# Patient Record
Sex: FEMALE | Race: WHITE | Employment: UNEMPLOYED
[De-identification: names, ages, dates, MRNs, and addresses within clinical notes are randomized per-mention and may not be internally consistent; named-entity substitution may affect disease eponyms.]

---

## 2017-01-01 ENCOUNTER — HOME CARE VISIT (OUTPATIENT)
Dept: SCHEDULING | Facility: HOME HEALTH | Age: 62
End: 2017-01-01
Payer: MEDICARE

## 2017-01-01 ENCOUNTER — HOME CARE VISIT (OUTPATIENT)
Dept: HOSPICE | Facility: HOSPICE | Age: 62
End: 2017-01-01
Payer: MEDICARE

## 2017-01-01 ENCOUNTER — TELEPHONE (OUTPATIENT)
Dept: PALLATIVE CARE | Age: 62
End: 2017-01-01

## 2017-01-01 ENCOUNTER — HOSPITAL ENCOUNTER (INPATIENT)
Age: 62
LOS: 6 days | Discharge: HOME | DRG: 189 | End: 2017-02-12
Attending: EMERGENCY MEDICINE | Admitting: INTERNAL MEDICINE
Payer: MEDICARE

## 2017-01-01 ENCOUNTER — OFFICE VISIT (OUTPATIENT)
Dept: FAMILY MEDICINE CLINIC | Age: 62
End: 2017-01-01

## 2017-01-01 ENCOUNTER — NURSE NAVIGATOR (OUTPATIENT)
Dept: PALLATIVE CARE | Age: 62
End: 2017-01-01

## 2017-01-01 ENCOUNTER — APPOINTMENT (OUTPATIENT)
Dept: GENERAL RADIOLOGY | Age: 62
DRG: 871 | End: 2017-01-01
Attending: INTERNAL MEDICINE
Payer: MEDICARE

## 2017-01-01 ENCOUNTER — DOCUMENTATION ONLY (OUTPATIENT)
Dept: FAMILY MEDICINE CLINIC | Age: 62
End: 2017-01-01

## 2017-01-01 ENCOUNTER — HOME CARE VISIT (OUTPATIENT)
Dept: HOME HEALTH SERVICES | Facility: HOME HEALTH | Age: 62
End: 2017-01-01
Payer: MEDICARE

## 2017-01-01 ENCOUNTER — APPOINTMENT (OUTPATIENT)
Dept: NUCLEAR MEDICINE | Age: 62
DRG: 871 | End: 2017-01-01
Attending: INTERNAL MEDICINE
Payer: MEDICARE

## 2017-01-01 ENCOUNTER — HOME HEALTH ADMISSION (OUTPATIENT)
Dept: HOME HEALTH SERVICES | Facility: HOME HEALTH | Age: 62
End: 2017-01-01
Payer: MEDICARE

## 2017-01-01 ENCOUNTER — PATIENT OUTREACH (OUTPATIENT)
Dept: FAMILY MEDICINE CLINIC | Age: 62
End: 2017-01-01

## 2017-01-01 ENCOUNTER — HOSPITAL ENCOUNTER (INPATIENT)
Age: 62
LOS: 6 days | Discharge: HOME HEALTH | DRG: 871 | End: 2017-04-22
Attending: EMERGENCY MEDICINE | Admitting: INTERNAL MEDICINE
Payer: MEDICARE

## 2017-01-01 ENCOUNTER — DOCUMENTATION ONLY (OUTPATIENT)
Dept: PALLATIVE CARE | Age: 62
End: 2017-01-01

## 2017-01-01 ENCOUNTER — APPOINTMENT (OUTPATIENT)
Dept: GENERAL RADIOLOGY | Age: 62
DRG: 871 | End: 2017-01-01
Attending: EMERGENCY MEDICINE
Payer: MEDICARE

## 2017-01-01 ENCOUNTER — ANESTHESIA (OUTPATIENT)
Dept: ENDOSCOPY | Age: 62
DRG: 871 | End: 2017-01-01
Payer: MEDICARE

## 2017-01-01 ENCOUNTER — APPOINTMENT (OUTPATIENT)
Dept: CT IMAGING | Age: 62
DRG: 871 | End: 2017-01-01
Attending: FAMILY MEDICINE
Payer: MEDICARE

## 2017-01-01 ENCOUNTER — HOSPITAL ENCOUNTER (INPATIENT)
Age: 62
LOS: 9 days | Discharge: HOME | DRG: 871 | End: 2017-01-24
Attending: EMERGENCY MEDICINE | Admitting: INTERNAL MEDICINE
Payer: MEDICARE

## 2017-01-01 ENCOUNTER — HOSPICE ADMISSION (OUTPATIENT)
Dept: HOSPICE | Facility: HOSPICE | Age: 62
End: 2017-01-01
Payer: MEDICARE

## 2017-01-01 ENCOUNTER — ANESTHESIA EVENT (OUTPATIENT)
Dept: ENDOSCOPY | Age: 62
DRG: 871 | End: 2017-01-01
Payer: MEDICARE

## 2017-01-01 ENCOUNTER — APPOINTMENT (OUTPATIENT)
Dept: CT IMAGING | Age: 62
DRG: 871 | End: 2017-01-01
Attending: EMERGENCY MEDICINE
Payer: MEDICARE

## 2017-01-01 ENCOUNTER — APPOINTMENT (OUTPATIENT)
Dept: CT IMAGING | Age: 62
DRG: 871 | End: 2017-01-01
Attending: INTERNAL MEDICINE
Payer: MEDICARE

## 2017-01-01 ENCOUNTER — HOSPITAL ENCOUNTER (INPATIENT)
Age: 62
LOS: 8 days | Discharge: HOME HEALTH | DRG: 871 | End: 2017-03-03
Attending: EMERGENCY MEDICINE | Admitting: HOSPITALIST
Payer: MEDICARE

## 2017-01-01 ENCOUNTER — HOME VISIT (OUTPATIENT)
Dept: PALLATIVE CARE | Age: 62
End: 2017-01-01

## 2017-01-01 ENCOUNTER — APPOINTMENT (OUTPATIENT)
Dept: GENERAL RADIOLOGY | Age: 62
DRG: 871 | End: 2017-01-01
Attending: FAMILY MEDICINE
Payer: MEDICARE

## 2017-01-01 ENCOUNTER — HOSPITAL ENCOUNTER (INPATIENT)
Age: 62
LOS: 5 days | Discharge: HOME HEALTH | DRG: 871 | End: 2017-03-27
Attending: EMERGENCY MEDICINE | Admitting: FAMILY MEDICINE
Payer: MEDICARE

## 2017-01-01 ENCOUNTER — APPOINTMENT (OUTPATIENT)
Dept: PET IMAGING | Age: 62
DRG: 189 | End: 2017-01-01
Attending: FAMILY MEDICINE
Payer: MEDICARE

## 2017-01-01 ENCOUNTER — HOSPITAL ENCOUNTER (INPATIENT)
Age: 62
LOS: 4 days | Discharge: HOME HEALTH | DRG: 871 | End: 2017-03-10
Attending: EMERGENCY MEDICINE | Admitting: INTERNAL MEDICINE
Payer: MEDICARE

## 2017-01-01 ENCOUNTER — APPOINTMENT (OUTPATIENT)
Dept: ULTRASOUND IMAGING | Age: 62
DRG: 871 | End: 2017-01-01
Attending: INTERNAL MEDICINE
Payer: MEDICARE

## 2017-01-01 ENCOUNTER — HOSPITAL ENCOUNTER (INPATIENT)
Age: 62
LOS: 3 days | End: 2017-11-19
Attending: INTERNAL MEDICINE | Admitting: INTERNAL MEDICINE

## 2017-01-01 ENCOUNTER — APPOINTMENT (OUTPATIENT)
Dept: GENERAL RADIOLOGY | Age: 62
DRG: 189 | End: 2017-01-01
Attending: EMERGENCY MEDICINE
Payer: MEDICARE

## 2017-01-01 VITALS
OXYGEN SATURATION: 96 % | TEMPERATURE: 98.6 F | DIASTOLIC BLOOD PRESSURE: 83 MMHG | HEART RATE: 70 BPM | SYSTOLIC BLOOD PRESSURE: 145 MMHG

## 2017-01-01 VITALS
TEMPERATURE: 98.8 F | RESPIRATION RATE: 17 BRPM | SYSTOLIC BLOOD PRESSURE: 90 MMHG | DIASTOLIC BLOOD PRESSURE: 60 MMHG | OXYGEN SATURATION: 97 % | HEART RATE: 67 BPM

## 2017-01-01 VITALS
SYSTOLIC BLOOD PRESSURE: 150 MMHG | HEART RATE: 97 BPM | HEIGHT: 69 IN | OXYGEN SATURATION: 95 % | WEIGHT: 142.86 LBS | TEMPERATURE: 99 F | RESPIRATION RATE: 30 BRPM | BODY MASS INDEX: 21.16 KG/M2 | DIASTOLIC BLOOD PRESSURE: 88 MMHG

## 2017-01-01 VITALS — HEART RATE: 100 BPM | SYSTOLIC BLOOD PRESSURE: 90 MMHG | RESPIRATION RATE: 24 BRPM | DIASTOLIC BLOOD PRESSURE: 62 MMHG

## 2017-01-01 VITALS
RESPIRATION RATE: 20 BRPM | SYSTOLIC BLOOD PRESSURE: 118 MMHG | HEART RATE: 105 BPM | OXYGEN SATURATION: 92 % | DIASTOLIC BLOOD PRESSURE: 80 MMHG

## 2017-01-01 VITALS
OXYGEN SATURATION: 97 % | HEART RATE: 81 BPM | WEIGHT: 135 LBS | TEMPERATURE: 97.3 F | DIASTOLIC BLOOD PRESSURE: 89 MMHG | RESPIRATION RATE: 18 BRPM | BODY MASS INDEX: 20.46 KG/M2 | HEIGHT: 68 IN | SYSTOLIC BLOOD PRESSURE: 136 MMHG

## 2017-01-01 VITALS
DIASTOLIC BLOOD PRESSURE: 90 MMHG | RESPIRATION RATE: 18 BRPM | HEART RATE: 69 BPM | SYSTOLIC BLOOD PRESSURE: 134 MMHG | OXYGEN SATURATION: 97 % | TEMPERATURE: 97 F

## 2017-01-01 VITALS
BODY MASS INDEX: 19.99 KG/M2 | OXYGEN SATURATION: 94 % | SYSTOLIC BLOOD PRESSURE: 130 MMHG | DIASTOLIC BLOOD PRESSURE: 90 MMHG | RESPIRATION RATE: 26 BRPM | HEIGHT: 69 IN | TEMPERATURE: 97.6 F | HEART RATE: 85 BPM | WEIGHT: 135 LBS

## 2017-01-01 VITALS
RESPIRATION RATE: 22 BRPM | TEMPERATURE: 98 F | HEART RATE: 80 BPM | WEIGHT: 132 LBS | BODY MASS INDEX: 20.07 KG/M2 | SYSTOLIC BLOOD PRESSURE: 125 MMHG | DIASTOLIC BLOOD PRESSURE: 82 MMHG | OXYGEN SATURATION: 95 %

## 2017-01-01 VITALS
DIASTOLIC BLOOD PRESSURE: 66 MMHG | RESPIRATION RATE: 14 BRPM | HEIGHT: 68 IN | WEIGHT: 134.92 LBS | HEART RATE: 74 BPM | BODY MASS INDEX: 20.45 KG/M2 | TEMPERATURE: 97.5 F | SYSTOLIC BLOOD PRESSURE: 132 MMHG | OXYGEN SATURATION: 97 %

## 2017-01-01 VITALS
DIASTOLIC BLOOD PRESSURE: 62 MMHG | RESPIRATION RATE: 16 BRPM | HEART RATE: 60 BPM | TEMPERATURE: 98 F | SYSTOLIC BLOOD PRESSURE: 102 MMHG | OXYGEN SATURATION: 98 %

## 2017-01-01 VITALS
DIASTOLIC BLOOD PRESSURE: 88 MMHG | SYSTOLIC BLOOD PRESSURE: 110 MMHG | RESPIRATION RATE: 14 BRPM | OXYGEN SATURATION: 91 % | HEART RATE: 66 BPM | OXYGEN SATURATION: 97 % | HEART RATE: 99 BPM | TEMPERATURE: 99.3 F | DIASTOLIC BLOOD PRESSURE: 62 MMHG | SYSTOLIC BLOOD PRESSURE: 122 MMHG

## 2017-01-01 VITALS
HEART RATE: 60 BPM | WEIGHT: 125 LBS | RESPIRATION RATE: 24 BRPM | BODY MASS INDEX: 18.46 KG/M2 | SYSTOLIC BLOOD PRESSURE: 100 MMHG | DIASTOLIC BLOOD PRESSURE: 66 MMHG

## 2017-01-01 VITALS
BODY MASS INDEX: 19.31 KG/M2 | TEMPERATURE: 98.7 F | SYSTOLIC BLOOD PRESSURE: 96 MMHG | HEART RATE: 50 BPM | HEIGHT: 69 IN | RESPIRATION RATE: 24 BRPM | DIASTOLIC BLOOD PRESSURE: 48 MMHG | WEIGHT: 130.4 LBS | OXYGEN SATURATION: 96 %

## 2017-01-01 VITALS
OXYGEN SATURATION: 96 % | DIASTOLIC BLOOD PRESSURE: 80 MMHG | TEMPERATURE: 98.4 F | RESPIRATION RATE: 16 BRPM | SYSTOLIC BLOOD PRESSURE: 120 MMHG | HEART RATE: 77 BPM

## 2017-01-01 VITALS — TEMPERATURE: 98.8 F | OXYGEN SATURATION: 93 % | SYSTOLIC BLOOD PRESSURE: 141 MMHG | DIASTOLIC BLOOD PRESSURE: 89 MMHG

## 2017-01-01 VITALS
RESPIRATION RATE: 20 BRPM | SYSTOLIC BLOOD PRESSURE: 122 MMHG | HEART RATE: 99 BPM | OXYGEN SATURATION: 99 % | DIASTOLIC BLOOD PRESSURE: 84 MMHG

## 2017-01-01 VITALS
TEMPERATURE: 97.1 F | OXYGEN SATURATION: 90 % | HEIGHT: 68 IN | HEART RATE: 60 BPM | RESPIRATION RATE: 18 BRPM | WEIGHT: 146.4 LBS | BODY MASS INDEX: 22.19 KG/M2 | DIASTOLIC BLOOD PRESSURE: 78 MMHG | SYSTOLIC BLOOD PRESSURE: 122 MMHG

## 2017-01-01 VITALS
BODY MASS INDEX: 20.16 KG/M2 | WEIGHT: 133 LBS | HEART RATE: 70 BPM | SYSTOLIC BLOOD PRESSURE: 114 MMHG | RESPIRATION RATE: 20 BRPM | OXYGEN SATURATION: 96 % | SYSTOLIC BLOOD PRESSURE: 136 MMHG | TEMPERATURE: 98.5 F | DIASTOLIC BLOOD PRESSURE: 76 MMHG | RESPIRATION RATE: 26 BRPM | OXYGEN SATURATION: 98 % | HEART RATE: 107 BPM | HEIGHT: 68 IN | DIASTOLIC BLOOD PRESSURE: 92 MMHG

## 2017-01-01 VITALS
SYSTOLIC BLOOD PRESSURE: 178 MMHG | TEMPERATURE: 98 F | RESPIRATION RATE: 30 BRPM | WEIGHT: 127 LBS | DIASTOLIC BLOOD PRESSURE: 92 MMHG | HEART RATE: 100 BPM | OXYGEN SATURATION: 93 % | BODY MASS INDEX: 18.75 KG/M2

## 2017-01-01 VITALS — DIASTOLIC BLOOD PRESSURE: 80 MMHG | OXYGEN SATURATION: 98 % | SYSTOLIC BLOOD PRESSURE: 140 MMHG | HEART RATE: 56 BPM

## 2017-01-01 VITALS
BODY MASS INDEX: 19.64 KG/M2 | HEART RATE: 91 BPM | SYSTOLIC BLOOD PRESSURE: 100 MMHG | OXYGEN SATURATION: 97 % | RESPIRATION RATE: 16 BRPM | WEIGHT: 133 LBS | DIASTOLIC BLOOD PRESSURE: 82 MMHG

## 2017-01-01 VITALS
SYSTOLIC BLOOD PRESSURE: 122 MMHG | HEART RATE: 77 BPM | RESPIRATION RATE: 20 BRPM | OXYGEN SATURATION: 94 % | DIASTOLIC BLOOD PRESSURE: 78 MMHG | TEMPERATURE: 97.1 F

## 2017-01-01 VITALS
SYSTOLIC BLOOD PRESSURE: 139 MMHG | HEART RATE: 93 BPM | RESPIRATION RATE: 20 BRPM | TEMPERATURE: 96.2 F | OXYGEN SATURATION: 92 % | DIASTOLIC BLOOD PRESSURE: 80 MMHG

## 2017-01-01 VITALS
DIASTOLIC BLOOD PRESSURE: 59 MMHG | TEMPERATURE: 98.3 F | OXYGEN SATURATION: 92 % | HEART RATE: 63 BPM | RESPIRATION RATE: 16 BRPM | SYSTOLIC BLOOD PRESSURE: 101 MMHG

## 2017-01-01 VITALS
TEMPERATURE: 98.1 F | HEART RATE: 65 BPM | WEIGHT: 132 LBS | RESPIRATION RATE: 20 BRPM | OXYGEN SATURATION: 94 % | SYSTOLIC BLOOD PRESSURE: 100 MMHG | BODY MASS INDEX: 20.07 KG/M2 | DIASTOLIC BLOOD PRESSURE: 64 MMHG

## 2017-01-01 VITALS
BODY MASS INDEX: 22.73 KG/M2 | TEMPERATURE: 97.5 F | WEIGHT: 150 LBS | HEART RATE: 63 BPM | HEIGHT: 68 IN | RESPIRATION RATE: 20 BRPM | OXYGEN SATURATION: 94 % | DIASTOLIC BLOOD PRESSURE: 62 MMHG | SYSTOLIC BLOOD PRESSURE: 112 MMHG

## 2017-01-01 VITALS
WEIGHT: 133 LBS | SYSTOLIC BLOOD PRESSURE: 100 MMHG | HEIGHT: 67 IN | HEART RATE: 68 BPM | RESPIRATION RATE: 26 BRPM | DIASTOLIC BLOOD PRESSURE: 60 MMHG | BODY MASS INDEX: 20.88 KG/M2 | OXYGEN SATURATION: 89 % | TEMPERATURE: 98.5 F

## 2017-01-01 VITALS
SYSTOLIC BLOOD PRESSURE: 122 MMHG | RESPIRATION RATE: 20 BRPM | TEMPERATURE: 97.4 F | HEART RATE: 96 BPM | DIASTOLIC BLOOD PRESSURE: 80 MMHG | OXYGEN SATURATION: 89 %

## 2017-01-01 VITALS
HEART RATE: 94 BPM | SYSTOLIC BLOOD PRESSURE: 116 MMHG | DIASTOLIC BLOOD PRESSURE: 74 MMHG | RESPIRATION RATE: 24 BRPM | OXYGEN SATURATION: 99 %

## 2017-01-01 VITALS
DIASTOLIC BLOOD PRESSURE: 88 MMHG | DIASTOLIC BLOOD PRESSURE: 72 MMHG | SYSTOLIC BLOOD PRESSURE: 130 MMHG | SYSTOLIC BLOOD PRESSURE: 132 MMHG | RESPIRATION RATE: 28 BRPM | TEMPERATURE: 98.2 F | HEART RATE: 73 BPM | TEMPERATURE: 98.1 F | HEART RATE: 91 BPM | OXYGEN SATURATION: 97 % | OXYGEN SATURATION: 94 %

## 2017-01-01 VITALS
SYSTOLIC BLOOD PRESSURE: 128 MMHG | DIASTOLIC BLOOD PRESSURE: 70 MMHG | TEMPERATURE: 96.5 F | RESPIRATION RATE: 20 BRPM | HEART RATE: 54 BPM

## 2017-01-01 VITALS
BODY MASS INDEX: 19.26 KG/M2 | RESPIRATION RATE: 16 BRPM | SYSTOLIC BLOOD PRESSURE: 110 MMHG | HEART RATE: 60 BPM | WEIGHT: 130 LBS | OXYGEN SATURATION: 96 % | DIASTOLIC BLOOD PRESSURE: 72 MMHG | HEIGHT: 69 IN

## 2017-01-01 VITALS
BODY MASS INDEX: 21.46 KG/M2 | HEART RATE: 96 BPM | RESPIRATION RATE: 18 BRPM | TEMPERATURE: 97.3 F | WEIGHT: 144.9 LBS | SYSTOLIC BLOOD PRESSURE: 150 MMHG | DIASTOLIC BLOOD PRESSURE: 90 MMHG | OXYGEN SATURATION: 96 % | HEIGHT: 69 IN

## 2017-01-01 VITALS
SYSTOLIC BLOOD PRESSURE: 157 MMHG | SYSTOLIC BLOOD PRESSURE: 108 MMHG | DIASTOLIC BLOOD PRESSURE: 99 MMHG | HEART RATE: 81 BPM | OXYGEN SATURATION: 97 % | TEMPERATURE: 98.9 F | RESPIRATION RATE: 16 BRPM | HEART RATE: 55 BPM | RESPIRATION RATE: 16 BRPM | DIASTOLIC BLOOD PRESSURE: 60 MMHG | OXYGEN SATURATION: 98 %

## 2017-01-01 VITALS
HEART RATE: 102 BPM | RESPIRATION RATE: 24 BRPM | DIASTOLIC BLOOD PRESSURE: 76 MMHG | OXYGEN SATURATION: 98 % | SYSTOLIC BLOOD PRESSURE: 118 MMHG

## 2017-01-01 VITALS
TEMPERATURE: 97.8 F | OXYGEN SATURATION: 97 % | SYSTOLIC BLOOD PRESSURE: 108 MMHG | HEART RATE: 66 BPM | DIASTOLIC BLOOD PRESSURE: 72 MMHG | RESPIRATION RATE: 17 BRPM

## 2017-01-01 VITALS
DIASTOLIC BLOOD PRESSURE: 70 MMHG | SYSTOLIC BLOOD PRESSURE: 110 MMHG | OXYGEN SATURATION: 96 % | HEART RATE: 72 BPM | RESPIRATION RATE: 24 BRPM

## 2017-01-01 VITALS
HEART RATE: 65 BPM | TEMPERATURE: 98 F | RESPIRATION RATE: 16 BRPM | SYSTOLIC BLOOD PRESSURE: 90 MMHG | DIASTOLIC BLOOD PRESSURE: 60 MMHG | OXYGEN SATURATION: 98 %

## 2017-01-01 VITALS
BODY MASS INDEX: 22.6 KG/M2 | WEIGHT: 152.6 LBS | TEMPERATURE: 98.2 F | OXYGEN SATURATION: 94 % | RESPIRATION RATE: 17 BRPM | SYSTOLIC BLOOD PRESSURE: 116 MMHG | HEIGHT: 69 IN | HEART RATE: 71 BPM | DIASTOLIC BLOOD PRESSURE: 73 MMHG

## 2017-01-01 VITALS — RESPIRATION RATE: 24 BRPM | DIASTOLIC BLOOD PRESSURE: 68 MMHG | SYSTOLIC BLOOD PRESSURE: 114 MMHG | HEART RATE: 90 BPM

## 2017-01-01 VITALS — SYSTOLIC BLOOD PRESSURE: 154 MMHG | DIASTOLIC BLOOD PRESSURE: 98 MMHG | RESPIRATION RATE: 14 BRPM | HEART RATE: 96 BPM

## 2017-01-01 VITALS
DIASTOLIC BLOOD PRESSURE: 72 MMHG | RESPIRATION RATE: 17 BRPM | OXYGEN SATURATION: 97 % | HEART RATE: 65 BPM | SYSTOLIC BLOOD PRESSURE: 108 MMHG | TEMPERATURE: 98.5 F

## 2017-01-01 VITALS
TEMPERATURE: 97.9 F | SYSTOLIC BLOOD PRESSURE: 123 MMHG | DIASTOLIC BLOOD PRESSURE: 79 MMHG | OXYGEN SATURATION: 96 % | HEART RATE: 69 BPM

## 2017-01-01 VITALS
HEART RATE: 92 BPM | OXYGEN SATURATION: 92 % | SYSTOLIC BLOOD PRESSURE: 108 MMHG | DIASTOLIC BLOOD PRESSURE: 88 MMHG | RESPIRATION RATE: 20 BRPM | TEMPERATURE: 99.1 F

## 2017-01-01 VITALS
HEART RATE: 86 BPM | TEMPERATURE: 96.9 F | SYSTOLIC BLOOD PRESSURE: 118 MMHG | OXYGEN SATURATION: 95 % | DIASTOLIC BLOOD PRESSURE: 79 MMHG

## 2017-01-01 VITALS — SYSTOLIC BLOOD PRESSURE: 130 MMHG | DIASTOLIC BLOOD PRESSURE: 62 MMHG | RESPIRATION RATE: 22 BRPM | HEART RATE: 72 BPM

## 2017-01-01 VITALS
TEMPERATURE: 97.1 F | HEART RATE: 111 BPM | RESPIRATION RATE: 28 BRPM | OXYGEN SATURATION: 91 % | SYSTOLIC BLOOD PRESSURE: 122 MMHG | DIASTOLIC BLOOD PRESSURE: 80 MMHG

## 2017-01-01 VITALS
OXYGEN SATURATION: 91 % | SYSTOLIC BLOOD PRESSURE: 60 MMHG | HEART RATE: 66 BPM | DIASTOLIC BLOOD PRESSURE: 40 MMHG | RESPIRATION RATE: 14 BRPM | TEMPERATURE: 99.8 F

## 2017-01-01 VITALS
DIASTOLIC BLOOD PRESSURE: 64 MMHG | SYSTOLIC BLOOD PRESSURE: 102 MMHG | OXYGEN SATURATION: 93 % | TEMPERATURE: 97.3 F | RESPIRATION RATE: 28 BRPM | HEART RATE: 75 BPM

## 2017-01-01 VITALS
TEMPERATURE: 99.1 F | HEART RATE: 109 BPM | SYSTOLIC BLOOD PRESSURE: 116 MMHG | RESPIRATION RATE: 30 BRPM | HEIGHT: 69 IN | BODY MASS INDEX: 21.56 KG/M2 | WEIGHT: 145.6 LBS | OXYGEN SATURATION: 89 % | DIASTOLIC BLOOD PRESSURE: 74 MMHG

## 2017-01-01 VITALS
SYSTOLIC BLOOD PRESSURE: 134 MMHG | RESPIRATION RATE: 17 BRPM | DIASTOLIC BLOOD PRESSURE: 72 MMHG | OXYGEN SATURATION: 97 % | HEART RATE: 79 BPM | TEMPERATURE: 98.2 F

## 2017-01-01 VITALS — DIASTOLIC BLOOD PRESSURE: 72 MMHG | HEART RATE: 71 BPM | OXYGEN SATURATION: 91 % | SYSTOLIC BLOOD PRESSURE: 120 MMHG

## 2017-01-01 VITALS
RESPIRATION RATE: 16 BRPM | TEMPERATURE: 99 F | DIASTOLIC BLOOD PRESSURE: 93 MMHG | WEIGHT: 133 LBS | SYSTOLIC BLOOD PRESSURE: 151 MMHG | HEIGHT: 69 IN | HEART RATE: 87 BPM | OXYGEN SATURATION: 99 % | BODY MASS INDEX: 19.7 KG/M2

## 2017-01-01 VITALS — OXYGEN SATURATION: 89 % | TEMPERATURE: 98.8 F

## 2017-01-01 VITALS
DIASTOLIC BLOOD PRESSURE: 67 MMHG | TEMPERATURE: 98 F | OXYGEN SATURATION: 93 % | SYSTOLIC BLOOD PRESSURE: 104 MMHG | HEART RATE: 63 BPM

## 2017-01-01 VITALS
BODY MASS INDEX: 19.7 KG/M2 | SYSTOLIC BLOOD PRESSURE: 122 MMHG | DIASTOLIC BLOOD PRESSURE: 68 MMHG | OXYGEN SATURATION: 93 % | WEIGHT: 133.4 LBS | RESPIRATION RATE: 16 BRPM | HEART RATE: 61 BPM

## 2017-01-01 VITALS
RESPIRATION RATE: 16 BRPM | WEIGHT: 134.4 LBS | DIASTOLIC BLOOD PRESSURE: 64 MMHG | OXYGEN SATURATION: 94 % | SYSTOLIC BLOOD PRESSURE: 102 MMHG | BODY MASS INDEX: 19.85 KG/M2 | HEART RATE: 53 BPM

## 2017-01-01 VITALS
SYSTOLIC BLOOD PRESSURE: 124 MMHG | DIASTOLIC BLOOD PRESSURE: 76 MMHG | RESPIRATION RATE: 18 BRPM | TEMPERATURE: 98 F | HEART RATE: 67 BPM | OXYGEN SATURATION: 99 %

## 2017-01-01 VITALS — OXYGEN SATURATION: 88 % | DIASTOLIC BLOOD PRESSURE: 40 MMHG | SYSTOLIC BLOOD PRESSURE: 60 MMHG | HEART RATE: 76 BPM

## 2017-01-01 VITALS
RESPIRATION RATE: 16 BRPM | OXYGEN SATURATION: 94 % | DIASTOLIC BLOOD PRESSURE: 82 MMHG | HEART RATE: 74 BPM | SYSTOLIC BLOOD PRESSURE: 128 MMHG | TEMPERATURE: 98.3 F

## 2017-01-01 VITALS
SYSTOLIC BLOOD PRESSURE: 126 MMHG | TEMPERATURE: 97.6 F | OXYGEN SATURATION: 92 % | HEART RATE: 85 BPM | DIASTOLIC BLOOD PRESSURE: 82 MMHG

## 2017-01-01 VITALS
SYSTOLIC BLOOD PRESSURE: 110 MMHG | DIASTOLIC BLOOD PRESSURE: 80 MMHG | TEMPERATURE: 98.1 F | OXYGEN SATURATION: 97 % | HEART RATE: 87 BPM

## 2017-01-01 VITALS
RESPIRATION RATE: 16 BRPM | SYSTOLIC BLOOD PRESSURE: 102 MMHG | DIASTOLIC BLOOD PRESSURE: 60 MMHG | TEMPERATURE: 98.8 F | OXYGEN SATURATION: 95 % | HEART RATE: 75 BPM

## 2017-01-01 VITALS
WEIGHT: 144 LBS | DIASTOLIC BLOOD PRESSURE: 68 MMHG | BODY MASS INDEX: 21.33 KG/M2 | TEMPERATURE: 98 F | HEART RATE: 102 BPM | RESPIRATION RATE: 16 BRPM | OXYGEN SATURATION: 97 % | SYSTOLIC BLOOD PRESSURE: 100 MMHG | HEIGHT: 69 IN

## 2017-01-01 VITALS — HEART RATE: 96 BPM | TEMPERATURE: 98.8 F | OXYGEN SATURATION: 95 %

## 2017-01-01 VITALS
HEART RATE: 99 BPM | OXYGEN SATURATION: 97 % | RESPIRATION RATE: 20 BRPM | SYSTOLIC BLOOD PRESSURE: 138 MMHG | DIASTOLIC BLOOD PRESSURE: 80 MMHG | TEMPERATURE: 98.7 F

## 2017-01-01 VITALS
RESPIRATION RATE: 20 BRPM | TEMPERATURE: 100.1 F | OXYGEN SATURATION: 92 % | SYSTOLIC BLOOD PRESSURE: 110 MMHG | HEART RATE: 77 BPM | DIASTOLIC BLOOD PRESSURE: 66 MMHG

## 2017-01-01 VITALS
HEART RATE: 96 BPM | SYSTOLIC BLOOD PRESSURE: 140 MMHG | RESPIRATION RATE: 22 BRPM | DIASTOLIC BLOOD PRESSURE: 70 MMHG | TEMPERATURE: 98.9 F | DIASTOLIC BLOOD PRESSURE: 90 MMHG | RESPIRATION RATE: 20 BRPM | SYSTOLIC BLOOD PRESSURE: 102 MMHG | WEIGHT: 125 LBS | OXYGEN SATURATION: 95 % | HEART RATE: 101 BPM | BODY MASS INDEX: 18.46 KG/M2

## 2017-01-01 VITALS
OXYGEN SATURATION: 74 % | RESPIRATION RATE: 38 BRPM | SYSTOLIC BLOOD PRESSURE: 118 MMHG | TEMPERATURE: 102.8 F | HEART RATE: 137 BPM | DIASTOLIC BLOOD PRESSURE: 76 MMHG

## 2017-01-01 VITALS
RESPIRATION RATE: 20 BRPM | TEMPERATURE: 98 F | HEART RATE: 68 BPM | DIASTOLIC BLOOD PRESSURE: 94 MMHG | SYSTOLIC BLOOD PRESSURE: 142 MMHG | OXYGEN SATURATION: 94 %

## 2017-01-01 VITALS
RESPIRATION RATE: 20 BRPM | SYSTOLIC BLOOD PRESSURE: 138 MMHG | HEART RATE: 88 BPM | OXYGEN SATURATION: 99 % | DIASTOLIC BLOOD PRESSURE: 80 MMHG

## 2017-01-01 VITALS
DIASTOLIC BLOOD PRESSURE: 70 MMHG | HEIGHT: 68 IN | OXYGEN SATURATION: 98 % | BODY MASS INDEX: 19.85 KG/M2 | RESPIRATION RATE: 20 BRPM | WEIGHT: 131 LBS | HEART RATE: 75 BPM | TEMPERATURE: 99 F | SYSTOLIC BLOOD PRESSURE: 100 MMHG

## 2017-01-01 VITALS
DIASTOLIC BLOOD PRESSURE: 80 MMHG | RESPIRATION RATE: 20 BRPM | HEIGHT: 69 IN | HEART RATE: 68 BPM | WEIGHT: 132 LBS | TEMPERATURE: 97.2 F | SYSTOLIC BLOOD PRESSURE: 122 MMHG | OXYGEN SATURATION: 91 % | BODY MASS INDEX: 19.55 KG/M2

## 2017-01-01 VITALS
SYSTOLIC BLOOD PRESSURE: 108 MMHG | RESPIRATION RATE: 14 BRPM | WEIGHT: 133 LBS | BODY MASS INDEX: 19.64 KG/M2 | HEART RATE: 61 BPM | OXYGEN SATURATION: 94 % | DIASTOLIC BLOOD PRESSURE: 56 MMHG

## 2017-01-01 VITALS
SYSTOLIC BLOOD PRESSURE: 118 MMHG | DIASTOLIC BLOOD PRESSURE: 78 MMHG | TEMPERATURE: 98.6 F | RESPIRATION RATE: 20 BRPM | OXYGEN SATURATION: 95 % | HEART RATE: 74 BPM

## 2017-01-01 VITALS — RESPIRATION RATE: 22 BRPM | SYSTOLIC BLOOD PRESSURE: 102 MMHG | HEART RATE: 62 BPM | DIASTOLIC BLOOD PRESSURE: 68 MMHG

## 2017-01-01 VITALS — HEART RATE: 71 BPM | OXYGEN SATURATION: 91 % | SYSTOLIC BLOOD PRESSURE: 120 MMHG | DIASTOLIC BLOOD PRESSURE: 72 MMHG

## 2017-01-01 VITALS
SYSTOLIC BLOOD PRESSURE: 92 MMHG | OXYGEN SATURATION: 93 % | DIASTOLIC BLOOD PRESSURE: 61 MMHG | TEMPERATURE: 97.8 F | HEART RATE: 63 BPM

## 2017-01-01 VITALS — DIASTOLIC BLOOD PRESSURE: 64 MMHG | OXYGEN SATURATION: 91 % | TEMPERATURE: 100.4 F | SYSTOLIC BLOOD PRESSURE: 108 MMHG

## 2017-01-01 DIAGNOSIS — R13.14 PHARYNGOESOPHAGEAL DYSPHAGIA: ICD-10-CM

## 2017-01-01 DIAGNOSIS — J96.21 ACUTE ON CHRONIC RESPIRATORY FAILURE WITH HYPOXEMIA (HCC): ICD-10-CM

## 2017-01-01 DIAGNOSIS — F31.9 BIPOLAR 1 DISORDER (HCC): ICD-10-CM

## 2017-01-01 DIAGNOSIS — G89.4 CHRONIC PAIN SYNDROME: Chronic | ICD-10-CM

## 2017-01-01 DIAGNOSIS — T17.908S CHRONIC PULMONARY ASPIRATION, SEQUELA: ICD-10-CM

## 2017-01-01 DIAGNOSIS — J96.02 ACUTE RESPIRATORY FAILURE WITH HYPOXIA AND HYPERCAPNIA (HCC): ICD-10-CM

## 2017-01-01 DIAGNOSIS — J69.0 ASPIRATION PNEUMONIA OF BOTH UPPER LOBES, UNSPECIFIED ASPIRATION PNEUMONIA TYPE (HCC): ICD-10-CM

## 2017-01-01 DIAGNOSIS — J69.0 ASPIRATION PNEUMONIA OF BOTH LUNGS, UNSPECIFIED ASPIRATION PNEUMONIA TYPE, UNSPECIFIED PART OF LUNG (HCC): ICD-10-CM

## 2017-01-01 DIAGNOSIS — J96.01 ACUTE RESPIRATORY FAILURE WITH HYPOXIA AND HYPERCAPNIA (HCC): ICD-10-CM

## 2017-01-01 DIAGNOSIS — R53.81 DEBILITY: ICD-10-CM

## 2017-01-01 DIAGNOSIS — R13.10 DYSPHAGIA, UNSPECIFIED TYPE: ICD-10-CM

## 2017-01-01 DIAGNOSIS — G89.4 CHRONIC PAIN SYNDROME: Primary | ICD-10-CM

## 2017-01-01 DIAGNOSIS — G89.3 CHRONIC PAIN DUE TO NEOPLASM: Chronic | ICD-10-CM

## 2017-01-01 DIAGNOSIS — Z85.21 HISTORY OF LARYNGEAL CANCER: ICD-10-CM

## 2017-01-01 DIAGNOSIS — N17.9 ACUTE RENAL INJURY (HCC): ICD-10-CM

## 2017-01-01 DIAGNOSIS — J96.01 ACUTE RESPIRATORY FAILURE WITH HYPOXIA (HCC): Primary | ICD-10-CM

## 2017-01-01 DIAGNOSIS — R65.21 SEPTIC SHOCK (HCC): Primary | ICD-10-CM

## 2017-01-01 DIAGNOSIS — Z71.89 GOALS OF CARE, COUNSELING/DISCUSSION: ICD-10-CM

## 2017-01-01 DIAGNOSIS — J96.21 ACUTE ON CHRONIC RESPIRATORY FAILURE WITH HYPOXIA (HCC): ICD-10-CM

## 2017-01-01 DIAGNOSIS — Z92.3 HISTORY OF RADIATION TO HEAD AND NECK REGION: ICD-10-CM

## 2017-01-01 DIAGNOSIS — R06.02 SHORTNESS OF BREATH: ICD-10-CM

## 2017-01-01 DIAGNOSIS — J18.9 HCAP (HEALTHCARE-ASSOCIATED PNEUMONIA): ICD-10-CM

## 2017-01-01 DIAGNOSIS — Z71.89 COUNSELING REGARDING ADVANCED DIRECTIVES AND GOALS OF CARE: ICD-10-CM

## 2017-01-01 DIAGNOSIS — J96.21 ACUTE ON CHRONIC RESPIRATORY FAILURE WITH HYPOXEMIA (HCC): Primary | ICD-10-CM

## 2017-01-01 DIAGNOSIS — J96.01 ACUTE RESPIRATORY FAILURE WITH HYPOXIA (HCC): ICD-10-CM

## 2017-01-01 DIAGNOSIS — J69.0 ASPIRATION PNEUMONIA OF BOTH UPPER LOBES, UNSPECIFIED ASPIRATION PNEUMONIA TYPE (HCC): Primary | ICD-10-CM

## 2017-01-01 DIAGNOSIS — F31.9 BIPOLAR 1 DISORDER (HCC): Primary | ICD-10-CM

## 2017-01-01 DIAGNOSIS — G89.29 OTHER CHRONIC PAIN: ICD-10-CM

## 2017-01-01 DIAGNOSIS — M79.7 FIBROMYALGIA SYNDROME: ICD-10-CM

## 2017-01-01 DIAGNOSIS — T85.848A PAIN AROUND PEG TUBE SITE, INITIAL ENCOUNTER: ICD-10-CM

## 2017-01-01 DIAGNOSIS — I10 ESSENTIAL HYPERTENSION, BENIGN: ICD-10-CM

## 2017-01-01 DIAGNOSIS — C32.9 LARYNGEAL CARCINOMA (HCC): ICD-10-CM

## 2017-01-01 DIAGNOSIS — J44.1 CHRONIC OBSTRUCTIVE PULMONARY DISEASE WITH ACUTE EXACERBATION (HCC): ICD-10-CM

## 2017-01-01 DIAGNOSIS — J96.21 ACUTE ON CHRONIC RESPIRATORY FAILURE WITH HYPOXIA (HCC): Primary | ICD-10-CM

## 2017-01-01 DIAGNOSIS — J96.91 RESPIRATORY FAILURE WITH HYPOXIA, UNSPECIFIED CHRONICITY (HCC): ICD-10-CM

## 2017-01-01 DIAGNOSIS — I48.91 ATRIAL FIBRILLATION WITH RVR (HCC): ICD-10-CM

## 2017-01-01 DIAGNOSIS — A41.9 SEPSIS, DUE TO UNSPECIFIED ORGANISM: ICD-10-CM

## 2017-01-01 DIAGNOSIS — A41.9 SEPTIC SHOCK (HCC): Primary | ICD-10-CM

## 2017-01-01 DIAGNOSIS — F11.90 CHRONIC, CONTINUOUS USE OF OPIOIDS: ICD-10-CM

## 2017-01-01 DIAGNOSIS — Z93.1 S/P PERCUTANEOUS ENDOSCOPIC GASTROSTOMY (PEG) TUBE PLACEMENT (HCC): ICD-10-CM

## 2017-01-01 DIAGNOSIS — M79.7 FIBROMYALGIA: ICD-10-CM

## 2017-01-01 DIAGNOSIS — J44.1 CHRONIC OBSTRUCTIVE PULMONARY DISEASE WITH ACUTE EXACERBATION (HCC): Primary | ICD-10-CM

## 2017-01-01 DIAGNOSIS — A41.01 SEPSIS DUE TO METHICILLIN SUSCEPTIBLE STAPHYLOCOCCUS AUREUS (HCC): ICD-10-CM

## 2017-01-01 DIAGNOSIS — R41.0 DELIRIUM: ICD-10-CM

## 2017-01-01 DIAGNOSIS — R57.9 SHOCK (HCC): ICD-10-CM

## 2017-01-01 DIAGNOSIS — C32.9 LARYNGEAL CANCER (HCC): Primary | ICD-10-CM

## 2017-01-01 DIAGNOSIS — J18.9 PNEUMONIA OF BOTH LUNGS DUE TO INFECTIOUS ORGANISM, UNSPECIFIED PART OF LUNG: ICD-10-CM

## 2017-01-01 DIAGNOSIS — C79.9 METASTATIC CANCER (HCC): ICD-10-CM

## 2017-01-01 DIAGNOSIS — R53.0 NEOPLASTIC MALIGNANT RELATED FATIGUE: ICD-10-CM

## 2017-01-01 DIAGNOSIS — R05.9 COUGH: ICD-10-CM

## 2017-01-01 DIAGNOSIS — K11.7 INCREASED OROPHARYNGEAL SECRETIONS: ICD-10-CM

## 2017-01-01 DIAGNOSIS — R77.8 ELEVATED TROPONIN: ICD-10-CM

## 2017-01-01 DIAGNOSIS — G89.3 CHRONIC PAIN DUE TO NEOPLASM: Primary | Chronic | ICD-10-CM

## 2017-01-01 DIAGNOSIS — N17.9 ACUTE RENAL FAILURE, UNSPECIFIED ACUTE RENAL FAILURE TYPE (HCC): ICD-10-CM

## 2017-01-01 DIAGNOSIS — Z87.891 HISTORY OF TOBACCO USE: ICD-10-CM

## 2017-01-01 LAB
ALBUMIN SERPL BCP-MCNC: 2.1 G/DL (ref 3.5–5)
ALBUMIN SERPL BCP-MCNC: 2.3 G/DL (ref 3.5–5)
ALBUMIN SERPL BCP-MCNC: 2.4 G/DL (ref 3.5–5)
ALBUMIN SERPL BCP-MCNC: 2.4 G/DL (ref 3.5–5)
ALBUMIN SERPL BCP-MCNC: 2.6 G/DL (ref 3.5–5)
ALBUMIN SERPL BCP-MCNC: 2.7 G/DL (ref 3.5–5)
ALBUMIN SERPL BCP-MCNC: 2.7 G/DL (ref 3.5–5)
ALBUMIN SERPL BCP-MCNC: 2.8 G/DL (ref 3.5–5)
ALBUMIN SERPL BCP-MCNC: 2.9 G/DL (ref 3.5–5)
ALBUMIN SERPL BCP-MCNC: 3 G/DL (ref 3.5–5)
ALBUMIN SERPL BCP-MCNC: 3.2 G/DL (ref 3.5–5)
ALBUMIN SERPL BCP-MCNC: 3.3 G/DL (ref 3.5–5)
ALBUMIN SERPL-MCNC: 4.1 G/DL (ref 3.6–4.8)
ALBUMIN/GLOB SERPL: 0.4 {RATIO} (ref 1.1–2.2)
ALBUMIN/GLOB SERPL: 0.5 {RATIO} (ref 1.1–2.2)
ALBUMIN/GLOB SERPL: 0.6 {RATIO} (ref 1.1–2.2)
ALBUMIN/GLOB SERPL: 0.7 {RATIO} (ref 1.1–2.2)
ALBUMIN/GLOB SERPL: 0.8 {RATIO} (ref 1.1–2.2)
ALBUMIN/GLOB SERPL: 0.8 {RATIO} (ref 1.1–2.2)
ALBUMIN/GLOB SERPL: 1.6 {RATIO} (ref 1.2–2.2)
ALP SERPL-CCNC: 46 U/L (ref 45–117)
ALP SERPL-CCNC: 50 IU/L (ref 39–117)
ALP SERPL-CCNC: 58 U/L (ref 45–117)
ALP SERPL-CCNC: 60 U/L (ref 45–117)
ALP SERPL-CCNC: 60 U/L (ref 45–117)
ALP SERPL-CCNC: 63 U/L (ref 45–117)
ALP SERPL-CCNC: 64 U/L (ref 45–117)
ALP SERPL-CCNC: 64 U/L (ref 45–117)
ALP SERPL-CCNC: 67 U/L (ref 45–117)
ALP SERPL-CCNC: 67 U/L (ref 45–117)
ALP SERPL-CCNC: 74 U/L (ref 45–117)
ALP SERPL-CCNC: 79 U/L (ref 45–117)
ALP SERPL-CCNC: 82 U/L (ref 45–117)
ALP SERPL-CCNC: 83 U/L (ref 45–117)
ALP SERPL-CCNC: 89 U/L (ref 45–117)
ALT SERPL-CCNC: 12 IU/L (ref 0–32)
ALT SERPL-CCNC: 12 U/L (ref 12–78)
ALT SERPL-CCNC: 13 U/L (ref 12–78)
ALT SERPL-CCNC: 16 U/L (ref 12–78)
ALT SERPL-CCNC: 16 U/L (ref 12–78)
ALT SERPL-CCNC: 17 U/L (ref 12–78)
ALT SERPL-CCNC: 18 U/L (ref 12–78)
ALT SERPL-CCNC: 19 U/L (ref 12–78)
ALT SERPL-CCNC: 29 U/L (ref 12–78)
ALT SERPL-CCNC: 7 U/L (ref 12–78)
ALT SERPL-CCNC: 8 U/L (ref 12–78)
ALT SERPL-CCNC: 8 U/L (ref 12–78)
AMPHET UR QL SCN: NEGATIVE
ANION GAP BLD CALC-SCNC: 10 MMOL/L (ref 5–15)
ANION GAP BLD CALC-SCNC: 10 MMOL/L (ref 5–15)
ANION GAP BLD CALC-SCNC: 11 MMOL/L (ref 5–15)
ANION GAP BLD CALC-SCNC: 4 MMOL/L (ref 5–15)
ANION GAP BLD CALC-SCNC: 5 MMOL/L (ref 5–15)
ANION GAP BLD CALC-SCNC: 6 MMOL/L (ref 5–15)
ANION GAP BLD CALC-SCNC: 7 MMOL/L (ref 5–15)
ANION GAP BLD CALC-SCNC: 8 MMOL/L (ref 5–15)
ANION GAP BLD CALC-SCNC: 9 MMOL/L (ref 5–15)
APPEARANCE UR: ABNORMAL
APPEARANCE UR: ABNORMAL
APPEARANCE UR: CLEAR
APTT PPP: 29 SEC (ref 22.1–32.5)
ARTERIAL PATENCY WRIST A: ABNORMAL
ARTERIAL PATENCY WRIST A: YES
AST SERPL W P-5'-P-CCNC: 10 U/L (ref 15–37)
AST SERPL W P-5'-P-CCNC: 10 U/L (ref 15–37)
AST SERPL W P-5'-P-CCNC: 11 U/L (ref 15–37)
AST SERPL W P-5'-P-CCNC: 12 U/L (ref 15–37)
AST SERPL W P-5'-P-CCNC: 13 U/L (ref 15–37)
AST SERPL W P-5'-P-CCNC: 16 U/L (ref 15–37)
AST SERPL W P-5'-P-CCNC: 19 U/L (ref 15–37)
AST SERPL W P-5'-P-CCNC: 19 U/L (ref 15–37)
AST SERPL W P-5'-P-CCNC: 22 U/L (ref 15–37)
AST SERPL W P-5'-P-CCNC: 8 U/L (ref 15–37)
AST SERPL W P-5'-P-CCNC: 8 U/L (ref 15–37)
AST SERPL W P-5'-P-CCNC: 9 U/L (ref 15–37)
AST SERPL-CCNC: 14 IU/L (ref 0–40)
ATRIAL RATE: 102 BPM
ATRIAL RATE: 111 BPM
ATRIAL RATE: 121 BPM
ATRIAL RATE: 83 BPM
ATRIAL RATE: 84 BPM
ATRIAL RATE: 89 BPM
ATRIAL RATE: 99 BPM
BACTERIA SPEC CULT: ABNORMAL
BACTERIA SPEC CULT: NORMAL
BACTERIA URNS QL MICRO: ABNORMAL /HPF
BACTERIA URNS QL MICRO: NEGATIVE /HPF
BARBITURATES UR QL SCN: NEGATIVE
BASE EXCESS BLDA CALC-SCNC: 0.7 MMOL/L
BASE EXCESS BLDA CALC-SCNC: 1.8 MMOL/L
BASE EXCESS BLDA CALC-SCNC: 2.9 MMOL/L
BASE EXCESS BLDA CALC-SCNC: 4.5 MMOL/L
BASE EXCESS BLDA CALC-SCNC: 4.6 MMOL/L
BASOPHILS # BLD AUTO: 0 K/UL
BASOPHILS # BLD AUTO: 0 K/UL (ref 0–0.1)
BASOPHILS # BLD AUTO: 0 X10E3/UL (ref 0–0.2)
BASOPHILS # BLD AUTO: 0.1 K/UL
BASOPHILS # BLD AUTO: 0.1 K/UL (ref 0–0.1)
BASOPHILS # BLD AUTO: 0.2 K/UL
BASOPHILS # BLD: 0 %
BASOPHILS # BLD: 0 % (ref 0–1)
BASOPHILS # BLD: 1 %
BASOPHILS # BLD: 1 %
BASOPHILS NFR BLD AUTO: 0 %
BDY SITE: ABNORMAL
BENZODIAZ UR QL: NEGATIVE
BILIRUB SERPL-MCNC: 0.2 MG/DL (ref 0.2–1)
BILIRUB SERPL-MCNC: 0.3 MG/DL (ref 0.2–1)
BILIRUB SERPL-MCNC: 0.4 MG/DL (ref 0.2–1)
BILIRUB SERPL-MCNC: 0.4 MG/DL (ref 0.2–1)
BILIRUB SERPL-MCNC: 0.5 MG/DL (ref 0.2–1)
BILIRUB SERPL-MCNC: <0.2 MG/DL (ref 0–1.2)
BILIRUB UR QL: NEGATIVE
BNP SERPL-MCNC: 1395 PG/ML (ref 0–125)
BNP SERPL-MCNC: 1930 PG/ML (ref 0–125)
BNP SERPL-MCNC: 334 PG/ML (ref 0–100)
BNP SERPL-MCNC: 928 PG/ML (ref 0–125)
BREATHS.SPONTANEOUS ON VENT: 20
BREATHS.SPONTANEOUS ON VENT: 28
BREATHS.SPONTANEOUS ON VENT: 41
BUN SERPL-MCNC: 12 MG/DL (ref 6–20)
BUN SERPL-MCNC: 12 MG/DL (ref 6–20)
BUN SERPL-MCNC: 13 MG/DL (ref 6–20)
BUN SERPL-MCNC: 14 MG/DL (ref 6–20)
BUN SERPL-MCNC: 15 MG/DL (ref 6–20)
BUN SERPL-MCNC: 16 MG/DL (ref 6–20)
BUN SERPL-MCNC: 17 MG/DL (ref 6–20)
BUN SERPL-MCNC: 17 MG/DL (ref 6–20)
BUN SERPL-MCNC: 21 MG/DL (ref 6–20)
BUN SERPL-MCNC: 22 MG/DL (ref 6–20)
BUN SERPL-MCNC: 23 MG/DL (ref 6–20)
BUN SERPL-MCNC: 23 MG/DL (ref 6–20)
BUN SERPL-MCNC: 24 MG/DL (ref 8–27)
BUN SERPL-MCNC: 25 MG/DL (ref 6–20)
BUN SERPL-MCNC: 25 MG/DL (ref 6–20)
BUN SERPL-MCNC: 27 MG/DL (ref 6–20)
BUN SERPL-MCNC: 29 MG/DL (ref 6–20)
BUN SERPL-MCNC: 31 MG/DL (ref 6–20)
BUN SERPL-MCNC: 33 MG/DL (ref 6–20)
BUN SERPL-MCNC: 9 MG/DL (ref 6–20)
BUN/CREAT SERPL: 12 (ref 12–20)
BUN/CREAT SERPL: 15 (ref 12–20)
BUN/CREAT SERPL: 16 (ref 12–20)
BUN/CREAT SERPL: 16 (ref 12–20)
BUN/CREAT SERPL: 17 (ref 12–20)
BUN/CREAT SERPL: 18 (ref 12–20)
BUN/CREAT SERPL: 19 (ref 12–20)
BUN/CREAT SERPL: 19 (ref 12–20)
BUN/CREAT SERPL: 20 (ref 12–20)
BUN/CREAT SERPL: 21 (ref 12–20)
BUN/CREAT SERPL: 22 (ref 12–20)
BUN/CREAT SERPL: 23 (ref 12–20)
BUN/CREAT SERPL: 23 (ref 12–28)
BUN/CREAT SERPL: 24 (ref 12–20)
BUN/CREAT SERPL: 24 (ref 12–20)
BUN/CREAT SERPL: 26 (ref 12–20)
BUN/CREAT SERPL: 28 (ref 12–20)
C DIFF TOX GENS STL QL NAA+PROBE: NEGATIVE
C JEJUNI+C COLI AG STL QL: NEGATIVE
CALCIUM SERPL-MCNC: 10 MG/DL (ref 8.7–10.3)
CALCIUM SERPL-MCNC: 10.2 MG/DL (ref 8.5–10.1)
CALCIUM SERPL-MCNC: 7.7 MG/DL (ref 8.5–10.1)
CALCIUM SERPL-MCNC: 8.9 MG/DL (ref 8.5–10.1)
CALCIUM SERPL-MCNC: 9 MG/DL (ref 8.5–10.1)
CALCIUM SERPL-MCNC: 9.1 MG/DL (ref 8.5–10.1)
CALCIUM SERPL-MCNC: 9.1 MG/DL (ref 8.5–10.1)
CALCIUM SERPL-MCNC: 9.2 MG/DL (ref 8.5–10.1)
CALCIUM SERPL-MCNC: 9.3 MG/DL (ref 8.5–10.1)
CALCIUM SERPL-MCNC: 9.3 MG/DL (ref 8.5–10.1)
CALCIUM SERPL-MCNC: 9.4 MG/DL (ref 8.5–10.1)
CALCIUM SERPL-MCNC: 9.5 MG/DL (ref 8.5–10.1)
CALCIUM SERPL-MCNC: 9.6 MG/DL (ref 8.5–10.1)
CALCIUM SERPL-MCNC: 9.7 MG/DL (ref 8.5–10.1)
CALCIUM SERPL-MCNC: 9.8 MG/DL (ref 8.5–10.1)
CALCULATED P AXIS, ECG09: -17 DEGREES
CALCULATED P AXIS, ECG09: 60 DEGREES
CALCULATED P AXIS, ECG09: 66 DEGREES
CALCULATED P AXIS, ECG09: 67 DEGREES
CALCULATED P AXIS, ECG09: 69 DEGREES
CALCULATED P AXIS, ECG09: 71 DEGREES
CALCULATED P AXIS, ECG09: 71 DEGREES
CALCULATED R AXIS, ECG10: -7 DEGREES
CALCULATED R AXIS, ECG10: 50 DEGREES
CALCULATED R AXIS, ECG10: 53 DEGREES
CALCULATED R AXIS, ECG10: 54 DEGREES
CALCULATED R AXIS, ECG10: 54 DEGREES
CALCULATED R AXIS, ECG10: 57 DEGREES
CALCULATED R AXIS, ECG10: 62 DEGREES
CALCULATED T AXIS, ECG11: -15 DEGREES
CALCULATED T AXIS, ECG11: 49 DEGREES
CALCULATED T AXIS, ECG11: 53 DEGREES
CALCULATED T AXIS, ECG11: 58 DEGREES
CALCULATED T AXIS, ECG11: 59 DEGREES
CALCULATED T AXIS, ECG11: 70 DEGREES
CALCULATED T AXIS, ECG11: 72 DEGREES
CANNABINOIDS UR QL SCN: NEGATIVE
CHLORIDE SERPL-SCNC: 100 MMOL/L (ref 97–108)
CHLORIDE SERPL-SCNC: 100 MMOL/L (ref 97–108)
CHLORIDE SERPL-SCNC: 101 MMOL/L (ref 97–108)
CHLORIDE SERPL-SCNC: 103 MMOL/L (ref 97–108)
CHLORIDE SERPL-SCNC: 104 MMOL/L (ref 97–108)
CHLORIDE SERPL-SCNC: 105 MMOL/L (ref 97–108)
CHLORIDE SERPL-SCNC: 106 MMOL/L (ref 97–108)
CHLORIDE SERPL-SCNC: 106 MMOL/L (ref 97–108)
CHLORIDE SERPL-SCNC: 107 MMOL/L (ref 97–108)
CHLORIDE SERPL-SCNC: 108 MMOL/L (ref 97–108)
CHLORIDE SERPL-SCNC: 109 MMOL/L (ref 97–108)
CHLORIDE SERPL-SCNC: 110 MMOL/L (ref 97–108)
CHLORIDE SERPL-SCNC: 110 MMOL/L (ref 97–108)
CHLORIDE SERPL-SCNC: 111 MMOL/L (ref 97–108)
CHLORIDE SERPL-SCNC: 111 MMOL/L (ref 97–108)
CHLORIDE SERPL-SCNC: 115 MMOL/L (ref 97–108)
CHLORIDE SERPL-SCNC: 99 MMOL/L (ref 96–106)
CHLORIDE SERPL-SCNC: 99 MMOL/L (ref 97–108)
CHLORIDE SERPL-SCNC: 99 MMOL/L (ref 97–108)
CK MB CFR SERPL CALC: 1.1 % (ref 0–2.5)
CK MB CFR SERPL CALC: 5.9 % (ref 0–2.5)
CK MB CFR SERPL CALC: NORMAL % (ref 0–2.5)
CK MB SERPL-MCNC: 1.3 NG/ML (ref 5–25)
CK MB SERPL-MCNC: 5.3 NG/ML (ref 5–25)
CK MB SERPL-MCNC: <1 NG/ML (ref 5–25)
CK SERPL-CCNC: 15 U/L (ref 26–192)
CK SERPL-CCNC: 22 U/L (ref 26–192)
CK SERPL-CCNC: 26 U/L (ref 26–192)
CK SERPL-CCNC: 29 U/L (ref 26–192)
CK SERPL-CCNC: 476 U/L (ref 26–192)
CO2 SERPL-SCNC: 25 MMOL/L (ref 21–32)
CO2 SERPL-SCNC: 25 MMOL/L (ref 21–32)
CO2 SERPL-SCNC: 26 MMOL/L (ref 21–32)
CO2 SERPL-SCNC: 27 MMOL/L (ref 18–29)
CO2 SERPL-SCNC: 27 MMOL/L (ref 21–32)
CO2 SERPL-SCNC: 28 MMOL/L (ref 21–32)
CO2 SERPL-SCNC: 29 MMOL/L (ref 21–32)
CO2 SERPL-SCNC: 30 MMOL/L (ref 21–32)
CO2 SERPL-SCNC: 31 MMOL/L (ref 21–32)
CO2 SERPL-SCNC: 32 MMOL/L (ref 21–32)
CO2 SERPL-SCNC: 33 MMOL/L (ref 21–32)
CO2 SERPL-SCNC: 34 MMOL/L (ref 21–32)
COCAINE UR QL SCN: NEGATIVE
COLOR UR: ABNORMAL
COLOR UR: NORMAL
CREAT SERPL-MCNC: 0.53 MG/DL (ref 0.55–1.02)
CREAT SERPL-MCNC: 0.62 MG/DL (ref 0.55–1.02)
CREAT SERPL-MCNC: 0.7 MG/DL (ref 0.55–1.02)
CREAT SERPL-MCNC: 0.71 MG/DL (ref 0.55–1.02)
CREAT SERPL-MCNC: 0.72 MG/DL (ref 0.55–1.02)
CREAT SERPL-MCNC: 0.73 MG/DL (ref 0.55–1.02)
CREAT SERPL-MCNC: 0.74 MG/DL (ref 0.55–1.02)
CREAT SERPL-MCNC: 0.74 MG/DL (ref 0.55–1.02)
CREAT SERPL-MCNC: 0.75 MG/DL (ref 0.55–1.02)
CREAT SERPL-MCNC: 0.77 MG/DL (ref 0.55–1.02)
CREAT SERPL-MCNC: 0.79 MG/DL (ref 0.55–1.02)
CREAT SERPL-MCNC: 0.8 MG/DL (ref 0.55–1.02)
CREAT SERPL-MCNC: 0.81 MG/DL (ref 0.55–1.02)
CREAT SERPL-MCNC: 0.82 MG/DL (ref 0.55–1.02)
CREAT SERPL-MCNC: 0.83 MG/DL (ref 0.55–1.02)
CREAT SERPL-MCNC: 0.91 MG/DL (ref 0.55–1.02)
CREAT SERPL-MCNC: 0.93 MG/DL (ref 0.55–1.02)
CREAT SERPL-MCNC: 1 MG/DL (ref 0.55–1.02)
CREAT SERPL-MCNC: 1.02 MG/DL (ref 0.55–1.02)
CREAT SERPL-MCNC: 1.03 MG/DL (ref 0.55–1.02)
CREAT SERPL-MCNC: 1.03 MG/DL (ref 0.55–1.02)
CREAT SERPL-MCNC: 1.04 MG/DL (ref 0.57–1)
CREAT SERPL-MCNC: 1.14 MG/DL (ref 0.55–1.02)
CREAT SERPL-MCNC: 1.16 MG/DL (ref 0.55–1.02)
CREAT SERPL-MCNC: 1.18 MG/DL (ref 0.55–1.02)
CREAT SERPL-MCNC: 1.19 MG/DL (ref 0.55–1.02)
CREAT SERPL-MCNC: 1.19 MG/DL (ref 0.55–1.02)
CREAT SERPL-MCNC: 1.25 MG/DL (ref 0.55–1.02)
CREAT SERPL-MCNC: 1.32 MG/DL (ref 0.55–1.02)
CREAT SERPL-MCNC: 2.01 MG/DL (ref 0.55–1.02)
DATE LAST DOSE: ABNORMAL
DATE LAST DOSE: NORMAL
DIAGNOSIS, 93000: NORMAL
DIFFERENTIAL METHOD BLD: ABNORMAL
DRUG SCRN COMMENT,DRGCM: ABNORMAL
E COLI SXT1+2 STL IA: NO GROWTH
EOSINOPHIL # BLD AUTO: 0.4 X10E3/UL (ref 0–0.4)
EOSINOPHIL # BLD: 0 K/UL
EOSINOPHIL # BLD: 0 K/UL (ref 0–0.4)
EOSINOPHIL # BLD: 0.1 K/UL
EOSINOPHIL # BLD: 0.1 K/UL (ref 0–0.4)
EOSINOPHIL # BLD: 0.1 K/UL (ref 0–0.4)
EOSINOPHIL # BLD: 0.2 K/UL (ref 0–0.4)
EOSINOPHIL # BLD: 0.3 K/UL (ref 0–0.4)
EOSINOPHIL # BLD: 0.4 K/UL (ref 0–0.4)
EOSINOPHIL # BLD: 0.8 K/UL
EOSINOPHIL # BLD: 0.8 K/UL (ref 0–0.4)
EOSINOPHIL # BLD: 0.8 K/UL (ref 0–0.4)
EOSINOPHIL # BLD: 1.2 K/UL (ref 0–0.4)
EOSINOPHIL NFR BLD AUTO: 5 %
EOSINOPHIL NFR BLD: 0 %
EOSINOPHIL NFR BLD: 0 % (ref 0–7)
EOSINOPHIL NFR BLD: 1 %
EOSINOPHIL NFR BLD: 1 % (ref 0–7)
EOSINOPHIL NFR BLD: 11 % (ref 0–7)
EOSINOPHIL NFR BLD: 2 % (ref 0–7)
EOSINOPHIL NFR BLD: 3 % (ref 0–7)
EOSINOPHIL NFR BLD: 3 % (ref 0–7)
EOSINOPHIL NFR BLD: 4 % (ref 0–7)
EOSINOPHIL NFR BLD: 4 % (ref 0–7)
EOSINOPHIL NFR BLD: 5 % (ref 0–7)
EOSINOPHIL NFR BLD: 5 % (ref 0–7)
EOSINOPHIL NFR BLD: 6 %
EOSINOPHIL NFR BLD: 7 % (ref 0–7)
EOSINOPHIL NFR BLD: 8 % (ref 0–7)
EPAP/CPAP/PEEP, PAPEEP: 8
EPITH CASTS URNS QL MICRO: ABNORMAL /LPF
ERYTHROCYTE [DISTWIDTH] IN BLOOD BY AUTOMATED COUNT: 14.9 % (ref 11.5–14.5)
ERYTHROCYTE [DISTWIDTH] IN BLOOD BY AUTOMATED COUNT: 14.9 % (ref 11.5–14.5)
ERYTHROCYTE [DISTWIDTH] IN BLOOD BY AUTOMATED COUNT: 15 % (ref 12.3–15.4)
ERYTHROCYTE [DISTWIDTH] IN BLOOD BY AUTOMATED COUNT: 15.1 % (ref 11.5–14.5)
ERYTHROCYTE [DISTWIDTH] IN BLOOD BY AUTOMATED COUNT: 15.2 % (ref 11.5–14.5)
ERYTHROCYTE [DISTWIDTH] IN BLOOD BY AUTOMATED COUNT: 15.3 % (ref 11.5–14.5)
ERYTHROCYTE [DISTWIDTH] IN BLOOD BY AUTOMATED COUNT: 15.5 % (ref 11.5–14.5)
ERYTHROCYTE [DISTWIDTH] IN BLOOD BY AUTOMATED COUNT: 15.5 % (ref 11.5–14.5)
ERYTHROCYTE [DISTWIDTH] IN BLOOD BY AUTOMATED COUNT: 15.6 % (ref 11.5–14.5)
ERYTHROCYTE [DISTWIDTH] IN BLOOD BY AUTOMATED COUNT: 15.7 % (ref 11.5–14.5)
ERYTHROCYTE [DISTWIDTH] IN BLOOD BY AUTOMATED COUNT: 15.9 % (ref 11.5–14.5)
ERYTHROCYTE [DISTWIDTH] IN BLOOD BY AUTOMATED COUNT: 16.1 % (ref 11.5–14.5)
ERYTHROCYTE [DISTWIDTH] IN BLOOD BY AUTOMATED COUNT: 16.2 % (ref 11.5–14.5)
ERYTHROCYTE [DISTWIDTH] IN BLOOD BY AUTOMATED COUNT: 16.8 % (ref 11.5–14.5)
ERYTHROCYTE [DISTWIDTH] IN BLOOD BY AUTOMATED COUNT: 16.8 % (ref 11.5–14.5)
ERYTHROCYTE [DISTWIDTH] IN BLOOD BY AUTOMATED COUNT: 17 % (ref 11.5–14.5)
ERYTHROCYTE [DISTWIDTH] IN BLOOD BY AUTOMATED COUNT: 17 % (ref 11.5–14.5)
ERYTHROCYTE [DISTWIDTH] IN BLOOD BY AUTOMATED COUNT: 17.2 % (ref 11.5–14.5)
ERYTHROCYTE [DISTWIDTH] IN BLOOD BY AUTOMATED COUNT: 17.3 % (ref 11.5–14.5)
ERYTHROCYTE [DISTWIDTH] IN BLOOD BY AUTOMATED COUNT: 17.4 % (ref 11.5–14.5)
ERYTHROCYTE [DISTWIDTH] IN BLOOD BY AUTOMATED COUNT: 17.5 % (ref 11.5–14.5)
ERYTHROCYTE [DISTWIDTH] IN BLOOD BY AUTOMATED COUNT: 17.5 % (ref 11.5–14.5)
ERYTHROCYTE [DISTWIDTH] IN BLOOD BY AUTOMATED COUNT: 17.6 % (ref 11.5–14.5)
ERYTHROCYTE [DISTWIDTH] IN BLOOD BY AUTOMATED COUNT: 17.6 % (ref 11.5–14.5)
ERYTHROCYTE [DISTWIDTH] IN BLOOD BY AUTOMATED COUNT: 17.7 % (ref 11.5–14.5)
ERYTHROCYTE [DISTWIDTH] IN BLOOD BY AUTOMATED COUNT: 17.7 % (ref 11.5–14.5)
ERYTHROCYTE [DISTWIDTH] IN BLOOD BY AUTOMATED COUNT: 18.2 % (ref 11.5–14.5)
ERYTHROCYTE [DISTWIDTH] IN BLOOD BY AUTOMATED COUNT: 18.3 % (ref 11.5–14.5)
ERYTHROCYTE [DISTWIDTH] IN BLOOD BY AUTOMATED COUNT: 18.5 % (ref 11.5–14.5)
ERYTHROCYTE [DISTWIDTH] IN BLOOD BY AUTOMATED COUNT: 19.4 % (ref 11.5–14.5)
ERYTHROCYTE [DISTWIDTH] IN BLOOD BY AUTOMATED COUNT: 19.5 % (ref 11.5–14.5)
ERYTHROCYTE [DISTWIDTH] IN BLOOD BY AUTOMATED COUNT: 19.5 % (ref 11.5–14.5)
FERRITIN SERPL-MCNC: 250 NG/ML (ref 8–252)
FIO2 ON VENT: 100 %
FLUAV AG NPH QL IA: NEGATIVE
FLUBV AG NOSE QL IA: NEGATIVE
GAS FLOW.O2 O2 DELIVERY SYS: 12 L/MIN
GAS FLOW.O2 O2 DELIVERY SYS: 6 L/MIN
GLOBULIN SER CALC-MCNC: 2.6 G/DL (ref 1.5–4.5)
GLOBULIN SER CALC-MCNC: 3.5 G/DL (ref 2–4)
GLOBULIN SER CALC-MCNC: 3.7 G/DL (ref 2–4)
GLOBULIN SER CALC-MCNC: 3.9 G/DL (ref 2–4)
GLOBULIN SER CALC-MCNC: 4 G/DL (ref 2–4)
GLOBULIN SER CALC-MCNC: 4.1 G/DL (ref 2–4)
GLOBULIN SER CALC-MCNC: 4.1 G/DL (ref 2–4)
GLOBULIN SER CALC-MCNC: 4.2 G/DL (ref 2–4)
GLOBULIN SER CALC-MCNC: 4.2 G/DL (ref 2–4)
GLOBULIN SER CALC-MCNC: 4.3 G/DL (ref 2–4)
GLOBULIN SER CALC-MCNC: 4.5 G/DL (ref 2–4)
GLOBULIN SER CALC-MCNC: 4.5 G/DL (ref 2–4)
GLOBULIN SER CALC-MCNC: 4.6 G/DL (ref 2–4)
GLOBULIN SER CALC-MCNC: 5 G/DL (ref 2–4)
GLOBULIN SER CALC-MCNC: 5.3 G/DL (ref 2–4)
GLUCOSE BLD STRIP.AUTO-MCNC: 100 MG/DL (ref 65–100)
GLUCOSE BLD STRIP.AUTO-MCNC: 142 MG/DL (ref 65–100)
GLUCOSE SERPL-MCNC: 100 MG/DL (ref 65–100)
GLUCOSE SERPL-MCNC: 100 MG/DL (ref 65–100)
GLUCOSE SERPL-MCNC: 101 MG/DL (ref 65–100)
GLUCOSE SERPL-MCNC: 102 MG/DL (ref 65–100)
GLUCOSE SERPL-MCNC: 104 MG/DL (ref 65–100)
GLUCOSE SERPL-MCNC: 104 MG/DL (ref 65–100)
GLUCOSE SERPL-MCNC: 106 MG/DL (ref 65–100)
GLUCOSE SERPL-MCNC: 110 MG/DL (ref 65–100)
GLUCOSE SERPL-MCNC: 110 MG/DL (ref 65–100)
GLUCOSE SERPL-MCNC: 111 MG/DL (ref 65–100)
GLUCOSE SERPL-MCNC: 113 MG/DL (ref 65–100)
GLUCOSE SERPL-MCNC: 114 MG/DL (ref 65–100)
GLUCOSE SERPL-MCNC: 115 MG/DL (ref 65–100)
GLUCOSE SERPL-MCNC: 116 MG/DL (ref 65–100)
GLUCOSE SERPL-MCNC: 116 MG/DL (ref 65–100)
GLUCOSE SERPL-MCNC: 117 MG/DL (ref 65–100)
GLUCOSE SERPL-MCNC: 121 MG/DL (ref 65–100)
GLUCOSE SERPL-MCNC: 121 MG/DL (ref 65–100)
GLUCOSE SERPL-MCNC: 122 MG/DL (ref 65–100)
GLUCOSE SERPL-MCNC: 128 MG/DL (ref 65–100)
GLUCOSE SERPL-MCNC: 137 MG/DL (ref 65–100)
GLUCOSE SERPL-MCNC: 138 MG/DL (ref 65–100)
GLUCOSE SERPL-MCNC: 147 MG/DL (ref 65–100)
GLUCOSE SERPL-MCNC: 158 MG/DL (ref 65–100)
GLUCOSE SERPL-MCNC: 69 MG/DL (ref 65–100)
GLUCOSE SERPL-MCNC: 81 MG/DL (ref 65–100)
GLUCOSE SERPL-MCNC: 85 MG/DL (ref 65–100)
GLUCOSE SERPL-MCNC: 92 MG/DL (ref 65–100)
GLUCOSE SERPL-MCNC: 92 MG/DL (ref 65–99)
GLUCOSE SERPL-MCNC: 96 MG/DL (ref 65–100)
GLUCOSE SERPL-MCNC: 97 MG/DL (ref 65–100)
GLUCOSE SERPL-MCNC: 99 MG/DL (ref 65–100)
GLUCOSE UR STRIP.AUTO-MCNC: NEGATIVE MG/DL
GRAM STN SPEC: ABNORMAL
HCO3 BLDA-SCNC: 27 MMOL/L (ref 22–26)
HCO3 BLDA-SCNC: 29 MMOL/L (ref 22–26)
HCO3 BLDA-SCNC: 31 MMOL/L (ref 22–26)
HCO3 BLDA-SCNC: 31 MMOL/L (ref 22–26)
HCO3 BLDA-SCNC: 32 MMOL/L (ref 22–26)
HCT VFR BLD AUTO: 27.2 % (ref 35–47)
HCT VFR BLD AUTO: 27.4 % (ref 35–47)
HCT VFR BLD AUTO: 28.5 % (ref 35–47)
HCT VFR BLD AUTO: 28.9 % (ref 35–47)
HCT VFR BLD AUTO: 29.2 % (ref 35–47)
HCT VFR BLD AUTO: 29.2 % (ref 35–47)
HCT VFR BLD AUTO: 29.3 % (ref 35–47)
HCT VFR BLD AUTO: 29.3 % (ref 35–47)
HCT VFR BLD AUTO: 29.4 % (ref 35–47)
HCT VFR BLD AUTO: 29.6 % (ref 35–47)
HCT VFR BLD AUTO: 30.1 % (ref 35–47)
HCT VFR BLD AUTO: 30.5 % (ref 35–47)
HCT VFR BLD AUTO: 30.7 % (ref 35–47)
HCT VFR BLD AUTO: 31 % (ref 35–47)
HCT VFR BLD AUTO: 31.3 % (ref 35–47)
HCT VFR BLD AUTO: 31.4 % (ref 35–47)
HCT VFR BLD AUTO: 32 % (ref 35–47)
HCT VFR BLD AUTO: 32.2 % (ref 35–47)
HCT VFR BLD AUTO: 32.4 % (ref 35–47)
HCT VFR BLD AUTO: 32.7 % (ref 35–47)
HCT VFR BLD AUTO: 32.8 % (ref 35–47)
HCT VFR BLD AUTO: 32.9 % (ref 35–47)
HCT VFR BLD AUTO: 33.1 % (ref 35–47)
HCT VFR BLD AUTO: 33.2 % (ref 35–47)
HCT VFR BLD AUTO: 33.2 % (ref 35–47)
HCT VFR BLD AUTO: 33.3 % (ref 35–47)
HCT VFR BLD AUTO: 33.8 % (ref 35–47)
HCT VFR BLD AUTO: 34.4 % (ref 35–47)
HCT VFR BLD AUTO: 34.5 % (ref 35–47)
HCT VFR BLD AUTO: 34.7 % (ref 35–47)
HCT VFR BLD AUTO: 36.2 % (ref 35–47)
HCT VFR BLD AUTO: 36.5 % (ref 35–47)
HCT VFR BLD AUTO: 36.5 % (ref 35–47)
HCT VFR BLD AUTO: 37.5 % (ref 34–46.6)
HCT VFR BLD AUTO: 38.4 % (ref 35–47)
HCT VFR BLD AUTO: 39.6 % (ref 35–47)
HGB BLD-MCNC: 10 G/DL (ref 11.5–16)
HGB BLD-MCNC: 10 G/DL (ref 11.5–16)
HGB BLD-MCNC: 10.1 G/DL (ref 11.5–16)
HGB BLD-MCNC: 10.2 G/DL (ref 11.5–16)
HGB BLD-MCNC: 10.3 G/DL (ref 11.5–16)
HGB BLD-MCNC: 10.4 G/DL (ref 11.5–16)
HGB BLD-MCNC: 10.5 G/DL (ref 11.5–16)
HGB BLD-MCNC: 10.6 G/DL (ref 11.5–16)
HGB BLD-MCNC: 10.9 G/DL (ref 11.5–16)
HGB BLD-MCNC: 11 G/DL (ref 11.5–16)
HGB BLD-MCNC: 11.5 G/DL (ref 11.1–15.9)
HGB BLD-MCNC: 11.8 G/DL (ref 11.5–16)
HGB BLD-MCNC: 11.9 G/DL (ref 11.5–16)
HGB BLD-MCNC: 8.1 G/DL (ref 11.5–16)
HGB BLD-MCNC: 8.2 G/DL (ref 11.5–16)
HGB BLD-MCNC: 8.4 G/DL (ref 11.5–16)
HGB BLD-MCNC: 8.5 G/DL (ref 11.5–16)
HGB BLD-MCNC: 8.6 G/DL (ref 11.5–16)
HGB BLD-MCNC: 8.8 G/DL (ref 11.5–16)
HGB BLD-MCNC: 8.8 G/DL (ref 11.5–16)
HGB BLD-MCNC: 8.9 G/DL (ref 11.5–16)
HGB BLD-MCNC: 9.2 G/DL (ref 11.5–16)
HGB BLD-MCNC: 9.2 G/DL (ref 11.5–16)
HGB BLD-MCNC: 9.3 G/DL (ref 11.5–16)
HGB BLD-MCNC: 9.4 G/DL (ref 11.5–16)
HGB BLD-MCNC: 9.5 G/DL (ref 11.5–16)
HGB BLD-MCNC: 9.6 G/DL (ref 11.5–16)
HGB BLD-MCNC: 9.8 G/DL (ref 11.5–16)
HGB BLD-MCNC: 9.9 G/DL (ref 11.5–16)
HGB UR QL STRIP: ABNORMAL
HGB UR QL STRIP: NEGATIVE
HYALINE CASTS URNS QL MICRO: ABNORMAL /LPF (ref 0–5)
HYALINE CASTS URNS QL MICRO: ABNORMAL /LPF (ref 0–5)
IMM GRANULOCYTES # BLD: 0 X10E3/UL (ref 0–0.1)
IMM GRANULOCYTES NFR BLD: 0 %
INR PPP: 1.1 (ref 0.9–1.1)
INTERPRETATION: NORMAL
IPAP/PIP, IPAPIP: 12
IRON SATN MFR SERPL: 6 % (ref 20–50)
IRON SERPL-MCNC: 10 UG/DL (ref 35–150)
KETONES UR QL STRIP.AUTO: NEGATIVE MG/DL
LACTATE SERPL-SCNC: 0.7 MMOL/L (ref 0.4–2)
LACTATE SERPL-SCNC: 0.8 MMOL/L (ref 0.4–2)
LACTATE SERPL-SCNC: 0.8 MMOL/L (ref 0.4–2)
LACTATE SERPL-SCNC: 0.9 MMOL/L (ref 0.4–2)
LACTATE SERPL-SCNC: 1 MMOL/L (ref 0.4–2)
LACTATE SERPL-SCNC: 1.3 MMOL/L (ref 0.4–2)
LACTATE SERPL-SCNC: 1.6 MMOL/L (ref 0.4–2)
LEUKOCYTE ESTERASE UR QL STRIP.AUTO: ABNORMAL
LEUKOCYTE ESTERASE UR QL STRIP.AUTO: NEGATIVE
LIPASE SERPL-CCNC: 83 U/L (ref 73–393)
LITHIUM SERPL-SCNC: 0.95 MMOL/L (ref 0.6–1.2)
LITHIUM SERPL-SCNC: 1.06 MMOL/L (ref 0.6–1.2)
LITHIUM SERPL-SCNC: 1.1 MMOL/L (ref 0.6–1.2)
LYMPHOCYTES # BLD AUTO: 1.1 X10E3/UL (ref 0.7–3.1)
LYMPHOCYTES # BLD AUTO: 10 % (ref 12–49)
LYMPHOCYTES # BLD AUTO: 11 %
LYMPHOCYTES # BLD AUTO: 12 %
LYMPHOCYTES # BLD AUTO: 12 % (ref 12–49)
LYMPHOCYTES # BLD AUTO: 13 % (ref 12–49)
LYMPHOCYTES # BLD AUTO: 14 % (ref 12–49)
LYMPHOCYTES # BLD AUTO: 14 % (ref 12–49)
LYMPHOCYTES # BLD AUTO: 15 % (ref 12–49)
LYMPHOCYTES # BLD AUTO: 15 % (ref 12–49)
LYMPHOCYTES # BLD AUTO: 18 % (ref 12–49)
LYMPHOCYTES # BLD AUTO: 18 % (ref 12–49)
LYMPHOCYTES # BLD AUTO: 19 % (ref 12–49)
LYMPHOCYTES # BLD AUTO: 3 % (ref 12–49)
LYMPHOCYTES # BLD AUTO: 4 %
LYMPHOCYTES # BLD AUTO: 4 % (ref 12–49)
LYMPHOCYTES # BLD AUTO: 5 %
LYMPHOCYTES # BLD AUTO: 5 % (ref 12–49)
LYMPHOCYTES # BLD AUTO: 5 % (ref 12–49)
LYMPHOCYTES # BLD AUTO: 6 % (ref 12–49)
LYMPHOCYTES # BLD AUTO: 6 % (ref 12–49)
LYMPHOCYTES # BLD AUTO: 7 %
LYMPHOCYTES # BLD AUTO: 7 % (ref 12–49)
LYMPHOCYTES # BLD AUTO: 8 % (ref 12–49)
LYMPHOCYTES # BLD AUTO: 8 % (ref 12–49)
LYMPHOCYTES # BLD AUTO: 9 %
LYMPHOCYTES # BLD AUTO: 9 %
LYMPHOCYTES # BLD AUTO: 9 % (ref 12–49)
LYMPHOCYTES # BLD: 0.5 K/UL (ref 0.8–3.5)
LYMPHOCYTES # BLD: 0.7 K/UL
LYMPHOCYTES # BLD: 0.7 K/UL (ref 0.8–3.5)
LYMPHOCYTES # BLD: 0.8 K/UL
LYMPHOCYTES # BLD: 0.8 K/UL (ref 0.8–3.5)
LYMPHOCYTES # BLD: 0.9 K/UL (ref 0.8–3.5)
LYMPHOCYTES # BLD: 1 K/UL (ref 0.8–3.5)
LYMPHOCYTES # BLD: 1 K/UL (ref 0.8–3.5)
LYMPHOCYTES # BLD: 1.1 K/UL (ref 0.8–3.5)
LYMPHOCYTES # BLD: 1.1 K/UL (ref 0.8–3.5)
LYMPHOCYTES # BLD: 1.2 K/UL (ref 0.8–3.5)
LYMPHOCYTES # BLD: 1.2 K/UL (ref 0.8–3.5)
LYMPHOCYTES # BLD: 1.3 K/UL (ref 0.8–3.5)
LYMPHOCYTES # BLD: 1.4 K/UL (ref 0.8–3.5)
LYMPHOCYTES # BLD: 1.4 K/UL (ref 0.8–3.5)
LYMPHOCYTES # BLD: 1.5 K/UL
LYMPHOCYTES # BLD: 1.5 K/UL (ref 0.8–3.5)
LYMPHOCYTES # BLD: 1.5 K/UL (ref 0.8–3.5)
LYMPHOCYTES # BLD: 1.6 K/UL
LYMPHOCYTES # BLD: 1.6 K/UL (ref 0.8–3.5)
LYMPHOCYTES # BLD: 1.7 K/UL (ref 0.8–3.5)
LYMPHOCYTES # BLD: 1.8 K/UL
LYMPHOCYTES # BLD: 1.8 K/UL
LYMPHOCYTES # BLD: 1.8 K/UL (ref 0.8–3.5)
LYMPHOCYTES # BLD: 1.9 K/UL (ref 0.8–3.5)
LYMPHOCYTES # BLD: 3.5 K/UL
LYMPHOCYTES NFR BLD AUTO: 13 %
MAGNESIUM SERPL-MCNC: 2 MG/DL (ref 1.6–2.4)
MAGNESIUM SERPL-MCNC: 2.2 MG/DL (ref 1.6–2.4)
MAGNESIUM SERPL-MCNC: 2.3 MG/DL (ref 1.6–2.4)
MAGNESIUM SERPL-MCNC: 2.4 MG/DL (ref 1.6–2.4)
MAGNESIUM SERPL-MCNC: 2.4 MG/DL (ref 1.6–2.4)
MCH RBC QN AUTO: 25.8 PG (ref 26–34)
MCH RBC QN AUTO: 26.1 PG (ref 26–34)
MCH RBC QN AUTO: 26.1 PG (ref 26–34)
MCH RBC QN AUTO: 26.3 PG (ref 26.6–33)
MCH RBC QN AUTO: 26.3 PG (ref 26–34)
MCH RBC QN AUTO: 26.4 PG (ref 26–34)
MCH RBC QN AUTO: 26.4 PG (ref 26–34)
MCH RBC QN AUTO: 26.5 PG (ref 26–34)
MCH RBC QN AUTO: 26.5 PG (ref 26–34)
MCH RBC QN AUTO: 26.9 PG (ref 26–34)
MCH RBC QN AUTO: 26.9 PG (ref 26–34)
MCH RBC QN AUTO: 27 PG (ref 26–34)
MCH RBC QN AUTO: 27 PG (ref 26–34)
MCH RBC QN AUTO: 27.1 PG (ref 26–34)
MCH RBC QN AUTO: 27.2 PG (ref 26–34)
MCH RBC QN AUTO: 27.3 PG (ref 26–34)
MCH RBC QN AUTO: 27.4 PG (ref 26–34)
MCH RBC QN AUTO: 27.5 PG (ref 26–34)
MCH RBC QN AUTO: 27.6 PG (ref 26–34)
MCH RBC QN AUTO: 27.6 PG (ref 26–34)
MCH RBC QN AUTO: 27.7 PG (ref 26–34)
MCH RBC QN AUTO: 27.9 PG (ref 26–34)
MCH RBC QN AUTO: 27.9 PG (ref 26–34)
MCH RBC QN AUTO: 28.6 PG (ref 26–34)
MCHC RBC AUTO-ENTMCNC: 28.5 G/DL (ref 30–36.5)
MCHC RBC AUTO-ENTMCNC: 28.7 G/DL (ref 30–36.5)
MCHC RBC AUTO-ENTMCNC: 28.8 G/DL (ref 30–36.5)
MCHC RBC AUTO-ENTMCNC: 28.9 G/DL (ref 30–36.5)
MCHC RBC AUTO-ENTMCNC: 29.4 G/DL (ref 30–36.5)
MCHC RBC AUTO-ENTMCNC: 29.5 G/DL (ref 30–36.5)
MCHC RBC AUTO-ENTMCNC: 29.6 G/DL (ref 30–36.5)
MCHC RBC AUTO-ENTMCNC: 29.6 G/DL (ref 30–36.5)
MCHC RBC AUTO-ENTMCNC: 29.7 G/DL (ref 30–36.5)
MCHC RBC AUTO-ENTMCNC: 29.8 G/DL (ref 30–36.5)
MCHC RBC AUTO-ENTMCNC: 29.8 G/DL (ref 30–36.5)
MCHC RBC AUTO-ENTMCNC: 29.9 G/DL (ref 30–36.5)
MCHC RBC AUTO-ENTMCNC: 30 G/DL (ref 30–36.5)
MCHC RBC AUTO-ENTMCNC: 30 G/DL (ref 30–36.5)
MCHC RBC AUTO-ENTMCNC: 30.1 G/DL (ref 30–36.5)
MCHC RBC AUTO-ENTMCNC: 30.2 G/DL (ref 30–36.5)
MCHC RBC AUTO-ENTMCNC: 30.3 G/DL (ref 30–36.5)
MCHC RBC AUTO-ENTMCNC: 30.4 G/DL (ref 30–36.5)
MCHC RBC AUTO-ENTMCNC: 30.6 G/DL (ref 30–36.5)
MCHC RBC AUTO-ENTMCNC: 30.6 G/DL (ref 30–36.5)
MCHC RBC AUTO-ENTMCNC: 30.7 G/DL (ref 30–36.5)
MCHC RBC AUTO-ENTMCNC: 30.7 G/DL (ref 31.5–35.7)
MCHC RBC AUTO-ENTMCNC: 30.8 G/DL (ref 30–36.5)
MCHC RBC AUTO-ENTMCNC: 31.1 G/DL (ref 30–36.5)
MCHC RBC AUTO-ENTMCNC: 31.3 G/DL (ref 30–36.5)
MCHC RBC AUTO-ENTMCNC: 31.4 G/DL (ref 30–36.5)
MCV RBC AUTO: 86 FL (ref 79–97)
MCV RBC AUTO: 87.8 FL (ref 80–99)
MCV RBC AUTO: 87.8 FL (ref 80–99)
MCV RBC AUTO: 87.9 FL (ref 80–99)
MCV RBC AUTO: 88.2 FL (ref 80–99)
MCV RBC AUTO: 88.3 FL (ref 80–99)
MCV RBC AUTO: 88.4 FL (ref 80–99)
MCV RBC AUTO: 88.4 FL (ref 80–99)
MCV RBC AUTO: 88.5 FL (ref 80–99)
MCV RBC AUTO: 88.8 FL (ref 80–99)
MCV RBC AUTO: 89 FL (ref 80–99)
MCV RBC AUTO: 89 FL (ref 80–99)
MCV RBC AUTO: 89.2 FL (ref 80–99)
MCV RBC AUTO: 89.4 FL (ref 80–99)
MCV RBC AUTO: 89.5 FL (ref 80–99)
MCV RBC AUTO: 89.6 FL (ref 80–99)
MCV RBC AUTO: 89.6 FL (ref 80–99)
MCV RBC AUTO: 89.9 FL (ref 80–99)
MCV RBC AUTO: 90 FL (ref 80–99)
MCV RBC AUTO: 90.8 FL (ref 80–99)
MCV RBC AUTO: 91 FL (ref 80–99)
MCV RBC AUTO: 91 FL (ref 80–99)
MCV RBC AUTO: 91.2 FL (ref 80–99)
MCV RBC AUTO: 91.2 FL (ref 80–99)
MCV RBC AUTO: 91.3 FL (ref 80–99)
MCV RBC AUTO: 91.6 FL (ref 80–99)
MCV RBC AUTO: 91.7 FL (ref 80–99)
MCV RBC AUTO: 92 FL (ref 80–99)
MCV RBC AUTO: 92.3 FL (ref 80–99)
MCV RBC AUTO: 92.4 FL (ref 80–99)
MCV RBC AUTO: 92.5 FL (ref 80–99)
MCV RBC AUTO: 92.6 FL (ref 80–99)
MCV RBC AUTO: 92.7 FL (ref 80–99)
MCV RBC AUTO: 92.8 FL (ref 80–99)
MCV RBC AUTO: 93.3 FL (ref 80–99)
MCV RBC AUTO: 95.1 FL (ref 80–99)
METHADONE UR QL: NEGATIVE
MONOCYTES # BLD AUTO: 0.7 X10E3/UL (ref 0.1–0.9)
MONOCYTES # BLD: 0.2 K/UL
MONOCYTES # BLD: 0.2 K/UL (ref 0–1)
MONOCYTES # BLD: 0.2 K/UL (ref 0–1)
MONOCYTES # BLD: 0.3 K/UL
MONOCYTES # BLD: 0.3 K/UL (ref 0–1)
MONOCYTES # BLD: 0.3 K/UL (ref 0–1)
MONOCYTES # BLD: 0.4 K/UL
MONOCYTES # BLD: 0.4 K/UL (ref 0–1)
MONOCYTES # BLD: 0.5 K/UL (ref 0–1)
MONOCYTES # BLD: 0.5 K/UL (ref 0–1)
MONOCYTES # BLD: 0.6 K/UL
MONOCYTES # BLD: 0.6 K/UL (ref 0–1)
MONOCYTES # BLD: 0.7 K/UL (ref 0–1)
MONOCYTES # BLD: 0.8 K/UL (ref 0–1)
MONOCYTES # BLD: 0.9 K/UL (ref 0–1)
MONOCYTES # BLD: 1 K/UL (ref 0–1)
MONOCYTES # BLD: 1.1 K/UL
MONOCYTES # BLD: 1.1 K/UL (ref 0–1)
MONOCYTES # BLD: 1.1 K/UL (ref 0–1)
MONOCYTES # BLD: 1.2 K/UL
MONOCYTES # BLD: 1.3 K/UL
MONOCYTES # BLD: 1.4 K/UL (ref 0–1)
MONOCYTES NFR BLD AUTO: 1 %
MONOCYTES NFR BLD AUTO: 10 % (ref 5–13)
MONOCYTES NFR BLD AUTO: 10 % (ref 5–13)
MONOCYTES NFR BLD AUTO: 11 % (ref 5–13)
MONOCYTES NFR BLD AUTO: 2 % (ref 5–13)
MONOCYTES NFR BLD AUTO: 3 %
MONOCYTES NFR BLD AUTO: 3 % (ref 5–13)
MONOCYTES NFR BLD AUTO: 4 %
MONOCYTES NFR BLD AUTO: 4 % (ref 5–13)
MONOCYTES NFR BLD AUTO: 6 % (ref 5–13)
MONOCYTES NFR BLD AUTO: 6 % (ref 5–13)
MONOCYTES NFR BLD AUTO: 7 % (ref 5–13)
MONOCYTES NFR BLD AUTO: 8 %
MONOCYTES NFR BLD AUTO: 9 %
MONOCYTES NFR BLD AUTO: 9 % (ref 5–13)
MYELOCYTES NFR BLD MANUAL: 1 %
MYELOCYTES NFR BLD MANUAL: 1 %
MYELOCYTES NFR BLD MANUAL: 2 %
MYELOCYTES NFR BLD MANUAL: 3 %
NEUTROPHILS # BLD AUTO: 6.4 X10E3/UL (ref 1.4–7)
NEUTROPHILS NFR BLD AUTO: 74 %
NEUTS BAND NFR BLD MANUAL: 1 %
NEUTS BAND NFR BLD MANUAL: 10 %
NEUTS BAND NFR BLD MANUAL: 12 %
NEUTS BAND NFR BLD MANUAL: 13 %
NEUTS BAND NFR BLD MANUAL: 2 % (ref 0–6)
NEUTS BAND NFR BLD MANUAL: 3 % (ref 0–6)
NEUTS BAND NFR BLD MANUAL: 5 % (ref 0–6)
NEUTS BAND NFR BLD MANUAL: 6 %
NEUTS BAND NFR BLD MANUAL: 7 %
NEUTS SEG # BLD: 10 K/UL
NEUTS SEG # BLD: 10 K/UL (ref 1.8–8)
NEUTS SEG # BLD: 11.7 K/UL
NEUTS SEG # BLD: 12.2 K/UL (ref 1.8–8)
NEUTS SEG # BLD: 12.6 K/UL (ref 1.8–8)
NEUTS SEG # BLD: 13.1 K/UL (ref 1.8–8)
NEUTS SEG # BLD: 13.7 K/UL (ref 1.8–8)
NEUTS SEG # BLD: 16.2 K/UL (ref 1.8–8)
NEUTS SEG # BLD: 16.9 K/UL (ref 1.8–8)
NEUTS SEG # BLD: 17.3 K/UL (ref 1.8–8)
NEUTS SEG # BLD: 17.9 K/UL
NEUTS SEG # BLD: 18.2 K/UL
NEUTS SEG # BLD: 22.3 K/UL (ref 1.8–8)
NEUTS SEG # BLD: 26.7 K/UL
NEUTS SEG # BLD: 32.3 K/UL (ref 1.8–8)
NEUTS SEG # BLD: 37.6 K/UL
NEUTS SEG # BLD: 4.5 K/UL (ref 1.8–8)
NEUTS SEG # BLD: 5 K/UL (ref 1.8–8)
NEUTS SEG # BLD: 5.5 K/UL (ref 1.8–8)
NEUTS SEG # BLD: 6 K/UL (ref 1.8–8)
NEUTS SEG # BLD: 6.8 K/UL (ref 1.8–8)
NEUTS SEG # BLD: 7 K/UL (ref 1.8–8)
NEUTS SEG # BLD: 7.8 K/UL (ref 1.8–8)
NEUTS SEG # BLD: 8 K/UL (ref 1.8–8)
NEUTS SEG # BLD: 8.2 K/UL (ref 1.8–8)
NEUTS SEG # BLD: 8.2 K/UL (ref 1.8–8)
NEUTS SEG # BLD: 8.8 K/UL
NEUTS SEG # BLD: 9.1 K/UL (ref 1.8–8)
NEUTS SEG # BLD: 9.2 K/UL (ref 1.8–8)
NEUTS SEG # BLD: 9.3 K/UL (ref 1.8–8)
NEUTS SEG # BLD: 9.4 K/UL (ref 1.8–8)
NEUTS SEG # BLD: 9.6 K/UL (ref 1.8–8)
NEUTS SEG # BLD: 9.7 K/UL (ref 1.8–8)
NEUTS SEG NFR BLD AUTO: 65 % (ref 32–75)
NEUTS SEG NFR BLD AUTO: 66 % (ref 32–75)
NEUTS SEG NFR BLD AUTO: 67 % (ref 32–75)
NEUTS SEG NFR BLD AUTO: 68 % (ref 32–75)
NEUTS SEG NFR BLD AUTO: 69 %
NEUTS SEG NFR BLD AUTO: 71 % (ref 32–75)
NEUTS SEG NFR BLD AUTO: 71 % (ref 32–75)
NEUTS SEG NFR BLD AUTO: 73 %
NEUTS SEG NFR BLD AUTO: 73 % (ref 32–75)
NEUTS SEG NFR BLD AUTO: 77 %
NEUTS SEG NFR BLD AUTO: 77 % (ref 32–75)
NEUTS SEG NFR BLD AUTO: 79 % (ref 32–75)
NEUTS SEG NFR BLD AUTO: 80 %
NEUTS SEG NFR BLD AUTO: 80 %
NEUTS SEG NFR BLD AUTO: 81 % (ref 32–75)
NEUTS SEG NFR BLD AUTO: 81 % (ref 32–75)
NEUTS SEG NFR BLD AUTO: 82 % (ref 32–75)
NEUTS SEG NFR BLD AUTO: 83 % (ref 32–75)
NEUTS SEG NFR BLD AUTO: 83 % (ref 32–75)
NEUTS SEG NFR BLD AUTO: 84 % (ref 32–75)
NEUTS SEG NFR BLD AUTO: 86 % (ref 32–75)
NEUTS SEG NFR BLD AUTO: 86 % (ref 32–75)
NEUTS SEG NFR BLD AUTO: 87 %
NEUTS SEG NFR BLD AUTO: 87 % (ref 32–75)
NEUTS SEG NFR BLD AUTO: 88 % (ref 32–75)
NEUTS SEG NFR BLD AUTO: 89 %
NEUTS SEG NFR BLD AUTO: 89 % (ref 32–75)
NEUTS SEG NFR BLD AUTO: 90 % (ref 32–75)
NEUTS SEG NFR BLD AUTO: 91 % (ref 32–75)
NEUTS SEG NFR BLD AUTO: 94 % (ref 32–75)
NITRITE UR QL STRIP.AUTO: NEGATIVE
NRBC # BLD: 0 K/UL (ref 0–0.01)
NRBC BLD-RTO: 0 PER 100 WBC
O+P SPEC MICRO: NORMAL
O+P STL CONC: NORMAL
OPIATES UR QL: POSITIVE
OTHER,OTHU: ABNORMAL
P-R INTERVAL, ECG05: 136 MS
P-R INTERVAL, ECG05: 144 MS
P-R INTERVAL, ECG05: 146 MS
P-R INTERVAL, ECG05: 152 MS
P-R INTERVAL, ECG05: 152 MS
P-R INTERVAL, ECG05: 154 MS
P-R INTERVAL, ECG05: 154 MS
PCO2 BLDA: 51 MMHG (ref 35–45)
PCO2 BLDA: 54 MMHG (ref 35–45)
PCO2 BLDA: 58 MMHG (ref 35–45)
PCO2 BLDA: 60 MMHG (ref 35–45)
PCO2 BLDA: 61 MMHG (ref 35–45)
PCP UR QL: NEGATIVE
PH BLDA: 7.32 [PH] (ref 7.35–7.45)
PH BLDA: 7.32 [PH] (ref 7.35–7.45)
PH BLDA: 7.35 [PH] (ref 7.35–7.45)
PH BLDA: 7.35 [PH] (ref 7.35–7.45)
PH BLDA: 7.38 [PH] (ref 7.35–7.45)
PH UR STRIP: 6 [PH] (ref 5–8)
PH UR STRIP: 6 [PH] (ref 5–8)
PH UR STRIP: 6.5 [PH] (ref 5–8)
PH UR STRIP: 6.5 [PH] (ref 5–8)
PH UR STRIP: 7 [PH] (ref 5–8)
PHOSPHATE SERPL-MCNC: 1.5 MG/DL (ref 2.6–4.7)
PHOSPHATE SERPL-MCNC: 2 MG/DL (ref 2.6–4.7)
PLATELET # BLD AUTO: 159 K/UL (ref 150–400)
PLATELET # BLD AUTO: 167 K/UL (ref 150–400)
PLATELET # BLD AUTO: 177 K/UL (ref 150–400)
PLATELET # BLD AUTO: 180 K/UL (ref 150–400)
PLATELET # BLD AUTO: 183 K/UL (ref 150–400)
PLATELET # BLD AUTO: 188 K/UL (ref 150–400)
PLATELET # BLD AUTO: 198 K/UL (ref 150–400)
PLATELET # BLD AUTO: 204 K/UL (ref 150–400)
PLATELET # BLD AUTO: 208 K/UL (ref 150–400)
PLATELET # BLD AUTO: 213 K/UL (ref 150–400)
PLATELET # BLD AUTO: 222 K/UL (ref 150–400)
PLATELET # BLD AUTO: 222 X10E3/UL (ref 150–379)
PLATELET # BLD AUTO: 229 K/UL (ref 150–400)
PLATELET # BLD AUTO: 232 K/UL (ref 150–400)
PLATELET # BLD AUTO: 233 K/UL (ref 150–400)
PLATELET # BLD AUTO: 235 K/UL (ref 150–400)
PLATELET # BLD AUTO: 238 K/UL (ref 150–400)
PLATELET # BLD AUTO: 243 K/UL (ref 150–400)
PLATELET # BLD AUTO: 257 K/UL (ref 150–400)
PLATELET # BLD AUTO: 258 K/UL (ref 150–400)
PLATELET # BLD AUTO: 265 K/UL (ref 150–400)
PLATELET # BLD AUTO: 267 K/UL (ref 150–400)
PLATELET # BLD AUTO: 269 K/UL (ref 150–400)
PLATELET # BLD AUTO: 275 K/UL (ref 150–400)
PLATELET # BLD AUTO: 288 K/UL (ref 150–400)
PLATELET # BLD AUTO: 300 K/UL (ref 150–400)
PLATELET # BLD AUTO: 305 K/UL (ref 150–400)
PLATELET # BLD AUTO: 306 K/UL (ref 150–400)
PLATELET # BLD AUTO: 316 K/UL (ref 150–400)
PLATELET # BLD AUTO: 319 K/UL (ref 150–400)
PLATELET # BLD AUTO: 322 K/UL (ref 150–400)
PLATELET # BLD AUTO: 351 K/UL (ref 150–400)
PLATELET # BLD AUTO: 377 K/UL (ref 150–400)
PLATELET # BLD AUTO: 403 K/UL (ref 150–400)
PLATELET # BLD AUTO: 418 K/UL (ref 150–400)
PLATELET # BLD AUTO: 532 K/UL (ref 150–400)
PO2 BLDA: 104 MMHG (ref 80–100)
PO2 BLDA: 254 MMHG (ref 80–100)
PO2 BLDA: 50 MMHG (ref 80–100)
PO2 BLDA: 56 MMHG (ref 80–100)
PO2 BLDA: 76 MMHG (ref 80–100)
POTASSIUM SERPL-SCNC: 3.5 MMOL/L (ref 3.5–5.1)
POTASSIUM SERPL-SCNC: 3.6 MMOL/L (ref 3.5–5.1)
POTASSIUM SERPL-SCNC: 3.7 MMOL/L (ref 3.5–5.1)
POTASSIUM SERPL-SCNC: 3.8 MMOL/L (ref 3.5–5.1)
POTASSIUM SERPL-SCNC: 3.8 MMOL/L (ref 3.5–5.1)
POTASSIUM SERPL-SCNC: 3.9 MMOL/L (ref 3.5–5.1)
POTASSIUM SERPL-SCNC: 4 MMOL/L (ref 3.5–5.1)
POTASSIUM SERPL-SCNC: 4.1 MMOL/L (ref 3.5–5.1)
POTASSIUM SERPL-SCNC: 4.2 MMOL/L (ref 3.5–5.1)
POTASSIUM SERPL-SCNC: 4.3 MMOL/L (ref 3.5–5.1)
POTASSIUM SERPL-SCNC: 4.3 MMOL/L (ref 3.5–5.1)
POTASSIUM SERPL-SCNC: 4.4 MMOL/L (ref 3.5–5.1)
POTASSIUM SERPL-SCNC: 4.5 MMOL/L (ref 3.5–5.1)
POTASSIUM SERPL-SCNC: 4.7 MMOL/L (ref 3.5–5.1)
POTASSIUM SERPL-SCNC: 4.7 MMOL/L (ref 3.5–5.1)
POTASSIUM SERPL-SCNC: 4.8 MMOL/L (ref 3.5–5.1)
POTASSIUM SERPL-SCNC: 4.8 MMOL/L (ref 3.5–5.1)
POTASSIUM SERPL-SCNC: 4.8 MMOL/L (ref 3.5–5.2)
POTASSIUM SERPL-SCNC: 4.9 MMOL/L (ref 3.5–5.1)
POTASSIUM SERPL-SCNC: 5 MMOL/L (ref 3.5–5.1)
POTASSIUM SERPL-SCNC: 5.1 MMOL/L (ref 3.5–5.1)
PROT SERPL-MCNC: 5.9 G/DL (ref 6.4–8.2)
PROT SERPL-MCNC: 6 G/DL (ref 6.4–8.2)
PROT SERPL-MCNC: 6.2 G/DL (ref 6.4–8.2)
PROT SERPL-MCNC: 6.4 G/DL (ref 6.4–8.2)
PROT SERPL-MCNC: 6.4 G/DL (ref 6.4–8.2)
PROT SERPL-MCNC: 6.7 G/DL (ref 6–8.5)
PROT SERPL-MCNC: 7 G/DL (ref 6.4–8.2)
PROT SERPL-MCNC: 7.1 G/DL (ref 6.4–8.2)
PROT SERPL-MCNC: 7.1 G/DL (ref 6.4–8.2)
PROT SERPL-MCNC: 7.2 G/DL (ref 6.4–8.2)
PROT SERPL-MCNC: 7.3 G/DL (ref 6.4–8.2)
PROT SERPL-MCNC: 7.4 G/DL (ref 6.4–8.2)
PROT SERPL-MCNC: 7.4 G/DL (ref 6.4–8.2)
PROT SERPL-MCNC: 7.6 G/DL (ref 6.4–8.2)
PROT SERPL-MCNC: 7.9 G/DL (ref 6.4–8.2)
PROT UR STRIP-MCNC: 30 MG/DL
PROT UR STRIP-MCNC: ABNORMAL MG/DL
PROT UR STRIP-MCNC: ABNORMAL MG/DL
PROT UR STRIP-MCNC: NEGATIVE MG/DL
PROT UR STRIP-MCNC: NEGATIVE MG/DL
PROTHROMBIN TIME: 10.7 SEC (ref 9–11.1)
Q-T INTERVAL, ECG07: 326 MS
Q-T INTERVAL, ECG07: 348 MS
Q-T INTERVAL, ECG07: 360 MS
Q-T INTERVAL, ECG07: 370 MS
Q-T INTERVAL, ECG07: 436 MS
Q-T INTERVAL, ECG07: 438 MS
Q-T INTERVAL, ECG07: 460 MS
QRS DURATION, ECG06: 74 MS
QRS DURATION, ECG06: 88 MS
QRS DURATION, ECG06: 90 MS
QRS DURATION, ECG06: 92 MS
QTC CALCULATION (BEZET), ECG08: 462 MS
QTC CALCULATION (BEZET), ECG08: 469 MS
QTC CALCULATION (BEZET), ECG08: 473 MS
QTC CALCULATION (BEZET), ECG08: 474 MS
QTC CALCULATION (BEZET), ECG08: 512 MS
QTC CALCULATION (BEZET), ECG08: 532 MS
QTC CALCULATION (BEZET), ECG08: 543 MS
RBC # BLD AUTO: 2.93 M/UL (ref 3.8–5.2)
RBC # BLD AUTO: 2.96 M/UL (ref 3.8–5.2)
RBC # BLD AUTO: 3.13 M/UL (ref 3.8–5.2)
RBC # BLD AUTO: 3.17 M/UL (ref 3.8–5.2)
RBC # BLD AUTO: 3.17 M/UL (ref 3.8–5.2)
RBC # BLD AUTO: 3.22 M/UL (ref 3.8–5.2)
RBC # BLD AUTO: 3.22 M/UL (ref 3.8–5.2)
RBC # BLD AUTO: 3.27 M/UL (ref 3.8–5.2)
RBC # BLD AUTO: 3.29 M/UL (ref 3.8–5.2)
RBC # BLD AUTO: 3.29 M/UL (ref 3.8–5.2)
RBC # BLD AUTO: 3.33 M/UL (ref 3.8–5.2)
RBC # BLD AUTO: 3.39 M/UL (ref 3.8–5.2)
RBC # BLD AUTO: 3.4 M/UL (ref 3.8–5.2)
RBC # BLD AUTO: 3.41 M/UL (ref 3.8–5.2)
RBC # BLD AUTO: 3.45 M/UL (ref 3.8–5.2)
RBC # BLD AUTO: 3.45 M/UL (ref 3.8–5.2)
RBC # BLD AUTO: 3.52 M/UL (ref 3.8–5.2)
RBC # BLD AUTO: 3.58 M/UL (ref 3.8–5.2)
RBC # BLD AUTO: 3.61 M/UL (ref 3.8–5.2)
RBC # BLD AUTO: 3.63 M/UL (ref 3.8–5.2)
RBC # BLD AUTO: 3.71 M/UL (ref 3.8–5.2)
RBC # BLD AUTO: 3.72 M/UL (ref 3.8–5.2)
RBC # BLD AUTO: 3.74 M/UL (ref 3.8–5.2)
RBC # BLD AUTO: 3.75 M/UL (ref 3.8–5.2)
RBC # BLD AUTO: 3.84 M/UL (ref 3.8–5.2)
RBC # BLD AUTO: 3.85 M/UL (ref 3.8–5.2)
RBC # BLD AUTO: 3.85 M/UL (ref 3.8–5.2)
RBC # BLD AUTO: 3.95 M/UL (ref 3.8–5.2)
RBC # BLD AUTO: 3.98 M/UL (ref 3.8–5.2)
RBC # BLD AUTO: 4.04 M/UL (ref 3.8–5.2)
RBC # BLD AUTO: 4.23 M/UL (ref 3.8–5.2)
RBC # BLD AUTO: 4.27 M/UL (ref 3.8–5.2)
RBC # BLD AUTO: 4.37 X10E6/UL (ref 3.77–5.28)
RBC #/AREA URNS HPF: ABNORMAL /HPF (ref 0–5)
RBC MORPH BLD: ABNORMAL
REPORTED DOSE,DOSE: ABNORMAL UNITS
REPORTED DOSE,DOSE: NORMAL UNITS
REPORTED DOSE/TIME,TMG: 2200
REPORTED DOSE/TIME,TMG: ABNORMAL
REPORTED DOSE/TIME,TMG: NORMAL
RETICS/RBC NFR AUTO: 1.6 % (ref 0.7–2.1)
SAO2 % BLD: 100 % (ref 92–97)
SAO2 % BLD: 82 % (ref 92–97)
SAO2 % BLD: 87 % (ref 92–97)
SAO2 % BLD: 94 % (ref 92–97)
SAO2 % BLD: 97 % (ref 92–97)
SAO2% DEVICE SAO2% SENSOR NAME: ABNORMAL
SERVICE CMNT-IMP: ABNORMAL
SERVICE CMNT-IMP: NORMAL
SODIUM SERPL-SCNC: 135 MMOL/L (ref 136–145)
SODIUM SERPL-SCNC: 136 MMOL/L (ref 136–145)
SODIUM SERPL-SCNC: 136 MMOL/L (ref 136–145)
SODIUM SERPL-SCNC: 137 MMOL/L (ref 136–145)
SODIUM SERPL-SCNC: 138 MMOL/L (ref 136–145)
SODIUM SERPL-SCNC: 139 MMOL/L (ref 136–145)
SODIUM SERPL-SCNC: 139 MMOL/L (ref 136–145)
SODIUM SERPL-SCNC: 140 MMOL/L (ref 134–144)
SODIUM SERPL-SCNC: 140 MMOL/L (ref 136–145)
SODIUM SERPL-SCNC: 141 MMOL/L (ref 136–145)
SODIUM SERPL-SCNC: 142 MMOL/L (ref 136–145)
SODIUM SERPL-SCNC: 142 MMOL/L (ref 136–145)
SODIUM SERPL-SCNC: 143 MMOL/L (ref 136–145)
SODIUM SERPL-SCNC: 144 MMOL/L (ref 136–145)
SODIUM SERPL-SCNC: 145 MMOL/L (ref 136–145)
SODIUM SERPL-SCNC: 146 MMOL/L (ref 136–145)
SODIUM SERPL-SCNC: 149 MMOL/L (ref 136–145)
SP GR UR REFRACTOMETRY: 1.01 (ref 1–1.03)
SP GR UR REFRACTOMETRY: 1.02 (ref 1–1.03)
SPECIMEN SITE: ABNORMAL
SPECIMEN SOURCE: NORMAL
T4 FREE SERPL-MCNC: 0.8 NG/DL (ref 0.8–1.5)
THERAPEUTIC RANGE,PTTT: NORMAL SECS (ref 58–77)
TIBC SERPL-MCNC: 169 UG/DL (ref 250–450)
TROPONIN I SERPL-MCNC: 0.23 NG/ML
TROPONIN I SERPL-MCNC: 0.33 NG/ML
TROPONIN I SERPL-MCNC: 0.36 NG/ML
TROPONIN I SERPL-MCNC: 0.43 NG/ML
TROPONIN I SERPL-MCNC: 0.55 NG/ML
TROPONIN I SERPL-MCNC: <0.04 NG/ML
TSH SERPL DL<=0.05 MIU/L-ACNC: 0.51 UIU/ML (ref 0.36–3.74)
UA: UC IF INDICATED,UAUC: ABNORMAL
UROBILINOGEN UR QL STRIP.AUTO: 0.2 EU/DL (ref 0.2–1)
VANCOMYCIN TROUGH SERPL-MCNC: 11 UG/ML (ref 5–10)
VANCOMYCIN TROUGH SERPL-MCNC: 12.9 UG/ML (ref 5–10)
VANCOMYCIN TROUGH SERPL-MCNC: 14.4 UG/ML (ref 5–10)
VANCOMYCIN TROUGH SERPL-MCNC: 17.6 UG/ML (ref 5–10)
VANCOMYCIN TROUGH SERPL-MCNC: 18.5 UG/ML (ref 5–10)
VENTILATION MODE VENT: ABNORMAL
VENTRICULAR RATE, ECG03: 102 BPM
VENTRICULAR RATE, ECG03: 111 BPM
VENTRICULAR RATE, ECG03: 121 BPM
VENTRICULAR RATE, ECG03: 83 BPM
VENTRICULAR RATE, ECG03: 84 BPM
VENTRICULAR RATE, ECG03: 89 BPM
VENTRICULAR RATE, ECG03: 99 BPM
WBC # BLD AUTO: 10.4 K/UL (ref 3.6–11)
WBC # BLD AUTO: 10.7 K/UL (ref 3.6–11)
WBC # BLD AUTO: 10.8 K/UL (ref 3.6–11)
WBC # BLD AUTO: 10.8 K/UL (ref 3.6–11)
WBC # BLD AUTO: 10.9 K/UL (ref 3.6–11)
WBC # BLD AUTO: 11.1 K/UL (ref 3.6–11)
WBC # BLD AUTO: 11.3 K/UL (ref 3.6–11)
WBC # BLD AUTO: 11.3 K/UL (ref 3.6–11)
WBC # BLD AUTO: 11.6 K/UL (ref 3.6–11)
WBC # BLD AUTO: 11.7 K/UL (ref 3.6–11)
WBC # BLD AUTO: 11.9 K/UL (ref 3.6–11)
WBC # BLD AUTO: 12.5 K/UL (ref 3.6–11)
WBC # BLD AUTO: 12.9 K/UL (ref 3.6–11)
WBC # BLD AUTO: 13 K/UL (ref 3.6–11)
WBC # BLD AUTO: 14.2 K/UL (ref 3.6–11)
WBC # BLD AUTO: 14.2 K/UL (ref 3.6–11)
WBC # BLD AUTO: 14.6 K/UL (ref 3.6–11)
WBC # BLD AUTO: 15.3 K/UL (ref 3.6–11)
WBC # BLD AUTO: 18.4 K/UL (ref 3.6–11)
WBC # BLD AUTO: 19.6 K/UL (ref 3.6–11)
WBC # BLD AUTO: 20.1 K/UL (ref 3.6–11)
WBC # BLD AUTO: 20.4 K/UL (ref 3.6–11)
WBC # BLD AUTO: 20.5 K/UL (ref 3.6–11)
WBC # BLD AUTO: 23.8 K/UL (ref 3.6–11)
WBC # BLD AUTO: 31.5 K/UL (ref 3.6–11)
WBC # BLD AUTO: 34.3 K/UL (ref 3.6–11)
WBC # BLD AUTO: 40.4 K/UL (ref 3.6–11)
WBC # BLD AUTO: 6.8 K/UL (ref 3.6–11)
WBC # BLD AUTO: 6.8 K/UL (ref 3.6–11)
WBC # BLD AUTO: 8.1 K/UL (ref 3.6–11)
WBC # BLD AUTO: 8.3 K/UL (ref 3.6–11)
WBC # BLD AUTO: 8.6 X10E3/UL (ref 3.4–10.8)
WBC # BLD AUTO: 9.1 K/UL (ref 3.6–11)
WBC # BLD AUTO: 9.2 K/UL (ref 3.6–11)
WBC # BLD AUTO: 9.7 K/UL (ref 3.6–11)
WBC # BLD AUTO: 9.8 K/UL (ref 3.6–11)
WBC #/AREA STL HPF: NORMAL /HPF (ref 0–4)
WBC MORPH BLD: ABNORMAL
WBC URNS QL MICRO: ABNORMAL /HPF (ref 0–4)
YEAST URNS QL MICRO: PRESENT

## 2017-01-01 PROCEDURE — 85025 COMPLETE CBC W/AUTO DIFF WBC: CPT | Performed by: FAMILY MEDICINE

## 2017-01-01 PROCEDURE — 74011000250 HC RX REV CODE- 250: Performed by: EMERGENCY MEDICINE

## 2017-01-01 PROCEDURE — 74011250636 HC RX REV CODE- 250/636: Performed by: INTERNAL MEDICINE

## 2017-01-01 PROCEDURE — 74011250637 HC RX REV CODE- 250/637: Performed by: INTERNAL MEDICINE

## 2017-01-01 PROCEDURE — 97161 PT EVAL LOW COMPLEX 20 MIN: CPT

## 2017-01-01 PROCEDURE — 74011000250 HC RX REV CODE- 250: Performed by: NURSE PRACTITIONER

## 2017-01-01 PROCEDURE — 0651 HSPC ROUTINE HOME CARE

## 2017-01-01 PROCEDURE — 0655 HSPC INPATIENT RESPITE

## 2017-01-01 PROCEDURE — 96368 THER/DIAG CONCURRENT INF: CPT

## 2017-01-01 PROCEDURE — 3331090002 HH PPS REVENUE DEBIT

## 2017-01-01 PROCEDURE — 36415 COLL VENOUS BLD VENIPUNCTURE: CPT | Performed by: FAMILY MEDICINE

## 2017-01-01 PROCEDURE — 74011000258 HC RX REV CODE- 258: Performed by: FAMILY MEDICINE

## 2017-01-01 PROCEDURE — 74011250636 HC RX REV CODE- 250/636: Performed by: NURSE PRACTITIONER

## 2017-01-01 PROCEDURE — 85025 COMPLETE CBC W/AUTO DIFF WBC: CPT | Performed by: INTERNAL MEDICINE

## 2017-01-01 PROCEDURE — A4322 IRRIGATION SYRINGE: HCPCS

## 2017-01-01 PROCEDURE — 74011250637 HC RX REV CODE- 250/637: Performed by: FAMILY MEDICINE

## 2017-01-01 PROCEDURE — 3331090001 HH PPS REVENUE CREDIT

## 2017-01-01 PROCEDURE — 36415 COLL VENOUS BLD VENIPUNCTURE: CPT | Performed by: EMERGENCY MEDICINE

## 2017-01-01 PROCEDURE — 80048 BASIC METABOLIC PNL TOTAL CA: CPT | Performed by: HOSPITALIST

## 2017-01-01 PROCEDURE — 74011000250 HC RX REV CODE- 250: Performed by: INTERNAL MEDICINE

## 2017-01-01 PROCEDURE — 74011250636 HC RX REV CODE- 250/636: Performed by: FAMILY MEDICINE

## 2017-01-01 PROCEDURE — 96365 THER/PROPH/DIAG IV INF INIT: CPT

## 2017-01-01 PROCEDURE — HOSPICE MEDICATION HC HH HOSPICE MEDICATION

## 2017-01-01 PROCEDURE — 96361 HYDRATE IV INFUSION ADD-ON: CPT

## 2017-01-01 PROCEDURE — 94640 AIRWAY INHALATION TREATMENT: CPT

## 2017-01-01 PROCEDURE — G0155 HHCP-SVS OF CSW,EA 15 MIN: HCPCS

## 2017-01-01 PROCEDURE — 65660000000 HC RM CCU STEPDOWN

## 2017-01-01 PROCEDURE — 83605 ASSAY OF LACTIC ACID: CPT | Performed by: EMERGENCY MEDICINE

## 2017-01-01 PROCEDURE — 76450000000

## 2017-01-01 PROCEDURE — G0300 HHS/HOSPICE OF LPN EA 15 MIN: HCPCS

## 2017-01-01 PROCEDURE — 82803 BLOOD GASES ANY COMBINATION: CPT | Performed by: EMERGENCY MEDICINE

## 2017-01-01 PROCEDURE — 74011250636 HC RX REV CODE- 250/636: Performed by: EMERGENCY MEDICINE

## 2017-01-01 PROCEDURE — 74011250637 HC RX REV CODE- 250/637: Performed by: NURSE PRACTITIONER

## 2017-01-01 PROCEDURE — 74011250637 HC RX REV CODE- 250/637: Performed by: HOSPITALIST

## 2017-01-01 PROCEDURE — 82962 GLUCOSE BLOOD TEST: CPT

## 2017-01-01 PROCEDURE — 3331090003 HH PPS REVENUE ADJ

## 2017-01-01 PROCEDURE — 74011000250 HC RX REV CODE- 250: Performed by: HOSPITALIST

## 2017-01-01 PROCEDURE — 80053 COMPREHEN METABOLIC PANEL: CPT | Performed by: FAMILY MEDICINE

## 2017-01-01 PROCEDURE — G0299 HHS/HOSPICE OF RN EA 15 MIN: HCPCS

## 2017-01-01 PROCEDURE — 77030019563 HC DEV ATTCH FEED HOLL -A

## 2017-01-01 PROCEDURE — 74011000258 HC RX REV CODE- 258: Performed by: INTERNAL MEDICINE

## 2017-01-01 PROCEDURE — G0151 HHCP-SERV OF PT,EA 15 MIN: HCPCS

## 2017-01-01 PROCEDURE — 74011000258 HC RX REV CODE- 258: Performed by: EMERGENCY MEDICINE

## 2017-01-01 PROCEDURE — 77010033678 HC OXYGEN DAILY

## 2017-01-01 PROCEDURE — 87070 CULTURE OTHR SPECIMN AEROBIC: CPT | Performed by: EMERGENCY MEDICINE

## 2017-01-01 PROCEDURE — 97161 PT EVAL LOW COMPLEX 20 MIN: CPT | Performed by: PHYSICAL THERAPIST

## 2017-01-01 PROCEDURE — 71020 XR CHEST PA LAT: CPT

## 2017-01-01 PROCEDURE — 93005 ELECTROCARDIOGRAM TRACING: CPT

## 2017-01-01 PROCEDURE — 70450 CT HEAD/BRAIN W/O DYE: CPT

## 2017-01-01 PROCEDURE — 77030018798 HC PMP KT ENTRL FED COVD -A

## 2017-01-01 PROCEDURE — 74011250636 HC RX REV CODE- 250/636

## 2017-01-01 PROCEDURE — 77030013140 HC MSK NEB VYRM -A

## 2017-01-01 PROCEDURE — 97165 OT EVAL LOW COMPLEX 30 MIN: CPT

## 2017-01-01 PROCEDURE — 97116 GAIT TRAINING THERAPY: CPT

## 2017-01-01 PROCEDURE — 36415 COLL VENOUS BLD VENIPUNCTURE: CPT | Performed by: HOSPITALIST

## 2017-01-01 PROCEDURE — 36415 COLL VENOUS BLD VENIPUNCTURE: CPT | Performed by: INTERNAL MEDICINE

## 2017-01-01 PROCEDURE — G8978 MOBILITY CURRENT STATUS: HCPCS

## 2017-01-01 PROCEDURE — 74011250636 HC RX REV CODE- 250/636: Performed by: HOSPITALIST

## 2017-01-01 PROCEDURE — 77030005563 HC CATH URETH INT MMGH -A

## 2017-01-01 PROCEDURE — 0656 HSPC GENERAL INPATIENT

## 2017-01-01 PROCEDURE — 81001 URINALYSIS AUTO W/SCOPE: CPT | Performed by: EMERGENCY MEDICINE

## 2017-01-01 PROCEDURE — G8988 SELF CARE GOAL STATUS: HCPCS

## 2017-01-01 PROCEDURE — 80048 BASIC METABOLIC PNL TOTAL CA: CPT | Performed by: INTERNAL MEDICINE

## 2017-01-01 PROCEDURE — 96360 HYDRATION IV INFUSION INIT: CPT

## 2017-01-01 PROCEDURE — 74011636637 HC RX REV CODE- 636/637: Performed by: INTERNAL MEDICINE

## 2017-01-01 PROCEDURE — 71010 XR CHEST PORT: CPT

## 2017-01-01 PROCEDURE — S9470 NUTRITIONAL COUNSELING, DIET: HCPCS

## 2017-01-01 PROCEDURE — 0DH63UZ INSERTION OF FEEDING DEVICE INTO STOMACH, PERCUTANEOUS APPROACH: ICD-10-PCS | Performed by: INTERNAL MEDICINE

## 2017-01-01 PROCEDURE — 36600 WITHDRAWAL OF ARTERIAL BLOOD: CPT | Performed by: EMERGENCY MEDICINE

## 2017-01-01 PROCEDURE — 85025 COMPLETE CBC W/AUTO DIFF WBC: CPT | Performed by: EMERGENCY MEDICINE

## 2017-01-01 PROCEDURE — 65270000029 HC RM PRIVATE

## 2017-01-01 PROCEDURE — 80053 COMPREHEN METABOLIC PANEL: CPT | Performed by: EMERGENCY MEDICINE

## 2017-01-01 PROCEDURE — 80202 ASSAY OF VANCOMYCIN: CPT | Performed by: FAMILY MEDICINE

## 2017-01-01 PROCEDURE — T4541 LARGE DISPOSABLE UNDERPAD: HCPCS

## 2017-01-01 PROCEDURE — 94660 CPAP INITIATION&MGMT: CPT

## 2017-01-01 PROCEDURE — G0153 HHCP-SVS OF S/L PATH,EA 15MN: HCPCS

## 2017-01-01 PROCEDURE — G8979 MOBILITY GOAL STATUS: HCPCS

## 2017-01-01 PROCEDURE — 80048 BASIC METABOLIC PNL TOTAL CA: CPT | Performed by: FAMILY MEDICINE

## 2017-01-01 PROCEDURE — T4526 ADULT SIZE PULL-ON MED: HCPCS

## 2017-01-01 PROCEDURE — G0157 HHC PT ASSISTANT EA 15: HCPCS

## 2017-01-01 PROCEDURE — 83880 ASSAY OF NATRIURETIC PEPTIDE: CPT | Performed by: EMERGENCY MEDICINE

## 2017-01-01 PROCEDURE — 99285 EMERGENCY DEPT VISIT HI MDM: CPT

## 2017-01-01 PROCEDURE — 65610000006 HC RM INTENSIVE CARE

## 2017-01-01 PROCEDURE — 82728 ASSAY OF FERRITIN: CPT | Performed by: FAMILY MEDICINE

## 2017-01-01 PROCEDURE — 83605 ASSAY OF LACTIC ACID: CPT | Performed by: INTERNAL MEDICINE

## 2017-01-01 PROCEDURE — 85025 COMPLETE CBC W/AUTO DIFF WBC: CPT | Performed by: HOSPITALIST

## 2017-01-01 PROCEDURE — G0493 RN CARE EA 15 MIN HH/HOSPICE: HCPCS

## 2017-01-01 PROCEDURE — 400013 HH SOC

## 2017-01-01 PROCEDURE — G8997 SWALLOW GOAL STATUS: HCPCS | Performed by: SPEECH-LANGUAGE PATHOLOGIST

## 2017-01-01 PROCEDURE — 99222 1ST HOSP IP/OBS MODERATE 55: CPT | Performed by: INTERNAL MEDICINE

## 2017-01-01 PROCEDURE — 97530 THERAPEUTIC ACTIVITIES: CPT

## 2017-01-01 PROCEDURE — 82550 ASSAY OF CK (CPK): CPT | Performed by: EMERGENCY MEDICINE

## 2017-01-01 PROCEDURE — 71260 CT THORAX DX C+: CPT

## 2017-01-01 PROCEDURE — 77030005122 HC CATH GASTMY PEG BSC -B: Performed by: INTERNAL MEDICINE

## 2017-01-01 PROCEDURE — 74011000250 HC RX REV CODE- 250

## 2017-01-01 PROCEDURE — 83735 ASSAY OF MAGNESIUM: CPT | Performed by: EMERGENCY MEDICINE

## 2017-01-01 PROCEDURE — 82553 CREATINE MB FRACTION: CPT | Performed by: EMERGENCY MEDICINE

## 2017-01-01 PROCEDURE — 74011000258 HC RX REV CODE- 258: Performed by: HOSPITALIST

## 2017-01-01 PROCEDURE — G8996 SWALLOW CURRENT STATUS: HCPCS | Performed by: SPEECH-LANGUAGE PATHOLOGIST

## 2017-01-01 PROCEDURE — 77030029684 HC NEB SM VOL KT MONA -A

## 2017-01-01 PROCEDURE — G8987 SELF CARE CURRENT STATUS: HCPCS

## 2017-01-01 PROCEDURE — 400014 HH F/U

## 2017-01-01 PROCEDURE — 92526 ORAL FUNCTION THERAPY: CPT | Performed by: SPEECH-LANGUAGE PATHOLOGIST

## 2017-01-01 PROCEDURE — 87040 BLOOD CULTURE FOR BACTERIA: CPT | Performed by: INTERNAL MEDICINE

## 2017-01-01 PROCEDURE — 84484 ASSAY OF TROPONIN QUANT: CPT | Performed by: EMERGENCY MEDICINE

## 2017-01-01 PROCEDURE — 74011000250 HC RX REV CODE- 250: Performed by: FAMILY MEDICINE

## 2017-01-01 PROCEDURE — 83735 ASSAY OF MAGNESIUM: CPT | Performed by: INTERNAL MEDICINE

## 2017-01-01 PROCEDURE — 65270000015 HC RM PRIVATE ONCOLOGY

## 2017-01-01 PROCEDURE — G0156 HHCP-SVS OF AIDE,EA 15 MIN: HCPCS

## 2017-01-01 PROCEDURE — A4927 NON-STERILE GLOVES: HCPCS

## 2017-01-01 PROCEDURE — 85610 PROTHROMBIN TIME: CPT | Performed by: EMERGENCY MEDICINE

## 2017-01-01 PROCEDURE — 87070 CULTURE OTHR SPECIMN AEROBIC: CPT | Performed by: INTERNAL MEDICINE

## 2017-01-01 PROCEDURE — 83540 ASSAY OF IRON: CPT | Performed by: FAMILY MEDICINE

## 2017-01-01 PROCEDURE — 94761 N-INVAS EAR/PLS OXIMETRY MLT: CPT

## 2017-01-01 PROCEDURE — 97535 SELF CARE MNGMENT TRAINING: CPT

## 2017-01-01 PROCEDURE — 96374 THER/PROPH/DIAG INJ IV PUSH: CPT

## 2017-01-01 PROCEDURE — G8988 SELF CARE GOAL STATUS: HCPCS | Performed by: OCCUPATIONAL THERAPIST

## 2017-01-01 PROCEDURE — 3E0G76Z INTRODUCTION OF NUTRITIONAL SUBSTANCE INTO UPPER GI, VIA NATURAL OR ARTIFICIAL OPENING: ICD-10-PCS | Performed by: INTERNAL MEDICINE

## 2017-01-01 PROCEDURE — 84100 ASSAY OF PHOSPHORUS: CPT | Performed by: EMERGENCY MEDICINE

## 2017-01-01 PROCEDURE — 99233 SBSQ HOSP IP/OBS HIGH 50: CPT | Performed by: INTERNAL MEDICINE

## 2017-01-01 PROCEDURE — 71275 CT ANGIOGRAPHY CHEST: CPT

## 2017-01-01 PROCEDURE — 80053 COMPREHEN METABOLIC PANEL: CPT | Performed by: INTERNAL MEDICINE

## 2017-01-01 PROCEDURE — 83880 ASSAY OF NATRIURETIC PEPTIDE: CPT | Performed by: INTERNAL MEDICINE

## 2017-01-01 PROCEDURE — 77030012879 HC MSK CPAP FLL FAC PHIL -B

## 2017-01-01 PROCEDURE — 80178 ASSAY OF LITHIUM: CPT | Performed by: INTERNAL MEDICINE

## 2017-01-01 PROCEDURE — 84100 ASSAY OF PHOSPHORUS: CPT | Performed by: INTERNAL MEDICINE

## 2017-01-01 PROCEDURE — 87040 BLOOD CULTURE FOR BACTERIA: CPT | Performed by: EMERGENCY MEDICINE

## 2017-01-01 PROCEDURE — 85730 THROMBOPLASTIN TIME PARTIAL: CPT | Performed by: EMERGENCY MEDICINE

## 2017-01-01 PROCEDURE — 87186 SC STD MICRODIL/AGAR DIL: CPT | Performed by: EMERGENCY MEDICINE

## 2017-01-01 PROCEDURE — 81003 URINALYSIS AUTO W/O SCOPE: CPT | Performed by: EMERGENCY MEDICINE

## 2017-01-01 PROCEDURE — 70491 CT SOFT TISSUE NECK W/DYE: CPT

## 2017-01-01 PROCEDURE — 80202 ASSAY OF VANCOMYCIN: CPT | Performed by: INTERNAL MEDICINE

## 2017-01-01 PROCEDURE — 36600 WITHDRAWAL OF ARTERIAL BLOOD: CPT

## 2017-01-01 PROCEDURE — 92610 EVALUATE SWALLOWING FUNCTION: CPT

## 2017-01-01 PROCEDURE — 87177 OVA AND PARASITES SMEARS: CPT | Performed by: INTERNAL MEDICINE

## 2017-01-01 PROCEDURE — 85045 AUTOMATED RETICULOCYTE COUNT: CPT | Performed by: FAMILY MEDICINE

## 2017-01-01 PROCEDURE — 87077 CULTURE AEROBIC IDENTIFY: CPT | Performed by: EMERGENCY MEDICINE

## 2017-01-01 PROCEDURE — 82803 BLOOD GASES ANY COMBINATION: CPT | Performed by: FAMILY MEDICINE

## 2017-01-01 PROCEDURE — 84439 ASSAY OF FREE THYROXINE: CPT | Performed by: INTERNAL MEDICINE

## 2017-01-01 PROCEDURE — 80048 BASIC METABOLIC PNL TOTAL CA: CPT | Performed by: EMERGENCY MEDICINE

## 2017-01-01 PROCEDURE — 36600 WITHDRAWAL OF ARTERIAL BLOOD: CPT | Performed by: FAMILY MEDICINE

## 2017-01-01 PROCEDURE — 3336500001 HSPC ELECTION

## 2017-01-01 PROCEDURE — 93306 TTE W/DOPPLER COMPLETE: CPT

## 2017-01-01 PROCEDURE — 80307 DRUG TEST PRSMV CHEM ANLYZR: CPT | Performed by: EMERGENCY MEDICINE

## 2017-01-01 PROCEDURE — 76040000019: Performed by: INTERNAL MEDICINE

## 2017-01-01 PROCEDURE — 89055 LEUKOCYTE ASSESSMENT FECAL: CPT | Performed by: INTERNAL MEDICINE

## 2017-01-01 PROCEDURE — 94762 N-INVAS EAR/PLS OXIMTRY CONT: CPT

## 2017-01-01 PROCEDURE — 83690 ASSAY OF LIPASE: CPT | Performed by: EMERGENCY MEDICINE

## 2017-01-01 PROCEDURE — 74011636320 HC RX REV CODE- 636/320: Performed by: EMERGENCY MEDICINE

## 2017-01-01 PROCEDURE — A9540 TC99M MAA: HCPCS

## 2017-01-01 PROCEDURE — 92526 ORAL FUNCTION THERAPY: CPT

## 2017-01-01 PROCEDURE — 80178 ASSAY OF LITHIUM: CPT | Performed by: EMERGENCY MEDICINE

## 2017-01-01 PROCEDURE — 78815 PET IMAGE W/CT SKULL-THIGH: CPT

## 2017-01-01 PROCEDURE — 74011250637 HC RX REV CODE- 250/637: Performed by: EMERGENCY MEDICINE

## 2017-01-01 PROCEDURE — 74230 X-RAY XM SWLNG FUNCJ C+: CPT

## 2017-01-01 PROCEDURE — 84484 ASSAY OF TROPONIN QUANT: CPT | Performed by: NURSE PRACTITIONER

## 2017-01-01 PROCEDURE — 76060000031 HC ANESTHESIA FIRST 0.5 HR: Performed by: INTERNAL MEDICINE

## 2017-01-01 PROCEDURE — 97165 OT EVAL LOW COMPLEX 30 MIN: CPT | Performed by: OCCUPATIONAL THERAPIST

## 2017-01-01 PROCEDURE — C1751 CATH, INF, PER/CENT/MIDLINE: HCPCS

## 2017-01-01 PROCEDURE — 97535 SELF CARE MNGMENT TRAINING: CPT | Performed by: OCCUPATIONAL THERAPIST

## 2017-01-01 PROCEDURE — G8987 SELF CARE CURRENT STATUS: HCPCS | Performed by: OCCUPATIONAL THERAPIST

## 2017-01-01 PROCEDURE — 75810000137 HC PLCMT CENT VENOUS CATH

## 2017-01-01 PROCEDURE — 93970 EXTREMITY STUDY: CPT

## 2017-01-01 PROCEDURE — 74011250637 HC RX REV CODE- 250/637

## 2017-01-01 PROCEDURE — 97110 THERAPEUTIC EXERCISES: CPT

## 2017-01-01 PROCEDURE — 97116 GAIT TRAINING THERAPY: CPT | Performed by: PHYSICAL THERAPIST

## 2017-01-01 PROCEDURE — 92610 EVALUATE SWALLOWING FUNCTION: CPT | Performed by: SPEECH-LANGUAGE PATHOLOGIST

## 2017-01-01 PROCEDURE — 87804 INFLUENZA ASSAY W/OPTIC: CPT | Performed by: EMERGENCY MEDICINE

## 2017-01-01 PROCEDURE — 51701 INSERT BLADDER CATHETER: CPT

## 2017-01-01 PROCEDURE — 74011636637 HC RX REV CODE- 636/637: Performed by: FAMILY MEDICINE

## 2017-01-01 PROCEDURE — G8980 MOBILITY D/C STATUS: HCPCS

## 2017-01-01 PROCEDURE — 87045 FECES CULTURE AEROBIC BACT: CPT | Performed by: INTERNAL MEDICINE

## 2017-01-01 PROCEDURE — 84443 ASSAY THYROID STIM HORMONE: CPT | Performed by: INTERNAL MEDICINE

## 2017-01-01 PROCEDURE — 02HV33Z INSERTION OF INFUSION DEVICE INTO SUPERIOR VENA CAVA, PERCUTANEOUS APPROACH: ICD-10-PCS | Performed by: EMERGENCY MEDICINE

## 2017-01-01 PROCEDURE — 92611 MOTION FLUOROSCOPY/SWALLOW: CPT

## 2017-01-01 PROCEDURE — G0152 HHCP-SERV OF OT,EA 15 MIN: HCPCS

## 2017-01-01 PROCEDURE — 87493 C DIFF AMPLIFIED PROBE: CPT | Performed by: INTERNAL MEDICINE

## 2017-01-01 PROCEDURE — 77030027138 HC INCENT SPIROMETER -A

## 2017-01-01 PROCEDURE — 74011636320 HC RX REV CODE- 636/320: Performed by: FAMILY MEDICINE

## 2017-01-01 RX ORDER — OXYCODONE HCL 20 MG/ML
10 CONCENTRATE, ORAL ORAL EVERY 4 HOURS
Qty: 30 ML | Refills: 0 | Status: SHIPPED | OUTPATIENT
Start: 2017-01-01 | End: 2017-01-01 | Stop reason: SDUPTHER

## 2017-01-01 RX ORDER — OXYCODONE HCL 20 MG/ML
10 CONCENTRATE, ORAL ORAL EVERY 4 HOURS
Qty: 30 ML | Refills: 0 | Status: SHIPPED | OUTPATIENT
Start: 2017-01-01 | End: 2017-01-01 | Stop reason: DRUGHIGH

## 2017-01-01 RX ORDER — METOPROLOL TARTRATE 25 MG/1
12.5 TABLET, FILM COATED ORAL EVERY 12 HOURS
Status: DISCONTINUED | OUTPATIENT
Start: 2017-01-01 | End: 2017-01-01

## 2017-01-01 RX ORDER — MULTIVITAMIN/IRON/FOLIC ACID 18MG-0.4MG
TABLET ORAL
Status: ON HOLD | COMMUNITY
Start: 2016-11-11 | End: 2017-01-01

## 2017-01-01 RX ORDER — IPRATROPIUM BROMIDE AND ALBUTEROL SULFATE 2.5; .5 MG/3ML; MG/3ML
3 SOLUTION RESPIRATORY (INHALATION)
Status: DISCONTINUED | OUTPATIENT
Start: 2017-01-01 | End: 2017-01-01

## 2017-01-01 RX ORDER — ENOXAPARIN SODIUM 100 MG/ML
1 INJECTION SUBCUTANEOUS EVERY 12 HOURS
Status: DISCONTINUED | OUTPATIENT
Start: 2017-01-01 | End: 2017-01-01

## 2017-01-01 RX ORDER — OXYCODONE HCL 10 MG/1
10 TABLET, FILM COATED, EXTENDED RELEASE ORAL EVERY 12 HOURS
Status: DISCONTINUED | OUTPATIENT
Start: 2017-01-01 | End: 2017-01-01

## 2017-01-01 RX ORDER — MORPHINE SULFATE 4 MG/ML
4 INJECTION, SOLUTION INTRAMUSCULAR; INTRAVENOUS
Status: DISCONTINUED | OUTPATIENT
Start: 2017-01-01 | End: 2017-01-01

## 2017-01-01 RX ORDER — OXYCODONE HCL 20 MG/ML
10 CONCENTRATE, ORAL ORAL
Qty: 30 ML | Refills: 0 | Status: SHIPPED | OUTPATIENT
Start: 2017-01-01 | End: 2017-01-01 | Stop reason: SDUPTHER

## 2017-01-01 RX ORDER — METOPROLOL TARTRATE 25 MG/1
25 TABLET, FILM COATED ORAL 2 TIMES DAILY
Status: DISCONTINUED | OUTPATIENT
Start: 2017-01-01 | End: 2017-01-01 | Stop reason: HOSPADM

## 2017-01-01 RX ORDER — PREDNISONE 10 MG/1
10 TABLET ORAL 2 TIMES DAILY
Qty: 10 TAB | Refills: 0 | Status: SHIPPED | OUTPATIENT
Start: 2017-01-01 | End: 2017-01-01 | Stop reason: ALTCHOICE

## 2017-01-01 RX ORDER — SODIUM CHLORIDE 0.9 % (FLUSH) 0.9 %
5-10 SYRINGE (ML) INJECTION AS NEEDED
Status: DISCONTINUED | OUTPATIENT
Start: 2017-01-01 | End: 2017-01-01 | Stop reason: HOSPADM

## 2017-01-01 RX ORDER — OXYCODONE HCL 20 MG/ML
10 CONCENTRATE, ORAL ORAL
Qty: 60 ML | Refills: 0 | Status: SHIPPED | OUTPATIENT
Start: 2017-01-01 | End: 2017-01-01 | Stop reason: DRUGHIGH

## 2017-01-01 RX ORDER — LIDOCAINE HYDROCHLORIDE 20 MG/ML
15 SOLUTION OROPHARYNGEAL
Status: DISCONTINUED | OUTPATIENT
Start: 2017-01-01 | End: 2017-01-01 | Stop reason: HOSPADM

## 2017-01-01 RX ORDER — HYDROMORPHONE HYDROCHLORIDE 1 MG/ML
1 INJECTION, SOLUTION INTRAMUSCULAR; INTRAVENOUS; SUBCUTANEOUS
Status: DISCONTINUED | OUTPATIENT
Start: 2017-01-01 | End: 2017-01-01

## 2017-01-01 RX ORDER — NALOXONE HYDROCHLORIDE 0.4 MG/ML
0.4 INJECTION, SOLUTION INTRAMUSCULAR; INTRAVENOUS; SUBCUTANEOUS AS NEEDED
Status: DISCONTINUED | OUTPATIENT
Start: 2017-01-01 | End: 2017-01-01 | Stop reason: HOSPADM

## 2017-01-01 RX ORDER — HEPARIN SODIUM 5000 [USP'U]/ML
5000 INJECTION, SOLUTION INTRAVENOUS; SUBCUTANEOUS EVERY 12 HOURS
Status: DISCONTINUED | OUTPATIENT
Start: 2017-01-01 | End: 2017-01-01 | Stop reason: HOSPADM

## 2017-01-01 RX ORDER — SODIUM CHLORIDE 0.9 % (FLUSH) 0.9 %
5-10 SYRINGE (ML) INJECTION EVERY 8 HOURS
Status: COMPLETED | OUTPATIENT
Start: 2017-01-01 | End: 2017-01-01

## 2017-01-01 RX ORDER — METOPROLOL TARTRATE 25 MG/1
25 TABLET, FILM COATED ORAL EVERY 12 HOURS
Status: DISCONTINUED | OUTPATIENT
Start: 2017-01-01 | End: 2017-01-01 | Stop reason: HOSPADM

## 2017-01-01 RX ORDER — ENOXAPARIN SODIUM 100 MG/ML
40 INJECTION SUBCUTANEOUS EVERY 24 HOURS
Status: DISCONTINUED | OUTPATIENT
Start: 2017-01-01 | End: 2017-01-01

## 2017-01-01 RX ORDER — DEXTROMETHORPHAN/PSEUDOEPHED 2.5-7.5/.8
1.2 DROPS ORAL
Status: DISCONTINUED | OUTPATIENT
Start: 2017-01-01 | End: 2017-01-01 | Stop reason: HOSPADM

## 2017-01-01 RX ORDER — PREDNISONE 10 MG/1
10 TABLET ORAL
Status: DISCONTINUED | OUTPATIENT
Start: 2017-01-01 | End: 2017-01-01

## 2017-01-01 RX ORDER — HYDROCODONE BITARTRATE AND ACETAMINOPHEN 10; 325 MG/1; MG/1
1 TABLET ORAL
Status: DISCONTINUED | OUTPATIENT
Start: 2017-01-01 | End: 2017-01-01

## 2017-01-01 RX ORDER — FACIAL-BODY WIPES
10 EACH TOPICAL DAILY PRN
Status: DISCONTINUED | OUTPATIENT
Start: 2017-01-01 | End: 2017-01-01 | Stop reason: HOSPADM

## 2017-01-01 RX ORDER — LITHIUM CARBONATE 450 MG/1
450 TABLET ORAL
Status: DISCONTINUED | OUTPATIENT
Start: 2017-01-01 | End: 2017-01-01 | Stop reason: HOSPADM

## 2017-01-01 RX ORDER — METOPROLOL TARTRATE 25 MG/1
25 TABLET, FILM COATED ORAL EVERY 12 HOURS
Status: DISCONTINUED | OUTPATIENT
Start: 2017-01-01 | End: 2017-01-01

## 2017-01-01 RX ORDER — LEVOFLOXACIN 5 MG/ML
750 INJECTION, SOLUTION INTRAVENOUS
Status: DISCONTINUED | OUTPATIENT
Start: 2017-01-01 | End: 2017-01-01

## 2017-01-01 RX ORDER — MORPHINE SULFATE 20 MG/ML
0.75 SOLUTION ORAL
Qty: 60 ML | Refills: 0 | Status: SHIPPED | OUTPATIENT
Start: 2017-01-01

## 2017-01-01 RX ORDER — MORPHINE SULFATE 2 MG/ML
4 INJECTION, SOLUTION INTRAMUSCULAR; INTRAVENOUS
Status: DISCONTINUED | OUTPATIENT
Start: 2017-01-01 | End: 2017-01-01

## 2017-01-01 RX ORDER — FLUMAZENIL 0.1 MG/ML
0.2 INJECTION INTRAVENOUS
Status: DISCONTINUED | OUTPATIENT
Start: 2017-01-01 | End: 2017-01-01 | Stop reason: HOSPADM

## 2017-01-01 RX ORDER — ALBUTEROL SULFATE 0.83 MG/ML
5 SOLUTION RESPIRATORY (INHALATION)
Status: COMPLETED | OUTPATIENT
Start: 2017-01-01 | End: 2017-01-01

## 2017-01-01 RX ORDER — TRAZODONE HYDROCHLORIDE 100 MG/1
200 TABLET ORAL
Status: DISCONTINUED | OUTPATIENT
Start: 2017-01-01 | End: 2017-01-01 | Stop reason: HOSPADM

## 2017-01-01 RX ORDER — SENNOSIDES 8.8 MG/5ML
5 LIQUID ORAL
Status: DISCONTINUED | OUTPATIENT
Start: 2017-01-01 | End: 2017-01-01 | Stop reason: SDUPTHER

## 2017-01-01 RX ORDER — GABAPENTIN 300 MG/1
300 CAPSULE ORAL 3 TIMES DAILY
Status: DISCONTINUED | OUTPATIENT
Start: 2017-01-01 | End: 2017-01-01

## 2017-01-01 RX ORDER — MUPIROCIN 20 MG/G
OINTMENT TOPICAL EVERY 12 HOURS
Status: DISPENSED | OUTPATIENT
Start: 2017-01-01 | End: 2017-01-01

## 2017-01-01 RX ORDER — LITHIUM CARBONATE 300 MG/1
300 TABLET, FILM COATED, EXTENDED RELEASE ORAL
Status: DISCONTINUED | OUTPATIENT
Start: 2017-01-01 | End: 2017-01-01

## 2017-01-01 RX ORDER — MIDAZOLAM HYDROCHLORIDE 1 MG/ML
.25-5 INJECTION, SOLUTION INTRAMUSCULAR; INTRAVENOUS
Status: DISCONTINUED | OUTPATIENT
Start: 2017-01-01 | End: 2017-01-01 | Stop reason: HOSPADM

## 2017-01-01 RX ORDER — IPRATROPIUM BROMIDE AND ALBUTEROL SULFATE 2.5; .5 MG/3ML; MG/3ML
3 SOLUTION RESPIRATORY (INHALATION)
Status: DISCONTINUED | OUTPATIENT
Start: 2017-01-01 | End: 2017-01-01 | Stop reason: HOSPADM

## 2017-01-01 RX ORDER — LITHIUM CARBONATE 450 MG/1
450 TABLET ORAL DAILY
Status: DISCONTINUED | OUTPATIENT
Start: 2017-01-01 | End: 2017-01-01

## 2017-01-01 RX ORDER — OXYCODONE HCL 20 MG/ML
10 CONCENTRATE, ORAL ORAL
Status: DISCONTINUED | OUTPATIENT
Start: 2017-01-01 | End: 2017-01-01

## 2017-01-01 RX ORDER — CEPHALEXIN 250 MG/1
500 CAPSULE ORAL EVERY 6 HOURS
Status: DISCONTINUED | OUTPATIENT
Start: 2017-01-01 | End: 2017-01-01 | Stop reason: HOSPADM

## 2017-01-01 RX ORDER — AMITRIPTYLINE HYDROCHLORIDE 50 MG/1
25 TABLET, FILM COATED ORAL
Status: DISCONTINUED | OUTPATIENT
Start: 2017-01-01 | End: 2017-01-01 | Stop reason: HOSPADM

## 2017-01-01 RX ORDER — PREGABALIN 100 MG/1
100 CAPSULE ORAL 3 TIMES DAILY
Status: DISCONTINUED | OUTPATIENT
Start: 2017-01-01 | End: 2017-01-01

## 2017-01-01 RX ORDER — AMLODIPINE BESYLATE 5 MG/1
5 TABLET ORAL DAILY
Status: DISCONTINUED | OUTPATIENT
Start: 2017-01-01 | End: 2017-01-01 | Stop reason: HOSPADM

## 2017-01-01 RX ORDER — GLYCOPYRROLATE 0.2 MG/ML
0.2 INJECTION INTRAMUSCULAR; INTRAVENOUS
Status: DISCONTINUED | OUTPATIENT
Start: 2017-01-01 | End: 2017-01-01 | Stop reason: HOSPADM

## 2017-01-01 RX ORDER — IPRATROPIUM BROMIDE AND ALBUTEROL SULFATE 2.5; .5 MG/3ML; MG/3ML
3 SOLUTION RESPIRATORY (INHALATION)
Status: COMPLETED | OUTPATIENT
Start: 2017-01-01 | End: 2017-01-01

## 2017-01-01 RX ORDER — SODIUM CHLORIDE 0.9 % (FLUSH) 0.9 %
10 SYRINGE (ML) INJECTION
Status: COMPLETED | OUTPATIENT
Start: 2017-01-01 | End: 2017-01-01

## 2017-01-01 RX ORDER — AMITRIPTYLINE HYDROCHLORIDE 10 MG/1
10 TABLET, FILM COATED ORAL
Status: DISCONTINUED | OUTPATIENT
Start: 2017-01-01 | End: 2017-01-01 | Stop reason: HOSPADM

## 2017-01-01 RX ORDER — LITHIUM CARBONATE 300 MG/1
300 TABLET, FILM COATED, EXTENDED RELEASE ORAL 2 TIMES DAILY
Status: DISCONTINUED | OUTPATIENT
Start: 2017-01-01 | End: 2017-01-01 | Stop reason: HOSPADM

## 2017-01-01 RX ORDER — AMLODIPINE BESYLATE 10 MG/1
10 TABLET ORAL DAILY
Qty: 30 TAB | Refills: 11 | Status: SHIPPED | OUTPATIENT
Start: 2017-01-01 | End: 2017-01-01

## 2017-01-01 RX ORDER — PREDNISONE 10 MG/1
10 TABLET ORAL
Status: DISCONTINUED | OUTPATIENT
Start: 2017-01-01 | End: 2017-01-01 | Stop reason: HOSPADM

## 2017-01-01 RX ORDER — METRONIDAZOLE 500 MG/100ML
500 INJECTION, SOLUTION INTRAVENOUS EVERY 8 HOURS
Status: DISCONTINUED | OUTPATIENT
Start: 2017-01-01 | End: 2017-01-01

## 2017-01-01 RX ORDER — ACETAMINOPHEN 325 MG/1
650 TABLET ORAL
Status: DISCONTINUED | OUTPATIENT
Start: 2017-01-01 | End: 2017-01-01 | Stop reason: HOSPADM

## 2017-01-01 RX ORDER — LORAZEPAM 2 MG/ML
2 CONCENTRATE ORAL
Status: DISCONTINUED | OUTPATIENT
Start: 2017-01-01 | End: 2017-01-01

## 2017-01-01 RX ORDER — ACETYLCYSTEINE 200 MG/ML
2 SOLUTION ORAL; RESPIRATORY (INHALATION)
Status: DISCONTINUED | OUTPATIENT
Start: 2017-01-01 | End: 2017-01-01

## 2017-01-01 RX ORDER — LITHIUM CARBONATE 300 MG/1
300 CAPSULE ORAL DAILY
Status: DISCONTINUED | OUTPATIENT
Start: 2017-01-01 | End: 2017-01-01 | Stop reason: HOSPADM

## 2017-01-01 RX ORDER — HYDROMORPHONE HYDROCHLORIDE 2 MG/1
2 TABLET ORAL
Status: DISCONTINUED | OUTPATIENT
Start: 2017-01-01 | End: 2017-01-01

## 2017-01-01 RX ORDER — PREDNISONE 10 MG/1
TABLET ORAL
Qty: 10 TAB | Refills: 0 | Status: SHIPPED | OUTPATIENT
Start: 2017-01-01 | End: 2017-01-01 | Stop reason: ALTCHOICE

## 2017-01-01 RX ORDER — HYDROCODONE BITARTRATE AND ACETAMINOPHEN 7.5; 325 MG/15ML; MG/15ML
10 SOLUTION ORAL EVERY 4 HOURS
Status: DISCONTINUED | OUTPATIENT
Start: 2017-01-01 | End: 2017-01-01

## 2017-01-01 RX ORDER — IPRATROPIUM BROMIDE AND ALBUTEROL SULFATE 2.5; .5 MG/3ML; MG/3ML
SOLUTION RESPIRATORY (INHALATION)
Status: COMPLETED
Start: 2017-01-01 | End: 2017-01-01

## 2017-01-01 RX ORDER — VANCOMYCIN 1.75 GRAM/500 ML IN 0.9 % SODIUM CHLORIDE INTRAVENOUS
1750 ONCE
Status: DISCONTINUED | OUTPATIENT
Start: 2017-01-01 | End: 2017-01-01

## 2017-01-01 RX ORDER — LORAZEPAM 2 MG/ML
1 INJECTION INTRAMUSCULAR
Status: DISCONTINUED | OUTPATIENT
Start: 2017-01-01 | End: 2017-01-01

## 2017-01-01 RX ORDER — LEVOFLOXACIN 5 MG/ML
750 INJECTION, SOLUTION INTRAVENOUS EVERY 24 HOURS
Status: DISCONTINUED | OUTPATIENT
Start: 2017-01-01 | End: 2017-01-01

## 2017-01-01 RX ORDER — VANCOMYCIN 2 GRAM/500 ML IN 0.9 % SODIUM CHLORIDE INTRAVENOUS
2000 ONCE
Status: DISCONTINUED | OUTPATIENT
Start: 2017-01-01 | End: 2017-01-01

## 2017-01-01 RX ORDER — GABAPENTIN 400 MG/1
CAPSULE ORAL
Refills: 11 | COMMUNITY
Start: 2016-01-01 | End: 2017-01-01 | Stop reason: ALTCHOICE

## 2017-01-01 RX ORDER — FLUOXETINE HYDROCHLORIDE 20 MG/1
20 CAPSULE ORAL DAILY
Status: DISCONTINUED | OUTPATIENT
Start: 2017-01-01 | End: 2017-01-01 | Stop reason: HOSPADM

## 2017-01-01 RX ORDER — AMOXICILLIN 250 MG
2 CAPSULE ORAL
Status: DISCONTINUED | OUTPATIENT
Start: 2017-01-01 | End: 2017-01-01

## 2017-01-01 RX ORDER — HYDROCODONE BITARTRATE AND ACETAMINOPHEN 5; 325 MG/1; MG/1
2 TABLET ORAL ONCE
Status: DISPENSED | OUTPATIENT
Start: 2017-01-01 | End: 2017-01-01

## 2017-01-01 RX ORDER — PROPOFOL 10 MG/ML
INJECTION, EMULSION INTRAVENOUS AS NEEDED
Status: DISCONTINUED | OUTPATIENT
Start: 2017-01-01 | End: 2017-01-01 | Stop reason: HOSPADM

## 2017-01-01 RX ORDER — LORAZEPAM 0.5 MG/1
0.5 TABLET ORAL
Qty: 60 TAB | Refills: 2 | Status: SHIPPED | OUTPATIENT
Start: 2017-01-01 | End: 2017-01-01

## 2017-01-01 RX ORDER — LORAZEPAM 2 MG/ML
1 CONCENTRATE ORAL
Qty: 30 ML | Refills: 0 | Status: SHIPPED | OUTPATIENT
Start: 2017-01-01

## 2017-01-01 RX ORDER — LITHIUM CARBONATE 450 MG/1
450 TABLET ORAL 2 TIMES DAILY
Status: DISCONTINUED | OUTPATIENT
Start: 2017-01-01 | End: 2017-01-01

## 2017-01-01 RX ORDER — VANCOMYCIN 1.75 GRAM/500 ML IN 0.9 % SODIUM CHLORIDE INTRAVENOUS
1750 ONCE
Status: COMPLETED | OUTPATIENT
Start: 2017-01-01 | End: 2017-01-01

## 2017-01-01 RX ORDER — LITHIUM CARBONATE 300 MG/1
300 TABLET, FILM COATED, EXTENDED RELEASE ORAL DAILY
Status: ON HOLD | COMMUNITY
End: 2017-01-01

## 2017-01-01 RX ORDER — LITHIUM 8 MEQ/5ML
300 SOLUTION ORAL DAILY
Status: DISCONTINUED | OUTPATIENT
Start: 2017-01-01 | End: 2017-01-01

## 2017-01-01 RX ORDER — GABAPENTIN 300 MG/1
600 CAPSULE ORAL 3 TIMES DAILY
Status: DISCONTINUED | OUTPATIENT
Start: 2017-01-01 | End: 2017-01-01 | Stop reason: HOSPADM

## 2017-01-01 RX ORDER — ATROPINE SULFATE 0.1 MG/ML
0.5 INJECTION INTRAVENOUS
Status: DISCONTINUED | OUTPATIENT
Start: 2017-01-01 | End: 2017-01-01 | Stop reason: HOSPADM

## 2017-01-01 RX ORDER — PREDNISONE 20 MG/1
20 TABLET ORAL 2 TIMES DAILY
Qty: 10 TAB | Refills: 0 | Status: SHIPPED | OUTPATIENT
Start: 2017-01-01 | End: 2017-01-01 | Stop reason: ALTCHOICE

## 2017-01-01 RX ORDER — ONDANSETRON 4 MG/1
4 TABLET, ORALLY DISINTEGRATING ORAL
Status: DISCONTINUED | OUTPATIENT
Start: 2017-01-01 | End: 2017-01-01

## 2017-01-01 RX ORDER — SODIUM CHLORIDE 9 MG/ML
50 INJECTION, SOLUTION INTRAVENOUS CONTINUOUS
Status: DISCONTINUED | OUTPATIENT
Start: 2017-01-01 | End: 2017-01-01 | Stop reason: HOSPADM

## 2017-01-01 RX ORDER — AMLODIPINE BESYLATE 5 MG/1
5 TABLET ORAL
Status: COMPLETED | OUTPATIENT
Start: 2017-01-01 | End: 2017-01-01

## 2017-01-01 RX ORDER — LANOLIN ALCOHOL/MO/W.PET/CERES
1000 CREAM (GRAM) TOPICAL DAILY
COMMUNITY

## 2017-01-01 RX ORDER — METOPROLOL TARTRATE 25 MG/1
12.5 TABLET, FILM COATED ORAL 2 TIMES DAILY
Status: DISCONTINUED | OUTPATIENT
Start: 2017-01-01 | End: 2017-01-01 | Stop reason: HOSPADM

## 2017-01-01 RX ORDER — SODIUM CHLORIDE 9 MG/ML
INJECTION, SOLUTION INTRAVENOUS
Status: DISCONTINUED | OUTPATIENT
Start: 2017-01-01 | End: 2017-01-01 | Stop reason: HOSPADM

## 2017-01-01 RX ORDER — ACETAMINOPHEN 650 MG/1
650 SUPPOSITORY RECTAL
Status: DISCONTINUED | OUTPATIENT
Start: 2017-01-01 | End: 2017-01-01 | Stop reason: HOSPADM

## 2017-01-01 RX ORDER — HYDROCODONE BITARTRATE AND IBUPROFEN 10; 200 MG/1; MG/1
TABLET ORAL
COMMUNITY
Start: 2016-01-01 | End: 2017-01-01 | Stop reason: ALTCHOICE

## 2017-01-01 RX ORDER — VANCOMYCIN HYDROCHLORIDE
1250
Status: DISCONTINUED | OUTPATIENT
Start: 2017-01-01 | End: 2017-01-01

## 2017-01-01 RX ORDER — ASPIRIN 325 MG
325 TABLET ORAL ONCE
Status: COMPLETED | OUTPATIENT
Start: 2017-01-01 | End: 2017-01-01

## 2017-01-01 RX ORDER — NALOXONE HYDROCHLORIDE 1 MG/ML
1 INJECTION INTRAMUSCULAR; INTRAVENOUS; SUBCUTANEOUS
Qty: 1 SYRINGE | Refills: 0 | Status: SHIPPED | OUTPATIENT
Start: 2017-01-01

## 2017-01-01 RX ORDER — FLUOXETINE HYDROCHLORIDE 20 MG/5ML
20 LIQUID ORAL DAILY
Status: DISCONTINUED | OUTPATIENT
Start: 2017-01-01 | End: 2017-01-01 | Stop reason: HOSPADM

## 2017-01-01 RX ORDER — NALOXONE HYDROCHLORIDE 0.4 MG/ML
0.4 INJECTION, SOLUTION INTRAMUSCULAR; INTRAVENOUS; SUBCUTANEOUS
Status: DISCONTINUED | OUTPATIENT
Start: 2017-01-01 | End: 2017-01-01 | Stop reason: HOSPADM

## 2017-01-01 RX ORDER — AMOXICILLIN AND CLAVULANATE POTASSIUM 250; 62.5 MG/5ML; MG/5ML
500 POWDER, FOR SUSPENSION ORAL EVERY 8 HOURS
Qty: 100 ML | Refills: 0 | Status: ON HOLD | OUTPATIENT
Start: 2017-01-01 | End: 2017-01-01

## 2017-01-01 RX ORDER — LANOLIN ALCOHOL/MO/W.PET/CERES
1000 CREAM (GRAM) TOPICAL DAILY
Status: DISCONTINUED | OUTPATIENT
Start: 2017-01-01 | End: 2017-01-01

## 2017-01-01 RX ORDER — ACETYLCYSTEINE 200 MG/ML
400 SOLUTION ORAL; RESPIRATORY (INHALATION)
Status: DISCONTINUED | OUTPATIENT
Start: 2017-01-01 | End: 2017-01-01 | Stop reason: HOSPADM

## 2017-01-01 RX ORDER — SODIUM CHLORIDE 9 MG/ML
50 INJECTION, SOLUTION INTRAVENOUS
Status: COMPLETED | OUTPATIENT
Start: 2017-01-01 | End: 2017-01-01

## 2017-01-01 RX ORDER — LITHIUM CARBONATE 300 MG/1
300 CAPSULE ORAL 2 TIMES DAILY
Status: DISCONTINUED | OUTPATIENT
Start: 2017-01-01 | End: 2017-01-01 | Stop reason: HOSPADM

## 2017-01-01 RX ORDER — GABAPENTIN 300 MG/1
600 CAPSULE ORAL 3 TIMES DAILY
Status: DISCONTINUED | OUTPATIENT
Start: 2017-01-01 | End: 2017-01-01

## 2017-01-01 RX ORDER — MORPHINE SULFATE 20 MG/ML
15 SOLUTION ORAL
Status: DISCONTINUED | OUTPATIENT
Start: 2017-01-01 | End: 2017-01-01

## 2017-01-01 RX ORDER — HYDROCODONE BITARTRATE AND ACETAMINOPHEN 10; 325 MG/1; MG/1
1 TABLET ORAL
COMMUNITY
End: 2017-01-01

## 2017-01-01 RX ORDER — LITHIUM CARBONATE 300 MG/1
300 CAPSULE ORAL
COMMUNITY
End: 2017-01-01 | Stop reason: SDUPTHER

## 2017-01-01 RX ORDER — AMLODIPINE BESYLATE 5 MG/1
5 TABLET ORAL DAILY
Status: DISCONTINUED | OUTPATIENT
Start: 2017-01-01 | End: 2017-01-01

## 2017-01-01 RX ORDER — SODIUM CHLORIDE 0.9 % (FLUSH) 0.9 %
5-10 SYRINGE (ML) INJECTION EVERY 8 HOURS
Status: DISCONTINUED | OUTPATIENT
Start: 2017-01-01 | End: 2017-01-01 | Stop reason: HOSPADM

## 2017-01-01 RX ORDER — PREDNISONE 10 MG/1
10 TABLET ORAL
Qty: 30 TAB | Refills: 2 | Status: SHIPPED | OUTPATIENT
Start: 2017-01-01 | End: 2017-01-01 | Stop reason: ALTCHOICE

## 2017-01-01 RX ORDER — SODIUM CHLORIDE 0.9 % (FLUSH) 0.9 %
5-10 SYRINGE (ML) INJECTION AS NEEDED
Status: ACTIVE | OUTPATIENT
Start: 2017-01-01 | End: 2017-01-01

## 2017-01-01 RX ORDER — ALBUTEROL SULFATE 0.83 MG/ML
2.5 SOLUTION RESPIRATORY (INHALATION)
Status: DISCONTINUED | OUTPATIENT
Start: 2017-01-01 | End: 2017-01-01 | Stop reason: HOSPADM

## 2017-01-01 RX ORDER — HYDROCODONE BITARTRATE AND ACETAMINOPHEN 7.5; 325 MG/1; MG/1
1 TABLET ORAL
Status: DISCONTINUED | OUTPATIENT
Start: 2017-01-01 | End: 2017-01-01

## 2017-01-01 RX ORDER — FLUOXETINE HYDROCHLORIDE 20 MG/1
20 CAPSULE ORAL DAILY
Status: DISCONTINUED | OUTPATIENT
Start: 2017-01-01 | End: 2017-01-01

## 2017-01-01 RX ORDER — IPRATROPIUM BROMIDE AND ALBUTEROL SULFATE 2.5; .5 MG/3ML; MG/3ML
3 SOLUTION RESPIRATORY (INHALATION)
Qty: 30 NEBULE | Refills: 12 | Status: SHIPPED | OUTPATIENT
Start: 2017-01-01

## 2017-01-01 RX ORDER — LIDOCAINE HYDROCHLORIDE 20 MG/ML
INJECTION, SOLUTION EPIDURAL; INFILTRATION; INTRACAUDAL; PERINEURAL AS NEEDED
Status: DISCONTINUED | OUTPATIENT
Start: 2017-01-01 | End: 2017-01-01 | Stop reason: HOSPADM

## 2017-01-01 RX ORDER — FENTANYL CITRATE 50 UG/ML
25 INJECTION, SOLUTION INTRAMUSCULAR; INTRAVENOUS
Status: DISCONTINUED | OUTPATIENT
Start: 2017-01-01 | End: 2017-01-01 | Stop reason: HOSPADM

## 2017-01-01 RX ORDER — LORAZEPAM 2 MG/ML
1 CONCENTRATE ORAL
Qty: 30 ML | Refills: 0 | Status: SHIPPED | OUTPATIENT
Start: 2017-01-01 | End: 2017-01-01 | Stop reason: SDUPTHER

## 2017-01-01 RX ORDER — SODIUM CHLORIDE 9 MG/ML
75 INJECTION, SOLUTION INTRAVENOUS CONTINUOUS
Status: DISPENSED | OUTPATIENT
Start: 2017-01-01 | End: 2017-01-01

## 2017-01-01 RX ORDER — SODIUM CHLORIDE 9 MG/ML
100 INJECTION, SOLUTION INTRAVENOUS CONTINUOUS
Status: DISCONTINUED | OUTPATIENT
Start: 2017-01-01 | End: 2017-01-01 | Stop reason: HOSPADM

## 2017-01-01 RX ORDER — MORPHINE SULFATE 20 MG/ML
10 SOLUTION ORAL
Status: DISCONTINUED | OUTPATIENT
Start: 2017-01-01 | End: 2017-01-01 | Stop reason: HOSPADM

## 2017-01-01 RX ORDER — METRONIDAZOLE 500 MG/100ML
500 INJECTION, SOLUTION INTRAVENOUS EVERY 6 HOURS
Status: DISCONTINUED | OUTPATIENT
Start: 2017-01-01 | End: 2017-01-01 | Stop reason: ALTCHOICE

## 2017-01-01 RX ORDER — PREDNISONE 10 MG/1
TABLET ORAL
Qty: 14 TAB | Refills: 0 | Status: SHIPPED | OUTPATIENT
Start: 2017-01-01 | End: 2017-01-01

## 2017-01-01 RX ORDER — ONDANSETRON 2 MG/ML
4 INJECTION INTRAMUSCULAR; INTRAVENOUS
Status: DISCONTINUED | OUTPATIENT
Start: 2017-01-01 | End: 2017-01-01 | Stop reason: HOSPADM

## 2017-01-01 RX ORDER — AMLODIPINE BESYLATE 5 MG/1
10 TABLET ORAL DAILY
Status: DISCONTINUED | OUTPATIENT
Start: 2017-01-01 | End: 2017-01-01 | Stop reason: HOSPADM

## 2017-01-01 RX ORDER — LITHIUM CARBONATE 300 MG
300 TABLET ORAL DAILY
Status: ON HOLD | COMMUNITY
End: 2017-01-01

## 2017-01-01 RX ORDER — EPINEPHRINE 0.1 MG/ML
1 INJECTION INTRACARDIAC; INTRAVENOUS
Status: DISCONTINUED | OUTPATIENT
Start: 2017-01-01 | End: 2017-01-01 | Stop reason: HOSPADM

## 2017-01-01 RX ORDER — LITHIUM CARBONATE 300 MG/1
300 CAPSULE ORAL
Status: DISCONTINUED | OUTPATIENT
Start: 2017-01-01 | End: 2017-01-01

## 2017-01-01 RX ORDER — METOPROLOL TARTRATE 25 MG/1
25 TABLET, FILM COATED ORAL EVERY 12 HOURS
Qty: 60 TAB | Refills: 11 | Status: SHIPPED | OUTPATIENT
Start: 2017-01-01 | End: 2017-01-01 | Stop reason: SDUPTHER

## 2017-01-01 RX ORDER — HYDROMORPHONE HYDROCHLORIDE 4 MG/1
4 TABLET ORAL
Qty: 180 TAB | Refills: 0 | Status: ON HOLD | OUTPATIENT
Start: 2017-01-01 | End: 2017-01-01

## 2017-01-01 RX ORDER — ASPIRIN 81 MG/1
81 TABLET ORAL DAILY
Status: DISCONTINUED | OUTPATIENT
Start: 2017-01-01 | End: 2017-01-01 | Stop reason: HOSPADM

## 2017-01-01 RX ORDER — OXYCODONE HCL 20 MG/ML
10 CONCENTRATE, ORAL ORAL
Qty: 30 ML | Refills: 0 | Status: SHIPPED | OUTPATIENT
Start: 2017-01-01 | End: 2017-01-01 | Stop reason: CLARIF

## 2017-01-01 RX ORDER — NEBULIZER AND COMPRESSOR
1 EACH MISCELLANEOUS
Qty: 1 EACH | Refills: 0 | Status: ON HOLD | OUTPATIENT
Start: 2017-01-01 | End: 2017-01-01

## 2017-01-01 RX ORDER — SENNOSIDES 8.8 MG/5ML
5 LIQUID ORAL
Status: DISCONTINUED | OUTPATIENT
Start: 2017-01-01 | End: 2017-01-01

## 2017-01-01 RX ORDER — LORAZEPAM 2 MG/ML
2 INJECTION INTRAMUSCULAR
Status: DISCONTINUED | OUTPATIENT
Start: 2017-01-01 | End: 2017-01-01 | Stop reason: HOSPADM

## 2017-01-01 RX ORDER — METOPROLOL TARTRATE 25 MG/1
25 TABLET, FILM COATED ORAL EVERY 12 HOURS
Qty: 60 TAB | Refills: 11 | Status: SHIPPED | OUTPATIENT
Start: 2017-01-01

## 2017-01-01 RX ORDER — IPRATROPIUM BROMIDE AND ALBUTEROL SULFATE 2.5; .5 MG/3ML; MG/3ML
3 SOLUTION RESPIRATORY (INHALATION)
Qty: 100 NEBULE | Refills: 3 | Status: ON HOLD | OUTPATIENT
Start: 2017-01-01 | End: 2017-01-01

## 2017-01-01 RX ORDER — HALOPERIDOL 5 MG/ML
0.5 INJECTION INTRAMUSCULAR
Status: DISCONTINUED | OUTPATIENT
Start: 2017-01-01 | End: 2017-01-01 | Stop reason: HOSPADM

## 2017-01-01 RX ORDER — IPRATROPIUM BROMIDE AND ALBUTEROL SULFATE 2.5; .5 MG/3ML; MG/3ML
3 SOLUTION RESPIRATORY (INHALATION) ONCE
Status: COMPLETED | OUTPATIENT
Start: 2017-01-01 | End: 2017-01-01

## 2017-01-01 RX ORDER — MORPHINE SULFATE 4 MG/ML
4 INJECTION, SOLUTION INTRAMUSCULAR; INTRAVENOUS
Status: COMPLETED | OUTPATIENT
Start: 2017-01-01 | End: 2017-01-01

## 2017-01-01 RX ORDER — VANCOMYCIN/0.9 % SOD CHLORIDE 1.5G/250ML
1500 PLASTIC BAG, INJECTION (ML) INTRAVENOUS EVERY 24 HOURS
Status: DISCONTINUED | OUTPATIENT
Start: 2017-01-01 | End: 2017-01-01

## 2017-01-01 RX ORDER — HYDROMORPHONE HYDROCHLORIDE 2 MG/1
4 TABLET ORAL
Status: DISCONTINUED | OUTPATIENT
Start: 2017-01-01 | End: 2017-01-01

## 2017-01-01 RX ORDER — OXYCODONE HCL 20 MG/ML
10 CONCENTRATE, ORAL ORAL
Qty: 60 ML | Refills: 0 | Status: ON HOLD | OUTPATIENT
Start: 2017-01-01 | End: 2017-01-01

## 2017-01-01 RX ORDER — FACIAL-BODY WIPES
10 EACH TOPICAL
Status: DISCONTINUED | OUTPATIENT
Start: 2017-01-01 | End: 2017-01-01 | Stop reason: HOSPADM

## 2017-01-01 RX ORDER — AMOXICILLIN AND CLAVULANATE POTASSIUM 250; 62.5 MG/5ML; MG/5ML
500 POWDER, FOR SUSPENSION ORAL EVERY 8 HOURS
Qty: 150 ML | Refills: 0 | Status: ON HOLD | OUTPATIENT
Start: 2017-01-01 | End: 2017-01-01

## 2017-01-01 RX ORDER — PREGABALIN 100 MG/1
100 CAPSULE ORAL 2 TIMES DAILY
Status: DISCONTINUED | OUTPATIENT
Start: 2017-01-01 | End: 2017-01-01

## 2017-01-01 RX ORDER — MORPHINE SULFATE 20 MG/ML
SOLUTION ORAL
Status: COMPLETED
Start: 2017-01-01 | End: 2017-01-01

## 2017-01-01 RX ORDER — POLYETHYLENE GLYCOL 3350 17 G/17G
17 POWDER, FOR SOLUTION ORAL DAILY
Status: DISCONTINUED | OUTPATIENT
Start: 2017-01-01 | End: 2017-01-01 | Stop reason: HOSPADM

## 2017-01-01 RX ORDER — GABAPENTIN 300 MG/1
600 CAPSULE ORAL 3 TIMES DAILY
Qty: 90 CAP | Refills: 11 | Status: SHIPPED | OUTPATIENT
Start: 2017-01-01 | End: 2017-01-01

## 2017-01-01 RX ORDER — HYDROCODONE BITARTRATE AND ACETAMINOPHEN 10; 325 MG/1; MG/1
1 TABLET ORAL
Status: DISCONTINUED | OUTPATIENT
Start: 2017-01-01 | End: 2017-01-01 | Stop reason: HOSPADM

## 2017-01-01 RX ORDER — LITHIUM CARBONATE 300 MG/1
300 CAPSULE ORAL
Qty: 90 CAP | Refills: 1 | Status: SHIPPED | OUTPATIENT
Start: 2017-01-01

## 2017-01-01 RX ORDER — MORPHINE SULFATE 2 MG/ML
2 INJECTION, SOLUTION INTRAMUSCULAR; INTRAVENOUS
Status: DISCONTINUED | OUTPATIENT
Start: 2017-01-01 | End: 2017-01-01 | Stop reason: HOSPADM

## 2017-01-01 RX ORDER — ENOXAPARIN SODIUM 100 MG/ML
40 INJECTION SUBCUTANEOUS EVERY 24 HOURS
Status: DISCONTINUED | OUTPATIENT
Start: 2017-01-01 | End: 2017-01-01 | Stop reason: HOSPADM

## 2017-01-01 RX ORDER — LEVOFLOXACIN 750 MG/1
TABLET ORAL
Qty: 10 TAB | Refills: 0 | Status: SHIPPED | OUTPATIENT
Start: 2017-01-01 | End: 2017-01-01 | Stop reason: ALTCHOICE

## 2017-01-01 RX ORDER — ESMOLOL HYDROCHLORIDE 10 MG/ML
INJECTION INTRAVENOUS AS NEEDED
Status: DISCONTINUED | OUTPATIENT
Start: 2017-01-01 | End: 2017-01-01 | Stop reason: HOSPADM

## 2017-01-01 RX ORDER — OXYCODONE HYDROCHLORIDE 5 MG/1
5 TABLET ORAL
Status: DISCONTINUED | OUTPATIENT
Start: 2017-01-01 | End: 2017-01-01

## 2017-01-01 RX ORDER — OXYCODONE HCL 20 MG/ML
10 CONCENTRATE, ORAL ORAL
Status: DISCONTINUED | OUTPATIENT
Start: 2017-01-01 | End: 2017-01-01 | Stop reason: HOSPADM

## 2017-01-01 RX ORDER — MORPHINE SULFATE 2 MG/ML
2 INJECTION, SOLUTION INTRAMUSCULAR; INTRAVENOUS
Status: DISCONTINUED | OUTPATIENT
Start: 2017-01-01 | End: 2017-01-01

## 2017-01-01 RX ORDER — AMLODIPINE BESYLATE 10 MG/1
10 TABLET ORAL DAILY
Qty: 90 TAB | Refills: 3 | Status: SHIPPED | OUTPATIENT
Start: 2017-01-01

## 2017-01-01 RX ORDER — ACETAMINOPHEN 500 MG
500 TABLET ORAL
Status: DISCONTINUED | OUTPATIENT
Start: 2017-01-01 | End: 2017-01-01 | Stop reason: HOSPADM

## 2017-01-01 RX ORDER — LORAZEPAM 2 MG/ML
0.5 INJECTION INTRAMUSCULAR
Status: DISCONTINUED | OUTPATIENT
Start: 2017-01-01 | End: 2017-01-01 | Stop reason: HOSPADM

## 2017-01-01 RX ORDER — HYDROMORPHONE HYDROCHLORIDE 2 MG/ML
1 INJECTION, SOLUTION INTRAMUSCULAR; INTRAVENOUS; SUBCUTANEOUS
Status: DISCONTINUED | OUTPATIENT
Start: 2017-01-01 | End: 2017-01-01 | Stop reason: HOSPADM

## 2017-01-01 RX ORDER — SODIUM CHLORIDE 0.9 % (FLUSH) 0.9 %
5-10 SYRINGE (ML) INJECTION AS NEEDED
Status: DISCONTINUED | OUTPATIENT
Start: 2017-01-01 | End: 2017-01-01 | Stop reason: SDUPTHER

## 2017-01-01 RX ORDER — ACETAMINOPHEN 325 MG/1
650 TABLET ORAL
Status: DISCONTINUED | OUTPATIENT
Start: 2017-01-01 | End: 2017-01-01

## 2017-01-01 RX ORDER — FACIAL-BODY WIPES
10 EACH TOPICAL DAILY PRN
Status: DISCONTINUED | OUTPATIENT
Start: 2017-01-01 | End: 2017-01-01 | Stop reason: SDUPTHER

## 2017-01-01 RX ORDER — VANCOMYCIN/0.9 % SOD CHLORIDE 1.5G/250ML
1500 PLASTIC BAG, INJECTION (ML) INTRAVENOUS ONCE
Status: COMPLETED | OUTPATIENT
Start: 2017-01-01 | End: 2017-01-01

## 2017-01-01 RX ORDER — GUAIFENESIN 100 MG/5ML
400 SOLUTION ORAL 4 TIMES DAILY
Status: DISCONTINUED | OUTPATIENT
Start: 2017-01-01 | End: 2017-01-01 | Stop reason: HOSPADM

## 2017-01-01 RX ORDER — HYDROCODONE BITARTRATE AND ACETAMINOPHEN 10; 325 MG/1; MG/1
1 TABLET ORAL
Qty: 180 TAB | Refills: 0 | Status: SHIPPED | OUTPATIENT
Start: 2017-01-01 | End: 2017-01-01 | Stop reason: SDUPTHER

## 2017-01-01 RX ORDER — HYDROMORPHONE HYDROCHLORIDE 2 MG/1
4 TABLET ORAL
Status: DISCONTINUED | OUTPATIENT
Start: 2017-01-01 | End: 2017-01-01 | Stop reason: HOSPADM

## 2017-01-01 RX ORDER — AMOXICILLIN 250 MG
2 CAPSULE ORAL
Status: DISCONTINUED | OUTPATIENT
Start: 2017-01-01 | End: 2017-01-01 | Stop reason: SDUPTHER

## 2017-01-01 RX ORDER — PREDNISONE 20 MG/1
20 TABLET ORAL
Status: DISCONTINUED | OUTPATIENT
Start: 2017-01-01 | End: 2017-01-01 | Stop reason: HOSPADM

## 2017-01-01 RX ORDER — GUAIFENESIN 100 MG/5ML
400 SOLUTION ORAL 3 TIMES DAILY
Status: DISCONTINUED | OUTPATIENT
Start: 2017-01-01 | End: 2017-01-01

## 2017-01-01 RX ORDER — GABAPENTIN 100 MG/1
CAPSULE ORAL
Refills: 3 | COMMUNITY
Start: 2016-11-01 | End: 2017-01-01 | Stop reason: ALTCHOICE

## 2017-01-01 RX ORDER — HYDROCODONE BITARTRATE AND ACETAMINOPHEN 10; 325 MG/1; MG/1
2 TABLET ORAL
Status: DISCONTINUED | OUTPATIENT
Start: 2017-01-01 | End: 2017-01-01

## 2017-01-01 RX ORDER — GUAIFENESIN 100 MG/5ML
600 SOLUTION ORAL
Status: DISCONTINUED | OUTPATIENT
Start: 2017-01-01 | End: 2017-01-01

## 2017-01-01 RX ORDER — OXYCODONE HCL 20 MG/ML
10 CONCENTRATE, ORAL ORAL EVERY 4 HOURS
Qty: 60 ML | Refills: 0 | Status: SHIPPED | OUTPATIENT
Start: 2017-01-01 | End: 2017-01-01 | Stop reason: SDUPTHER

## 2017-01-01 RX ORDER — OXYCODONE HCL 20 MG/ML
10 CONCENTRATE, ORAL ORAL
Qty: 60 ML | Refills: 0 | Status: SHIPPED | OUTPATIENT
Start: 2017-01-01 | End: 2017-01-01 | Stop reason: SDUPTHER

## 2017-01-01 RX ORDER — LORAZEPAM 0.5 MG/1
0.5 TABLET ORAL
Status: DISCONTINUED | OUTPATIENT
Start: 2017-01-01 | End: 2017-01-01 | Stop reason: HOSPADM

## 2017-01-01 RX ORDER — LEVOFLOXACIN 5 MG/ML
750 INJECTION, SOLUTION INTRAVENOUS EVERY 24 HOURS
Status: DISCONTINUED | OUTPATIENT
Start: 2017-01-01 | End: 2017-01-01 | Stop reason: HOSPADM

## 2017-01-01 RX ORDER — FENTANYL 50 UG/1
1 PATCH TRANSDERMAL
Qty: 5 PATCH | Refills: 0
Start: 2017-01-01

## 2017-01-01 RX ORDER — HEPARIN SODIUM 5000 [USP'U]/ML
5000 INJECTION, SOLUTION INTRAVENOUS; SUBCUTANEOUS EVERY 8 HOURS
Status: DISCONTINUED | OUTPATIENT
Start: 2017-01-01 | End: 2017-01-01 | Stop reason: HOSPADM

## 2017-01-01 RX ORDER — BISACODYL 5 MG
5 TABLET, DELAYED RELEASE (ENTERIC COATED) ORAL DAILY
Status: DISCONTINUED | OUTPATIENT
Start: 2017-01-01 | End: 2017-01-01 | Stop reason: CLARIF

## 2017-01-01 RX ORDER — VANCOMYCIN HYDROCHLORIDE
1250 EVERY 12 HOURS
Status: DISCONTINUED | OUTPATIENT
Start: 2017-01-01 | End: 2017-01-01

## 2017-01-01 RX ORDER — OXYCODONE HCL 20 MG/ML
10 CONCENTRATE, ORAL ORAL EVERY 4 HOURS
COMMUNITY
End: 2017-01-01 | Stop reason: SDUPTHER

## 2017-01-01 RX ORDER — LEVOFLOXACIN 500 MG/1
500 TABLET, FILM COATED ORAL DAILY
Qty: 7 TAB | Refills: 0 | Status: SHIPPED | OUTPATIENT
Start: 2017-01-01 | End: 2017-01-01 | Stop reason: ALTCHOICE

## 2017-01-01 RX ORDER — LORAZEPAM 0.5 MG/1
0.5 TABLET ORAL 2 TIMES DAILY
Status: DISCONTINUED | OUTPATIENT
Start: 2017-01-01 | End: 2017-01-01 | Stop reason: HOSPADM

## 2017-01-01 RX ORDER — AMOXICILLIN AND CLAVULANATE POTASSIUM 250; 62.5 MG/5ML; MG/5ML
500 POWDER, FOR SUSPENSION ORAL EVERY 8 HOURS
Status: DISCONTINUED | OUTPATIENT
Start: 2017-01-01 | End: 2017-01-01 | Stop reason: HOSPADM

## 2017-01-01 RX ORDER — AMLODIPINE BESYLATE 10 MG/1
10 TABLET ORAL DAILY
Qty: 30 TAB | Refills: 11 | Status: CANCELLED | OUTPATIENT
Start: 2017-01-01

## 2017-01-01 RX ORDER — HEPARIN SODIUM 10000 [USP'U]/100ML
18-36 INJECTION, SOLUTION INTRAVENOUS
Status: CANCELLED | OUTPATIENT
Start: 2017-01-01

## 2017-01-01 RX ORDER — ACETAMINOPHEN 10 MG/ML
1000 INJECTION, SOLUTION INTRAVENOUS
Status: COMPLETED | OUTPATIENT
Start: 2017-01-01 | End: 2017-01-01

## 2017-01-01 RX ORDER — DULOXETIN HYDROCHLORIDE 20 MG/1
20 CAPSULE, DELAYED RELEASE ORAL DAILY
Status: DISCONTINUED | OUTPATIENT
Start: 2017-01-01 | End: 2017-01-01

## 2017-01-01 RX ORDER — GABAPENTIN 100 MG/1
200 CAPSULE ORAL 3 TIMES DAILY
Status: DISCONTINUED | OUTPATIENT
Start: 2017-01-01 | End: 2017-01-01

## 2017-01-01 RX ORDER — MORPHINE SULFATE 20 MG/ML
0.5 SOLUTION ORAL
COMMUNITY
End: 2017-01-01 | Stop reason: SDUPTHER

## 2017-01-01 RX ORDER — HYDROCODONE BITARTRATE AND ACETAMINOPHEN 10; 325 MG/1; MG/1
1 TABLET ORAL
Qty: 120 TAB | Refills: 0 | Status: SHIPPED | OUTPATIENT
Start: 2017-01-01 | End: 2017-01-01 | Stop reason: SDUPTHER

## 2017-01-01 RX ORDER — ACETAMINOPHEN 500 MG
1000 TABLET ORAL ONCE
Status: COMPLETED | OUTPATIENT
Start: 2017-01-01 | End: 2017-01-01

## 2017-01-01 RX ORDER — HYDROCODONE BITARTRATE AND ACETAMINOPHEN 10; 325 MG/1; MG/1
2 TABLET ORAL
Qty: 240 TAB | Refills: 0 | Status: SHIPPED | OUTPATIENT
Start: 2017-01-01 | End: 2017-01-01

## 2017-01-01 RX ORDER — METOPROLOL TARTRATE 25 MG/1
12.5 TABLET, FILM COATED ORAL EVERY 12 HOURS
Status: DISCONTINUED | OUTPATIENT
Start: 2017-01-01 | End: 2017-01-01 | Stop reason: HOSPADM

## 2017-01-01 RX ORDER — LEVOFLOXACIN 750 MG/1
750 TABLET ORAL DAILY
Qty: 10 TAB | Refills: 0 | Status: SHIPPED | OUTPATIENT
Start: 2017-01-01 | End: 2017-01-01 | Stop reason: ALTCHOICE

## 2017-01-01 RX ORDER — PREGABALIN 100 MG/1
100 CAPSULE ORAL 3 TIMES DAILY
Qty: 90 CAP | Refills: 11 | Status: ON HOLD | OUTPATIENT
Start: 2017-01-01 | End: 2017-01-01

## 2017-01-01 RX ORDER — GLYCOPYRROLATE 0.2 MG/ML
0.2 INJECTION INTRAMUSCULAR; INTRAVENOUS
Status: DISCONTINUED | OUTPATIENT
Start: 2017-01-01 | End: 2017-01-01

## 2017-01-01 RX ORDER — PROPOFOL 10 MG/ML
.5-2 INJECTION, EMULSION INTRAVENOUS ONCE
Status: DISCONTINUED | OUTPATIENT
Start: 2017-01-01 | End: 2017-01-01 | Stop reason: HOSPADM

## 2017-01-01 RX ORDER — OXYCODONE HCL 20 MG/ML
10 CONCENTRATE, ORAL ORAL
Qty: 30 ML | Refills: 0 | Status: CANCELLED | OUTPATIENT
Start: 2017-01-01

## 2017-01-01 RX ORDER — LITHIUM CARBONATE 300 MG
150 TABLET ORAL 3 TIMES DAILY
Status: DISCONTINUED | OUTPATIENT
Start: 2017-01-01 | End: 2017-01-01 | Stop reason: HOSPADM

## 2017-01-01 RX ORDER — MORPHINE SULFATE ORAL SOLUTION 10 MG/5ML
SOLUTION ORAL
Status: ON HOLD | COMMUNITY
End: 2017-01-01

## 2017-01-01 RX ORDER — FENTANYL 25 UG/1
1 PATCH TRANSDERMAL
Status: DISCONTINUED | OUTPATIENT
Start: 2017-01-01 | End: 2017-01-01 | Stop reason: HOSPADM

## 2017-01-01 RX ORDER — LORAZEPAM 2 MG/ML
1 CONCENTRATE ORAL
Status: DISCONTINUED | OUTPATIENT
Start: 2017-01-01 | End: 2017-01-01 | Stop reason: HOSPADM

## 2017-01-01 RX ORDER — AMLODIPINE BESYLATE 5 MG/1
10 TABLET ORAL DAILY
Status: DISCONTINUED | OUTPATIENT
Start: 2017-01-01 | End: 2017-01-01

## 2017-01-01 RX ORDER — ACETYLCYSTEINE 200 MG/ML
600 SOLUTION ORAL; RESPIRATORY (INHALATION) EVERY 12 HOURS
Status: DISCONTINUED | OUTPATIENT
Start: 2017-01-01 | End: 2017-01-01

## 2017-01-01 RX ORDER — SODIUM CHLORIDE 450 MG/100ML
75 INJECTION, SOLUTION INTRAVENOUS CONTINUOUS
Status: DISCONTINUED | OUTPATIENT
Start: 2017-01-01 | End: 2017-01-01

## 2017-01-01 RX ORDER — PREGABALIN 100 MG/1
100 CAPSULE ORAL 3 TIMES DAILY
Status: DISCONTINUED | OUTPATIENT
Start: 2017-01-01 | End: 2017-01-01 | Stop reason: HOSPADM

## 2017-01-01 RX ORDER — LITHIUM CARBONATE 300 MG/1
300 CAPSULE ORAL DAILY
Status: DISCONTINUED | OUTPATIENT
Start: 2017-01-01 | End: 2017-01-01

## 2017-01-01 RX ORDER — AMITRIPTYLINE HYDROCHLORIDE 10 MG/1
10 TABLET, FILM COATED ORAL
Status: DISCONTINUED | OUTPATIENT
Start: 2017-01-01 | End: 2017-01-01

## 2017-01-01 RX ORDER — TRAZODONE HYDROCHLORIDE 50 MG/1
200 TABLET ORAL
Status: DISCONTINUED | OUTPATIENT
Start: 2017-01-01 | End: 2017-01-01

## 2017-01-01 RX ORDER — ATORVASTATIN CALCIUM 20 MG/1
20 TABLET, FILM COATED ORAL
Status: DISCONTINUED | OUTPATIENT
Start: 2017-01-01 | End: 2017-01-01 | Stop reason: HOSPADM

## 2017-01-01 RX ORDER — METRONIDAZOLE 500 MG/100ML
500 INJECTION, SOLUTION INTRAVENOUS
Status: COMPLETED | OUTPATIENT
Start: 2017-01-01 | End: 2017-01-01

## 2017-01-01 RX ORDER — CEFAZOLIN SODIUM IN 0.9 % NACL 2 G/100 ML
2 PLASTIC BAG, INJECTION (ML) INTRAVENOUS EVERY 8 HOURS
Status: DISCONTINUED | OUTPATIENT
Start: 2017-01-01 | End: 2017-01-01

## 2017-01-01 RX ORDER — ACETYLCYSTEINE 100 MG/ML
4 SOLUTION ORAL; RESPIRATORY (INHALATION) EVERY 4 HOURS
Qty: 120 ML | Refills: 11 | Status: SHIPPED | OUTPATIENT
Start: 2017-01-01

## 2017-01-01 RX ORDER — BISACODYL 5 MG
5 TABLET, DELAYED RELEASE (ENTERIC COATED) ORAL DAILY PRN
Status: DISCONTINUED | OUTPATIENT
Start: 2017-01-01 | End: 2017-01-01 | Stop reason: HOSPADM

## 2017-01-01 RX ORDER — HYDROCODONE BITARTRATE AND ACETAMINOPHEN 5; 325 MG/1; MG/1
2 TABLET ORAL
Status: COMPLETED | OUTPATIENT
Start: 2017-01-01 | End: 2017-01-01

## 2017-01-01 RX ORDER — GABAPENTIN 100 MG/1
100 CAPSULE ORAL 3 TIMES DAILY
Status: DISCONTINUED | OUTPATIENT
Start: 2017-01-01 | End: 2017-01-01

## 2017-01-01 RX ORDER — SCOLOPAMINE TRANSDERMAL SYSTEM 1 MG/1
1.5 PATCH, EXTENDED RELEASE TRANSDERMAL
Status: DISCONTINUED | OUTPATIENT
Start: 2017-01-01 | End: 2017-01-01 | Stop reason: HOSPADM

## 2017-01-01 RX ORDER — ENOXAPARIN SODIUM 100 MG/ML
40 INJECTION SUBCUTANEOUS DAILY
Status: DISCONTINUED | OUTPATIENT
Start: 2017-01-01 | End: 2017-01-01 | Stop reason: HOSPADM

## 2017-01-01 RX ORDER — ENOXAPARIN SODIUM 100 MG/ML
30 INJECTION SUBCUTANEOUS DAILY
Status: DISCONTINUED | OUTPATIENT
Start: 2017-01-01 | End: 2017-01-01

## 2017-01-01 RX ORDER — ZOLPIDEM TARTRATE 5 MG/1
5 TABLET ORAL
Status: DISCONTINUED | OUTPATIENT
Start: 2017-01-01 | End: 2017-01-01 | Stop reason: HOSPADM

## 2017-01-01 RX ORDER — VANCOMYCIN/0.9 % SOD CHLORIDE 1.5G/250ML
1500 PLASTIC BAG, INJECTION (ML) INTRAVENOUS ONCE
Status: DISCONTINUED | OUTPATIENT
Start: 2017-01-01 | End: 2017-01-01 | Stop reason: DRUGHIGH

## 2017-01-01 RX ORDER — CEPHALEXIN 500 MG/1
500 CAPSULE ORAL 3 TIMES DAILY
Qty: 15 CAP | Refills: 0 | Status: SHIPPED | OUTPATIENT
Start: 2017-01-01 | End: 2017-01-01 | Stop reason: ALTCHOICE

## 2017-01-01 RX ORDER — SENNOSIDES 8.6 MG/1
1 TABLET ORAL DAILY
Status: DISCONTINUED | OUTPATIENT
Start: 2017-01-01 | End: 2017-01-01 | Stop reason: HOSPADM

## 2017-01-01 RX ORDER — AMLODIPINE BESYLATE 10 MG/1
10 TABLET ORAL DAILY
COMMUNITY
End: 2017-01-01

## 2017-01-01 RX ORDER — ACETYLCYSTEINE 200 MG/ML
2 SOLUTION ORAL; RESPIRATORY (INHALATION) 3 TIMES DAILY
Status: DISCONTINUED | OUTPATIENT
Start: 2017-01-01 | End: 2017-01-01

## 2017-01-01 RX ADMIN — AMLODIPINE BESYLATE 10 MG: 5 TABLET ORAL at 08:00

## 2017-01-01 RX ADMIN — Medication 10 ML: at 10:40

## 2017-01-01 RX ADMIN — GUAIFENESIN 400 MG: 100 SOLUTION ORAL at 22:28

## 2017-01-01 RX ADMIN — HYDROCODONE BITARTRATE AND ACETAMINOPHEN 2 TABLET: 10; 325 TABLET ORAL at 15:50

## 2017-01-01 RX ADMIN — GABAPENTIN 600 MG: 300 CAPSULE ORAL at 22:25

## 2017-01-01 RX ADMIN — IPRATROPIUM BROMIDE AND ALBUTEROL SULFATE 3 ML: .5; 3 SOLUTION RESPIRATORY (INHALATION) at 01:23

## 2017-01-01 RX ADMIN — Medication 4 MG: at 03:59

## 2017-01-01 RX ADMIN — FLUOXETINE 20 MG: 20 CAPSULE ORAL at 08:32

## 2017-01-01 RX ADMIN — IOPAMIDOL 100 ML: 755 INJECTION, SOLUTION INTRAVENOUS at 08:57

## 2017-01-01 RX ADMIN — LEVOFLOXACIN 750 MG: 5 INJECTION, SOLUTION INTRAVENOUS at 12:04

## 2017-01-01 RX ADMIN — Medication 10 ML: at 05:21

## 2017-01-01 RX ADMIN — Medication 10 ML: at 06:19

## 2017-01-01 RX ADMIN — ALBUTEROL SULFATE 2.5 MG: 2.5 SOLUTION RESPIRATORY (INHALATION) at 02:08

## 2017-01-01 RX ADMIN — Medication 10 ML: at 06:02

## 2017-01-01 RX ADMIN — OXYCODONE HYDROCHLORIDE 10 MG: 100 SOLUTION ORAL at 12:57

## 2017-01-01 RX ADMIN — IPRATROPIUM BROMIDE AND ALBUTEROL SULFATE 3 ML: .5; 3 SOLUTION RESPIRATORY (INHALATION) at 20:57

## 2017-01-01 RX ADMIN — UMECLIDINIUM 1 PUFF: 62.5 AEROSOL, POWDER ORAL at 12:37

## 2017-01-01 RX ADMIN — Medication 10 ML: at 21:47

## 2017-01-01 RX ADMIN — LORAZEPAM 1 MG: 2 SOLUTION, CONCENTRATE ORAL at 20:14

## 2017-01-01 RX ADMIN — FLUOXETINE 20 MG: 20 CAPSULE ORAL at 08:59

## 2017-01-01 RX ADMIN — CEFAZOLIN 2 G: 10 INJECTION, POWDER, FOR SOLUTION INTRAVENOUS; PARENTERAL at 03:02

## 2017-01-01 RX ADMIN — DEXTROSE MONOHYDRATE 2 MCG/MIN: 5 INJECTION, SOLUTION INTRAVENOUS at 18:40

## 2017-01-01 RX ADMIN — ACETAMINOPHEN 1000 MG: 500 TABLET ORAL at 12:46

## 2017-01-01 RX ADMIN — METOPROLOL TARTRATE 12.5 MG: 25 TABLET ORAL at 08:16

## 2017-01-01 RX ADMIN — HEPARIN SODIUM 5000 UNITS: 5000 INJECTION, SOLUTION INTRAVENOUS; SUBCUTANEOUS at 09:36

## 2017-01-01 RX ADMIN — Medication 10 ML: at 04:38

## 2017-01-01 RX ADMIN — METOPROLOL TARTRATE 25 MG: 25 TABLET, FILM COATED ORAL at 09:04

## 2017-01-01 RX ADMIN — IPRATROPIUM BROMIDE AND ALBUTEROL SULFATE 3 ML: .5; 3 SOLUTION RESPIRATORY (INHALATION) at 20:54

## 2017-01-01 RX ADMIN — GABAPENTIN 600 MG: 300 CAPSULE ORAL at 08:13

## 2017-01-01 RX ADMIN — HYDROCODONE BITARTRATE AND ACETAMINOPHEN 1 TABLET: 10; 325 TABLET ORAL at 12:10

## 2017-01-01 RX ADMIN — METRONIDAZOLE 500 MG: 500 INJECTION, SOLUTION INTRAVENOUS at 17:32

## 2017-01-01 RX ADMIN — Medication 4 MG: at 15:02

## 2017-01-01 RX ADMIN — ACETYLCYSTEINE 400 MG: 200 SOLUTION ORAL; RESPIRATORY (INHALATION) at 07:22

## 2017-01-01 RX ADMIN — METHYLPREDNISOLONE SODIUM SUCCINATE 20 MG: 40 INJECTION, POWDER, FOR SOLUTION INTRAMUSCULAR; INTRAVENOUS at 09:36

## 2017-01-01 RX ADMIN — LEVOFLOXACIN 750 MG: 5 INJECTION, SOLUTION INTRAVENOUS at 11:24

## 2017-01-01 RX ADMIN — AMLODIPINE BESYLATE 5 MG: 5 TABLET ORAL at 08:04

## 2017-01-01 RX ADMIN — CEFAZOLIN SODIUM 1 G: 1 INJECTION, POWDER, FOR SOLUTION INTRAMUSCULAR; INTRAVENOUS at 14:46

## 2017-01-01 RX ADMIN — ALBUTEROL SULFATE 2.5 MG: 2.5 SOLUTION RESPIRATORY (INHALATION) at 20:17

## 2017-01-01 RX ADMIN — Medication 10 ML: at 06:56

## 2017-01-01 RX ADMIN — Medication 10 ML: at 23:45

## 2017-01-01 RX ADMIN — Medication 10 ML: at 08:58

## 2017-01-01 RX ADMIN — VANCOMYCIN HYDROCHLORIDE 1000 MG: 1 INJECTION, POWDER, LYOPHILIZED, FOR SOLUTION INTRAVENOUS at 17:26

## 2017-01-01 RX ADMIN — METRONIDAZOLE 500 MG: 500 INJECTION, SOLUTION INTRAVENOUS at 11:52

## 2017-01-01 RX ADMIN — GLYCOPYRROLATE 0.2 MG: 0.2 INJECTION INTRAMUSCULAR; INTRAVENOUS at 13:59

## 2017-01-01 RX ADMIN — GABAPENTIN 600 MG: 300 CAPSULE ORAL at 16:53

## 2017-01-01 RX ADMIN — LORAZEPAM 2 MG: 2 INJECTION INTRAMUSCULAR; INTRAVENOUS at 12:15

## 2017-01-01 RX ADMIN — HYDROCODONE BITARTRATE AND ACETAMINOPHEN 1 TABLET: 10; 325 TABLET ORAL at 05:00

## 2017-01-01 RX ADMIN — UMECLIDINIUM 1 PUFF: 62.5 AEROSOL, POWDER ORAL at 10:03

## 2017-01-01 RX ADMIN — METHYLPREDNISOLONE SODIUM SUCCINATE 60 MG: 125 INJECTION, POWDER, FOR SOLUTION INTRAMUSCULAR; INTRAVENOUS at 08:10

## 2017-01-01 RX ADMIN — METHYLPREDNISOLONE SODIUM SUCCINATE 40 MG: 40 INJECTION, POWDER, FOR SOLUTION INTRAMUSCULAR; INTRAVENOUS at 10:09

## 2017-01-01 RX ADMIN — Medication 4 MG: at 06:03

## 2017-01-01 RX ADMIN — CEFAZOLIN SODIUM 1 G: 1 INJECTION, POWDER, FOR SOLUTION INTRAMUSCULAR; INTRAVENOUS at 21:52

## 2017-01-01 RX ADMIN — AMLODIPINE BESYLATE 10 MG: 5 TABLET ORAL at 08:55

## 2017-01-01 RX ADMIN — TRAZODONE HYDROCHLORIDE 200 MG: 100 TABLET ORAL at 21:31

## 2017-01-01 RX ADMIN — IPRATROPIUM BROMIDE AND ALBUTEROL SULFATE 3 ML: .5; 3 SOLUTION RESPIRATORY (INHALATION) at 03:42

## 2017-01-01 RX ADMIN — GABAPENTIN 600 MG: 300 CAPSULE ORAL at 22:04

## 2017-01-01 RX ADMIN — PREGABALIN 100 MG: 100 CAPSULE ORAL at 08:58

## 2017-01-01 RX ADMIN — IPRATROPIUM BROMIDE AND ALBUTEROL SULFATE 3 ML: .5; 3 SOLUTION RESPIRATORY (INHALATION) at 19:40

## 2017-01-01 RX ADMIN — METOPROLOL TARTRATE 25 MG: 25 TABLET, FILM COATED ORAL at 09:37

## 2017-01-01 RX ADMIN — GUAIFENESIN 1200 MG: 600 TABLET, EXTENDED RELEASE ORAL at 09:37

## 2017-01-01 RX ADMIN — LITHIUM CARBONATE 150 MG: 300 TABLET ORAL at 21:49

## 2017-01-01 RX ADMIN — GUAIFENESIN 400 MG: 100 SOLUTION ORAL at 13:25

## 2017-01-01 RX ADMIN — IPRATROPIUM BROMIDE AND ALBUTEROL SULFATE 3 ML: .5; 3 SOLUTION RESPIRATORY (INHALATION) at 08:02

## 2017-01-01 RX ADMIN — Medication 10 ML: at 13:46

## 2017-01-01 RX ADMIN — CEFAZOLIN 2 G: 10 INJECTION, POWDER, FOR SOLUTION INTRAVENOUS; PARENTERAL at 14:21

## 2017-01-01 RX ADMIN — IPRATROPIUM BROMIDE AND ALBUTEROL SULFATE 3 ML: .5; 3 SOLUTION RESPIRATORY (INHALATION) at 07:24

## 2017-01-01 RX ADMIN — GABAPENTIN 600 MG: 300 CAPSULE ORAL at 18:52

## 2017-01-01 RX ADMIN — GUAIFENESIN 400 MG: 100 SOLUTION ORAL at 09:36

## 2017-01-01 RX ADMIN — METRONIDAZOLE 500 MG: 500 INJECTION, SOLUTION INTRAVENOUS at 10:49

## 2017-01-01 RX ADMIN — CEFEPIME HYDROCHLORIDE 2 G: 2 INJECTION, POWDER, FOR SOLUTION INTRAVENOUS at 08:08

## 2017-01-01 RX ADMIN — SODIUM CHLORIDE 125 ML/HR: 900 INJECTION, SOLUTION INTRAVENOUS at 18:34

## 2017-01-01 RX ADMIN — IPRATROPIUM BROMIDE AND ALBUTEROL SULFATE 3 ML: .5; 3 SOLUTION RESPIRATORY (INHALATION) at 22:09

## 2017-01-01 RX ADMIN — MORPHINE SULFATE 4 MG: 4 INJECTION, SOLUTION INTRAMUSCULAR; INTRAVENOUS at 08:22

## 2017-01-01 RX ADMIN — IPRATROPIUM BROMIDE AND ALBUTEROL SULFATE 3 ML: .5; 3 SOLUTION RESPIRATORY (INHALATION) at 02:42

## 2017-01-01 RX ADMIN — LEVOFLOXACIN 750 MG: 5 INJECTION, SOLUTION INTRAVENOUS at 13:45

## 2017-01-01 RX ADMIN — METOPROLOL TARTRATE 25 MG: 25 TABLET, FILM COATED ORAL at 21:38

## 2017-01-01 RX ADMIN — VANCOMYCIN HYDROCHLORIDE 750 MG: 750 INJECTION, POWDER, LYOPHILIZED, FOR SOLUTION INTRAVENOUS at 16:30

## 2017-01-01 RX ADMIN — FLUOXETINE 20 MG: 20 CAPSULE ORAL at 08:03

## 2017-01-01 RX ADMIN — OXYCODONE HYDROCHLORIDE 10 MG: 100 SOLUTION ORAL at 21:05

## 2017-01-01 RX ADMIN — TRAZODONE HYDROCHLORIDE 200 MG: 100 TABLET ORAL at 22:28

## 2017-01-01 RX ADMIN — PIPERACILLIN SODIUM,TAZOBACTAM SODIUM 3.38 G: 3; .375 INJECTION, POWDER, FOR SOLUTION INTRAVENOUS at 21:49

## 2017-01-01 RX ADMIN — METOPROLOL TARTRATE 25 MG: 25 TABLET, FILM COATED ORAL at 21:39

## 2017-01-01 RX ADMIN — METOPROLOL TARTRATE 25 MG: 25 TABLET, FILM COATED ORAL at 08:55

## 2017-01-01 RX ADMIN — ENOXAPARIN SODIUM 40 MG: 40 INJECTION SUBCUTANEOUS at 18:29

## 2017-01-01 RX ADMIN — HEPARIN SODIUM 5000 UNITS: 5000 INJECTION, SOLUTION INTRAVENOUS; SUBCUTANEOUS at 22:03

## 2017-01-01 RX ADMIN — LITHIUM CARBONATE 150 MG: 300 TABLET ORAL at 22:03

## 2017-01-01 RX ADMIN — LORAZEPAM 2 MG: 2 INJECTION INTRAMUSCULAR; INTRAVENOUS at 10:33

## 2017-01-01 RX ADMIN — VANCOMYCIN HYDROCHLORIDE 1000 MG: 1 INJECTION, POWDER, LYOPHILIZED, FOR SOLUTION INTRAVENOUS at 14:14

## 2017-01-01 RX ADMIN — PREDNISONE 10 MG: 10 TABLET ORAL at 09:36

## 2017-01-01 RX ADMIN — SODIUM CHLORIDE 75 ML/HR: 450 INJECTION, SOLUTION INTRAVENOUS at 08:06

## 2017-01-01 RX ADMIN — LITHIUM CARBONATE 300 MG: 300 TABLET, FILM COATED, EXTENDED RELEASE ORAL at 08:00

## 2017-01-01 RX ADMIN — METRONIDAZOLE 500 MG: 500 INJECTION, SOLUTION INTRAVENOUS at 10:23

## 2017-01-01 RX ADMIN — GABAPENTIN 600 MG: 300 CAPSULE ORAL at 10:23

## 2017-01-01 RX ADMIN — PREDNISONE 10 MG: 10 TABLET ORAL at 09:34

## 2017-01-01 RX ADMIN — CEFEPIME HYDROCHLORIDE 2 G: 2 INJECTION, POWDER, FOR SOLUTION INTRAVENOUS at 10:57

## 2017-01-01 RX ADMIN — OXYCODONE HYDROCHLORIDE 10 MG: 100 SOLUTION ORAL at 14:30

## 2017-01-01 RX ADMIN — FLUOXETINE HYDROCHLORIDE 20 MG: 20 SOLUTION ORAL at 10:55

## 2017-01-01 RX ADMIN — SODIUM CHLORIDE 50 ML/HR: 900 INJECTION, SOLUTION INTRAVENOUS at 17:45

## 2017-01-01 RX ADMIN — IPRATROPIUM BROMIDE AND ALBUTEROL SULFATE 3 ML: .5; 3 SOLUTION RESPIRATORY (INHALATION) at 15:44

## 2017-01-01 RX ADMIN — METRONIDAZOLE 500 MG: 500 INJECTION, SOLUTION INTRAVENOUS at 09:03

## 2017-01-01 RX ADMIN — METOPROLOL TARTRATE 12.5 MG: 25 TABLET ORAL at 18:13

## 2017-01-01 RX ADMIN — OXYCODONE HYDROCHLORIDE 10 MG: 100 SOLUTION ORAL at 09:22

## 2017-01-01 RX ADMIN — LITHIUM CARBONATE 300 MG: 300 CAPSULE ORAL at 09:30

## 2017-01-01 RX ADMIN — METOPROLOL TARTRATE 25 MG: 25 TABLET, FILM COATED ORAL at 08:14

## 2017-01-01 RX ADMIN — HYDROCODONE BITARTRATE AND ACETAMINOPHEN 1 TABLET: 7.5; 325 TABLET ORAL at 23:43

## 2017-01-01 RX ADMIN — LORAZEPAM 0.5 MG: 2 INJECTION INTRAMUSCULAR; INTRAVENOUS at 07:57

## 2017-01-01 RX ADMIN — METRONIDAZOLE 500 MG: 500 INJECTION, SOLUTION INTRAVENOUS at 17:54

## 2017-01-01 RX ADMIN — PIPERACILLIN SODIUM,TAZOBACTAM SODIUM 3.38 G: 3; .375 INJECTION, POWDER, FOR SOLUTION INTRAVENOUS at 07:17

## 2017-01-01 RX ADMIN — OXYCODONE HYDROCHLORIDE 10 MG: 100 SOLUTION ORAL at 17:51

## 2017-01-01 RX ADMIN — VANCOMYCIN HYDROCHLORIDE 1750 MG: 10 INJECTION, POWDER, LYOPHILIZED, FOR SOLUTION INTRAVENOUS at 18:07

## 2017-01-01 RX ADMIN — PIPERACILLIN SODIUM,TAZOBACTAM SODIUM 4.5 G: 4; .5 INJECTION, POWDER, FOR SOLUTION INTRAVENOUS at 15:29

## 2017-01-01 RX ADMIN — CEFEPIME HYDROCHLORIDE 2 G: 2 INJECTION, POWDER, FOR SOLUTION INTRAVENOUS at 02:57

## 2017-01-01 RX ADMIN — HYDROCODONE BITARTRATE AND ACETAMINOPHEN 1 TABLET: 10; 325 TABLET ORAL at 05:36

## 2017-01-01 RX ADMIN — HYDROCODONE BITARTRATE AND ACETAMINOPHEN 2 TABLET: 10; 325 TABLET ORAL at 23:45

## 2017-01-01 RX ADMIN — VANCOMYCIN HYDROCHLORIDE 750 MG: 750 INJECTION, POWDER, LYOPHILIZED, FOR SOLUTION INTRAVENOUS at 22:28

## 2017-01-01 RX ADMIN — METOPROLOL TARTRATE 12.5 MG: 25 TABLET ORAL at 08:22

## 2017-01-01 RX ADMIN — CEFAZOLIN SODIUM 1 G: 1 INJECTION, POWDER, FOR SOLUTION INTRAMUSCULAR; INTRAVENOUS at 14:48

## 2017-01-01 RX ADMIN — ASPIRIN 81 MG: 81 TABLET, COATED ORAL at 09:05

## 2017-01-01 RX ADMIN — Medication 10 ML: at 05:39

## 2017-01-01 RX ADMIN — LITHIUM CARBONATE 450 MG: 450 TABLET, EXTENDED RELEASE ORAL at 00:00

## 2017-01-01 RX ADMIN — ALBUTEROL SULFATE 2.5 MG: 2.5 SOLUTION RESPIRATORY (INHALATION) at 02:32

## 2017-01-01 RX ADMIN — METOPROLOL TARTRATE 12.5 MG: 25 TABLET ORAL at 08:39

## 2017-01-01 RX ADMIN — LEVOFLOXACIN 750 MG: 5 INJECTION, SOLUTION INTRAVENOUS at 11:38

## 2017-01-01 RX ADMIN — LEVOFLOXACIN 750 MG: 5 INJECTION, SOLUTION INTRAVENOUS at 17:00

## 2017-01-01 RX ADMIN — IPRATROPIUM BROMIDE AND ALBUTEROL SULFATE 3 ML: .5; 3 SOLUTION RESPIRATORY (INHALATION) at 15:51

## 2017-01-01 RX ADMIN — ATORVASTATIN CALCIUM 20 MG: 20 TABLET, FILM COATED ORAL at 21:38

## 2017-01-01 RX ADMIN — FLUOXETINE 20 MG: 20 CAPSULE ORAL at 08:12

## 2017-01-01 RX ADMIN — OXYCODONE HYDROCHLORIDE 10 MG: 100 SOLUTION ORAL at 18:53

## 2017-01-01 RX ADMIN — Medication 4 MG: at 00:23

## 2017-01-01 RX ADMIN — LORAZEPAM 0.5 MG: 0.5 TABLET ORAL at 17:18

## 2017-01-01 RX ADMIN — SODIUM CHLORIDE 100 ML/HR: 900 INJECTION, SOLUTION INTRAVENOUS at 18:24

## 2017-01-01 RX ADMIN — ENOXAPARIN SODIUM 40 MG: 40 INJECTION SUBCUTANEOUS at 09:36

## 2017-01-01 RX ADMIN — SODIUM CHLORIDE 100 ML/HR: 900 INJECTION, SOLUTION INTRAVENOUS at 00:16

## 2017-01-01 RX ADMIN — HYDROCODONE BITARTRATE AND ACETAMINOPHEN 2 TABLET: 10; 325 TABLET ORAL at 12:21

## 2017-01-01 RX ADMIN — LORAZEPAM 1 MG: 2 INJECTION INTRAMUSCULAR; INTRAVENOUS at 15:44

## 2017-01-01 RX ADMIN — HYDROMORPHONE HYDROCHLORIDE 2 MG: 2 TABLET ORAL at 05:24

## 2017-01-01 RX ADMIN — ACETYLCYSTEINE 800 MG: 200 SOLUTION ORAL; RESPIRATORY (INHALATION) at 08:09

## 2017-01-01 RX ADMIN — Medication 10 ML: at 21:23

## 2017-01-01 RX ADMIN — SODIUM CHLORIDE 100 ML/HR: 900 INJECTION, SOLUTION INTRAVENOUS at 00:56

## 2017-01-01 RX ADMIN — METOPROLOL TARTRATE 25 MG: 25 TABLET ORAL at 09:27

## 2017-01-01 RX ADMIN — LORAZEPAM 0.5 MG: 0.5 TABLET ORAL at 09:14

## 2017-01-01 RX ADMIN — VANCOMYCIN HYDROCHLORIDE 1000 MG: 1 INJECTION, POWDER, LYOPHILIZED, FOR SOLUTION INTRAVENOUS at 20:23

## 2017-01-01 RX ADMIN — LITHIUM CARBONATE 300 MG: 300 TABLET, FILM COATED, EXTENDED RELEASE ORAL at 17:25

## 2017-01-01 RX ADMIN — LORAZEPAM 0.5 MG: 2 INJECTION INTRAMUSCULAR; INTRAVENOUS at 00:23

## 2017-01-01 RX ADMIN — Medication 10 ML: at 05:45

## 2017-01-01 RX ADMIN — OXYCODONE HYDROCHLORIDE 10 MG: 100 SOLUTION ORAL at 21:36

## 2017-01-01 RX ADMIN — HALOPERIDOL LACTATE 0.5 MG: 5 INJECTION, SOLUTION INTRAMUSCULAR at 14:17

## 2017-01-01 RX ADMIN — ENOXAPARIN SODIUM 40 MG: 40 INJECTION SUBCUTANEOUS at 11:21

## 2017-01-01 RX ADMIN — Medication 10 ML: at 08:37

## 2017-01-01 RX ADMIN — LITHIUM CARBONATE 450 MG: 450 TABLET, EXTENDED RELEASE ORAL at 21:24

## 2017-01-01 RX ADMIN — LIDOCAINE HYDROCHLORIDE 40 MG: 20 INJECTION, SOLUTION EPIDURAL; INFILTRATION; INTRACAUDAL; PERINEURAL at 11:35

## 2017-01-01 RX ADMIN — LEVOFLOXACIN 750 MG: 5 INJECTION, SOLUTION INTRAVENOUS at 09:52

## 2017-01-01 RX ADMIN — CEFEPIME HYDROCHLORIDE 2 G: 2 INJECTION, POWDER, FOR SOLUTION INTRAVENOUS at 12:27

## 2017-01-01 RX ADMIN — ENOXAPARIN SODIUM 40 MG: 40 INJECTION SUBCUTANEOUS at 18:37

## 2017-01-01 RX ADMIN — TRAZODONE HYDROCHLORIDE 200 MG: 100 TABLET ORAL at 21:00

## 2017-01-01 RX ADMIN — POLYETHYLENE GLYCOL 3350 17 G: 17 POWDER, FOR SOLUTION ORAL at 08:06

## 2017-01-01 RX ADMIN — METOPROLOL TARTRATE 12.5 MG: 25 TABLET ORAL at 17:51

## 2017-01-01 RX ADMIN — METRONIDAZOLE 500 MG: 500 INJECTION, SOLUTION INTRAVENOUS at 16:34

## 2017-01-01 RX ADMIN — Medication 2 MG: at 18:24

## 2017-01-01 RX ADMIN — OXYCODONE HYDROCHLORIDE 10 MG: 100 SOLUTION ORAL at 13:26

## 2017-01-01 RX ADMIN — ENOXAPARIN SODIUM 30 MG: 30 INJECTION SUBCUTANEOUS at 10:29

## 2017-01-01 RX ADMIN — MORPHINE SULFATE 4 MG: 4 INJECTION, SOLUTION INTRAMUSCULAR; INTRAVENOUS at 18:58

## 2017-01-01 RX ADMIN — Medication 10 ML: at 05:41

## 2017-01-01 RX ADMIN — OXYCODONE HYDROCHLORIDE 10 MG: 100 SOLUTION ORAL at 14:11

## 2017-01-01 RX ADMIN — Medication 10 ML: at 22:52

## 2017-01-01 RX ADMIN — Medication 10 ML: at 05:00

## 2017-01-01 RX ADMIN — Medication 1000 MCG: at 09:00

## 2017-01-01 RX ADMIN — ACETYLCYSTEINE 400 MG: 200 SOLUTION ORAL; RESPIRATORY (INHALATION) at 20:29

## 2017-01-01 RX ADMIN — LORAZEPAM 0.5 MG: 0.5 TABLET ORAL at 21:39

## 2017-01-01 RX ADMIN — CEFAZOLIN 2 G: 10 INJECTION, POWDER, FOR SOLUTION INTRAVENOUS; PARENTERAL at 11:55

## 2017-01-01 RX ADMIN — LORAZEPAM 0.5 MG: 2 INJECTION INTRAMUSCULAR; INTRAVENOUS at 18:38

## 2017-01-01 RX ADMIN — Medication 10 ML: at 13:53

## 2017-01-01 RX ADMIN — OXYCODONE HYDROCHLORIDE 10 MG: 100 SOLUTION ORAL at 09:02

## 2017-01-01 RX ADMIN — HYDROCODONE BITARTRATE AND ACETAMINOPHEN 2 TABLET: 10; 325 TABLET ORAL at 07:04

## 2017-01-01 RX ADMIN — VANCOMYCIN HYDROCHLORIDE 2000 MG: 10 INJECTION, POWDER, LYOPHILIZED, FOR SOLUTION INTRAVENOUS at 09:36

## 2017-01-01 RX ADMIN — GABAPENTIN 600 MG: 300 CAPSULE ORAL at 21:16

## 2017-01-01 RX ADMIN — IPRATROPIUM BROMIDE AND ALBUTEROL SULFATE 3 ML: .5; 3 SOLUTION RESPIRATORY (INHALATION) at 02:05

## 2017-01-01 RX ADMIN — HEPARIN SODIUM 5000 UNITS: 5000 INJECTION, SOLUTION INTRAVENOUS; SUBCUTANEOUS at 21:12

## 2017-01-01 RX ADMIN — Medication 2 MG: at 15:50

## 2017-01-01 RX ADMIN — GABAPENTIN 600 MG: 300 CAPSULE ORAL at 11:13

## 2017-01-01 RX ADMIN — CEFEPIME HYDROCHLORIDE 2 G: 2 INJECTION, POWDER, FOR SOLUTION INTRAVENOUS at 04:42

## 2017-01-01 RX ADMIN — LORAZEPAM 0.5 MG: 0.5 TABLET ORAL at 18:19

## 2017-01-01 RX ADMIN — IPRATROPIUM BROMIDE AND ALBUTEROL SULFATE 3 ML: .5; 3 SOLUTION RESPIRATORY (INHALATION) at 07:27

## 2017-01-01 RX ADMIN — AMITRIPTYLINE HYDROCHLORIDE 10 MG: 10 TABLET, FILM COATED ORAL at 21:00

## 2017-01-01 RX ADMIN — AMITRIPTYLINE HYDROCHLORIDE 10 MG: 10 TABLET, FILM COATED ORAL at 21:39

## 2017-01-01 RX ADMIN — LITHIUM CARBONATE 300 MG: 300 CAPSULE ORAL at 09:16

## 2017-01-01 RX ADMIN — PIPERACILLIN SODIUM,TAZOBACTAM SODIUM 3.38 G: 3; .375 INJECTION, POWDER, FOR SOLUTION INTRAVENOUS at 08:08

## 2017-01-01 RX ADMIN — LITHIUM CARBONATE 450 MG: 450 TABLET, EXTENDED RELEASE ORAL at 08:23

## 2017-01-01 RX ADMIN — Medication 5 ML: at 14:00

## 2017-01-01 RX ADMIN — LORAZEPAM 0.5 MG: 0.5 TABLET ORAL at 09:37

## 2017-01-01 RX ADMIN — VANCOMYCIN HYDROCHLORIDE 750 MG: 750 INJECTION, POWDER, LYOPHILIZED, FOR SOLUTION INTRAVENOUS at 04:54

## 2017-01-01 RX ADMIN — ENOXAPARIN SODIUM 40 MG: 40 INJECTION SUBCUTANEOUS at 19:00

## 2017-01-01 RX ADMIN — FLUOXETINE 20 MG: 20 CAPSULE ORAL at 09:02

## 2017-01-01 RX ADMIN — Medication 8.6 MG: at 09:14

## 2017-01-01 RX ADMIN — GLYCOPYRROLATE 0.2 MG: 0.2 INJECTION INTRAMUSCULAR; INTRAVENOUS at 01:28

## 2017-01-01 RX ADMIN — PIPERACILLIN SODIUM,TAZOBACTAM SODIUM 4.5 G: 4; .5 INJECTION, POWDER, FOR SOLUTION INTRAVENOUS at 17:49

## 2017-01-01 RX ADMIN — LITHIUM CARBONATE 450 MG: 450 TABLET, EXTENDED RELEASE ORAL at 22:07

## 2017-01-01 RX ADMIN — IPRATROPIUM BROMIDE AND ALBUTEROL SULFATE 3 ML: .5; 3 SOLUTION RESPIRATORY (INHALATION) at 09:05

## 2017-01-01 RX ADMIN — HYDROCODONE BITARTRATE AND ACETAMINOPHEN 2 TABLET: 10; 325 TABLET ORAL at 19:27

## 2017-01-01 RX ADMIN — GABAPENTIN 600 MG: 300 CAPSULE ORAL at 21:47

## 2017-01-01 RX ADMIN — LORAZEPAM 0.5 MG: 2 INJECTION INTRAMUSCULAR; INTRAVENOUS at 00:05

## 2017-01-01 RX ADMIN — GABAPENTIN 600 MG: 300 CAPSULE ORAL at 17:00

## 2017-01-01 RX ADMIN — LORAZEPAM 1 MG: 2 INJECTION INTRAMUSCULAR; INTRAVENOUS at 04:13

## 2017-01-01 RX ADMIN — ACETYLCYSTEINE 400 MG: 200 SOLUTION ORAL; RESPIRATORY (INHALATION) at 02:47

## 2017-01-01 RX ADMIN — PREDNISONE 10 MG: 10 TABLET ORAL at 08:00

## 2017-01-01 RX ADMIN — GUAIFENESIN 400 MG: 100 SOLUTION ORAL at 21:15

## 2017-01-01 RX ADMIN — LORAZEPAM 2 MG: 2 INJECTION INTRAMUSCULAR; INTRAVENOUS at 13:59

## 2017-01-01 RX ADMIN — AMLODIPINE BESYLATE 5 MG: 5 TABLET ORAL at 09:39

## 2017-01-01 RX ADMIN — PIPERACILLIN SODIUM,TAZOBACTAM SODIUM 4.5 G: 4; .5 INJECTION, POWDER, FOR SOLUTION INTRAVENOUS at 16:06

## 2017-01-01 RX ADMIN — METRONIDAZOLE 500 MG: 500 INJECTION, SOLUTION INTRAVENOUS at 22:10

## 2017-01-01 RX ADMIN — HYDROCODONE BITARTRATE AND ACETAMINOPHEN 1 TABLET: 10; 325 TABLET ORAL at 00:59

## 2017-01-01 RX ADMIN — METOPROLOL TARTRATE 12.5 MG: 25 TABLET ORAL at 18:37

## 2017-01-01 RX ADMIN — MORPHINE SULFATE 4 MG: 4 INJECTION, SOLUTION INTRAMUSCULAR; INTRAVENOUS at 01:23

## 2017-01-01 RX ADMIN — IPRATROPIUM BROMIDE AND ALBUTEROL SULFATE 3 ML: .5; 3 SOLUTION RESPIRATORY (INHALATION) at 19:22

## 2017-01-01 RX ADMIN — IPRATROPIUM BROMIDE AND ALBUTEROL SULFATE 3 ML: .5; 3 SOLUTION RESPIRATORY (INHALATION) at 03:09

## 2017-01-01 RX ADMIN — LORAZEPAM 0.5 MG: 0.5 TABLET ORAL at 21:38

## 2017-01-01 RX ADMIN — SODIUM CHLORIDE 1500 ML: 900 INJECTION, SOLUTION INTRAVENOUS at 20:11

## 2017-01-01 RX ADMIN — METOPROLOL TARTRATE 12.5 MG: 25 TABLET ORAL at 09:16

## 2017-01-01 RX ADMIN — METHYLPREDNISOLONE SODIUM SUCCINATE 40 MG: 40 INJECTION, POWDER, FOR SOLUTION INTRAMUSCULAR; INTRAVENOUS at 20:22

## 2017-01-01 RX ADMIN — SODIUM CHLORIDE 100 ML/HR: 900 INJECTION, SOLUTION INTRAVENOUS at 03:58

## 2017-01-01 RX ADMIN — CEFEPIME HYDROCHLORIDE 2 G: 2 INJECTION, POWDER, FOR SOLUTION INTRAVENOUS at 08:00

## 2017-01-01 RX ADMIN — IPRATROPIUM BROMIDE AND ALBUTEROL SULFATE 3 ML: .5; 3 SOLUTION RESPIRATORY (INHALATION) at 08:13

## 2017-01-01 RX ADMIN — HYDROCODONE BITARTRATE AND ACETAMINOPHEN 2 TABLET: 10; 325 TABLET ORAL at 23:43

## 2017-01-01 RX ADMIN — Medication 10 ML: at 08:57

## 2017-01-01 RX ADMIN — IPRATROPIUM BROMIDE AND ALBUTEROL SULFATE 3 ML: .5; 3 SOLUTION RESPIRATORY (INHALATION) at 19:26

## 2017-01-01 RX ADMIN — ATORVASTATIN CALCIUM 20 MG: 20 TABLET, FILM COATED ORAL at 21:00

## 2017-01-01 RX ADMIN — Medication 10 ML: at 06:27

## 2017-01-01 RX ADMIN — METRONIDAZOLE 500 MG: 500 INJECTION, SOLUTION INTRAVENOUS at 10:21

## 2017-01-01 RX ADMIN — IPRATROPIUM BROMIDE AND ALBUTEROL SULFATE 3 ML: .5; 3 SOLUTION RESPIRATORY (INHALATION) at 21:21

## 2017-01-01 RX ADMIN — PREGABALIN 100 MG: 100 CAPSULE ORAL at 13:34

## 2017-01-01 RX ADMIN — LITHIUM CARBONATE 300 MG: 300 CAPSULE, GELATIN COATED ORAL at 09:15

## 2017-01-01 RX ADMIN — IPRATROPIUM BROMIDE AND ALBUTEROL SULFATE 3 ML: .5; 3 SOLUTION RESPIRATORY (INHALATION) at 07:45

## 2017-01-01 RX ADMIN — AMITRIPTYLINE HYDROCHLORIDE 10 MG: 10 TABLET, FILM COATED ORAL at 21:31

## 2017-01-01 RX ADMIN — OXYCODONE HYDROCHLORIDE 10 MG: 100 SOLUTION ORAL at 09:37

## 2017-01-01 RX ADMIN — Medication 10 ML: at 09:15

## 2017-01-01 RX ADMIN — ACETYLCYSTEINE 400 MG: 200 SOLUTION ORAL; RESPIRATORY (INHALATION) at 21:21

## 2017-01-01 RX ADMIN — ALBUTEROL SULFATE 2.5 MG: 2.5 SOLUTION RESPIRATORY (INHALATION) at 07:22

## 2017-01-01 RX ADMIN — ALBUTEROL SULFATE 2.5 MG: 2.5 SOLUTION RESPIRATORY (INHALATION) at 09:38

## 2017-01-01 RX ADMIN — LORAZEPAM 0.5 MG: 0.5 TABLET ORAL at 21:22

## 2017-01-01 RX ADMIN — METRONIDAZOLE 500 MG: 500 INJECTION, SOLUTION INTRAVENOUS at 08:48

## 2017-01-01 RX ADMIN — ASPIRIN 81 MG: 81 TABLET, COATED ORAL at 08:31

## 2017-01-01 RX ADMIN — AMITRIPTYLINE HYDROCHLORIDE 25 MG: 50 TABLET, FILM COATED ORAL at 21:48

## 2017-01-01 RX ADMIN — FLUOXETINE 20 MG: 20 CAPSULE ORAL at 10:10

## 2017-01-01 RX ADMIN — LORAZEPAM 0.5 MG: 2 INJECTION INTRAMUSCULAR; INTRAVENOUS at 00:12

## 2017-01-01 RX ADMIN — Medication 10 ML: at 17:40

## 2017-01-01 RX ADMIN — HYDROCODONE BITARTRATE AND ACETAMINOPHEN 1 TABLET: 10; 325 TABLET ORAL at 10:39

## 2017-01-01 RX ADMIN — GUAIFENESIN 1200 MG: 600 TABLET, EXTENDED RELEASE ORAL at 08:11

## 2017-01-01 RX ADMIN — Medication 8.6 MG: at 09:05

## 2017-01-01 RX ADMIN — TRAZODONE HYDROCHLORIDE 200 MG: 100 TABLET ORAL at 22:04

## 2017-01-01 RX ADMIN — IPRATROPIUM BROMIDE AND ALBUTEROL SULFATE 3 ML: .5; 3 SOLUTION RESPIRATORY (INHALATION) at 08:07

## 2017-01-01 RX ADMIN — ACETYLCYSTEINE 400 MG: 200 SOLUTION ORAL; RESPIRATORY (INHALATION) at 20:17

## 2017-01-01 RX ADMIN — LITHIUM CARBONATE 450 MG: 450 TABLET, EXTENDED RELEASE ORAL at 11:13

## 2017-01-01 RX ADMIN — METRONIDAZOLE 500 MG: 500 INJECTION, SOLUTION INTRAVENOUS at 09:51

## 2017-01-01 RX ADMIN — CEFEPIME HYDROCHLORIDE 2 G: 2 INJECTION, POWDER, FOR SOLUTION INTRAVENOUS at 16:12

## 2017-01-01 RX ADMIN — HEPARIN SODIUM 5000 UNITS: 5000 INJECTION, SOLUTION INTRAVENOUS; SUBCUTANEOUS at 08:21

## 2017-01-01 RX ADMIN — METRONIDAZOLE 500 MG: 500 INJECTION, SOLUTION INTRAVENOUS at 21:19

## 2017-01-01 RX ADMIN — METOPROLOL TARTRATE 25 MG: 25 TABLET ORAL at 08:03

## 2017-01-01 RX ADMIN — CEFAZOLIN SODIUM 1 G: 1 INJECTION, POWDER, FOR SOLUTION INTRAMUSCULAR; INTRAVENOUS at 15:29

## 2017-01-01 RX ADMIN — METOPROLOL TARTRATE 25 MG: 25 TABLET, FILM COATED ORAL at 08:32

## 2017-01-01 RX ADMIN — HYDROCODONE BITARTRATE AND ACETAMINOPHEN 1 TABLET: 10; 325 TABLET ORAL at 01:04

## 2017-01-01 RX ADMIN — MORPHINE SULFATE 4 MG: 4 INJECTION, SOLUTION INTRAMUSCULAR; INTRAVENOUS at 06:32

## 2017-01-01 RX ADMIN — OXYCODONE HYDROCHLORIDE 10 MG: 100 SOLUTION ORAL at 18:36

## 2017-01-01 RX ADMIN — LORAZEPAM 2 MG: 2 INJECTION INTRAMUSCULAR; INTRAVENOUS at 16:49

## 2017-01-01 RX ADMIN — IPRATROPIUM BROMIDE AND ALBUTEROL SULFATE 3 ML: .5; 3 SOLUTION RESPIRATORY (INHALATION) at 13:59

## 2017-01-01 RX ADMIN — PIPERACILLIN SODIUM,TAZOBACTAM SODIUM 4.5 G: 4; .5 INJECTION, POWDER, FOR SOLUTION INTRAVENOUS at 21:05

## 2017-01-01 RX ADMIN — CEFAZOLIN SODIUM 1 G: 1 INJECTION, POWDER, FOR SOLUTION INTRAMUSCULAR; INTRAVENOUS at 09:19

## 2017-01-01 RX ADMIN — HEPARIN SODIUM 5000 UNITS: 5000 INJECTION, SOLUTION INTRAVENOUS; SUBCUTANEOUS at 14:22

## 2017-01-01 RX ADMIN — HYDROCODONE BITARTRATE AND ACETAMINOPHEN 2 TABLET: 5; 325 TABLET ORAL at 16:07

## 2017-01-01 RX ADMIN — HYDROCODONE BITARTRATE AND ACETAMINOPHEN 1 TABLET: 10; 325 TABLET ORAL at 22:32

## 2017-01-01 RX ADMIN — IPRATROPIUM BROMIDE AND ALBUTEROL SULFATE 3 ML: .5; 3 SOLUTION RESPIRATORY (INHALATION) at 07:17

## 2017-01-01 RX ADMIN — LORAZEPAM 0.5 MG: 0.5 TABLET ORAL at 08:00

## 2017-01-01 RX ADMIN — DEXTROSE MONOHYDRATE 6 MCG/MIN: 5 INJECTION, SOLUTION INTRAVENOUS at 18:07

## 2017-01-01 RX ADMIN — METHYLPREDNISOLONE SODIUM SUCCINATE 20 MG: 40 INJECTION, POWDER, FOR SOLUTION INTRAMUSCULAR; INTRAVENOUS at 08:45

## 2017-01-01 RX ADMIN — METOPROLOL TARTRATE 25 MG: 25 TABLET, FILM COATED ORAL at 08:07

## 2017-01-01 RX ADMIN — GUAIFENESIN 400 MG: 100 SOLUTION ORAL at 17:30

## 2017-01-01 RX ADMIN — TRAZODONE HYDROCHLORIDE 200 MG: 100 TABLET ORAL at 22:23

## 2017-01-01 RX ADMIN — MORPHINE SULFATE 4 MG: 4 INJECTION, SOLUTION INTRAMUSCULAR; INTRAVENOUS at 22:24

## 2017-01-01 RX ADMIN — HYDROMORPHONE HYDROCHLORIDE 1 MG: 1 INJECTION, SOLUTION INTRAMUSCULAR; INTRAVENOUS; SUBCUTANEOUS at 21:22

## 2017-01-01 RX ADMIN — HEPARIN SODIUM 5000 UNITS: 5000 INJECTION, SOLUTION INTRAVENOUS; SUBCUTANEOUS at 13:49

## 2017-01-01 RX ADMIN — GABAPENTIN 600 MG: 300 CAPSULE ORAL at 08:21

## 2017-01-01 RX ADMIN — Medication 4 MG: at 05:16

## 2017-01-01 RX ADMIN — SODIUM CHLORIDE 75 ML/HR: 900 INJECTION, SOLUTION INTRAVENOUS at 11:11

## 2017-01-01 RX ADMIN — Medication 10 ML: at 14:28

## 2017-01-01 RX ADMIN — HYDROCODONE BITARTRATE AND ACETAMINOPHEN 2 TABLET: 10; 325 TABLET ORAL at 20:34

## 2017-01-01 RX ADMIN — HYDROCODONE BITARTRATE AND ACETAMINOPHEN 1 TABLET: 10; 325 TABLET ORAL at 13:16

## 2017-01-01 RX ADMIN — ACETAMINOPHEN 500 MG: 500 TABLET, FILM COATED ORAL at 17:17

## 2017-01-01 RX ADMIN — LEVOFLOXACIN 750 MG: 5 INJECTION, SOLUTION INTRAVENOUS at 11:48

## 2017-01-01 RX ADMIN — VANCOMYCIN HYDROCHLORIDE 750 MG: 750 INJECTION, POWDER, LYOPHILIZED, FOR SOLUTION INTRAVENOUS at 20:14

## 2017-01-01 RX ADMIN — HEPARIN SODIUM 5000 UNITS: 5000 INJECTION, SOLUTION INTRAVENOUS; SUBCUTANEOUS at 05:59

## 2017-01-01 RX ADMIN — OXYCODONE HYDROCHLORIDE 10 MG: 100 SOLUTION ORAL at 21:41

## 2017-01-01 RX ADMIN — IPRATROPIUM BROMIDE AND ALBUTEROL SULFATE 3 ML: .5; 3 SOLUTION RESPIRATORY (INHALATION) at 03:10

## 2017-01-01 RX ADMIN — METOPROLOL TARTRATE 25 MG: 25 TABLET, FILM COATED ORAL at 22:26

## 2017-01-01 RX ADMIN — LEVOFLOXACIN 750 MG: 5 INJECTION, SOLUTION INTRAVENOUS at 11:12

## 2017-01-01 RX ADMIN — TRAZODONE HYDROCHLORIDE 200 MG: 100 TABLET ORAL at 21:50

## 2017-01-01 RX ADMIN — ACETYLCYSTEINE 400 MG: 200 SOLUTION ORAL; RESPIRATORY (INHALATION) at 07:29

## 2017-01-01 RX ADMIN — GABAPENTIN 600 MG: 300 CAPSULE ORAL at 17:47

## 2017-01-01 RX ADMIN — METHYLPREDNISOLONE SODIUM SUCCINATE 40 MG: 40 INJECTION, POWDER, FOR SOLUTION INTRAMUSCULAR; INTRAVENOUS at 21:49

## 2017-01-01 RX ADMIN — IPRATROPIUM BROMIDE AND ALBUTEROL SULFATE 3 ML: .5; 3 SOLUTION RESPIRATORY (INHALATION) at 15:10

## 2017-01-01 RX ADMIN — HYDROCODONE BITARTRATE AND ACETAMINOPHEN 1 TABLET: 10; 325 TABLET ORAL at 14:25

## 2017-01-01 RX ADMIN — CEFAZOLIN 2 G: 10 INJECTION, POWDER, FOR SOLUTION INTRAVENOUS; PARENTERAL at 04:24

## 2017-01-01 RX ADMIN — ENOXAPARIN SODIUM 30 MG: 30 INJECTION SUBCUTANEOUS at 09:24

## 2017-01-01 RX ADMIN — ASPIRIN 81 MG: 81 TABLET, COATED ORAL at 08:55

## 2017-01-01 RX ADMIN — Medication 10 ML: at 16:41

## 2017-01-01 RX ADMIN — IPRATROPIUM BROMIDE AND ALBUTEROL SULFATE 3 ML: .5; 3 SOLUTION RESPIRATORY (INHALATION) at 21:44

## 2017-01-01 RX ADMIN — PIPERACILLIN SODIUM,TAZOBACTAM SODIUM 3.38 G: 3; .375 INJECTION, POWDER, FOR SOLUTION INTRAVENOUS at 06:30

## 2017-01-01 RX ADMIN — Medication 4 MG: at 21:54

## 2017-01-01 RX ADMIN — AMLODIPINE BESYLATE 10 MG: 5 TABLET ORAL at 09:14

## 2017-01-01 RX ADMIN — ACETYLCYSTEINE 400 MG: 200 SOLUTION ORAL; RESPIRATORY (INHALATION) at 14:11

## 2017-01-01 RX ADMIN — LORAZEPAM 0.5 MG: 2 INJECTION INTRAMUSCULAR; INTRAVENOUS at 15:20

## 2017-01-01 RX ADMIN — METRONIDAZOLE 500 MG: 500 INJECTION, SOLUTION INTRAVENOUS at 02:17

## 2017-01-01 RX ADMIN — PIPERACILLIN SODIUM,TAZOBACTAM SODIUM 3.38 G: 3; .375 INJECTION, POWDER, FOR SOLUTION INTRAVENOUS at 05:39

## 2017-01-01 RX ADMIN — ALBUTEROL SULFATE 2.5 MG: 2.5 SOLUTION RESPIRATORY (INHALATION) at 07:29

## 2017-01-01 RX ADMIN — SODIUM CHLORIDE 125 ML/HR: 900 INJECTION, SOLUTION INTRAVENOUS at 19:29

## 2017-01-01 RX ADMIN — IPRATROPIUM BROMIDE AND ALBUTEROL SULFATE 3 ML: .5; 3 SOLUTION RESPIRATORY (INHALATION) at 12:10

## 2017-01-01 RX ADMIN — LORAZEPAM 0.5 MG: 0.5 TABLET ORAL at 21:31

## 2017-01-01 RX ADMIN — PREGABALIN 100 MG: 100 CAPSULE ORAL at 22:29

## 2017-01-01 RX ADMIN — METOPROLOL TARTRATE 12.5 MG: 25 TABLET ORAL at 22:29

## 2017-01-01 RX ADMIN — Medication 10 ML: at 07:59

## 2017-01-01 RX ADMIN — METOPROLOL TARTRATE 25 MG: 25 TABLET, FILM COATED ORAL at 22:05

## 2017-01-01 RX ADMIN — Medication 4 MG: at 09:26

## 2017-01-01 RX ADMIN — GABAPENTIN 300 MG: 300 CAPSULE ORAL at 15:32

## 2017-01-01 RX ADMIN — IPRATROPIUM BROMIDE AND ALBUTEROL SULFATE 3 ML: .5; 3 SOLUTION RESPIRATORY (INHALATION) at 19:34

## 2017-01-01 RX ADMIN — Medication 1 SPRAY: at 09:36

## 2017-01-01 RX ADMIN — LITHIUM CARBONATE 300 MG: 300 TABLET, FILM COATED, EXTENDED RELEASE ORAL at 09:50

## 2017-01-01 RX ADMIN — ACETYLCYSTEINE: 200 SOLUTION ORAL; RESPIRATORY (INHALATION) at 01:10

## 2017-01-01 RX ADMIN — IPRATROPIUM BROMIDE AND ALBUTEROL SULFATE 3 ML: .5; 3 SOLUTION RESPIRATORY (INHALATION) at 06:46

## 2017-01-01 RX ADMIN — HEPARIN SODIUM 5000 UNITS: 5000 INJECTION, SOLUTION INTRAVENOUS; SUBCUTANEOUS at 09:02

## 2017-01-01 RX ADMIN — IPRATROPIUM BROMIDE AND ALBUTEROL SULFATE 3 ML: .5; 3 SOLUTION RESPIRATORY (INHALATION) at 23:58

## 2017-01-01 RX ADMIN — Medication 10 ML: at 21:51

## 2017-01-01 RX ADMIN — AZITHROMYCIN MONOHYDRATE 500 MG: 500 INJECTION, POWDER, LYOPHILIZED, FOR SOLUTION INTRAVENOUS at 13:52

## 2017-01-01 RX ADMIN — METHYLPREDNISOLONE SODIUM SUCCINATE 40 MG: 40 INJECTION, POWDER, FOR SOLUTION INTRAMUSCULAR; INTRAVENOUS at 20:03

## 2017-01-01 RX ADMIN — PIPERACILLIN SODIUM,TAZOBACTAM SODIUM 4.5 G: 4; .5 INJECTION, POWDER, FOR SOLUTION INTRAVENOUS at 22:21

## 2017-01-01 RX ADMIN — LEVOFLOXACIN 750 MG: 5 INJECTION, SOLUTION INTRAVENOUS at 11:10

## 2017-01-01 RX ADMIN — HYDROCODONE BITARTRATE AND ACETAMINOPHEN 1 TABLET: 10; 325 TABLET ORAL at 06:25

## 2017-01-01 RX ADMIN — GABAPENTIN 600 MG: 300 CAPSULE ORAL at 08:05

## 2017-01-01 RX ADMIN — ATORVASTATIN CALCIUM 20 MG: 20 TABLET, FILM COATED ORAL at 21:31

## 2017-01-01 RX ADMIN — HEPARIN SODIUM 5000 UNITS: 5000 INJECTION, SOLUTION INTRAVENOUS; SUBCUTANEOUS at 08:46

## 2017-01-01 RX ADMIN — METHYLPREDNISOLONE SODIUM SUCCINATE 40 MG: 40 INJECTION, POWDER, FOR SOLUTION INTRAMUSCULAR; INTRAVENOUS at 09:48

## 2017-01-01 RX ADMIN — ALBUTEROL SULFATE 2.5 MG: 2.5 SOLUTION RESPIRATORY (INHALATION) at 02:47

## 2017-01-01 RX ADMIN — ENOXAPARIN SODIUM 70 MG: 40 INJECTION SUBCUTANEOUS at 23:40

## 2017-01-01 RX ADMIN — Medication 10 ML: at 03:09

## 2017-01-01 RX ADMIN — IPRATROPIUM BROMIDE AND ALBUTEROL SULFATE 3 ML: .5; 3 SOLUTION RESPIRATORY (INHALATION) at 14:17

## 2017-01-01 RX ADMIN — PIPERACILLIN SODIUM,TAZOBACTAM SODIUM 3.38 G: 3; .375 INJECTION, POWDER, FOR SOLUTION INTRAVENOUS at 07:59

## 2017-01-01 RX ADMIN — IPRATROPIUM BROMIDE AND ALBUTEROL SULFATE 3 ML: .5; 3 SOLUTION RESPIRATORY (INHALATION) at 20:06

## 2017-01-01 RX ADMIN — GUAIFENESIN 400 MG: 100 SOLUTION ORAL at 08:59

## 2017-01-01 RX ADMIN — Medication 2 MG: at 15:00

## 2017-01-01 RX ADMIN — AMITRIPTYLINE HYDROCHLORIDE 10 MG: 10 TABLET, FILM COATED ORAL at 23:26

## 2017-01-01 RX ADMIN — HEPARIN SODIUM 5000 UNITS: 5000 INJECTION, SOLUTION INTRAVENOUS; SUBCUTANEOUS at 07:04

## 2017-01-01 RX ADMIN — PIPERACILLIN SODIUM,TAZOBACTAM SODIUM 4.5 G: 4; .5 INJECTION, POWDER, FOR SOLUTION INTRAVENOUS at 21:35

## 2017-01-01 RX ADMIN — TRAZODONE HYDROCHLORIDE 200 MG: 100 TABLET ORAL at 21:15

## 2017-01-01 RX ADMIN — LORAZEPAM 2 MG: 2 INJECTION INTRAMUSCULAR; INTRAVENOUS at 21:22

## 2017-01-01 RX ADMIN — ACETYLCYSTEINE 400 MG: 200 SOLUTION ORAL; RESPIRATORY (INHALATION) at 07:17

## 2017-01-01 RX ADMIN — METOPROLOL TARTRATE 12.5 MG: 25 TABLET ORAL at 13:35

## 2017-01-01 RX ADMIN — LORAZEPAM 2 MG: 2 INJECTION INTRAMUSCULAR; INTRAVENOUS at 23:17

## 2017-01-01 RX ADMIN — HEPARIN SODIUM 5000 UNITS: 5000 INJECTION, SOLUTION INTRAVENOUS; SUBCUTANEOUS at 21:21

## 2017-01-01 RX ADMIN — Medication 2 MG: at 12:16

## 2017-01-01 RX ADMIN — SODIUM CHLORIDE 2 G: 900 INJECTION, SOLUTION INTRAVENOUS at 10:57

## 2017-01-01 RX ADMIN — TRAZODONE HYDROCHLORIDE 200 MG: 100 TABLET ORAL at 21:46

## 2017-01-01 RX ADMIN — HYDROCODONE BITARTRATE AND ACETAMINOPHEN 1 TABLET: 10; 325 TABLET ORAL at 03:38

## 2017-01-01 RX ADMIN — FLUOXETINE 20 MG: 20 CAPSULE ORAL at 09:27

## 2017-01-01 RX ADMIN — PIPERACILLIN SODIUM,TAZOBACTAM SODIUM 3.38 G: 3; .375 INJECTION, POWDER, FOR SOLUTION INTRAVENOUS at 13:49

## 2017-01-01 RX ADMIN — PREGABALIN 100 MG: 100 CAPSULE ORAL at 15:46

## 2017-01-01 RX ADMIN — LITHIUM CARBONATE 300 MG: 300 CAPSULE, GELATIN COATED ORAL at 18:59

## 2017-01-01 RX ADMIN — FLUOXETINE 20 MG: 20 CAPSULE ORAL at 09:34

## 2017-01-01 RX ADMIN — Medication 10 ML: at 14:17

## 2017-01-01 RX ADMIN — HYDROCODONE BITARTRATE AND ACETAMINOPHEN 1 TABLET: 7.5; 325 TABLET ORAL at 01:52

## 2017-01-01 RX ADMIN — Medication 10 ML: at 22:30

## 2017-01-01 RX ADMIN — FLUOXETINE 20 MG: 20 CAPSULE ORAL at 09:00

## 2017-01-01 RX ADMIN — LORAZEPAM 1 MG: 2 INJECTION, SOLUTION INTRAMUSCULAR; INTRAVENOUS at 05:55

## 2017-01-01 RX ADMIN — OXYCODONE HYDROCHLORIDE 10 MG: 100 SOLUTION ORAL at 23:55

## 2017-01-01 RX ADMIN — OXYCODONE HYDROCHLORIDE 10 MG: 100 SOLUTION ORAL at 18:16

## 2017-01-01 RX ADMIN — GABAPENTIN 600 MG: 300 CAPSULE ORAL at 08:39

## 2017-01-01 RX ADMIN — Medication 10 ML: at 05:12

## 2017-01-01 RX ADMIN — GABAPENTIN 300 MG: 300 CAPSULE ORAL at 21:00

## 2017-01-01 RX ADMIN — GUAIFENESIN 400 MG: 100 SOLUTION ORAL at 21:32

## 2017-01-01 RX ADMIN — HYDROCODONE BITARTRATE AND ACETAMINOPHEN 1 TABLET: 10; 325 TABLET ORAL at 04:25

## 2017-01-01 RX ADMIN — HYDROCODONE BITARTRATE AND ACETAMINOPHEN 1 TABLET: 10; 325 TABLET ORAL at 05:49

## 2017-01-01 RX ADMIN — METRONIDAZOLE 500 MG: 500 INJECTION, SOLUTION INTRAVENOUS at 11:22

## 2017-01-01 RX ADMIN — Medication 10 ML: at 14:35

## 2017-01-01 RX ADMIN — FLUOXETINE 20 MG: 20 CAPSULE ORAL at 08:05

## 2017-01-01 RX ADMIN — OXYCODONE HYDROCHLORIDE 10 MG: 100 SOLUTION ORAL at 17:48

## 2017-01-01 RX ADMIN — Medication 4 MG: at 21:32

## 2017-01-01 RX ADMIN — GABAPENTIN 600 MG: 300 CAPSULE ORAL at 21:38

## 2017-01-01 RX ADMIN — PIPERACILLIN SODIUM,TAZOBACTAM SODIUM 3.38 G: 3; .375 INJECTION, POWDER, FOR SOLUTION INTRAVENOUS at 01:44

## 2017-01-01 RX ADMIN — SODIUM CHLORIDE 100 ML/HR: 900 INJECTION, SOLUTION INTRAVENOUS at 00:32

## 2017-01-01 RX ADMIN — IPRATROPIUM BROMIDE AND ALBUTEROL SULFATE 3 ML: .5; 3 SOLUTION RESPIRATORY (INHALATION) at 12:05

## 2017-01-01 RX ADMIN — LITHIUM CARBONATE 150 MG: 300 TABLET ORAL at 21:38

## 2017-01-01 RX ADMIN — IPRATROPIUM BROMIDE AND ALBUTEROL SULFATE 3 ML: .5; 3 SOLUTION RESPIRATORY (INHALATION) at 07:48

## 2017-01-01 RX ADMIN — Medication 10 ML: at 08:38

## 2017-01-01 RX ADMIN — PIPERACILLIN SODIUM,TAZOBACTAM SODIUM 3.38 G: 3; .375 INJECTION, POWDER, FOR SOLUTION INTRAVENOUS at 13:50

## 2017-01-01 RX ADMIN — AMITRIPTYLINE HYDROCHLORIDE 10 MG: 10 TABLET, FILM COATED ORAL at 21:38

## 2017-01-01 RX ADMIN — UMECLIDINIUM 1 PUFF: 62.5 AEROSOL, POWDER ORAL at 09:50

## 2017-01-01 RX ADMIN — IPRATROPIUM BROMIDE AND ALBUTEROL SULFATE 3 ML: .5; 3 SOLUTION RESPIRATORY (INHALATION) at 19:59

## 2017-01-01 RX ADMIN — HEPARIN SODIUM 5000 UNITS: 5000 INJECTION, SOLUTION INTRAVENOUS; SUBCUTANEOUS at 06:32

## 2017-01-01 RX ADMIN — FLUOXETINE 20 MG: 20 CAPSULE ORAL at 08:51

## 2017-01-01 RX ADMIN — OXYCODONE HYDROCHLORIDE 10 MG: 100 SOLUTION ORAL at 14:03

## 2017-01-01 RX ADMIN — METRONIDAZOLE 500 MG: 500 INJECTION, SOLUTION INTRAVENOUS at 15:06

## 2017-01-01 RX ADMIN — IPRATROPIUM BROMIDE AND ALBUTEROL SULFATE 3 ML: .5; 3 SOLUTION RESPIRATORY (INHALATION) at 13:31

## 2017-01-01 RX ADMIN — CEFAZOLIN SODIUM 1 G: 1 INJECTION, POWDER, FOR SOLUTION INTRAMUSCULAR; INTRAVENOUS at 03:35

## 2017-01-01 RX ADMIN — METHYLPREDNISOLONE SODIUM SUCCINATE 40 MG: 40 INJECTION, POWDER, FOR SOLUTION INTRAMUSCULAR; INTRAVENOUS at 07:13

## 2017-01-01 RX ADMIN — SODIUM CHLORIDE 100 ML/HR: 900 INJECTION, SOLUTION INTRAVENOUS at 10:28

## 2017-01-01 RX ADMIN — FLUOXETINE HYDROCHLORIDE 20 MG: 20 SOLUTION ORAL at 09:59

## 2017-01-01 RX ADMIN — SODIUM CHLORIDE 100 ML/HR: 900 INJECTION, SOLUTION INTRAVENOUS at 02:56

## 2017-01-01 RX ADMIN — IPRATROPIUM BROMIDE AND ALBUTEROL SULFATE 3 ML: .5; 3 SOLUTION RESPIRATORY (INHALATION) at 23:30

## 2017-01-01 RX ADMIN — MORPHINE SULFATE 10 MG: 20 SOLUTION ORAL at 12:13

## 2017-01-01 RX ADMIN — CEPHALEXIN 500 MG: 250 CAPSULE ORAL at 12:54

## 2017-01-01 RX ADMIN — LITHIUM CARBONATE 150 MG: 300 TABLET ORAL at 08:03

## 2017-01-01 RX ADMIN — CEPHALEXIN 500 MG: 250 CAPSULE ORAL at 05:36

## 2017-01-01 RX ADMIN — OXYCODONE HYDROCHLORIDE 10 MG: 100 SOLUTION ORAL at 18:51

## 2017-01-01 RX ADMIN — GABAPENTIN 200 MG: 100 CAPSULE ORAL at 21:22

## 2017-01-01 RX ADMIN — VANCOMYCIN HYDROCHLORIDE 1750 MG: 10 INJECTION, POWDER, LYOPHILIZED, FOR SOLUTION INTRAVENOUS at 12:00

## 2017-01-01 RX ADMIN — ACETAMINOPHEN 500 MG: 500 TABLET, FILM COATED ORAL at 15:16

## 2017-01-01 RX ADMIN — IPRATROPIUM BROMIDE AND ALBUTEROL SULFATE 3 ML: .5; 3 SOLUTION RESPIRATORY (INHALATION) at 19:13

## 2017-01-01 RX ADMIN — HYDROCODONE BITARTRATE AND ACETAMINOPHEN 10 MG: 2.5; 108 SOLUTION ORAL at 04:02

## 2017-01-01 RX ADMIN — Medication 10 ML: at 04:31

## 2017-01-01 RX ADMIN — METHYLPREDNISOLONE SODIUM SUCCINATE 40 MG: 40 INJECTION, POWDER, FOR SOLUTION INTRAMUSCULAR; INTRAVENOUS at 21:37

## 2017-01-01 RX ADMIN — IPRATROPIUM BROMIDE AND ALBUTEROL SULFATE 3 ML: .5; 3 SOLUTION RESPIRATORY (INHALATION) at 14:14

## 2017-01-01 RX ADMIN — SODIUM CHLORIDE 100 ML/HR: 900 INJECTION, SOLUTION INTRAVENOUS at 08:14

## 2017-01-01 RX ADMIN — HYDROCODONE BITARTRATE AND ACETAMINOPHEN 1 TABLET: 7.5; 325 TABLET ORAL at 19:50

## 2017-01-01 RX ADMIN — VANCOMYCIN HYDROCHLORIDE 750 MG: 750 INJECTION, POWDER, LYOPHILIZED, FOR SOLUTION INTRAVENOUS at 21:37

## 2017-01-01 RX ADMIN — Medication 10 ML: at 03:43

## 2017-01-01 RX ADMIN — ALBUTEROL SULFATE 2.5 MG: 2.5 SOLUTION RESPIRATORY (INHALATION) at 20:47

## 2017-01-01 RX ADMIN — METHYLPREDNISOLONE SODIUM SUCCINATE 40 MG: 40 INJECTION, POWDER, FOR SOLUTION INTRAMUSCULAR; INTRAVENOUS at 12:10

## 2017-01-01 RX ADMIN — ACETYLCYSTEINE 600 MG: 200 SOLUTION ORAL; RESPIRATORY (INHALATION) at 23:48

## 2017-01-01 RX ADMIN — Medication 1 LOZENGE: at 23:38

## 2017-01-01 RX ADMIN — LITHIUM CARBONATE 300 MG: 300 TABLET, FILM COATED, EXTENDED RELEASE ORAL at 17:47

## 2017-01-01 RX ADMIN — AMLODIPINE BESYLATE 10 MG: 5 TABLET ORAL at 08:31

## 2017-01-01 RX ADMIN — LORAZEPAM 0.5 MG: 0.5 TABLET ORAL at 18:47

## 2017-01-01 RX ADMIN — HEPARIN SODIUM 5000 UNITS: 5000 INJECTION, SOLUTION INTRAVENOUS; SUBCUTANEOUS at 08:04

## 2017-01-01 RX ADMIN — LITHIUM CARBONATE 300 MG: 300 TABLET, FILM COATED, EXTENDED RELEASE ORAL at 08:57

## 2017-01-01 RX ADMIN — GABAPENTIN 200 MG: 100 CAPSULE ORAL at 15:35

## 2017-01-01 RX ADMIN — Medication 2 MG: at 13:35

## 2017-01-01 RX ADMIN — HYDROCODONE BITARTRATE AND ACETAMINOPHEN 1 TABLET: 10; 325 TABLET ORAL at 11:56

## 2017-01-01 RX ADMIN — LITHIUM CARBONATE 300 MG: 300 CAPSULE ORAL at 09:00

## 2017-01-01 RX ADMIN — SODIUM CHLORIDE 100 ML/HR: 900 INJECTION, SOLUTION INTRAVENOUS at 23:23

## 2017-01-01 RX ADMIN — METHYLPREDNISOLONE SODIUM SUCCINATE 40 MG: 40 INJECTION, POWDER, FOR SOLUTION INTRAMUSCULAR; INTRAVENOUS at 21:47

## 2017-01-01 RX ADMIN — SODIUM CHLORIDE 1000 ML: 900 INJECTION, SOLUTION INTRAVENOUS at 22:20

## 2017-01-01 RX ADMIN — Medication 10 ML: at 00:55

## 2017-01-01 RX ADMIN — METHYLPREDNISOLONE SODIUM SUCCINATE 40 MG: 125 INJECTION, POWDER, FOR SOLUTION INTRAMUSCULAR; INTRAVENOUS at 16:55

## 2017-01-01 RX ADMIN — METRONIDAZOLE 500 MG: 500 INJECTION, SOLUTION INTRAVENOUS at 09:34

## 2017-01-01 RX ADMIN — FLUOXETINE HYDROCHLORIDE 20 MG: 20 SOLUTION ORAL at 12:31

## 2017-01-01 RX ADMIN — Medication 2 MG: at 12:39

## 2017-01-01 RX ADMIN — IPRATROPIUM BROMIDE AND ALBUTEROL SULFATE 3 ML: .5; 3 SOLUTION RESPIRATORY (INHALATION) at 20:29

## 2017-01-01 RX ADMIN — HALOPERIDOL LACTATE 0.5 MG: 5 INJECTION, SOLUTION INTRAMUSCULAR at 07:54

## 2017-01-01 RX ADMIN — METRONIDAZOLE 500 MG: 500 INJECTION, SOLUTION INTRAVENOUS at 00:59

## 2017-01-01 RX ADMIN — METRONIDAZOLE 500 MG: 500 INJECTION, SOLUTION INTRAVENOUS at 02:30

## 2017-01-01 RX ADMIN — ALBUTEROL SULFATE 2.5 MG: 2.5 SOLUTION RESPIRATORY (INHALATION) at 14:17

## 2017-01-01 RX ADMIN — METOPROLOL TARTRATE 25 MG: 25 TABLET ORAL at 17:58

## 2017-01-01 RX ADMIN — IPRATROPIUM BROMIDE AND ALBUTEROL SULFATE 3 ML: .5; 3 SOLUTION RESPIRATORY (INHALATION) at 22:12

## 2017-01-01 RX ADMIN — MORPHINE SULFATE 4 MG: 4 INJECTION, SOLUTION INTRAMUSCULAR; INTRAVENOUS at 20:50

## 2017-01-01 RX ADMIN — FLUOXETINE HYDROCHLORIDE 20 MG: 20 SOLUTION ORAL at 09:24

## 2017-01-01 RX ADMIN — MUPIROCIN: 20 OINTMENT TOPICAL at 10:23

## 2017-01-01 RX ADMIN — VANCOMYCIN HYDROCHLORIDE 750 MG: 750 INJECTION, POWDER, LYOPHILIZED, FOR SOLUTION INTRAVENOUS at 12:05

## 2017-01-01 RX ADMIN — Medication 10 ML: at 22:29

## 2017-01-01 RX ADMIN — ALBUTEROL SULFATE 2.5 MG: 2.5 SOLUTION RESPIRATORY (INHALATION) at 19:18

## 2017-01-01 RX ADMIN — HYDROMORPHONE HYDROCHLORIDE 2 MG: 2 TABLET ORAL at 10:17

## 2017-01-01 RX ADMIN — HYDROCODONE BITARTRATE AND ACETAMINOPHEN 2 TABLET: 10; 325 TABLET ORAL at 01:57

## 2017-01-01 RX ADMIN — METHYLPREDNISOLONE SODIUM SUCCINATE 40 MG: 40 INJECTION, POWDER, FOR SOLUTION INTRAMUSCULAR; INTRAVENOUS at 06:10

## 2017-01-01 RX ADMIN — Medication 8.6 MG: at 08:05

## 2017-01-01 RX ADMIN — Medication 10 ML: at 14:32

## 2017-01-01 RX ADMIN — GABAPENTIN 400 MG: 100 CAPSULE ORAL at 13:50

## 2017-01-01 RX ADMIN — ENOXAPARIN SODIUM 40 MG: 40 INJECTION SUBCUTANEOUS at 21:32

## 2017-01-01 RX ADMIN — ACETYLCYSTEINE 400 MG: 200 SOLUTION ORAL; RESPIRATORY (INHALATION) at 13:29

## 2017-01-01 RX ADMIN — Medication 4 MG: at 21:36

## 2017-01-01 RX ADMIN — GUAIFENESIN 400 MG: 100 SOLUTION ORAL at 18:12

## 2017-01-01 RX ADMIN — HYDROCODONE BITARTRATE AND ACETAMINOPHEN 1 TABLET: 7.5; 325 TABLET ORAL at 19:53

## 2017-01-01 RX ADMIN — MUPIROCIN: 20 OINTMENT TOPICAL at 09:17

## 2017-01-01 RX ADMIN — AMLODIPINE BESYLATE 10 MG: 5 TABLET ORAL at 09:34

## 2017-01-01 RX ADMIN — HYDROMORPHONE HYDROCHLORIDE 1 MG: 1 INJECTION, SOLUTION INTRAMUSCULAR; INTRAVENOUS; SUBCUTANEOUS at 22:13

## 2017-01-01 RX ADMIN — HEPARIN SODIUM 5000 UNITS: 5000 INJECTION, SOLUTION INTRAVENOUS; SUBCUTANEOUS at 14:01

## 2017-01-01 RX ADMIN — HYDROCODONE BITARTRATE AND ACETAMINOPHEN 1 TABLET: 10; 325 TABLET ORAL at 21:16

## 2017-01-01 RX ADMIN — LITHIUM CARBONATE 300 MG: 300 TABLET, FILM COATED, EXTENDED RELEASE ORAL at 17:18

## 2017-01-01 RX ADMIN — HYDROMORPHONE HYDROCHLORIDE 1 MG: 1 INJECTION, SOLUTION INTRAMUSCULAR; INTRAVENOUS; SUBCUTANEOUS at 05:45

## 2017-01-01 RX ADMIN — HYDROCODONE BITARTRATE AND ACETAMINOPHEN 1 TABLET: 10; 325 TABLET ORAL at 14:00

## 2017-01-01 RX ADMIN — Medication 10 ML: at 21:48

## 2017-01-01 RX ADMIN — HYDROCODONE BITARTRATE AND ACETAMINOPHEN 1 TABLET: 10; 325 TABLET ORAL at 21:38

## 2017-01-01 RX ADMIN — HEPARIN SODIUM 5000 UNITS: 5000 INJECTION, SOLUTION INTRAVENOUS; SUBCUTANEOUS at 06:44

## 2017-01-01 RX ADMIN — ACETYLCYSTEINE: 200 SOLUTION ORAL; RESPIRATORY (INHALATION) at 19:18

## 2017-01-01 RX ADMIN — IPRATROPIUM BROMIDE AND ALBUTEROL SULFATE 3 ML: .5; 3 SOLUTION RESPIRATORY (INHALATION) at 20:02

## 2017-01-01 RX ADMIN — TRAZODONE HYDROCHLORIDE 200 MG: 100 TABLET ORAL at 21:52

## 2017-01-01 RX ADMIN — OXYCODONE HYDROCHLORIDE 10 MG: 100 SOLUTION ORAL at 13:59

## 2017-01-01 RX ADMIN — HYDROCODONE BITARTRATE AND ACETAMINOPHEN 2 TABLET: 10; 325 TABLET ORAL at 04:13

## 2017-01-01 RX ADMIN — Medication 10 ML: at 21:41

## 2017-01-01 RX ADMIN — LORAZEPAM 0.5 MG: 0.5 TABLET ORAL at 17:57

## 2017-01-01 RX ADMIN — LORAZEPAM 0.5 MG: 0.5 TABLET ORAL at 08:55

## 2017-01-01 RX ADMIN — IPRATROPIUM BROMIDE AND ALBUTEROL SULFATE 3 ML: .5; 3 SOLUTION RESPIRATORY (INHALATION) at 23:28

## 2017-01-01 RX ADMIN — ACETYLCYSTEINE 400 MG: 200 SOLUTION ORAL; RESPIRATORY (INHALATION) at 14:28

## 2017-01-01 RX ADMIN — AMITRIPTYLINE HYDROCHLORIDE 10 MG: 10 TABLET, FILM COATED ORAL at 21:25

## 2017-01-01 RX ADMIN — Medication 8.6 MG: at 08:57

## 2017-01-01 RX ADMIN — HYDROCODONE BITARTRATE AND ACETAMINOPHEN 1 TABLET: 10; 325 TABLET ORAL at 19:27

## 2017-01-01 RX ADMIN — Medication 5 ML: at 22:00

## 2017-01-01 RX ADMIN — LEVOFLOXACIN 750 MG: 5 INJECTION, SOLUTION INTRAVENOUS at 11:44

## 2017-01-01 RX ADMIN — GUAIFENESIN 400 MG: 100 SOLUTION ORAL at 17:59

## 2017-01-01 RX ADMIN — METOPROLOL TARTRATE 25 MG: 25 TABLET, FILM COATED ORAL at 21:22

## 2017-01-01 RX ADMIN — IPRATROPIUM BROMIDE AND ALBUTEROL SULFATE 3 ML: .5; 3 SOLUTION RESPIRATORY (INHALATION) at 20:14

## 2017-01-01 RX ADMIN — METRONIDAZOLE 500 MG: 500 INJECTION, SOLUTION INTRAVENOUS at 18:52

## 2017-01-01 RX ADMIN — METHYLPREDNISOLONE SODIUM SUCCINATE 60 MG: 125 INJECTION, POWDER, FOR SOLUTION INTRAMUSCULAR; INTRAVENOUS at 08:03

## 2017-01-01 RX ADMIN — HEPARIN SODIUM 5000 UNITS: 5000 INJECTION, SOLUTION INTRAVENOUS; SUBCUTANEOUS at 16:25

## 2017-01-01 RX ADMIN — METOPROLOL TARTRATE 25 MG: 25 TABLET, FILM COATED ORAL at 21:16

## 2017-01-01 RX ADMIN — SODIUM CHLORIDE 250 ML: 900 INJECTION, SOLUTION INTRAVENOUS at 15:11

## 2017-01-01 RX ADMIN — METHYLPREDNISOLONE SODIUM SUCCINATE 125 MG: 125 INJECTION, POWDER, FOR SOLUTION INTRAMUSCULAR; INTRAVENOUS at 07:00

## 2017-01-01 RX ADMIN — HYDROCODONE BITARTRATE AND ACETAMINOPHEN 1 TABLET: 10; 325 TABLET ORAL at 14:44

## 2017-01-01 RX ADMIN — ACETYLCYSTEINE 400 MG: 200 SOLUTION ORAL; RESPIRATORY (INHALATION) at 07:48

## 2017-01-01 RX ADMIN — MUPIROCIN: 20 OINTMENT TOPICAL at 10:58

## 2017-01-01 RX ADMIN — HYDROCODONE BITARTRATE AND ACETAMINOPHEN 10 MG: 2.5; 108 SOLUTION ORAL at 00:09

## 2017-01-01 RX ADMIN — SODIUM CHLORIDE 2028 ML: 900 INJECTION, SOLUTION INTRAVENOUS at 09:02

## 2017-01-01 RX ADMIN — LITHIUM CARBONATE 150 MG: 300 TABLET ORAL at 10:17

## 2017-01-01 RX ADMIN — PIPERACILLIN SODIUM,TAZOBACTAM SODIUM 4.5 G: 4; .5 INJECTION, POWDER, FOR SOLUTION INTRAVENOUS at 21:45

## 2017-01-01 RX ADMIN — LORAZEPAM 0.5 MG: 2 INJECTION INTRAMUSCULAR; INTRAVENOUS at 13:11

## 2017-01-01 RX ADMIN — HYDROCODONE BITARTRATE AND ACETAMINOPHEN 2 TABLET: 10; 325 TABLET ORAL at 23:32

## 2017-01-01 RX ADMIN — AMLODIPINE BESYLATE 10 MG: 5 TABLET ORAL at 08:44

## 2017-01-01 RX ADMIN — METOPROLOL TARTRATE 12.5 MG: 25 TABLET ORAL at 21:36

## 2017-01-01 RX ADMIN — OXYCODONE HYDROCHLORIDE 10 MG: 100 SOLUTION ORAL at 17:08

## 2017-01-01 RX ADMIN — IPRATROPIUM BROMIDE AND ALBUTEROL SULFATE 3 ML: .5; 3 SOLUTION RESPIRATORY (INHALATION) at 07:50

## 2017-01-01 RX ADMIN — ACETAMINOPHEN 500 MG: 500 TABLET, FILM COATED ORAL at 18:31

## 2017-01-01 RX ADMIN — HYDROMORPHONE HYDROCHLORIDE 1 MG: 1 INJECTION, SOLUTION INTRAMUSCULAR; INTRAVENOUS; SUBCUTANEOUS at 12:16

## 2017-01-01 RX ADMIN — OXYCODONE HYDROCHLORIDE 10 MG: 100 SOLUTION ORAL at 14:26

## 2017-01-01 RX ADMIN — CEFAZOLIN SODIUM 1 G: 1 INJECTION, POWDER, FOR SOLUTION INTRAMUSCULAR; INTRAVENOUS at 20:38

## 2017-01-01 RX ADMIN — Medication 10 ML: at 17:41

## 2017-01-01 RX ADMIN — GUAIFENESIN 1200 MG: 600 TABLET, EXTENDED RELEASE ORAL at 22:04

## 2017-01-01 RX ADMIN — HYDROCODONE BITARTRATE AND ACETAMINOPHEN 1 TABLET: 10; 325 TABLET ORAL at 11:22

## 2017-01-01 RX ADMIN — IPRATROPIUM BROMIDE AND ALBUTEROL SULFATE 3 ML: .5; 3 SOLUTION RESPIRATORY (INHALATION) at 11:21

## 2017-01-01 RX ADMIN — ENOXAPARIN SODIUM 40 MG: 40 INJECTION SUBCUTANEOUS at 18:27

## 2017-01-01 RX ADMIN — IPRATROPIUM BROMIDE AND ALBUTEROL SULFATE 3 ML: .5; 3 SOLUTION RESPIRATORY (INHALATION) at 14:28

## 2017-01-01 RX ADMIN — IPRATROPIUM BROMIDE AND ALBUTEROL SULFATE 3 ML: .5; 3 SOLUTION RESPIRATORY (INHALATION) at 02:28

## 2017-01-01 RX ADMIN — VANCOMYCIN HYDROCHLORIDE 1000 MG: 1 INJECTION, POWDER, LYOPHILIZED, FOR SOLUTION INTRAVENOUS at 02:43

## 2017-01-01 RX ADMIN — TRAZODONE HYDROCHLORIDE 200 MG: 100 TABLET ORAL at 22:25

## 2017-01-01 RX ADMIN — METRONIDAZOLE 500 MG: 500 INJECTION, SOLUTION INTRAVENOUS at 22:41

## 2017-01-01 RX ADMIN — HYDROMORPHONE HYDROCHLORIDE 2 MG: 2 TABLET ORAL at 14:46

## 2017-01-01 RX ADMIN — FLUOXETINE HYDROCHLORIDE 20 MG: 20 SOLUTION ORAL at 09:05

## 2017-01-01 RX ADMIN — Medication 10 ML: at 14:51

## 2017-01-01 RX ADMIN — Medication 10 ML: at 13:40

## 2017-01-01 RX ADMIN — METOPROLOL TARTRATE 25 MG: 25 TABLET, FILM COATED ORAL at 08:44

## 2017-01-01 RX ADMIN — SODIUM CHLORIDE 50 ML/HR: 900 INJECTION, SOLUTION INTRAVENOUS at 09:01

## 2017-01-01 RX ADMIN — TRAZODONE HYDROCHLORIDE 200 MG: 100 TABLET ORAL at 21:19

## 2017-01-01 RX ADMIN — HYDROCODONE BITARTRATE AND ACETAMINOPHEN 1 TABLET: 7.5; 325 TABLET ORAL at 08:07

## 2017-01-01 RX ADMIN — TRAZODONE HYDROCHLORIDE 200 MG: 100 TABLET ORAL at 23:26

## 2017-01-01 RX ADMIN — TRAZODONE HYDROCHLORIDE 200 MG: 100 TABLET ORAL at 21:41

## 2017-01-01 RX ADMIN — METRONIDAZOLE 500 MG: 500 INJECTION, SOLUTION INTRAVENOUS at 22:06

## 2017-01-01 RX ADMIN — METOPROLOL TARTRATE 12.5 MG: 25 TABLET ORAL at 11:13

## 2017-01-01 RX ADMIN — GLYCOPYRROLATE 0.2 MG: 0.2 INJECTION INTRAMUSCULAR; INTRAVENOUS at 07:55

## 2017-01-01 RX ADMIN — GUAIFENESIN 400 MG: 100 SOLUTION ORAL at 13:11

## 2017-01-01 RX ADMIN — VANCOMYCIN HYDROCHLORIDE 1500 MG: 10 INJECTION, POWDER, LYOPHILIZED, FOR SOLUTION INTRAVENOUS at 23:47

## 2017-01-01 RX ADMIN — LORAZEPAM 0.5 MG: 0.5 TABLET ORAL at 08:47

## 2017-01-01 RX ADMIN — IPRATROPIUM BROMIDE AND ALBUTEROL SULFATE 3 ML: .5; 3 SOLUTION RESPIRATORY (INHALATION) at 20:41

## 2017-01-01 RX ADMIN — CEFEPIME 2 G: 2 INJECTION, POWDER, FOR SOLUTION INTRAMUSCULAR; INTRAVENOUS at 22:11

## 2017-01-01 RX ADMIN — GABAPENTIN 600 MG: 300 CAPSULE ORAL at 09:34

## 2017-01-01 RX ADMIN — GABAPENTIN 300 MG: 300 CAPSULE ORAL at 09:14

## 2017-01-01 RX ADMIN — MORPHINE SULFATE 4 MG: 4 INJECTION, SOLUTION INTRAMUSCULAR; INTRAVENOUS at 12:51

## 2017-01-01 RX ADMIN — VANCOMYCIN HYDROCHLORIDE 750 MG: 750 INJECTION, POWDER, LYOPHILIZED, FOR SOLUTION INTRAVENOUS at 12:13

## 2017-01-01 RX ADMIN — Medication 1 LOZENGE: at 07:54

## 2017-01-01 RX ADMIN — MUPIROCIN: 20 OINTMENT TOPICAL at 21:00

## 2017-01-01 RX ADMIN — MUPIROCIN: 20 OINTMENT TOPICAL at 21:25

## 2017-01-01 RX ADMIN — GUAIFENESIN 1200 MG: 600 TABLET, EXTENDED RELEASE ORAL at 08:51

## 2017-01-01 RX ADMIN — METOPROLOL TARTRATE 12.5 MG: 25 TABLET ORAL at 20:15

## 2017-01-01 RX ADMIN — LORAZEPAM 1 MG: 2 INJECTION, SOLUTION INTRAMUSCULAR; INTRAVENOUS at 20:49

## 2017-01-01 RX ADMIN — IPRATROPIUM BROMIDE AND ALBUTEROL SULFATE 3 ML: .5; 3 SOLUTION RESPIRATORY (INHALATION) at 15:08

## 2017-01-01 RX ADMIN — POLYETHYLENE GLYCOL 3350 17 G: 17 POWDER, FOR SOLUTION ORAL at 08:49

## 2017-01-01 RX ADMIN — PREGABALIN 100 MG: 100 CAPSULE ORAL at 14:50

## 2017-01-01 RX ADMIN — TRAZODONE HYDROCHLORIDE 200 MG: 100 TABLET ORAL at 21:54

## 2017-01-01 RX ADMIN — Medication 2 MG: at 01:57

## 2017-01-01 RX ADMIN — OXYCODONE HYDROCHLORIDE 10 MG: 100 SOLUTION ORAL at 07:57

## 2017-01-01 RX ADMIN — AMITRIPTYLINE HYDROCHLORIDE 25 MG: 50 TABLET, FILM COATED ORAL at 22:25

## 2017-01-01 RX ADMIN — IPRATROPIUM BROMIDE AND ALBUTEROL SULFATE 3 ML: .5; 3 SOLUTION RESPIRATORY (INHALATION) at 19:19

## 2017-01-01 RX ADMIN — LEVOFLOXACIN 750 MG: 5 INJECTION, SOLUTION INTRAVENOUS at 10:24

## 2017-01-01 RX ADMIN — LITHIUM CARBONATE 450 MG: 450 TABLET, EXTENDED RELEASE ORAL at 21:48

## 2017-01-01 RX ADMIN — METRONIDAZOLE 500 MG: 500 INJECTION, SOLUTION INTRAVENOUS at 04:00

## 2017-01-01 RX ADMIN — PIPERACILLIN SODIUM,TAZOBACTAM SODIUM 3.38 G: 3; .375 INJECTION, POWDER, FOR SOLUTION INTRAVENOUS at 18:36

## 2017-01-01 RX ADMIN — ENOXAPARIN SODIUM 40 MG: 40 INJECTION SUBCUTANEOUS at 10:48

## 2017-01-01 RX ADMIN — CEPHALEXIN 500 MG: 250 CAPSULE ORAL at 17:47

## 2017-01-01 RX ADMIN — AMLODIPINE BESYLATE 5 MG: 5 TABLET ORAL at 10:11

## 2017-01-01 RX ADMIN — VANCOMYCIN HYDROCHLORIDE 750 MG: 750 INJECTION, POWDER, LYOPHILIZED, FOR SOLUTION INTRAVENOUS at 04:21

## 2017-01-01 RX ADMIN — METHYLPREDNISOLONE SODIUM SUCCINATE 60 MG: 125 INJECTION, POWDER, FOR SOLUTION INTRAMUSCULAR; INTRAVENOUS at 17:00

## 2017-01-01 RX ADMIN — LITHIUM CARBONATE 300 MG: 300 TABLET, FILM COATED, EXTENDED RELEASE ORAL at 18:19

## 2017-01-01 RX ADMIN — MORPHINE SULFATE 4 MG: 4 INJECTION, SOLUTION INTRAMUSCULAR; INTRAVENOUS at 22:08

## 2017-01-01 RX ADMIN — HYDROCODONE BITARTRATE AND ACETAMINOPHEN 1 TABLET: 7.5; 325 TABLET ORAL at 05:48

## 2017-01-01 RX ADMIN — ATORVASTATIN CALCIUM 20 MG: 20 TABLET, FILM COATED ORAL at 21:19

## 2017-01-01 RX ADMIN — METOPROLOL TARTRATE 12.5 MG: 25 TABLET ORAL at 09:48

## 2017-01-01 RX ADMIN — HYDROCODONE BITARTRATE AND ACETAMINOPHEN 1 TABLET: 7.5; 325 TABLET ORAL at 14:13

## 2017-01-01 RX ADMIN — HYDROCODONE BITARTRATE AND ACETAMINOPHEN 1 TABLET: 10; 325 TABLET ORAL at 16:58

## 2017-01-01 RX ADMIN — Medication 4 MG: at 17:06

## 2017-01-01 RX ADMIN — METOPROLOL TARTRATE 12.5 MG: 25 TABLET ORAL at 09:32

## 2017-01-01 RX ADMIN — METOPROLOL TARTRATE 12.5 MG: 25 TABLET ORAL at 14:13

## 2017-01-01 RX ADMIN — AMOXICILLIN AND CLAVULANATE POTASSIUM 500 MG: 250; 62.5 POWDER, FOR SUSPENSION ORAL at 05:59

## 2017-01-01 RX ADMIN — CEFEPIME HYDROCHLORIDE 2 G: 2 INJECTION, POWDER, FOR SOLUTION INTRAVENOUS at 10:28

## 2017-01-01 RX ADMIN — GABAPENTIN 100 MG: 100 CAPSULE ORAL at 21:20

## 2017-01-01 RX ADMIN — HYDROCODONE BITARTRATE AND ACETAMINOPHEN 1 TABLET: 7.5; 325 TABLET ORAL at 17:18

## 2017-01-01 RX ADMIN — GABAPENTIN 200 MG: 100 CAPSULE ORAL at 21:31

## 2017-01-01 RX ADMIN — GUAIFENESIN 1200 MG: 600 TABLET, EXTENDED RELEASE ORAL at 22:25

## 2017-01-01 RX ADMIN — IPRATROPIUM BROMIDE AND ALBUTEROL SULFATE 3 ML: .5; 3 SOLUTION RESPIRATORY (INHALATION) at 16:28

## 2017-01-01 RX ADMIN — OXYCODONE HYDROCHLORIDE 10 MG: 100 SOLUTION ORAL at 06:02

## 2017-01-01 RX ADMIN — LEVOFLOXACIN 750 MG: 5 INJECTION, SOLUTION INTRAVENOUS at 09:06

## 2017-01-01 RX ADMIN — METOPROLOL TARTRATE 25 MG: 25 TABLET, FILM COATED ORAL at 08:51

## 2017-01-01 RX ADMIN — CEFAZOLIN 2 G: 10 INJECTION, POWDER, FOR SOLUTION INTRAVENOUS; PARENTERAL at 19:28

## 2017-01-01 RX ADMIN — METOPROLOL TARTRATE 12.5 MG: 25 TABLET ORAL at 09:15

## 2017-01-01 RX ADMIN — METOPROLOL TARTRATE 25 MG: 25 TABLET, FILM COATED ORAL at 08:57

## 2017-01-01 RX ADMIN — LITHIUM CARBONATE 300 MG: 300 CAPSULE, GELATIN COATED ORAL at 08:57

## 2017-01-01 RX ADMIN — ACETYLCYSTEINE 400 MG: 200 SOLUTION ORAL; RESPIRATORY (INHALATION) at 13:46

## 2017-01-01 RX ADMIN — Medication 5 ML: at 18:53

## 2017-01-01 RX ADMIN — LITHIUM CARBONATE 450 MG: 450 TABLET, EXTENDED RELEASE ORAL at 09:55

## 2017-01-01 RX ADMIN — Medication 10 ML: at 04:46

## 2017-01-01 RX ADMIN — OXYCODONE HYDROCHLORIDE 10 MG: 100 SOLUTION ORAL at 01:25

## 2017-01-01 RX ADMIN — IPRATROPIUM BROMIDE AND ALBUTEROL SULFATE 3 ML: .5; 3 SOLUTION RESPIRATORY (INHALATION) at 19:31

## 2017-01-01 RX ADMIN — Medication 1 SPRAY: at 09:54

## 2017-01-01 RX ADMIN — AMLODIPINE BESYLATE 5 MG: 5 TABLET ORAL at 19:13

## 2017-01-01 RX ADMIN — METRONIDAZOLE 500 MG: 500 INJECTION, SOLUTION INTRAVENOUS at 17:02

## 2017-01-01 RX ADMIN — HYDROMORPHONE HYDROCHLORIDE 4 MG: 2 TABLET ORAL at 22:29

## 2017-01-01 RX ADMIN — GUAIFENESIN 400 MG: 100 SOLUTION ORAL at 21:20

## 2017-01-01 RX ADMIN — CEFAZOLIN SODIUM 1 G: 1 INJECTION, POWDER, FOR SOLUTION INTRAMUSCULAR; INTRAVENOUS at 03:19

## 2017-01-01 RX ADMIN — OXYCODONE HYDROCHLORIDE 10 MG: 100 SOLUTION ORAL at 21:44

## 2017-01-01 RX ADMIN — MORPHINE SULFATE 4 MG: 4 INJECTION, SOLUTION INTRAMUSCULAR; INTRAVENOUS at 08:59

## 2017-01-01 RX ADMIN — METOPROLOL TARTRATE 25 MG: 25 TABLET, FILM COATED ORAL at 08:00

## 2017-01-01 RX ADMIN — IPRATROPIUM BROMIDE AND ALBUTEROL SULFATE 3 ML: .5; 2.5 SOLUTION RESPIRATORY (INHALATION) at 17:22

## 2017-01-01 RX ADMIN — METRONIDAZOLE 500 MG: 500 INJECTION, SOLUTION INTRAVENOUS at 03:35

## 2017-01-01 RX ADMIN — ACETAMINOPHEN 650 MG: 650 SUPPOSITORY RECTAL at 21:12

## 2017-01-01 RX ADMIN — OXYCODONE HYDROCHLORIDE 10 MG: 100 SOLUTION ORAL at 02:51

## 2017-01-01 RX ADMIN — ENOXAPARIN SODIUM 30 MG: 30 INJECTION SUBCUTANEOUS at 09:16

## 2017-01-01 RX ADMIN — PIPERACILLIN SODIUM,TAZOBACTAM SODIUM 4.5 G: 4; .5 INJECTION, POWDER, FOR SOLUTION INTRAVENOUS at 16:29

## 2017-01-01 RX ADMIN — CEFEPIME HYDROCHLORIDE 2 G: 2 INJECTION, POWDER, FOR SOLUTION INTRAVENOUS at 05:16

## 2017-01-01 RX ADMIN — OXYCODONE HYDROCHLORIDE 10 MG: 100 SOLUTION ORAL at 05:13

## 2017-01-01 RX ADMIN — CEFAZOLIN SODIUM 1 G: 1 INJECTION, POWDER, FOR SOLUTION INTRAMUSCULAR; INTRAVENOUS at 03:23

## 2017-01-01 RX ADMIN — METHYLPREDNISOLONE SODIUM SUCCINATE 20 MG: 40 INJECTION, POWDER, FOR SOLUTION INTRAMUSCULAR; INTRAVENOUS at 09:33

## 2017-01-01 RX ADMIN — Medication 10 ML: at 06:29

## 2017-01-01 RX ADMIN — ALBUTEROL SULFATE 2.5 MG: 2.5 SOLUTION RESPIRATORY (INHALATION) at 07:47

## 2017-01-01 RX ADMIN — GABAPENTIN 600 MG: 300 CAPSULE ORAL at 08:22

## 2017-01-01 RX ADMIN — GABAPENTIN 300 MG: 300 CAPSULE ORAL at 08:01

## 2017-01-01 RX ADMIN — LORAZEPAM 0.5 MG: 0.5 TABLET ORAL at 09:04

## 2017-01-01 RX ADMIN — Medication 4 MG: at 09:41

## 2017-01-01 RX ADMIN — Medication 2 MG: at 04:30

## 2017-01-01 RX ADMIN — OXYCODONE HYDROCHLORIDE 10 MG: 100 SOLUTION ORAL at 05:31

## 2017-01-01 RX ADMIN — OXYCODONE HYDROCHLORIDE 10 MG: 100 SOLUTION ORAL at 10:28

## 2017-01-01 RX ADMIN — MUPIROCIN: 20 OINTMENT TOPICAL at 08:39

## 2017-01-01 RX ADMIN — IPRATROPIUM BROMIDE AND ALBUTEROL SULFATE 3 ML: .5; 3 SOLUTION RESPIRATORY (INHALATION) at 11:23

## 2017-01-01 RX ADMIN — OXYCODONE HYDROCHLORIDE 10 MG: 100 SOLUTION ORAL at 11:11

## 2017-01-01 RX ADMIN — CEFEPIME HYDROCHLORIDE 2 G: 2 INJECTION, POWDER, FOR SOLUTION INTRAVENOUS at 20:13

## 2017-01-01 RX ADMIN — METOPROLOL TARTRATE 12.5 MG: 25 TABLET ORAL at 09:35

## 2017-01-01 RX ADMIN — METRONIDAZOLE 500 MG: 500 INJECTION, SOLUTION INTRAVENOUS at 20:37

## 2017-01-01 RX ADMIN — METOPROLOL TARTRATE 25 MG: 25 TABLET, FILM COATED ORAL at 22:04

## 2017-01-01 RX ADMIN — LITHIUM CARBONATE 300 MG: 300 TABLET, FILM COATED, EXTENDED RELEASE ORAL at 17:57

## 2017-01-01 RX ADMIN — HYDROCODONE BITARTRATE AND ACETAMINOPHEN 1 TABLET: 10; 325 TABLET ORAL at 22:09

## 2017-01-01 RX ADMIN — FLUOXETINE 20 MG: 20 CAPSULE ORAL at 09:37

## 2017-01-01 RX ADMIN — CEFAZOLIN 2 G: 10 INJECTION, POWDER, FOR SOLUTION INTRAVENOUS; PARENTERAL at 12:32

## 2017-01-01 RX ADMIN — PREGABALIN 100 MG: 100 CAPSULE ORAL at 21:57

## 2017-01-01 RX ADMIN — METOPROLOL TARTRATE 25 MG: 25 TABLET, FILM COATED ORAL at 21:19

## 2017-01-01 RX ADMIN — Medication 10 ML: at 15:01

## 2017-01-01 RX ADMIN — HYDROMORPHONE HYDROCHLORIDE 1 MG: 1 INJECTION, SOLUTION INTRAMUSCULAR; INTRAVENOUS; SUBCUTANEOUS at 14:17

## 2017-01-01 RX ADMIN — ENOXAPARIN SODIUM 40 MG: 40 INJECTION SUBCUTANEOUS at 19:34

## 2017-01-01 RX ADMIN — CEFEPIME HYDROCHLORIDE 2 G: 2 INJECTION, POWDER, FOR SOLUTION INTRAVENOUS at 20:01

## 2017-01-01 RX ADMIN — IPRATROPIUM BROMIDE AND ALBUTEROL SULFATE 3 ML: .5; 3 SOLUTION RESPIRATORY (INHALATION) at 12:11

## 2017-01-01 RX ADMIN — OXYCODONE HYDROCHLORIDE 10 MG: 100 SOLUTION ORAL at 06:53

## 2017-01-01 RX ADMIN — HYDROCODONE BITARTRATE AND ACETAMINOPHEN 2 TABLET: 10; 325 TABLET ORAL at 17:32

## 2017-01-01 RX ADMIN — ALBUTEROL SULFATE 2.5 MG: 2.5 SOLUTION RESPIRATORY (INHALATION) at 20:29

## 2017-01-01 RX ADMIN — TRAZODONE HYDROCHLORIDE 200 MG: 100 TABLET ORAL at 21:35

## 2017-01-01 RX ADMIN — HYDROCODONE BITARTRATE AND ACETAMINOPHEN 2 TABLET: 10; 325 TABLET ORAL at 03:12

## 2017-01-01 RX ADMIN — ALBUTEROL SULFATE 2.5 MG: 2.5 SOLUTION RESPIRATORY (INHALATION) at 01:10

## 2017-01-01 RX ADMIN — METHYLPREDNISOLONE SODIUM SUCCINATE 40 MG: 40 INJECTION, POWDER, FOR SOLUTION INTRAMUSCULAR; INTRAVENOUS at 00:02

## 2017-01-01 RX ADMIN — METRONIDAZOLE 500 MG: 500 INJECTION, SOLUTION INTRAVENOUS at 09:19

## 2017-01-01 RX ADMIN — SODIUM CHLORIDE 1000 ML: 900 INJECTION, SOLUTION INTRAVENOUS at 10:17

## 2017-01-01 RX ADMIN — OXYCODONE HYDROCHLORIDE 10 MG: 100 SOLUTION ORAL at 05:02

## 2017-01-01 RX ADMIN — ALBUTEROL SULFATE 5 MG: 2.5 SOLUTION RESPIRATORY (INHALATION) at 00:39

## 2017-01-01 RX ADMIN — METOPROLOL TARTRATE 12.5 MG: 25 TABLET ORAL at 20:35

## 2017-01-01 RX ADMIN — GLYCOPYRROLATE 0.2 MG: 0.2 INJECTION INTRAMUSCULAR; INTRAVENOUS at 19:58

## 2017-01-01 RX ADMIN — Medication 10 ML: at 22:04

## 2017-01-01 RX ADMIN — LORAZEPAM 1 MG: 2 INJECTION INTRAMUSCULAR; INTRAVENOUS at 01:25

## 2017-01-01 RX ADMIN — OXYCODONE HYDROCHLORIDE 10 MG: 10 TABLET, FILM COATED, EXTENDED RELEASE ORAL at 13:50

## 2017-01-01 RX ADMIN — ALBUTEROL SULFATE 2.5 MG: 2.5 SOLUTION RESPIRATORY (INHALATION) at 21:52

## 2017-01-01 RX ADMIN — Medication 10 ML: at 05:16

## 2017-01-01 RX ADMIN — CEFEPIME HYDROCHLORIDE 2 G: 2 INJECTION, POWDER, FOR SOLUTION INTRAVENOUS at 21:42

## 2017-01-01 RX ADMIN — ACETYLCYSTEINE 400 MG: 200 SOLUTION ORAL; RESPIRATORY (INHALATION) at 15:09

## 2017-01-01 RX ADMIN — HYDROMORPHONE HYDROCHLORIDE 1 MG: 1 INJECTION, SOLUTION INTRAMUSCULAR; INTRAVENOUS; SUBCUTANEOUS at 01:23

## 2017-01-01 RX ADMIN — Medication 10 ML: at 21:39

## 2017-01-01 RX ADMIN — SODIUM CHLORIDE 50 ML/HR: 900 INJECTION, SOLUTION INTRAVENOUS at 13:27

## 2017-01-01 RX ADMIN — SODIUM CHLORIDE 500 ML: 900 INJECTION, SOLUTION INTRAVENOUS at 23:32

## 2017-01-01 RX ADMIN — Medication 4 MG: at 18:55

## 2017-01-01 RX ADMIN — HYDROCODONE BITARTRATE AND ACETAMINOPHEN 1 TABLET: 10; 325 TABLET ORAL at 18:48

## 2017-01-01 RX ADMIN — ASPIRIN 81 MG: 81 TABLET, COATED ORAL at 08:01

## 2017-01-01 RX ADMIN — ATORVASTATIN CALCIUM 20 MG: 20 TABLET, FILM COATED ORAL at 21:22

## 2017-01-01 RX ADMIN — HYDROCODONE BITARTRATE AND ACETAMINOPHEN 1 TABLET: 10; 325 TABLET ORAL at 16:37

## 2017-01-01 RX ADMIN — AMLODIPINE BESYLATE 10 MG: 5 TABLET ORAL at 08:06

## 2017-01-01 RX ADMIN — GABAPENTIN 600 MG: 300 CAPSULE ORAL at 17:40

## 2017-01-01 RX ADMIN — IPRATROPIUM BROMIDE AND ALBUTEROL SULFATE 3 ML: .5; 3 SOLUTION RESPIRATORY (INHALATION) at 00:59

## 2017-01-01 RX ADMIN — SODIUM CHLORIDE 75 ML/HR: 450 INJECTION, SOLUTION INTRAVENOUS at 04:30

## 2017-01-01 RX ADMIN — PIPERACILLIN SODIUM,TAZOBACTAM SODIUM 3.38 G: 3; .375 INJECTION, POWDER, FOR SOLUTION INTRAVENOUS at 14:18

## 2017-01-01 RX ADMIN — METRONIDAZOLE 500 MG: 500 INJECTION, SOLUTION INTRAVENOUS at 04:46

## 2017-01-01 RX ADMIN — HEPARIN SODIUM 5000 UNITS: 5000 INJECTION, SOLUTION INTRAVENOUS; SUBCUTANEOUS at 05:43

## 2017-01-01 RX ADMIN — HYDROMORPHONE HYDROCHLORIDE 2 MG: 2 TABLET ORAL at 19:16

## 2017-01-01 RX ADMIN — METHYLPREDNISOLONE SODIUM SUCCINATE 40 MG: 40 INJECTION, POWDER, FOR SOLUTION INTRAMUSCULAR; INTRAVENOUS at 09:42

## 2017-01-01 RX ADMIN — LITHIUM CARBONATE 300 MG: 300 CAPSULE ORAL at 09:35

## 2017-01-01 RX ADMIN — LITHIUM CARBONATE 450 MG: 450 TABLET, EXTENDED RELEASE ORAL at 00:50

## 2017-01-01 RX ADMIN — Medication 10 ML: at 03:24

## 2017-01-01 RX ADMIN — Medication 5 ML: at 16:12

## 2017-01-01 RX ADMIN — LITHIUM CARBONATE 150 MG: 300 TABLET ORAL at 16:16

## 2017-01-01 RX ADMIN — HYDROMORPHONE HYDROCHLORIDE 1 MG: 1 INJECTION, SOLUTION INTRAMUSCULAR; INTRAVENOUS; SUBCUTANEOUS at 14:09

## 2017-01-01 RX ADMIN — HYDROCODONE BITARTRATE AND ACETAMINOPHEN 2 TABLET: 10; 325 TABLET ORAL at 18:52

## 2017-01-01 RX ADMIN — PIPERACILLIN SODIUM,TAZOBACTAM SODIUM 3.38 G: 3; .375 INJECTION, POWDER, FOR SOLUTION INTRAVENOUS at 08:01

## 2017-01-01 RX ADMIN — HYDROCODONE BITARTRATE AND ACETAMINOPHEN 1 TABLET: 10; 325 TABLET ORAL at 13:22

## 2017-01-01 RX ADMIN — METOPROLOL TARTRATE 25 MG: 25 TABLET ORAL at 10:10

## 2017-01-01 RX ADMIN — METOPROLOL TARTRATE 12.5 MG: 25 TABLET ORAL at 21:33

## 2017-01-01 RX ADMIN — METOPROLOL TARTRATE 12.5 MG: 25 TABLET ORAL at 10:28

## 2017-01-01 RX ADMIN — CEFAZOLIN 2 G: 10 INJECTION, POWDER, FOR SOLUTION INTRAVENOUS; PARENTERAL at 03:38

## 2017-01-01 RX ADMIN — PIPERACILLIN SODIUM,TAZOBACTAM SODIUM 3.38 G: 3; .375 INJECTION, POWDER, FOR SOLUTION INTRAVENOUS at 08:16

## 2017-01-01 RX ADMIN — CEFAZOLIN SODIUM 1 G: 1 INJECTION, POWDER, FOR SOLUTION INTRAMUSCULAR; INTRAVENOUS at 15:44

## 2017-01-01 RX ADMIN — GABAPENTIN 600 MG: 300 CAPSULE ORAL at 08:51

## 2017-01-01 RX ADMIN — HYDROMORPHONE HYDROCHLORIDE 4 MG: 2 TABLET ORAL at 14:20

## 2017-01-01 RX ADMIN — HYDROCODONE BITARTRATE AND ACETAMINOPHEN 1 TABLET: 10; 325 TABLET ORAL at 14:01

## 2017-01-01 RX ADMIN — HYDROCODONE BITARTRATE AND ACETAMINOPHEN 2 TABLET: 10; 325 TABLET ORAL at 11:13

## 2017-01-01 RX ADMIN — Medication 10 ML: at 14:20

## 2017-01-01 RX ADMIN — HYDROCODONE BITARTRATE AND ACETAMINOPHEN 2 TABLET: 10; 325 TABLET ORAL at 08:23

## 2017-01-01 RX ADMIN — HYDROCODONE BITARTRATE AND ACETAMINOPHEN 1 TABLET: 10; 325 TABLET ORAL at 22:53

## 2017-01-01 RX ADMIN — HYDROCODONE BITARTRATE AND ACETAMINOPHEN 1 TABLET: 10; 325 TABLET ORAL at 05:39

## 2017-01-01 RX ADMIN — IPRATROPIUM BROMIDE AND ALBUTEROL SULFATE 3 ML: .5; 3 SOLUTION RESPIRATORY (INHALATION) at 11:39

## 2017-01-01 RX ADMIN — UMECLIDINIUM 1 PUFF: 62.5 AEROSOL, POWDER ORAL at 09:00

## 2017-01-01 RX ADMIN — Medication 10 ML: at 03:35

## 2017-01-01 RX ADMIN — Medication 10 ML: at 10:29

## 2017-01-01 RX ADMIN — HEPARIN SODIUM 5000 UNITS: 5000 INJECTION, SOLUTION INTRAVENOUS; SUBCUTANEOUS at 21:16

## 2017-01-01 RX ADMIN — CEFAZOLIN 2 G: 10 INJECTION, POWDER, FOR SOLUTION INTRAVENOUS; PARENTERAL at 12:51

## 2017-01-01 RX ADMIN — METOPROLOL TARTRATE 25 MG: 25 TABLET, FILM COATED ORAL at 21:48

## 2017-01-01 RX ADMIN — AMITRIPTYLINE HYDROCHLORIDE 10 MG: 10 TABLET, FILM COATED ORAL at 21:22

## 2017-01-01 RX ADMIN — ACETYLCYSTEINE 400 MG: 200 SOLUTION ORAL; RESPIRATORY (INHALATION) at 09:38

## 2017-01-01 RX ADMIN — LITHIUM CARBONATE 450 MG: 450 TABLET, EXTENDED RELEASE ORAL at 22:04

## 2017-01-01 RX ADMIN — OXYCODONE HYDROCHLORIDE 10 MG: 100 SOLUTION ORAL at 22:33

## 2017-01-01 RX ADMIN — MORPHINE SULFATE 4 MG: 4 INJECTION, SOLUTION INTRAMUSCULAR; INTRAVENOUS at 20:23

## 2017-01-01 RX ADMIN — TRAZODONE HYDROCHLORIDE 200 MG: 100 TABLET ORAL at 21:38

## 2017-01-01 RX ADMIN — VANCOMYCIN HYDROCHLORIDE 1000 MG: 1 INJECTION, POWDER, LYOPHILIZED, FOR SOLUTION INTRAVENOUS at 02:31

## 2017-01-01 RX ADMIN — LORAZEPAM 0.5 MG: 2 INJECTION INTRAMUSCULAR; INTRAVENOUS at 22:24

## 2017-01-01 RX ADMIN — ATORVASTATIN CALCIUM 20 MG: 20 TABLET, FILM COATED ORAL at 22:22

## 2017-01-01 RX ADMIN — METOPROLOL TARTRATE 25 MG: 25 TABLET, FILM COATED ORAL at 08:06

## 2017-01-01 RX ADMIN — SODIUM CHLORIDE 50 ML/HR: 900 INJECTION, SOLUTION INTRAVENOUS at 08:55

## 2017-01-01 RX ADMIN — METHYLPREDNISOLONE SODIUM SUCCINATE 40 MG: 125 INJECTION, POWDER, FOR SOLUTION INTRAMUSCULAR; INTRAVENOUS at 01:00

## 2017-01-01 RX ADMIN — HYDROCODONE BITARTRATE AND ACETAMINOPHEN 1 TABLET: 7.5; 325 TABLET ORAL at 04:26

## 2017-01-01 RX ADMIN — PREDNISONE 10 MG: 10 TABLET ORAL at 10:28

## 2017-01-01 RX ADMIN — METRONIDAZOLE 500 MG: 500 INJECTION, SOLUTION INTRAVENOUS at 00:00

## 2017-01-01 RX ADMIN — SODIUM CHLORIDE 100 ML/HR: 900 INJECTION, SOLUTION INTRAVENOUS at 00:00

## 2017-01-01 RX ADMIN — HYDROCODONE BITARTRATE AND ACETAMINOPHEN 1 TABLET: 10; 325 TABLET ORAL at 07:03

## 2017-01-01 RX ADMIN — MORPHINE SULFATE 4 MG: 4 INJECTION, SOLUTION INTRAMUSCULAR; INTRAVENOUS at 17:02

## 2017-01-01 RX ADMIN — MUPIROCIN: 20 OINTMENT TOPICAL at 21:30

## 2017-01-01 RX ADMIN — IPRATROPIUM BROMIDE AND ALBUTEROL SULFATE 3 ML: .5; 3 SOLUTION RESPIRATORY (INHALATION) at 08:35

## 2017-01-01 RX ADMIN — AMLODIPINE BESYLATE 10 MG: 5 TABLET ORAL at 09:04

## 2017-01-01 RX ADMIN — SODIUM CHLORIDE: 9 INJECTION, SOLUTION INTRAVENOUS at 11:05

## 2017-01-01 RX ADMIN — SODIUM CHLORIDE 100 ML/HR: 900 INJECTION, SOLUTION INTRAVENOUS at 12:26

## 2017-01-01 RX ADMIN — CEFEPIME HYDROCHLORIDE 2 G: 2 INJECTION, POWDER, FOR SOLUTION INTRAVENOUS at 12:56

## 2017-01-01 RX ADMIN — AMOXICILLIN AND CLAVULANATE POTASSIUM 500 MG: 250; 62.5 POWDER, FOR SUSPENSION ORAL at 13:26

## 2017-01-01 RX ADMIN — VANCOMYCIN HYDROCHLORIDE 1000 MG: 1 INJECTION, POWDER, LYOPHILIZED, FOR SOLUTION INTRAVENOUS at 21:28

## 2017-01-01 RX ADMIN — ACETAMINOPHEN 500 MG: 500 TABLET, FILM COATED ORAL at 20:18

## 2017-01-01 RX ADMIN — HYDROCODONE BITARTRATE AND ACETAMINOPHEN 1 TABLET: 10; 325 TABLET ORAL at 16:52

## 2017-01-01 RX ADMIN — Medication 10 ML: at 22:26

## 2017-01-01 RX ADMIN — METRONIDAZOLE 500 MG: 500 INJECTION, SOLUTION INTRAVENOUS at 00:05

## 2017-01-01 RX ADMIN — HYDROCODONE BITARTRATE AND ACETAMINOPHEN 1 TABLET: 7.5; 325 TABLET ORAL at 20:15

## 2017-01-01 RX ADMIN — PIPERACILLIN SODIUM,TAZOBACTAM SODIUM 3.38 G: 3; .375 INJECTION, POWDER, FOR SOLUTION INTRAVENOUS at 14:31

## 2017-01-01 RX ADMIN — Medication 10 ML: at 13:36

## 2017-01-01 RX ADMIN — HYDROCODONE BITARTRATE AND ACETAMINOPHEN 1 TABLET: 10; 325 TABLET ORAL at 08:05

## 2017-01-01 RX ADMIN — METHYLPREDNISOLONE SODIUM SUCCINATE 60 MG: 125 INJECTION, POWDER, FOR SOLUTION INTRAMUSCULAR; INTRAVENOUS at 23:48

## 2017-01-01 RX ADMIN — Medication 8.6 MG: at 08:01

## 2017-01-01 RX ADMIN — Medication 8.6 MG: at 08:00

## 2017-01-01 RX ADMIN — HYDROCODONE BITARTRATE AND ACETAMINOPHEN 1 TABLET: 10; 325 TABLET ORAL at 01:57

## 2017-01-01 RX ADMIN — FLUOXETINE 20 MG: 20 CAPSULE ORAL at 08:46

## 2017-01-01 RX ADMIN — GABAPENTIN 300 MG: 300 CAPSULE ORAL at 08:00

## 2017-01-01 RX ADMIN — Medication 10 ML: at 13:05

## 2017-01-01 RX ADMIN — HYDROCODONE BITARTRATE AND ACETAMINOPHEN 2 TABLET: 10; 325 TABLET ORAL at 05:32

## 2017-01-01 RX ADMIN — LORAZEPAM 2 MG: 2 INJECTION INTRAMUSCULAR; INTRAVENOUS at 19:58

## 2017-01-01 RX ADMIN — PIPERACILLIN SODIUM,TAZOBACTAM SODIUM 3.38 G: 3; .375 INJECTION, POWDER, FOR SOLUTION INTRAVENOUS at 18:47

## 2017-01-01 RX ADMIN — METRONIDAZOLE 500 MG: 500 INJECTION, SOLUTION INTRAVENOUS at 08:57

## 2017-01-01 RX ADMIN — FLUOXETINE 20 MG: 20 CAPSULE ORAL at 09:39

## 2017-01-01 RX ADMIN — AMITRIPTYLINE HYDROCHLORIDE 10 MG: 10 TABLET, FILM COATED ORAL at 22:09

## 2017-01-01 RX ADMIN — LORAZEPAM 0.5 MG: 0.5 TABLET ORAL at 17:40

## 2017-01-01 RX ADMIN — UMECLIDINIUM 1 PUFF: 62.5 AEROSOL, POWDER ORAL at 09:18

## 2017-01-01 RX ADMIN — METHYLPREDNISOLONE SODIUM SUCCINATE 40 MG: 40 INJECTION, POWDER, FOR SOLUTION INTRAMUSCULAR; INTRAVENOUS at 17:30

## 2017-01-01 RX ADMIN — LEVOFLOXACIN 750 MG: 5 INJECTION, SOLUTION INTRAVENOUS at 09:37

## 2017-01-01 RX ADMIN — ENOXAPARIN SODIUM 40 MG: 40 INJECTION SUBCUTANEOUS at 20:14

## 2017-01-01 RX ADMIN — FLUOXETINE 20 MG: 20 CAPSULE ORAL at 08:21

## 2017-01-01 RX ADMIN — HYDROCODONE BITARTRATE AND ACETAMINOPHEN 1 TABLET: 10; 325 TABLET ORAL at 08:14

## 2017-01-01 RX ADMIN — LITHIUM CARBONATE 300 MG: 300 TABLET, EXTENDED RELEASE ORAL at 23:26

## 2017-01-01 RX ADMIN — METOPROLOL TARTRATE 25 MG: 25 TABLET, FILM COATED ORAL at 20:46

## 2017-01-01 RX ADMIN — LITHIUM CARBONATE 300 MG: 300 TABLET, FILM COATED, EXTENDED RELEASE ORAL at 09:13

## 2017-01-01 RX ADMIN — ACETYLCYSTEINE 400 MG: 200 SOLUTION ORAL; RESPIRATORY (INHALATION) at 07:45

## 2017-01-01 RX ADMIN — HEPARIN SODIUM 5000 UNITS: 5000 INJECTION, SOLUTION INTRAVENOUS; SUBCUTANEOUS at 14:00

## 2017-01-01 RX ADMIN — GABAPENTIN 600 MG: 300 CAPSULE ORAL at 09:04

## 2017-01-01 RX ADMIN — FLUOXETINE 20 MG: 20 CAPSULE ORAL at 09:36

## 2017-01-01 RX ADMIN — OXYCODONE HYDROCHLORIDE 10 MG: 100 SOLUTION ORAL at 05:39

## 2017-01-01 RX ADMIN — LORAZEPAM 2 MG: 2 INJECTION INTRAMUSCULAR; INTRAVENOUS at 02:43

## 2017-01-01 RX ADMIN — METRONIDAZOLE 500 MG: 500 INJECTION, SOLUTION INTRAVENOUS at 16:20

## 2017-01-01 RX ADMIN — HEPARIN SODIUM 5000 UNITS: 5000 INJECTION, SOLUTION INTRAVENOUS; SUBCUTANEOUS at 23:23

## 2017-01-01 RX ADMIN — Medication 10 ML: at 05:24

## 2017-01-01 RX ADMIN — IPRATROPIUM BROMIDE AND ALBUTEROL SULFATE 3 ML: .5; 3 SOLUTION RESPIRATORY (INHALATION) at 19:37

## 2017-01-01 RX ADMIN — VANCOMYCIN HYDROCHLORIDE 1250 MG: 10 INJECTION, POWDER, LYOPHILIZED, FOR SOLUTION INTRAVENOUS at 21:45

## 2017-01-01 RX ADMIN — Medication 10 ML: at 13:27

## 2017-01-01 RX ADMIN — MORPHINE SULFATE 4 MG: 4 INJECTION, SOLUTION INTRAMUSCULAR; INTRAVENOUS at 04:14

## 2017-01-01 RX ADMIN — PROPOFOL 70 MG: 10 INJECTION, EMULSION INTRAVENOUS at 11:44

## 2017-01-01 RX ADMIN — HYDROCODONE BITARTRATE AND ACETAMINOPHEN 2 TABLET: 10; 325 TABLET ORAL at 12:22

## 2017-01-01 RX ADMIN — LITHIUM CARBONATE 300 MG: 300 TABLET, FILM COATED, EXTENDED RELEASE ORAL at 09:04

## 2017-01-01 RX ADMIN — HALOPERIDOL LACTATE 0.5 MG: 5 INJECTION, SOLUTION INTRAMUSCULAR at 04:11

## 2017-01-01 RX ADMIN — METOPROLOL TARTRATE 12.5 MG: 25 TABLET ORAL at 17:48

## 2017-01-01 RX ADMIN — ACETYLCYSTEINE 400 MG: 200 SOLUTION ORAL; RESPIRATORY (INHALATION) at 02:08

## 2017-01-01 RX ADMIN — LEVOFLOXACIN 750 MG: 5 INJECTION, SOLUTION INTRAVENOUS at 17:40

## 2017-01-01 RX ADMIN — GABAPENTIN 600 MG: 300 CAPSULE ORAL at 17:32

## 2017-01-01 RX ADMIN — HYDROCODONE BITARTRATE AND ACETAMINOPHEN 10 MG: 2.5; 108 SOLUTION ORAL at 16:20

## 2017-01-01 RX ADMIN — METOPROLOL TARTRATE 25 MG: 25 TABLET, FILM COATED ORAL at 08:01

## 2017-01-01 RX ADMIN — Medication 10 ML: at 06:44

## 2017-01-01 RX ADMIN — VANCOMYCIN HYDROCHLORIDE 1000 MG: 1 INJECTION, POWDER, LYOPHILIZED, FOR SOLUTION INTRAVENOUS at 12:24

## 2017-01-01 RX ADMIN — METHYLPREDNISOLONE SODIUM SUCCINATE 60 MG: 125 INJECTION, POWDER, FOR SOLUTION INTRAMUSCULAR; INTRAVENOUS at 16:53

## 2017-01-01 RX ADMIN — METOPROLOL TARTRATE 12.5 MG: 25 TABLET ORAL at 18:02

## 2017-01-01 RX ADMIN — TRAZODONE HYDROCHLORIDE 200 MG: 100 TABLET ORAL at 21:17

## 2017-01-01 RX ADMIN — HYDROCODONE BITARTRATE AND ACETAMINOPHEN 1 TABLET: 10; 325 TABLET ORAL at 09:45

## 2017-01-01 RX ADMIN — TRAZODONE HYDROCHLORIDE 200 MG: 100 TABLET ORAL at 22:03

## 2017-01-01 RX ADMIN — METOPROLOL TARTRATE 12.5 MG: 25 TABLET ORAL at 21:52

## 2017-01-01 RX ADMIN — VANCOMYCIN HYDROCHLORIDE 750 MG: 750 INJECTION, POWDER, LYOPHILIZED, FOR SOLUTION INTRAVENOUS at 04:00

## 2017-01-01 RX ADMIN — FLUOXETINE 20 MG: 20 CAPSULE ORAL at 08:00

## 2017-01-01 RX ADMIN — OXYCODONE HYDROCHLORIDE 10 MG: 100 SOLUTION ORAL at 13:53

## 2017-01-01 RX ADMIN — ACETYLCYSTEINE 800 MG: 200 SOLUTION ORAL; RESPIRATORY (INHALATION) at 13:00

## 2017-01-01 RX ADMIN — OXYCODONE HYDROCHLORIDE 10 MG: 100 SOLUTION ORAL at 07:43

## 2017-01-01 RX ADMIN — PIPERACILLIN SODIUM,TAZOBACTAM SODIUM 3.38 G: 3; .375 INJECTION, POWDER, FOR SOLUTION INTRAVENOUS at 21:15

## 2017-01-01 RX ADMIN — HYDROCODONE BITARTRATE AND ACETAMINOPHEN 2 TABLET: 10; 325 TABLET ORAL at 21:50

## 2017-01-01 RX ADMIN — HYDROCODONE BITARTRATE AND ACETAMINOPHEN 1 TABLET: 10; 325 TABLET ORAL at 09:34

## 2017-01-01 RX ADMIN — ASPIRIN 81 MG: 81 TABLET, COATED ORAL at 09:14

## 2017-01-01 RX ADMIN — HEPARIN SODIUM 5000 UNITS: 5000 INJECTION, SOLUTION INTRAVENOUS; SUBCUTANEOUS at 21:26

## 2017-01-01 RX ADMIN — LITHIUM CARBONATE 150 MG: 300 TABLET ORAL at 16:25

## 2017-01-01 RX ADMIN — METRONIDAZOLE 500 MG: 500 INJECTION, SOLUTION INTRAVENOUS at 10:46

## 2017-01-01 RX ADMIN — ALBUTEROL SULFATE 2.5 MG: 2.5 SOLUTION RESPIRATORY (INHALATION) at 13:46

## 2017-01-01 RX ADMIN — LORAZEPAM 0.5 MG: 0.5 TABLET ORAL at 08:56

## 2017-01-01 RX ADMIN — PIPERACILLIN SODIUM,TAZOBACTAM SODIUM 3.38 G: 3; .375 INJECTION, POWDER, FOR SOLUTION INTRAVENOUS at 13:58

## 2017-01-01 RX ADMIN — Medication 10 ML: at 14:03

## 2017-01-01 RX ADMIN — LITHIUM CARBONATE 300 MG: 300 TABLET, FILM COATED, EXTENDED RELEASE ORAL at 18:47

## 2017-01-01 RX ADMIN — ENOXAPARIN SODIUM 40 MG: 40 INJECTION SUBCUTANEOUS at 18:01

## 2017-01-01 RX ADMIN — LITHIUM CARBONATE 300 MG: 300 CAPSULE, GELATIN COATED ORAL at 22:00

## 2017-01-01 RX ADMIN — HYDROCODONE BITARTRATE AND ACETAMINOPHEN 2 TABLET: 10; 325 TABLET ORAL at 13:28

## 2017-01-01 RX ADMIN — VANCOMYCIN HYDROCHLORIDE 1000 MG: 1 INJECTION, POWDER, LYOPHILIZED, FOR SOLUTION INTRAVENOUS at 21:31

## 2017-01-01 RX ADMIN — PIPERACILLIN SODIUM,TAZOBACTAM SODIUM 4.5 G: 4; .5 INJECTION, POWDER, FOR SOLUTION INTRAVENOUS at 06:21

## 2017-01-01 RX ADMIN — LORAZEPAM 1 MG: 2 SOLUTION, CONCENTRATE ORAL at 20:15

## 2017-01-01 RX ADMIN — AMITRIPTYLINE HYDROCHLORIDE 25 MG: 50 TABLET, FILM COATED ORAL at 22:04

## 2017-01-01 RX ADMIN — ALBUTEROL SULFATE 2.5 MG: 2.5 SOLUTION RESPIRATORY (INHALATION) at 08:09

## 2017-01-01 RX ADMIN — Medication 2 MG: at 16:41

## 2017-01-01 RX ADMIN — PIPERACILLIN SODIUM,TAZOBACTAM SODIUM 3.38 G: 3; .375 INJECTION, POWDER, FOR SOLUTION INTRAVENOUS at 14:02

## 2017-01-01 RX ADMIN — GABAPENTIN 600 MG: 300 CAPSULE ORAL at 09:56

## 2017-01-01 RX ADMIN — PIPERACILLIN SODIUM,TAZOBACTAM SODIUM 3.38 G: 3; .375 INJECTION, POWDER, FOR SOLUTION INTRAVENOUS at 06:25

## 2017-01-01 RX ADMIN — Medication 10 ML: at 14:00

## 2017-01-01 RX ADMIN — IPRATROPIUM BROMIDE AND ALBUTEROL SULFATE 3 ML: .5; 3 SOLUTION RESPIRATORY (INHALATION) at 14:12

## 2017-01-01 RX ADMIN — HYDROCODONE BITARTRATE AND ACETAMINOPHEN 2 TABLET: 10; 325 TABLET ORAL at 02:30

## 2017-01-01 RX ADMIN — IPRATROPIUM BROMIDE AND ALBUTEROL SULFATE 3 ML: .5; 3 SOLUTION RESPIRATORY (INHALATION) at 16:39

## 2017-01-01 RX ADMIN — ACETYLCYSTEINE 400 MG: 200 SOLUTION ORAL; RESPIRATORY (INHALATION) at 09:06

## 2017-01-01 RX ADMIN — HALOPERIDOL LACTATE 0.5 MG: 5 INJECTION, SOLUTION INTRAMUSCULAR at 22:22

## 2017-01-01 RX ADMIN — SODIUM CHLORIDE 2 G: 900 INJECTION, SOLUTION INTRAVENOUS at 20:11

## 2017-01-01 RX ADMIN — ACETAMINOPHEN 500 MG: 500 TABLET, FILM COATED ORAL at 20:47

## 2017-01-01 RX ADMIN — LITHIUM CARBONATE 150 MG: 300 TABLET ORAL at 15:40

## 2017-01-01 RX ADMIN — GUAIFENESIN 400 MG: 100 SOLUTION ORAL at 09:03

## 2017-01-01 RX ADMIN — SODIUM CHLORIDE 500 ML: 900 INJECTION, SOLUTION INTRAVENOUS at 18:11

## 2017-01-01 RX ADMIN — HYDROCODONE BITARTRATE AND ACETAMINOPHEN 10 MG: 2.5; 108 SOLUTION ORAL at 08:59

## 2017-01-01 RX ADMIN — IPRATROPIUM BROMIDE AND ALBUTEROL SULFATE 3 ML: .5; 3 SOLUTION RESPIRATORY (INHALATION) at 23:04

## 2017-01-01 RX ADMIN — FLUOXETINE 20 MG: 20 CAPSULE ORAL at 09:55

## 2017-01-01 RX ADMIN — Medication 10 ML: at 07:17

## 2017-01-01 RX ADMIN — HYDROCODONE BITARTRATE AND ACETAMINOPHEN 1 TABLET: 10; 325 TABLET ORAL at 05:26

## 2017-01-01 RX ADMIN — IPRATROPIUM BROMIDE AND ALBUTEROL SULFATE 3 ML: .5; 3 SOLUTION RESPIRATORY (INHALATION) at 20:03

## 2017-01-01 RX ADMIN — OXYCODONE HYDROCHLORIDE 10 MG: 100 SOLUTION ORAL at 14:57

## 2017-01-01 RX ADMIN — TRAZODONE HYDROCHLORIDE 200 MG: 100 TABLET ORAL at 21:12

## 2017-01-01 RX ADMIN — IPRATROPIUM BROMIDE AND ALBUTEROL SULFATE 3 ML: .5; 3 SOLUTION RESPIRATORY (INHALATION) at 15:58

## 2017-01-01 RX ADMIN — SODIUM CHLORIDE 100 ML/HR: 900 INJECTION, SOLUTION INTRAVENOUS at 12:57

## 2017-01-01 RX ADMIN — LORAZEPAM 1 MG: 2 INJECTION INTRAMUSCULAR; INTRAVENOUS at 06:35

## 2017-01-01 RX ADMIN — OXYCODONE HYDROCHLORIDE 10 MG: 100 SOLUTION ORAL at 02:49

## 2017-01-01 RX ADMIN — PREDNISONE 10 MG: 10 TABLET ORAL at 09:35

## 2017-01-01 RX ADMIN — LORAZEPAM 0.5 MG: 0.5 TABLET ORAL at 17:26

## 2017-01-01 RX ADMIN — ENOXAPARIN SODIUM 40 MG: 40 INJECTION SUBCUTANEOUS at 22:28

## 2017-01-01 RX ADMIN — CEFEPIME HYDROCHLORIDE 2 G: 2 INJECTION, POWDER, FOR SOLUTION INTRAVENOUS at 02:01

## 2017-01-01 RX ADMIN — GUAIFENESIN 1200 MG: 600 TABLET, EXTENDED RELEASE ORAL at 21:16

## 2017-01-01 RX ADMIN — HEPARIN SODIUM 5000 UNITS: 5000 INJECTION, SOLUTION INTRAVENOUS; SUBCUTANEOUS at 22:24

## 2017-01-01 RX ADMIN — SODIUM CHLORIDE 100 ML/HR: 900 INJECTION, SOLUTION INTRAVENOUS at 10:24

## 2017-01-01 RX ADMIN — PIPERACILLIN SODIUM,TAZOBACTAM SODIUM 4.5 G: 4; .5 INJECTION, POWDER, FOR SOLUTION INTRAVENOUS at 06:01

## 2017-01-01 RX ADMIN — HEPARIN SODIUM 5000 UNITS: 5000 INJECTION, SOLUTION INTRAVENOUS; SUBCUTANEOUS at 20:03

## 2017-01-01 RX ADMIN — CEFAZOLIN 2 G: 10 INJECTION, POWDER, FOR SOLUTION INTRAVENOUS; PARENTERAL at 21:39

## 2017-01-01 RX ADMIN — METHYLPREDNISOLONE SODIUM SUCCINATE 60 MG: 125 INJECTION, POWDER, FOR SOLUTION INTRAMUSCULAR; INTRAVENOUS at 07:59

## 2017-01-01 RX ADMIN — LORAZEPAM 1 MG: 2 INJECTION, SOLUTION INTRAMUSCULAR; INTRAVENOUS at 17:02

## 2017-01-01 RX ADMIN — Medication 5 ML: at 14:29

## 2017-01-01 RX ADMIN — SODIUM CHLORIDE 125 ML/HR: 900 INJECTION, SOLUTION INTRAVENOUS at 03:57

## 2017-01-01 RX ADMIN — Medication 2 MG: at 00:04

## 2017-01-01 RX ADMIN — GUAIFENESIN 400 MG: 100 SOLUTION ORAL at 18:10

## 2017-01-01 RX ADMIN — ACETYLCYSTEINE 400 MG: 200 SOLUTION ORAL; RESPIRATORY (INHALATION) at 00:57

## 2017-01-01 RX ADMIN — TRAZODONE HYDROCHLORIDE 200 MG: 100 TABLET ORAL at 21:36

## 2017-01-01 RX ADMIN — PIPERACILLIN SODIUM,TAZOBACTAM SODIUM 3.38 G: 3; .375 INJECTION, POWDER, FOR SOLUTION INTRAVENOUS at 21:12

## 2017-01-01 RX ADMIN — FLUOXETINE 20 MG: 20 CAPSULE ORAL at 08:22

## 2017-01-01 RX ADMIN — SODIUM CHLORIDE 500 ML: 900 INJECTION, SOLUTION INTRAVENOUS at 00:39

## 2017-01-01 RX ADMIN — LEVOFLOXACIN 750 MG: 5 INJECTION, SOLUTION INTRAVENOUS at 15:38

## 2017-01-01 RX ADMIN — UMECLIDINIUM 1 PUFF: 62.5 AEROSOL, POWDER ORAL at 09:24

## 2017-01-01 RX ADMIN — Medication 10 ML: at 21:19

## 2017-01-01 RX ADMIN — PIPERACILLIN SODIUM,TAZOBACTAM SODIUM 3.38 G: 3; .375 INJECTION, POWDER, FOR SOLUTION INTRAVENOUS at 21:40

## 2017-01-01 RX ADMIN — HYDROCODONE BITARTRATE AND ACETAMINOPHEN 1 TABLET: 10; 325 TABLET ORAL at 17:25

## 2017-01-01 RX ADMIN — OXYCODONE HYDROCHLORIDE 10 MG: 100 SOLUTION ORAL at 05:44

## 2017-01-01 RX ADMIN — OXYCODONE HYDROCHLORIDE 10 MG: 100 SOLUTION ORAL at 22:23

## 2017-01-01 RX ADMIN — GLYCOPYRROLATE 0.2 MG: 0.2 INJECTION INTRAMUSCULAR; INTRAVENOUS at 18:58

## 2017-01-01 RX ADMIN — GLYCOPYRROLATE 0.2 MG: 0.2 INJECTION INTRAMUSCULAR; INTRAVENOUS at 08:59

## 2017-01-01 RX ADMIN — OXYCODONE HYDROCHLORIDE 10 MG: 100 SOLUTION ORAL at 09:28

## 2017-01-01 RX ADMIN — SODIUM CHLORIDE 125 ML/HR: 900 INJECTION, SOLUTION INTRAVENOUS at 06:11

## 2017-01-01 RX ADMIN — AMLODIPINE BESYLATE 10 MG: 5 TABLET ORAL at 08:12

## 2017-01-01 RX ADMIN — GABAPENTIN 600 MG: 300 CAPSULE ORAL at 21:33

## 2017-01-01 RX ADMIN — ACETYLCYSTEINE 400 MG: 200 SOLUTION ORAL; RESPIRATORY (INHALATION) at 20:47

## 2017-01-01 RX ADMIN — HYDROCODONE BITARTRATE AND ACETAMINOPHEN 1 TABLET: 10; 325 TABLET ORAL at 13:42

## 2017-01-01 RX ADMIN — AMLODIPINE BESYLATE 5 MG: 5 TABLET ORAL at 18:46

## 2017-01-01 RX ADMIN — HYDROMORPHONE HYDROCHLORIDE 4 MG: 2 TABLET ORAL at 07:40

## 2017-01-01 RX ADMIN — LORAZEPAM 1 MG: 2 SOLUTION, CONCENTRATE ORAL at 09:58

## 2017-01-01 RX ADMIN — OXYCODONE HYDROCHLORIDE 10 MG: 100 SOLUTION ORAL at 21:50

## 2017-01-01 RX ADMIN — OXYCODONE HYDROCHLORIDE 10 MG: 100 SOLUTION ORAL at 09:36

## 2017-01-01 RX ADMIN — GABAPENTIN 600 MG: 300 CAPSULE ORAL at 16:37

## 2017-01-01 RX ADMIN — ALBUTEROL SULFATE 2.5 MG: 2.5 SOLUTION RESPIRATORY (INHALATION) at 14:53

## 2017-01-01 RX ADMIN — CEPHALEXIN 500 MG: 250 CAPSULE ORAL at 12:58

## 2017-01-01 RX ADMIN — METOPROLOL TARTRATE 25 MG: 25 TABLET, FILM COATED ORAL at 08:21

## 2017-01-01 RX ADMIN — LITHIUM CARBONATE 300 MG: 300 CAPSULE, GELATIN COATED ORAL at 08:17

## 2017-01-01 RX ADMIN — VANCOMYCIN HYDROCHLORIDE 1500 MG: 10 INJECTION, POWDER, LYOPHILIZED, FOR SOLUTION INTRAVENOUS at 12:31

## 2017-01-01 RX ADMIN — LITHIUM CARBONATE 450 MG: 450 TABLET, EXTENDED RELEASE ORAL at 08:48

## 2017-01-01 RX ADMIN — HYDROMORPHONE HYDROCHLORIDE 1 MG: 1 INJECTION, SOLUTION INTRAMUSCULAR; INTRAVENOUS; SUBCUTANEOUS at 23:18

## 2017-01-01 RX ADMIN — HYDROCODONE BITARTRATE AND ACETAMINOPHEN 1 TABLET: 10; 325 TABLET ORAL at 21:12

## 2017-01-01 RX ADMIN — ENOXAPARIN SODIUM 40 MG: 40 INJECTION SUBCUTANEOUS at 22:29

## 2017-01-01 RX ADMIN — Medication 10 ML: at 04:14

## 2017-01-01 RX ADMIN — HYDROCODONE BITARTRATE AND ACETAMINOPHEN 1 TABLET: 10; 325 TABLET ORAL at 21:39

## 2017-01-01 RX ADMIN — LORAZEPAM 1 MG: 2 SOLUTION, CONCENTRATE ORAL at 12:15

## 2017-01-01 RX ADMIN — ENOXAPARIN SODIUM 30 MG: 30 INJECTION SUBCUTANEOUS at 09:52

## 2017-01-01 RX ADMIN — IPRATROPIUM BROMIDE AND ALBUTEROL SULFATE 3 ML: .5; 3 SOLUTION RESPIRATORY (INHALATION) at 16:08

## 2017-01-01 RX ADMIN — MORPHINE SULFATE 10 MG: 20 SOLUTION ORAL at 14:38

## 2017-01-01 RX ADMIN — GABAPENTIN 200 MG: 100 CAPSULE ORAL at 17:06

## 2017-01-01 RX ADMIN — METOPROLOL TARTRATE 25 MG: 25 TABLET ORAL at 09:39

## 2017-01-01 RX ADMIN — FLUOXETINE 20 MG: 20 CAPSULE ORAL at 09:04

## 2017-01-01 RX ADMIN — LORAZEPAM 0.25 MG: 2 CONCENTRATE ORAL at 13:00

## 2017-01-01 RX ADMIN — ENOXAPARIN SODIUM 40 MG: 40 INJECTION SUBCUTANEOUS at 18:47

## 2017-01-01 RX ADMIN — VANCOMYCIN HYDROCHLORIDE 1000 MG: 1 INJECTION, POWDER, LYOPHILIZED, FOR SOLUTION INTRAVENOUS at 21:18

## 2017-01-01 RX ADMIN — GABAPENTIN 600 MG: 300 CAPSULE ORAL at 17:54

## 2017-01-01 RX ADMIN — HYDROCODONE BITARTRATE AND ACETAMINOPHEN 1 TABLET: 10; 325 TABLET ORAL at 18:03

## 2017-01-01 RX ADMIN — HYDROMORPHONE HYDROCHLORIDE 2 MG: 2 TABLET ORAL at 01:03

## 2017-01-01 RX ADMIN — IPRATROPIUM BROMIDE AND ALBUTEROL SULFATE 3 ML: .5; 3 SOLUTION RESPIRATORY (INHALATION) at 07:53

## 2017-01-01 RX ADMIN — IPRATROPIUM BROMIDE AND ALBUTEROL SULFATE 3 ML: .5; 3 SOLUTION RESPIRATORY (INHALATION) at 02:29

## 2017-01-01 RX ADMIN — PIPERACILLIN SODIUM,TAZOBACTAM SODIUM 3.38 G: 3; .375 INJECTION, POWDER, FOR SOLUTION INTRAVENOUS at 08:42

## 2017-01-01 RX ADMIN — SODIUM CHLORIDE 50 ML/HR: 900 INJECTION, SOLUTION INTRAVENOUS at 08:58

## 2017-01-01 RX ADMIN — HYDROCODONE BITARTRATE AND ACETAMINOPHEN 2 TABLET: 10; 325 TABLET ORAL at 18:58

## 2017-01-01 RX ADMIN — CEFAZOLIN 2 G: 10 INJECTION, POWDER, FOR SOLUTION INTRAVENOUS; PARENTERAL at 20:23

## 2017-01-01 RX ADMIN — LEVOFLOXACIN 750 MG: 5 INJECTION, SOLUTION INTRAVENOUS at 09:43

## 2017-01-01 RX ADMIN — PIPERACILLIN SODIUM,TAZOBACTAM SODIUM 3.38 G: 3; .375 INJECTION, POWDER, FOR SOLUTION INTRAVENOUS at 00:52

## 2017-01-01 RX ADMIN — IPRATROPIUM BROMIDE AND ALBUTEROL SULFATE 3 ML: .5; 3 SOLUTION RESPIRATORY (INHALATION) at 11:46

## 2017-01-01 RX ADMIN — Medication 10 ML: at 00:29

## 2017-01-01 RX ADMIN — METOPROLOL TARTRATE 12.5 MG: 25 TABLET ORAL at 08:58

## 2017-01-01 RX ADMIN — IPRATROPIUM BROMIDE AND ALBUTEROL SULFATE 3 ML: .5; 3 SOLUTION RESPIRATORY (INHALATION) at 07:46

## 2017-01-01 RX ADMIN — TRAZODONE HYDROCHLORIDE 200 MG: 100 TABLET ORAL at 21:47

## 2017-01-01 RX ADMIN — OXYCODONE HYDROCHLORIDE 10 MG: 100 SOLUTION ORAL at 06:13

## 2017-01-01 RX ADMIN — MORPHINE SULFATE 1 ML: 20 SOLUTION ORAL at 12:19

## 2017-01-01 RX ADMIN — Medication 4 MG: at 12:26

## 2017-01-01 RX ADMIN — MORPHINE SULFATE 4 MG: 4 INJECTION, SOLUTION INTRAMUSCULAR; INTRAVENOUS at 15:36

## 2017-01-01 RX ADMIN — VANCOMYCIN HYDROCHLORIDE 1250 MG: 10 INJECTION, POWDER, LYOPHILIZED, FOR SOLUTION INTRAVENOUS at 13:02

## 2017-01-01 RX ADMIN — HYDROCODONE BITARTRATE AND ACETAMINOPHEN 1 TABLET: 10; 325 TABLET ORAL at 17:04

## 2017-01-01 RX ADMIN — Medication 5 ML: at 15:30

## 2017-01-01 RX ADMIN — FLUOXETINE HYDROCHLORIDE 20 MG: 20 SOLUTION ORAL at 09:26

## 2017-01-01 RX ADMIN — IPRATROPIUM BROMIDE AND ALBUTEROL SULFATE 3 ML: .5; 3 SOLUTION RESPIRATORY (INHALATION) at 03:33

## 2017-01-01 RX ADMIN — ACETYLCYSTEINE 800 MG: 200 SOLUTION ORAL; RESPIRATORY (INHALATION) at 01:10

## 2017-01-01 RX ADMIN — Medication 10 ML: at 21:40

## 2017-01-01 RX ADMIN — FLUOXETINE 20 MG: 20 CAPSULE ORAL at 08:39

## 2017-01-01 RX ADMIN — ALBUTEROL SULFATE 2.5 MG: 2.5 SOLUTION RESPIRATORY (INHALATION) at 14:26

## 2017-01-01 RX ADMIN — PIPERACILLIN SODIUM,TAZOBACTAM SODIUM 4.5 G: 4; .5 INJECTION, POWDER, FOR SOLUTION INTRAVENOUS at 14:20

## 2017-01-01 RX ADMIN — HYDROCODONE BITARTRATE AND ACETAMINOPHEN 1 TABLET: 10; 325 TABLET ORAL at 22:25

## 2017-01-01 RX ADMIN — CEFEPIME HYDROCHLORIDE 2 G: 2 INJECTION, POWDER, FOR SOLUTION INTRAVENOUS at 22:03

## 2017-01-01 RX ADMIN — CEFAZOLIN 2 G: 10 INJECTION, POWDER, FOR SOLUTION INTRAVENOUS; PARENTERAL at 04:45

## 2017-01-01 RX ADMIN — HYDROCODONE BITARTRATE AND ACETAMINOPHEN 1 TABLET: 10; 325 TABLET ORAL at 10:08

## 2017-01-01 RX ADMIN — LITHIUM CARBONATE 300 MG: 300 CAPSULE ORAL at 13:41

## 2017-01-01 RX ADMIN — CEFEPIME HYDROCHLORIDE 2 G: 2 INJECTION, POWDER, FOR SOLUTION INTRAVENOUS at 17:30

## 2017-01-01 RX ADMIN — HEPARIN SODIUM 5000 UNITS: 5000 INJECTION, SOLUTION INTRAVENOUS; SUBCUTANEOUS at 21:47

## 2017-01-01 RX ADMIN — Medication 10 ML: at 04:47

## 2017-01-01 RX ADMIN — LORAZEPAM 0.5 MG: 0.5 TABLET ORAL at 21:00

## 2017-01-01 RX ADMIN — UMECLIDINIUM 1 PUFF: 62.5 AEROSOL, POWDER ORAL at 11:12

## 2017-01-01 RX ADMIN — HYDROCODONE BITARTRATE AND ACETAMINOPHEN 2 TABLET: 10; 325 TABLET ORAL at 11:00

## 2017-01-01 RX ADMIN — HYDROCODONE BITARTRATE AND ACETAMINOPHEN 2 TABLET: 10; 325 TABLET ORAL at 16:10

## 2017-01-01 RX ADMIN — TRAZODONE HYDROCHLORIDE 200 MG: 50 TABLET ORAL at 22:57

## 2017-01-01 RX ADMIN — IPRATROPIUM BROMIDE AND ALBUTEROL SULFATE 3 ML: .5; 3 SOLUTION RESPIRATORY (INHALATION) at 11:37

## 2017-01-01 RX ADMIN — Medication 10 ML: at 21:05

## 2017-01-01 RX ADMIN — ATORVASTATIN CALCIUM 20 MG: 20 TABLET, FILM COATED ORAL at 21:39

## 2017-01-01 RX ADMIN — HYDROCODONE BITARTRATE AND ACETAMINOPHEN 1 TABLET: 10; 325 TABLET ORAL at 08:43

## 2017-01-01 RX ADMIN — HYDROMORPHONE HYDROCHLORIDE 1 MG: 1 INJECTION, SOLUTION INTRAMUSCULAR; INTRAVENOUS; SUBCUTANEOUS at 19:58

## 2017-01-01 RX ADMIN — IPRATROPIUM BROMIDE AND ALBUTEROL SULFATE 3 ML: .5; 3 SOLUTION RESPIRATORY (INHALATION) at 23:36

## 2017-01-01 RX ADMIN — HYDROCODONE BITARTRATE AND ACETAMINOPHEN 1 TABLET: 7.5; 325 TABLET ORAL at 14:20

## 2017-01-01 RX ADMIN — PIPERACILLIN SODIUM,TAZOBACTAM SODIUM 3.38 G: 3; .375 INJECTION, POWDER, FOR SOLUTION INTRAVENOUS at 09:33

## 2017-01-01 RX ADMIN — ENOXAPARIN SODIUM 30 MG: 30 INJECTION SUBCUTANEOUS at 09:36

## 2017-01-01 RX ADMIN — PIPERACILLIN SODIUM,TAZOBACTAM SODIUM 3.38 G: 3; .375 INJECTION, POWDER, FOR SOLUTION INTRAVENOUS at 01:00

## 2017-01-01 RX ADMIN — Medication 10 ML: at 21:00

## 2017-01-01 RX ADMIN — Medication 10 ML: at 07:03

## 2017-01-01 RX ADMIN — GABAPENTIN 600 MG: 300 CAPSULE ORAL at 22:29

## 2017-01-01 RX ADMIN — HYDROMORPHONE HYDROCHLORIDE 4 MG: 2 TABLET ORAL at 03:36

## 2017-01-01 RX ADMIN — VANCOMYCIN HYDROCHLORIDE 1250 MG: 10 INJECTION, POWDER, LYOPHILIZED, FOR SOLUTION INTRAVENOUS at 05:22

## 2017-01-01 RX ADMIN — Medication 2 MG: at 03:20

## 2017-01-01 RX ADMIN — LITHIUM CARBONATE 300 MG: 300 TABLET, EXTENDED RELEASE ORAL at 21:24

## 2017-01-01 RX ADMIN — ESMOLOL HYDROCHLORIDE 10 MG: 10 INJECTION INTRAVENOUS at 11:43

## 2017-01-01 RX ADMIN — PIPERACILLIN SODIUM,TAZOBACTAM SODIUM 3.38 G: 3; .375 INJECTION, POWDER, FOR SOLUTION INTRAVENOUS at 03:09

## 2017-01-01 RX ADMIN — HEPARIN SODIUM 5000 UNITS: 5000 INJECTION, SOLUTION INTRAVENOUS; SUBCUTANEOUS at 08:59

## 2017-01-01 RX ADMIN — LITHIUM CARBONATE 150 MG: 300 TABLET ORAL at 09:39

## 2017-01-01 RX ADMIN — CEFAZOLIN SODIUM 1 G: 1 INJECTION, POWDER, FOR SOLUTION INTRAMUSCULAR; INTRAVENOUS at 08:58

## 2017-01-01 RX ADMIN — GABAPENTIN 600 MG: 300 CAPSULE ORAL at 21:12

## 2017-01-01 RX ADMIN — LORAZEPAM 1 MG: 2 SOLUTION, CONCENTRATE ORAL at 18:02

## 2017-01-01 RX ADMIN — METOPROLOL TARTRATE 25 MG: 25 TABLET, FILM COATED ORAL at 20:47

## 2017-01-01 RX ADMIN — SODIUM CHLORIDE 1000 ML: 900 INJECTION, SOLUTION INTRAVENOUS at 11:45

## 2017-01-01 RX ADMIN — ACETYLCYSTEINE 400 MG: 200 SOLUTION ORAL; RESPIRATORY (INHALATION) at 13:59

## 2017-01-01 RX ADMIN — HYDROCODONE BITARTRATE AND ACETAMINOPHEN 1 TABLET: 7.5; 325 TABLET ORAL at 11:55

## 2017-01-01 RX ADMIN — IPRATROPIUM BROMIDE AND ALBUTEROL SULFATE 3 ML: .5; 3 SOLUTION RESPIRATORY (INHALATION) at 22:10

## 2017-01-01 RX ADMIN — LORAZEPAM 1 MG: 2 INJECTION, SOLUTION INTRAMUSCULAR; INTRAVENOUS at 08:22

## 2017-01-01 RX ADMIN — IPRATROPIUM BROMIDE AND ALBUTEROL SULFATE 3 ML: .5; 2.5 SOLUTION RESPIRATORY (INHALATION) at 14:38

## 2017-01-01 RX ADMIN — METRONIDAZOLE 500 MG: 500 INJECTION, SOLUTION INTRAVENOUS at 17:08

## 2017-01-01 RX ADMIN — ALBUTEROL SULFATE 2.5 MG: 2.5 SOLUTION RESPIRATORY (INHALATION) at 14:11

## 2017-01-01 RX ADMIN — GUAIFENESIN 1200 MG: 600 TABLET, EXTENDED RELEASE ORAL at 08:05

## 2017-01-01 RX ADMIN — ASPIRIN 81 MG: 81 TABLET, COATED ORAL at 08:02

## 2017-01-01 RX ADMIN — VANCOMYCIN HYDROCHLORIDE 1250 MG: 10 INJECTION, POWDER, LYOPHILIZED, FOR SOLUTION INTRAVENOUS at 04:31

## 2017-01-01 RX ADMIN — HYDROCODONE BITARTRATE AND ACETAMINOPHEN 2 TABLET: 10; 325 TABLET ORAL at 09:15

## 2017-01-01 RX ADMIN — PIPERACILLIN SODIUM,TAZOBACTAM SODIUM 3.38 G: 3; .375 INJECTION, POWDER, FOR SOLUTION INTRAVENOUS at 06:59

## 2017-01-01 RX ADMIN — GABAPENTIN 300 MG: 300 CAPSULE ORAL at 15:16

## 2017-01-01 RX ADMIN — PIPERACILLIN SODIUM,TAZOBACTAM SODIUM 3.38 G: 3; .375 INJECTION, POWDER, FOR SOLUTION INTRAVENOUS at 18:31

## 2017-01-01 RX ADMIN — HYDROCODONE BITARTRATE AND ACETAMINOPHEN 1 TABLET: 10; 325 TABLET ORAL at 00:30

## 2017-01-01 RX ADMIN — HYDROCODONE BITARTRATE AND ACETAMINOPHEN 1 TABLET: 10; 325 TABLET ORAL at 11:28

## 2017-01-01 RX ADMIN — PIPERACILLIN SODIUM,TAZOBACTAM SODIUM 3.38 G: 3; .375 INJECTION, POWDER, FOR SOLUTION INTRAVENOUS at 01:07

## 2017-01-01 RX ADMIN — IPRATROPIUM BROMIDE AND ALBUTEROL SULFATE 3 ML: .5; 3 SOLUTION RESPIRATORY (INHALATION) at 07:28

## 2017-01-01 RX ADMIN — SODIUM CHLORIDE 100 ML/HR: 900 INJECTION, SOLUTION INTRAVENOUS at 23:17

## 2017-01-01 RX ADMIN — HYDROMORPHONE HYDROCHLORIDE 1 MG: 1 INJECTION, SOLUTION INTRAMUSCULAR; INTRAVENOUS; SUBCUTANEOUS at 16:54

## 2017-01-01 RX ADMIN — ENOXAPARIN SODIUM 40 MG: 40 INJECTION SUBCUTANEOUS at 18:19

## 2017-01-01 RX ADMIN — HYDROCODONE BITARTRATE AND ACETAMINOPHEN 1 TABLET: 7.5; 325 TABLET ORAL at 08:00

## 2017-01-01 RX ADMIN — LITHIUM CARBONATE 450 MG: 450 TABLET, EXTENDED RELEASE ORAL at 21:12

## 2017-01-01 RX ADMIN — OXYCODONE HYDROCHLORIDE 10 MG: 100 SOLUTION ORAL at 09:05

## 2017-01-01 RX ADMIN — TRAZODONE HYDROCHLORIDE 200 MG: 100 TABLET ORAL at 22:29

## 2017-01-01 RX ADMIN — HYDROCODONE BITARTRATE AND ACETAMINOPHEN 2 TABLET: 10; 325 TABLET ORAL at 13:02

## 2017-01-01 RX ADMIN — LORAZEPAM 0.5 MG: 0.5 TABLET ORAL at 17:47

## 2017-01-01 RX ADMIN — Medication 2 MG: at 23:59

## 2017-01-01 RX ADMIN — MUPIROCIN: 20 OINTMENT TOPICAL at 20:49

## 2017-01-01 RX ADMIN — ACETAMINOPHEN 1000 MG: 10 INJECTION, SOLUTION INTRAVENOUS at 09:03

## 2017-01-01 RX ADMIN — HYDROCODONE BITARTRATE AND ACETAMINOPHEN 1 TABLET: 10; 325 TABLET ORAL at 01:43

## 2017-01-01 RX ADMIN — HYDROCODONE BITARTRATE AND ACETAMINOPHEN 2 TABLET: 10; 325 TABLET ORAL at 03:46

## 2017-01-01 RX ADMIN — PROPOFOL 30 MG: 10 INJECTION, EMULSION INTRAVENOUS at 11:50

## 2017-01-01 RX ADMIN — OXYCODONE HYDROCHLORIDE 10 MG: 100 SOLUTION ORAL at 14:02

## 2017-01-01 RX ADMIN — GUAIFENESIN 1200 MG: 600 TABLET, EXTENDED RELEASE ORAL at 08:47

## 2017-01-01 RX ADMIN — GABAPENTIN 600 MG: 300 CAPSULE ORAL at 17:17

## 2017-01-01 RX ADMIN — LITHIUM CARBONATE 300 MG: 300 CAPSULE ORAL at 09:48

## 2017-01-01 RX ADMIN — HYDROCODONE BITARTRATE AND ACETAMINOPHEN 2 TABLET: 10; 325 TABLET ORAL at 06:47

## 2017-01-01 RX ADMIN — CEFTRIAXONE 1 G: 1 INJECTION, POWDER, FOR SOLUTION INTRAMUSCULAR; INTRAVENOUS at 12:45

## 2017-01-01 RX ADMIN — HYDROCODONE BITARTRATE AND ACETAMINOPHEN 1 TABLET: 10; 325 TABLET ORAL at 09:11

## 2017-01-01 RX ADMIN — GUAIFENESIN 400 MG: 100 SOLUTION ORAL at 10:28

## 2017-01-01 RX ADMIN — FLUOXETINE 20 MG: 20 CAPSULE ORAL at 11:24

## 2017-01-01 RX ADMIN — PREDNISONE 10 MG: 10 TABLET ORAL at 09:15

## 2017-01-01 RX ADMIN — CEFEPIME HYDROCHLORIDE 2 G: 2 INJECTION, POWDER, FOR SOLUTION INTRAVENOUS at 21:28

## 2017-01-01 RX ADMIN — Medication 10 ML: at 21:33

## 2017-01-01 RX ADMIN — IPRATROPIUM BROMIDE AND ALBUTEROL SULFATE 3 ML: .5; 3 SOLUTION RESPIRATORY (INHALATION) at 18:16

## 2017-01-01 RX ADMIN — TRAZODONE HYDROCHLORIDE 200 MG: 100 TABLET ORAL at 21:22

## 2017-01-01 RX ADMIN — ACETYLCYSTEINE 400 MG: 200 SOLUTION ORAL; RESPIRATORY (INHALATION) at 21:53

## 2017-01-01 RX ADMIN — HYDROCODONE BITARTRATE AND ACETAMINOPHEN 1 TABLET: 10; 325 TABLET ORAL at 07:53

## 2017-01-01 RX ADMIN — GABAPENTIN 100 MG: 100 CAPSULE ORAL at 17:02

## 2017-01-01 RX ADMIN — TRAZODONE HYDROCHLORIDE 200 MG: 100 TABLET ORAL at 21:21

## 2017-01-01 RX ADMIN — GUAIFENESIN 400 MG: 100 SOLUTION ORAL at 09:02

## 2017-01-01 RX ADMIN — GUAIFENESIN 1200 MG: 600 TABLET, EXTENDED RELEASE ORAL at 09:34

## 2017-01-01 RX ADMIN — GUAIFENESIN 400 MG: 100 SOLUTION ORAL at 18:52

## 2017-01-01 RX ADMIN — GUAIFENESIN 400 MG: 100 SOLUTION ORAL at 12:31

## 2017-01-01 RX ADMIN — SODIUM CHLORIDE 75 ML/HR: 450 INJECTION, SOLUTION INTRAVENOUS at 17:53

## 2017-01-01 RX ADMIN — IPRATROPIUM BROMIDE AND ALBUTEROL SULFATE 3 ML: .5; 3 SOLUTION RESPIRATORY (INHALATION) at 13:29

## 2017-01-01 RX ADMIN — SODIUM CHLORIDE 100 ML/HR: 900 INJECTION, SOLUTION INTRAVENOUS at 13:58

## 2017-01-01 RX ADMIN — LEVOFLOXACIN 750 MG: 5 INJECTION, SOLUTION INTRAVENOUS at 07:56

## 2017-01-01 RX ADMIN — TRAZODONE HYDROCHLORIDE 200 MG: 100 TABLET ORAL at 21:33

## 2017-01-01 RX ADMIN — IPRATROPIUM BROMIDE AND ALBUTEROL SULFATE 3 ML: .5; 3 SOLUTION RESPIRATORY (INHALATION) at 22:24

## 2017-01-01 RX ADMIN — METHYLPREDNISOLONE SODIUM SUCCINATE 20 MG: 40 INJECTION, POWDER, FOR SOLUTION INTRAMUSCULAR; INTRAVENOUS at 22:24

## 2017-01-01 RX ADMIN — LEVOFLOXACIN 750 MG: 5 INJECTION, SOLUTION INTRAVENOUS at 12:33

## 2017-01-01 RX ADMIN — VANCOMYCIN HYDROCHLORIDE 1000 MG: 1 INJECTION, POWDER, LYOPHILIZED, FOR SOLUTION INTRAVENOUS at 04:46

## 2017-01-01 RX ADMIN — MORPHINE SULFATE 4 MG: 4 INJECTION, SOLUTION INTRAMUSCULAR; INTRAVENOUS at 05:54

## 2017-01-01 RX ADMIN — Medication 2 MG: at 01:28

## 2017-01-01 RX ADMIN — TRAZODONE HYDROCHLORIDE 200 MG: 100 TABLET ORAL at 21:48

## 2017-01-01 RX ADMIN — IPRATROPIUM BROMIDE AND ALBUTEROL SULFATE 3 ML: .5; 3 SOLUTION RESPIRATORY (INHALATION) at 15:23

## 2017-01-01 RX ADMIN — HEPARIN SODIUM 5000 UNITS: 5000 INJECTION, SOLUTION INTRAVENOUS; SUBCUTANEOUS at 09:03

## 2017-01-01 RX ADMIN — ACETYLCYSTEINE 400 MG: 200 SOLUTION ORAL; RESPIRATORY (INHALATION) at 02:32

## 2017-01-01 RX ADMIN — METHYLPREDNISOLONE SODIUM SUCCINATE 40 MG: 40 INJECTION, POWDER, FOR SOLUTION INTRAMUSCULAR; INTRAVENOUS at 21:25

## 2017-01-01 RX ADMIN — OXYCODONE HYDROCHLORIDE 10 MG: 100 SOLUTION ORAL at 09:16

## 2017-01-01 RX ADMIN — GABAPENTIN 200 MG: 100 CAPSULE ORAL at 08:31

## 2017-01-01 RX ADMIN — LORAZEPAM 1 MG: 2 INJECTION INTRAMUSCULAR; INTRAVENOUS at 18:57

## 2017-01-01 RX ADMIN — HEPARIN SODIUM 5000 UNITS: 5000 INJECTION, SOLUTION INTRAVENOUS; SUBCUTANEOUS at 21:36

## 2017-01-01 RX ADMIN — POLYETHYLENE GLYCOL 3350 17 G: 17 POWDER, FOR SOLUTION ORAL at 09:36

## 2017-01-01 RX ADMIN — ENOXAPARIN SODIUM 40 MG: 40 INJECTION SUBCUTANEOUS at 21:52

## 2017-01-01 RX ADMIN — ASPIRIN 81 MG: 81 TABLET, COATED ORAL at 14:13

## 2017-01-01 RX ADMIN — LORAZEPAM 1 MG: 2 SOLUTION, CONCENTRATE ORAL at 09:33

## 2017-01-01 RX ADMIN — LORAZEPAM 1 MG: 2 INJECTION INTRAMUSCULAR; INTRAVENOUS at 20:20

## 2017-01-01 RX ADMIN — LORAZEPAM 2 MG: 2 INJECTION INTRAMUSCULAR; INTRAVENOUS at 01:23

## 2017-01-01 RX ADMIN — PREGABALIN 100 MG: 100 CAPSULE ORAL at 21:50

## 2017-01-01 RX ADMIN — GABAPENTIN 100 MG: 100 CAPSULE ORAL at 08:07

## 2017-01-01 RX ADMIN — SODIUM CHLORIDE 100 ML/HR: 900 INJECTION, SOLUTION INTRAVENOUS at 09:45

## 2017-01-01 RX ADMIN — IPRATROPIUM BROMIDE AND ALBUTEROL SULFATE 3 ML: .5; 3 SOLUTION RESPIRATORY (INHALATION) at 23:35

## 2017-01-01 RX ADMIN — VANCOMYCIN HYDROCHLORIDE 750 MG: 750 INJECTION, POWDER, LYOPHILIZED, FOR SOLUTION INTRAVENOUS at 11:11

## 2017-01-01 RX ADMIN — GABAPENTIN 200 MG: 100 CAPSULE ORAL at 08:55

## 2017-01-01 RX ADMIN — METOPROLOL TARTRATE 25 MG: 25 TABLET ORAL at 18:58

## 2017-01-01 RX ADMIN — GABAPENTIN 300 MG: 300 CAPSULE ORAL at 21:38

## 2017-01-01 RX ADMIN — Medication 10 ML: at 04:48

## 2017-01-01 RX ADMIN — ALBUTEROL SULFATE 2.5 MG: 2.5 SOLUTION RESPIRATORY (INHALATION) at 13:00

## 2017-01-01 RX ADMIN — OXYCODONE HYDROCHLORIDE 5 MG: 5 TABLET ORAL at 02:57

## 2017-01-01 RX ADMIN — METOPROLOL TARTRATE 12.5 MG: 25 TABLET ORAL at 21:51

## 2017-01-01 RX ADMIN — AMLODIPINE BESYLATE 10 MG: 5 TABLET ORAL at 08:21

## 2017-01-01 RX ADMIN — METOPROLOL TARTRATE 25 MG: 25 TABLET, FILM COATED ORAL at 09:14

## 2017-01-01 RX ADMIN — PIPERACILLIN SODIUM,TAZOBACTAM SODIUM 4.5 G: 4; .5 INJECTION, POWDER, FOR SOLUTION INTRAVENOUS at 05:14

## 2017-01-01 RX ADMIN — CEFEPIME HYDROCHLORIDE 2 G: 2 INJECTION, POWDER, FOR SOLUTION INTRAVENOUS at 19:35

## 2017-01-01 RX ADMIN — CEFEPIME HYDROCHLORIDE 2 G: 2 INJECTION, POWDER, FOR SOLUTION INTRAVENOUS at 17:59

## 2017-01-01 RX ADMIN — AMOXICILLIN AND CLAVULANATE POTASSIUM 500 MG: 250; 62.5 POWDER, FOR SUSPENSION ORAL at 09:04

## 2017-01-01 RX ADMIN — IPRATROPIUM BROMIDE AND ALBUTEROL SULFATE 3 ML: .5; 3 SOLUTION RESPIRATORY (INHALATION) at 07:51

## 2017-01-01 RX ADMIN — OXYCODONE HYDROCHLORIDE 10 MG: 100 SOLUTION ORAL at 23:17

## 2017-01-01 RX ADMIN — HEPARIN SODIUM 5000 UNITS: 5000 INJECTION, SOLUTION INTRAVENOUS; SUBCUTANEOUS at 21:49

## 2017-01-01 RX ADMIN — CEFAZOLIN 2 G: 10 INJECTION, POWDER, FOR SOLUTION INTRAVENOUS; PARENTERAL at 20:20

## 2017-01-01 RX ADMIN — VANCOMYCIN HYDROCHLORIDE 1750 MG: 10 INJECTION, POWDER, LYOPHILIZED, FOR SOLUTION INTRAVENOUS at 04:32

## 2017-01-01 RX ADMIN — LITHIUM CARBONATE 450 MG: 450 TABLET, EXTENDED RELEASE ORAL at 21:27

## 2017-01-01 RX ADMIN — METHYLPREDNISOLONE SODIUM SUCCINATE 20 MG: 40 INJECTION, POWDER, FOR SOLUTION INTRAMUSCULAR; INTRAVENOUS at 22:04

## 2017-01-01 RX ADMIN — CEFAZOLIN SODIUM 1 G: 1 INJECTION, POWDER, FOR SOLUTION INTRAMUSCULAR; INTRAVENOUS at 08:21

## 2017-01-01 RX ADMIN — IPRATROPIUM BROMIDE AND ALBUTEROL SULFATE 3 ML: .5; 3 SOLUTION RESPIRATORY (INHALATION) at 08:10

## 2017-01-01 RX ADMIN — SODIUM CHLORIDE 500 ML: 900 INJECTION, SOLUTION INTRAVENOUS at 19:10

## 2017-01-01 RX ADMIN — PIPERACILLIN SODIUM,TAZOBACTAM SODIUM 4.5 G: 4; .5 INJECTION, POWDER, FOR SOLUTION INTRAVENOUS at 05:38

## 2017-01-01 RX ADMIN — Medication 4 MG: at 10:12

## 2017-01-01 RX ADMIN — IPRATROPIUM BROMIDE AND ALBUTEROL SULFATE 3 ML: .5; 3 SOLUTION RESPIRATORY (INHALATION) at 09:06

## 2017-01-01 RX ADMIN — PIPERACILLIN SODIUM,TAZOBACTAM SODIUM 3.38 G: 3; .375 INJECTION, POWDER, FOR SOLUTION INTRAVENOUS at 05:59

## 2017-01-01 RX ADMIN — HEPARIN SODIUM 5000 UNITS: 5000 INJECTION, SOLUTION INTRAVENOUS; SUBCUTANEOUS at 14:59

## 2017-01-01 RX ADMIN — PIPERACILLIN SODIUM,TAZOBACTAM SODIUM 3.38 G: 3; .375 INJECTION, POWDER, FOR SOLUTION INTRAVENOUS at 05:42

## 2017-01-01 RX ADMIN — GLYCOPYRROLATE 0.2 MG: 0.2 INJECTION INTRAMUSCULAR; INTRAVENOUS at 06:33

## 2017-01-01 RX ADMIN — GABAPENTIN 600 MG: 300 CAPSULE ORAL at 08:57

## 2017-01-01 RX ADMIN — IPRATROPIUM BROMIDE AND ALBUTEROL SULFATE 3 ML: .5; 3 SOLUTION RESPIRATORY (INHALATION) at 03:28

## 2017-01-01 RX ADMIN — LITHIUM CARBONATE 150 MG: 300 TABLET ORAL at 09:27

## 2017-01-01 RX ADMIN — GUAIFENESIN 1200 MG: 600 TABLET, EXTENDED RELEASE ORAL at 22:03

## 2017-01-01 RX ADMIN — IPRATROPIUM BROMIDE AND ALBUTEROL SULFATE 3 ML: .5; 3 SOLUTION RESPIRATORY (INHALATION) at 13:53

## 2017-01-01 RX ADMIN — OXYCODONE HYDROCHLORIDE 10 MG: 100 SOLUTION ORAL at 09:49

## 2017-01-01 RX ADMIN — AMITRIPTYLINE HYDROCHLORIDE 10 MG: 10 TABLET, FILM COATED ORAL at 22:27

## 2017-01-01 RX ADMIN — Medication 4 MG: at 17:59

## 2017-01-01 RX ADMIN — IPRATROPIUM BROMIDE AND ALBUTEROL SULFATE 3 ML: .5; 3 SOLUTION RESPIRATORY (INHALATION) at 01:22

## 2017-01-01 RX ADMIN — Medication 8.6 MG: at 08:07

## 2017-01-01 RX ADMIN — ENOXAPARIN SODIUM 40 MG: 40 INJECTION SUBCUTANEOUS at 21:51

## 2017-01-01 RX ADMIN — PIPERACILLIN SODIUM,TAZOBACTAM SODIUM 4.5 G: 4; .5 INJECTION, POWDER, FOR SOLUTION INTRAVENOUS at 23:44

## 2017-01-01 RX ADMIN — LORAZEPAM 0.5 MG: 0.5 TABLET ORAL at 17:00

## 2017-01-01 RX ADMIN — Medication 4 MG: at 00:12

## 2017-01-01 RX ADMIN — ENOXAPARIN SODIUM 40 MG: 40 INJECTION SUBCUTANEOUS at 09:05

## 2017-01-01 RX ADMIN — Medication 8.6 MG: at 08:31

## 2017-01-01 RX ADMIN — TRAZODONE HYDROCHLORIDE 200 MG: 100 TABLET ORAL at 22:22

## 2017-01-01 RX ADMIN — IPRATROPIUM BROMIDE AND ALBUTEROL SULFATE 3 ML: .5; 3 SOLUTION RESPIRATORY (INHALATION) at 02:00

## 2017-01-01 RX ADMIN — PREDNISONE 20 MG: 20 TABLET ORAL at 08:04

## 2017-01-01 RX ADMIN — PREDNISONE 10 MG: 10 TABLET ORAL at 09:51

## 2017-01-01 RX ADMIN — Medication 2 MG: at 01:48

## 2017-01-01 RX ADMIN — HYDROCODONE BITARTRATE AND ACETAMINOPHEN 2 TABLET: 10; 325 TABLET ORAL at 08:39

## 2017-01-01 RX ADMIN — MORPHINE SULFATE 4 MG: 4 INJECTION, SOLUTION INTRAMUSCULAR; INTRAVENOUS at 10:58

## 2017-01-01 RX ADMIN — LORAZEPAM 2 MG: 2 INJECTION INTRAMUSCULAR; INTRAVENOUS at 07:54

## 2017-01-01 RX ADMIN — LITHIUM CARBONATE 300 MG: 300 CAPSULE, GELATIN COATED ORAL at 18:23

## 2017-01-01 RX ADMIN — LORAZEPAM 0.5 MG: 0.5 TABLET ORAL at 08:21

## 2017-01-01 RX ADMIN — IPRATROPIUM BROMIDE AND ALBUTEROL SULFATE 3 ML: .5; 3 SOLUTION RESPIRATORY (INHALATION) at 11:13

## 2017-01-01 RX ADMIN — PIPERACILLIN SODIUM,TAZOBACTAM SODIUM 3.38 G: 3; .375 INJECTION, POWDER, FOR SOLUTION INTRAVENOUS at 06:00

## 2017-01-01 RX ADMIN — SODIUM CHLORIDE 100 ML/HR: 900 INJECTION, SOLUTION INTRAVENOUS at 04:39

## 2017-01-01 RX ADMIN — Medication 10 ML: at 13:25

## 2017-01-01 RX ADMIN — OXYCODONE HYDROCHLORIDE 10 MG: 100 SOLUTION ORAL at 19:57

## 2017-01-01 RX ADMIN — DEXTROSE MONOHYDRATE 2 MCG/MIN: 5 INJECTION, SOLUTION INTRAVENOUS at 05:37

## 2017-01-01 RX ADMIN — LORAZEPAM 1 MG: 2 SOLUTION, CONCENTRATE ORAL at 10:01

## 2017-01-01 RX ADMIN — IPRATROPIUM BROMIDE AND ALBUTEROL SULFATE 3 ML: .5; 2.5 SOLUTION RESPIRATORY (INHALATION) at 05:54

## 2017-01-01 RX ADMIN — HYDROCODONE BITARTRATE AND ACETAMINOPHEN 2 TABLET: 10; 325 TABLET ORAL at 04:14

## 2017-01-01 RX ADMIN — IPRATROPIUM BROMIDE AND ALBUTEROL SULFATE 3 ML: .5; 3 SOLUTION RESPIRATORY (INHALATION) at 01:36

## 2017-01-01 RX ADMIN — CEPHALEXIN 500 MG: 250 CAPSULE ORAL at 00:29

## 2017-01-01 RX ADMIN — ASPIRIN 81 MG: 81 TABLET, COATED ORAL at 08:57

## 2017-01-01 RX ADMIN — TRAZODONE HYDROCHLORIDE 200 MG: 100 TABLET ORAL at 21:05

## 2017-01-01 RX ADMIN — PIPERACILLIN SODIUM,TAZOBACTAM SODIUM 3.38 G: 3; .375 INJECTION, POWDER, FOR SOLUTION INTRAVENOUS at 21:21

## 2017-01-01 RX ADMIN — HYDROMORPHONE HYDROCHLORIDE 1 MG: 1 INJECTION, SOLUTION INTRAMUSCULAR; INTRAVENOUS; SUBCUTANEOUS at 13:59

## 2017-01-01 RX ADMIN — HYDROCODONE BITARTRATE AND ACETAMINOPHEN 2 TABLET: 10; 325 TABLET ORAL at 08:17

## 2017-01-01 RX ADMIN — Medication 10 ML: at 21:45

## 2017-01-01 RX ADMIN — Medication 10 ML: at 14:22

## 2017-01-01 RX ADMIN — PIPERACILLIN SODIUM,TAZOBACTAM SODIUM 3.38 G: 3; .375 INJECTION, POWDER, FOR SOLUTION INTRAVENOUS at 13:26

## 2017-01-01 RX ADMIN — MORPHINE SULFATE 4 MG: 4 INJECTION, SOLUTION INTRAMUSCULAR; INTRAVENOUS at 14:58

## 2017-01-01 RX ADMIN — SODIUM CHLORIDE 100 ML/HR: 900 INJECTION, SOLUTION INTRAVENOUS at 14:39

## 2017-01-01 RX ADMIN — LEVOFLOXACIN 750 MG: 5 INJECTION, SOLUTION INTRAVENOUS at 17:18

## 2017-01-01 RX ADMIN — OXYCODONE HYDROCHLORIDE 10 MG: 100 SOLUTION ORAL at 18:10

## 2017-01-01 RX ADMIN — IPRATROPIUM BROMIDE AND ALBUTEROL SULFATE 3 ML: .5; 3 SOLUTION RESPIRATORY (INHALATION) at 07:18

## 2017-01-01 RX ADMIN — AMLODIPINE BESYLATE 5 MG: 5 TABLET ORAL at 09:27

## 2017-01-01 RX ADMIN — ACETYLCYSTEINE 400 MG: 200 SOLUTION ORAL; RESPIRATORY (INHALATION) at 13:53

## 2017-01-01 RX ADMIN — HYDROCODONE BITARTRATE AND ACETAMINOPHEN 2 TABLET: 10; 325 TABLET ORAL at 21:33

## 2017-01-01 RX ADMIN — FLUOXETINE HYDROCHLORIDE 20 MG: 20 SOLUTION ORAL at 11:11

## 2017-01-01 RX ADMIN — PREGABALIN 100 MG: 100 CAPSULE ORAL at 08:17

## 2017-01-01 RX ADMIN — SODIUM CHLORIDE 75 ML/HR: 450 INJECTION, SOLUTION INTRAVENOUS at 04:00

## 2017-01-01 RX ADMIN — OXYCODONE HYDROCHLORIDE 10 MG: 100 SOLUTION ORAL at 18:34

## 2017-01-01 RX ADMIN — Medication 10 ML: at 21:36

## 2017-01-01 RX ADMIN — TRAZODONE HYDROCHLORIDE 200 MG: 100 TABLET ORAL at 21:43

## 2017-01-01 RX ADMIN — METHYLPREDNISOLONE SODIUM SUCCINATE 60 MG: 125 INJECTION, POWDER, FOR SOLUTION INTRAMUSCULAR; INTRAVENOUS at 01:43

## 2017-01-01 RX ADMIN — ALBUTEROL SULFATE 2.5 MG: 2.5 SOLUTION RESPIRATORY (INHALATION) at 01:09

## 2017-01-01 RX ADMIN — HYDROMORPHONE HYDROCHLORIDE 4 MG: 2 TABLET ORAL at 18:23

## 2017-01-01 RX ADMIN — Medication 10 ML: at 14:19

## 2017-01-01 RX ADMIN — OXYCODONE HYDROCHLORIDE 10 MG: 100 SOLUTION ORAL at 05:14

## 2017-01-01 RX ADMIN — LEVOFLOXACIN 750 MG: 5 INJECTION, SOLUTION INTRAVENOUS at 10:02

## 2017-01-01 RX ADMIN — ASPIRIN 325 MG ORAL TABLET 325 MG: 325 PILL ORAL at 12:46

## 2017-01-01 RX ADMIN — METOPROLOL TARTRATE 25 MG: 25 TABLET, FILM COATED ORAL at 09:35

## 2017-01-01 RX ADMIN — Medication 8.6 MG: at 08:55

## 2017-01-01 RX ADMIN — FLUOXETINE 20 MG: 20 CAPSULE ORAL at 08:07

## 2017-01-01 RX ADMIN — PIPERACILLIN SODIUM,TAZOBACTAM SODIUM 3.38 G: 3; .375 INJECTION, POWDER, FOR SOLUTION INTRAVENOUS at 13:44

## 2017-01-01 RX ADMIN — HYDROMORPHONE HYDROCHLORIDE 1 MG: 1 INJECTION, SOLUTION INTRAMUSCULAR; INTRAVENOUS; SUBCUTANEOUS at 02:43

## 2017-01-01 RX ADMIN — HYDROCODONE BITARTRATE AND ACETAMINOPHEN 1 TABLET: 7.5; 325 TABLET ORAL at 02:00

## 2017-01-01 RX ADMIN — Medication 10 ML: at 14:02

## 2017-01-01 RX ADMIN — LORAZEPAM 1 MG: 2 INJECTION INTRAMUSCULAR; INTRAVENOUS at 22:05

## 2017-01-01 RX ADMIN — Medication 10 ML: at 05:13

## 2017-01-01 RX ADMIN — Medication 4 MG: at 12:49

## 2017-01-01 RX ADMIN — Medication 2 MG: at 10:51

## 2017-01-01 RX ADMIN — OXYCODONE HYDROCHLORIDE 10 MG: 100 SOLUTION ORAL at 13:40

## 2017-01-01 RX ADMIN — GUAIFENESIN 1200 MG: 600 TABLET, EXTENDED RELEASE ORAL at 21:47

## 2017-01-01 RX ADMIN — SODIUM CHLORIDE 75 ML/HR: 450 INJECTION, SOLUTION INTRAVENOUS at 10:18

## 2017-01-01 RX ADMIN — LITHIUM CARBONATE 300 MG: 300 TABLET, EXTENDED RELEASE ORAL at 22:47

## 2017-01-01 RX ADMIN — METOPROLOL TARTRATE 12.5 MG: 25 TABLET ORAL at 09:04

## 2017-01-01 RX ADMIN — IPRATROPIUM BROMIDE AND ALBUTEROL SULFATE 3 ML: .5; 3 SOLUTION RESPIRATORY (INHALATION) at 15:30

## 2017-01-01 RX ADMIN — HYDROCODONE BITARTRATE AND ACETAMINOPHEN 1 TABLET: 10; 325 TABLET ORAL at 15:32

## 2017-01-01 RX ADMIN — FLUOXETINE 20 MG: 20 CAPSULE ORAL at 09:15

## 2017-01-01 RX ADMIN — OXYCODONE HYDROCHLORIDE 10 MG: 100 SOLUTION ORAL at 21:45

## 2017-01-01 RX ADMIN — LORAZEPAM 1 MG: 2 INJECTION INTRAMUSCULAR; INTRAVENOUS at 22:25

## 2017-01-01 RX ADMIN — ACETAMINOPHEN 500 MG: 500 TABLET, FILM COATED ORAL at 05:34

## 2017-01-01 RX ADMIN — AMLODIPINE BESYLATE 10 MG: 5 TABLET ORAL at 08:51

## 2017-01-01 RX ADMIN — HEPARIN SODIUM 5000 UNITS: 5000 INJECTION, SOLUTION INTRAVENOUS; SUBCUTANEOUS at 08:51

## 2017-01-01 RX ADMIN — LEVOFLOXACIN 750 MG: 5 INJECTION, SOLUTION INTRAVENOUS at 11:43

## 2017-01-01 RX ADMIN — SODIUM CHLORIDE 75 ML/HR: 450 INJECTION, SOLUTION INTRAVENOUS at 19:50

## 2017-01-01 RX ADMIN — AMOXICILLIN AND CLAVULANATE POTASSIUM 500 MG: 250; 62.5 POWDER, FOR SUSPENSION ORAL at 21:24

## 2017-01-01 RX ADMIN — GABAPENTIN 600 MG: 300 CAPSULE ORAL at 16:26

## 2017-01-01 RX ADMIN — FLUOXETINE 20 MG: 20 CAPSULE ORAL at 08:56

## 2017-01-01 RX ADMIN — VANCOMYCIN HYDROCHLORIDE 1000 MG: 1 INJECTION, POWDER, LYOPHILIZED, FOR SOLUTION INTRAVENOUS at 11:15

## 2017-01-01 RX ADMIN — ACETYLCYSTEINE 400 MG: 200 SOLUTION ORAL; RESPIRATORY (INHALATION) at 14:26

## 2017-01-01 RX ADMIN — PIPERACILLIN SODIUM,TAZOBACTAM SODIUM 3.38 G: 3; .375 INJECTION, POWDER, FOR SOLUTION INTRAVENOUS at 20:04

## 2017-01-01 RX ADMIN — ACETYLCYSTEINE 400 MG: 200 SOLUTION ORAL; RESPIRATORY (INHALATION) at 22:09

## 2017-01-01 RX ADMIN — HEPARIN SODIUM 5000 UNITS: 5000 INJECTION, SOLUTION INTRAVENOUS; SUBCUTANEOUS at 08:10

## 2017-01-01 RX ADMIN — ACETAMINOPHEN 500 MG: 500 TABLET, FILM COATED ORAL at 08:01

## 2017-01-01 RX ADMIN — SODIUM CHLORIDE 125 ML/HR: 900 INJECTION, SOLUTION INTRAVENOUS at 04:54

## 2017-01-01 RX ADMIN — GUAIFENESIN 1200 MG: 600 TABLET, EXTENDED RELEASE ORAL at 21:12

## 2017-01-01 RX ADMIN — PIPERACILLIN SODIUM,TAZOBACTAM SODIUM 3.38 G: 3; .375 INJECTION, POWDER, FOR SOLUTION INTRAVENOUS at 12:42

## 2017-01-01 RX ADMIN — GABAPENTIN 600 MG: 300 CAPSULE ORAL at 09:37

## 2017-01-01 RX ADMIN — TRAZODONE HYDROCHLORIDE 200 MG: 100 TABLET ORAL at 23:24

## 2017-01-01 RX ADMIN — CEFAZOLIN 2 G: 10 INJECTION, POWDER, FOR SOLUTION INTRAVENOUS; PARENTERAL at 20:48

## 2017-01-01 RX ADMIN — IPRATROPIUM BROMIDE AND ALBUTEROL SULFATE 3 ML: .5; 3 SOLUTION RESPIRATORY (INHALATION) at 12:00

## 2017-01-01 RX ADMIN — GUAIFENESIN 400 MG: 100 SOLUTION ORAL at 22:23

## 2017-01-01 RX ADMIN — GABAPENTIN 600 MG: 300 CAPSULE ORAL at 21:35

## 2017-01-01 RX ADMIN — GUAIFENESIN 400 MG: 100 SOLUTION ORAL at 09:48

## 2017-01-01 RX ADMIN — AMLODIPINE BESYLATE 10 MG: 5 TABLET ORAL at 08:57

## 2017-01-01 RX ADMIN — IPRATROPIUM BROMIDE AND ALBUTEROL SULFATE 3 ML: .5; 3 SOLUTION RESPIRATORY (INHALATION) at 03:34

## 2017-01-01 RX ADMIN — Medication 10 ML: at 05:01

## 2017-01-01 RX ADMIN — MUPIROCIN: 20 OINTMENT TOPICAL at 08:08

## 2017-01-01 RX ADMIN — HYDROCODONE BITARTRATE AND ACETAMINOPHEN 1 TABLET: 10; 325 TABLET ORAL at 18:36

## 2017-01-01 RX ADMIN — IPRATROPIUM BROMIDE AND ALBUTEROL SULFATE 3 ML: .5; 3 SOLUTION RESPIRATORY (INHALATION) at 07:40

## 2017-01-01 RX ADMIN — Medication 2 MG: at 00:57

## 2017-01-01 RX ADMIN — HYDROCODONE BITARTRATE AND ACETAMINOPHEN 2 TABLET: 10; 325 TABLET ORAL at 16:26

## 2017-01-01 RX ADMIN — GABAPENTIN 600 MG: 300 CAPSULE ORAL at 21:48

## 2017-01-01 RX ADMIN — METOPROLOL TARTRATE 25 MG: 25 TABLET ORAL at 18:32

## 2017-01-01 RX ADMIN — LITHIUM CARBONATE 450 MG: 450 TABLET, EXTENDED RELEASE ORAL at 21:32

## 2017-01-01 RX ADMIN — GUAIFENESIN AND DEXTROMETHORPHAN HYDROBROMIDE 1 TABLET: 600; 30 TABLET, EXTENDED RELEASE ORAL at 08:51

## 2017-01-01 RX ADMIN — GABAPENTIN 600 MG: 300 CAPSULE ORAL at 16:52

## 2017-01-01 RX ADMIN — HYDROCODONE BITARTRATE AND ACETAMINOPHEN 2 TABLET: 10; 325 TABLET ORAL at 17:43

## 2017-01-01 RX ADMIN — OXYCODONE HYDROCHLORIDE 10 MG: 100 SOLUTION ORAL at 09:35

## 2017-01-01 RX ADMIN — METOPROLOL TARTRATE 25 MG: 25 TABLET, FILM COATED ORAL at 21:12

## 2017-01-01 RX ADMIN — FLUOXETINE 20 MG: 20 CAPSULE ORAL at 08:55

## 2017-01-01 RX ADMIN — SODIUM CHLORIDE 75 ML/HR: 450 INJECTION, SOLUTION INTRAVENOUS at 01:14

## 2017-01-01 RX ADMIN — SODIUM CHLORIDE 100 ML/HR: 900 INJECTION, SOLUTION INTRAVENOUS at 11:41

## 2017-01-01 RX ADMIN — CEFAZOLIN SODIUM 1 G: 1 INJECTION, POWDER, FOR SOLUTION INTRAMUSCULAR; INTRAVENOUS at 22:23

## 2017-01-01 RX ADMIN — MORPHINE SULFATE 4 MG: 4 INJECTION, SOLUTION INTRAMUSCULAR; INTRAVENOUS at 07:54

## 2017-01-01 RX ADMIN — METRONIDAZOLE 500 MG: 500 INJECTION, SOLUTION INTRAVENOUS at 18:58

## 2017-01-01 RX ADMIN — LEVOFLOXACIN 750 MG: 5 INJECTION, SOLUTION INTRAVENOUS at 12:20

## 2017-01-01 RX ADMIN — LITHIUM CARBONATE 300 MG: 300 CAPSULE, GELATIN COATED ORAL at 17:43

## 2017-01-01 RX ADMIN — POLYETHYLENE GLYCOL 3350 17 G: 17 POWDER, FOR SOLUTION ORAL at 08:16

## 2017-01-01 RX ADMIN — HEPARIN SODIUM 5000 UNITS: 5000 INJECTION, SOLUTION INTRAVENOUS; SUBCUTANEOUS at 21:15

## 2017-01-01 RX ADMIN — Medication 10 ML: at 05:27

## 2017-01-01 RX ADMIN — Medication 10 ML: at 21:13

## 2017-01-01 RX ADMIN — PIPERACILLIN SODIUM,TAZOBACTAM SODIUM 3.38 G: 3; .375 INJECTION, POWDER, FOR SOLUTION INTRAVENOUS at 23:22

## 2017-01-01 RX ADMIN — IPRATROPIUM BROMIDE AND ALBUTEROL SULFATE 3 ML: .5; 3 SOLUTION RESPIRATORY (INHALATION) at 07:39

## 2017-01-01 RX ADMIN — PREDNISONE 10 MG: 10 TABLET ORAL at 09:04

## 2017-01-01 RX ADMIN — HYDROCODONE BITARTRATE AND ACETAMINOPHEN 1 TABLET: 10; 325 TABLET ORAL at 18:31

## 2017-01-01 RX ADMIN — HYDROCODONE BITARTRATE AND ACETAMINOPHEN 1 TABLET: 10; 325 TABLET ORAL at 18:37

## 2017-01-01 RX ADMIN — LORAZEPAM 0.5 MG: 0.5 TABLET ORAL at 08:31

## 2017-01-01 RX ADMIN — CEFAZOLIN 2 G: 10 INJECTION, POWDER, FOR SOLUTION INTRAVENOUS; PARENTERAL at 11:22

## 2017-01-01 RX ADMIN — LORAZEPAM 0.5 MG: 2 INJECTION INTRAMUSCULAR; INTRAVENOUS at 12:26

## 2017-01-01 RX ADMIN — HYDROCODONE BITARTRATE AND ACETAMINOPHEN 10 MG: 2.5; 108 SOLUTION ORAL at 20:12

## 2017-01-01 RX ADMIN — ASPIRIN 81 MG: 81 TABLET, COATED ORAL at 08:07

## 2017-01-01 RX ADMIN — IPRATROPIUM BROMIDE AND ALBUTEROL SULFATE 3 ML: .5; 3 SOLUTION RESPIRATORY (INHALATION) at 19:24

## 2017-01-01 RX ADMIN — SODIUM CHLORIDE 75 ML/HR: 450 INJECTION, SOLUTION INTRAVENOUS at 09:04

## 2017-01-01 RX ADMIN — PIPERACILLIN SODIUM,TAZOBACTAM SODIUM 3.38 G: 3; .375 INJECTION, POWDER, FOR SOLUTION INTRAVENOUS at 14:59

## 2017-01-01 RX ADMIN — IOPAMIDOL 100 ML: 612 INJECTION, SOLUTION INTRAVENOUS at 09:00

## 2017-01-01 RX ADMIN — Medication 10 ML: at 13:50

## 2017-01-01 RX ADMIN — GUAIFENESIN 400 MG: 100 SOLUTION ORAL at 12:56

## 2017-01-01 RX ADMIN — HYDROCODONE BITARTRATE AND ACETAMINOPHEN 1 TABLET: 10; 325 TABLET ORAL at 09:53

## 2017-01-01 RX ADMIN — ACETYLCYSTEINE 800 MG: 200 SOLUTION ORAL; RESPIRATORY (INHALATION) at 19:37

## 2017-01-01 RX ADMIN — CEFAZOLIN 2 G: 10 INJECTION, POWDER, FOR SOLUTION INTRAVENOUS; PARENTERAL at 05:26

## 2017-01-01 RX ADMIN — PREGABALIN 100 MG: 100 CAPSULE ORAL at 09:15

## 2017-01-01 RX ADMIN — HYDROCODONE BITARTRATE AND ACETAMINOPHEN 2 TABLET: 10; 325 TABLET ORAL at 14:55

## 2017-01-01 RX ADMIN — PIPERACILLIN SODIUM,TAZOBACTAM SODIUM 4.5 G: 4; .5 INJECTION, POWDER, FOR SOLUTION INTRAVENOUS at 06:13

## 2017-01-01 RX ADMIN — METRONIDAZOLE 500 MG: 500 INJECTION, SOLUTION INTRAVENOUS at 02:51

## 2017-01-01 RX ADMIN — METOPROLOL TARTRATE 12.5 MG: 25 TABLET ORAL at 18:52

## 2017-01-01 RX ADMIN — GUAIFENESIN 1200 MG: 600 TABLET, EXTENDED RELEASE ORAL at 08:21

## 2017-01-15 PROBLEM — R57.9 SHOCK (HCC): Status: ACTIVE | Noted: 2017-01-01

## 2017-01-15 PROBLEM — M79.7 FIBROMYALGIA: Status: ACTIVE | Noted: 2017-01-01

## 2017-01-15 PROBLEM — N17.9 ACUTE RENAL INJURY (HCC): Status: ACTIVE | Noted: 2017-01-01

## 2017-01-15 PROBLEM — A41.9 SEPSIS (HCC): Status: ACTIVE | Noted: 2017-01-01

## 2017-01-15 PROBLEM — G89.29 CHRONIC PAIN: Chronic | Status: ACTIVE | Noted: 2017-01-01

## 2017-01-15 PROBLEM — J96.01 ACUTE RESPIRATORY FAILURE WITH HYPOXIA (HCC): Status: ACTIVE | Noted: 2017-01-01

## 2017-01-15 PROBLEM — Z87.891 HISTORY OF TOBACCO USE: Status: ACTIVE | Noted: 2017-01-01

## 2017-01-15 NOTE — IP AVS SNAPSHOT
Current Discharge Medication List  
  
Take these medications at their scheduled times Dose & Instructions Dispensing Information Comments Morning Noon Evening Bedtime  
 amitriptyline 10 mg tablet Commonly known as:  ELAVIL Your next dose is: Today, Tomorrow Other:  ____________ Dose:  10 mg Take 1 Tab by mouth nightly. Quantity:  30 Tab Refills:  5  
     
   
   
   
  
 amLODIPine 10 mg tablet Commonly known as:  Ofelia Burgess Your next dose is: Today, Tomorrow Other:  ____________ Dose:  10 mg Take 1 Tab by mouth daily. Quantity:  30 Tab Refills:  11  
     
   
   
   
  
 cephALEXin 500 mg capsule Commonly known as:  Deatrice Bennett Your next dose is: Today, Tomorrow Other:  ____________ Dose:  500 mg Take 1 Cap by mouth three (3) times daily. Quantity:  15 Cap Refills:  0 FLUoxetine 20 mg tablet Commonly known as:  PROzac Your next dose is: Today, Tomorrow Other:  ____________  
   
   
 daily. Refills:  0  
     
   
   
   
  
 gabapentin 300 mg capsule Commonly known as:  NEURONTIN Your next dose is: Today, Tomorrow Other:  ____________ Dose:  600 mg Take 2 Caps by mouth three (3) times daily. Quantity:  90 Cap Refills:  11  
     
   
   
   
  
 guaiFENesin-dextromethorphan 600-30 mg per tablet Commonly known as:  Doyle & Doyle DM Your next dose is: Today, Tomorrow Other:  ____________ Dose:  1 Tab Take 1 Tab by mouth two (2) times a day. Quantity:  20 Tab Refills:  3  
     
   
   
   
  
 metoprolol tartrate 25 mg tablet Commonly known as:  LOPRESSOR Your next dose is: Today, Tomorrow Other:  ____________ Dose:  25 mg Take 1 Tab by mouth every twelve (12) hours. Quantity:  60 Tab Refills:  11 Senna 8.6 mg tablet Generic drug:  senna Your next dose is: Today, Tomorrow Other:  ____________ Dose:  1 Tab Take 1 Tab by mouth daily. Refills:  0  
     
   
   
   
  
 traZODone 100 mg tablet Commonly known as:  Jeannette Tavarez Your next dose is: Today, Tomorrow Other:  ____________ Dose:  200 mg Take 200 mg by mouth nightly. Refills:  2 Take these medications as needed Dose & Instructions Dispensing Information Comments Morning Noon Evening Bedtime  
 albuterol-ipratropium 2.5 mg-0.5 mg/3 ml Nebu Commonly known as:  Felvinay Hejose raulann Your next dose is: Today, Tomorrow Other:  ____________ Dose:  3 mL  
3 mL by Nebulization route every six (6) hours as needed. Quantity:  100 Nebule Refills:  3  
     
   
   
   
  
 bisacodyl 5 mg Tab Your next dose is: Today, Tomorrow Other:  ____________ Dose:  1 Tab Take 1 Tab by mouth daily as needed. Refills:  0 HYDROcodone-acetaminophen  mg tablet Commonly known as:  Tony Ascencio Your next dose is: Today, Tomorrow Other:  ____________ Dose:  1 Tab Take 1 Tab by mouth every four (4) hours as needed. Max Daily Amount: 6 Tabs. Quantity:  120 Tab Refills:  0 Nebulizer & Compressor machine Your next dose is: Today, Tomorrow Other:  ____________ Dose:  1 Each  
1 Each by Does Not Apply route four (4) times daily as needed. Quantity:  1 Each Refills:  0 Take these medications as directed Dose & Instructions Dispensing Information Comments Morning Noon Evening Bedtime  
 lithium carbonate  mg CR tablet Commonly known as:  ESKALITH CR Your next dose is: Today, Tomorrow Other:  ____________ TAKE 1 TABLET BY ORAL ROUTE PER AT BEDTIME Refills:  0 SORE THROAT (BENZOCAINE-MENTH) 15-2.6 mg Lozg lozenge Generic drug:  benzocaine-menthol Your next dose is: Today, Tomorrow Other:  ____________ Refills:  0 Where to Get Your Medications Information about where to get these medications is not yet available ! Ask your nurse or doctor about these medications  
  albuterol-ipratropium 2.5 mg-0.5 mg/3 ml Nebu  
 amLODIPine 10 mg tablet  
 cephALEXin 500 mg capsule  
 gabapentin 300 mg capsule  
 guaiFENesin-dextromethorphan 600-30 mg per tablet HYDROcodone-acetaminophen  mg tablet  
 metoprolol tartrate 25 mg tablet Nebulizer & Compressor machine

## 2017-01-15 NOTE — IP AVS SNAPSHOT
[address] [facility] 
[phone] Patient: Whit Turcios MRN: [id number] You are allergic to the following No active allergies Recent Documentation Height Weight BMI OB Status Smoking Status 1.727 m 68 kg 22.15 kg/m2 Hysterectomy Former Smoker Emergency Contacts Name Discharge Info Relation Home Work Mobile [facility] HOSPITAL DISCHARGE CAREGIVER [3] Child [2] [phone] [address] CAREGIVER [3] Spouse [3]   [phone] About your hospitalization You were admitted on:  January 15, 2017 You last received care in the:  [facility] 3 RENAL MEDICINE You were discharged on:  January 24, 2017 Why you were hospitalized Your primary diagnosis was:  Acute Respiratory Failure With Hypoxia (Hcc) Your diagnoses also included:  Essential Hypertension, Benign, Bipolar 1 Disorder (Hcc), History Of Laryngeal Cancer, History Of Radiation To Head And Neck Region, Fibromyalgia, Chronic Pain, History Of Tobacco Use, Sepsis (Hcc), Acute Renal Injury (Hcc), Shock (Hcc), Dysphagia, Aspiration Pneumonia Of Both Upper Lobes (Hcc), Shortness Of Breath, Delirium, Goals Of Care, Counseling/Discussion, Debility Providers Seen During Your Hospitalizations Provider Role Specialty Primary office phone Robles Edmonds DO Attending Provider Emergency Medicine [phone] Bubba Batista MD Attending Provider [facility] [phone] Your Primary Care Physician (PCP) Primary Care Physician Office Phone Office Fax Edi López [phone] Follow-up Information Follow up With Details Comments Contact Info Bubba Batista MD   [address] [place] [address] [phone] 
[phone] Current Discharge Medication List  
  
 START taking these medications Dose & Instructions Dispensing Information Comments Morning Noon Evening Bedtime  
 albuterol-ipratropium 2.5 mg-0.5 mg/3 ml Nebu Commonly known as:  Rory Burton Your next dose is: Today, Tomorrow Other:  _________ Dose:  3 mL  
3 mL by Nebulization route every six (6) hours as needed. Quantity:  100 Nebule Refills:  3  
     
   
   
   
  
 amLODIPine 10 mg tablet Commonly known as:  Endeavor Lobito Your next dose is: Today, Tomorrow Other:  _________ Dose:  10 mg Take 1 Tab by mouth daily. Quantity:  30 Tab Refills:  11  
     
   
   
   
  
 cephALEXin 500 mg capsule Commonly known as:  Tamazight Rota Your next dose is: Today, Tomorrow Other:  _________ Dose:  500 mg Take 1 Cap by mouth three (3) times daily. Quantity:  15 Cap Refills:  0  
     
   
   
   
  
 gabapentin 300 mg capsule Commonly known as:  NEURONTIN Your next dose is: Today, Tomorrow Other:  _________ Dose:  600 mg Take 2 Caps by mouth three (3) times daily. Quantity:  90 Cap Refills:  11  
     
   
   
   
  
 guaiFENesin-dextromethorphan 600-30 mg per tablet Commonly known as:  Doyle & Doyle DM Your next dose is: Today, Tomorrow Other:  _________ Dose:  1 Tab Take 1 Tab by mouth two (2) times a day. Quantity:  20 Tab Refills:  3 HYDROcodone-acetaminophen  mg tablet Commonly known as:  Solomon Vivas Your next dose is: Today, Tomorrow Other:  _________ Dose:  1 Tab Take 1 Tab by mouth every four (4) hours as needed. Max Daily Amount: 6 Tabs. Quantity:  120 Tab Refills:  0  
     
   
   
   
  
 metoprolol tartrate 25 mg tablet Commonly known as:  LOPRESSOR Your next dose is: Today, Tomorrow Other:  _________ Dose:  25 mg Take 1 Tab by mouth every twelve (12) hours. Quantity:  60 Tab Refills:  11 Nebulizer & Compressor machine Your next dose is: Today, Tomorrow Other:  _________ Dose:  1 Each  
1 Each by Does Not Apply route four (4) times daily as needed. Quantity:  1 Each Refills:  0 CONTINUE these medications which have NOT CHANGED Dose & Instructions Dispensing Information Comments Morning Noon Evening Bedtime  
 amitriptyline 10 mg tablet Commonly known as:  ELAVIL Your next dose is: Today, Tomorrow Other:  _________ Dose:  10 mg Take 1 Tab by mouth nightly. Quantity:  30 Tab Refills:  5  
     
   
   
   
  
 bisacodyl 5 mg Tab Your next dose is: Today, Tomorrow Other:  _________ Dose:  1 Tab Take 1 Tab by mouth daily as needed. Refills:  0 FLUoxetine 20 mg tablet Commonly known as:  PROzac Your next dose is: Today, Tomorrow Other:  _________  
   
   
 daily. Refills:  0  
     
   
   
   
  
 lithium carbonate  mg CR tablet Commonly known as:  ESKALITH CR Your next dose is: Today, Tomorrow Other:  _________ TAKE 1 TABLET BY ORAL ROUTE PER AT BEDTIME Refills:  0 Senna 8.6 mg tablet Generic drug:  senna Your next dose is: Today, Tomorrow Other:  _________ Dose:  1 Tab Take 1 Tab by mouth daily. Refills:  0 SORE THROAT (BENZOCAINE-MENTH) 15-2.6 mg Lozg lozenge Generic drug:  benzocaine-menthol Your next dose is: Today, Tomorrow Other:  _________ Refills:  0  
     
   
   
   
  
 traZODone 100 mg tablet Commonly known as:  Hessjohny Ziegler Your next dose is: Today, Tomorrow Other:  _________ Dose:  200 mg Take 200 mg by mouth nightly. Refills:  2 STOP taking these medications HYDROcodone-Ibuprofen  mg tablet Commonly known as:  VICOPROFEN  
   
  
 lidocaine 2 % solution Commonly known as:  XYLOCAINE Where to Get Your Medications Information on where to get these meds will be given to you by the nurse or doctor. ! Ask your nurse or doctor about these medications  
  albuterol-ipratropium 2.5 mg-0.5 mg/3 ml Nebu  
 amLODIPine 10 mg tablet  
 cephALEXin 500 mg capsule  
 gabapentin 300 mg capsule  
 guaiFENesin-dextromethorphan 600-30 mg per tablet HYDROcodone-acetaminophen  mg tablet  
 metoprolol tartrate 25 mg tablet Nebulizer & Compressor machine Discharge Instructions PATIENT DISCHARGE INSTRUCTIONS PATIENT DISCHARGE INSTRUCTIONS Lexy Cage / [id number] : 1955 Admitted 1/15/2017 Discharged: 2017 · It is important that you take the medication exactly as they are prescribed. · Keep your medication in the bottles provided by the pharmacist and keep a list of the medication names, dosages, and times to be taken in your wallet. · Do not take other medications without consulting your doctor. What to do at [facility] Recommended Diet: Nectar thick liquids Recommended Activity: Activity as tolerated. use O2 at night and as needed If you experience any of the following symptoms  Worsening shortness of breathe,fever ,chills,worsening cough 
, please follow up with Da Moore MD 
 
 
 
 
Signed By: Da Moore MD   
 2017 Discharge Orders None General Information Please provide this summary of care documentation to your next provider. Introducing [facility]! Simran Erwin introduces [employer] patient portal. Now you can access parts of your medical record, email your doctor's office, and request medication refills online. 1. In your internet browser, go to [email or web address] 2. Click on the First Time User? Click Here link in the Sign In box. You will see the New Member Sign Up page. 3. Enter your [email or web address] Access Code exactly as it appears below. You will not need to use this code after youve completed the sign-up process. If you do not sign up before the expiration date, you must request a new code. · [email or web address] Access Code: [facility] Expires: 3/7/2017  2:52 PM 
 
4. Enter the last four digits of your Social Security Number (xxxx) and Date of Birth (mm/dd/yyyy) as indicated and click Submit. You will be taken to the next sign-up page. 5. Create a [email or web address] ID. This will be your [email or web address] login ID and cannot be changed, so think of one that is secure and easy to remember. 6. Create a [email or web address] password. You can change your password at any time. 7. Enter your Password Reset Question and Answer. This can be used at a later time if you forget your password. 8. Enter your e-mail address. You will receive e-mail notification when new information is available in [address] 9. Click Sign Up. You can now view and download portions of your medical record. 10. Click the Download Summary menu link to download a portable copy of your medical information. If you have questions, please visit the Frequently Asked Questions section of the [email or web address] website. Remember, [email or web address] is NOT to be used for urgent needs. For medical emergencies, dial 911. Now available from your iPhone and Android! Patient Signature:  ____________________________________________________________ Date:  ____________________________________________________________  
  
Kera [employer] Provider Signature:  ____________________________________________________________ Date:  ____________________________________________________________

## 2017-01-15 NOTE — IP AVS SNAPSHOT
[address] [facility] 
[phone] Patient: Adarsh Tijerina MRN: [id number] URK:[id number] You are allergic to the following No active allergies Recent Documentation Height Weight BMI OB Status Smoking Status 1.727 m 68 kg 22.15 kg/m2 Hysterectomy Former Smoker Emergency Contacts Name Discharge Info Relation Home Work Mobile [facility] HOSPITAL DISCHARGE CAREGIVER [3] Child [2] [phone] [address] CAREGIVER [3] Spouse [3]   [phone] About your hospitalization You were admitted on:  January 15, 2017 You last received care in the:  [facility] 3 RENAL MEDICINE You were discharged on:  January 24, 2017 Unit phone number:  [phone] Why you were hospitalized Your primary diagnosis was:  Acute Respiratory Failure With Hypoxia (Hcc) Your diagnoses also included:  Essential Hypertension, Benign, Bipolar 1 Disorder (Hcc), History Of Laryngeal Cancer, History Of Radiation To Head And Neck Region, Fibromyalgia, Chronic Pain, History Of Tobacco Use, Sepsis (Hcc), Acute Renal Injury (Hcc), Shock (Hcc), Dysphagia, Aspiration Pneumonia Of Both Upper Lobes (Hcc), Shortness Of Breath, Delirium, Goals Of Care, Counseling/Discussion, Debility Providers Seen During Your Hospitalizations Provider Role Specialty Primary office phone Pato Hernandez DO Attending Provider Emergency Medicine [phone] Lizabeth Brasher MD Attending Provider [facility] [phone] Your Primary Care Physician (PCP) Primary Care Physician Office Phone Office Fax Carmela Gutierrez [phone] Follow-up Information Follow up With Details Comments Contact Info Lizabeth Brasher MD   [address] [place], [place] [address] [address] [phone] Current Discharge Medication List  
  
START taking these medications Dose & Instructions Dispensing Information Comments Morning Noon Evening Bedtime  
 albuterol-ipratropium 2.5 mg-0.5 mg/3 ml Nebu Commonly known as:  Mannie Lott Your next dose is: Today, Tomorrow Other:  _________ Dose:  3 mL  
3 mL by Nebulization route every six (6) hours as needed. Quantity:  100 Nebule Refills:  3  
     
   
   
   
  
 amLODIPine 10 mg tablet Commonly known as:  Sandy Spruce Your next dose is: Today, Tomorrow Other:  _________ Dose:  10 mg Take 1 Tab by mouth daily. Quantity:  30 Tab Refills:  11  
     
   
   
   
  
 cephALEXin 500 mg capsule Commonly known as:  Inocencia Craze Your next dose is: Today, Tomorrow Other:  _________ Dose:  500 mg Take 1 Cap by mouth three (3) times daily. Quantity:  15 Cap Refills:  0  
     
   
   
   
  
 gabapentin 300 mg capsule Commonly known as:  NEURONTIN Your next dose is: Today, Tomorrow Other:  _________ Dose:  600 mg Take 2 Caps by mouth three (3) times daily. Quantity:  90 Cap Refills:  11  
     
   
   
   
  
 guaiFENesin-dextromethorphan 600-30 mg per tablet Commonly known as:  [address] DM Your next dose is: Today, Tomorrow Other:  _________ Dose:  1 Tab Take 1 Tab by mouth two (2) times a day. Quantity:  20 Tab Refills:  3 HYDROcodone-acetaminophen  mg tablet Commonly known as:  Brown Minor Your next dose is: Today, Tomorrow Other:  _________ Dose:  1 Tab Take 1 Tab by mouth every four (4) hours as needed. Max Daily Amount: 6 Tabs. Quantity:  120 Tab Refills:  0  
     
   
   
   
  
 metoprolol tartrate 25 mg tablet Commonly known as:  LOPRESSOR Your next dose is: Today, Tomorrow Other:  _________  Dose:  25 mg  
 Take 1 Tab by mouth every twelve (12) hours. Quantity:  60 Tab Refills:  11 Nebulizer & Compressor machine Your next dose is: Today, Tomorrow Other:  _________ Dose:  1 Each  
1 Each by Does Not Apply route four (4) times daily as needed. Quantity:  1 Each Refills:  0 CONTINUE these medications which have NOT CHANGED Dose & Instructions Dispensing Information Comments Morning Noon Evening Bedtime  
 amitriptyline 10 mg tablet Commonly known as:  ELAVIL Your next dose is: Today, Tomorrow Other:  _________ Dose:  10 mg Take 1 Tab by mouth nightly. Quantity:  30 Tab Refills:  5  
     
   
   
   
  
 bisacodyl 5 mg Tab Your next dose is: Today, Tomorrow Other:  _________ Dose:  1 Tab Take 1 Tab by mouth daily as needed. Refills:  0 FLUoxetine 20 mg tablet Commonly known as:  PROzac Your next dose is: Today, Tomorrow Other:  _________  
   
   
 daily. Refills:  0  
     
   
   
   
  
 lithium carbonate  mg CR tablet Commonly known as:  ESKALITH CR Your next dose is: Today, Tomorrow Other:  _________ TAKE 1 TABLET BY ORAL ROUTE PER AT BEDTIME Refills:  0 Senna 8.6 mg tablet Generic drug:  senna Your next dose is: Today, Tomorrow Other:  _________ Dose:  1 Tab Take 1 Tab by mouth daily. Refills:  0 SORE THROAT (BENZOCAINE-MENTH) 15-2.6 mg Lozg lozenge Generic drug:  benzocaine-menthol Your next dose is: Today, Tomorrow Other:  _________ Refills:  0  
     
   
   
   
  
 traZODone 100 mg tablet Commonly known as:  Saba Cast Your next dose is: Today, Tomorrow Other:  _________ Dose:  200 mg Take 200 mg by mouth nightly. Refills:  2 STOP taking these medications HYDROcodone-Ibuprofen  mg tablet Commonly known as:  VICOPROFEN  
   
  
 lidocaine 2 % solution Commonly known as:  XYLOCAINE Where to Get Your Medications Information on where to get these meds will be given to you by the nurse or doctor. ! Ask your nurse or doctor about these medications  
  albuterol-ipratropium 2.5 mg-0.5 mg/3 ml Nebu  
 amLODIPine 10 mg tablet  
 cephALEXin 500 mg capsule  
 gabapentin 300 mg capsule  
 guaiFENesin-dextromethorphan 600-30 mg per tablet HYDROcodone-acetaminophen  mg tablet  
 metoprolol tartrate 25 mg tablet Nebulizer & Compressor machine Discharge Instructions PATIENT DISCHARGE INSTRUCTIONS PATIENT DISCHARGE INSTRUCTIONS Dale Mariscal / [id number] : 1955 Admitted 1/15/2017 Discharged: 2017 · It is important that you take the medication exactly as they are prescribed. · Keep your medication in the bottles provided by the pharmacist and keep a list of the medication names, dosages, and times to be taken in your wallet. · Do not take other medications without consulting your doctor. What to do at [facility] Recommended Diet: Nectar thick liquids Recommended Activity: Activity as tolerated. use O2 at night and as needed If you experience any of the following symptoms  Worsening shortness of breathe,fever ,chills,worsening cough 
, please follow up with Wilson Galicia MD 
 
 
 
 
Signed By: Wilson Galicia MD   
 2017 Discharge Orders None [facility] Announcement We are excited to announce that we are making your provider's discharge notes available to you in [facility]. You will see these notes when they are completed and signed by the physician that discharged you from your recent hospital stay.   If you have any questions or concerns about any information you see in [employer], please call the Health Information Department where you were seen or reach out to your Primary Care Provider for more information about your plan of care. Introducing [facility] SERVICES! Nghia Palomo introduces [employer] patient portal. Now you can access parts of your medical record, email your doctor's office, and request medication refills online. 1. In your internet browser, go to [email or web address] 2. Click on the First Time User? Click Here link in the Sign In box. You will see the New Member Sign Up page. 3. Enter your [employer] Access Code exactly as it appears below. You will not need to use this code after youve completed the sign-up process. If you do not sign up before the expiration date, you must request a new code. · [employer] Access Code: [facility] Expires: 3/7/2017  2:52 PM 
 
4. Enter the last four digits of your Social Security Number (xxxx) and Date of Birth (mm/dd/yyyy) as indicated and click Submit. You will be taken to the next sign-up page. 5. Create a [employer] ID. This will be your [employer] login ID and cannot be changed, so think of one that is secure and easy to remember. 6. Create a [employer] password. You can change your password at any time. 7. Enter your Password Reset Question and Answer. This can be used at a later time if you forget your password. 8. Enter your e-mail address. You will receive e-mail notification when new information is available in [address] 9. Click Sign Up. You can now view and download portions of your medical record. 10. Click the Download Summary menu link to download a portable copy of your medical information. If you have questions, please visit the Frequently Asked Questions section of the [employer] website. Remember, [employer] is NOT to be used for urgent needs. For medical emergencies, dial 911. Now available from your iPhone and Android! General Information Please provide this summary of care documentation to your next provider. Patient Signature:  ____________________________________________________________ Date:  ____________________________________________________________  
  
Anuj Mais Provider Signature:  ____________________________________________________________ Date:  ____________________________________________________________

## 2017-01-15 NOTE — PROGRESS NOTES
Case discussed with RN and Dr. Winston Klein. The patient's SBP is not responding to IVF and with rising troponin the overall picture is more complicated. Thus we will change her admission disposition to ICU and start her on Levophed. Dr. Winston Klein has agreed to place a central line.      Sabina De Leon MD MPH FACP   4:40 PM  1/15/2017

## 2017-01-15 NOTE — PROGRESS NOTES
Pharmacy Automatic Renal Dosing Protocol - Antimicrobials    Indication for Antimicrobials: HCAP  Current Regimen of Each Antimicrobial (Start Day & Day of Therapy):  Vancomycin 2.5 g X1, then 1250 mg q 24 hours (1/15; day #1    Previous abx:  Azithromycin in the ED X1   CTX X1 in the ED    Goal Level (if needed): 15-20  Level(s) Ordered (if needed): n/a  Measured / Extrapolated Levels (if drawn): na   / na    Significant Cultures:   1/15: Blood cx: pending    CAPD, Hemodialysis or Renal Replacement Therapy: ARF   Paralysis, amputations, malnutrition: ?? Recent Labs      01/15/17   1113   CREA  2.01*   BUN  25*   WBC  10.4     Temp (24hrs), Av.5 °F (38.1 °C), Min:100.5 °F (38.1 °C), Max:100.5 °F (38.1 °C)    Creatinine Clearance (Creatinine Clearance (ml/min)): ~30     Impression/Plan:   Patient received a dose of azithromycin and CTX. To start with vancomycin 2.5 g X1, then 1250 mg q 24 hours and this will yield an estimated trough of 17 mcg/ml. Will follow cx, renal function and WBC       Pharmacy will follow daily and adjust as appropriate.     Thank you,  Lachelle Cohen, PHARMD

## 2017-01-15 NOTE — ED PROVIDER NOTES
HPI Comments:   Jesse Aguilar is a 57 y.o. female with a hx of HTN, laryngeal cancer, fibromyalgia, and bipolar disorder presenting to the ED via EMS with her  for garbled speech which started just PTA. Per , pt was c/o cough and not feeling well last night, went to sleep, and he did not wake her up when he left the house this morning. Her daughter then came to wake her up, she fell out of the bed, and was experiencing garbled speech. Her last known well was last night.  reports she takes Hydrocodone for her chronic pain but denies seeing her take more pills than usual. She is not currently on O2 at home.  denies any other symptoms or complaints. PCP: Marna Fabry, MD    There are no other complaints, changes or physical findings at this time. Written by SHAYNE Littleibkathya, as dictated by Cassell Fleischer,       The history is provided by the spouse. The history is limited by the condition of the patient. No  was used. Past Medical History:   Diagnosis Date    Bipolar 1 disorder ([address] 11/29/2011    Cancer ([address]      skin cancer buttocks    Cancer (HCC)      throat    Chronic pain      FIBROMYALGIA, LEGS    Encounter for long-term (current) use of other medications 11/29/2011    Essential hypertension, benign 11/29/2011    Laryngeal cancer ([facility] 11/2/2015    Laryngeal mass     Psychiatric disorder      bipolar       Past Surgical History:   Procedure Laterality Date    Place percut gastrostomy tube  10/28/2014      has been removed 2015    Hx heent       BX OF NECK MASS    Hx gi       PLACEMENT OF G TUBE    Hx gi       ESOPH.  DILATATION    Hx breast augmentation Bilateral      SALINE IMPLANTS    Hx gyn       tubal pregnancy    Hx hysterectomy           Family History:   Problem Relation Age of Onset    Cancer Father      bone/skin/lung    Anesth Problems Neg Hx        Social History     Social History    Marital status:      Spouse name: N/A    Number of children: N/A    Years of education: N/A     Occupational History    Not on file. Social History Main Topics    Smoking status: Former Smoker     Packs/day: 1.00     Years: 40.00     Quit date: 8/27/2014    Smokeless tobacco: Never Used      Comment: PASSIVE SMOKE EXPOSURE,  SMOKES    Alcohol use No    Drug use: No    Sexual activity: Not on file     Other Topics Concern    Not on file     Social History Narrative         ALLERGIES: Review of patient's allergies indicates no known allergies. Review of Systems   Unable to perform ROS: Mental status change       Vitals:    01/15/17 1600 01/15/17 1615 01/15/17 1630 01/15/17 1645   BP: (!) 88/59 (!) 82/57 (!) 84/58 (!) 88/58   Pulse: 81 81 78 78   Resp: 17 21 14 18   Temp:       SpO2: 99% 99% 99% 98%   Weight:                Physical Exam   Constitutional: She appears well-developed and well-nourished. She appears distressed. HENT:   Head: Normocephalic and atraumatic. Right Ear: External ear normal.   Left Ear: External ear normal.   Mouth/Throat: Oropharynx is clear and moist.   Eyes: Conjunctivae and EOM are normal. Pupils are equal, round, and reactive to light. Neck: Normal range of motion. Neck supple. No JVD present. No tracheal deviation present. Cardiovascular: Regular rhythm, normal heart sounds and intact distal pulses. No murmur heard. Tachycardic     Pulmonary/Chest: No stridor. She is in respiratory distress. She has no wheezes. She has no rales. Diminished, diffuse rhonchi     Abdominal: Soft. Bowel sounds are normal. She exhibits no distension. There is no tenderness. There is no rebound and no guarding. Musculoskeletal: Normal range of motion. She exhibits no edema or tenderness. Neurological: No cranial nerve deficit. Pt altered non-sensical speech, moving all extremities, no apparent facial droop      Skin: Skin is warm and dry.    Psychiatric: She has a normal mood and affect. Her behavior is normal.   Nursing note and vitals reviewed. MDM  Number of Diagnoses or Management Options  Acute renal failure, unspecified acute renal failure type [facility]):   Acute respiratory failure with hypoxia ([address]):   Pneumonia of both lungs due to infectious organism, unspecified part of lung:   Sepsis, due to unspecified organism [facility]):   Diagnosis management comments: DDx: CVA, ICH, adverse effect of medication, sepsis, PNA       Amount and/or Complexity of Data Reviewed  Clinical lab tests: ordered and reviewed  Tests in the radiology section of CPT®: ordered and reviewed  Tests in the medicine section of CPT®: ordered and reviewed  Obtain history from someone other than the patient: yes ()  Discuss the patient with other providers: yes (Hospitalist)  Independent visualization of images, tracings, or specimens: yes    Patient Progress  Patient progress: stable    Procedures     ED EKG interpretation:  Rhythm: sinus tachycardia; and regular . Rate (approx.): 111; Axis: normal; P wave: normal; QRS interval: normal ; ST/T wave: non-specific changes; . This EKG was interpreted by Jeniffer Muse DO,ED Provider. Discussed with spouse, pt has hx of chronic pain, he is not aware of her taking more than her prescribed dose, she has had a cough recently but not uncommon for her. Pt went to bed normal he thought she had slept in, and when he went to check on her found her altered. PROGRESS NOTE:   11:50 AM  Pt is more awake and alert, took her off O2 for 1 minute and sats dropped to 80s, pt placed back on nasal cannula. Pt startes on IV Ab for CAP. Written by Jeral Libman, ED Scribe, as dictated by Jeniffer Muse DO.     CONSULT NOTE:   12:55 PM  Jeniffer Muse DO spoke with Dr. Lukas Casiano,   Specialty: Hospitalist  Discussed pt's hx, disposition, and available diagnostic and imaging results. Reviewed care plans. Consultant will evaluate pt for admission.   Written by Viki Robles Amisha Winters, ED Scribe, as dictated by Willian Harper DO. After IV Ab, pt BP dropped, we were asked to place CVL in pt so pressors could be started. Procedure Note - Central Line Placement:   5:40 PM  Performed by: Willian Harper DO    Immediately prior to the procedure, the patient was reevaluated and found suitable for the planned procedure and any planned medications. Immediately prior to the procedure a time out was called to verify the correct patient, procedure, equipment, staff, and marking as appropriate. Area was cleansed with Chlorprep and anesthetized with 3 mLs of 1% lidocaine. Prepped and draped in sterile fashion. Landmarks identified. 20 gauge needle with triple lumen catheter was inserted into pt's Right, Internal Jugular Vein with ultrasound guidance. Line sutured in place; sterile dressing applied. Position: Trendelenburg  Number of attempts: 1  Estimated blood loss: 1 mL  The procedure took 16-30 minutes, and pt tolerated well. Written by Abdias Pathak, ED Scribe, as dictated by Willian Harper DO.      CRITICAL CARE NOTE :  5:41 PM    IMPENDING DETERIORATION -Respiratory, Cardiovascular and CNS    ASSOCIATED RISK FACTORS - Hypoxia    MANAGEMENT- Bedside Assessment and Supervision of Care    INTERPRETATION -  Xrays, ECG, Blood Pressure and Cardiac Output Measures     INTERVENTIONS - hemodynamic mngmt and oxygen, IV Ab    CASE REVIEW - Hospitalist, Nursing and Family    TREATMENT RESPONSE -Improved    PERFORMED BY - Self    NOTES   :  I have spent 40 minutes of critical care time involved in lab review, consultations with specialist, family decision- making, bedside attention and documentation. During this entire length of time I was immediately available to the patient .   Willian Harper DO      LABORATORY TESTS:  Recent Results (from the past 12 hour(s))   EKG, 12 LEAD, INITIAL    Collection Time: 01/15/17 11:06 AM   Result Value Ref Range    Ventricular Rate 111 BPM Atrial Rate 111 BPM    P-R Interval 146 ms    QRS Duration 90 ms    Q-T Interval 348 ms    QTC Calculation (Bezet) 473 ms    Calculated P Axis 71 degrees    Calculated R Axis 57 degrees    Calculated T Axis 53 degrees    Diagnosis       Sinus tachycardia  Nonspecific ST abnormality  When compared with ECG of 23-NOV-2015 09:37,  premature atrial complexes are no longer present  Non-specific change in ST segment in Anterior leads  ST now depressed in Lateral leads  Nonspecific T wave abnormality now evident in Inferior leads     LACTIC ACID, PLASMA    Collection Time: 01/15/17 11:13 AM   Result Value Ref Range    Lactic acid 1.3 0.4 - 2.0 MMOL/L   TROPONIN I    Collection Time: 01/15/17 11:13 AM   Result Value Ref Range    Troponin-I, Qt. 0.23 (H) <7.17 ng/mL   METABOLIC PANEL, COMPREHENSIVE    Collection Time: 01/15/17 11:13 AM   Result Value Ref Range    Sodium 136 136 - 145 mmol/L    Potassium 4.7 3.5 - 5.1 mmol/L    Chloride 100 97 - 108 mmol/L    CO2 26 21 - 32 mmol/L    Anion gap 10 5 - 15 mmol/L    Glucose 113 (H) 65 - 100 mg/dL    BUN 25 (H) 6 - 20 MG/DL    Creatinine 2.01 (H) 0.55 - 1.02 MG/DL    BUN/Creatinine ratio 12 12 - 20      GFR est AA 30 (L) >60 ml/min/1.73m2    GFR est non-AA 25 (L) >60 ml/min/1.73m2    Calcium 9.8 8.5 - 10.1 MG/DL    Bilirubin, total 0.5 0.2 - 1.0 MG/DL    ALT 16 12 - 78 U/L    AST 22 15 - 37 U/L    Alk.  phosphatase 67 45 - 117 U/L    Protein, total 7.6 6.4 - 8.2 g/dL    Albumin 3.3 (L) 3.5 - 5.0 g/dL    Globulin 4.3 (H) 2.0 - 4.0 g/dL    A-G Ratio 0.8 (L) 1.1 - 2.2     CBC WITH AUTOMATED DIFF    Collection Time: 01/15/17 11:13 AM   Result Value Ref Range    WBC 10.4 3.6 - 11.0 K/uL    RBC 4.27 3.80 - 5.20 M/uL    HGB 11.9 11.5 - 16.0 g/dL    HCT 39.6 35.0 - 47.0 %    MCV 92.7 80.0 - 99.0 FL    MCH 27.9 26.0 - 34.0 PG    MCHC 30.1 30.0 - 36.5 g/dL    RDW 14.9 (H) 11.5 - 14.5 %    PLATELET 592 096 - 798 K/uL    NEUTROPHILS 90 (H) 32 - 75 %    LYMPHOCYTES 5 (L) 12 - 49 %    MONOCYTES 4 (L) 5 - 13 %    EOSINOPHILS 1 0 - 7 %    BASOPHILS 0 0 - 1 %    ABS. NEUTROPHILS 9.4 (H) 1.8 - 8.0 K/UL    ABS. LYMPHOCYTES 0.5 (L) 0.8 - 3.5 K/UL    ABS. MONOCYTES 0.4 0.0 - 1.0 K/UL    ABS. EOSINOPHILS 0.1 0.0 - 0.4 K/UL    ABS. BASOPHILS 0.0 0.0 - 0.1 K/UL    RBC COMMENTS NORMOCYTIC, NORMOCHROMIC     CK W/ REFLX CKMB    Collection Time: 01/15/17 11:13 AM   Result Value Ref Range     (H) 26 - 192 U/L   LITHIUM    Collection Time: 01/15/17 11:13 AM   Result Value Ref Range    Lithium 1.10 0.60 - 1.20 MMOL/L    Reported dose date: NOT PROVIDED      Reported dose time: NOT PROVIDED      Reported dose: NOT PROVIDED UNITS   URINALYSIS W/ REFLEX CULTURE    Collection Time: 01/15/17 11:13 AM   Result Value Ref Range    Color YELLOW/STRAW      Appearance CLOUDY (A) CLEAR      Specific gravity 1.014 1.003 - 1.030      pH (UA) 6.0 5.0 - 8.0      Protein 30 (A) NEG mg/dL    Glucose NEGATIVE  NEG mg/dL    Ketone NEGATIVE  NEG mg/dL    Bilirubin NEGATIVE  NEG      Blood MODERATE (A) NEG      Urobilinogen 0.2 0.2 - 1.0 EU/dL    Nitrites NEGATIVE  NEG      Leukocyte Esterase NEGATIVE  NEG      WBC 0-4 0 - 4 /hpf    RBC 0-5 0 - 5 /hpf    Epithelial cells FEW FEW /lpf    Bacteria NEGATIVE  NEG /hpf    UA:UC IF INDICATED CULTURE NOT INDICATED BY UA RESULT CNI     DRUG SCREEN, URINE    Collection Time: 01/15/17 11:13 AM   Result Value Ref Range    AMPHETAMINE NEGATIVE  NEG      BARBITURATES NEGATIVE  NEG      BENZODIAZEPINE NEGATIVE  NEG      COCAINE NEGATIVE  NEG      METHADONE NEGATIVE  NEG      OPIATES POSITIVE (A) NEG      PCP(PHENCYCLIDINE) NEGATIVE  NEG      THC (TH-CANNABINOL) NEGATIVE  NEG      Drug screen comment (NOTE)    CK-MB,QUANT.     Collection Time: 01/15/17 11:13 AM   Result Value Ref Range    CK - MB 5.3 (H) <3.6 NG/ML    CK-MB Index 1.1 0 - 2.5     TROPONIN I    Collection Time: 01/15/17  3:32 PM   Result Value Ref Range    Troponin-I, Qt. 0.36 (H) <0.05 ng/mL   MAGNESIUM    Collection Time: 01/15/17  4:25 PM   Result Value Ref Range    Magnesium 2.2 1.6 - 2.4 mg/dL   TSH 3RD GENERATION    Collection Time: 01/15/17  4:25 PM   Result Value Ref Range    TSH 0.51 0.36 - 3.74 uIU/mL       IMAGING RESULTS:  CT HEAD WO CONT   Final Result   EXAM: CT HEAD WO CONT     INDICATION: 57year-old with history of hypertension, laryngeal cancer,  fibromyalgia, bipolar disorder presenting to emergency department via EMS for  probable speech beginning just prior to admission.     COMPARISON: None.     TECHNIQUE: Unenhanced CT of the head was performed using 5 mm images. Brain and  bone windows were generated. CT dose reduction was achieved through use of a  standardized protocol tailored for this examination and automatic exposure  control for dose modulation.      FINDINGS:  The ventricles and sulci are normal in size, shape and configuration and  midline. Mild to moderate bilateral periventricular and centrum semiovale  diminished attenuation is shown consistent with chronic small vessel ischemic  disease of the white matter. . There is no intracranial hemorrhage, extra-axial  collection, mass, mass effect or midline shift. The basilar cisterns are open. No acute infarct is identified. The bone windows demonstrate no abnormalities. The visualized portions of the paranasal sinuses and mastoid air cells are  clear.     IMPRESSION  IMPRESSION: Chronic small vessel ischemic disease of white matter. No acute  findings. Signed by      Signed Date/Time    Phone Pager     BURT Bergeron 1/15/2017 12:21 [phone]       XR CHEST PORT   Final Result   EXAM: XR CHEST PORT     INDICATION: ams     COMPARISON: 9/7/2016     FINDINGS: A portable AP radiograph of the chest was obtained at 1125 hours. The  patient is on a cardiac monitor. There is bilateral perihilar airspace  disease. . The cardiac and mediastinal contours and pulmonary vascularity are  normal. Bony structures appear intact. .      IMPRESSION  IMPRESSION: There is bilateral perihilar airspace disease could represent edema  and/or pneumonia. Signed by      Signed Date/Time    Phone Pager     Ingrid Goyal 1/15/2017 11:35 [phone]           MEDICATIONS GIVEN:  Medications   oxyCODONE ER (OxyCONTIN) tablet 10 mg (10 mg Oral Given 1/15/17 1350)   NOREPINEPHrine (LEVOPHED) 16 mg/250 ml D5W infusion (not administered)   vancomycin (VANCOCIN) 1750 mg in  ml infusion (not administered)   vancomycin (VANCOCIN) 1,000 mg in 0.9% sodium chloride (MBP/ADV) 250 mL (not administered)   sodium chloride 0.9 % bolus infusion 1,000 mL (0 mL IntraVENous IV Completed 1/15/17 1406)   cefTRIAXone (ROCEPHIN) 1 g in 0.9% sodium chloride (MBP/ADV) 50 mL (0 g IntraVENous IV Completed 1/15/17 1401)   azithromycin (ZITHROMAX) 500 mg in 0.9% sodium chloride (MBP/ADV) 250 mL (0 mg IntraVENous IV Completed 1/15/17 1513)   aspirin (ASPIRIN) tablet 325 mg (325 mg Oral Given 1/15/17 1246)   acetaminophen (TYLENOL) tablet 1,000 mg (1,000 mg Oral Given 1/15/17 1246)   gabapentin (NEURONTIN) capsule 400 mg (400 mg Oral Given 1/15/17 1350)   sodium chloride 0.9 % bolus infusion 250 mL (0 mL IntraVENous IV Completed 1/15/17 1611)       IMPRESSION:  1. Acute respiratory failure with hypoxia ([address]    2. Pneumonia of both lungs due to infectious organism, unspecified part of lung    3. Acute renal failure, unspecified acute renal failure type ([address]    4. Sepsis, due to unspecified organism [facility])        PLAN: Admit to Hospitalist    Admit Note:  12:55 PM  Patient is being admitted to the hospital by Dr. Ronnie Mckenna. The results of their tests and reasons for their admission have been discussed with the patient and/or available family. They convey their agreement and understanding for the need to be admitted and for their admission diagnosis. Written by Bob Mitchell ED Scribe, as dictated by Vinita Quiles DO. Attestation:   This note is prepared by Bob Mitchell, acting as Scribe for Jazmín Calhoun Oriana Chester DO: The scribe's documentation has been prepared under my direction and personally reviewed by me in its entirety. I confirm that the note above accurately reflects all work, treatment, procedures, and medical decision making performed by me.

## 2017-01-15 NOTE — Clinical Note
Status[de-identified] Inpatient [101] Type of Bed: Telemetry Remote [29] Inpatient Hospitalization Certified Necessary for the Following Reasons: 3. Patient receiving treatment that can only be provided in an inpatient setting (further clarification in H&P documentation) Admitting Diagnosis: Acute respiratory failure with hypoxia ([facility] [[id number]] Admitting Physician: Heidi Montgomery [[id number]] Attending Physician: Cachorro Yoo [[id number]] Estimated Length of Stay: 3-4 Midnights Discharge Plan[de-identified] [facility]

## 2017-01-15 NOTE — PROGRESS NOTES
Arrived for STAT V/Q Lung Scan. Called for pt to come. Delay bringing pt to [address] . Spoke with nurse and they will call me back in when pt able to come to lab.   Will call radiologist and inform

## 2017-01-15 NOTE — PROGRESS NOTES
[phone]: bedside and verbal report received from xavier lundy  1835: patient transported to [facility] and transferred to bed, connected to monitor. Levo gtt decreased to 2mcg. Patient alert and oriented, 4l nc with o2 sats 97%.    1915: bedside and verbal report given to xavier Stafford

## 2017-01-15 NOTE — PROGRESS NOTES
Pharmacy Medication Reconciliation     The patient was interviewed regarding current PTA medication list, use and drug allergies;  three family visitors present in room and obtained permission from patient to discuss drug regimen with visitor(s) present. Allergy Update:  no update    Recommendations/Findings: The following amendments were made to the patient's active medication list on file at [facility]:   1)  Additions:       Bisacodyl 5mg PO Daily prn    2)  Deletions:       none    3)  Changes:       None    Note:  Patient confirms taking Hydrocodone-Ibuprofen 10mg/200mg two tabs QID (09/12/16/20) every day    The patient was questioned regarding use of any other inhalers, topical products, over the counter medications, herbal medications, vitamin products or ophthalmic/nasal/otic medication use. Prior to Admission Medications   Prescriptions Last Dose Informant Patient Reported? Taking? FLUoxetine (PROZAC) 20 mg tablet 1/14/2017 at Unknown time  Yes Yes   Sig: daily. HYDROcodone-Ibuprofen (VICOPROFEN)  mg tablet 1/14/2017 at Unknown time  No Yes   Sig: Take 2 Tabs by mouth every six (6) hours as needed for Pain. Max Daily Amount: 8 Tabs. SORE THROAT, BENZOCAINE-MENTH, 15-2.6 mg lozg lozenge Unknown at Unknown time  Yes No   amitriptyline (ELAVIL) 10 mg tablet 1/14/2017 at Unknown time  No Yes   Sig: Take 1 Tab by mouth nightly. bisacodyl 5 mg tab Unknown at Unknown time  Yes No   Sig: Take 1 Tab by mouth daily as needed. lidocaine (XYLOCAINE) 2 % solution 1/14/2017 at Unknown time  No Yes   Sig: Take 15 mL by mouth as needed for Pain.   lithium carbonate CR (ESKALITH CR) 450 mg CR tablet 1/14/2017 at Unknown time  Yes Yes   Sig: TAKE 1 TABLET BY ORAL ROUTE PER AT BEDTIME   senna (SENNA) 8.6 mg tablet Unknown at Unknown time  Yes No   Sig: Take 1 Tab by mouth daily. traZODone (DESYREL) 100 mg tablet 1/14/2017 at Unknown time Self Yes Yes   Sig: Take 200 mg by mouth nightly. Facility-Administered Medications: None       Thank you,  Kamilah Bravo, [facility]

## 2017-01-15 NOTE — H&P
History and Physical          Pt Name  Keri Wharton   Date of Birth 1955   Medical Record Number  [id number]      Age  57 y.o. PCP Zelda Nix MD   Admit date:  1/15/2017    Room Number  070 [id number]  @ [facility]   Date of Service  1/15/2017     Admission Diagnoses:  Acute respiratory failure with hypoxia [facility])     Certification: We are admitting Justin Wall 57 y.o. female with a principle diagnosis of Acute respiratory failure with hypoxia ([address]  This patient also suffers from other comorbidities listed below. I have a high level of concern for rapid decline in homeostasis  leading to life threatening complications      Assessment and plan:   Sepsis (altered mental status, hypotension, = meets qSOFA Criteria)    Acute respiratory failure with hypoxia (HCC) due to   [employer] associated Bilateral  PNA    Altered mental status and dysphagia (transient)    Rising troponin level - possible T2MI    fall at home    Essential hypertension, benign   Bipolar 1 disorder (HCC)   History of laryngeal cancer   History of radiation to head and neck region   Fibromyalgia   Chronic pain   History of tobacco use      PLAN :   · Admit to remote tele on behalf of PCP Dr. Zelda Nix MD   · Full code   · IV Broad spectrum abx with Cefepime IV + IV Vanc (per pharmacy protocol )   · Oxygen supplement per NC   · Gentle IVF   · We will change order for  CTA PE Protocol instead order V/Q scan   · We will consult cardiologist to see her   · Order Echo complete to be done in the morning. · Continue home meds as they were   · We will not give her Vicoprofen; we will order Oxycodone/tylenol (due to risk of GI bleed etc)   · As a background check we will go ahead order TSH. Free T4 (due to history of XRT to neck)   · PT OT to see her for fall at home. · Her CT head was negative for acute finding.  We will not engage neurology and our working hypothesis is most likely due to her hypoxia she was altered in her mentation. But we cannot rule out naroctics and other centrally acting medications' contribution. .   · Her Lithium level is therapeutic,   · I spoke with Dr. Ray Ahuja informed him about the pt's admission     PORT score for Pneumonia Severity index is Class IV - AMS, Low SBP, hypoxia, neoplastic disease. CODE STATUS  Full     Functional Status  Pt is  and lives with her  in their private residence. She performs most of her ADLs on her own. Their daughter lives bout one block away who helps as well  The patient quite smoking when she learned about her throat cancer    Surrogate decision maker: Pt's     Prophylaxis  Lovenox   Discharge Plan:  PT, OT, RN,     There are currently no Active Isolations Payor: [employer] MEDICARE / Plan: [address] [place] [address] HMO / Product Type: Managed Care Medicare /    Social issues  Date Comment    01/15/17 Met with pt's  in the room and we talked extensively about clinical issues plan of care and answered all questions       Prognosis  Fair      Subjective Data         History of Present Illness : The patient was not feeling well last night. This morning her  didn't awaken her and allowed her to rest in. Later their daughter went to wake her up and noted that the pt was altered and was having signs of dysarthria, without any sign of focal neurological deficit. EMS were called in and pt was noted to have hypoxia, oxygen supplied and her mentation improved immediately. CT head was negative.           Past Medical History   Diagnosis Date    Bipolar 1 disorder ([address] 11/29/2011    Cancer ([facility]      skin cancer buttocks    Cancer (HCC)      throat    Chronic pain      FIBROMYALGIA, LEGS    Encounter for long-term (current) use of other medications 11/29/2011    Essential hypertension, benign 11/29/2011    Laryngeal cancer ([address] 11/2/2015    Laryngeal mass     Psychiatric disorder      bipolar Past Surgical History   Procedure Laterality Date    Place percut gastrostomy tube  10/28/2014      has been removed 2015    Hx heent       BX OF NECK MASS    Hx gi       PLACEMENT OF G TUBE    Hx gi       ESOPH. DILATATION    Hx breast augmentation Bilateral      SALINE IMPLANTS    Hx gyn       tubal pregnancy    Hx hysterectomy           Social History   Substance Use Topics    Smoking status: Former Smoker     Packs/day: 1.00     Years: 40.00     Quit date: 8/27/2014    Smokeless tobacco: Never Used      Comment: PASSIVE SMOKE EXPOSURE,  SMOKES    Alcohol use No         Family History   Problem Relation Age of Onset    Cancer Father      bone/skin/lung    Anesth Problems Neg Hx        Review of Systems - History obtained from spouse and the patient  General ROS: positive for  - chills, fatigue, fever and malaise  Psychological ROS: positive for - depression and BiPolar disorder managed by Dr. Maria Del Carmen Menard   negative for - hallucinations, hostility or irritability  Ophthalmic ROS: negative for - blurry vision, decreased vision or double vision  ENT ROS: negative for - epistaxis, headaches or hearing change  Respiratory ROS: positive for - cough, shortness of breath and tachypnea  negative for - hemoptysis  Cardiovascular ROS: no chest pain or dyspnea on exertion  Gastrointestinal ROS: no abdominal pain, change in bowel habits, or black or bloody stools  Genito-Urinary ROS: no dysuria, trouble voiding, or hematuria  Musculoskeletal ROS: positive for - Weakness and fall at home   Neurological ROS: positive for - Dysarthria   Dermatological ROS: negative       Objective data     Comment:  Pleasant lady lying in bed in no apparent distress.  But her speech is impaired due to shortness of breath   Her  is at her side      Patient Vitals for the past 24 hrs:   Temp   01/15/17 1100 (!) 100.5 °F (38.1 °C)    Patient Vitals for the past 24 hrs:   Pulse   01/15/17 1400 90   01/15/17 1220 97   01/15/17 1145 (!) 105   01/15/17 1130 (!) 108   01/15/17 1102 (!) 113    Patient Vitals for the past 24 hrs:   Resp   01/15/17 1400 17   01/15/17 1220 24   01/15/17 1145 19   01/15/17 1130 21   01/15/17 1102 11    Patient Vitals for the past 24 hrs:   BP   01/15/17 1400 95/48   01/15/17 1145 127/83   01/15/17 1130 (!) 136/39   01/15/17 1100 (!) 146/111        SpO2 Readings from Last 6 Encounters:   01/15/17 98%   12/07/16 91%   11/22/16 93%   10/07/16 95%   09/07/16 92%   09/04/16 95%         O2 Device: Room air   Body mass index is 22.15 kg/(m^2). - Wt Readings from Last 10 Encounters:   01/15/17 68 kg (150 lb)   12/07/16 67.9 kg (149 lb 12.8 oz)   11/22/16 67.8 kg (149 lb 6.4 oz)   10/07/16 65.9 kg (145 lb 3.2 oz)   09/07/16 64.5 kg (142 lb 3.2 oz)   09/04/16 64.2 kg (141 lb 8.6 oz)   08/24/16 64.9 kg (143 lb)   08/15/16 65.9 kg (145 lb 3.2 oz)   07/05/16 66.9 kg (147 lb 6.4 oz)   06/16/16 65.3 kg (144 lb)          Physical Exam:             General:  Alert, cooperative,   Mildly MAL  noursished,   well developed,   appears stated age    Ears/Eyes:  Hearing intact  Sclera anicteric. Pupils equal   Mouth/Throat:  Mucous membranes normal pink and moist  Oral pharynx clear    Neck:     Lungs:  Trachea midline  Chest excursion symmetrical   Auscultation B/L Symmetrical with   Vesicular breath sounds     No Crepitations noted   Percussion note resonant on mid Clavicular line; no sign of pneumothorax        CVS:  Regular rate and rhythm   no  murmur,   No click, rub or gallop  S1 normal   S2 normal   Pedal pulses  b/l symmetrical   Abdomen:    Soft, non-tender  Bowel sounds normal  No distension   Percussion note tympanitic   Extremities:    No cyanosis, jaundice  No edema noted   No sign of DVT/cord like lesion on palpation  No sign of acute trauma   Age appropriate OA changes noted.     Skin:    Skin color, texture, turgor normal. no acute rash or lesions    Lymph nodes:     Musculoskeletal Muscle bulk B/L symmetrical   Neuro Cranial nerves are intact,   motor movement b/l symmetrical,   Sensory evaluation b/l symmetrical    Psych:  Alert and oriented, normal mood & affect given the clinical scenario          Medications reviewed     Current Facility-Administered Medications   Medication Dose Route Frequency    oxyCODONE ER (OxyCONTIN) tablet 10 mg  10 mg Oral Q12H     Current Outpatient Prescriptions   Medication Sig    HYDROcodone-Ibuprofen (VICOPROFEN)  mg tablet Take 2 Tabs by mouth every six (6) hours as needed for Pain. Max Daily Amount: 8 Tabs.  lithium carbonate CR (ESKALITH CR) 450 mg CR tablet TAKE 1 TABLET BY ORAL ROUTE PER AT BEDTIME    lidocaine (XYLOCAINE) 2 % solution Take 15 mL by mouth as needed for Pain.  FLUoxetine (PROZAC) 20 mg tablet daily.  amitriptyline (ELAVIL) 10 mg tablet Take 1 Tab by mouth nightly.  traZODone (DESYREL) 100 mg tablet Take 200 mg by mouth nightly.  bisacodyl 5 mg tab Take 1 Tab by mouth daily as needed.  senna (SENNA) 8.6 mg tablet Take 1 Tab by mouth daily.  SORE THROAT, BENZOCAINE-MENTH, 15-2.6 mg lozg lozenge        Relevant other informations: Other medical conditions listed in this following active hospital problem list section; all of these and other pertinent data were taken into consideration when treatment plan is developed and customized to this patient's unique overall circumstances and needs. We have reviewed available old medical records within the constraints of this admission process.    Present on Admission:   Acute respiratory failure with hypoxia (HCC)   Essential hypertension, benign   Bipolar 1 disorder ([facility]   History of laryngeal cancer   History of radiation to head and neck region   Fibromyalgia   Chronic pain   History of tobacco use       Data Review:   Recent Days:  All Micro Results     Procedure Component Value Units Date/Time    CULTURE, BLOOD, PAIRED [[id number]] Collected:  01/15/17 1113    Order Status:  Completed Specimen:  Blood Updated:  01/15/17 1200           Recent Labs      01/15/17   1113   WBC  10.4   HGB  11.9   HCT  39.6   PLT  177     Recent Labs      01/15/17   1113   NA  136   K  4.7   CL  100   CO2  26   GLU  113*   BUN  25*   CREA  2.01*   CA  9.8   ALB  3.3*   TBILI  0.5   SGOT  22   ALT  16      Lab Results   Component Value Date/Time    TSH 2.790 10/04/2013 10:57 AM         Care Plan discussed with: Patient/Family and Nurse   Other medical conditions are listed in the active hospital problem list section; these and other pertinent data were taken into consideration when the treatment plan was developed and customized to this patient's unique overall circumstances and needs. High complexity decision making was performed for this patient who is at high risk for decompensation with multiple organ involvement. Today total floor/unit time was 65  minutes while caring for this patient and greater than 50% of that time was spent with patient (and/or family) coordinating patients clinical issues.      Alyssa Lima MD MPH  FACP                               1/15/2017

## 2017-01-16 NOTE — PROGRESS NOTES
General Daily Progress Note    Admit Date: 1/15/2017  Hospital day 2    Subjective:     Patient has  Dyspnea,neck pain. .   Medication side effects: none    Current Facility-Administered Medications   Medication Dose Route Frequency    sodium chloride 0.9 % bolus infusion 1,000 mL  1,000 mL IntraVENous ONCE    amitriptyline (ELAVIL) tablet 10 mg  10 mg Oral QHS    FLUoxetine (PROzac) capsule 20 mg  20 mg Oral DAILY    lidocaine (XYLOCAINE) 2 % viscous solution 15 mL  15 mL Mouth/Throat Q4H PRN    senna (SENOKOT) tablet 8.6 mg  1 Tab Oral DAILY    benzocaine-menthol (CHLORASEPTIC MAX) lozenge 1 Lozenge  1 Lozenge Oral Q2H PRN    traZODone (DESYREL) tablet 200 mg  200 mg Oral QHS    sodium chloride (NS) flush 5-10 mL  5-10 mL IntraVENous Q8H    sodium chloride (NS) flush 5-10 mL  5-10 mL IntraVENous PRN    acetaminophen (TYLENOL) tablet 500 mg  500 mg Oral Q4H PRN    naloxone (NARCAN) injection 0.4 mg  0.4 mg IntraVENous PRN    bisacodyl (DULCOLAX) tablet 5 mg  5 mg Oral DAILY PRN    enoxaparin (LOVENOX) injection 40 mg  40 mg SubCUTAneous Q24H    sodium chloride (NS) flush 5-10 mL  5-10 mL IntraVENous PRN    HYDROcodone-acetaminophen (NORCO) 7.5-325 mg per tablet 1 Tab  1 Tab Oral Q6H PRN    NOREPINEPHrine (LEVOPHED) 16 mg/250 ml D5W infusion  2-16 mcg/min IntraVENous TITRATE    vancomycin (VANCOCIN) 1,000 mg in 0.9% sodium chloride (MBP/ADV) 250 mL  1,000 mg IntraVENous Q24H    cefepime (MAXIPIME) 2 g in 0.9% sodium chloride (MBP/ADV) 100 mL  2 g IntraVENous Q12H    lithium carbonate SR (LITHOBID) tablet 300 mg  300 mg Oral QHS        Review of Systems  Constitutional: positive for fatigue and malaise  Respiratory: positive for cough or stridor  Cardiovascular: negative  Gastrointestinal: negative  Behavioral/Psych: positive for anxiety and bipolar    Objective:     Patient Vitals for the past 8 hrs:   BP Temp Pulse Resp SpO2   01/16/17 0800 132/85 98.4 °F (36.9 °C) 93 18 94 %   01/16/17 0700 102/62 - 90 20 94 %   01/16/17 0630 (!) 81/52 - 82 18 95 %   01/16/17 0600 112/71 - 94 25 93 %   01/16/17 0529 (!) 82/48 - 82 17 90 %   01/16/17 0414 110/64 99.3 °F (37.4 °C) 99 20 92 %   01/16/17 0300 118/61 - 79 16 94 %   01/16/17 0200 - - 84 15 95 %   01/16/17 0100 - - 70 16 97 %     01/16 0701 - 01/16 1900  In: 201.9 [I.V.:201.9]  Out: -   01/14 1901 - 01/16 0700  In: 368.1 [P.O.:340; I.V.:28.1]  Out: -     Physical Exam:   Visit Vitals    /85 (BP 1 Location: Left arm, BP Patient Position: At rest)    Pulse 93    Temp 98.4 °F (36.9 °C)    Resp 18    Wt 150 lb (68 kg)    LMP  (LMP Unknown)    SpO2 94%    BMI 22.15 kg/m2     General appearance: alert, fatigued, cooperative, no distress, appears stated age  Neck: supple, symmetrical, trachea midline, no adenopathy, thyroid: not enlarged, symmetric, no tenderness/mass/nodules, no carotid bruit, no JVD and tender rt lateral neck  Lungs: rhonchi R base, L base  Heart: regular rate and rhythm, S1, S2 normal, no murmur, click, rub or gallop  Abdomen: soft, non-tender.  Bowel sounds normal. No masses,  no organomegaly      ECG: normal sinus rhythm     Data Review   Recent Results (from the past 24 hour(s))   EKG, 12 LEAD, INITIAL    Collection Time: 01/15/17 11:06 AM   Result Value Ref Range    Ventricular Rate 111 BPM    Atrial Rate 111 BPM    P-R Interval 146 ms    QRS Duration 90 ms    Q-T Interval 348 ms    QTC Calculation (Bezet) 473 ms    Calculated P Axis 71 degrees    Calculated R Axis 57 degrees    Calculated T Axis 53 degrees    Diagnosis       Sinus tachycardia  Nonspecific ST abnormality  Confirmed by Elliot Quinonez ([id number]) on 1/16/2017 8:30:15 AM     LACTIC ACID, PLASMA    Collection Time: 01/15/17 11:13 AM   Result Value Ref Range    Lactic acid 1.3 0.4 - 2.0 MMOL/L   TROPONIN I    Collection Time: 01/15/17 11:13 AM   Result Value Ref Range    Troponin-I, Qt. 0.23 (H) <6.65 ng/mL   METABOLIC PANEL, COMPREHENSIVE    Collection Time: 01/15/17 11:13 AM   Result Value Ref Range    Sodium 136 136 - 145 mmol/L    Potassium 4.7 3.5 - 5.1 mmol/L    Chloride 100 97 - 108 mmol/L    CO2 26 21 - 32 mmol/L    Anion gap 10 5 - 15 mmol/L    Glucose 113 (H) 65 - 100 mg/dL    BUN 25 (H) 6 - 20 MG/DL    Creatinine 2.01 (H) 0.55 - 1.02 MG/DL    BUN/Creatinine ratio 12 12 - 20      GFR est AA 30 (L) >60 ml/min/1.73m2    GFR est non-AA 25 (L) >60 ml/min/1.73m2    Calcium 9.8 8.5 - 10.1 MG/DL    Bilirubin, total 0.5 0.2 - 1.0 MG/DL    ALT 16 12 - 78 U/L    AST 22 15 - 37 U/L    Alk. phosphatase 67 45 - 117 U/L    Protein, total 7.6 6.4 - 8.2 g/dL    Albumin 3.3 (L) 3.5 - 5.0 g/dL    Globulin 4.3 (H) 2.0 - 4.0 g/dL    A-G Ratio 0.8 (L) 1.1 - 2.2     CBC WITH AUTOMATED DIFF    Collection Time: 01/15/17 11:13 AM   Result Value Ref Range    WBC 10.4 3.6 - 11.0 K/uL    RBC 4.27 3.80 - 5.20 M/uL    HGB 11.9 11.5 - 16.0 g/dL    HCT 39.6 35.0 - 47.0 %    MCV 92.7 80.0 - 99.0 FL    MCH 27.9 26.0 - 34.0 PG    MCHC 30.1 30.0 - 36.5 g/dL    RDW 14.9 (H) 11.5 - 14.5 %    PLATELET 332 783 - 178 K/uL    NEUTROPHILS 90 (H) 32 - 75 %    LYMPHOCYTES 5 (L) 12 - 49 %    MONOCYTES 4 (L) 5 - 13 %    EOSINOPHILS 1 0 - 7 %    BASOPHILS 0 0 - 1 %    ABS. NEUTROPHILS 9.4 (H) 1.8 - 8.0 K/UL    ABS. LYMPHOCYTES 0.5 (L) 0.8 - 3.5 K/UL    ABS. MONOCYTES 0.4 0.0 - 1.0 K/UL    ABS. EOSINOPHILS 0.1 0.0 - 0.4 K/UL    ABS.  BASOPHILS 0.0 0.0 - 0.1 K/UL    RBC COMMENTS NORMOCYTIC, NORMOCHROMIC     CK W/ REFLX CKMB    Collection Time: 01/15/17 11:13 AM   Result Value Ref Range     (H) 26 - 192 U/L   LITHIUM    Collection Time: 01/15/17 11:13 AM   Result Value Ref Range    Lithium 1.10 0.60 - 1.20 MMOL/L    Reported dose date: NOT PROVIDED      Reported dose time: NOT PROVIDED      Reported dose: NOT PROVIDED UNITS   URINALYSIS W/ REFLEX CULTURE    Collection Time: 01/15/17 11:13 AM   Result Value Ref Range    Color YELLOW/STRAW      Appearance CLOUDY (A) CLEAR      Specific gravity 1.014 1.003 - 1.030 pH (UA) 6.0 5.0 - 8.0      Protein 30 (A) NEG mg/dL    Glucose NEGATIVE  NEG mg/dL    Ketone NEGATIVE  NEG mg/dL    Bilirubin NEGATIVE  NEG      Blood MODERATE (A) NEG      Urobilinogen 0.2 0.2 - 1.0 EU/dL    Nitrites NEGATIVE  NEG      Leukocyte Esterase NEGATIVE  NEG      WBC 0-4 0 - 4 /hpf    RBC 0-5 0 - 5 /hpf    Epithelial cells FEW FEW /lpf    Bacteria NEGATIVE  NEG /hpf    UA:UC IF INDICATED CULTURE NOT INDICATED BY UA RESULT CNI     DRUG SCREEN, URINE    Collection Time: 01/15/17 11:13 AM   Result Value Ref Range    AMPHETAMINE NEGATIVE  NEG      BARBITURATES NEGATIVE  NEG      BENZODIAZEPINE NEGATIVE  NEG      COCAINE NEGATIVE  NEG      METHADONE NEGATIVE  NEG      OPIATES POSITIVE (A) NEG      PCP(PHENCYCLIDINE) NEGATIVE  NEG      THC (TH-CANNABINOL) NEGATIVE  NEG      Drug screen comment (NOTE)    CULTURE, BLOOD, PAIRED    Collection Time: 01/15/17 11:13 AM   Result Value Ref Range    Special Requests: NO SPECIAL REQUESTS      Culture result: NO GROWTH AFTER 20 HOURS     CK-MB,QUANT.     Collection Time: 01/15/17 11:13 AM   Result Value Ref Range    CK - MB 5.3 (H) <3.6 NG/ML    CK-MB Index 1.1 0 - 2.5     TROPONIN I    Collection Time: 01/15/17  3:32 PM   Result Value Ref Range    Troponin-I, Qt. 0.36 (H) <0.05 ng/mL   MAGNESIUM    Collection Time: 01/15/17  4:25 PM   Result Value Ref Range    Magnesium 2.2 1.6 - 2.4 mg/dL   TSH 3RD GENERATION    Collection Time: 01/15/17  4:25 PM   Result Value Ref Range    TSH 0.51 0.36 - 3.74 uIU/mL   T4, FREE    Collection Time: 01/15/17  4:25 PM   Result Value Ref Range    T4, Free 0.8 0.8 - 1.5 NG/DL   METABOLIC PANEL, BASIC    Collection Time: 01/16/17  5:28 AM   Result Value Ref Range    Sodium 143 136 - 145 mmol/L    Potassium 4.2 3.5 - 5.1 mmol/L    Chloride 110 (H) 97 - 108 mmol/L    CO2 28 21 - 32 mmol/L    Anion gap 5 5 - 15 mmol/L    Glucose 117 (H) 65 - 100 mg/dL    BUN 21 (H) 6 - 20 MG/DL    Creatinine 1.25 (H) 0.55 - 1.02 MG/DL    BUN/Creatinine ratio 17 12 - 20      GFR est AA 53 (L) >60 ml/min/1.73m2    GFR est non-AA 43 (L) >60 ml/min/1.73m2    Calcium 9.1 8.5 - 10.1 MG/DL   CBC WITH AUTOMATED DIFF    Collection Time: 01/16/17  5:28 AM   Result Value Ref Range    WBC 11.6 (H) 3.6 - 11.0 K/uL    RBC 3.45 (L) 3.80 - 5.20 M/uL    HGB 9.5 (L) 11.5 - 16.0 g/dL    HCT 31.4 (L) 35.0 - 47.0 %    MCV 91.0 80.0 - 99.0 FL    MCH 27.5 26.0 - 34.0 PG    MCHC 30.3 30.0 - 36.5 g/dL    RDW 15.3 (H) 11.5 - 14.5 %    PLATELET 877 511 - 703 K/uL    NEUTROPHILS 83 (H) 32 - 75 %    LYMPHOCYTES 7 (L) 12 - 49 %    MONOCYTES 9 5 - 13 %    EOSINOPHILS 1 0 - 7 %    BASOPHILS 0 0 - 1 %    ABS. NEUTROPHILS 9.6 (H) 1.8 - 8.0 K/UL    ABS. LYMPHOCYTES 0.9 0.8 - 3.5 K/UL    ABS. MONOCYTES 1.0 0.0 - 1.0 K/UL    ABS. EOSINOPHILS 0.2 0.0 - 0.4 K/UL    ABS. BASOPHILS 0.0 0.0 - 0.1 K/UL   LITHIUM    Collection Time: 01/16/17  5:28 AM   Result Value Ref Range    Lithium 1.06 0.60 - 1.20 MMOL/L    Reported dose date: NOT PROVIDED      Reported dose time: NOT PROVIDED      Reported dose: NOT PROVIDED UNITS           Assessment:     Principal Problem:    Sepsis ([facility] (1/15/2017)    Active Problems:    Shock ([facility] [address] (1/15/2017)      Acute respiratory failure with hypoxia ([facility] (1/15/2017)      History of laryngeal cancer (11/2/2015)      History of radiation to head and neck region (3/30/2016)      Chronic pain (1/15/2017)      Essential hypertension, benign (11/29/2011)      Bipolar 1 disorder ([address] (11/29/2011)      Fibromyalgia (1/15/2017)      History of tobacco use (1/15/2017)      Acute renal injury ([facility] [address] (1/15/2017)        Plan:     Pressure remains low. V/Q scan intermediate probability,on lovenox. Has had abnormal CT since October worrisome for infectious or metastatic processes. Has chronic rt neck pain from ca treatment. Remains chronically anxious ,little insight

## 2017-01-16 NOTE — PROGRESS NOTES
Problem: Mobility Impaired (Adult and Pediatric)  Goal: *Acute Goals and Plan of Care (Insert Text)  Physical Therapy Goals  Initiated 1/16/2017  1. Patient will move from supine to sit and sit to supine , scoot up and down and roll side to side in bed with independence within 7 day(s). 2. Patient will transfer from bed to chair and chair to bed with independence using the least restrictive device within 7 day(s). 3. Patient will perform sit to stand with independence within 7 day(s). 4. Patient will ambulate with independence for 250 feet with the least restrictive device within 7 day(s). 5. Patient will ascend/descend 5 stairs with 1 handrail(s) with independence within 7 day(s). PHYSICAL THERAPY EVALUATION  Patient: Dale Mariscal (87 y.o. female)  Date: 1/16/2017  Primary Diagnosis: Acute respiratory failure with hypoxia (HCC)  Shock ([facility]        Precautions:          ASSESSMENT :  Based on the objective data described below, the patient presents with impaired functional mobility secondary to generalized weakness, decreased activity tolerance, and impaired safety awareness. Prior to admission, pt reports independence w/ ambulation, ADLs, and denies history of falls. Pt lives with , still drives, and denies home O2 use. Today, pt received supine in bed and agreeable to participation with therapy. Pt assumed seated position EOB at supervision level with intact sitting balance once EOB. Pt required near constant VC to remain on task as well as for safety cues throughout mobility. Pt sit<>stand w/ stand-by assist and ambulated 160ft w/ stand-by assist. Mild gait instability noted with pt exhibited short, shuffled steps and decreased gait speed (non-functional gait speed). Verbal cues required for pt to increase gait speed to normal pace. Pt initially performed ambulation trial on 3 LPM O2 via NC with O2 sats 85% after ~70ft of ambulation.  Instructed pt in pursed lip breathing however O2 sats remained decreased therefore increased supplemental O2 to 4 LPM for remainder of gait training. O2 sats 92% at completion of ambulation trial on 4 LPM O2. At this time, pt is likely nearing her baseline level in terms of strength, balance, and overall mobility however would benefit from additional skilled therapy to address the above mentioned deficits and assist in weaning supplemental O2. Discharge recommendations include [facility] PT for home safety evaluation vs none, pending further progress. Patient will benefit from skilled intervention to address the above impairments. Patients rehabilitation potential is considered to be Good  Factors which may influence rehabilitation potential include:   [ ]         None noted  [X]         Mental ability/status  [ ]         Medical condition  [ ]         Home/family situation and support systems  [ ]         Safety awareness  [ ]         Pain tolerance/management  [ ]         Other:        PLAN :  Recommendations and Planned Interventions:  [X]           Bed Mobility Training             [ ]    Neuromuscular Re-Education  [X]           Transfer Training                   [ ]    Orthotic/Prosthetic Training  [X]           Gait Training                         [ ]    Modalities  [X]           Therapeutic Exercises           [ ]    Edema Management/Control  [X]           Therapeutic Activities            [X]    Patient and Family Training/Education  [X]           Other (comment): stair climbing     Frequency/Duration: Patient will be followed by physical therapy  3 times a week to address goals. Discharge Recommendations:  PT vs none  Further Equipment Recommendations for Discharge: None       SUBJECTIVE:   Patient stated I have railings next to my stairs at home but I don't use them.       OBJECTIVE DATA SUMMARY:   HISTORY:    Past Medical History   Diagnosis Date    Bipolar 1 disorder ([facility] 11/29/2011    Cancer ([facility]         skin cancer buttocks    Cancer ([facility] throat    Chronic pain         FIBROMYALGIA, LEGS    Encounter for long-term (current) use of other medications 11/29/2011    Essential hypertension, benign 11/29/2011    Laryngeal cancer ([facility] [address] 11/2/2015    Laryngeal mass      Psychiatric disorder         bipolar     Past Surgical History   Procedure Laterality Date    Place percut gastrostomy tube   10/28/2014        has been removed 2015    Hx heent           BX OF NECK MASS    Hx gi           PLACEMENT OF G TUBE    Hx gi           ESOPH. DILATATION    Hx breast augmentation Bilateral         SALINE IMPLANTS    Hx gyn           tubal pregnancy    Hx hysterectomy         Prior Level of Function/Home Situation: Prior to admission, pt reports independence w/ ambulation, ADLs, and denies history of falls. Pt denies home O2 use. Lives with . Still driving.    Personal factors and/or comorbidities impacting plan of care:      Home Situation  Home Environment: Private residence  # Steps to Enter: 5  Rails to Enter: Yes  Hand Rails : Right  One/Two Story Residence: Two story, live on 1st floor  Lift Chair Available: No  Living Alone: No  Support Systems: Spouse/Significant Other/Partner  Patient Expects to be Discharged to[de-identified] Private residence  Current DME Used/Available at Home: None     EXAMINATION/PRESENTATION/DECISION MAKING:   Critical Behavior:  Neurologic State: Alert           Skin:  Intact   Strength:    Strength: Generally decreased, functional                    Tone & Sensation:   Tone: Normal              Sensation: Intact               Range Of Motion:  AROM: Within functional limits                       Coordination:  Coordination: Within functional limits     Functional Mobility:  Bed Mobility:  Rolling: Supervision  Supine to Sit: Supervision     Scooting: Supervision  Transfers:  Sit to Stand: Stand-by asssistance  Stand to Sit: Stand-by asssistance        Bed to Chair: Stand-by asssistance              Balance:   Sitting: Intact  Standing: Impaired  Standing - Static: Good  Standing - Dynamic : Fair  Ambulation/Gait Training:  Distance (ft): 160 Feet (ft)  Assistive Device: Gait belt  Ambulation - Level of Assistance: Stand-by asssistance        Gait Abnormalities: Decreased step clearance;Shuffling gait        Base of Support: Widened     Speed/[place]: Fluctuations; Pace decreased (<100 feet/min); Accelerated  Step Length: Left shortened;Right shortened                             Stand-by assist over 160ft of ambulation. Pt initially exhibited decreased gait speed with short, shuffled gaits. Mildly unsteady overall. Functional Measure:  Barthel Index:      Bathin  Bladder: 5  Bowels: 5  Groomin  Dressin  Feeding: 10  Mobility: 10  Stairs: 0  Toilet Use: 5  Transfer (Bed to Chair and Back): 10  Total: 60         Barthel and G-code impairment scale:  Percentage of impairment CH  0% CI  1-19% CJ  20-39% CK  40-59% CL  60-79% CM  80-99% CN  100%   Barthel Score 0-100 100 99-80 79-60 59-40 20-39 1-19    0   Barthel Score 0-20 20 17-19 13-16 9-12 5-8 1-4 0      The Barthel ADL Index: Guidelines  1. The index should be used as a record of what a patient does, not as a record of what a patient could do. 2. The main aim is to establish degree of independence from any help, physical or verbal, however minor and for whatever reason. 3. The need for supervision renders the patient not independent. 4. A patient's performance should be established using the best available evidence. Asking the patient, friends/relatives and nurses are the usual sources, but direct observation and common sense are also important. However direct testing is not needed. 5. Usually the patient's performance over the preceding 24-48 hours is important, but occasionally longer periods will be relevant. 6. Middle categories imply that the patient supplies over 50 per cent of the effort. 7. Use of aids to be independent is allowed. Duncan Owen., Barthel, D.W. (0019). Functional evaluation: the Barthel Index. [address] [facility] [address]. Marcellus Rojas gabino MARLENA Matthews, Donan Barclay., Jeimy De La Torre., [facility], [address] (1999). Measuring the change indisability after inpatient rehabilitation; comparison of the responsiveness of the Barthel Index and Functional [place] Measure. Journal of Neurology, Neurosurgery, and Psychiatry, 66(4), 829-319. CHICA Hopper, HOLLEY Toscano, & Amisha Thomson M.A. (2004.) Assessment of post-stroke quality of life in cost-effectiveness studies: The usefulness of the Barthel Index and the EuroQoL-5D. Quality of Life Research, 13, 219-79         G codes: In compliance with CMSs Claims Based Outcome Reporting, the following G-code set was chosen for this patient based on their primary functional limitation being treated: The outcome measure chosen to determine the severity of the functional limitation was the Barthel Index with a score of 60/100 which was correlated with the impairment scale.       · Mobility - Walking and Moving Around:               - CURRENT STATUS:    CJ - 20%-39% impaired, limited or restricted               - GOAL STATUS:           CI - 1%-19% impaired, limited or restricted               - D/C STATUS:                       ---------------To be determined---------------      Physical Therapy Evaluation Charge Determination   History Examination Presentation Decision-Making   MEDIUM  Complexity : 1-2 comorbidities / personal factors will impact the outcome/ POC  MEDIUM Complexity : 3 Standardized tests and measures addressing body structure, function, activity limitation and / or participation in recreation  MEDIUM Complexity : Evolving with changing characteristics  MEDIUM Complexity : FOTO score of 26-74      Based on the above components, the patient evaluation is determined to be of the following complexity level: MEDIUM     Pain:  Pain Scale 1: Numeric (0 - 10)  Pain Intensity 1: 0              Activity Tolerance:   O2 sats 85% on 3 LPM O2 via NC during ambulation trial with HR elevated to 117 BPM  Please refer to the flowsheet for vital signs taken during this treatment. After treatment:   [X]         Patient left in no apparent distress sitting up in chair  [ ]         Patient left in no apparent distress in bed  [X]         Call bell left within reach  [X]         Nursing notified  [ ]         Caregiver present  [ ]         Bed alarm activated      COMMUNICATION/EDUCATION:   The patients plan of care was discussed with: Occupational Therapist and Registered Nurse.  [X]         Fall prevention education was provided and the patient/caregiver indicated understanding. [X]         Patient/family have participated as able in goal setting and plan of care. [X]         Patient/family agree to work toward stated goals and plan of care. [ ]         Patient understands intent and goals of therapy, but is neutral about his/her participation. [ ]         Patient is unable to participate in goal setting and plan of care.      Thank you for this referral.  Osorio Weinberg, PT, DPT   Time Calculation: 21 mins

## 2017-01-16 NOTE — PROGRESS NOTES
Problem: Self Care Deficits Care Plan (Adult)  Goal: *Acute Goals and Plan of Care (Insert Text)  Occupational Therapy Goals  Initiated 1/16/2017  1. Patient will perform standing ADL at sink x 5 minutes with supervision/set-up within 7 day(s). 2. Patient will perform upper body dressing and lower body dressing with supervision/set-up within 7 day(s). 3. Patient will gather materials for ADL task with supervision/set-up within 7 day(s). 4. Patient will perform toilet transfers with supervision/set-up within 7 day(s). 5. Patient will perform all aspects of toileting with supervision/set-up within 7 day(s). OCCUPATIONAL THERAPY EVALUATION  Patient: Andre Bence (23 y.o. female)  Date: 1/16/2017  Primary Diagnosis: Acute respiratory failure with hypoxia (HCC)  Shock ([facility] [address]        Precautions: fall         ASSESSMENT :  Based on the objective data described below, the patient presents with impaired cognition (likely baseline 2* Bipolar disorder), need for supplemental O2 with activity and decreased balance. Suspect pt is likely close to ADL baseline and will need 1-2 more OT sessions to address energy conservation during ADLs. Pt was CGA for standing ADLs at sink and dynamic balance tasks without assistive device. Pt desaturated to 85% on 3 L with activity, educated on importance of PLB and O2 titrated to 4 L for remainder of evaluation. All VSS during session with pt seated in chair end of session via 3 L NC. Recommend discharge home with HHPT vs none. Patient will benefit from skilled intervention to address the above impairments.   Patients rehabilitation potential is considered to be Good  Factors which may influence rehabilitation potential include:   [ ]             None noted  [X]             Mental ability/status- hx Bipolar   [ ]             Medical condition  [ ]             Home/family situation and support systems  [ ]             Safety awareness  [ ]             Pain tolerance/management  [ ] Other: PLAN :  Recommendations and Planned Interventions:  [X]               Self Care Training                  [ ]        Therapeutic Activities  [X]               Functional Mobility Training    [ ]        Cognitive Retraining  [X]               Therapeutic Exercises           [X]        Endurance Activities  [X]               Balance Training                   [ ]        Neuromuscular Re-Education  [ ]               Visual/Perceptual Training     [X]   Home Safety Training  [X]               Patient Education                 [X]        Family Training/Education  [ ]               Other (comment):     Frequency/Duration: Patient will be followed by occupational therapy 3 times a week to address goals. Discharge Recommendations: HHPT vs none  Further Equipment Recommendations for Discharge: none       SUBJECTIVE:   Patient stated My 's name is [place].       OBJECTIVE DATA SUMMARY:   HISTORY:   Past Medical History   Diagnosis Date    Bipolar 1 disorder ([facility] [address] 11/29/2011    Cancer ([facility] [address]         skin cancer buttocks    Cancer (HCC)         throat    Chronic pain         FIBROMYALGIA, LEGS    Encounter for long-term (current) use of other medications 11/29/2011    Essential hypertension, benign 11/29/2011    Laryngeal cancer ([facility] [address] 11/2/2015    Laryngeal mass      Psychiatric disorder         bipolar     Past Surgical History   Procedure Laterality Date    Place percut gastrostomy tube   10/28/2014        has been removed 2015    Hx heent           BX OF NECK MASS    Hx gi           PLACEMENT OF G TUBE    Hx gi           ESOPH. DILATATION    Hx breast augmentation Bilateral         SALINE IMPLANTS    Hx gyn           tubal pregnancy    Hx hysterectomy            Prior Level of Function/Home Situation: independent in ADLs, does not wear home O2.  Lives with her   Expanded or extensive additional review of patient history: hx bipolar d/o     [address] [facility] Environment: Private residence  # Steps to Enter: 5  Rails to Enter: Yes  [employer] : Right  One/Two Story Residence: Two story, live on 1st floor  Lift Chair Available: No  Living Alone: No  Support Systems: Spouse/Significant Other/Partner  Patient Expects to be Discharged to[de-identified] Private residence  Current DME Used/Available at Home: None  [X]  Right hand dominant             [ ]  Left hand dominant     EXAMINATION OF PERFORMANCE DEFICITS:  Cognitive/Behavioral Status:  Neurologic State: Alert;Agitated                 Skin: intact  Edema: none noted  Vision/Perceptual:    Tracking: Able to track stimulus in all quadrants w/o difficulty                              Range of Motion:     AROM: Within functional limits                       Strength:     Strength: Generally decreased, functional              Coordination:  Coordination: Within functional limits  Fine Motor Skills-Upper: Left Intact; Right Intact    Gross Motor Skills-Upper: Left Intact; Right Intact  Tone & Sensation:  Tone: Normal  Sensation: Intact                       Balance:  Sitting: Intact  Standing: Impaired  Standing - Static: Good  Standing - Dynamic : Fair     Functional Mobility and Transfers for ADLs:  Bed Mobility:  Rolling: Supervision  Supine to Sit: Supervision  Scooting: Supervision     Transfers:  Sit to Stand: Stand-by asssistance  Stand to Sit: Stand-by asssistance  Bed to Chair: Stand-by asssistance     ADL Assessment:  Feeding: Independent (pt with multiple items in bed, utensils)     Oral Facial Hygiene/Grooming: Contact guard assistance     Bathing: Minimum assistance     Upper Body Dressing: Minimum assistance (L IV)     Lower Body Dressing: Contact guard assistance     Toileting: Contact guard assistance                 ADL Intervention and task modifications:     Pt educated on role of OT and required verbal cues for safety and proper hand placement during ADLs/functional transfers.       Educated pt on PLB during activity and ADLs, demonstrated to pt who completed technique. Functional Measure:  Barthel Index:      Bathin  Bladder: 5  Bowels: 5  Groomin  Dressin  Feeding: 10  Mobility: 10  Stairs: 0  Toilet Use: 5  Transfer (Bed to Chair and Back): 10  Total: 60         Barthel and G-code impairment scale:  Percentage of impairment CH  0% CI  1-19% CJ  20-39% CK  40-59% CL  60-79% CM  80-99% CN  100%   Barthel Score 0-100 100 99-80 79-60 59-40 20-39 1-19    0   Barthel Score 0-20 20 17-19 13-16 9-12 5-8 1-4 0      The Barthel ADL Index: Guidelines  1. The index should be used as a record of what a patient does, not as a record of what a patient could do. 2. The main aim is to establish degree of independence from any help, physical or verbal, however minor and for whatever reason. 3. The need for supervision renders the patient not independent. 4. A patient's performance should be established using the best available evidence. Asking the patient, friends/relatives and nurses are the usual sources, but direct observation and common sense are also important. However direct testing is not needed. 5. Usually the patient's performance over the preceding 24-48 hours is important, but occasionally longer periods will be relevant. 6. Middle categories imply that the patient supplies over 50 per cent of the effort. 7. Use of aids to be independent is allowed. Ruben Suárez., Barthel, D.W. (9056). Functional evaluation: the Barthel Index. [address] [facility] (14)2. MARLENA Michaud, [place] Joshua., Tasneem Ceja, Nancy, [address] [facility] (). Measuring the change indisability after inpatient rehabilitation; comparison of the responsiveness of the Barthel Index and Functional [place] Measure. Journal of Neurology, Neurosurgery, and Psychiatry, 66(4), 639-785. [place] NJENNIFER.CHARISSA, HOLLEY Toscano, & Veronica Madden M.A. (2004.) Assessment of post-stroke quality of life in cost-effectiveness studies:  The usefulness of the Barthel Index and the EuroQoL-5D. Quality of Life Research, 15, 687-35   '     G codes: In compliance with CMSs Claims Based Outcome Reporting, the following G-code set was chosen for this patient based on their primary functional limitation being treated: The outcome measure chosen to determine the severity of the functional limitation was the [place] index with a score of 60/100 which was correlated with the impairment scale. · Self Care:               - CURRENT STATUS:    CJ - 20%-39% impaired, limited or restricted               - GOAL STATUS:           CH - 0% impaired, limited or restricted               - D/C STATUS:                       ---------------To be determined---------------      Occupational Therapy Evaluation Charge Determination   History Examination Decision-Making   LOW Complexity : Brief history review  MEDIUM Complexity : 3-5 performance deficits relating to physical, cognitive , or psychosocial skils that result in activity limitations and / or participation restrictions LOW Complexity : No comorbidities that affect functional and no verbal or physical assistance needed to complete eval tasks       Based on the above components, the patient evaluation is determined to be of the following complexity level: LOW   Pain:  Pain Scale 1: Numeric (0 - 10)  Pain Intensity 1: 0              Activity Tolerance:   Need for 4 L with activity   Please refer to the flowsheet for vital signs taken during this treatment. After treatment:   [X] Patient left in no apparent distress sitting up in chair  [ ] Patient left in no apparent distress in bed  [X] Call bell left within reach  [X] Nursing notified  [ ] Caregiver present  [ ] Bed alarm activated      COMMUNICATION/EDUCATION:   The patients plan of care was discussed with: Physical Therapist and Registered Nurse. [ ] Home safety education was provided and the patient/caregiver indicated understanding.   [X] Patient/family have participated as able in goal setting and plan of care. [X] Patient/family agree to work toward stated goals and plan of care. [ ] Patient understands intent and goals of therapy, but is neutral about his/her participation. [ ] Patient is unable to participate in goal setting and plan of care. This patients plan of care is appropriate for delegation to [facility].      Thank you for this referral.  Mary Herrmann, OT  Time Calculation: 20 mins

## 2017-01-16 NOTE — INTERDISCIPLINARY ROUNDS
Interdisciplinary team rounds were held with the following team members:Case Management, Diabetes Treatment Specialist, Nursing, Nutrition, Occupational Therapy, Physical Therapy,Pharmacy, Physician and Respiratory Therapy. Plan of care discussed. See clinical pathway and/or care plan for interventions and desired outcomes.     Goals: add [place], PT/OT, lower extrem dopplers

## 2017-01-16 NOTE — PROGRESS NOTES
1915- bedside report received from Radha Lebron [facility] and assumed care of pt.  1930- assessment completed  1945- pt c/o pain 8 on scale of 1-10 and asked for pain meds. norco po given per prn.    1950- pt incontinent of urine. stress incontinence from coughing. Pt washed and Bed linens changed. 2100- daughter [address] [facility] Pt has cell phone at bedside and a bag with clothing and eye glasses. 2230- pt to Nuclear med on monitor, Nasal O2 with transport tech and RN. 2245-pt SBP in 140's . Levophed gtt decreased to 2.5 mcg/kg/min. 2320- back in room, tolerated VQ well. VSS, ttr Levophed for SBP >100    0030- pt resting with eyes closed. 0330- Levophed off BP stable. 0425- pt c/o neck and body pain. 8 on 1-10 scale. Norco given  0530- BP has decreased and SBP <100.  Levophed gtt on at 2 mcg/kg/min.  0700- bedside report given to Fela Monae

## 2017-01-16 NOTE — PROGRESS NOTES
9449 Received verbal report from off going shift, assumed care of patient. Labs and orders reviewed. Close observation maintained. [id number]  Interdisciplinary rounds done, orders received.

## 2017-01-16 NOTE — PROGRESS NOTES
[facility] Consult Service Progress NOTE  Pulmonary, Critical Care, and Sleep Medicine    Name: Samia Quinonez MRN: [id number]   : 1955 Hospital: [facility]   Date: 2017  Admission Date: 1/15/2017     Chart and notes reviewed. Data reviewed. Pt seen on rounds earlier today. I have evaluated and examined the patient. Pt is acutely ill. Reviewed the evaluation and will assist in implementing the plan as outlined by Dr. Alysia Case and team    The patient was not feeling well night prior to admission. This morning her  didn't awaken her and allowed her to rest in. Later her daughter went to wake her up and noted that the pt was altered and was having signs of dysarthria, without any sign of focal neurological deficit. EMS were called in and pt was noted to have hypoxia, oxygen supplied and her mentation improved immediately. CT head was negative. Pt admitted. VQ scan intermediate probability but CXR markedly abnormal. Pt in ICU on IV levophed that was weaned off this AM.    Pt now awake but has pressured speech and dysarthria. No SOB. Some cough. No sputum      Hospital Day: 2    --Blood cultures came back positive to for Gram Postive Cocci. IMPRESSION:   · Acute respiratory failure with hypoxiaAMS- found by family- etiology? Over medicated?   · Abnormal VQ scan; matched defects- indeterminant  · Severe bilateral penumonia- likely aspiration given her underlying history  · Sepsis ([address]   · Shock ([address] (1/15/2017)  · History of laryngeal cancer (2015)  · History of radiation to head and neck region (3/30/2016)  · H/o dysphagia   · Chronic pain (1/15/2017)  · Hypertension, benign   · Bipolar 1 disorder Lithium level is therapeutic,   · Fibromyalgia (1/15/2017)  · History of tobacco use (1/15/2017)  · Acute renal injury ([address] (1/15/2017)      RECOMMENDATIONS/PLAN:   · Full code   · IV Broad spectrum abx with Cefepime IV + IV Vanc (per pharmacy protocol ) · Add IV flagyl for anaerobic coverage  · Oxygen supplement per NC   · cardiologist to see her   · Echo completed  · Speech therapy  · Aspiration precautions  · Another fluid bolus- looks dehydrated  · Continue home meds as they were; no Vicoprofen; on Oxycodone/tylenol (due to risk of GI bleed etc) As a background check we will go ahead order TSH. Free T4 (due to history of XRT to neck)  PT OT to see her for fall at home. · Will be available to assist in medical management while in the CCU pending disposition     Code: Full Code        Hemodynamics:   PAP:   CO:     Wedge:   CI:     CVP:    SVR:       PVR:         Ventilator Settings:      Mode Rate TV Press PEEP FiO2 PIP Min. Vent                              Vital Signs: Intake/Output: Intake/Output:   Visit Vitals    /73 (BP 1 Location: Left arm, BP Patient Position: At rest)    Pulse 95    Temp 98.4 °F (36.9 °C)    Resp 19    Wt 68 kg (150 lb)    LMP  (LMP Unknown)    SpO2 (!) 82%    BMI 22.15 kg/m2      O2 Device: Nasal cannula  O2 Flow Rate (L/min): 4 l/min  Temp (24hrs), Av.3 °F (36.8 °C), Min:97.9 °F (36.6 °C), Max:99.3 °F (37.4 °C)     Intake/Output Summary (Last 24 hours) at 17 1210  Last data filed at 17 1204   Gross per 24 hour   Intake           810.08 ml   Output             1150 ml   Net          -339.92 ml    Last shift:       07 - 1900  In: 788 [P.O.:240; I.V.:202]  Out: 1150 [Urine:1150]  Last 3 shifts: 1901 -  07  In: 368.1 [P.O.:340; I.V.:28.1]  Out: -          Telemetry:    AFIB    Physical Exam:    General: petite WF in no respiratory distress and acyanotic, alert and appears dehydrated   HEENT: NCAT; no thrush   Neck: No abnormally enlarged lymph nodes.    Chest: normal   Lungs: decreased air exchange bilaterally   Heart: Regular rate and rhythm or S1S2 present   Abdomen: soft, non-tender, without masses or organomegaly   :    Extremity: negative, clubbing;    Neuro: alert   Psych: disorganized   Skin: Skin unremarkableSkin color, texture, turgor normal. No rashes or lesions;   Pulses: Bilateral, Radial, 1+ Normal upper and lower extremity pulses and Diffusely decreased peripheral pulses   Capillary refill: normal well perfused;      DATA:    MAR reviewed and pertinent medications noted or modified as needed    MEDS:   Current Facility-Administered Medications   Medication    metroNIDAZOLE (FLAGYL) IVPB premix 500 mg    0.45% sodium chloride infusion    mupirocin (BACTROBAN) 2 % ointment    vancomycin (VANCOCIN) 1,000 mg in 0.9% sodium chloride (MBP/ADV) 250 mL    amitriptyline (ELAVIL) tablet 10 mg    FLUoxetine (PROzac) capsule 20 mg    lidocaine (XYLOCAINE) 2 % viscous solution 15 mL    senna (SENOKOT) tablet 8.6 mg    benzocaine-menthol (CHLORASEPTIC MAX) lozenge 1 Lozenge    traZODone (DESYREL) tablet 200 mg    sodium chloride (NS) flush 5-10 mL    sodium chloride (NS) flush 5-10 mL    acetaminophen (TYLENOL) tablet 500 mg    naloxone (NARCAN) injection 0.4 mg    bisacodyl (DULCOLAX) tablet 5 mg    enoxaparin (LOVENOX) injection 40 mg    sodium chloride (NS) flush 5-10 mL    HYDROcodone-acetaminophen (NORCO) 7.5-325 mg per tablet 1 Tab    cefepime (MAXIPIME) 2 g in 0.9% sodium chloride (MBP/ADV) 100 mL    lithium carbonate SR (LITHOBID) tablet 300 mg          Labs:  Recent Labs      01/16/17   0528  01/15/17   1113   WBC  11.6*  10.4   HGB  9.5*  11.9   PLT  159  177     Recent Labs      01/16/17   0528  01/15/17   1625  01/15/17   1113   NA  143   --   136   K  4.2   --   4.7   CL  110*   --   100   CO2  28   --   26   GLU  117*   --   113*   BUN  21*   --   25*   CREA  1.25*   --   2.01*   CA  9.1   --   9.8   MG   --   2.2   --    LAC   --    --   1.3   ALB   --    --   3.3*   SGOT   --    --   22   ALT   --    --   16     No results for input(s): PH, PCO2, PO2, HCO3, FIO2 in the last 72 hours.   No results for input(s): PH, PCO2, PO2, HCO3, FIO2 in the last 72 hours. Recent Labs      01/15/17   1532  01/15/17   1113   CKNDX   --   1.1   TROIQ  0.36*  0.23*       Imaging:  []                           I have personally reviewed the patients radiographs and reports:        Results from Hospital Encounter encounter on 01/15/17   XR CHEST PORT   Narrative EXAM:  XR CHEST PORT    INDICATION:  central line placement verf    COMPARISON:  January 15, 2017    FINDINGS: A portable AP radiograph of the chest was obtained at 1743 hours. A  right IJ line is placed in the interval terminating in the superior vena cava. There is no pneumothorax or pleural effusion. Severe bilateral pulmonary  consolidations are again noted in the right and left perihilar regions  predominating in the left mid and lower perihilar region of the right superior  perihilar region. The findings could be on the basis of bilateral pneumonia or  pulmonary edema. Hilar contours are fully obscured. The visualized portions of  the cardiac and mediastinal contours are stable. The bones are moderately  osteopenic. Impression IMPRESSION: Right IJ line position in superior vena cava. Other findings  unchanged. Results from [place] encounter on 01/15/17   CT HEAD WO CONT   Narrative EXAM:  CT HEAD WO CONT    INDICATION:   57-year-old with history of hypertension, laryngeal cancer,  fibromyalgia, bipolar disorder presenting to emergency department via EMS for  probable speech beginning just prior to admission. COMPARISON: None. TECHNIQUE: Unenhanced CT of the head was performed using 5 mm images. Brain and  bone windows were generated. CT dose reduction was achieved through use of a  standardized protocol tailored for this examination and automatic exposure  control for dose modulation. FINDINGS:  The ventricles and sulci are normal in size, shape and configuration and  midline.  Mild to moderate bilateral periventricular and centrum semiovale  diminished attenuation is shown consistent with chronic small vessel ischemic  disease of the white matter. . There is no intracranial hemorrhage, extra-axial  collection, mass, mass effect or midline shift. The basilar cisterns are open. No acute infarct is identified. The bone windows demonstrate no abnormalities. The visualized portions of the paranasal sinuses and mastoid air cells are  clear. Impression IMPRESSION: Chronic small vessel ischemic disease of white matter. No acute  findings.             Christiano Hebert MD

## 2017-01-16 NOTE — CONSULTS
[address] [place] [address], [address] [facility]     Date of  Admission: 1/15/2017 10:58 AM     Admission type:Emergency    Consult for: elevated troponin  Consult by: hospitalist     Subjective:     Dale Mariscal is a 57 y.o. female  With PMH bipolar, laryngeal cancer, HTN who was admitted for Acute respiratory failure with hypoxia ([address]  Shock ([address] Per H&P, Ms. Todd Rg was in her usual state of health until yesterday when she had altered mental status and dysarthria and found to be hypoxic on EMS arrival.      Ms. Todd Rg is not followed by a cardiologist.  She has no recent ECHOs, caths, or stress tests in the system. Today Ms. Wall is alert and engaged, but has tangential thinking and it is difficult to do a full ROS. She denies chest pain and SOB.       Cardiac risk factors: smoking/ tobacco exposure, hypertension, stress, post-menopausal.      Patient Active Problem List    Diagnosis Date Noted    Acute respiratory failure with hypoxia ([address] 01/15/2017    Fibromyalgia 01/15/2017     Class: Chronic    Chronic pain 01/15/2017     Class: Chronic    History of tobacco use 01/15/2017     Class: Present on Admission    Sepsis ([address] 01/15/2017     Class: Acute    Acute renal injury ([address] 01/15/2017     Class: Present on Admission    Shock ([address] 01/15/2017    History of radiation to head and neck region 03/30/2016    History of laryngeal cancer 11/02/2015     Class: Present on Admission    Dysphagia 12/30/2014    Essential hypertension, benign 11/29/2011    Bipolar 1 disorder ([address] 11/29/2011    Encounter for long-term (current) use of other medications 11/29/2011      Wilson Galicia MD  Past Medical History   Diagnosis Date    Bipolar 1 disorder ([address] 11/29/2011    Cancer ([address]      skin cancer buttocks    Cancer (HCC)      throat    Chronic pain      FIBROMYALGIA, LEGS    Encounter for long-term (current) use of other medications 11/29/2011    Essential hypertension, benign 11/29/2011    [facility] 11/2/2015    Laryngeal mass     Psychiatric disorder      bipolar      Social History     Social History    Marital status:      Spouse name: N/A    Number of children: N/A    Years of education: N/A     Social History Main Topics    Smoking status: Former Smoker     Packs/day: 1.00     Years: 40.00     Quit date: 8/27/2014    Smokeless tobacco: Never Used      Comment: PASSIVE SMOKE EXPOSURE,  SMOKES    Alcohol use No    Drug use: No    Sexual activity: Not on file     Other Topics Concern    Not on file     Social History Narrative     No Known Allergies   Family History   Problem Relation Age of Onset    Cancer Father      bone/skin/lung    Anesth Problems Neg Hx       Current Facility-Administered Medications   Medication Dose Route Frequency    metroNIDAZOLE (FLAGYL) IVPB premix 500 mg  500 mg IntraVENous Q6H    0.45% sodium chloride infusion  75 mL/hr IntraVENous CONTINUOUS    mupirocin (BACTROBAN) 2 % ointment   Both Nostrils Q12H    amitriptyline (ELAVIL) tablet 10 mg  10 mg Oral QHS    FLUoxetine (PROzac) capsule 20 mg  20 mg Oral DAILY    lidocaine (XYLOCAINE) 2 % viscous solution 15 mL  15 mL Mouth/Throat Q4H PRN    senna (SENOKOT) tablet 8.6 mg  1 Tab Oral DAILY    benzocaine-menthol (CHLORASEPTIC MAX) lozenge 1 Lozenge  1 Lozenge Oral Q2H PRN    traZODone (DESYREL) tablet 200 mg  200 mg Oral QHS    sodium chloride (NS) flush 5-10 mL  5-10 mL IntraVENous Q8H    sodium chloride (NS) flush 5-10 mL  5-10 mL IntraVENous PRN    acetaminophen (TYLENOL) tablet 500 mg  500 mg Oral Q4H PRN    naloxone (NARCAN) injection 0.4 mg  0.4 mg IntraVENous PRN    bisacodyl (DULCOLAX) tablet 5 mg  5 mg Oral DAILY PRN    enoxaparin (LOVENOX) injection 40 mg  40 mg SubCUTAneous Q24H    sodium chloride (NS) flush 5-10 mL  5-10 mL IntraVENous PRN    HYDROcodone-acetaminophen (NORCO) 7.5-325 mg per tablet 1 Tab  1 Tab Oral Q6H PRN    vancomycin (VANCOCIN) 1,000 mg in 0.9% sodium chloride (MBP/ADV) 250 mL  1,000 mg IntraVENous Q24H    cefepime (MAXIPIME) 2 g in 0.9% sodium chloride (MBP/ADV) 100 mL  2 g IntraVENous Q12H    lithium carbonate SR (LITHOBID) tablet 300 mg  300 mg Oral QHS        Review of Symptoms:   Constitutional: negative  Eyes: negative   Ears, nose, mouth, throat, and face: negative  Respiratory: negative   Cardiovascular: negative   Gastrointestinal: negative  Genitourinary:negative   Musculoskeletal: pain at joints and all IV sites. Neurological: negative   Endocrine: negative          Objective:      Visit Vitals    /73 (BP 1 Location: Left arm, BP Patient Position: At rest)    Pulse 95    Temp 98.4 °F (36.9 °C)    Resp 19    Wt 68 kg (150 lb)    SpO2 (!) 82%    BMI 22.15 kg/m2       Physical:   General:  female sitting in chair, unable to speak in full sentences due to breathing. Heart: tachy RR, no m/S3/  Lungs: very diminished throughout  Abdomen: distended, Soft, +BS, NT  Extremities: LE luis +DP/PT, trace edema   Neurologic: impulsive, tangential thinking, dysarthria.   ZAVALA with equal strength    Data Review:   Recent Labs      01/16/17   0528  01/15/17   1113   WBC  11.6*  10.4   HGB  9.5*  11.9   HCT  31.4*  39.6   PLT  159  177     Recent Labs      01/16/17   0528  01/15/17   1625  01/15/17   1113   NA  143   --   136   K  4.2   --   4.7   CL  110*   --   100   CO2  28   --   26   GLU  117*   --   113*   BUN  21*   --   25*   CREA  1.25*   --   2.01*   CA  9.1   --   9.8   MG   --   2.2   --    ALB   --    --   3.3*   TBILI   --    --   0.5   SGOT   --    --   22   ALT   --    --   16       Recent Labs      01/15/17   1532  01/15/17   1113   TROIQ  0.36*  0.23*   CKMB   --   5.3*         Intake/Output Summary (Last 24 hours) at 01/16/17 1133  Last data filed at 01/16/17 1000   Gross per 24 hour   Intake           810.08 ml   Output 300 ml   Net           510.08 ml        Cardiographics    Telemetry: SR-ST with frequent PACs, occasional PVCs, and an episode of 10 beat run VT overnight. ECG: (1/15) ST  Echocardiogram: ordered  CXRAY:(1/15) \"There is bilateral perihilar airspace disease could represent edema and/or pneumonia. \"   V/Q scan: (1/15) intermediate probability of PE       Assessment:       Principal Problem:    Sepsis ([address] (1/15/2017)    Active Problems:    Essential hypertension, benign (11/29/2011)      Bipolar 1 disorder ([address] (11/29/2011)      History of laryngeal cancer (11/2/2015)      History of radiation to head and neck region (3/30/2016)      Acute respiratory failure with hypoxia ([address] (1/15/2017)      Fibromyalgia (1/15/2017)      Chronic pain (1/15/2017)      History of tobacco use (1/15/2017)      Acute renal injury ([address] (1/15/2017)      Shock ([address] (1/15/2017)         Plan:       Elevated troponin: 0.23 to 0.36. In setting of shock, hypoxic respiratory failure, and V/Q showing intermediate risk of PE. No EKG changes - short run of VT overnight. Titrated off levophed this morning. MIGUEL ÁNGEL resolving. Last CT of chest concerning for cancer. · ECHO ordered  · Add-on troponin to morning blood draw  · Add ASA and statin, low dose BB  · Troponin increase most likely secondary to non-cardiac causes. ECHO results will impact further orders. Thank you for consulting [facility].       Dayna Domínguez NP  DNP, RN, AGACNP-BC

## 2017-01-16 NOTE — PROGRESS NOTES
General Daily Progress Note    Admit Date: 1/15/2017  Hospital day 2    Subjective:     Patient has no complaint of cough,dyspnea,chronic rt sided neck pain. .   Medication side effects: none    Current Facility-Administered Medications   Medication Dose Route Frequency    amitriptyline (ELAVIL) tablet 10 mg  10 mg Oral QHS    FLUoxetine (PROzac) capsule 20 mg  20 mg Oral DAILY    lidocaine (XYLOCAINE) 2 % viscous solution 15 mL  15 mL Mouth/Throat Q4H PRN    senna (SENOKOT) tablet 8.6 mg  1 Tab Oral DAILY    benzocaine-menthol (CHLORASEPTIC MAX) lozenge 1 Lozenge  1 Lozenge Oral Q2H PRN    traZODone (DESYREL) tablet 200 mg  200 mg Oral QHS    sodium chloride (NS) flush 5-10 mL  5-10 mL IntraVENous Q8H    sodium chloride (NS) flush 5-10 mL  5-10 mL IntraVENous PRN    acetaminophen (TYLENOL) tablet 500 mg  500 mg Oral Q4H PRN    naloxone (NARCAN) injection 0.4 mg  0.4 mg IntraVENous PRN    bisacodyl (DULCOLAX) tablet 5 mg  5 mg Oral DAILY PRN    enoxaparin (LOVENOX) injection 40 mg  40 mg SubCUTAneous Q24H    sodium chloride (NS) flush 5-10 mL  5-10 mL IntraVENous PRN    HYDROcodone-acetaminophen (NORCO) 7.5-325 mg per tablet 1 Tab  1 Tab Oral Q6H PRN    NOREPINEPHrine (LEVOPHED) 16 mg/250 ml D5W infusion  2-16 mcg/min IntraVENous TITRATE    vancomycin (VANCOCIN) 1,000 mg in 0.9% sodium chloride (MBP/ADV) 250 mL  1,000 mg IntraVENous Q24H    cefepime (MAXIPIME) 2 g in 0.9% sodium chloride (MBP/ADV) 100 mL  2 g IntraVENous Q12H    lithium carbonate SR (LITHOBID) tablet 300 mg  300 mg Oral QHS        Review of Systems  Constitutional: positive for fatigue and malaise  Ears, nose, mouth, throat, and face: positive for chronic neck pain  Respiratory: positive for cough or stridor  Cardiovascular: negative  Behavioral/Psych: positive for anxiety and bipolar    Objective:     Patient Vitals for the past 8 hrs:   BP Temp Pulse Resp SpO2   01/16/17 0529 (!) 82/48 - 82 17 90 %   01/16/17 0414 110/64 99.3 °F (37.4 °C) 99 20 92 %   01/16/17 0300 118/61 - 79 16 94 %   01/16/17 0200 - - 84 15 95 %   01/16/17 0100 - - 70 16 97 %   01/16/17 0000 - 97.9 °F (36.6 °C) 85 14 92 %   01/15/17 2330 - 97.9 °F (36.6 °C) 96 19 91 %   01/15/17 2319 126/62 - (!) 101 (!) 32 (!) 89 %   01/15/17 2215 123/66 - 82 - -     01/15 1901 - 01/16 0700  In: 365.5 [P.O.:340; I.V.:25.5]  Out: -        Physical Exam:   Visit Vitals    BP (!) 82/48    Pulse 82    Temp 99.3 °F (37.4 °C)    Resp 17    Wt 150 lb (68 kg)    LMP  (LMP Unknown)    SpO2 90%    BMI 22.15 kg/m2     General appearance: alert, fatigued, cooperative, mild distress, appears stated age  Neck: supple, symmetrical, trachea midline, no adenopathy, thyroid: not enlarged, symmetric, no tenderness/mass/nodules and rt lateral neck tenderness  Lungs: rhonchi R base, L base  Heart: regular rate and rhythm  Abdomen: soft, non-tender.  Bowel sounds normal. No masses,  no organomegaly  Neurologic: Mental status: Alert, oriented, mildly agitated      ECG: normal sinus rhythm     Data Review   Recent Results (from the past 24 hour(s))   EKG, 12 LEAD, INITIAL    Collection Time: 01/15/17 11:06 AM   Result Value Ref Range    Ventricular Rate 111 BPM    Atrial Rate 111 BPM    P-R Interval 146 ms    QRS Duration 90 ms    Q-T Interval 348 ms    QTC Calculation (Bezet) 473 ms    Calculated P Axis 71 degrees    Calculated R Axis 57 degrees    Calculated T Axis 53 degrees    Diagnosis       Sinus tachycardia  Nonspecific ST abnormality  When compared with ECG of 23-NOV-2015 09:37,  premature atrial complexes are no longer present  Non-specific change in ST segment in Anterior leads  ST now depressed in Lateral leads  Nonspecific T wave abnormality now evident in Inferior leads     LACTIC ACID, PLASMA    Collection Time: 01/15/17 11:13 AM   Result Value Ref Range    Lactic acid 1.3 0.4 - 2.0 MMOL/L   TROPONIN I    Collection Time: 01/15/17 11:13 AM   Result Value Ref Range    Troponin-I, Qt. 0.23 (H) <5.08 ng/mL   METABOLIC PANEL, COMPREHENSIVE    Collection Time: 01/15/17 11:13 AM   Result Value Ref Range    Sodium 136 136 - 145 mmol/L    Potassium 4.7 3.5 - 5.1 mmol/L    Chloride 100 97 - 108 mmol/L    CO2 26 21 - 32 mmol/L    Anion gap 10 5 - 15 mmol/L    Glucose 113 (H) 65 - 100 mg/dL    BUN 25 (H) 6 - 20 MG/DL    Creatinine 2.01 (H) 0.55 - 1.02 MG/DL    BUN/Creatinine ratio 12 12 - 20      GFR est AA 30 (L) >60 ml/min/1.73m2    GFR est non-AA 25 (L) >60 ml/min/1.73m2    Calcium 9.8 8.5 - 10.1 MG/DL    Bilirubin, total 0.5 0.2 - 1.0 MG/DL    ALT 16 12 - 78 U/L    AST 22 15 - 37 U/L    Alk. phosphatase 67 45 - 117 U/L    Protein, total 7.6 6.4 - 8.2 g/dL    Albumin 3.3 (L) 3.5 - 5.0 g/dL    Globulin 4.3 (H) 2.0 - 4.0 g/dL    A-G Ratio 0.8 (L) 1.1 - 2.2     CBC WITH AUTOMATED DIFF    Collection Time: 01/15/17 11:13 AM   Result Value Ref Range    WBC 10.4 3.6 - 11.0 K/uL    RBC 4.27 3.80 - 5.20 M/uL    HGB 11.9 11.5 - 16.0 g/dL    HCT 39.6 35.0 - 47.0 %    MCV 92.7 80.0 - 99.0 FL    MCH 27.9 26.0 - 34.0 PG    MCHC 30.1 30.0 - 36.5 g/dL    RDW 14.9 (H) 11.5 - 14.5 %    PLATELET 143 526 - 274 K/uL    NEUTROPHILS 90 (H) 32 - 75 %    LYMPHOCYTES 5 (L) 12 - 49 %    MONOCYTES 4 (L) 5 - 13 %    EOSINOPHILS 1 0 - 7 %    BASOPHILS 0 0 - 1 %    ABS. NEUTROPHILS 9.4 (H) 1.8 - 8.0 K/UL    ABS. LYMPHOCYTES 0.5 (L) 0.8 - 3.5 K/UL    ABS. MONOCYTES 0.4 0.0 - 1.0 K/UL    ABS. EOSINOPHILS 0.1 0.0 - 0.4 K/UL    ABS.  BASOPHILS 0.0 0.0 - 0.1 K/UL    RBC COMMENTS NORMOCYTIC, NORMOCHROMIC     CK W/ REFLX CKMB    Collection Time: 01/15/17 11:13 AM   Result Value Ref Range     (H) 26 - 192 U/L   LITHIUM    Collection Time: 01/15/17 11:13 AM   Result Value Ref Range    Lithium 1.10 0.60 - 1.20 MMOL/L    Reported dose date: NOT PROVIDED      Reported dose time: NOT PROVIDED      Reported dose: NOT PROVIDED UNITS   URINALYSIS W/ REFLEX CULTURE    Collection Time: 01/15/17 11:13 AM   Result Value Ref Range    Color YELLOW/STRAW Appearance CLOUDY (A) CLEAR      Specific gravity 1.014 1.003 - 1.030      pH (UA) 6.0 5.0 - 8.0      Protein 30 (A) NEG mg/dL    Glucose NEGATIVE  NEG mg/dL    Ketone NEGATIVE  NEG mg/dL    Bilirubin NEGATIVE  NEG      Blood MODERATE (A) NEG      Urobilinogen 0.2 0.2 - 1.0 EU/dL    Nitrites NEGATIVE  NEG      Leukocyte Esterase NEGATIVE  NEG      WBC 0-4 0 - 4 /hpf    RBC 0-5 0 - 5 /hpf    Epithelial cells FEW FEW /lpf    Bacteria NEGATIVE  NEG /hpf    UA:UC IF INDICATED CULTURE NOT INDICATED BY UA RESULT CNI     DRUG SCREEN, URINE    Collection Time: 01/15/17 11:13 AM   Result Value Ref Range    AMPHETAMINE NEGATIVE  NEG      BARBITURATES NEGATIVE  NEG      BENZODIAZEPINE NEGATIVE  NEG      COCAINE NEGATIVE  NEG      METHADONE NEGATIVE  NEG      OPIATES POSITIVE (A) NEG      PCP(PHENCYCLIDINE) NEGATIVE  NEG      THC (TH-CANNABINOL) NEGATIVE  NEG      Drug screen comment (NOTE)    CK-MB,QUANT.     Collection Time: 01/15/17 11:13 AM   Result Value Ref Range    CK - MB 5.3 (H) <3.6 NG/ML    CK-MB Index 1.1 0 - 2.5     TROPONIN I    Collection Time: 01/15/17  3:32 PM   Result Value Ref Range    Troponin-I, Qt. 0.36 (H) <0.05 ng/mL   MAGNESIUM    Collection Time: 01/15/17  4:25 PM   Result Value Ref Range    Magnesium 2.2 1.6 - 2.4 mg/dL   TSH 3RD GENERATION    Collection Time: 01/15/17  4:25 PM   Result Value Ref Range    TSH 0.51 0.36 - 3.74 uIU/mL   T4, FREE    Collection Time: 01/15/17  4:25 PM   Result Value Ref Range    T4, Free 0.8 0.8 - 1.5 NG/DL   CBC WITH AUTOMATED DIFF    Collection Time: 01/16/17  5:28 AM   Result Value Ref Range    WBC 11.6 (H) 3.6 - 11.0 K/uL    RBC 3.45 (L) 3.80 - 5.20 M/uL    HGB 9.5 (L) 11.5 - 16.0 g/dL    HCT 31.4 (L) 35.0 - 47.0 %    MCV 91.0 80.0 - 99.0 FL    MCH 27.5 26.0 - 34.0 PG    MCHC 30.3 30.0 - 36.5 g/dL    RDW 15.3 (H) 11.5 - 14.5 %    PLATELET 994 075 - 094 K/uL    NEUTROPHILS 83 (H) 32 - 75 %    LYMPHOCYTES 7 (L) 12 - 49 %    MONOCYTES 9 5 - 13 %    EOSINOPHILS 1 0 - 7 %    BASOPHILS 0 0 - 1 %    ABS. NEUTROPHILS 9.6 (H) 1.8 - 8.0 K/UL    ABS. LYMPHOCYTES 0.9 0.8 - 3.5 K/UL    ABS. MONOCYTES 1.0 0.0 - 1.0 K/UL    ABS. EOSINOPHILS 0.2 0.0 - 0.4 K/UL    ABS. BASOPHILS 0.0 0.0 - 0.1 K/UL           Assessment:     Principal Problem:    Sepsis ([facility] [address] (1/15/2017)    Active Problems:    Shock ([facility] [address] (1/15/2017)      Acute respiratory failure with hypoxia ([facility] [address] (1/15/2017)      History of laryngeal cancer (11/2/2015)      History of radiation to head and neck region (3/30/2016)      Chronic pain (1/15/2017)      Essential hypertension, benign (11/29/2011)      Bipolar 1 disorder ([facility] [address] (11/29/2011)      Fibromyalgia (1/15/2017)      History of tobacco use (1/15/2017)      Acute renal injury ([facility] [address] (1/15/2017)        Plan:     Feeling  Some better this am,remains hypotensive,sats ok. Coarse ronchi both bases. V/Q scan intermediate probability for PE. Pt well known to me. Has had laryngeal cancer with surgery and RT with resultant chronic rt neck pain and mild dysphagia. Dhiraj Monreal Has had abnormal CXR and Chest CT since October,awaiting 3 mo f/u CT. Studies worrisome for infectious or metastatic etiologies. Pt followed by Psych for Bipolar Disorder and is chronically anxious /agitated with poor insight. Continue current meds and treatments.

## 2017-01-16 NOTE — PROGRESS NOTES
Problem: Dysphagia (Adult)  Goal: *Acute Goals and Plan of Care (Insert Text)  Initiated 1/16/2017  1. Patient will participate in reevaluation of swallow function within 7 days. SPEECH LANGUAGE PATHOLOGY BEDSIDE SWALLOW EVALUATION  Patient: Krishna Frankel (11 y.o. female)  Date: 1/16/2017  Primary Diagnosis: Acute respiratory failure with hypoxia (HCC)  Shock ([address]        Precautions: fall         ASSESSMENT :  Based on the objective data described below, the patient presents with suspected severe pharyngeal dysphagia. Limited examination due to patient agitation, report of significant pain. RN aware and attempting to assist with pain management as able. Patient constantly rocking in bed, repositioning, throwing off covers, and talking. O2 sats and RR fluctuating significantly throughout assessment likely due to agitation/anxiety. Attempts to slow breathing and encourage deep breaths through her nose were met with resistance. With thin liquids, patient with delayed pharyngeal swallow and multiple swallows per bolus, followed by throat clear. High risk for aspiration. Poor insight as patient stats she swallows \"just fine\". Note patient's history of laryngeal cancer/radiation. She had an esophagram in 2015 showing narrowing of her cervical esophagus and aspiration or barium. She has had an esophageal dilatation in the past. Per family, baseline diet is mostly liquids. She is unable to eat solids at all. Patient will benefit from skilled intervention to address the above impairments.   Patients rehabilitation potential is considered to be Guarded  Factors which may influence rehabilitation potential include:   [ ]            None noted  [X]            Mental ability/status  [X]            Medical condition  [ ]            Home/family situation and support systems  [ ]            Safety awareness  [ ]            Pain tolerance/management  [ ]            Other:        PLAN :  Recommendations and Planned Interventions:  Hold trays for now. SLP will reassess tomorrow, pending patient's ability to participate  She may benefit from [facility] as tolerated. Frequency/Duration: Patient will be followed by speech-language pathology 4 times a week to address goals. Discharge Recommendations: To Be Determined       SUBJECTIVE:   Patient stated I don't want to slow my breathing down. OBJECTIVE:       Past Medical History   Diagnosis Date    Bipolar 1 disorder ([facility] [address] 11/29/2011    Cancer ([facility] [address]         skin cancer buttocks    Cancer (HCC)         throat    Chronic pain         FIBROMYALGIA, LEGS    Encounter for long-term (current) use of other medications 11/29/2011    Essential hypertension, benign 11/29/2011    Laryngeal cancer ([facility] [address] 11/2/2015    Laryngeal mass      Psychiatric disorder         bipolar     Past Surgical History   Procedure Laterality Date    Place percut gastrostomy tube   10/28/2014        has been removed 2015    Hx heent           BX OF NECK MASS    Hx gi           PLACEMENT OF G TUBE    Hx gi           ESOPH.  DILATATION    Hx breast augmentation Bilateral         SALINE IMPLANTS    Hx gyn           tubal pregnancy    Hx hysterectomy         Prior Level of Function/Home Situation:    Home Situation  Home Environment: Private residence  # Steps to Enter: 5  Rails to Enter: Yes  Hand Rails : Right  One/Two Story Residence: Two story, live on 1st floor  Lift Chair Available: No  Living Alone: No  Support Systems: Spouse/Significant Other/Partner  Patient Expects to be Discharged to[de-identified] Private residence  Current DME Used/Available at Home: None  Diet prior to admission: essentially full liquid per family-ice cream, carnation instant breakfast, etc.   Current Diet:  Mechanical soft/thins   Cognitive and Communication Status:  Neurologic State: Alert, Agitated                 Oral Assessment:  Oral Assessment  Labial: No impairment  Dentition: Edentulous  Oral Hygiene: dry  Lingual: No impairment  Mandible: No impairment  P.O. Trials:  Patient Position: semi upright in bed-constant movement  Vocal quality prior to P.O.:  (mildly hoarse)  Consistency Presented: Thin liquid  How Presented: Cup/sip; Self-fed/presented     Bolus Acceptance: No impairment  Bolus Formation/Control: No impairment     Propulsion: No impairment  Oral Residue: None  Initiation of Swallow: Delayed (# of seconds)  Laryngeal Elevation: Decreased  Aspiration Signs/Symptoms: Clear throat (100% of trials)  Pharyngeal Phase Characteristics: Multiple swallows              Oral Phase Severity: No impairment  Pharyngeal Phase Severity : Severe     NOMS:   The NOMS functional outcome measure was used to quantify this patient's level of swallowing impairment. Based on the NOMS, the patient was determined to be at level 2 for swallow function      G Codes: In compliance with CMSs Claims Based Outcome Reporting, the following G-code set was chosen for this patient based the use of the NOMS functional outcome to quantify this patient's level of swallowing impairment. Using the NOMS, the patient was determined to be at level 2 for swallow function which correlates with the CM= 80-99% level of severity. Based on the objective assessment provided within this note, the current, goal, and discharge g-codes are as follows:     Swallow  Swallowing:   Swallow Current Status CM= 80-99%   Swallow Goal Status CK= 40-59%         NOMS Swallowing Levels:  Level 1 (CN): NPO  Level 2 (CM): NPO but takes consistency in therapy  Level 3 (CL): Takes less than 50% of nutrition p.o. and continues with nonoral feedings; and/or safe with mod cues; and/or max diet restriction  Level 4 (CK):  Safe swallow but needs mod cues; and/or mod diet restriction; and/or still requires some nonoral feeding/supplements  Level 5 (CJ): Safe swallow with min diet restriction; and/or needs min cues  Level 6 (CI): Independent with p.o.; rare cues; usually self cues; may need to avoid some foods or needs extra time  Level 7 [facility]): Independent for all p.o.  PETTY. (2003). National Outcomes Measurement System (NOMS): Adult Speech-Language Pathology User's Guide. Pain:  Pain Scale 1: Numeric (0 - 10)  Pain Intensity 1: 0     After treatment:   [ ]            Patient left in no apparent distress sitting up in chair  [ ]            Patient left in no apparent distress in bed  [X]            Call bell left within reach  [X]            Nursing notified  [X]            Caregiver present  [ ]            Bed alarm activated      COMMUNICATION/EDUCATION:   The patients plan of care including recommendations, planned interventions, and recommended diet changes were discussed with: Registered Nurse and Physician. Patient was educated regarding purpose of SLP visit. She was unable to participate in conversation regarding recommendations given agitation. [ ]            Posted safety precautions in patient's room. [ ]            Patient/family have participated as able in goal setting and plan of care. [ ]            Patient/family agree to work toward stated goals and plan of care. [ ]            Patient understands intent and goals of therapy, but is neutral about his/her participation. [X]            Patient is unable to participate in goal setting and plan of care.      Thank you for this referral.  Nando Richmond SLP  Time Calculation: 15 mins

## 2017-01-16 NOTE — PROGRESS NOTES
Pressure Ulcer Prevention In basket Alert Received for Angel < 14 (moderate risk).      Suggested Care Plan/Interventions for Nursing  1. Complete Angel Pressure Ulcer Risk Scale and use sub scores to identify appropriate interventions. 2. Perform Assessment: skin, changes in LOC, visual cues for pain, monitor skin under medical devices  3. Respond to Reduced Sensory Perception: changes in LOC, check visual cues for pain, float heels, suspension boots, pressure redistribution bed/mattress/chair cushion, turning and reposition approximately every 2 hours (pillows & wedges), pad between skin to skin, turn & reposition  4. Manage Moisture: absorbent under pads, internal / external urinary device, internal /  external fecal device, minimize layers, contain wound drainage, access need for specialty bed, limit adult briefs, maintain skin hydration (lotion/cream), moisture barrier, offer toileting every hour  5. Promote Activity: increase time out of bed, chair cushion, PT/OT evaluation, trapeze to reposition, pressure redistribution bed/mattress/chair  6. Address Reduced Mobility: float heels / suspension boot, HOB 30 degrees or less, pressure redistribution bed/mattress/cushion, PT / OT evaluation, turn and reposition approximately every 2 hours (pillows & wedges)  7. Promote Nutrition: document food / fluid / supplement intake, encourage/assist with meals as needed  8. Reduce Friction and Shear: transferring/repositioning devices (lift/draw sheet), lift team/ patient mobility team, feet elevated on foot rest, minimize layers, foam dressing / transparent film / skin sealants, protective barrier creams and emollients, transfer aides (board, Katie lift, ceiling lift, stand assist), HOB 30 degrees or less, trapeze to reposition.   Wound Care Team

## 2017-01-17 NOTE — PROGRESS NOTES
Chart reviewed. Attempted to see pt this PM for PT intervention however pt declining OOB mobility/participation, continually stating \"not today! Not today! \" Pt with reports of increased pain, requesting administration of pain medication as well as requesting that she be allowed to rest. Educated pt regarding importance of OOB mobility however pt continued to refused. Will hold this date however continue to follow. Kristen Goins.  Sarina Arevalo, DPT

## 2017-01-17 NOTE — PROGRESS NOTES
Problem: Dysphagia (Adult)  Goal: *Acute Goals and Plan of Care (Insert Text)  Initiated 1/16/2017  1. Patient will participate in reevaluation of swallow function within 7 days. SPEECH LANGUAGE PATHOLOGY DYSPHAGIA TREATMENT  Patient: Jesse Aguilar (24 y.o. female)  Date: 1/17/2017  Diagnosis: Acute respiratory failure with hypoxia ([address]  Shock ([address] Sepsis ([address]       Precautions: fall        ASSESSMENT:  Patient seen seated in chair, Self-feeding ice cream. She was much calmer today, better able to provide history of her dysphagia, and more understanding of goals of care. No overt s/s of aspiration noted when self-feeding ice cream. (She mostly only eats ice cream, carnation instant breakfast, and protein shakes at home.) Patient reports that when she attempts solid foods, she immediately vomits or coughs it back up. Greater difficulty with thins than slightly thicker liquids. Given her history and current report of difficulties, MBS would be beneficial to further guide care. Discussed this with her, and she agreed. Order obtained from MD. Scheduled with radiology for 9:00 AM tomorrow. Progression toward goals:  [ ]         Improving appropriately and progressing toward goals  [X]         Improving slowly and progressing toward goals  [ ]         Not making progress toward goals and plan of care will be adjusted       PLAN:  Recommendations and Planned Interventions:  Diet as tolerated. Hold if s/s of aspiration  MBS  Patient continues to benefit from skilled intervention to address the above impairments. Continue treatment per established plan of care. Discharge Recommendations: To Be Determined       SUBJECTIVE:   Patient stated The doctor came and gave me the booger blaster or something like that. .      OBJECTIVE:   Cognitive and Communication Status:  Neurologic State: Alert, Confused                 Dysphagia Treatment:  Oral Assessment:  Oral Assessment  Labial: No impairment  Dentition: Edentulous  Oral Hygiene: moist  Lingual: No impairment  Mandible: No impairment  P.O. Trials:  Patient Position: seated in chair  Vocal quality prior to P.O.: No impairment  Consistency Presented:  (ice cream)  How Presented: Self-fed/presented;Spoon     Bolus Acceptance: No impairment  Bolus Formation/Control: No impairment     Propulsion: No impairment  Oral Residue: None        Aspiration Signs/Symptoms: None                                                                                                                                                  Pain:  Pain Scale 1: Numeric (0 - 10)  Pain Intensity 1: 5  Pain Location 1: Chest  After treatment:   [ ]              Patient left in no apparent distress sitting up in chair  [X]              Patient left in no apparent distress in bed  [X]              Call bell left within reach  [ ]              Nursing notified  [ ]              Caregiver present  [ ]              Bed alarm activated      COMMUNICATION/EDUCATION:   Patient was educated regarding  Concern for aspiration, plans for MBS, goals of MBS. Patient verbalized understanding. The patients plan of care including recommendations, planned interventions, and recommended diet changes were discussed with: Registered Nurse. [ ]              Posted safety precautions in patient's room.      JAZ Fu  Time Calculation: 9 mins

## 2017-01-17 NOTE — PROGRESS NOTES
[address] [place] [address], [address] [facility]  [phone]      Cardiology Progress Note      1/17/2017 5:57 PM    Admit Date: 1/15/2017    Admit Diagnosis:   Acute respiratory failure with hypoxia ([facility] Shock ([facility]    Subjective:     Andre Bence denies CP    Visit Vitals    /83 (BP 1 Location: Left arm, BP Patient Position: At rest)    Pulse 90    Temp 98.6 °F (37 °C)    Resp 22    Wt 150 lb (68 kg)    LMP  (LMP Unknown)    SpO2 97%    BMI 22.15 kg/m2       Current Facility-Administered Medications   Medication Dose Route Frequency    metoprolol tartrate (LOPRESSOR) tablet 25 mg  25 mg Oral Q12H    gabapentin (NEURONTIN) capsule 100 mg  100 mg Oral TID    Vancomycin Trough before 2100 dose   Other ONCE    [START ON 1/18/2017] amLODIPine (NORVASC) tablet 5 mg  5 mg Oral DAILY    metroNIDAZOLE (FLAGYL) IVPB premix 500 mg  500 mg IntraVENous Q6H    0.45% sodium chloride infusion  75 mL/hr IntraVENous CONTINUOUS    mupirocin (BACTROBAN) 2 % ointment   Both Nostrils Q12H    vancomycin (VANCOCIN) 1,000 mg in 0.9% sodium chloride (MBP/ADV) 250 mL  1,000 mg IntraVENous Q16H    atorvastatin (LIPITOR) tablet 20 mg  20 mg Oral QHS    aspirin delayed-release tablet 81 mg  81 mg Oral DAILY    albuterol-ipratropium (DUO-NEB) 2.5 MG-0.5 MG/3 ML  3 mL Nebulization QID RT    amitriptyline (ELAVIL) tablet 10 mg  10 mg Oral QHS    FLUoxetine (PROzac) capsule 20 mg  20 mg Oral DAILY    lidocaine (XYLOCAINE) 2 % viscous solution 15 mL  15 mL Mouth/Throat Q4H PRN    senna (SENOKOT) tablet 8.6 mg  1 Tab Oral DAILY    benzocaine-menthol (CHLORASEPTIC MAX) lozenge 1 Lozenge  1 Lozenge Oral Q2H PRN    traZODone (DESYREL) tablet 200 mg  200 mg Oral QHS    sodium chloride (NS) flush 5-10 mL  5-10 mL IntraVENous Q8H    sodium chloride (NS) flush 5-10 mL  5-10 mL IntraVENous PRN    acetaminophen (TYLENOL) tablet 500 mg  500 mg Oral Q4H PRN    naloxone (NARCAN) injection 0.4 mg  0.4 mg IntraVENous PRN    bisacodyl (DULCOLAX) tablet 5 mg  5 mg Oral DAILY PRN    enoxaparin (LOVENOX) injection 40 mg  40 mg SubCUTAneous Q24H    sodium chloride (NS) flush 5-10 mL  5-10 mL IntraVENous PRN    HYDROcodone-acetaminophen (NORCO) 7.5-325 mg per tablet 1 Tab  1 Tab Oral Q6H PRN    cefepime (MAXIPIME) 2 g in 0.9% sodium chloride (MBP/ADV) 100 mL  2 g IntraVENous Q12H    lithium carbonate SR (LITHOBID) tablet 300 mg  300 mg Oral QHS       Objective:      Physical Exam:  General Appearance:    Chest:   Clear  Cardiovascular:  Regular rate and rhythm, no murmur.   Abdomen:   Soft, non-tender, bowel sounds are active.   Extremities:   Skin:  Warm and dry.     Data Review:   Recent Labs      01/17/17   0442  01/16/17   0528  01/15/17   1113   WBC  14.2*  11.6*  10.4   HGB  10.1*  9.5*  11.9   HCT  33.3*  31.4*  39.6   PLT  188  159  177     Recent Labs      01/17/17   0442  01/16/17   0528  01/15/17   1625  01/15/17   1113   NA  141  143   --   136   K  3.8  4.2   --   4.7   CL  109*  110*   --   100   CO2  28  28   --   26   GLU  106*  117*   --   113*   BUN  14  21*   --   25*   CREA  0.93  1.25*   --   2.01*   CA  9.2  9.1   --   9.8   MG   --    --   2.2   --    ALB   --    --    --   3.3*   TBILI   --    --    --   0.5   SGOT   --    --    --   22   ALT   --    --    --   16       Recent Labs      01/15/17   1532  01/15/17   1113   TROIQ  0.36*  0.23*   CKMB   --   5.3*         Intake/Output Summary (Last 24 hours) at 01/17/17 1757  Last data filed at 01/17/17 1702   Gross per 24 hour   Intake             2925 ml   Output             3700 ml   Net             -775 ml        Telemetry:   EKG:  Cxray:    Assessment:     Principal Problem:    Sepsis ([address] (1/15/2017)    Active Problems:    Essential hypertension, benign (11/29/2011)      B[facility] disorder ([facility] (11/29/2011)      History of laryngeal cancer (11/2/2015)      History of radiation to head and neck region (3/30/2016)      Acute respiratory failure with hypoxia ([facility] (1/15/2017)      Fibromyalgia (1/15/2017)      Chronic pain (1/15/2017)      History of tobacco use (1/15/2017)      Acute renal injury ([facility] (1/15/2017)      Shock ([facility] (1/15/2017)        Plan:     Echo normal.  No cardiac Sx. Will sign off.

## 2017-01-17 NOTE — PROGRESS NOTES
General Daily Progress Note    Admit Date: 1/15/2017  Hospital day 3    Subjective:     Patient has improving sob,cough. .   Medication side effects: none    Current Facility-Administered Medications   Medication Dose Route Frequency    metoprolol tartrate (LOPRESSOR) tablet 25 mg  25 mg Oral Q12H    gabapentin (NEURONTIN) capsule 100 mg  100 mg Oral TID    metroNIDAZOLE (FLAGYL) IVPB premix 500 mg  500 mg IntraVENous Q6H    0.45% sodium chloride infusion  75 mL/hr IntraVENous CONTINUOUS    mupirocin (BACTROBAN) 2 % ointment   Both Nostrils Q12H    vancomycin (VANCOCIN) 1,000 mg in 0.9% sodium chloride (MBP/ADV) 250 mL  1,000 mg IntraVENous Q16H    atorvastatin (LIPITOR) tablet 20 mg  20 mg Oral QHS    aspirin delayed-release tablet 81 mg  81 mg Oral DAILY    albuterol-ipratropium (DUO-NEB) 2.5 MG-0.5 MG/3 ML  3 mL Nebulization QID RT    amitriptyline (ELAVIL) tablet 10 mg  10 mg Oral QHS    FLUoxetine (PROzac) capsule 20 mg  20 mg Oral DAILY    lidocaine (XYLOCAINE) 2 % viscous solution 15 mL  15 mL Mouth/Throat Q4H PRN    senna (SENOKOT) tablet 8.6 mg  1 Tab Oral DAILY    benzocaine-menthol (CHLORASEPTIC MAX) lozenge 1 Lozenge  1 Lozenge Oral Q2H PRN    traZODone (DESYREL) tablet 200 mg  200 mg Oral QHS    sodium chloride (NS) flush 5-10 mL  5-10 mL IntraVENous Q8H    sodium chloride (NS) flush 5-10 mL  5-10 mL IntraVENous PRN    acetaminophen (TYLENOL) tablet 500 mg  500 mg Oral Q4H PRN    naloxone (NARCAN) injection 0.4 mg  0.4 mg IntraVENous PRN    bisacodyl (DULCOLAX) tablet 5 mg  5 mg Oral DAILY PRN    enoxaparin (LOVENOX) injection 40 mg  40 mg SubCUTAneous Q24H    sodium chloride (NS) flush 5-10 mL  5-10 mL IntraVENous PRN    HYDROcodone-acetaminophen (NORCO) 7.5-325 mg per tablet 1 Tab  1 Tab Oral Q6H PRN    cefepime (MAXIPIME) 2 g in 0.9% sodium chloride (MBP/ADV) 100 mL  2 g IntraVENous Q12H    lithium carbonate SR (LITHOBID) tablet 300 mg  300 mg Oral QHS        Review of Systems  Constitutional: positive for fatigue and malaise  Respiratory: positive for cough or stridor  Cardiovascular: negative  Gastrointestinal: positive for dysphagia  Musculoskeletal:positive for myalgias and arthralgias    Objective:     Patient Vitals for the past 8 hrs:   BP Temp Pulse Resp SpO2   01/17/17 0600 (!) 188/94 - 98 26 100 %   01/17/17 0500 153/84 - 87 21 97 %   01/17/17 0400 (!) 161/106 98.2 °F (36.8 °C) 89 24 97 %   01/17/17 0300 164/72 - 90 20 97 %   01/17/17 0200 (!) 170/96 - 92 27 95 %   01/17/17 0100 155/86 - 88 23 97 %   01/17/17 0000 153/87 98.4 °F (36.9 °C) 80 23 96 %   01/16/17 2358 - - - - 97 %   01/16/17 2300 165/87 - 83 22 96 %     01/16 1901 - 01/17 0700  In: 1325 [I.V.:1325]  Out: 1500 [Urine:1500]  01/15 0701 - 01/16 1900  In: 1912.6 [P.O.:580; I.V.:1332.6]  Out: 1950 [Urine:1950]    Physical Exam:   Visit Vitals    BP (!) 188/94    Pulse 98    Temp 98.2 °F (36.8 °C)    Resp 26    Wt 150 lb (68 kg)    LMP  (LMP Unknown)    SpO2 100%    BMI 22.15 kg/m2     General appearance: alert, fatigued, cooperative, no distress, appears stated age  Lungs: clear to auscultation bilaterally  Heart: regular rate and rhythm  Abdomen: soft, non-tender.  Bowel sounds normal. No masses,  no organomegaly  Extremities: extremities normal, atraumatic, no cyanosis or edema      ECG: normal EKG, normal sinus rhythm, unchanged from previous tracings, sinus tachycardia     Data Review   Recent Results (from the past 24 hour(s))   METABOLIC PANEL, BASIC    Collection Time: 01/17/17  4:42 AM   Result Value Ref Range    Sodium 141 136 - 145 mmol/L    Potassium 3.8 3.5 - 5.1 mmol/L    Chloride 109 (H) 97 - 108 mmol/L    CO2 28 21 - 32 mmol/L    Anion gap 4 (L) 5 - 15 mmol/L    Glucose 106 (H) 65 - 100 mg/dL    BUN 14 6 - 20 MG/DL    Creatinine 0.93 0.55 - 1.02 MG/DL    BUN/Creatinine ratio 15 12 - 20      GFR est AA >60 >60 ml/min/1.73m2    GFR est non-AA >60 >60 ml/min/1.73m2    Calcium 9.2 8.5 - 10.1 MG/DL   CBC WITH AUTOMATED DIFF    Collection Time: 01/17/17  4:42 AM   Result Value Ref Range    WBC 14.2 (H) 3.6 - 11.0 K/uL    RBC 3.74 (L) 3.80 - 5.20 M/uL    HGB 10.1 (L) 11.5 - 16.0 g/dL    HCT 33.3 (L) 35.0 - 47.0 %    MCV 89.0 80.0 - 99.0 FL    MCH 27.0 26.0 - 34.0 PG    MCHC 30.3 30.0 - 36.5 g/dL    RDW 14.9 (H) 11.5 - 14.5 %    PLATELET 550 164 - 698 K/uL    NEUTROPHILS 86 (H) 32 - 75 %    LYMPHOCYTES 6 (L) 12 - 49 %    MONOCYTES 7 5 - 13 %    EOSINOPHILS 1 0 - 7 %    BASOPHILS 0 0 - 1 %    ABS. NEUTROPHILS 12.2 (H) 1.8 - 8.0 K/UL    ABS. LYMPHOCYTES 0.9 0.8 - 3.5 K/UL    ABS. MONOCYTES 1.0 0.0 - 1.0 K/UL    ABS. EOSINOPHILS 0.2 0.0 - 0.4 K/UL    ABS. BASOPHILS 0.0 0.0 - 0.1 K/UL           Assessment:     Principal Problem:    Sepsis ([address] (1/15/2017)    Active Problems:    Shock ([address] (1/15/2017)      Acute respiratory failure with hypoxia ([address] (1/15/2017)      History of laryngeal cancer (11/2/2015)      History of radiation to head and neck region (3/30/2016)      Chronic pain (1/15/2017)      Essential hypertension, benign (11/29/2011)      Bipolar 1 disorder ([address] (11/29/2011)      Fibromyalgia (1/15/2017)      History of tobacco use (1/15/2017)      Acute renal injury ([address] (1/15/2017)        Plan:     Feeling better,now hypertensive,mildly agitated. C/O of a variety of aches and pains . Little insight. For swallowing studies. OK to move to floor. Needs repeat CT Thorax

## 2017-01-17 NOTE — PROGRESS NOTES
0730  Received verbal report from off going shift assumed care of patient, labs and orders reviewed  Close observation maintained. 6939  Interdisciplinary rounds done, orders received. 1200  Patient assessment completed No signs of acute distress noted VSS  Close observation maintained. Patient has asked for help to the bedside commode. Close observation maintained. 1400  Patient resting comfortably, no signs of acute distress noted VSS    1711  Dr. Ashleigh Webb called and notified of elevated BP, orders received.

## 2017-01-17 NOTE — PROGRESS NOTES
[facility] Consult Service Progress NOTE  Pulmonary, Critical Care, and Sleep Medicine    Name: Samia Quinonez MRN: [id number]   : 1955 Hospital: [facility]   Date: 2017  Admission Date: 1/15/2017     Chart and notes reviewed. Data reviewed. Pt seen on rounds earlier today. I have evaluated and examined the patient. Pt is acutely ill. Reviewed the evaluation and will assist in implementing the plan as outlined by Dr. Alysia Case and team    The patient was not feeling well night prior to admission. This morning her  didn't awaken her and allowed her to rest in. Later her daughter went to wake her up and noted that the pt was altered and was having signs of dysarthria, without any sign of focal neurological deficit. EMS were called in and pt was noted to have hypoxia, oxygen supplied and her mentation improved immediately. CT head was negative. Pt admitted. VQ scan intermediate probability but CXR markedly abnormal. Pt in ICU on IV levophed that was weaned off this AM.    Pt now awake but has pressured speech and dysarthria. No SOB. Some cough. No sputum      Hospital Day: 3    --Blood cultures came back positive to for Gram Postive Cocci. IMPRESSION:   · Acute respiratory failure with hypoxiaAMS- found by family- etiology? Over medicated? · Abnormal VQ scan; matched defects- indeterminant  · Gram positive Bacteremia  · Severe bilateral penumonia- likely aspiration given her underlying history  · Sepsis ([address]   · Shock ([address] (1/15/2017)  · History of laryngeal cancer (2015)  · History of radiation to head and neck region (3/30/2016)  · H/o dysphagia, discussed with   · Chronic pain (1/15/2017)-this has been an challenge. · Hypertension, benign   · Bipolar 1 disorder Lithium level is therapeutic.    · Fibromyalgia (1/15/2017)  · History of tobacco use (1/15/2017)  · Acute renal injury ([address] (1/15/2017)      RECOMMENDATIONS/PLAN:   · Full code   · IV Broad spectrum abx with Cefepime IV + IV Vanc (per pharmacy protocol ) -will await final Cx. · Add IV flagyl for anaerobic coverage  · Oxygen supplement per NC   · Echo completed  · Speech therapy, following. · Aspiration precautions, will most likely need MBS now since stable. · Another fluid bolus- looks dehydrated  · Continue home meds as they were; no Vicoprofen; on Oxycodone/tylenol (due to risk of GI bleed etc) As a background check we will go ahead order TSH. Free T4 (due to history of XRT to neck)  PT OT to see her for fall at home. · Will be available to assist in medical management while in the CCU pending disposition     Code: Full Code        Hemodynamics:   PAP:   CO:     Wedge:   CI:     CVP:    SVR:       PVR:         Ventilator Settings:      Mode Rate TV Press PEEP FiO2 PIP Min. Vent                              Vital Signs: Intake/Output: Intake/Output:   Visit Vitals    BP (!) 172/93 (BP 1 Location: Left arm, BP Patient Position: At rest)    Pulse 92    Temp 98.2 °F (36.8 °C)    Resp 26    Wt 68 kg (150 lb)    LMP  (LMP Unknown)    SpO2 98%    BMI 22.15 kg/m2      O2 Device: Nasal cannula  O2 Flow Rate (L/min): 4 l/min  Temp (24hrs), Av.6 °F (37 °C), Min:98.2 °F (36.8 °C), Max:98.9 °F (37.2 °C)     Intake/Output Summary (Last 24 hours) at 17 0758  Last data filed at 17 0700   Gross per 24 hour   Intake          2944.46 ml   Output             3450 ml   Net          -505.54 ml    Last shift:         Last 3 shifts: 01/15 1901 -  0700  In: 3312.6 [P.O.:580; I.V.:2732.6]  Out: 3450 [Urine:3450]         Telemetry:    AFIB    Physical Exam: on 4L NC oxygen. General: petite WF in no respiratory distress and acyanotic, alert and appears dehydrated   HEENT: NCAT; no thrush   Neck: No abnormally enlarged lymph nodes. Chest: normal   Lungs: decreased air exchange bilaterally, clear, has pleuritic pain with deep inspiration.     Heart: Regular rate and rhythm or S1S2 present   Abdomen: soft, non-tender, without masses or organomegaly   :    Extremity: negative, clubbing;    Neuro: alert   Psych: disorganized   Skin: Skin unremarkableSkin color, texture, turgor normal. No rashes or lesions;   Pulses: Bilateral, Radial, 1+ Normal upper and lower extremity pulses and Diffusely decreased peripheral pulses   Capillary refill: normal well perfused;      DATA:    MAR reviewed and pertinent medications noted or modified as needed    MEDS:   Current Facility-Administered Medications   Medication    metoprolol tartrate (LOPRESSOR) tablet 25 mg    gabapentin (NEURONTIN) capsule 100 mg    metroNIDAZOLE (FLAGYL) IVPB premix 500 mg    0.45% sodium chloride infusion    mupirocin (BACTROBAN) 2 % ointment    vancomycin (VANCOCIN) 1,000 mg in 0.9% sodium chloride (MBP/ADV) 250 mL    atorvastatin (LIPITOR) tablet 20 mg    aspirin delayed-release tablet 81 mg    albuterol-ipratropium (DUO-NEB) 2.5 MG-0.5 MG/3 ML    amitriptyline (ELAVIL) tablet 10 mg    FLUoxetine (PROzac) capsule 20 mg    lidocaine (XYLOCAINE) 2 % viscous solution 15 mL    senna (SENOKOT) tablet 8.6 mg    benzocaine-menthol (CHLORASEPTIC MAX) lozenge 1 Lozenge    traZODone (DESYREL) tablet 200 mg    sodium chloride (NS) flush 5-10 mL    sodium chloride (NS) flush 5-10 mL    acetaminophen (TYLENOL) tablet 500 mg    naloxone (NARCAN) injection 0.4 mg    bisacodyl (DULCOLAX) tablet 5 mg    enoxaparin (LOVENOX) injection 40 mg    sodium chloride (NS) flush 5-10 mL    HYDROcodone-acetaminophen (NORCO) 7.5-325 mg per tablet 1 Tab    cefepime (MAXIPIME) 2 g in 0.9% sodium chloride (MBP/ADV) 100 mL    lithium carbonate SR (LITHOBID) tablet 300 mg          Labs:  Recent Labs      01/17/17   0442  01/16/17   0528  01/15/17   1113   WBC  14.2*  11.6*  10.4   HGB  10.1*  9.5*  11.9   PLT  188  159  177     Recent Labs      01/17/17   0442  01/16/17   0528  01/15/17   1625  01/15/17   1113   NA  141  143   --   136 K  3.8  4.2   --   4.7   CL  109*  110*   --   100   CO2  28  28   --   26   GLU  106*  117*   --   113*   BUN  14  21*   --   25*   CREA  0.93  1.25*   --   2.01*   CA  9.2  9.1   --   9.8   MG   --    --   2.2   --    LAC   --    --    --   1.3   ALB   --    --    --   3.3*   SGOT   --    --    --   22   ALT   --    --    --   16     No results for input(s): PH, PCO2, PO2, HCO3, FIO2 in the last 72 hours. No results for input(s): PH, PCO2, PO2, HCO3, FIO2 in the last 72 hours. Recent Labs      01/15/17   1532  01/15/17   1113   CKNDX   --   1.1   TROIQ  0.36*  0.23*       Imaging:  []                           I have personally reviewed the patients radiographs and reports:        Results from Hospital Encounter encounter on 01/15/17   XR CHEST PORT   Narrative EXAM:  XR CHEST PORT    INDICATION:  central line placement verf    COMPARISON:  January 15, 2017    FINDINGS: A portable AP radiograph of the chest was obtained at 1743 hours. A  right IJ line is placed in the interval terminating in the superior vena cava. There is no pneumothorax or pleural effusion. Severe bilateral pulmonary  consolidations are again noted in the right and left perihilar regions  predominating in the left mid and lower perihilar region of the right superior  perihilar region. The findings could be on the basis of bilateral pneumonia or  pulmonary edema. Hilar contours are fully obscured. The visualized portions of  the cardiac and mediastinal contours are stable. The bones are moderately  osteopenic. Impression IMPRESSION: Right IJ line position in superior vena cava. Other findings  unchanged.               Results from [place] encounter on 01/15/17   CT HEAD WO CONT   Narrative EXAM:  CT HEAD WO CONT    INDICATION:   21-year-old with history of hypertension, laryngeal cancer,  fibromyalgia, bipolar disorder presenting to emergency department via EMS for  probable speech beginning just prior to admission. COMPARISON: None. TECHNIQUE: Unenhanced CT of the head was performed using 5 mm images. Brain and  bone windows were generated. CT dose reduction was achieved through use of a  standardized protocol tailored for this examination and automatic exposure  control for dose modulation. FINDINGS:  The ventricles and sulci are normal in size, shape and configuration and  midline. Mild to moderate bilateral periventricular and centrum semiovale  diminished attenuation is shown consistent with chronic small vessel ischemic  disease of the white matter. . There is no intracranial hemorrhage, extra-axial  collection, mass, mass effect or midline shift. The basilar cisterns are open. No acute infarct is identified. The bone windows demonstrate no abnormalities. The visualized portions of the paranasal sinuses and mastoid air cells are  clear. Impression IMPRESSION: Chronic small vessel ischemic disease of white matter. No acute  findings.             Anna Ireland MD

## 2017-01-18 NOTE — PROGRESS NOTES
Problem: Dysphagia (Adult)  Goal: *Acute Goals and Plan of Care (Insert Text)  Initiated 1/16/2017  1. Patient will participate in reevaluation of swallow function within 7 days. Pt had MBS 1/18. 2. Pt will tolerate dys 2 diet with thins with no overt s/s of aspiration. 3. Pt will complete pharyngeal strengthening exercises to improve laryngeal elevation/closure and pharyngeal constriction with mod cues. SPEECH PATHOLOGY MODIFIED BARIUM SWALLOW STUDY  Patient: Shaan Rodriguez (2 y.o. female)  Date: 1/18/2017  Primary Diagnosis: Acute respiratory failure with hypoxia (HCC)  Shock ([facility] [address]        Precautions:          ASSESSMENT :  Based on the objective data described below, the patient presents with mild to mod oral and mod pharyngeal dysphagia. Orally she is slow to masticate solids and discoordinated oral mastication. Some extraneous movements orally. Pharyngeal swallow is characterized by mild to mod swallow delay. With thins and nectar thick liquids there is a swallow delay to the aryepiglottic folds and into the laryngeal vestibule. There is reduced laryngeal closure/elevation and  laryngeal penetration occurring before and during the swallow that does not clear with the swallow. They penetrated to mid vestibule and eventually to the TVCs but in minimal trace amt. She does not cough spontaneously due to reduced sensation. When cued to cough she is able to clear most if not all of the trace penetrated liquid. Chin tuck with thins is not helpful. With larger gulp of nectar she has spot trace aspiration that clears back up into the vestibule after the swallow. Purees with mild swallow delay and trace laryngeal penetration during the swallow due to reduced laryngeal closure. The purees penetrated mid vestibule. Solids are manipulated orally in a discoordinated way. Mild residue on underside of epiglottis and in the valleculae. The epiglottis appears short and thick with no inversion.  There is also trace posterior pharyngeal wall residue due to reduced pharyngeal wall constriction. Patient will benefit from skilled intervention to address the above impairments. Patients rehabilitation potential is considered to be Fair with OP speech /swallow therapy  Factors which may influence rehabilitation potential include:   [ ]              None noted  [X]              Mental ability/status  [X]              Medical condition  [X]              Home/family situation and support systems  [ ]              Safety awareness  [ ]              Pain tolerance/management  [ ]              Other:        PLAN :  Recommendations and Planned Interventions:  Purees//minced with thins   Small bites and sips   Cough after a few bolus to eliminate any penetrated material.   May need alternative feeding to assist with nutritional intake  Frequency/Duration: Patient will be followed by speech-language pathology 4 times a week to address goals. Discharge Recommendations: Outpatient       SUBJECTIVE:   Patient stated she wanted to know the results. OBJECTIVE:       Past Medical History   Diagnosis Date    Bipolar 1 disorder ([facility] [address] 11/29/2011    Cancer ([facility] [address]         skin cancer buttocks    Cancer (HCC)         throat    Chronic pain         FIBROMYALGIA, LEGS    Encounter for long-term (current) use of other medications 11/29/2011    Essential hypertension, benign 11/29/2011    Laryngeal cancer ([facility] [address] 11/2/2015    Laryngeal mass      Psychiatric disorder         bipolar     Past Surgical History   Procedure Laterality Date    Place percut gastrostomy tube   10/28/2014        has been removed 2015    Hx heent           BX OF NECK MASS    Hx gi           PLACEMENT OF G TUBE    Hx gi           ESOPH.  DILATATION    Hx breast augmentation Bilateral         SALINE IMPLANTS    Hx gyn           tubal pregnancy    Hx hysterectomy         Prior Level of Function/Home Situation:   Home Situation  Home Environment: Private residence  # Steps to Enter: 5  Rails to Enter: Yes  Hand Rails : Right  One/Two Story Residence: Two story, live on 1st floor  Lift Chair Available: No  Living Alone: No  Support Systems: Spouse/Significant Other/Partner  Patient Expects to be Discharged to[de-identified] Private residence  Current DME Used/Available at Home: None  Diet prior to admission:   Current Diet:  Purees/thins  Radiologist:  (Dr. Priscilla Montes De Oca)  Film Views: Lateral;Fluoro  Patient Position:  (upright in transmotion chair)      Trial 1:   Consistency Presented: Thin liquid; Nectar thick liquid;Puree; Solid   How Presented: Self-fed/presented;Cup/sip       Bolus Acceptance: No impairment   Bolus Formation/Control: Impaired: Delayed;Mastication;Posterior   Propulsion: Delayed (# of seconds)   Oral Residue: None   Initiation of Swallow: Triggered at vallecula   Timing: Pooling 1-5 sec   Penetration: Trace;During swallow; To laryngeal vestibule; To cords   Aspiration/Timing: Silent ;Trace;During;From initial swallow   Pharyngeal Clearance: Vallecular residue;Pyriform residue ; Less than 10%   Attempted Modifications: Chin tuck   Effective Modifications: None   Cues for Modifications: Maximal             Decreased Tongue Base Retraction?: Yes  Laryngeal Elevation: Inadequate epiglottic inversion; Incomplete laryngeal closure; Reduced excursion with laryngeal vestibule gap (reduced elevation)  Aspiration/Penetration Score: 6 (Aspiration-Contrast passes below the cords/glottis, and is cleared)   Pharyngeal Symmetry: Not assessed  Pharyngeal-Esophageal Segment:  (h/o esoph dlitation)  Pharyngeal Dysfunction: Decreased tongue base retraction;Decreased elevation/closure;Decreased pharyngeal wall constriction     Oral Phase Severity: Mild  Pharyngeal Phase Severity: Moderate  NOMS:   The NOMS functional outcome measure was used to quantify this patient's level of swallowing impairment.   Based on the NOMS, the patient was determined to be at level 4 for swallow function      G Codes:  In compliance with CMSs Claims Based Outcome Reporting, the following G-code set was chosen for this patient based the use of the NOMS functional outcome to quantify this patient's level of swallowing impairment. Using the NOMS, the patient was determined to be at level 4 for swallow function which correlates with the CK= 40-59% level of severity. Based on the objective assessment provided within this note, the current, goal, and discharge g-codes are as follows:     Swallow  Swallowing:   Swallow Current Status CK= 40-59%   Swallow Goal Status CK= 40-59%         NOMS Swallowing Levels:  Level 1 (CN): NPO  Level 2 (CM): NPO but takes consistency in therapy  Level 3 (CL): Takes less than 50% of nutrition p.o. and continues with nonoral feedings; and/or safe with mod cues; and/or max diet restriction  Level 4 (CK): Safe swallow but needs mod cues; and/or mod diet restriction; and/or still requires some nonoral feeding/supplements  Level 5 (CJ): Safe swallow with min diet restriction; and/or needs min cues  Level 6 (CI): Independent with p.o.; rare cues; usually self cues; may need to avoid some foods or needs extra time  Level 7 ([address] [place] [address]): Independent for all p.o.  PETTY. (2003). National Outcomes Measurement System (NOMS): Adult Speech-Language Pathology User's Guide. COMMUNICATION/EDUCATION:   Patient was educated regarding Her deficit(s) of dysphagia as this relates to Her diagnosis of neck CA s/p xrt and chemo. She demonstrated Fair understanding     The patients plan of care including findings from MBS, recommendations, planned interventions, and recommended diet changes were discussed with: Registered Nurse.  [X]  Posted safety precautions in patient's room. [X]  Patient/family have participated as able in goal setting and plan of care. [ ]  Patient/family agree to work toward stated goals and plan of care.   [ ]  Patient understands intent and goals of therapy, but is neutral about his/her participation. [ ]  Patient is unable to participate in goal setting and plan of care.      Thank you for this referral.  JAZ Brown  Time Calculation: 25 mins

## 2017-01-18 NOTE — PROGRESS NOTES
Pharmacy Automatic Renal Dosing Protocol - Antimicrobials    Indication for Antimicrobials: HCAP  Current Regimen of Each Antimicrobial (Start Day & Day of Therapy):  Cefazolin 2 g IV q 8 hours (; day #1    Previous abx:  Azithromycin in the ED X1   CTX X1 in the ED  Vancomycin 2.5 g X1, then 1000 mg q 12 hours (1/15; day #4  Cefepime 2g IV Q12H (1/15; day #4  Metronidazole 500 mg IV Q8H (; day #3  Goal Level (if needed): 15-20  Level(s) Ordered (if needed): will order  Measured / Extrapolated Levels (if drawn): na   / na    Significant Cultures:   1/15: Blood Paired cx: probable S. Aureus (MSSA)  bottles  - Prelim  : Blood cx: NG - Pending      CAPD, Hemodialysis or Renal Replacement Therapy: None   Paralysis, amputations, malnutrition: None  Recent Labs      17   0403  17   0442  17   0528   CREA  0.79  0.93  1.25*   BUN  14  14  21*   WBC  12.9*  14.2*  11.6*     Temp (24hrs), Av.4 °F (36.9 °C), Min:97.4 °F (36.3 °C), Max:98.9 °F (37.2 °C)    Creatinine Clearance (Creatinine Clearance (ml/min)): ~65-67 ml/min    Impression/Plan:   Discussed with Dr. Lindsey Johnson and he agreed to change the abx to Cefazolin 2g q 8 hours to cover for MSSA for now. Will follow cx. Will d/c the vancomycin, cefepime and metronidazole       Pharmacy will follow daily and adjust as appropriate.   Thanks  Fawn Vincent, PHARMD    Thank you,

## 2017-01-18 NOTE — PROGRESS NOTES
Verbal report received from YONAS Cabello RN(name) on Xiao Marte  being received from CCU(unit) for routine progression of care      Report consisted of patients Situation, Background, Assessment and   Recommendations(SBAR). Information from the following report(s) SBAR, Kardex, Recent Results and Med Rec Status was reviewed with the receiving nurse. Opportunity for questions and clarification was provided.

## 2017-01-18 NOTE — PROGRESS NOTES
[facility] Consult Service Progress NOTE  Pulmonary, Critical Care, and Sleep Medicine    Name: Maciel Fermin MRN: [id number]   : 1955 Hospital: [address] [facility]   Date: 2017  Admission Date: 1/15/2017     1-18-17: Pt seems to be doing better today. She feels her pain is better controlled. Has a mainly dry cough. Feels breathing is much more comfortable. Chart and notes reviewed. Data reviewed. Pt seen on rounds earlier today. I have evaluated and examined the patient. Pt is acutely ill. Reviewed the evaluation and will assist in implementing the plan as outlined by Dr. Buddy Nicholson and team        Hospital Day: 4    --Blood cultures came back positive to for Gram Postive Cocci. IMPRESSION:   · Acute respiratory failure with hypoxia-on 4L oxygen with pox of 96% this am.   · Gram positive Bacteremia has MSSA, second set of cx are negative. · Severe bilateral penumonia- likely aspiration given her underlying history  · Dysphagia: went to endo this am  · Sepsis ([facility] [address]   · History of laryngeal cancer (2015)  · History of radiation to head and neck region (3/30/2016)  · H/o dysphagia, discussed with   · Chronic pain (1/15/2017)-this has been an challenge. · Hypertension, benign   · Bipolar 1 disorder Lithium level is therapeutic. · Fibromyalgia (1/15/2017)  · History of tobacco use (1/15/2017)  · Acute renal injury ([address] (1/15/2017)      RECOMMENDATIONS/PLAN:   · Full code   · Can de-escalate Abx. Will see what type of po intake she will have. · Diet per speech and GI.   · Oxygen supplement per NC, Wean as tolerated, suspect may need home oxygen. · Speech therapy, following.    · Aspiration precautions  · Continue home meds as they were; no Vicoprofen; on Oxycodone/tylenol (due to risk of GI bleed etc)   · Will be available to assist in medical management while in the CCU pending disposition     Code: Full Code          Vital Signs: Intake/Output: Intake/Output:   Visit Vitals    BP (!) 148/115    Pulse 74    Temp 98.2 °F (36.8 °C)    Resp 19    Wt 68 kg (150 lb)    LMP  (LMP Unknown)    SpO2 96%    BMI 22.15 kg/m2      O2 Device: Nasal cannula  O2 Flow Rate (L/min): 4 l/min  Temp (24hrs), Av.6 °F (37 °C), Min:98.2 °F (36.8 °C), Max:98.8 °F (37.1 °C)     Intake/Output Summary (Last 24 hours) at 17 1111  Last data filed at 17 0600   Gross per 24 hour   Intake             2645 ml   Output             4400 ml   Net            -1755 ml    Last shift:         Last 3 shifts:  1901 -  0700  In: 3429 [P.O.:720; I.V.:3825]  Out: 6500 [Urine:6500]         Telemetry:    AFIB    Physical Exam: on 4L NC oxygen. General: petite WF in no respiratory distress and acyanotic, alert   HEENT: NCAT; no thrush   Neck: No abnormally enlarged lymph nodes. Chest: normal   Lungs: decreased air exchange bilaterally, clear good air movement today. Heart: Regular rate and rhythm or S1S2 present   Abdomen: soft, non-tender, without masses or organomegaly   :    Extremity: negative, clubbing;    Neuro: alert   Psych: seems pretty appropriate today. Still with intermittent confusion.     Skin: Skin unremarkableSkin color, texture, turgor normal. No rashes or lesions;   Pulses: Bilateral, Radial, 1+ Normal upper and lower extremity pulses and Diffusely decreased peripheral pulses   Capillary refill: normal well perfused;      DATA:    MAR reviewed and pertinent medications noted or modified as needed    MEDS:   Current Facility-Administered Medications   Medication    amLODIPine (NORVASC) tablet 10 mg    gabapentin (NEURONTIN) capsule 200 mg    lithium carbonate SR (LITHOBID) tablet 300 mg    LORazepam (ATIVAN) tablet 0.5 mg    metoprolol tartrate (LOPRESSOR) tablet 25 mg    vancomycin (VANCOCIN) 1,000 mg in 0.9% sodium chloride (MBP/ADV) 250 mL    metroNIDAZOLE (FLAGYL) IVPB premix 500 mg    0.45% sodium chloride infusion    mupirocin (BACTROBAN) 2 % ointment    atorvastatin (LIPITOR) tablet 20 mg    aspirin delayed-release tablet 81 mg    albuterol-ipratropium (DUO-NEB) 2.5 MG-0.5 MG/3 ML    amitriptyline (ELAVIL) tablet 10 mg    FLUoxetine (PROzac) capsule 20 mg    lidocaine (XYLOCAINE) 2 % viscous solution 15 mL    senna (SENOKOT) tablet 8.6 mg    benzocaine-menthol (CHLORASEPTIC MAX) lozenge 1 Lozenge    traZODone (DESYREL) tablet 200 mg    sodium chloride (NS) flush 5-10 mL    sodium chloride (NS) flush 5-10 mL    acetaminophen (TYLENOL) tablet 500 mg    naloxone (NARCAN) injection 0.4 mg    bisacodyl (DULCOLAX) tablet 5 mg    enoxaparin (LOVENOX) injection 40 mg    sodium chloride (NS) flush 5-10 mL    HYDROcodone-acetaminophen (NORCO) 7.5-325 mg per tablet 1 Tab    cefepime (MAXIPIME) 2 g in 0.9% sodium chloride (MBP/ADV) 100 mL          Labs:  Recent Labs      01/18/17   0403  01/17/17   0442  01/16/17   0528   WBC  12.9*  14.2*  11.6*   HGB  10.1*  10.1*  9.5*   PLT  222  188  159     Recent Labs      01/18/17   0403  01/17/17   0442  01/16/17   0528  01/15/17   1625  01/15/17   1113   NA  144  141  143   --   136   K  3.5  3.8  4.2   --   4.7   CL  107  109*  110*   --   100   CO2  31  28  28   --   26   GLU  102*  106*  117*   --   113*   BUN  14  14  21*   --   25*   CREA  0.79  0.93  1.25*   --   2.01*   CA  8.9  9.2  9.1   --   9.8   MG   --    --    --   2.2   --    LAC   --    --    --    --   1.3   ALB   --    --    --    --   3.3*   SGOT   --    --    --    --   22   ALT   --    --    --    --   16     No results for input(s): PH, PCO2, PO2, HCO3, FIO2 in the last 72 hours. No results for input(s): PH, PCO2, PO2, HCO3, FIO2 in the last 72 hours.   Recent Labs      01/15/17   1532  01/15/17   1113   CKNDX   --   1.1   TROIQ  0.36*  0.23*       Imaging:  []                           I have personally reviewed the patients radiographs and reports:        Results from Hospital Encounter encounter on 01/15/17   XR SWALLOW [facility] VIDEO   Narrative Clinical indication: Difficulty swallowing. Fluoroscopy provided for a modified barium swallow performed with the speech  pathologist, no images were taken. Fluoroscopy time was 3.3 minutes. Direct  lateral fluoroscopy for multiple different consistencies. For water consistency  there was some flash penetration. Minimal delay in the oral phase. For nectar  consistency there was some flash penetration and one episode of minimal  aspiration with significant cough reflex. for puree and solid consistencies  there were no significant additional abnormalities. Impression impression: Mild abnormality of the swallowing mechanism as above. Results from [place] encounter on 01/15/17   CT HEAD WO CONT   Narrative EXAM:  CT HEAD WO CONT    INDICATION:   59-year-old with history of hypertension, laryngeal cancer,  fibromyalgia, bipolar disorder presenting to emergency department via EMS for  probable speech beginning just prior to admission. COMPARISON: None. TECHNIQUE: Unenhanced CT of the head was performed using 5 mm images. Brain and  bone windows were generated. CT dose reduction was achieved through use of a  standardized protocol tailored for this examination and automatic exposure  control for dose modulation. FINDINGS:  The ventricles and sulci are normal in size, shape and configuration and  midline. Mild to moderate bilateral periventricular and centrum semiovale  diminished attenuation is shown consistent with chronic small vessel ischemic  disease of the white matter. . There is no intracranial hemorrhage, extra-axial  collection, mass, mass effect or midline shift. The basilar cisterns are open. No acute infarct is identified. The bone windows demonstrate no abnormalities. The visualized portions of the paranasal sinuses and mastoid air cells are  clear. Impression IMPRESSION: Chronic small vessel ischemic disease of white matter. No acute  findings.             Benoit Hernandez MD

## 2017-01-18 NOTE — PROGRESS NOTES
Occupational Therapy  Chart reviewed. Attempted to see pt for OT. Pt is currently off the floor. Will continue to follow.  Sukh Colunga OT

## 2017-01-18 NOTE — PROGRESS NOTES
General Daily Progress Note    Admit Date: 1/15/2017  Hospital day 4    Subjective:     Patient has  Anxiety,agitation,widespread pain c/o. [place]    Medication side effects: none    Current Facility-Administered Medications   Medication Dose Route Frequency    amLODIPine (NORVASC) tablet 10 mg  10 mg Oral DAILY    gabapentin (NEURONTIN) capsule 200 mg  200 mg Oral TID    lithium carbonate SR (LITHOBID) tablet 300 mg  300 mg Oral BID    LORazepam (ATIVAN) tablet 0.5 mg  0.5 mg Oral BID and QHS    metoprolol tartrate (LOPRESSOR) tablet 25 mg  25 mg Oral Q12H    vancomycin (VANCOCIN) 1,000 mg in 0.9% sodium chloride (MBP/ADV) 250 mL  1,000 mg IntraVENous Q12H    metroNIDAZOLE (FLAGYL) IVPB premix 500 mg  500 mg IntraVENous Q6H    0.45% sodium chloride infusion  75 mL/hr IntraVENous CONTINUOUS    mupirocin (BACTROBAN) 2 % ointment   Both Nostrils Q12H    atorvastatin (LIPITOR) tablet 20 mg  20 mg Oral QHS    aspirin delayed-release tablet 81 mg  81 mg Oral DAILY    albuterol-ipratropium (DUO-NEB) 2.5 MG-0.5 MG/3 ML  3 mL Nebulization QID RT    amitriptyline (ELAVIL) tablet 10 mg  10 mg Oral QHS    FLUoxetine (PROzac) capsule 20 mg  20 mg Oral DAILY    lidocaine (XYLOCAINE) 2 % viscous solution 15 mL  15 mL Mouth/Throat Q4H PRN    senna (SENOKOT) tablet 8.6 mg  1 Tab Oral DAILY    benzocaine-menthol (CHLORASEPTIC MAX) lozenge 1 Lozenge  1 Lozenge Oral Q2H PRN    traZODone (DESYREL) tablet 200 mg  200 mg Oral QHS    sodium chloride (NS) flush 5-10 mL  5-10 mL IntraVENous Q8H    sodium chloride (NS) flush 5-10 mL  5-10 mL IntraVENous PRN    acetaminophen (TYLENOL) tablet 500 mg  500 mg Oral Q4H PRN    naloxone (NARCAN) injection 0.4 mg  0.4 mg IntraVENous PRN    bisacodyl (DULCOLAX) tablet 5 mg  5 mg Oral DAILY PRN    enoxaparin (LOVENOX) injection 40 mg  40 mg SubCUTAneous Q24H    sodium chloride (NS) flush 5-10 mL  5-10 mL IntraVENous PRN    HYDROcodone-acetaminophen (NORCO) 7.5-325 mg per tablet 1 Tab  1 Tab Oral Q6H PRN    cefepime (MAXIPIME) 2 g in 0.9% sodium chloride (MBP/ADV) 100 mL  2 g IntraVENous Q12H        Review of Systems  Constitutional: negative  Respiratory: positive for cough  Cardiovascular: negative  Musculoskeletal:positive for myalgias and arthralgias  Behavioral/Psych: positive for anxiety    Objective:     Patient Vitals for the past 8 hrs:   BP Temp Pulse Resp SpO2   01/18/17 0400 111/68 98.2 °F (36.8 °C) 74 19 96 %   01/17/17 2300 139/83 98.8 °F (37.1 °C) 76 22 96 %     01/17 1901 - 01/18 0700  In: 2020 [P.O.:720; I.V.:1300]  Out: 3300 [Urine:3300]  01/16 0701 - 01/17 1900  In: 4069.5 [P.O.:240; I.V.:3829.5]  Out: 5150 [Urine:5150]    Physical Exam:   Visit Vitals    /68 (BP 1 Location: Left arm, BP Patient Position: At rest)    Pulse 74    Temp 98.2 °F (36.8 °C)    Resp 19    Wt 150 lb (68 kg)    LMP  (LMP Unknown)    SpO2 96%    BMI 22.15 kg/m2     General appearance: alert, cooperative, no distress, appears stated age, agitated  Lungs: clear to auscultation bilaterally, diminished breath sounds diffusely  Heart: regular rate and rhythm  Abdomen: soft, non-tender.  Bowel sounds normal. No masses,  no organomegaly  Extremities: extremities normal, atraumatic, no cyanosis or edema  Neurologic: Grossly normal      ECG: sinus tachycardia     Data Review   Recent Results (from the past 24 hour(s))   VANCOMYCIN, TROUGH    Collection Time: 01/17/17  9:11 PM   Result Value Ref Range    Vancomycin,trough 11.0 (H) 5.0 - 10.0 ug/mL    Reported dose date: NOT PROVIDED      Reported dose time: NOT PROVIDED      Reported dose: NOT PROVIDED UNITS   METABOLIC PANEL, BASIC    Collection Time: 01/18/17  4:03 AM   Result Value Ref Range    Sodium 144 136 - 145 mmol/L    Potassium 3.5 3.5 - 5.1 mmol/L    Chloride 107 97 - 108 mmol/L    CO2 31 21 - 32 mmol/L    Anion gap 6 5 - 15 mmol/L    Glucose 102 (H) 65 - 100 mg/dL    BUN 14 6 - 20 MG/DL    Creatinine 0.79 0.55 - 1.02 MG/DL BUN/Creatinine ratio 18 12 - 20      GFR est AA >60 >60 ml/min/1.73m2    GFR est non-AA >60 >60 ml/min/1.73m2    Calcium 8.9 8.5 - 10.1 MG/DL           Assessment:     Principal Problem:    Sepsis ([facility] [address] (1/15/2017)    Active Problems:    Shock ([address] (1/15/2017)      Acute respiratory failure with hypoxia ([facility] [address] (1/15/2017)      History of laryngeal cancer (11/2/2015)      History of radiation to head and neck region (3/30/2016)      Chronic pain (1/15/2017)      Essential hypertension, benign (11/29/2011)      Bipolar 1 disorder ([facility] [address] (11/29/2011)      Fibromyalgia (1/15/2017)      History of tobacco use (1/15/2017)      Acute renal injury ([facility] [address] (1/15/2017)        Plan:     Feeling some better,less cough and dyspnea. Seems more agitated. C/O of variety of musculosketal pains,requesting hydrocodone. Discussed with daughter hCuy Jacinto feels patient is habituated to Hydrocone ,initiated to control intractable Head and Neck cancer pain,but now takes for variety of pains. Agitation may be withdrawal from reduced dose of narcotic or inadequately controlled Bip[place]ar hypomania. She sees Dr Lorri Duncan ,as outpatient. Will consult. Usually maintained on Eskalith 450mg every day,since hospitalization ahs been on litium 300mg every day,will increase to bid,add ativan for agitation,increase gabapentin.

## 2017-01-18 NOTE — PROGRESS NOTES
Pharmacist Note - Vancomycin Dosing  Therapy day 3  Indication: HCAP  Current regimen: Vancomycin 1000 mg IV every 16 hours    A Trough Level resulted at 11 mcg/mL. Goal trough: 15 - 20 mcg/mL     Plan: Change to 1000 mg IV every 12 hours for an estimated trough of 17.9 mcg/mL . Pharmacy will continue to monitor this patient daily for changes in clinical status and renal function.       Thank you,  Rachid Salmeron, PHARMD

## 2017-01-18 NOTE — PROGRESS NOTES
Failed attempt to meet with patient. CM acknowledged consult for discharge planning. Cm will continue to follow.     [address] [facility]  Ext [phone]

## 2017-01-18 NOTE — PROGRESS NOTES
Assumed care of pt late this morning. Pt is able to answer orientation questions appropriately but seems a little off. Looking though the chart the previous nursing assessments documented that she was confused, this maybe intermittent. Pt daughter present at bedside when shift started, stated this was close to her baseline. PO medication missed this morning, never sent by pharmacy per report. Pt stated she did not rest well last night now sleeping, family has left bedside.

## 2017-01-18 NOTE — PROGRESS NOTES
Cm spoke with daughter, Griselda Manzano to confirm patient information. Name and  confirmed as pt identifiers. Demographics confirmed. Patient lives with her  in a single story home, 4-5 steps at entry. Patient was independent with ADL's at home to include driving. DME - none  Support network -  works during the day, daughter lives 3 houses down and is available for support as needed. Daughter informed CM that a [employer] CM visits with patient weekly. [employer] RN makes 1 or 2 visits a year. Pt has had previous experience with [facility] but does not remember the name of agency. Preferred pharmacy is [facility]. Family discharge goal is for patient to return home. Care Management Interventions  PCP Verified by CM:  Yes  Transition of Care Consult (CM Consult): Discharge Planning  Physical Therapy Consult: Yes  Occupational Therapy Consult: Yes  Current Support Network: Lives with Spouse  Confirm Follow Up Transport: Family  Discharge Location  Discharge Placement: Lluvia Perkins RN CM  Ext [phone]

## 2017-01-19 NOTE — PROGRESS NOTES
Problem: Mobility Impaired (Adult and Pediatric)  Goal: *Acute Goals and Plan of Care (Insert Text)  Physical Therapy Goals  Initiated 1/16/2017  1. Patient will move from supine to sit and sit to supine , scoot up and down and roll side to side in bed with independence within 7 day(s). 2. Patient will transfer from bed to chair and chair to bed with independence using the least restrictive device within 7 day(s). 3. Patient will perform sit to stand with independence within 7 day(s). 4. Patient will ambulate with independence for 250 feet with the least restrictive device within 7 day(s). 5. Patient will ascend/descend 5 stairs with 1 handrail(s) with independence within 7 day(s). PHYSICAL THERAPY TREATMENT  Patient: Guillaume Denson (40 y.o. female)  Date: 1/19/2017  Diagnosis: Acute respiratory failure with hypoxia ([address]  Shock ([address] Sepsis ([address]       Precautions:  Fall      ASSESSMENT:  Pt intially declined amb with PT but stated she would cooperate after receiving her pain med at 11:48. PT returned at 11:55 & pt agreeable to amb immediately after receiving pain med. Reports pain is all over. Pt very tangential in her conversation, rambling about all her cocaine/heroin/meth usage \" yrs ago \". Pt with poor attention to task, impaired judgement. Pt amb without device/no balance loss/SBA & up/down 5 steps with one rail/CGA. With activity: On RA O2 sats were 89% to 91 %, on 2 LPM O2, sats stable > 94%. Pt reports both her  & dtr across the street work so she's alone during the day. -.  Progression toward goals:  [ ]    Improving appropriately and progressing toward goals  [X]    Improving slowly and progressing toward goals  [ ]    Not making progress toward goals and plan of care will be adjusted       PLAN:  Patient continues to benefit from skilled intervention to address the above impairments. Continue treatment per established plan of care.   Discharge Recommendations:  Home Health  Further Equipment Recommendations for Discharge:  Continue to assess re O2 needs       SUBJECTIVE:   Patient stated I'll be ready a minute after my pain med.       OBJECTIVE DATA SUMMARY:   Critical Behavior:  Neurologic State: Alert  Orientation Level: Oriented X4  Cognition: Follows commands     Functional Mobility Training:  Bed Mobility:  Rolling: Stand-by asssistance  Supine to Sit: Stand-by asssistance  Sit to Supine: Supervision  Scooting: Stand-by asssistance        Transfers:  Sit to Stand: Stand-by asssistance  Stand to Sit: Stand-by asssistance        Bed to Chair: Stand-by asssistance                    Balance:  Sitting: Intact  Standing: Intact  Ambulation/Gait Training:  Distance (ft): 160 Feet (ft)  Assistive Device: Gait belt  Ambulation - Level of Assistance: Stand-by asssistance        Gait Abnormalities: Path deviations        Base of Support: Narrowed              No balance loss  Stairs:      Up/down 5 steps/one rail/CGA                    Therapeutic Exercises:   Attempted standing BLE exer with HHA, but pt not attending to task  Pain:  Pain Scale 1: Numeric (0 - 10)  Pain Intensity 1: 7  Pain Location 1: Chest;Shoulder  Pain Orientation 1: Anterior;Distal  Pain Description 1: Aching;Constant  Pain Intervention(s) 1: Medication (see MAR)  Activity Tolerance:   Fair  Please refer to the flowsheet for vital signs taken during this treatment.   After treatment:   [X]    Patient left in no apparent distress sitting up in chair  [ ]    Patient left in no apparent distress in bed  [X]    Call bell left within reach  [X]    Nursing notified  [ ]    Caregiver present  [ ]    Bed alarm activated      COMMUNICATION/COLLABORATION:   The patients plan of care was discussed with: Occupational Therapist, Speech Therapist and Registered Nurse     Anthony Acosta, PT   Time Calculation: 25 mins

## 2017-01-19 NOTE — PROGRESS NOTES
General Daily Progress Note    Admit Date: 1/15/2017  Hospital day 5    Subjective:     Patient has improving dyspnea,agitation. .   Medication side effects: none    Current Facility-Administered Medications   Medication Dose Route Frequency    amLODIPine (NORVASC) tablet 10 mg  10 mg Oral DAILY    gabapentin (NEURONTIN) capsule 200 mg  200 mg Oral TID    lithium carbonate SR (LITHOBID) tablet 300 mg  300 mg Oral BID    LORazepam (ATIVAN) tablet 0.5 mg  0.5 mg Oral BID and QHS    ceFAZolin (ANCEF) 2g in 100 ml 0.9% NS IVPB  2 g IntraVENous Q8H    metoprolol tartrate (LOPRESSOR) tablet 25 mg  25 mg Oral Q12H    0.45% sodium chloride infusion  75 mL/hr IntraVENous CONTINUOUS    mupirocin (BACTROBAN) 2 % ointment   Both Nostrils Q12H    atorvastatin (LIPITOR) tablet 20 mg  20 mg Oral QHS    aspirin delayed-release tablet 81 mg  81 mg Oral DAILY    albuterol-ipratropium (DUO-NEB) 2.5 MG-0.5 MG/3 ML  3 mL Nebulization QID RT    amitriptyline (ELAVIL) tablet 10 mg  10 mg Oral QHS    FLUoxetine (PROzac) capsule 20 mg  20 mg Oral DAILY    lidocaine (XYLOCAINE) 2 % viscous solution 15 mL  15 mL Mouth/Throat Q4H PRN    senna (SENOKOT) tablet 8.6 mg  1 Tab Oral DAILY    benzocaine-menthol (CHLORASEPTIC MAX) lozenge 1 Lozenge  1 Lozenge Oral Q2H PRN    traZODone (DESYREL) tablet 200 mg  200 mg Oral QHS    sodium chloride (NS) flush 5-10 mL  5-10 mL IntraVENous Q8H    sodium chloride (NS) flush 5-10 mL  5-10 mL IntraVENous PRN    acetaminophen (TYLENOL) tablet 500 mg  500 mg Oral Q4H PRN    naloxone (NARCAN) injection 0.4 mg  0.4 mg IntraVENous PRN    bisacodyl (DULCOLAX) tablet 5 mg  5 mg Oral DAILY PRN    enoxaparin (LOVENOX) injection 40 mg  40 mg SubCUTAneous Q24H    sodium chloride (NS) flush 5-10 mL  5-10 mL IntraVENous PRN    HYDROcodone-acetaminophen (NORCO) 7.5-325 mg per tablet 1 Tab  1 Tab Oral Q6H PRN        Review of Systems  Constitutional: positive for fatigue  Respiratory: positive for stridor  Cardiovascular: negative  Gastrointestinal: negative  Genitourinary:negative  Musculoskeletal:positive for myalgias and arthralgias    Objective:     Patient Vitals for the past 8 hrs:   BP Temp Pulse Resp SpO2   01/19/17 0320 144/89 98.8 °F (37.1 °C) 74 18 97 %   01/18/17 2339 137/89 99.4 °F (37.4 °C) 80 18 98 %     01/18 1901 - 01/19 0700  In: 2016.3 [P.O.:1190; I.V.:826.3]  Out: 975 [Urine:975]  01/17 0701 - 01/18 1900  In: 4118.8 [P.O.:720; I.V.:3398.8]  Out: 5000 [Urine:5000]    Physical Exam:   Visit Vitals    /89 (BP 1 Location: Left arm, BP Patient Position: At rest)    Pulse 74    Temp 98.8 °F (37.1 °C)    Resp 18    Wt 150 lb (68 kg)    LMP  (LMP Unknown)    SpO2 97%    BMI 22.15 kg/m2     General appearance: alert, fatigued, cooperative, no distress, appears stated age  Lungs: clear to auscultation bilaterally  Heart: regular rate and rhythm  Abdomen: soft, non-tender. Bowel sounds normal. No masses,  no organomegaly  Extremities: extremities normal, atraumatic, no cyanosis or edema  Neurologic: Grossly normal,mildly agitated      ECG: normal sinus rhythm     Data Review   Recent Results (from the past 24 hour(s))   CBC WITH AUTOMATED DIFF    Collection Time: 01/19/17  4:50 AM   Result Value Ref Range    WBC 11.9 (H) 3.6 - 11.0 K/uL    RBC 3.95 3.80 - 5.20 M/uL    HGB 10.9 (L) 11.5 - 16.0 g/dL    HCT 34.7 (L) 35.0 - 47.0 %    MCV 87.8 80.0 - 99.0 FL    MCH 27.6 26.0 - 34.0 PG    MCHC 31.4 30.0 - 36.5 g/dL    RDW 15.2 (H) 11.5 - 14.5 %    PLATELET 706 219 - 817 K/uL    NEUTROPHILS 67 32 - 75 %    BANDS 2 0 - 6 %    LYMPHOCYTES 14 12 - 49 %    MONOCYTES 9 5 - 13 %    EOSINOPHILS 7 0 - 7 %    BASOPHILS 0 0 - 1 %    MYELOCYTES 1 (H) 0 %    ABS. NEUTROPHILS 8.2 (H) 1.8 - 8.0 K/UL    ABS. LYMPHOCYTES 1.7 0.8 - 3.5 K/UL    ABS. MONOCYTES 1.1 (H) 0.0 - 1.0 K/UL    ABS. EOSINOPHILS 0.8 (H) 0.0 - 0.4 K/UL    ABS.  BASOPHILS 0.0 0.0 - 0.1 K/UL    DF MANUAL      RBC COMMENTS ANISOCYTOSIS  1+ RBC COMMENTS OVALOCYTES  1+        WBC COMMENTS TOXIC GRANULATION             Assessment:     Principal Problem:    Sepsis ([address] (1/15/2017)    Active Problems:    Shock ([address] (1/15/2017)      Acute respiratory failure with hypoxia ([address] (1/15/2017)      History of laryngeal cancer (11/2/2015)      History of radiation to head and neck region (3/30/2016)      Chronic pain (1/15/2017)      Essential hypertension, benign (11/29/2011)      Bipolar 1 disorder ([address] (11/29/2011)      Dysphagia (12/30/2014)      Fibromyalgia (1/15/2017)      History of tobacco use (1/15/2017)      Acute renal injury ([address] (1/15/2017)        Plan:     Feeling some better. Mild leukocytosis persists. Weaning pain meds. Hopefully will get psych consult. [facility]. Will check CTs

## 2017-01-19 NOTE — PROGRESS NOTES
[facility] Consult Service Progress NOTE  Pulmonary, Critical Care, and Sleep Medicine    Name: Krishna Frankel MRN: [id number]   : 1955 Hospital: [facility]   Date: 2017  Admission Date: 1/15/2017     1-19-17: Complain of chest pain. Requesting more pain meds. Has dry cough. 17: Pt seems to be doing better today. She feels her pain is better controlled. Has a mainly dry cough. Feels breathing is much more comfortable. Chart and notes reviewed. Data reviewed. Pt seen on rounds earlier today. I have evaluated and examined the patient. Pt is acutely ill. Reviewed the evaluation and will assist in implementing the plan as outlined by Dr. Dago Toure and team        Hospital Day: 5      IMPRESSION:   · Acute respiratory failure with hypoxia-on 4L oxygen with pox of 96% this am.   · Gram positive Bacteremia has MSSA, second set of cx are negative. · Severe bilateral penumonia- likely aspiration given her underlying history-Will need to follow up with CT in next 2-3 months. Question of malignancy may be helpful to get PET scan once treated and recover from pneumonia. · Dysphagia: went to endo this am  · Sepsis ([address]   · History of laryngeal cancer (2015)  · History of radiation to head and neck region (3/30/2016)  · H/o dysphagia, discussed with   · Chronic pain (1/15/2017)-this has been an challenge. · Hypertension, benign   · Bipolar disorder Lithium level is therapeutic. · Fibromyalgia (1/15/2017)  · History of tobacco use (1/15/2017)  · Acute renal injury ([address] (1/15/2017)      RECOMMENDATIONS/PLAN:   · Full code   · Was placed on ancef. · Diet per speech and GI.   · Oxygen supplement per NC, Wean as tolerated, suspect may need home oxygen. · Speech therapy, following. · Aspiration precautions  · Wean oxygen as tolerated  · Will need follow up imaging of chest in 3 months upon discharge.    · Continue home meds as they were; no Vicoprofen; on Oxycodone/tylenol (due to risk of GI bleed etc)      Code: Full Code          Vital Signs: Intake/Output: Intake/Output:   Visit Vitals    BP (!) 150/94    Pulse 89    Temp 97.5 °F (36.4 °C)    Resp 18    Wt 68 kg (150 lb)    LMP  (LMP Unknown)    SpO2 98%    BMI 22.15 kg/m2      O2 Device: Nasal cannula  O2 Flow Rate (L/min): 4 l/min  Temp (24hrs), Av.4 °F (36.9 °C), Min:97.5 °F (36.4 °C), Max:99.4 °F (37.4 °C)     Intake/Output Summary (Last 24 hours) at 17 1339  Last data filed at 17 0901   Gross per 24 hour   Intake             3290 ml   Output              975 ml   Net             2315 ml    Last shift:       0701 -  190  In: 300 [P.O.:300]  Out: -   Last 3 shifts:  1901 -  0700  In: 5010 [P.O.:1910; I.V.:3100]  Out: 4275 [Urine:4275]         Telemetry:    AFIB    Physical Exam: on 4L NC oxygen. General: petite WF in no respiratory distress and acyanotic, alert   HEENT: NCAT; no thrush   Neck: No abnormally enlarged lymph nodes. Chest: normal   Lungs: decreased air exchange bilaterally, clear good air movement today. Heart: Regular rate and rhythm or S1S2 present   Abdomen: soft, non-tender, without masses or organomegaly   :    Extremity: negative, clubbing;    Neuro: alert   Psych: seems pretty appropriate today. Still with intermittent confusion.     Skin: Skin unremarkableSkin color, texture, turgor normal. No rashes or lesions;   Pulses: Bilateral, Radial, 1+ Normal upper and lower extremity pulses and Diffusely decreased peripheral pulses   Capillary refill: normal well perfused;      DATA:    MAR reviewed and pertinent medications noted or modified as needed    MEDS:   Current Facility-Administered Medications   Medication    amLODIPine (NORVASC) tablet 10 mg    gabapentin (NEURONTIN) capsule 200 mg    lithium carbonate SR (LITHOBID) tablet 300 mg    LORazepam (ATIVAN) tablet 0.5 mg    ceFAZolin (ANCEF) 2g in 100 ml 0.9% NS IVPB    metoprolol tartrate (LOPRESSOR) tablet 25 mg    0.45% sodium chloride infusion    mupirocin (BACTROBAN) 2 % ointment    atorvastatin (LIPITOR) tablet 20 mg    aspirin delayed-release tablet 81 mg    albuterol-ipratropium (DUO-NEB) 2.5 MG-0.5 MG/3 ML    amitriptyline (ELAVIL) tablet 10 mg    FLUoxetine (PROzac) capsule 20 mg    lidocaine (XYLOCAINE) 2 % viscous solution 15 mL    senna (SENOKOT) tablet 8.6 mg    benzocaine-menthol (CHLORASEPTIC MAX) lozenge 1 Lozenge    traZODone (DESYREL) tablet 200 mg    sodium chloride (NS) flush 5-10 mL    sodium chloride (NS) flush 5-10 mL    acetaminophen (TYLENOL) tablet 500 mg    naloxone (NARCAN) injection 0.4 mg    bisacodyl (DULCOLAX) tablet 5 mg    enoxaparin (LOVENOX) injection 40 mg    sodium chloride (NS) flush 5-10 mL    HYDROcodone-acetaminophen (NORCO) 7.5-325 mg per tablet 1 Tab          Labs:  Recent Labs      01/19/17   0450  01/18/17   0403  01/17/17   0442   WBC  11.9*  12.9*  14.2*   HGB  10.9*  10.1*  10.1*   PLT  232  222  188     Recent Labs      01/18/17   0403  01/17/17   0442   NA  144  141   K  3.5  3.8   CL  107  109*   CO2  31  28   GLU  102*  106*   BUN  14  14   CREA  0.79  0.93   CA  8.9  9.2     No results for input(s): PH, PCO2, PO2, HCO3, FIO2 in the last 72 hours. No results for input(s): PH, PCO2, PO2, HCO3, FIO2 in the last 72 hours. No results for input(s): CPK, CKNDX, TROIQ in the last 72 hours. No lab exists for component: CPKMB    Imaging:  []                           I have personally reviewed the patients radiographs and reports:        Results from Hospital Encounter encounter on 01/15/17   XR SWALLOW FUNC VIDEO   Narrative Clinical indication: Difficulty swallowing. Fluoroscopy provided for a modified barium swallow performed with the speech  pathologist, no images were taken. Fluoroscopy time was 3.3 minutes. Direct  lateral fluoroscopy for multiple different consistencies.  For water consistency  there was some flash penetration. Minimal delay in the oral phase. For nectar  consistency there was some flash penetration and one episode of minimal  aspiration with significant cough reflex. for puree and solid consistencies  there were no significant additional abnormalities. Impression impression: Mild abnormality of the swallowing mechanism as above. Results from [place] encounter on 01/15/17   CT NECK SOFT TISSUE W CONT   Narrative Clinical indication: Neck mass, pneumonia, history of laryngeal malignancy. Axial CT scan of the neck and chest obtained after intravenous injection 100 cc  of Isovue-300. Coronal and sagittal reconstructions. Comparison chest CT October 13, 2016 , Neck CT January 11, 2016. CT dose reduction was achieved through the use of a standardized protocol  tailored for this examination and automatic exposure control for dose modulation  . Loni Oates Prior right parotid surgery unchanged findings. Posttreatment changes in the  larynx also unchanged. There is no new masses. There is no adenopathy. Submandibular gland and left parotid glands appear unremarkable. There is no  compromise of the airway or fluid collection. Thyroid enlargement with masses  are also stable. Major vessels do appear patent. Lateral breast implants. Infusion catheter tip is in the right atrium. There is  an enlarged superior mediastinal node pretracheal measuring 22 x 15  it  was 12  x 16 , infracarinal nodes and precarinal nodes have also slightly enlarged in  size since the prior examination. Disease once again seen. A small nonsolid lesion in the right apex is increase  in size measuring at this time 11 mm, it was 4 multiple new nonsolid nodules are  seen bilaterally in addition there is a consolidation in the right suprahilar  region.  There are some solid nodule also seen which are new or increased in  there is a small sized right pleural effusion the heart is enlarged no  pericardial effusion seen. Review of bone windows do not suggest metastases. Impression impression: Significant progression of lung disease suspicious for metastases. Right upper lobe consolidation is acute. Increased size of mediastinal nodes.                 Bhavna Patel MD

## 2017-01-19 NOTE — CONSULTS
[address]   [address] [place] [address]       Name:  Romario Fortune   MR#:  [id number]   :  1955   Account #:  [de-identified]    Date of Consultation:  2017   Date of Adm:  01/15/2017       REASON FOR CONSULTATION: Possible metastatic disease to her   chest, history of throat cancer. REFERRING PHYSICIAN: Dr. Rohit Castillo. HISTORY OF PRESENT ILLNESS: The patient is a 76-year-old   female who is a patient of one of my partners, Dr. Royal Saenz. 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 She received concurrent chemotherapy and radiation therapy. She then had what sounds to be Warthin tumor that was removed. the   patient has been following with Dr. Royal Saenz. She has not had any   signs of recurrence. She is in the hospital now with a pneumonia. Pulmonary is following her. She had a CT scan done of her neck and   chest today. There was some progression in her lung disease   suspicious for metastasis, right upper lobe consolidation and increased   size of mediastinal lymph nodes. Pulmonary, Dr. Margarette Kaplan has seen the   scans and thinks that this all could just be the pneumonia, however, a   PET scan has been ordered. The patient reports she is doing okay. Is   weak. She is still on oxygen and we are asked to see her for further   evaluation. PAST MEDICAL HISTORY: Significant for the squamous cell   carcinoma of the throat back in , a Warthin tumor, fibromyalgia,   bipolar disorder. SOCIAL HISTORY: She is a former smoker. She does not drink. CURRENT MEDICATIONS   1. DuoNebs. 2. Elavil. 3. Norvasc. 4. Aspirin. 5. Atorvastatin. 6. Ancef. 7. Lovenox prophylactic dose. 8. Prozac. 9. Neurontin. 10. Lithobid. 11. Ativan. 12. Lopressor. 13. Bactroban. 14. Vancomycin. ALLERGIES: NO KNOWN DRUG ALLERGIES. FAMILY HISTORY: Noncontributory.     REVIEW OF SYSTEMS: A 12 point systems done and negative except   for as above. PHYSICAL EXAMINATION   VITAL SIGNS: Temperature 97.7, pulse 85, blood pressure 130/77,   respirations 18, saturating 97% on 4 liters nasal cannula. GENERAL: Lying in bed in no acute distress. HEENT: Normocephalic, atraumatic. No cervical, supraclavicular,   axillary lymphadenopathy appreciated. CARDIOVASCULAR: Regular rhythm. LUNGS: Coarse breath sounds bilaterally. ABDOMEN: Normoactive bowel sounds, soft, nontender,   nondistended. No hepatosplenomegaly. EXTREMITIES: No clubbing, cyanosis or edema. NEUROLOGIC: Nonfocal.    LABORATORY VALUES: White blood cell count 11.9, hemoglobin   10.9, platelets 184. CHEMISTRY: Sodium 144, potassium 3.5, glucose   102, BUN 14, creatinine 8.9. LFTs done on 01/15, total bili 0.5, ALT 16,   AST 22, alkaline phosphatase 67. IMPRESSION AND PLAN: The patient is a 24-year-old female who is   a patient of one of my partners, Dr. Jon Barclay, with a history of throat   cancer in the past. She received chemo and radiation therapy. She has   had no signs of recurrence. She is now in the hospital with   a pneumonia. CT scan shows a few questionable areas concerning for   possible metastasis. Pulmonary has seen the patient. They   recommend doing another CT scan or perhaps a PET scan after she   has gotten over her pneumonia. A  PET scan has been ordered by   Dr. Buddy Nicholson. We will continue to follow along with you. We will see what   the further scans show. Pulmonary is on the case. If something needs   to be biopsied, perhaps we will see what the PET scan shows. We will   continue to follow along with you and make further recommendations   as needed. She will need to followup with Dr. Jon Barclay as an   outpatient. This could just all be due to her pneumonia. We will see   what further workup shows.         MD Cyndi Hines / On[place]   D:  01/19/2017   15:58   T:  01/19/2017   16:39   Job #:  [id number]

## 2017-01-19 NOTE — PROGRESS NOTES
PCU SHIFT NURSING NOTE      Bedside and Verbal shift change report given to Morro Khan (oncoming nurse) by Rosibel Mcghee RN (offgoing nurse). Report included the following information SBAR, Kardex, Recent Results and Med Rec Status. Shift Summary:       Admission Date 1/15/2017   Admission Diagnosis Acute respiratory failure with hypoxia (HCC)  Shock ([address]   Consults IP CONSULT TO PRIMARY CARE PROVIDER  IP CONSULT TO PSYCHIATRY  IP CONSULT TO CARDIOLOGY  IP CONSULT TO PULMONOLOGY        Consults   []PT   []OT   []Speech   []Case Management      [] Palliative      Cardiac Monitoring Order   []Yes   []No     IV drips   []Yes    Drip:                            Dose:  Drip:                            Dose:  Drip:                            Dose:   []No     GI Prophylaxis   []Yes   []No         DVT Prophylaxis   SCDs:             Claudio stockings:         [] Medication   []Contraindicated   []None      Activity Level Activity Level: Bed Rest     Activity Assistance: Partial (one person)   Purposeful Rounding every 1-2 hour? []Yes   Das Score  Total Score: 5   Bed Alarm (If score 3 or >)   []Yes   [] Refused (See signed refusal form in chart)   Angel Score  Angel Score: 17   Angel Score (if score 14 or less)   []PMT consult   []Wound Care consult      []Specialty bed   [] Nutrition consult          Needs prior to discharge:   Home O2 required:    []Yes   []No    If yes, how much O2 required?     Other:    Last Bowel Movement: Last Bowel Movement Date: 01/17/17      Influenza Vaccine Received Flu Vaccine for Current Season (usually Sept-March): Yes        Pneumonia Vaccine           Diet Active Orders   Diet    DIET CARDIAC Pureed      LDAs           Triple Lumen triple lumen central line 01/15/17 Right Neck (Active)   Central Line Being Utilized Yes 1/18/2017  3:14 PM   Criteria for Appropriate Use Hemodynamically unstable, requiring monitoring lines, vasopressors, or volume resuscitation 1/18/2017  3:14 PM   Site Assessment Clean, dry, & intact 1/18/2017  3:14 PM   Infiltration Assessment 0 1/18/2017  3:14 PM   Affected Extremity/Extremities Color distal to insertion site pink (or appropriate for race) 1/18/2017  3:14 PM   Date of Last Dressing Change 01/17/17 1/18/2017  4:00 AM   Dressing Status Clean, dry, & intact 1/18/2017  3:14 PM   Dressing Type Transparent;Tape;Disk with Chlorhexadine Gluconate (CHG) 1/18/2017  3:14 PM   Proximal Hub Color/Line Status Patent 1/18/2017  3:14 PM   Positive Blood Return (Medial Site) Yes 1/18/2017  4:00 AM   Medial Hub Color/Line Status Patent 1/18/2017  3:14 PM   Positive Blood Return (Lateral Site) Yes 1/18/2017  4:00 AM   Distal Hub Color/Line Status Patent 1/18/2017  3:14 PM   Positive Blood Return (Site #3) Yes 1/18/2017  4:00 AM   Alcohol Cap Used Yes 1/18/2017  4:00 AM        Peripheral IV 05/13/15 Left Hand (Active)       Peripheral IV 05/13/15 Right Forearm (Active)          Damir-Russell Drain 05/13/15 Right; Anterior Neck (Active)       PEG/Gastrostomy Tube 10/28/14 Mid Abdomen (Active)       PEG/Gastrostomy Tube Abdomen (Active)       J-Tube (Active)                Urinary Catheter      Intake & Output   Date 01/17/17 1900 - 01/18/17 0659 01/18/17 0700 - 01/19/17 0659   Shift 0281-9076 24 Hour Total 8667-0264 1125-4005 24 Hour Total   I  N  T  A  K  E   P. O. 720 720         P. O. 720 720       I.V.  (mL/kg/hr) 1300 2500         Volume (0.45% sodium chloride infusion) 900 1800         Volume (cefepime (MAXIPIME) 2 g in 0.9% sodium chloride (MBP/ADV) 100 mL) 50 150         Volume (metroNIDAZOLE (FLAGYL) IVPB premix 500 mg) 100 300         Volume (vancomycin (VANCOCIN) 1,000 mg in 0.9% sodium chloride (MBP/ADV) 250 mL) 250 250       Shift Total  (mL/kg) 2020  (29.7) 3220  (47.3)      O  U  T  P  U  T   Urine  (mL/kg/hr) 3300 5000         Urine Voided 3300 5000       Stool           Stool Occurrence(s)  3 x       Shift Total  (mL/kg) 3300  (48.5) 5000  (73.5)      NET -1285 -8721 Weight (kg) 68 68 68 68 68         Readmission Risk Assessment Tool Score Low Risk            12       Total Score        3 Relationship with PCP    4 More than 1 Admission in calendar year    5 Patient Insurance is Medicare, Medicaid or Self Pay        Criteria that do not apply:    Patient Living Status    Patient Length of Stay > 5    Charlson Comorbidity Score       Expected Length of Stay 4d 21h   Actual Length of Stay 3

## 2017-01-19 NOTE — PROGRESS NOTES
Called Dr Colin Naqvi with request for more pain medication.   Pt's Ct scan showed possible metastasis

## 2017-01-19 NOTE — ROUTINE PROCESS
Bedside, Verbal and Written shift change report given to Lena Samaniego RN (oncoming nurse) by Lucius Chester RN (offgoing nurse). Report included the following information SBAR, Kardex, MAR and Recent Results.

## 2017-01-19 NOTE — PROGRESS NOTES
Problem: Self Care Deficits Care Plan (Adult)  Goal: *Acute Goals and Plan of Care (Insert Text)  Occupational Therapy Goals  Initiated 1/16/2017  1. Patient will perform standing ADL at sink x 5 minutes with supervision/set-up within 7 day(s). (MET)  2. Patient will perform upper body dressing and lower body dressing with supervision/set-up within 7 day(s). (MET)  3. Patient will gather materials for ADL task with supervision/set-up within 7 day(s). (MET)  4. Patient will perform toilet transfers with supervision/set-up within 7 day(s). (MET)  5. Patient will perform all aspects of toileting with supervision/set-up within 7 day(s). (MET)   OCCUPATIONAL THERAPY TREATMENT/DISCHARGE  Patient: Dale Mariscal (89 y.o. female)  Date: 1/19/2017  Diagnosis: Acute respiratory failure with hypoxia ([address]  Shock ([address] Sepsis ([address]       Precautions:        ASSESSMENT:  Patient has met all OT goals at this time, completing functional mobility with SBA and no assistive device. Pt completed all bathing and dressing ADLs with supervision/setup of supplies, able to retrieve clothing item from floor and walk to dirty linen bag on opposite side of room. O2 ranged from 90-93% on 4L during activity, 96% at rest. Pt is close to her ADL baseline, recommend discharge home with HHOT/PT and occasional 24 hr supervision or safety. Noted CM note that  works during the day but daughter lives close by to assist as needed. Pt needs O2 for activity at this time and may require portable O2 at discharge. Progression toward goals:  [X]            Improving appropriately and progressing toward goals  [ ]            Improving slowly and progressing toward goals  [ ]            Not making progress toward goals and plan of care will be adjusted       PLAN:  Patient will be discharged from occupational therapy at this time.   Rationale for discharge:  [X]   Goals Achieved  [ ]   Olivier Andre  [ ]   Patient not participating in therapy  [ ] Other:  Discharge Recommendations:  HHOT/PT, initial 24 hour for safety  Further Equipment Recommendations for Discharge:  None for OT       SUBJECTIVE:   Patient stated I have some pain in my right side of my neck but I am not due for pain meds for another hour.       OBJECTIVE DATA SUMMARY:   Cognitive/Behavioral Status:  Neurologic State: Alert  Orientation Level: Oriented X4  Cognition: Follows commands              Functional Mobility and Transfers for ADLs:  Bed Mobility:  Supine to Sit: Supervision  Sit to Supine: Supervision     Transfers:  Sit to Stand: Stand-by asssistance  Bed to Chair: Stand-by asssistance  Toilet Transfer : Supervision     Balance:   intact sitting  Impaired without support     ADL Intervention:         pt completed functional mobility to bathroom with SBA and gait belt. Pt completed standing ADLs in bathroom x 15 minutes, stood x 5 minutes for hygiene with increased PENALOZA. Pt required  verbal cue to sit on commode for rest break to perform remaining bathing task. Upper Body Bathing  Bathing Assistance: Supervision/set-up  Position Performed: Seated in chair (on commode)     Lower Body Bathing  Perineal  : Supervision/set-up  Position Performed: Seated on toilet  Lower Body : Supervision/set-up  Position Performed: Seated in chair (on commode)     Upper Body [address]: Supervision/ set-up     Lower Body Dressing Assistance  Underpants: Supervision/set-up (seated on commode)     Toileting  Bladder Hygiene: Supervision/set-up  Clothing Management: Supervision/set-up              Pain:  Pain Scale 1: Numeric (0 - 10)  Pain Intensity 1: 5  Pain Location 1: Chest;Shoulder  Pain Orientation 1: Anterior;Distal  Pain Description 1: Aching;Constant  Pain Intervention(s) 1: Medication (see MAR)  Activity Tolerance:   90-93% on 4 L  Please refer to the flowsheet for vital signs taken during this treatment.   After treatment:   [ ] Patient left in no apparent distress sitting up in chair  [X] Patient left in no apparent distress in bed  [X] Call bell left within reach  [X] Nursing notified  [ ] Caregiver present  [X] Bed alarm activated      COMMUNICATION/COLLABORATION:   The patients plan of care was discussed with: Physical Therapist, Registered Nurse and Rehabilitation Attendant     Merlene Vazquez OT  Time Calculation: 27 mins

## 2017-01-19 NOTE — ROUTINE PROCESS
Primary Nurse Kellee Talley RN and Edwar Elmore RN performed a dual skin assessment on this patient No impairment noted  Angel score is 17

## 2017-01-19 NOTE — PROGRESS NOTES
Problem: Dysphagia (Adult)  Goal: *Acute Goals and Plan of Care (Insert Text)  Initiated 1/16/2017  1. Patient will participate in reevaluation of swallow function within 7 days. Pt had MBS 1/18. 2. Pt will tolerate dys 2 diet with thins with no overt s/s of aspiration. 3. Pt will complete pharyngeal strengthening exercises to improve laryngeal elevation/closure and pharyngeal constriction with mod cues. SPEECH LANGUAGE PATHOLOGY DYSPHAGIA TREATMENT  Patient: Mu Fischer (19 y.o. female)  Date: 1/19/2017  Diagnosis: Acute respiratory failure with hypoxia ([address]  Shock ([address] Sepsis ([address]       Precautions: fall        ASSESSMENT:  Patient seen sitting upright edge of bed. Reviewed MBS results with her. She demonstrated fair understanding. She did point to her swallow precautions sign in her room and was able to verbalize all safe swallow strategies without cues. Observed her taking several sips of milk, free of overt s/s of aspiration. With conversation, O2 sats via monitor dropped to 72. Notified RN who attended to patient. Progression toward goals:  [ ]         Improving appropriately and progressing toward goals  [X]         Improving slowly and progressing toward goals  [ ]         Not making progress toward goals and plan of care will be adjusted       PLAN:  Recommendations and Planned Interventions:  Continue puree/thins-patient mostly drinks carnation instant breakfast  Upright for all PO  Clear throat every 2-3 swallows  Will follow for treatment of dysphagia  Patient continues to benefit from skilled intervention to address the above impairments. Continue treatment per established plan of care. Discharge Recommendations: To Be Determined       SUBJECTIVE:   Patient stated My daddy was a milk man.       OBJECTIVE:   Cognitive and Communication Status:  Neurologic State: Alert  Orientation Level: Oriented X4  Cognition: Follows commands           Dysphagia Treatment:  :           reviewed MBS results and recommendations. Thin liquid trials. No s/s of aspiration. Pain:  Pain Scale 1: Numeric (0 - 10)  Pain Intensity 1: 5  Pain Location 1: Chest;Shoulder  After treatment:   [ ]              Patient left in no apparent distress sitting up in chair  [X]              Patient left in no apparent distress in bed seated edge of bed  [X]              Call bell left within reach  [X]              Nursing notified  [ ]              Caregiver present  [ ]              Bed alarm activated      COMMUNICATION/EDUCATION:   Patient was educated as noted above     The patients plan of care including recommendations, planned interventions, and recommended diet changes were discussed with: Registered Nurse. [ ]              Posted safety precautions in patient's room.      Nam Patel SLP  Time Calculation: 12 mins

## 2017-01-20 PROBLEM — R53.81 DEBILITY: Status: ACTIVE | Noted: 2017-01-01

## 2017-01-20 PROBLEM — R06.02 SHORTNESS OF BREATH: Status: ACTIVE | Noted: 2017-01-01

## 2017-01-20 PROBLEM — Z71.89 GOALS OF CARE, COUNSELING/DISCUSSION: Status: ACTIVE | Noted: 2017-01-01

## 2017-01-20 PROBLEM — J69.0 ASPIRATION PNEUMONIA OF BOTH UPPER LOBES (HCC): Status: ACTIVE | Noted: 2017-01-01

## 2017-01-20 PROBLEM — R41.0 DELIRIUM: Status: ACTIVE | Noted: 2017-01-01

## 2017-01-20 NOTE — PROGRESS NOTES
General Daily Progress Note    Admit Date: 1/15/2017  Hospital day 6    Subjective:     Patient has cough ,dyspnea,diffuse pain. .   Medication side effects: none    Current Facility-Administered Medications   Medication Dose Route Frequency    HYDROcodone-acetaminophen (NORCO)  mg tablet 1 Tab  1 Tab Oral Q4H PRN    gabapentin (NEURONTIN) capsule 300 mg  300 mg Oral TID    amLODIPine (NORVASC) tablet 10 mg  10 mg Oral DAILY    lithium carbonate SR (LITHOBID) tablet 300 mg  300 mg Oral BID    LORazepam (ATIVAN) tablet 0.5 mg  0.5 mg Oral BID and QHS    ceFAZolin (ANCEF) 2g in 100 ml 0.9% NS IVPB  2 g IntraVENous Q8H    metoprolol tartrate (LOPRESSOR) tablet 25 mg  25 mg Oral Q12H    0.45% sodium chloride infusion  75 mL/hr IntraVENous CONTINUOUS    mupirocin (BACTROBAN) 2 % ointment   Both Nostrils Q12H    atorvastatin (LIPITOR) tablet 20 mg  20 mg Oral QHS    aspirin delayed-release tablet 81 mg  81 mg Oral DAILY    albuterol-ipratropium (DUO-NEB) 2.5 MG-0.5 MG/3 ML  3 mL Nebulization QID RT    amitriptyline (ELAVIL) tablet 10 mg  10 mg Oral QHS    FLUoxetine (PROzac) capsule 20 mg  20 mg Oral DAILY    lidocaine (XYLOCAINE) 2 % viscous solution 15 mL  15 mL Mouth/Throat Q4H PRN    senna (SENOKOT) tablet 8.6 mg  1 Tab Oral DAILY    benzocaine-menthol (CHLORASEPTIC MAX) lozenge 1 Lozenge  1 Lozenge Oral Q2H PRN    traZODone (DESYREL) tablet 200 mg  200 mg Oral QHS    sodium chloride (NS) flush 5-10 mL  5-10 mL IntraVENous Q8H    sodium chloride (NS) flush 5-10 mL  5-10 mL IntraVENous PRN    acetaminophen (TYLENOL) tablet 500 mg  500 mg Oral Q4H PRN    naloxone (NARCAN) injection 0.4 mg  0.4 mg IntraVENous PRN    bisacodyl (DULCOLAX) tablet 5 mg  5 mg Oral DAILY PRN    enoxaparin (LOVENOX) injection 40 mg  40 mg SubCUTAneous Q24H    sodium chloride (NS) flush 5-10 mL  5-10 mL IntraVENous PRN        Review of Systems  Constitutional: positive for fatigue and malaise  Respiratory: positive for cough or stridor  Cardiovascular: negative  Gastrointestinal: positive for dysphagia  Musculoskeletal:positive for myalgias and arthralgias    Objective:     Patient Vitals for the past 8 hrs:   BP Temp Pulse Resp SpO2   01/20/17 0242 134/69 98.4 °F (36.9 °C) 95 22 90 %        01/18 0701 - 01/19 1900  In: 5786 [P.O.:1708; I.V.:1800]  Out: 975 [Urine:975]    Physical Exam:   Visit Vitals    /69 (BP 1 Location: Left arm, BP Patient Position: At rest;Head of bed elevated (Comment degrees))    Pulse 95    Temp 98.4 °F (36.9 °C)    Resp 22    Wt 150 lb (68 kg)    LMP  (LMP Unknown)    SpO2 90%    BMI 22.15 kg/m2     General appearance: alert, fatigued, cooperative, mild distress, appears stated age  Lungs: rhonchi R base, L base  Heart: regular rate and rhythm  Abdomen: soft, non-tender. Bowel sounds normal. No masses,  no organomegaly      ECG: sinus tachycardia     Data Review   Recent Results (from the past 24 hour(s))   CBC WITH AUTOMATED DIFF    Collection Time: 01/20/17  4:24 AM   Result Value Ref Range    WBC 12.5 (H) 3.6 - 11.0 K/uL    RBC 3.72 (L) 3.80 - 5.20 M/uL    HGB 10.1 (L) 11.5 - 16.0 g/dL    HCT 32.8 (L) 35.0 - 47.0 %    MCV 88.2 80.0 - 99.0 FL    MCH 27.2 26.0 - 34.0 PG    MCHC 30.8 30.0 - 36.5 g/dL    RDW 15.5 (H) 11.5 - 14.5 %    PLATELET 870 750 - 450 K/uL    NEUTROPHILS PENDING %    LYMPHOCYTES PENDING %    MONOCYTES PENDING %    EOSINOPHILS PENDING %    BASOPHILS PENDING %    ABS. NEUTROPHILS PENDING K/UL    ABS. LYMPHOCYTES PENDING K/UL    ABS. MONOCYTES PENDING K/UL    ABS. EOSINOPHILS PENDING K/UL    ABS.  BASOPHILS PENDING K/UL    DF PENDING            Assessment:     Principal Problem:    Sepsis ([facility] (1/15/2017)    Active Problems:    Shock ([facility] (1/15/2017)      Acute respiratory failure with hypoxia ([facility] (1/15/2017)      History of laryngeal cancer (11/2/2015)      History of radiation to head and neck region (3/30/2016)      Chronic pain (1/15/2017)      Essential hypertension, benign (11/29/2011)      Bipolar 1 disorder ([facility] (11/29/2011)      Dysphagia (12/30/2014)      Fibromyalgia (1/15/2017)      History of tobacco use (1/15/2017)      Acute renal injury ([facility] (1/15/2017)        Plan:     Remains dyspneic ,coughing. Tolerating diet ok. CT scan ,onncology consult noted. Leukocytosis,hypoxia persist .Currently on Ancef and Vanc for pneumonitis and 1 positive BC for MSSA. Will discuss with Pulmonary what antibiotic therapy  We should use. Remains agitated and insistent on increased pain meds. Will ask Psych to see,and Palliative Care.

## 2017-01-20 NOTE — CONSULTS
Psychiatry Consult Note    Subjective:   Patient:  Shaan Rodriguez Age:  57 y.o. :  1955  Date of Evaluation:  2017    Reason for Referral:  Shaan Rodriguez was admitted for Bipolar Disorder    History of Presenting Problem: Saw the patient this afternoon and she stated she was admitted here for sepsis and pneumonia. Stated she has been diagnosed with Bipolar II DO and she sees Dr Angelic Nolasco and she is doing good with her bipolar on the current medication of lithium and prozac. She denied SI/HI/AH/VH/paranpoia. Stated she is sleeps fine with the current sleep medication and she is not having ups and down in her mood . Stated she got little down when she was told that she was being investigated for cancer. Denied having depressive and manic symptoms or any psychotic symptoms. Stated she doesn't want her medication to be changed.       Social History:   Social History     Social History    Marital status:      Spouse name: N/A    Number of children: N/A    Years of education: N/A     Social History Main Topics    Smoking status: Former Smoker     Packs/day: 1.00     Years: 40.00     Quit date: 2014    Smokeless tobacco: Never Used      Comment: PASSIVE SMOKE EXPOSURE,  SMOKES    Alcohol use No    Drug use: No    Sexual activity: Not on file     Other Topics Concern    Not on file     Social History Narrative       Legal: denied    Family History:     Family History   Problem Relation Age of Onset    Cancer Father      bone/skin/lung    Anesth Problems Neg Hx          Medical History:   Past Medical History   Diagnosis Date    Bipolar 1 disorder ([address] 2011    Cancer ([address]      skin cancer buttocks    Cancer ([address]      throat    Chronic pain      FIBROMYALGIA, LEGS    Encounter for long-term (current) use of other medications 2011    Essential hypertension, benign 2011    Laryngeal cancer ([address] 2015    Laryngeal mass     Psychiatric disorder bipolar       Psychiatric History: has been diagnosed with Bipolar II DO and sees Dr Ade Taylor and had been admited to psychiatric hospital in the past many years ago      Substance Use History:   History   Alcohol Use No     History   Drug Use No           Objective:     Vitals/Physical Assessment:        Patient Vitals for the past 8 hrs:   SpO2   01/20/17 1413 97 %           MENTAL STATUS EXAM:   FINDINGS WITHIN NORMAL LIMITS (WNL) UNLESS OTHERWISE STATED BELOW:    Sensorium  oriented to time, place and person   Relations cooperative   Appearance:  age appropriate and older than stated age   Motor Behavior:  hypoactive and within normal limits   Speech:  normal volume   Thought Process: logical and tangential   Thought Content free of hallucinations   Suicidal ideations none   Homicidal ideations none   Mood:  depressed   Affect:  anxious   Memory recent  adequate   Memory remote:  adequate   Concentration:     Abstraction:     Insight:  fair   Reliability fair   Judgment:  fair          Pertinant Data:  Patient Vitals for the past 8 hrs:   SpO2   01/20/17 1413 97 %       Recent Results (from the past 24 hour(s))   CBC WITH AUTOMATED DIFF    Collection Time: 01/20/17  4:24 AM   Result Value Ref Range    WBC 12.5 (H) 3.6 - 11.0 K/uL    RBC 3.72 (L) 3.80 - 5.20 M/uL    HGB 10.1 (L) 11.5 - 16.0 g/dL    HCT 32.8 (L) 35.0 - 47.0 %    MCV 88.2 80.0 - 99.0 FL    MCH 27.2 26.0 - 34.0 PG    MCHC 30.8 30.0 - 36.5 g/dL    RDW 15.5 (H) 11.5 - 14.5 %    PLATELET 619 010 - 128 K/uL    NEUTROPHILS 69 %    BANDS 1 %    LYMPHOCYTES 12 %    MONOCYTES 9 %    EOSINOPHILS 6 %    BASOPHILS 0 %    MYELOCYTES 3 %    ABS. NEUTROPHILS 8.8 K/UL    ABS. LYMPHOCYTES 1.5 K/UL    ABS. MONOCYTES 1.1 K/UL    ABS. EOSINOPHILS 0.8 K/UL    ABS. BASOPHILS 0.0 K/UL    RBC COMMENTS NORMOCYTIC, NORMOCHROMIC      WBC COMMENTS TOXIC GRANULATION      DF MANUAL       No results found.           Impression:      Principal Problem:    Sepsis ([address] (1/15/2017)    Active Problems:    Essential hypertension, benign (11/29/2011)      Bipolar 1 disorder ([facility] (11/29/2011)      Dysphagia (12/30/2014)      History of laryngeal cancer (11/2/2015)      History of radiation to head and neck region (3/30/2016)      Acute respiratory failure with hypoxia (HCC) (1/15/2017)      Fibromyalgia (1/15/2017)      Chronic pain (1/15/2017)      History of tobacco use (1/15/2017)      Acute renal injury ([facility] (1/15/2017)      Shock ([address] (1/15/2017)      Aspiration pneumonia of both upper lobes ([facility] (1/20/2017)      Shortness of breath (1/20/2017)      Delirium (1/20/2017)      Goals of care, counseling/discussion (1/20/2017)      Debility (1/20/2017)              Plan:       Medications:  Current Facility-Administered Medications   Medication Dose Route Frequency    HYDROcodone-acetaminophen (NORCO)  mg tablet 1 Tab  1 Tab Oral Q4H PRN    gabapentin (NEURONTIN) capsule 300 mg  300 mg Oral TID    amLODIPine (NORVASC) tablet 10 mg  10 mg Oral DAILY    lithium carbonate SR (LITHOBID) tablet 300 mg  300 mg Oral BID    LORazepam (ATIVAN) tablet 0.5 mg  0.5 mg Oral BID and QHS    ceFAZolin (ANCEF) 2g in 100 ml 0.9% NS IVPB  2 g IntraVENous Q8H    metoprolol tartrate (LOPRESSOR) tablet 25 mg  25 mg Oral Q12H    0.45% sodium chloride infusion  75 mL/hr IntraVENous CONTINUOUS    mupirocin (BACTROBAN) 2 % ointment   Both Nostrils Q12H    atorvastatin (LIPITOR) tablet 20 mg  20 mg Oral QHS    aspirin delayed-release tablet 81 mg  81 mg Oral DAILY    albuterol-ipratropium (DUO-NEB) 2.5 MG-0.5 MG/3 ML  3 mL Nebulization QID RT    amitriptyline (ELAVIL) tablet 10 mg  10 mg Oral QHS    FLUoxetine (PROzac) capsule 20 mg  20 mg Oral DAILY    lidocaine (XYLOCAINE) 2 % viscous solution 15 mL  15 mL Mouth/Throat Q4H PRN    senna (SENOKOT) tablet 8.6 mg  1 Tab Oral DAILY    benzocaine-menthol (CHLORASEPTIC MAX) lozenge 1 Lozenge  1 Lozenge Oral Q2H PRN    traZODone (DESYREL) tablet 200 mg  200 mg Oral QHS    sodium chloride (NS) flush 5-10 mL  5-10 mL IntraVENous Q8H    sodium chloride (NS) flush 5-10 mL  5-10 mL IntraVENous PRN    acetaminophen (TYLENOL) tablet 500 mg  500 mg Oral Q4H PRN    naloxone (NARCAN) injection 0.4 mg  0.4 mg IntraVENous PRN    bisacodyl (DULCOLAX) tablet 5 mg  5 mg Oral DAILY PRN    enoxaparin (LOVENOX) injection 40 mg  40 mg SubCUTAneous Q24H        Scheduled Medications:   Current Facility-Administered Medications   Medication Dose Route Frequency    gabapentin (NEURONTIN) capsule 300 mg  300 mg Oral TID    amLODIPine (NORVASC) tablet 10 mg  10 mg Oral DAILY    lithium carbonate SR (LITHOBID) tablet 300 mg  300 mg Oral BID    LORazepam (ATIVAN) tablet 0.5 mg  0.5 mg Oral BID and QHS    ceFAZolin (ANCEF) 2g in 100 ml 0.9% NS IVPB  2 g IntraVENous Q8H    metoprolol tartrate (LOPRESSOR) tablet 25 mg  25 mg Oral Q12H    0.45% sodium chloride infusion  75 mL/hr IntraVENous CONTINUOUS    mupirocin (BACTROBAN) 2 % ointment   Both Nostrils Q12H    atorvastatin (LIPITOR) tablet 20 mg  20 mg Oral QHS    aspirin delayed-release tablet 81 mg  81 mg Oral DAILY    albuterol-ipratropium (DUO-NEB) 2.5 MG-0.5 MG/3 ML  3 mL Nebulization QID RT    amitriptyline (ELAVIL) tablet 10 mg  10 mg Oral QHS    FLUoxetine (PROzac) capsule 20 mg  20 mg Oral DAILY    senna (SENOKOT) tablet 8.6 mg  1 Tab Oral DAILY    traZODone (DESYREL) tablet 200 mg  200 mg Oral QHS    sodium chloride (NS) flush 5-10 mL  5-10 mL IntraVENous Q8H    enoxaparin (LOVENOX) injection 40 mg  40 mg SubCUTAneous Q24H            Interventions:  Continue the current lithium and prozac 20 mg PO daily and lithium 300 mg PO BID.   Check Lithium level        Recommendations for Treatment/Conditions:  Continue meds and follow up with out[patient doctor upon discharge    Referral To:           Klaus Brennan MD  1/20/2017 4:56 PM

## 2017-01-20 NOTE — PROGRESS NOTES
Bedside and Verbal shift change report given to Clark Marie RN (oncoming nurse) by Ella Blanco RN (offgoing nurse). Report included the following information SBAR, Kardex, Procedure Summary, Intake/Output, MAR, Recent Results, Med Rec Status and Cardiac Rhythm NSR/ST.

## 2017-01-20 NOTE — PROGRESS NOTES
TRANSFER - OUT REPORT:    Verbal report given to [facility] RN(name) on [employer]  being transferred to Renal (unit) for routine progression of care       Report consisted of patients Situation, Background, Assessment and   Recommendations(SBAR). Information from the following report(s) SBAR, Kardex, [facility] and Recent Results was reviewed with the receiving nurse. Lines:   Triple Lumen triple lumen central line 01/15/17 Right Neck (Active)   Central Line Being Utilized Yes 1/20/2017  2:55 AM   Criteria for Appropriate Use Limited/no vessel suitable for conventional peripheral access 1/20/2017  2:55 AM   Site Assessment Clean, dry, & intact 1/20/2017  2:55 AM   Infiltration Assessment 0 1/20/2017  2:55 AM   Affected Extremity/Extremities Color distal to insertion site pink (or appropriate for race) 1/20/2017  2:55 AM   Date of Last Dressing Change 01/17/17 1/20/2017  2:55 AM   Dressing Status Clean, dry, & intact 1/20/2017  2:55 AM   Dressing Type Transparent 1/20/2017  2:55 AM   Action Taken Blood drawn 1/20/2017  2:55 AM   Proximal Hub Color/Line Status White 1/20/2017  2:55 AM   Positive Blood Return (Medial Site) Yes 1/19/2017  3:51 PM   Medial Hub Color/Line Status Blue 1/20/2017  2:55 AM   Positive Blood Return (Lateral Site) Yes 1/19/2017  3:51 PM   Distal Hub Color/Line Status Brown 1/20/2017  2:55 AM   Positive Blood Return (Site #3) Yes 1/19/2017  3:51 PM   Alcohol Cap Used Yes 1/20/2017  2:55 AM       Peripheral IV 05/13/15 Left Hand (Active)       Peripheral IV 05/13/15 Right Forearm (Active)        Opportunity for questions and clarification was provided.       Patient transported with:   O2 @ 2 liters

## 2017-01-20 NOTE — PROGRESS NOTES
[facility] Consult Service Progress NOTE  Pulmonary, Critical Care, and Sleep Medicine    Name: Julia Moore MRN: [id number]   : 1955 Hospital: [address]   Date: 2017  Admission Date: 1/15/2017     1-20-17: Pt was seen and evaluated this am. Pt is much more calm and   17: Complain of chest pain. Requesting more pain meds. Has dry cough. 17: Pt seems to be doing better today. She feels her pain is better controlled. Has a mainly dry cough. Feels breathing is much more comfortable. Chart and notes reviewed. Data reviewed. Pt seen on rounds earlier today. I have evaluated and examined the patient. Pt is acutely ill. Reviewed the evaluation and will assist in implementing the plan as outlined by Dr. Miguelangel Becker and team        Hospital Day: 6      IMPRESSION:   · Acute respiratory failure with hypoxia-Pox was 92% on RA this pm.   · Gram positive Bacteremia has MSSA   · Severe bilateral pneumonia- likely aspiration given her underlying history-Will need to follow up with CT in next 2-3 months. Question of malignancy may be helpful to get PET scan once treated and recovered from pneumonia. Will get a repeat CT of chest in 3 months as an oupt. · Dysphagia: went to endo this am  · Sepsis ([address]   · History of laryngeal cancer (2015)  · History of radiation to head and neck region (3/30/2016)  · H/o dysphagia, discussed with   · Chronic pain (1/15/2017)-this has been an challenge. · Hypertension, benign   · Bipolar disorder Lithium level is therapeutic. · Fibromyalgia (1/15/2017)  · History of tobacco use (1/15/2017)  · Acute renal injury ([address] (1/15/2017)      RECOMMENDATIONS/PLAN:   · Full code   · Was placed on ancef. · Diet per speech and GI.   · Oxygen supplement per NC, Wean as tolerated, suspect may need home oxygen. Wean as tolerated  · Speech therapy, following.    · Aspiration precautions  · Will need follow up imaging of chestCT in 3 months upon discharge. Will have pt come see me in office after CT of chest.    · Continue home meds as they were; no Vicoprofen; on Oxycodone/tylenol (due to risk of GI bleed etc)   · Will see as needed over the weekend. Please call if any issues. Code: Full Code          Vital Signs: Intake/Output: Intake/Output:   Visit Vitals    /82 (BP 1 Location: Left arm, BP Patient Position: At rest)    Pulse 97    Temp 98.1 °F (36.7 °C)    Resp 22    Wt 68 kg (150 lb)    LMP  (LMP Unknown)    SpO2 97%    BMI 22.15 kg/m2      O2 Device: Nasal cannula  O2 Flow Rate (L/min): 2 l/min  Temp (24hrs), Av.1 °F (36.7 °C), Min:97.6 °F (36.4 °C), Max:98.4 °F (36.9 °C)     Intake/Output Summary (Last 24 hours) at 17 1516  Last data filed at 17 1847   Gross per 24 hour   Intake              118 ml   Output                0 ml   Net              118 ml    Last shift:         Last 3 shifts:  1901 -  0700  In: 2534.3 [P.O.:1708; I.V.:826.3]  Out: 975 [Urine:975]         Telemetry:    AFIB    Physical Exam: Pox was 93% on RA at rest.   General: petite WF in no respiratory distress and acyanotic, alert   HEENT: NCAT; no thrush   Neck: No abnormally enlarged lymph nodes. Chest: normal   Lungs: decreased air exchange bilaterally, clear good air movement today. Heart: Regular rate and rhythm or S1S2 present   Abdomen: soft, non-tender, without masses or organomegaly   :    Extremity: negative, clubbing;    Neuro: alert   Psych: seems pretty appropriate today. No depression no anxiety.    Skin: Skin unremarkableSkin color, texture, turgor normal. No rashes or lesions;   Pulses: Bilateral, Radial, 1+ Normal upper and lower extremity pulses and Diffusely decreased peripheral pulses   Capillary refill: normal well perfused;      DATA:    MAR reviewed and pertinent medications noted or modified as needed    MEDS:   Current Facility-Administered Medications   Medication    HYDROcodone-acetaminophen (NORCO)  mg tablet 1 Tab    gabapentin (NEURONTIN) capsule 300 mg    amLODIPine (NORVASC) tablet 10 mg    lithium carbonate SR (LITHOBID) tablet 300 mg    LORazepam (ATIVAN) tablet 0.5 mg    ceFAZolin (ANCEF) 2g in 100 ml 0.9% NS IVPB    metoprolol tartrate (LOPRESSOR) tablet 25 mg    0.45% sodium chloride infusion    mupirocin (BACTROBAN) 2 % ointment    atorvastatin (LIPITOR) tablet 20 mg    aspirin delayed-release tablet 81 mg    albuterol-ipratropium (DUO-NEB) 2.5 MG-0.5 MG/3 ML    amitriptyline (ELAVIL) tablet 10 mg    FLUoxetine (PROzac) capsule 20 mg    lidocaine (XYLOCAINE) 2 % viscous solution 15 mL    senna (SENOKOT) tablet 8.6 mg    benzocaine-menthol (CHLORASEPTIC MAX) lozenge 1 Lozenge    traZODone (DESYREL) tablet 200 mg    sodium chloride (NS) flush 5-10 mL    sodium chloride (NS) flush 5-10 mL    acetaminophen (TYLENOL) tablet 500 mg    naloxone (NARCAN) injection 0.4 mg    bisacodyl (DULCOLAX) tablet 5 mg    enoxaparin (LOVENOX) injection 40 mg          Labs:  Recent Labs      01/20/17   0424  01/19/17   0450  01/18/17   0403   WBC  12.5*  11.9*  12.9*   HGB  10.1*  10.9*  10.1*   PLT  235  232  222     Recent Labs      01/18/17   0403   NA  144   K  3.5   CL  107   CO2  31   GLU  102*   BUN  14   CREA  0.79   CA  8.9     No results for input(s): PH, PCO2, PO2, HCO3, FIO2 in the last 72 hours. No results for input(s): PH, PCO2, PO2, HCO3, FIO2 in the last 72 hours. No results for input(s): CPK, CKNDX, TROIQ in the last 72 hours. No lab exists for component: CPKMB    Imaging:  []                           I have personally reviewed the patients radiographs and reports:        Results from Hospital Encounter encounter on 01/15/17   XR SWALLOW FUNC VIDEO   Narrative Clinical indication: Difficulty swallowing. Fluoroscopy provided for a modified barium swallow performed with the speech  pathologist, no images were taken.  Fluoroscopy time was 3.3 minutes. Direct  lateral fluoroscopy for multiple different consistencies. For water consistency  there was some flash penetration. Minimal delay in the oral phase. For nectar  consistency there was some flash penetration and one episode of minimal  aspiration with significant cough reflex. for puree and solid consistencies  there were no significant additional abnormalities. Impression impression: Mild abnormality of the swallowing mechanism as above. Results from [place] encounter on 01/15/17   CT NECK SOFT TISSUE W CONT   Narrative Clinical indication: Neck mass, pneumonia, history of laryngeal malignancy. Axial CT scan of the neck and chest obtained after intravenous injection 100 cc  of Isovue-300. Coronal and sagittal reconstructions. Comparison chest CT October 13, 2016 , Neck CT January 11, 2016. CT dose reduction was achieved through the use of a standardized protocol  tailored for this examination and automatic exposure control for dose modulation  . Marivel Alvarez Prior right parotid surgery unchanged findings. Posttreatment changes in the  larynx also unchanged. There is no new masses. There is no adenopathy. Submandibular gland and left parotid glands appear unremarkable. There is no  compromise of the airway or fluid collection. Thyroid enlargement with masses  are also stable. Major vessels do appear patent. Lateral breast implants. Infusion catheter tip is in the right atrium. There is  an enlarged superior mediastinal node pretracheal measuring 22 x 15  it  was 12  x 16 , infracarinal nodes and precarinal nodes have also slightly enlarged in  size since the prior examination. Disease once again seen. A small nonsolid lesion in the right apex is increase  in size measuring at this time 11 mm, it was 4 multiple new nonsolid nodules are  seen bilaterally in addition there is a consolidation in the right suprahilar  region.  There are some solid nodule also seen which are new or increased in  there is a small sized right pleural effusion the heart is enlarged no  pericardial effusion seen. Review of bone windows do not suggest metastases. Impression impression: Significant progression of lung disease suspicious for metastases. Right upper lobe consolidation is acute. Increased size of mediastinal nodes.                 Katya Cisneros MD

## 2017-01-20 NOTE — CONSULTS
Palliative Medicine Consult  Jose Alberto: [phone] ([phone])    Patient Name: Mu Fischer  YOB: 1955    Date of Initial Consult: 1/20/17  Reason for Consult: care decisions  Requesting Provider: Dr. Chichi Cadet   Primary Care Physician: Jeanette Benavides MD   SUMMARY:   Mu Fischer is a 57y.o. year old with a past history of fibromyalgia, bipolar disorder, laryngeal cancer diagnosed in 2014 s/p chemo with Dr. Gildardo Tellez, who was admitted on 1/15/2017 from home with a diagnosis of shortness of breath and altered mental status in the setting of multilobar pneumonia. Current medical issues leading to Palliative Medicine involvement include: help with care deicions. Tisha Gonzalezthorne PALLIATIVE DIAGNOSES:   1. Delirium  2. Shortness of breath  3. Debility  4. Care decisions       PLAN:   1. Patient in deep sleep. Wakes up but goes right back to sleep. 2. Called daughter and  in the numbers listed and left messages to call back. 3. Goals seem clear at this point- follow up CT in 3 months to differentiate lung lesions from pneumonia. Baseline functional status unknown at this time. 4. Will recommend completion of AMD if daughter calls back or patient able to communicate with me. 5. Psychiatry has been consulted as well. 6. Initial consult note routed to primary continuity provider  7.  Communicated plan of care with: Palliative IDT       GOALS OF CARE / TREATMENT PREFERENCES:   [====Goals of Care====]  GOALS OF CARE:  Patient / health care proxy stated goals: FULL      TREATMENT PREFERENCES:   Code Status: Full Code    Advance Care Planning:  Advance Care Planning 1/20/2017   Patient's Healthcare Decision Maker is: Legal Next of Kin   Primary Decision Maker Name Turner Morris   Primary Decision Maker Phone Number [phone]   Primary Decision Maker Relationship to Patient Adult child   Confirm Advance Directive -       Other:    The palliative care team has discussed with patient / health care proxy about goals of care / treatment preferences for patient.  [====Goals of Care====]         HISTORY:         HPI/SUBJECTIVE:    The patient is:   [] Verbal and participatory  [x] Non-participatory due to:   Mental status. Baseline unknown at this time     Clinical Pain Assessment (nonverbal scale for severity on nonverbal patients):   [++++ Clinical Pain Assessment++++]  [++++Pain Severity++++]: Pain: 0  [++++Pain Character++++]:   [++++Pain Duration++++]:   [++++Pain Effect++++]:   [++++Pain Factors++++]:   [++++Pain Frequency++++]:   [++++Pain Location++++]:   [++++ Clinical Pain Assessment++++]     FUNCTIONAL ASSESSMENT:     Palliative Performance Scale (PPS):  PPS: 60       PSYCHOSOCIAL/SPIRITUAL SCREENING:     Advance Care Planning:  Advance Care Planning 1/20/2017   Patient's Healthcare Decision Maker is: Legal Next archana Velez 68   Primary Decision Maker Name [address] [place] [address]   Primary Decision Maker Phone Number [phone]   Primary Decision Maker Relationship to Patient Adult child   Confirm Advance Directive -        Any spiritual / Worship concerns:  [] Yes /  [x] No    Caregiver Burnout:  [] Yes /  [] No /  [x] No Caregiver Present      Anticipatory grief assessment:   [] Normal  / [] Maladaptive       ESAS Anxiety:      ESAS Depression:        not assessed due to patient factors   REVIEW OF SYSTEMS:     Positive and pertinent negative findings in ROS are noted above in HPI. The following systems were [] reviewed / [] unable to be reviewed as noted in HPI  Other findings are noted below. Systems: constitutional, ears/nose/mouth/throat, respiratory, gastrointestinal, genitourinary, musculoskeletal, integumentary, neurologic, psychiatric, endocrine. Positive findings noted below.   Modified ESAS Completed by: provider           Pain: 0           Dyspnea: 0     Constipation: No     Stool Occurrence(s): 1        PHYSICAL EXAM:     From RN flowsheet:  Wt Readings from Last 3 Encounters:   01/15/17 150 lb (68 kg) 12/07/16 149 lb 12.8 oz (67.9 kg)   11/22/16 149 lb 6.4 oz (67.8 kg)     Blood pressure 135/82, pulse 97, temperature 98.1 °F (36.7 °C), resp. rate 22, weight 150 lb (68 kg), SpO2 97 %. Pain Scale 1: Numeric (0 - 10)  Pain Intensity 1: 3  Pain Onset 1: chronic  Pain Location 1: Chest, Shoulder  Pain Orientation 1: Anterior, Left  Pain Description 1: Aching, Constant  Pain Intervention(s) 1: Medication (see MAR)  Last bowel movement, if known:     Constitutional: sleepy, drowsy when aroused.   Eyes: pupils equal, anicteric  ENMT: no nasal discharge, moist mucous membranes  Cardiovascular: regular rhythm, distal pulses intact  Respiratory: breathing not labored, symmetric  Gastrointestinal: soft non-tender, +bowel sounds  Musculoskeletal: no deformity, no tenderness to palpation  Skin: warm, dry  Neurologic:na     HISTORY:     Principal Problem:    Sepsis ([address] (1/15/2017)    Active Problems:    Essential hypertension, benign (11/29/2011)      Bipolar 1 disorder ([address] (11/29/2011)      Dysphagia (12/30/2014)      History of laryngeal cancer (11/2/2015)      History of radiation to head and neck region (3/30/2016)      Acute respiratory failure with hypoxia ([address] (1/15/2017)      Fibromyalgia (1/15/2017)      Chronic pain (1/15/2017)      History of tobacco use (1/15/2017)      Acute renal injury ([address] (1/15/2017)      Shock ([address] (1/15/2017)      Aspiration pneumonia of both upper lobes ([address] (1/20/2017)      Past Medical History   Diagnosis Date    Bipolar 1 disorder ([address] 11/29/2011    Cancer ([address]      skin cancer buttocks    Cancer ([address]      throat    Chronic pain      FIBROMYALGIA, LEGS    Encounter for long-term (current) use of other medications 11/29/2011    Essential hypertension, benign 11/29/2011    Laryngeal cancer ([address] 11/2/2015    Laryngeal mass     Psychiatric disorder      bipolar      Past Surgical History   Procedure Laterality Date    Place percut gastrostomy tube  10/28/2014 has been removed 2015    Hx heent       BX OF NECK MASS    Hx gi       PLACEMENT OF G TUBE    Hx gi       ESOPH. DILATATION    Hx breast augmentation Bilateral      SALINE IMPLANTS    Hx gyn       tubal pregnancy    Hx hysterectomy        Family History   Problem Relation Age of Onset    Cancer Father      bone/skin/lung    Anesth Problems Neg Hx       History reviewed, no pertinent family history.   Social History   Substance Use Topics    Smoking status: Former Smoker     Packs/day: 1.00     Years: 40.00     Quit date: 8/27/2014    Smokeless tobacco: Never Used      Comment: PASSIVE SMOKE EXPOSURE,  SMOKES    Alcohol use No     No Known Allergies   Current Facility-Administered Medications   Medication Dose Route Frequency    HYDROcodone-acetaminophen (NORCO)  mg tablet 1 Tab  1 Tab Oral Q4H PRN    gabapentin (NEURONTIN) capsule 300 mg  300 mg Oral TID    amLODIPine (NORVASC) tablet 10 mg  10 mg Oral DAILY    lithium carbonate SR (LITHOBID) tablet 300 mg  300 mg Oral BID    LORazepam (ATIVAN) tablet 0.5 mg  0.5 mg Oral BID and QHS    ceFAZolin (ANCEF) 2g in 100 ml 0.9% NS IVPB  2 g IntraVENous Q8H    metoprolol tartrate (LOPRESSOR) tablet 25 mg  25 mg Oral Q12H    0.45% sodium chloride infusion  75 mL/hr IntraVENous CONTINUOUS    mupirocin (BACTROBAN) 2 % ointment   Both Nostrils Q12H    atorvastatin (LIPITOR) tablet 20 mg  20 mg Oral QHS    aspirin delayed-release tablet 81 mg  81 mg Oral DAILY    albuterol-ipratropium (DUO-NEB) 2.5 MG-0.5 MG/3 ML  3 mL Nebulization QID RT    amitriptyline (ELAVIL) tablet 10 mg  10 mg Oral QHS    FLUoxetine (PROzac) capsule 20 mg  20 mg Oral DAILY    lidocaine (XYLOCAINE) 2 % viscous solution 15 mL  15 mL Mouth/Throat Q4H PRN    senna (SENOKOT) tablet 8.6 mg  1 Tab Oral DAILY    benzocaine-menthol (CHLORASEPTIC MAX) lozenge 1 Lozenge  1 Lozenge Oral Q2H PRN    traZODone (DESYREL) tablet 200 mg  200 mg Oral QHS    sodium chloride (NS) flush 5-10 mL  5-10 mL IntraVENous Q8H    sodium chloride (NS) flush 5-10 mL  5-10 mL IntraVENous PRN    acetaminophen (TYLENOL) tablet 500 mg  500 mg Oral Q4H PRN    naloxone (NARCAN) injection 0.4 mg  0.4 mg IntraVENous PRN    bisacodyl (DULCOLAX) tablet 5 mg  5 mg Oral DAILY PRN    enoxaparin (LOVENOX) injection 40 mg  40 mg SubCUTAneous Q24H          LAB AND IMAGING FINDINGS:     Lab Results   Component Value Date/Time    WBC 12.5 01/20/2017 04:24 AM    HGB 10.1 01/20/2017 04:24 AM    PLATELET 546 43/12/0688 04:24 AM     Lab Results   Component Value Date/Time    Sodium 144 01/18/2017 04:03 AM    Potassium 3.5 01/18/2017 04:03 AM    Chloride 107 01/18/2017 04:03 AM    CO2 31 01/18/2017 04:03 AM    BUN 14 01/18/2017 04:03 AM    Creatinine 0.79 01/18/2017 04:03 AM    Calcium 8.9 01/18/2017 04:03 AM    Magnesium 2.2 01/15/2017 04:25 PM    Phosphorus 3.3 12/30/2014 12:07 PM      Lab Results   Component Value Date/Time    AST 22 01/15/2017 11:13 AM    Alk. phosphatase 67 01/15/2017 11:13 AM    Protein, total 7.6 01/15/2017 11:13 AM    Albumin 3.3 01/15/2017 11:13 AM    Globulin 4.3 01/15/2017 11:13 AM     No results found for: INR, PTMR, PTP, PT1, PT2, APTT   No results found for: IRON, FE, TIBC, IBCT, PSAT, FERR   No results found for: PH, PCO2, PO2  No components found for: [place]   Lab Results   Component Value Date/Time    CK 61 12/30/2014 12:07 PM    CK - MB 5.3 01/15/2017 11:13 AM                Total time: 30 mins   Counseling / coordination time: 20 mins   > 50% counseling / coordination?: yes    Prolonged service was provided for  []30 min   []75 min in face to face time in the presence of the patient. Time Start:   Time End:   Note: this can only be billed with 31409 (initial) or 55208 (follow up). If multiple start / stop times, list each separately.

## 2017-01-20 NOTE — PROGRESS NOTES
5: Called PCU to receive report.    0702: TRANSFER - IN REPORT:    Verbal report received from [facility]) on [employer]  being received from PCU (unit) for routine progression of care      Report consisted of patients Situation, Background, Assessment and   Recommendations(SBAR). Information from the following report(s) SBAR was reviewed with the receiving nurse. Opportunity for questions and clarification was provided. Assessment completed upon patients arrival to unit and care assumed. Patient has not arrived to the floor.

## 2017-01-20 NOTE — PROGRESS NOTES
Hematology Oncology Progress Note    Follow up for: h/o Head and Neck ca, questionable areas on chest CT    Chart notes reviewed since last visit. Case discussed with following:     Patient complains of the following: weak, some PENALOZA    Additional concerns noted by the staff:     Patient Vitals for the past 24 hrs:   BP Temp Pulse Resp SpO2   01/20/17 0830 135/82 98.1 °F (36.7 °C) 97 22 97 %   01/20/17 0746 - - - - 94 %   01/20/17 0653 (!) 142/94 98.3 °F (36.8 °C) 91 20 91 %   01/20/17 0242 134/69 98.4 °F (36.9 °C) 95 22 90 %   01/19/17 2052 - - - - 93 %   01/19/17 2047 (!) 161/91 97.6 °F (36.4 °C) (!) 110 22 94 %   01/19/17 1551 - - - - 97 %   01/19/17 1445 130/77 97.7 °F (36.5 °C) 85 18 98 %   01/19/17 1419 132/80 - 88 - -   01/19/17 1159 (!) 150/94 97.5 °F (36.4 °C) 89 18 98 %   01/19/17 1113 - - - - 97 %       Review of Systems:  12 point ROS done and negative except as above    Physical Examination:  Gen NAD  CV reg  Lungs coarse  abd benign    Labs:  Recent Results (from the past 24 hour(s))   CBC WITH AUTOMATED DIFF    Collection Time: 01/20/17  4:24 AM   Result Value Ref Range    WBC 12.5 (H) 3.6 - 11.0 K/uL    RBC 3.72 (L) 3.80 - 5.20 M/uL    HGB 10.1 (L) 11.5 - 16.0 g/dL    HCT 32.8 (L) 35.0 - 47.0 %    MCV 88.2 80.0 - 99.0 FL    MCH 27.2 26.0 - 34.0 PG    MCHC 30.8 30.0 - 36.5 g/dL    RDW 15.5 (H) 11.5 - 14.5 %    PLATELET 040 805 - 296 K/uL    NEUTROPHILS 69 %    BANDS 1 %    LYMPHOCYTES 12 %    MONOCYTES 9 %    EOSINOPHILS 6 %    BASOPHILS 0 %    MYELOCYTES 3 %    ABS. NEUTROPHILS 8.8 K/UL    ABS. LYMPHOCYTES 1.5 K/UL    ABS. MONOCYTES 1.1 K/UL    ABS. EOSINOPHILS 0.8 K/UL    ABS. BASOPHILS 0.0 K/UL    RBC COMMENTS NORMOCYTIC, NORMOCHROMIC      WBC COMMENTS TOXIC GRANULATION      DF MANUAL       Assessment and Plan:   H/o head and Neck ca  -has been no sign of recurrence   -f/u with Dr. Mayte Brunner    Pneumonia/?  Areas on CT  -agree with Dr. Aaron Hernandez that this could be due to pneumonia  -he recommends f/u CT in a few months to access interval change    Will sign off. Please call with any questions.

## 2017-01-20 NOTE — PROGRESS NOTES
Discussed with Dr. Gabriele Montez. Will cancel PET scan. Will need an outpt CT of chest in 3 months for interval changes. Had a multilobar Pneumonia which would confuse results on PET scan imaging.

## 2017-01-20 NOTE — PROGRESS NOTES
Nutrition Services      Nutrition Screen:  Wt Readings from Last 10 Encounters:   01/15/17 68 kg (150 lb)   12/07/16 67.9 kg (149 lb 12.8 oz)   11/22/16 67.8 kg (149 lb 6.4 oz)   10/07/16 65.9 kg (145 lb 3.2 oz)   09/07/16 64.5 kg (142 lb 3.2 oz)   09/04/16 64.2 kg (141 lb 8.6 oz)   08/24/16 64.9 kg (143 lb)   08/15/16 65.9 kg (145 lb 3.2 oz)   07/05/16 66.9 kg (147 lb 6.4 oz)   06/16/16 65.3 kg (144 lb)     Body mass index is 22.15 kg/(m^2). Supplements:                        _____ ordered ______  declined. __ __  Pt is nutritionally stable at this time, will rescreen in 7 days. __ __    Pt is at nutritional risk and will be rescreened in  days. _x __  Pt is at moderate or high nutritional risk, will refer to RD for assessment.        Lynne Geiger  Dietetic Technician, Registered

## 2017-01-20 NOTE — PROGRESS NOTES
Problem: Mobility Impaired (Adult and Pediatric)  Goal: *Acute Goals and Plan of Care (Insert Text)  Physical Therapy Goals  Initiated 1/16/2017  1. Patient will move from supine to sit and sit to supine , scoot up and down and roll side to side in bed with independence within 7 day(s). 2. Patient will transfer from bed to chair and chair to bed with independence using the least restrictive device within 7 day(s). 3. Patient will perform sit to stand with independence within 7 day(s). 4. Patient will ambulate with independence for 250 feet with the least restrictive device within 7 day(s). 5. Patient will ascend/descend 5 stairs with 1 handrail(s) with independence within 7 day(s). PHYSICAL THERAPY TREATMENT  Patient: Maciel Fermin (4 y.o. female)  Date: 1/20/2017  Diagnosis: Acute respiratory failure with hypoxia (HCC)  Shock ([address] Sepsis ([facility]       Precautions:        ASSESSMENT:  Received patient supine in bed after finishing breathing treatment. Patient is supervision with bed mobility and SBA with transfers and ambulation. Ambulates without AD 200ft without any loss of balance and asks that PT \"doesn't hold onto the belt\" while she ambulates because it \"adds weight for me to pull\". Patient was on room air during ambulation with O2 decreasing to 90%. Patient was impulsive at times with mobility but generally safe. Recommend at d/c HHPT for continued rehab needs. Will continue to follow for progression of mobility. Progression toward goals:  [X]    Improving appropriately and progressing toward goals  [ ]    Improving slowly and progressing toward goals  [ ]    Not making progress toward goals and plan of care will be adjusted       PLAN:  Patient continues to benefit from skilled intervention to address the above impairments. Continue treatment per established plan of care.   Discharge Recommendations:  Home Health  Further Equipment Recommendations for Discharge:  TBD       SUBJECTIVE: Patient stated Ly Gaviria you please not hold onto the belt while I'm walking.       OBJECTIVE DATA SUMMARY:   Critical Behavior:  Neurologic State: Alert, Restless  Orientation Level: Oriented X4  Cognition: Follows commands     Functional Mobility Training:  Bed Mobility:  Rolling: Supervision  Supine to Sit: Supervision  Sit to Supine: Supervision  Scooting: Supervision        Transfers:  Sit to Stand: Stand-by asssistance  Stand to Sit: Stand-by asssistance              Balance:  Sitting: Intact  Standing: Intact  Standing - Static: Good  Standing - Dynamic : Fair  Ambulation/Gait Training:  Distance (ft): 200 Feet (ft)  Assistive Device: Gait belt  Ambulation - Level of Assistance: Stand-by asssistance;Assist x1        Gait Abnormalities: Decreased step clearance        Base of Support: Narrowed     Speed/[place]: Pace decreased (<100 feet/min)  Step Length: Left shortened;Right shortened           Pain:  Pain Scale 1: Numeric (0 - 10)  Pain Intensity 1: 3  Pain Location 1: Chest;Shoulder  Pain Orientation 1: Anterior; Left  Pain Description 1: Aching;Constant  Pain Intervention(s) 1: Medication (see MAR)  Activity Tolerance:   VSS- SpO2 on room air was 95% sitting and during ambulation 90%  Please refer to the flowsheet for vital signs taken during this treatment. After treatment:   [ ]    Patient left in no apparent distress sitting up in chair  [X]    Patient left in no apparent distress in bed  [X]    Call bell left within reach  [X]    Nursing notified  [ ]    Caregiver present  [X]    Bed alarm activated      COMMUNICATION/COLLABORATION:   The patients plan of care was discussed with: Physical Therapist and Registered Nurse     Regarding student involvement in patient care:  A student participated in this treatment session. Per CMS Medicare statements and APTA guidelines I certify that the following was true:  1. I was present and directly observed the entire session.   2. I made all skilled judgments and clinical decisions regarding care. 3. I am the practitioner responsible for assessment, treatment, and documentation. Dameon Hastings, [facility]   Time Calculation: 12 mins

## 2017-01-21 NOTE — PROGRESS NOTES
S: Ms. Dontrell Luu is a patient of Dr. Wilfredo Billings, and was seen by me today during rounds. At this time, she is ambulating in room. The patient is highly fixated on the number of narcotic medications she receives. \"I used to take 16 pills a day, then they put me down to 2 and you can imagine how terrible that was. I'm only on half my usual dose and I need more. \"  Pain rated at 8/10, hakeem along R jaw and face, and right neck, right chest.  ROS otherwise unremarkable except as noted elsewhere. O: Blood pressure 119/69, pulse 91, temperature 97.8 °F (36.6 °C), resp. rate 20, weight 150 lb (68 kg), SpO2 91 %. Gen: Patient is in no acute distress. Lungs: CTAB. Heart: RRR. Abd: S, NT, ND, BS present. Extremities: Warm. Recent Results (from the past 12 hour(s))   CBC WITH AUTOMATED DIFF    Collection Time: 01/21/17  1:00 AM   Result Value Ref Range    WBC 10.7 3.6 - 11.0 K/uL    RBC 3.75 (L) 3.80 - 5.20 M/uL    HGB 10.1 (L) 11.5 - 16.0 g/dL    HCT 33.2 (L) 35.0 - 47.0 %    MCV 88.5 80.0 - 99.0 FL    MCH 26.9 26.0 - 34.0 PG    MCHC 30.4 30.0 - 36.5 g/dL    RDW 15.6 (H) 11.5 - 14.5 %    PLATELET 348 658 - 986 K/uL    NEUTROPHILS 65 32 - 75 %    LYMPHOCYTES 18 12 - 49 %    MONOCYTES 4 (L) 5 - 13 %    EOSINOPHILS 11 (H) 0 - 7 %    BASOPHILS 0 0 - 1 %    MYELOCYTES 2 (H) 0 %    ABS. NEUTROPHILS 7.0 1.8 - 8.0 K/UL    ABS. LYMPHOCYTES 1.9 0.8 - 3.5 K/UL    ABS. MONOCYTES 0.4 0.0 - 1.0 K/UL    ABS. EOSINOPHILS 1.2 (H) 0.0 - 0.4 K/UL    ABS. BASOPHILS 0.0 0.0 - 0.1 K/UL    DF MANUAL      RBC COMMENTS ANISOCYTOSIS  1+        WBC COMMENTS TOXIC GRANULATION          A / P:  1. Acute respiratory failure with hypoxia: Better; Now satting >90% on RA. Pulmonology following. 2. Gram positive Bacteremia has MSSA   3. Severe bilateral pneumonia, probably secondary to aspiration. Suspicious CT; Per Pulmonary: Will need to follow up with CT in next 2-3 months.  Question of malignancy may be helpful to get PET scan once treated and recovered from pneumonia. Will get a repeat CT of chest in 3 months as an oupt. 4. Dysphagia:   5. Sepsis ([address]): Improved. 6. History of laryngeal cancer (11/2/2015): s/p resection by Dr. Potts ; History of radiation to head and neck region (3/30/2016)  7. Chronic pain (1/15/2017) due to prior surgeries and fibromyalgia: She is very insistent / obsessed on obtaining more narcotics. I'll review regimen. 8. Hypertension, benign   9. Bipolar disorder Lithium level is therapeutic. 10. Fibromyalgia (1/15/2017)  11. History of tobacco use (1/15/2017)  12. Acute renal injury ([address] (1/15/2017): Improved.

## 2017-01-21 NOTE — PROGRESS NOTES
Bedside and Verbal shift change report given to Parker Woods RN (oncoming nurse) by  Nava Knight RN (offgoing nurse). Report included the following information SBAR, Kardex, Procedure Summary, Intake/Output, MAR, Accordion and Recent Results. Zone Phone for oncoming shift:   [phone]    Shift Summary:     LDAs           Triple Lumen triple lumen central line 01/15/17 Right Neck (Active)   Central Line Being Utilized Yes 1/20/2017  3:00 PM   Criteria for Appropriate Use Limited/no vessel suitable for conventional peripheral access 1/20/2017  3:00 PM   Site Assessment Clean, dry, & intact 1/20/2017  3:00 PM   Infiltration Assessment 0 1/20/2017  3:00 PM   Affected Extremity/Extremities Color distal to insertion site pink (or appropriate for race) 1/20/2017  3:00 PM   Date of Last Dressing Change 01/17/17 1/20/2017  3:00 PM   Dressing Status Clean, dry, & intact 1/20/2017  3:00 PM   Dressing Type Transparent;Disk with Chlorhexadine Gluconate (CHG) 1/20/2017  3:00 PM   Action Taken Open ports on tubing capped 1/20/2017  3:00 PM   Proximal Hub Color/Line Status White;Flushed;Cap end changed 1/20/2017  3:00 PM   Positive Blood Return (Medial Site) Yes 1/20/2017  3:00 PM   Medial Hub Color/Line Status Blue;Flushed;Cap end changed; Infusing 1/20/2017  3:00 PM   Positive Blood Return (Lateral Site) Yes 1/20/2017  3:00 PM   Distal Hub Color/Line Status Brown;Flushed;Cap end changed 1/20/2017  3:00 PM   Positive Blood Return (Site #3) Yes 1/20/2017  3:00 PM   Alcohol Cap Used Yes 1/20/2017  3:00 PM        Peripheral IV 05/13/15 Left Hand (Active)       Peripheral IV 05/13/15 Right Forearm (Active)          Damir-Russell Drain 05/13/15 Right; Anterior Neck (Active)       PEG/Gastrostomy Tube 10/28/14 Mid Abdomen (Active)       PEG/Gastrostomy Tube Abdomen (Active)       J-Tube (Active)                  Intake & Output   Date 01/19/17 1900 - 01/20/17 0659 01/20/17 0700 - 01/21/17 0659   Shift 8285-2958 24 Hour Total 5499-5065 4885-5564 24 Hour Total   I  N  T  A  K  E   P. O.  518         P. O.  518       Shift Total  (mL/kg)  518  (7.6)      O  U  T  P  U  T   Urine  (mL/kg/hr)           Urine Occurrence(s) 2 x 2 x       Shift Total  (mL/kg)        NET  518      Weight (kg) 68 68 68 68 68      Last Bowel Movement Last Bowel Movement Date: 01/20/17   Glucose Checks [x] N/A  [] AC/HS  [] Q6  Concerns:   Nutrition Active Orders   Diet    DIET CARDIAC Pureed       Consults []PT  []OT  []Speech  [x]Case Management   Cardiac Monitoring [x]N/A []Yes Expires:

## 2017-01-21 NOTE — PROGRESS NOTES
Bedside shift change report given to [facility][1] (oncoming nurse) by Chanda Maradiaga (offgoing nurse). Report included the following information SBAR, Kardex, Intake/Output, MAR and Recent Results. Zone Phone for oncoming shift:   [phone]    Shift Summary: Pt rested quietly through night. Still issues with pain in shoulder and lung area. Some relief from medication. LDAs           Triple Lumen triple lumen central line 01/15/17 Right Neck (Active)   Central Line Being Utilized Yes 1/21/2017  3:02 AM   Criteria for Appropriate Use Limited/no vessel suitable for conventional peripheral access 1/21/2017  3:02 AM   Site Assessment Clean, dry, & intact 1/21/2017  3:02 AM   Infiltration Assessment 0 1/21/2017  3:02 AM   Affected Extremity/Extremities Color distal to insertion site pink (or appropriate for race) 1/21/2017  3:02 AM   Date of Last Dressing Change 01/17/17 1/21/2017  3:02 AM   Dressing Status Clean, dry, & intact 1/21/2017  3:02 AM   Dressing Type Disk with Chlorhexadine Gluconate (CHG); Transparent 1/21/2017  3:02 AM   Action Taken Blood drawn 1/21/2017  3:02 AM   Proximal Hub Color/Line Status White;Flushed 1/21/2017  3:02 AM   Positive Blood Return (Medial Site) Yes 1/21/2017  3:02 AM   Medial Hub Color/Line Status Blue;Flushed 1/21/2017  3:02 AM   Positive Blood Return (Lateral Site) Yes 1/21/2017  3:02 AM   Distal Hub Color/Line Status Brown;Flushed 1/21/2017  3:02 AM   Positive Blood Return (Site #3) Yes 1/21/2017  3:02 AM   Alcohol Cap Used Yes 1/21/2017  3:02 AM        Peripheral IV 05/13/15 Left Hand (Active)       Peripheral IV 05/13/15 Right Forearm (Active)          Damir-Russell Drain 05/13/15 Right; Anterior Neck (Active)       PEG/Gastrostomy Tube 10/28/14 Mid Abdomen (Active)       PEG/Gastrostomy Tube Abdomen (Active)       J-Tube (Active)                  Intake & Output   Date 01/20/17 0700 - 01/21/17 0659 01/21/17 0700 - 01/22/17 0659   Shift 1335-8456 3896-8779 24 Hour Total 9308-4606 3859-9548 24 Hour Total   I  N  T  A  K  E   P.O.  480 480         P. O.  480 480       I.V.  (mL/kg/hr)  900 900         I.V.  900 900       Shift Total  (mL/kg)  1380  (20.3) 1380  (20.3)      O  U  T  P  U  T   Urine  (mL/kg/hr)            Urine Occurrence(s)  3 x 3 x       Shift Total  (mL/kg)         NET  1380 1380      Weight (kg) 68 68 68 68 68 68      Last Bowel Movement Last Bowel Movement Date: 01/20/17   Glucose Checks [x] N/A  [] AC/HS  [] Q6  Concerns:   Nutrition Active Orders   Diet    DIET CARDIAC Pureed       Consults [x]PT  [x]OT  []Speech  [x]Case Management   Cardiac Monitoring [x]N/A []Yes Expires:

## 2017-01-22 NOTE — PROGRESS NOTES
S: Ms. Dontrell Luu is a patient of Dr. Wilfredo Billings, and was seen by me today during rounds. At this time, she is lying in bed, asleep initially but rouses easily. She says she has some SOB when the oxygen is off. The patient remains very concerned about her pain and getting more narcotics. Pain is hakeem intense in R jaw and face, and right neck, right chest.  ROS otherwise unremarkable except as noted elsewhere. O: Blood pressure 129/62, pulse 91, temperature 97.9 °F (36.6 °C), resp. rate 20, weight 150 lb (68 kg), SpO2 92 %. Gen: Patient is in no acute distress. Lungs: CTAB. Heart: RRR. Abd: S, NT, ND, BS present. Extremities: Warm. No results found for this or any previous visit (from the past 12 hour(s)). A / P:  1. Acute respiratory failure with hypoxia: Better; Now satting >90% on RA. Pulmonology following. 2. Gram positive Bacteremia has MSSA   3. Severe bilateral pneumonia, probably secondary to aspiration. Suspicious CT; Per Pulmonary: Will need to follow up with CT in next 2-3 months. Question of malignancy may be helpful to get PET scan once treated and recovered from pneumonia. Will get a repeat CT of chest in 3 months as an oupt. 4. Dysphagia:   5. Sepsis ([address]): Improved. 6. History of laryngeal cancer (11/2/2015): s/p resection by Dr. Diego Pratt; History of radiation to head and neck region (3/30/2016)  7. Chronic pain (1/15/2017) due to prior surgeries and fibromyalgia: She is very insistent / obsessed on obtaining more narcotics. I'll review regimen. 8. Hypertension, benign   9. Bipolar disorder, on lithium   10. Fibromyalgia (1/15/2017)  11. History of tobacco use (1/15/2017)  12. Acute renal injury ([address] (1/15/2017): Improved.

## 2017-01-22 NOTE — PROGRESS NOTES
Bedside and Verbal shift change report given to [place] (oncoming nurse) by Sachi Dumont RN (offgoing nurse). Report included the following information SBAR, Kardex, Intake/Output, MAR and Med Rec Status. Zone Phone for oncoming shift:   [phone]    Shift Summary: Pain med given  prn    LDAs           Triple Lumen triple lumen central line 01/15/17 Right Neck (Active)   Central Line Being Utilized Yes 1/21/2017  3:23 PM   Criteria for Appropriate Use Limited/no vessel suitable for conventional peripheral access 1/21/2017  3:23 PM   Site Assessment Clean, dry, & intact 1/21/2017  3:23 PM   Infiltration Assessment 0 1/21/2017  3:23 PM   Affected Extremity/Extremities Color distal to insertion site pink (or appropriate for race) 1/21/2017  3:23 PM   Date of Last Dressing Change 01/15/17 1/21/2017  3:23 PM   Dressing Status Clean, dry, & intact 1/21/2017  3:23 PM   Dressing Type Disk with Chlorhexadine Gluconate (CHG); Transparent 1/21/2017  3:23 PM   Action Taken Blood drawn 1/21/2017  3:23 PM   Proximal Hub Color/Line Status White;Capped 1/21/2017  3:23 PM   Positive Blood Return (Medial Site) Yes 1/21/2017  3:23 PM   Medial Hub Color/Line Status Blue;Flushed 1/21/2017  3:23 PM   Positive Blood Return (Lateral Site) Yes 1/21/2017  3:23 PM   Distal Hub Color/Line Status Brown;Flushed 1/21/2017  3:23 PM   Positive Blood Return (Site #3) Yes 1/21/2017  3:23 PM   Alcohol Cap Used Yes 1/21/2017  3:23 PM        Peripheral IV 05/13/15 Left Hand (Active)       Peripheral IV 05/13/15 Right Forearm (Active)          Damir-Russell Drain 05/13/15 Right; Anterior Neck (Active)       PEG/Gastrostomy Tube 10/28/14 Mid Abdomen (Active)       PEG/Gastrostomy Tube Abdomen (Active)       J-Tube (Active)                  Intake & Output   Date 01/21/17 0700 - 01/22/17 0659 01/22/17 0700 - 01/23/17 0659   Shift 5202-0594 6294-6630 24 Hour Total 7486-3927 7109-2817 24 Hour Total   I  N  T  A  K  E   Shift Total  (mL/kg) O  U  T  P  U  T   Urine  (mL/kg/hr) 350  (0.4)  350         Urine Voided 350  350         Urine Occurrence(s) 1 x  1 x       Shift Total  (mL/kg) 350  (5.1)  350  (5.1)      NET -350  -350      Weight (kg) 68 68 68 68 68 68      Last Bowel Movement Last Bowel Movement Date: 01/20/17   Glucose Checks [x] N/A  [] AC/HS  [] Q6  Concerns:   Nutrition Active Orders   Diet    DIET CARDIAC Pureed       Consults []PT  []OT  []Speech  []Case Management   Cardiac Monitoring [x]N/A []Yes Expires:

## 2017-01-22 NOTE — PROGRESS NOTES
Bedside and Verbal shift change report given to Sister RN (oncoming nurse) by Shantal Schwartz RN (offgoing nurse). Report included the following information SBAR, Kardex, Intake/Output, MAR and Recent Results. Zone Phone for oncoming shift:   [phone]    Shift Summary: none    LDAs           Triple Lumen triple lumen central line 01/15/17 Right Neck (Active)   Central Line Being Utilized Yes 1/22/2017  2:19 PM   Criteria for Appropriate Use Limited/no vessel suitable for conventional peripheral access 1/22/2017  2:19 PM   Site Assessment Clean, dry, & intact 1/22/2017  2:19 PM   Infiltration Assessment 0 1/22/2017  2:19 PM   Affected Extremity/Extremities Color distal to insertion site pink (or appropriate for race) 1/22/2017  2:19 PM   Date of Last Dressing Change 01/15/17 1/22/2017  2:19 PM   Dressing Status Clean, dry, & intact 1/22/2017  2:19 PM   Dressing Type Disk with Chlorhexadine Gluconate (CHG); Transparent 1/22/2017  2:19 PM   Action Taken Blood drawn 1/22/2017  2:19 PM   Proximal Hub Color/Line Status White;Capped 1/22/2017  2:19 PM   Positive Blood Return (Medial Site) Yes 1/22/2017  2:19 PM   Medial Hub Color/Line Status Blue;Capped 1/22/2017  2:19 PM   Positive Blood Return (Lateral Site) Yes 1/22/2017  2:19 PM   Distal Hub Color/Line Status Brown;Capped 1/22/2017  2:19 PM   Positive Blood Return (Site #3) Yes 1/22/2017  2:19 PM   External Catheter Length (cm) 0 centimeters 1/22/2017  2:19 PM   Alcohol Cap Used Yes 1/22/2017  2:19 PM        Peripheral IV 05/13/15 Left Hand (Active)       Peripheral IV 05/13/15 Right Forearm (Active)          Damir-Russell Drain 05/13/15 Right; Anterior Neck (Active)       PEG/Gastrostomy Tube 10/28/14 Mid Abdomen (Active)       PEG/Gastrostomy Tube Abdomen (Active)       J-Tube (Active)                  Intake & Output   Date 01/21/17 0700 - 01/22/17 0659 01/22/17 0700 - 01/23/17 0659   Shift 2449-9934 6251-5684 24 Hour Total 6679-6586 4439-0926 24 Hour Total   I  KARI  T  CHARISSA  K  E I.V.  (mL/kg/hr)  5422.5  (6.6) 5422.5  (3.3)         Volume (0.45% sodium chloride infusion)  5422.5 5422.5       Shift Total  (mL/kg)  5422.5  (79.7) 5422.5  (79.7)      O  U  T  P  U  T   Urine  (mL/kg/hr) 350  (0.4)  350  (0.2)         Urine Voided 350  350         Urine Occurrence(s) 1 x 4 x 5 x       Shift Total  (mL/kg) 350  (5.1)  350  (5.1)      NET -350 5422.5 5072.5      Weight (kg) 68 68 68 68 68 68      Last Bowel Movement Last Bowel Movement Date: 01/20/17   Glucose Checks [x] N/A  [] AC/HS  [] Q6  Concerns:   Nutrition Active Orders   Diet    DIET CARDIAC Pureed       Consults []PT  []OT  []Speech  []Case Management   Cardiac Monitoring [x]N/A []Yes Expires:

## 2017-01-22 NOTE — PROGRESS NOTES
Bedside and Verbal shift change report given to Sister RN (oncoming nurse) by Ninoska Collado RN (offgoing nurse). Report included the following information SBAR, Kardex, Intake/Output, MAR and Recent Results. Zone Phone for oncoming shift:   [phone]    Shift Summary: none    LDAs           Triple Lumen triple lumen central line 01/15/17 Right Neck (Active)   Central Line Being Utilized Yes 1/21/2017  3:23 PM   Criteria for Appropriate Use Limited/no vessel suitable for conventional peripheral access 1/21/2017  3:23 PM   Site Assessment Clean, dry, & intact 1/21/2017  3:23 PM   Infiltration Assessment 0 1/21/2017  3:23 PM   Affected Extremity/Extremities Color distal to insertion site pink (or appropriate for race) 1/21/2017  3:23 PM   Date of Last Dressing Change 01/15/17 1/21/2017  3:23 PM   Dressing Status Clean, dry, & intact 1/21/2017  3:23 PM   Dressing Type Disk with Chlorhexadine Gluconate (CHG); Transparent 1/21/2017  3:23 PM   Action Taken Blood drawn 1/21/2017  3:23 PM   Proximal Hub Color/Line Status White;Capped 1/21/2017  3:23 PM   Positive Blood Return (Medial Site) Yes 1/21/2017  3:23 PM   Medial Hub Color/Line Status Blue;Flushed 1/21/2017  3:23 PM   Positive Blood Return (Lateral Site) Yes 1/21/2017  3:23 PM   Distal Hub Color/Line Status Brown;Flushed 1/21/2017  3:23 PM   Positive Blood Return (Site #3) Yes 1/21/2017  3:23 PM   Alcohol Cap Used Yes 1/21/2017  3:23 PM        Peripheral IV 05/13/15 Left Hand (Active)       Peripheral IV 05/13/15 Right Forearm (Active)          Damir-Russell Drain 05/13/15 Right; Anterior Neck (Active)       PEG/Gastrostomy Tube 10/28/14 Mid Abdomen (Active)       PEG/Gastrostomy Tube Abdomen (Active)       J-Tube (Active)                  Intake & Output   Date 01/20/17 1900 - 01/21/17 0659 01/21/17 0700 - 01/22/17 0659   Shift 6537-0629 24 Hour Total 8979-6352 3804-7799 24 Hour Total   I  N  T  A  K  E   P.O. 480 480         P. O. 480 480       I.V.  (mL/kg/hr) 094 172 I.V. 900 900       Shift Total  (mL/kg) 1380  (20.3) 1380  (20.3)      O  U  T  P  U  T   Urine  (mL/kg/hr)   350  (0.4)  350      Urine Voided   350  350      Urine Occurrence(s) 3 x 3 x 1 x  1 x    Shift Total  (mL/kg)   350  (5.1)  350  (5.1)   NET 1380 1380 -350  -350   Weight (kg) 68 68 68 68 68      Last Bowel Movement Last Bowel Movement Date: 01/20/17   Glucose Checks [x] N/A  [] AC/HS  [] Q6  Concerns:   Nutrition Active Orders   Diet    DIET CARDIAC Pureed       Consults [x]PT  [x]OT  []Speech  [x]Case Management   Cardiac Monitoring [x]N/A []Yes Expires:

## 2017-01-23 NOTE — PROGRESS NOTES
Spiritual Care Assessment/Progress Notes    George Shaw [id number]  [id number]    1955  57 y.o.  female    Patient Telephone Number: [phone] (home)   Sabianist Affiliation: Karla Mckeon   Language: English   Extended Emergency Contact Information  Primary Emergency Contact: Vern Phone: [phone]  Mobile Phone: [phone]  Relation: Child  Secondary Emergency Contact: Rick Wall  Address: [place] [address] [address] [place] [address] Deric  Mobile Phone: [phone]  Relation: Spouse   Patient Active Problem List    Diagnosis Date Noted    Aspiration pneumonia of both upper lobes ([address] 01/20/2017    Shortness of breath 01/20/2017    Delirium 01/20/2017    Goals of care, counseling/discussion 01/20/2017    Debility 01/20/2017    Acute respiratory failure with hypoxia ([address] 01/15/2017    Fibromyalgia 01/15/2017     Class: Chronic    Chronic pain 01/15/2017     Class: Chronic    History of tobacco use 01/15/2017     Class: Present on Admission    Sepsis ([address] 01/15/2017     Class: Acute    Acute renal injury ([address] 01/15/2017     Class: Present on Admission    Shock ([address] 01/15/2017    History of radiation to head and neck region 03/30/2016    History of laryngeal cancer 11/02/2015     Class: Present on Admission    Dysphagia 12/30/2014    Essential hypertension, benign 11/29/2011    Bipolar 1 disorder ([address] 11/29/2011    Encounter for long-term (current) use of other medications 11/29/2011        Date: 1/23/2017       Level of Sabianist/Spiritual Activity:  [x]         Involved in yoly tradition/spiritual practice    []         Not involved in yoly tradition/spiritual practice  []         Spiritually oriented    []         Claims no spiritual orientation    []         seeking spiritual identity  []         Feels alienated from Cheondoism practice/tradition  []         Feels angry about Cheondoism practice/tradition  [x]         Spirituality/Gnosticist tradition is a resource for coping at this time. []         Not able to assess due to medical condition    Services Provided Today:  []         crisis intervention    []         reading Scriptures  [x]         spiritual assessment    []         prayer  [x]         empathic listening/emotional support  []         rites and rituals (cite in comments)  []         life review     []         Gnosticist support  [x]         theological development   []         advocacy  []         ethical dialog     []         blessing  []         bereavement support    []         support to family  []         anticipatory grief support   []         help with AMD  []         spiritual guidance    []         meditation      Spiritual Care Needs  [x]         Emotional Support  [x]         Spiritual/Temple Care  []         Loss/Adjustment  []         Advocacy/Referral                /Ethics  []         No needs expressed at               this time  []         Other: (note in               comments)  [address]  []         Follow up visits with               pt/family  []         Provide materials  []         Schedule sacraments  []         Contact Community               Clergy  [x]         Follow up as needed  []         Other: (note in               comments)     Comments: Visited with Ms. Annalise Tlelez, who goes by Nicholas Brower, after pt's case discussed with Palliative MD.  Nicholas Inocente shared about her yoly and some spiritual questions that she is struggling with. Pt shared end of life concerns. Provided listening presence and support, exploring Barby's beliefs and affirming her yoly. Offered some theological reframing around forgiveness issues. Pt has support from community clergy (her daughter's ). Pt expressed appreciation for visit and support. Words of blessing offered as visit came to an end. Pt expects to be discharged tomorrow.     Delphine Arreguin, Palliative

## 2017-01-23 NOTE — PROGRESS NOTES
General Daily Progress Note    Admit Date: 1/15/2017  Hospital day 9    Subjective:     Patient has cough and dyspnea. .   Medication side effects: none    Current Facility-Administered Medications   Medication Dose Route Frequency    gabapentin (NEURONTIN) capsule 600 mg  600 mg Oral TID    albuterol-ipratropium (DUO-NEB) 2.5 MG-0.5 MG/3 ML  3 mL Nebulization Q6H PRN    HYDROcodone-acetaminophen (NORCO)  mg tablet 1 Tab  1 Tab Oral Q4H PRN    amLODIPine (NORVASC) tablet 10 mg  10 mg Oral DAILY    lithium carbonate SR (LITHOBID) tablet 300 mg  300 mg Oral BID    LORazepam (ATIVAN) tablet 0.5 mg  0.5 mg Oral BID and QHS    ceFAZolin (ANCEF) 2g in 100 ml 0.9% NS IVPB  2 g IntraVENous Q8H    metoprolol tartrate (LOPRESSOR) tablet 25 mg  25 mg Oral Q12H    atorvastatin (LIPITOR) tablet 20 mg  20 mg Oral QHS    aspirin delayed-release tablet 81 mg  81 mg Oral DAILY    amitriptyline (ELAVIL) tablet 10 mg  10 mg Oral QHS    FLUoxetine (PROzac) capsule 20 mg  20 mg Oral DAILY    lidocaine (XYLOCAINE) 2 % viscous solution 15 mL  15 mL Mouth/Throat Q4H PRN    senna (SENOKOT) tablet 8.6 mg  1 Tab Oral DAILY    benzocaine-menthol (CHLORASEPTIC MAX) lozenge 1 Lozenge  1 Lozenge Oral Q2H PRN    traZODone (DESYREL) tablet 200 mg  200 mg Oral QHS    sodium chloride (NS) flush 5-10 mL  5-10 mL IntraVENous Q8H    sodium chloride (NS) flush 5-10 mL  5-10 mL IntraVENous PRN    acetaminophen (TYLENOL) tablet 500 mg  500 mg Oral Q4H PRN    naloxone (NARCAN) injection 0.4 mg  0.4 mg IntraVENous PRN    bisacodyl (DULCOLAX) tablet 5 mg  5 mg Oral DAILY PRN    enoxaparin (LOVENOX) injection 40 mg  40 mg SubCUTAneous Q24H        Review of Systems  Constitutional: positive for fatigue and malaise  Respiratory: positive for cough or stridor  Cardiovascular: negative  Gastrointestinal: positive for dysphagia  Behavioral/Psych: positive for anxiety    Objective:     Patient Vitals for the past 8 hrs:   BP Temp Pulse Resp SpO2   01/23/17 0152 - - - - 93 %   01/22/17 2336 105/57 98.7 °F (37.1 °C) 65 18 92 %        01/21 0701 - 01/22 1900  In: 5422.5 [I.V.:5422.5]  Out: 350 [Urine:350]    Physical Exam:   Visit Vitals    /57    Pulse 65    Temp 98.7 °F (37.1 °C)    Resp 18    Wt 150 lb (68 kg)    LMP  (LMP Unknown)    SpO2 93%    BMI 22.15 kg/m2     General appearance: alert, fatigued, cooperative, no distress, appears stated age  Lungs: rhonchi R base, L base  Heart: regular rate and rhythm  Abdomen: soft, non-tender. Bowel sounds normal. No masses,  no organomegaly  Extremities: extremities normal, atraumatic, no cyanosis or edema  Neurologic: Grossly normal           Data Review   Recent Results (from the past 24 hour(s))   METABOLIC PANEL, COMPREHENSIVE    Collection Time: 01/23/17  4:42 AM   Result Value Ref Range    Sodium 140 136 - 145 mmol/L    Potassium 4.7 3.5 - 5.1 mmol/L    Chloride 104 97 - 108 mmol/L    CO2 30 21 - 32 mmol/L    Anion gap 6 5 - 15 mmol/L    Glucose 85 65 - 100 mg/dL    BUN 23 (H) 6 - 20 MG/DL    Creatinine 1.03 (H) 0.55 - 1.02 MG/DL    BUN/Creatinine ratio 22 (H) 12 - 20      GFR est AA >60 >60 ml/min/1.73m2    GFR est non-AA 54 (L) >60 ml/min/1.73m2    Calcium 9.2 8.5 - 10.1 MG/DL    Bilirubin, total 0.2 0.2 - 1.0 MG/DL    ALT 7 (L) 12 - 78 U/L    AST 13 (L) 15 - 37 U/L    Alk.  phosphatase 63 45 - 117 U/L    Protein, total 6.4 6.4 - 8.2 g/dL    Albumin 2.4 (L) 3.5 - 5.0 g/dL    Globulin 4.0 2.0 - 4.0 g/dL    A-G Ratio 0.6 (L) 1.1 - 2.2     CBC WITH AUTOMATED DIFF    Collection Time: 01/23/17  4:42 AM   Result Value Ref Range    WBC 9.7 3.6 - 11.0 K/uL    RBC 3.61 (L) 3.80 - 5.20 M/uL    HGB 10.0 (L) 11.5 - 16.0 g/dL    HCT 32.2 (L) 35.0 - 47.0 %    MCV 89.2 80.0 - 99.0 FL    MCH 27.7 26.0 - 34.0 PG    MCHC 31.1 30.0 - 36.5 g/dL    RDW 15.5 (H) 11.5 - 14.5 %    PLATELET 279 138 - 862 K/uL    NEUTROPHILS 71 32 - 75 %    LYMPHOCYTES 15 12 - 49 %    MONOCYTES 6 5 - 13 %    EOSINOPHILS 8 (H) 0 - 7 %    BASOPHILS 0 0 - 1 %    ABS. NEUTROPHILS 6.8 1.8 - 8.0 K/UL    ABS. LYMPHOCYTES 1.5 0.8 - 3.5 K/UL    ABS. MONOCYTES 0.6 0.0 - 1.0 K/UL    ABS. EOSINOPHILS 0.8 (H) 0.0 - 0.4 K/UL    ABS. BASOPHILS 0.0 0.0 - 0.1 K/UL           Assessment:     Principal Problem:    Sepsis ([address] (1/15/2017)    Active Problems:    Shock ([address] (1/15/2017)      Acute respiratory failure with hypoxia ([address] (1/15/2017)      History of laryngeal cancer (11/2/2015)      History of radiation to head and neck region (3/30/2016)      Chronic pain (1/15/2017)      Essential hypertension, benign (11/29/2011)      Bipolar 1 disorder ([address] (11/29/2011)      Dysphagia (12/30/2014)      Fibromyalgia (1/15/2017)      History of tobacco use (1/15/2017)      Acute renal injury ([address] (1/15/2017)      Aspiration pneumonia of both upper lobes ([address] (1/20/2017)      Shortness of breath (1/20/2017)      Delirium (1/20/2017)      Goals of care, counseling/discussion (1/20/2017)      Debility (1/20/2017)        Plan:     Feeling some better. Swallowing better. Dyspneic without nasal O2. On day 8 of antibiotics for pneumonia/staph bacteremia. Very insistent on pain meds. [facility]  Can assist .Some pain related to prior cancer treatment,but has variety of pain c/o. On gabapentin,amitriptyline,hydrocodone. Will likely need Home O2,Nebulizer and meds. Needs to complete 2 weeks antibiotics,hopefully can be changed to po and go home in am.Await Pulmonary recommendations

## 2017-01-23 NOTE — PROGRESS NOTES
Problem: Dysphagia (Adult)  Goal: *Acute Goals and Plan of Care (Insert Text)  Initiated 1/16/2017  1. Patient will participate in reevaluation of swallow function within 7 days. Pt had MBS 1/18. 2. Pt will tolerate dys 2 diet with thins with no overt s/s of aspiration. 3. Pt will complete pharyngeal strengthening exercises to improve laryngeal elevation/closure and pharyngeal constriction with mod cues. SPEECH LANGUAGE PATHOLOGY DYSPHAGIA TREATMENT  Patient: Gini Lawler (9 y.o. female)  Date: 1/23/2017  Diagnosis: Acute respiratory failure with hypoxia ([facility]  Shock ([facility] Sepsis ([facility] [facility]       Precautions:         ASSESSMENT:  Pt practiced an effortful swallow 20 times. She completed the Shaker to improve laryngeal elevation. She was able to complete with mod cues. She does not recall her swallowing strategies or why she needs them. Recommend home health speech/swallowing exercises. She is challenging to treat due to her mental illness and behavior. Reiterated reason for her strategies. Progression toward goals:  [ ]         Improving appropriately and progressing toward goals  [ ]         Improving slowly and progressing toward goals  [ ]         Not making progress toward goals and plan of care will be adjusted       PLAN:  Recommendations and Planned Interventions:  Swallowing exercises  Patient continues to benefit from skilled intervention to address the above impairments. Continue treatment per established plan of care. Discharge Recommendations:  Home Health       SUBJECTIVE:   Patient stated she wet her pants when she coughed.        OBJECTIVE:   Cognitive and Communication Status:  Neurologic State: Alert, Appropriate for age  Orientation Level: Oriented X4  Cognition: Follows commands           Dysphagia Treatment:  Oral Assessment:                    Exercises:  Laryngeal Exercises:         effortful swallow  Shaker exercise Pain:  Pain Scale 1: Numeric (0 - 10)  Pain Intensity 1: 0  Pain Location 1: Neck; Shoulder  After treatment:   [ ]              Patient left in no apparent distress sitting up in chair  [ ]              Patient left in no apparent distress in bed  [ ]              Call bell left within reach  [ ]              Nursing notified  [ ]              Caregiver present  [ ]              Bed alarm activated      COMMUNICATION/EDUCATION:   Patient was educated regarding her exercises and rationale for swallowing strategies. The patients plan of care including recommendations, planned interventions, and recommended diet changes were discussed with: Registered Nurse. [ ]              Posted safety precautions in patient's room.      JAZ Corona  Time Calculation: 20 mins

## 2017-01-23 NOTE — PROGRESS NOTES
Bedside and Verbal shift change report given to [place] (oncoming nurse) by Tan Martínez RN (offgoing nurse). Report included the following information SBAR, Kardex, Intake/Output, MAR, Recent Results and Med Rec Status.

## 2017-01-23 NOTE — PROGRESS NOTES
Palliative Medicine Psychosocial Assessment  Jose Alberto: [phone]  [facility]: [phone])        Medical/Physical Functioning:  Principal Problem:    Sepsis ([address] (1/15/2017)    Active Problems:    Essential hypertension, benign (11/29/2011)      Bipolar 1 disorder ([address] (11/29/2011)      Dysphagia (12/30/2014)      History of laryngeal cancer (11/2/2015)      History of radiation to head and neck region (3/30/2016)      Acute respiratory failure with hypoxia ([address] (1/15/2017)      Fibromyalgia (1/15/2017)      Chronic pain (1/15/2017)      History of tobacco use (1/15/2017)      Acute renal injury ([address] (1/15/2017)      Shock ([address] (1/15/2017)      Aspiration pneumonia of both upper lobes ([address] (1/20/2017)      Shortness of breath (1/20/2017)      Delirium (1/20/2017)      Goals of care, counseling/discussion (1/20/2017)      Debility (1/20/2017)        Advance Care Planning:  Advance Care Planning 1/20/2017   Patient's Healthcare Decision Maker is: Legal Next of Agustin 68   Primary Decision Maker Name Jose Skinner,    Primary Decision Maker Phone Number [phone]   Primary Decision Maker Relationship to Patient Adult child   Secondary Decision Maker Name Joe Smalls, daughter and another daughter Priscilla Scott Phone Number [phone] or [phone]   Secondary Decision Maker Relationship to Patient Adult child   Confirm Advance Directive None   Patient Would Like to Complete Advance Directive No      Relationship Status:  []Single  [x]  []Significant Other/Life Partner  []  []  []      Living Circumstances:  []Lives Alone  [x]Family/Significant Other in Household  []Roommates  []Children in the Home  []Paid Caregivers  []Assisted Living Facility/Group [address]  []Homeless  []Incarcerated  []Environmental/Care Concerns  []Other:lives with her ; he works full-time. Their dtr, Alejandra Leaks, lives 3 house down from them. Support System:  Pt has strong formal support system: Insurance CM visits weekly for last year and also weekly rn visit recently. Pt has outpatient psychiatrist helping with her manage mental health dx. Pt draws strength from her yoly in [place], her daughters (Lisa Berg and Bogdan Luis Enrique), in-laws, etc.  [x]Strong  []Fair  []Limited    Tenriism/Spiritual/Existential:  [x]Strong Sense of Spirituality  []Involved in [facility]  []Request  Visit  []Expressing Clotilde Border  [x]No Concerns Identified            Narrative: Pt is alert and oriented to person, place, and has some understanding of her situation. She spoke about end of life wishes and after death with matter of fact nature. She said she sometimes feels depressed because of pain (neck/face) and it helps to speak with her family members. She shared about joyful visits with loved ones that occurred during this hospitalization and her desire to help a relative's mood. Pt states she does not have an Advance directive but wants to complete one with her insurance CM. Palliative team provided her with AMD and Rights booklet to review. Pt verbally stated she would want to appoint her dtr, Lisa Berg, as health care agent because she has been involved in her medical care for over 10 years now. She was uncertain about successor but mentioned other dtr, Bogdan Dudley, over . At present, Pt's NOK is her . Palliative MD is going to call Pt's dtr (Lisa Otis) after being given permission by Pt to explain about AMD and encourage them to complete with assistance from insurance  this Wednesday. Instructed Pt to provide a copy to PCP office. Pt does not want to be prolonged on machine support if she has a terminal condition. Pt has been thinking about her living will wishes for awhile and reports talking with Carli on multiple occasions.      Goals/Plan:   Palliative Medicine met with Pt regarding AMD. Pt plans to review document and discuss with her daughter and complete in near future. Pt anticipates returning home tomorrow. Thank you for including Palliative Medicine in Ms. Wall's care.       Mary Jo Dickerson, [address] ([id number])  Work cell: [phone]

## 2017-01-23 NOTE — PROGRESS NOTES
Initial Nutrition Assessment:    INTERVENTIONS/RECOMMENDATIONS:   · Meals/Snacks: General/healthful diet: Continue cardiac, pureed diet. · Supplements: Commercial supplement: [place] instant breakfast TID, mighty shakes TID    ASSESSMENT:   Patient medically noted for acute respiratory failure, shock, and aspiration pneumonia. PMH for HTN, dysphagia, bipolar disorder, and laryngeal cancer. Patient reports improved appetite and swallowing function. Tolerating pureed foods at this time. She reports loving the mighty shakes and carn[place] instant breakfast. Hopeful for discharge tomorrow. Continue current nutrition plan of care. Diet Order: Cardiac, Puree  % Eaten:  No data found. Pertinent Medications: [x]Reviewed []Other: norvasc, atorvastatin, prozac, ativan, lopressor, senna, trazodone   Pertinent Labs: [x]Reviewed []Other: BUN 23, Cr 1.03  Food Allergies: [x]None []Other   Last BM: 1/20   [x]Active     []Hyperactive  []Hypoactive       [] Absent BS  Skin:    [x] Intact   [] Incision  [] Breakdown  [] Other:    Anthropometrics:   Height: 5' 8\" (172.7 cm) Weight: 68 kg (150 lb)   IBW (%IBW): 63.5 kg (140 lb) ( ) UBW (%UBW):   (  %)   Last Weight Metrics:  Weight Loss Metrics 1/15/2017 12/7/2016 11/22/2016 10/7/2016 9/7/2016 9/4/2016 8/24/2016   Today's Wt 150 lb 149 lb 12.8 oz 149 lb 6.4 oz 145 lb 3.2 oz 142 lb 3.2 oz 141 lb 8.6 oz 143 lb   BMI 22.15 kg/m2 22.12 kg/m2 22.06 kg/m2 21.44 kg/m2 21 kg/m2 20.9 kg/m2 21.74 kg/m2       BMI: Body mass index is 22.15 kg/(m^2). This BMI is indicative of:   []Underweight    [x]Normal    []Overweight    [] Obesity   [] Extreme Obesity (BMI>40)     Estimated Nutrition Needs (Based on):   1675 Kcals/day (BMR (1289) x 1. 3AF) , 54 g (-68 g (0.8-1.0 g/kg bw)) Protein  Carbohydrate:  At Least 130 g/day  Fluids: 1700 mL/day (1ml/kcal)    Pt expected to meet estimated nutrient needs: [x]Yes []No    NUTRITION DIAGNOSES:   Problem:  Swallowing difficulty      Etiology: related to hx laryngeal cancer     Signs/Symptoms: as evidenced by aspiration pna, pureed diet       NUTRITION INTERVENTIONS:  Meals/Snacks: General/healthful diet   Supplements: Commercial supplement              GOAL:   PO intake >50% of meals and 75% of ONS next 4-6 days     LEARNING NEEDS (Diet, Food/Nutrient-Drug Interaction):    [x] None Identified   [] Identified and Education Provided/Documented   [] Identified and Pt declined/was not appropriate     Cultureal, Evangelical, OR Ethnic Dietary Needs:    [x] None Identified   [] Identified and Addressed     [x] Interdisciplinary Care Plan Reviewed/Documented    [x] Discharge Planning:   Pureed diet. Preferred ONS 2-3x/day     MONITORING /EVALUATION:   Food/Nutrient Intake Outcomes:  Total energy intake  Physical Signs/Symptoms Outcomes: Weight/weight change, Electrolyte and renal profile, GI profile    NUTRITION RISK:    [] High              [] Moderate           [x]  Low  []  Minimal/Uncompromised    PT SEEN FOR:    []  MD Consult: []Calorie Count      []Diabetic Diet Education        []Diet Education     []Electrolyte Management     []General Nutrition Management and Supplements     []Management of Tube Feeding     []TPN Recommendations    []  RN Referral:  []MST score >=2     []Enteral/Parenteral Nutrition PTA     []Pregnant: Gestational DM or Multigestation     []Pressure Ulcer/Wound Care needs        []  Low BMI  [x]  DTR Referral       [facility]  Pager [phone]                Weekend Pager [phone]

## 2017-01-23 NOTE — PROGRESS NOTES
[facility] Consult Service Progress NOTE  Pulmonary, Critical Care, and Sleep Medicine    Name: Shaan Rodriguez MRN: [id number]   : 1955 Hospital: [facility]   Date: 2017  Admission Date: 1/15/2017     1-23-17: feeling better. Less chest pain and less dyspnea. 17: Pt was seen and evaluated this am. Pt is much more calm and   17: Complain of chest pain. Requesting more pain meds. Has dry cough. 17: Pt seems to be doing better today. She feels her pain is better controlled. Has a mainly dry cough. Feels breathing is much more comfortable. Chart and notes reviewed. Data reviewed. Pt seen on rounds earlier today. I have evaluated and examined the patient. Pt is acutely ill. Reviewed the evaluation and will assist in implementing the plan as outlined by Dr. Wendie Perez and team        Hospital Day: 9      IMPRESSION:   · Acute respiratory failure with hypoxia   · Gram positive bacteremia - MSSA   · Severe bilateral pneumonia- likely aspiration given her underlying history-Will need to follow up with CT in next 2-3 months. Question of malignancy may be helpful to get PET scan once treated and recovered from pneumonia. · Dysphagia   · Sepsis ([address]   · History of laryngeal cancer (2015)  · History of radiation to head and neck region (3/30/2016)  · H/o dysphagia, discussed with   · Chronic pain (1/15/2017)-this has been an challenge. · Hypertension, benign   · Bipolar disorder Lithium level is therapeutic. · Fibromyalgia (1/15/2017)  · History of tobacco use (1/15/2017)  · Acute renal injury ([address] (1/15/2017)      RECOMMENDATIONS/PLAN:   · O2 PRN  · Bronchodilators  · Continue antibiotics for 14 days total (ie finish on )  · Speech therapy, following. · Aspiration precautions  · Will need follow up imaging of chest CT in 3 months upon discharge.  Patient to follow up with Dr. Mickey Centeno in office after CT of chest.    · Continue home meds as they were   · Agree she should be ready for discharge home in AM.  Will check back PRN. Code: Full Code          Vital Signs: Intake/Output: Intake/Output:   Visit Vitals    /71    Pulse 77    Temp 98.6 °F (37 °C)    Resp 18    Wt 68 kg (150 lb)    LMP  (LMP Unknown)    SpO2 94%    BMI 22.15 kg/m2      O2 Device: Nasal cannula  O2 Flow Rate (L/min): 2 l/min  Temp (24hrs), Av.6 °F (37 °C), Min:98.6 °F (37 °C), Max:98.7 °F (37.1 °C)   No intake or output data in the 24 hours ending 17 0915 Last shift:         Last 3 shifts: 1901 -  0700  In: 5422.5 [I.V.:5422.5]  Out: -             Physical Exam:     General: NAD   HEENT: NCAT    Neck: No abnormally enlarged lymph nodes. Chest: normal   Lungs: no wheeze or rales   Heart: Regular rate and rhythm    Abdomen: soft, non-tender    Extremity: negative, clubbing   Neuro: alert   Skin: warm/dry    Data:   Labs:  Recent Labs      17   0442  17   0100   WBC  9.7  10.7   HGB  10.0*  10.1*   HCT  32.2*  33.2*   PLT  322  243     Recent Labs      17   0442   NA  140   K  4.7   CL  104   CO2  30   GLU  85   BUN  23*   CREA  1.03*   CA  9.2   ALB  2.4*   TBILI  0.2   SGOT  13*   ALT  7*     No results for input(s): PH, PCO2, PO2, HCO3, FIO2 in the last 72 hours.     Imaging:  I have personally reviewed the patients radiographs:  None today        Macario Fernandez MD

## 2017-01-23 NOTE — PROGRESS NOTES
Problem: Mobility Impaired (Adult and Pediatric)  Goal: *Acute Goals and Plan of Care (Insert Text)  Physical Therapy Goals  Goals reviewed and remain appropriate 1/23/17  Initiated 1/16/2017  1. Patient will move from supine to sit and sit to supine , scoot up and down and roll side to side in bed with independence within 7 day(s). 2. Patient will transfer from bed to chair and chair to bed with independence using the least restrictive device within 7 day(s). 3. Patient will perform sit to stand with independence within 7 day(s). 4. Patient will ambulate with independence for 250 feet with the least restrictive device within 7 day(s). 5. Patient will ascend/descend 5 stairs with 1 handrail(s) with independence within 7 day(s). PHYSICAL THERAPY TREATMENT: WEEKLY REASSESSMENT  Patient: Dontrell Luu (58 y.o. female)  Date: 1/23/2017  Diagnosis: Acute respiratory failure with hypoxia (HCC)  Shock ([facility] Sepsis ([facility] [address] [address]       Precautions:        ASSESSMENT:  Patient received sitting on EOB and agreeable to PT intervention. Patient cleared by nursing for mobility. Patient continues to demonstrate limited functional mobility secondary to generalized weakness, impaired balance, and decreased activity tolerance. Patient required SBA-supervision for transfers and CGA-SBA for gait this date. Patient able to ambulate 200' x 2 demonstrating mild path deviations, decreased step clearance, and slow gait speed throughout. 2 L/min O2 donned during gait (see below). Patient without LOB during mobility, however pt easily distracted and impulsive requiring cueing to attend to task. Patient was left sitting on EOB following therapy session with all needs met, nursing informed, and bed alarm on. Recommend patient return home with family support and HHPT pending progress in acute PT. Patient will continue to benefit from skilled acute PT to improve activity tolerance and gait.       SaO2 on RA pre-activity: 88%  SaO2 on RA during activity: 85%  SaO2 on 2 L/min O2 during mobility: 91%  SaO2 on 2 L/min O2 post-mobility at rest: 93%     Patient's progression toward goals since last assessment: Slow progress       PLAN:  Goals have been updated based on progression since last assessment. Patient continues to benefit from skilled intervention to address the above impairments. Continue to follow the patient 3 times a week to address goals. Planned Interventions:  [X]              Bed Mobility Training             [ ]       Neuromuscular Re-Education  [X]              Transfer Training                   [ ]       Orthotic/Prosthetic Training  [X]              Gait Training                         [ ]       Modalities  [X]              Therapeutic Exercises           [ ]       Edema Management/Control  [X]              Therapeutic Activities            [X]       Patient and Family Training/Education  [ ]              Other (comment):  Discharge Recommendations: Home Health  Further Equipment Recommendations for Discharge: portable oxygen       SUBJECTIVE:   Patient stated Do I need that?       OBJECTIVE DATA SUMMARY:   Critical Behavior:  Neurologic State: Alert, Appropriate for age  Orientation Level: Oriented X4  Cognition: Follows commands     Functional Mobility Training:  Bed Mobility:           Scooting: Modified independent        Transfers:  Sit to Stand: Supervision  Stand to Sit: Supervision        Bed to Chair: Stand-by asssistance                    Balance:  Sitting: Intact  Standing: Impaired; Without support  Standing - Static: Good  Standing - Dynamic : Fair  Ambulation/Gait Training:  Distance (ft): 200 Feet (ft) (x 2)  Assistive Device: Gait belt  Ambulation - Level of Assistance: Contact guard assistance;Stand-by asssistance        Gait Abnormalities: Decreased step clearance; Path deviations        Base of Support: Narrowed     Speed/[place]: Pace decreased (<100 feet/min)  Step Length: Left shortened;Right shortened Pain:   Patient with complaints of pain, however did not rate pain nor did pain impact mobility.       Activity Tolerance:   Fair - SaO2 variable; pt with pain  After treatment:   [ ]  Patient left in no apparent distress sitting up in chair  [X]  Patient left in no apparent distress in bed  [X]  Call bell left within reach  [X]  Nursing notified  [ ]  Caregiver present  [X]  Bed alarm activated      COMMUNICATION/COLLABORATION:   The patients plan of care was discussed with: Physical Therapist and Registered Nurse     Levar Burgos PT, DPT   Time Calculation: 30 mins

## 2017-01-23 NOTE — PROGRESS NOTES
Per CRM note 17:  Cm spoke with daughter, Karishma Pickett to confirm patient information. Name and  confirmed as pt identifiers. Demographics confirmed. Patient lives with her  in a single story home, 4-5 steps at entry. Patient was independent with ADL's at home to include driving. DME - none  Support network -  works during the day, daughter lives 3 houses down and is available for support as needed. Daughter informed CM that a [employer] CM visits with patient weekly. [employer] RN makes 1 or 2 visits a year. Pt has had previous experience with [facility] but does not remember the name of agency.   [facility]. Will follow to arrange oxygen//nebulizer. Staff please document oxygen criteria. Thanks    1600  SaO2 on RA pre-activity: 88%  SaO2 on RA during activity: 85%  SaO2 on 2 L/min O2 during mobility: 91%  SaO2 on 2 L/min O2 post-mobility at rest: 93%    1630  Orders ecin'd to [facility] for oxygen//nebulizer delivery.   Order on paper chart for MD signature

## 2017-01-23 NOTE — PROGRESS NOTES
Palliative Medicine Consult  Jose Alberto: [phone]    Patient Name: Sofy Gallegos  YOB: 1955    Date of Initial Consult: 1/20/17  Reason for Consult: care decisions  Requesting Provider: Dr. Stephanie Genao   Primary Care Physician: Sveta Saavedra MD   SUMMARY:   Sofy Gallegos is a 57y.o. year old with a past history of fibromyalgia, bipolar disorder, laryngeal cancer diagnosed in 2014 s/p chemo with Dr. Yaw English, who was admitted on 1/15/2017 from home with a diagnosis of shortness of breath and altered mental status in the setting of multilobar pneumonia. Also w/ MSSA bacteremia. Question of pulm malignancy on CT chest - pulmonary recommends repeat CT in next couple months. Psych saw her here- on lithium. Current medical issues leading to Palliative Medicine involvement include: help with care deicions. Gorge Rudolph PALLIATIVE DIAGNOSES:   1. Delirium- improved  2. Shortness of breath  3. Debility  4. Care decisions       PLAN:   1. AMD: Along w/ Breonna Knott LCSW, meet w/ pt who is alert and oriented. No longer seems delirious, but uncertain what baseline mental status is. Very tangential. Discussed w/ bedside RN Maggy Beaulieu as well. While I did not conduct detailed psych exam, throughout discussion I was concerned that pt may not have full ability to do AMD today- this may clear up as PNA cont to be treated. 2. Defer to  who sees pt on weekly basis and PCP who know pt best. I can also see again tmrw once I have a better understanding of her baseline mental state. She does seem consistent that she would want her  and dtr Carli to make decisions, and that she would not want resuscitative efforts at end of life if terminal.   3. Do think that AMD important to do. I called her dtr Mariposa Hoffman at [phone] to discuss, left message. 4. Pain: Chronic, in neck, hard to describe. Very fixated on her hydrocodone.  Interestingly, while I do not doubt her pain- when she is involved in conversation and during her exam- her nonverbal pain signs are 0/10. May have psychosocial overlay. 5. I am not going to make any changes to her pain regimen, as not going to follow in clinic- however reiterated opioid safety. 6. Agree w/ use of adjuvant meds, can consider Cymbalta instead of Prozac, but pt would need to discuss w/ her psychiatrist.   7. Support: Will ask  Tati Pennington to see, may also help pain. Feels well supported. 8. Communicated plan of care with: Palliative IDT; [facility] RN       GOALS OF CARE / TREATMENT PREFERENCES:   [====Goals of Care====]  GOALS OF CARE:  Patient / health care proxy stated goals: FULL      TREATMENT PREFERENCES:   Code Status: Full Code    Advance Care Planning:  Advance Care Planning 1/20/2017   Patient's Healthcare Decision Maker is: Legal Next of Agustin 68   Primary Decision Maker Name Devora Levi,    Primary Decision Maker Phone Number [phone]   Primary Decision Maker Relationship to Patient Adult child   Secondary Decision Maker Name Shantell Marcum, daughter and another daughter Elder Wisdom Phone Number [phone] or [phone]   Secondary Decision Maker Relationship to Patient Adult child   Confirm Advance Directive None   Patient Would Like to Complete Advance Directive No       Other:    The palliative care team has discussed with patient / health care proxy about goals of care / treatment preferences for patient.  [====Goals of Care====]         HISTORY:         HPI/SUBJECTIVE:    The patient is:   [x] Verbal and participatory  [] Non-participatory due to:     Pt in NAD, can get up and is involved in conversation. Tangential. Feels that her family supports her well.      Clinical Pain Assessment (nonverbal scale for severity on nonverbal patients):   [++++ Clinical Pain Assessment++++]  [++++Pain Severity++++]: Pain: 4  [++++Pain Character++++]: burning, aching   [++++Pain Duration++++]: past few years  [++++Pain Effect++++]: incr depression- denies SI/HI  [++++Pain Factors++++]:   [++++Pain Frequency++++]:constant  [++++Pain Location++++]: neck and everywhere  [++++ Clinical Pain Assessment++++]     FUNCTIONAL ASSESSMENT:     Palliative Performance Scale (PPS):  PPS: 70       PSYCHOSOCIAL/SPIRITUAL SCREENING:     Advance Care Planning:  Advance Care Planning 1/20/2017   Patient's Healthcare Decision Maker is: Legal Next of Agustin 68   Primary Decision Maker Name Andrew Houston,    Primary Decision Maker Phone Number [phone]   Primary Decision Maker Relationship to Patient Adult child   Secondary Decision Maker Name Caden Carmona, daughter and another daughter Giuseppe Boss Phone Number [phone] or [phone]   Secondary Decision Maker Relationship to Patient Adult child   Confirm Advance Directive None   Patient Would Like to Complete Advance Directive No        Any spiritual / Sabianist concerns:  [] Yes /  [x] No    Caregiver Burnout:  [] Yes /  [] No /  [x] No Caregiver Present      Anticipatory grief assessment:   [] Normal  / [] Maladaptive       ESAS Anxiety: Anxiety: 4    ESAS Depression: Depression: 0      not assessed due to patient factors   REVIEW OF SYSTEMS:     Positive and pertinent negative findings in ROS are noted above in HPI. The following systems were [x] reviewed / [] unable to be reviewed as noted in HPI  Other findings are noted below. Systems: constitutional, ears/nose/mouth/throat, respiratory, gastrointestinal, genitourinary, musculoskeletal, integumentary, neurologic, psychiatric, endocrine. Positive findings noted below.   Modified ESAS Completed by: provider   Fatigue: 3 Drowsiness: 0   Depression: 0 Pain: 4   Anxiety: 4 Nausea: 0   Anorexia: 0 Dyspnea: 0     Constipation: No     Stool Occurrence(s): 1        PHYSICAL EXAM:     From RN flowsheet:  Wt Readings from Last 3 Encounters:   01/15/17 150 lb (68 kg)   12/07/16 149 lb 12.8 oz (67.9 kg)   11/22/16 149 lb 6.4 oz (67.8 kg)     Blood pressure 103/70, pulse 68, temperature 98.1 °F (36.7 °C), resp. rate 20, height 5' 8\" (1.727 m), weight 150 lb (68 kg), SpO2 94 %.     Pain Scale 1: Numeric (0 - 10)  Pain Intensity 1: 0  Pain Onset 1: chronic  Pain Location 1: Head, Neck  Pain Orientation 1: Anterior  Pain Description 1: Aching  Pain Intervention(s) 1: Medication (see MAR)  Last bowel movement, if known:     Constitutional: awake, alert to situation and orientation question but not certain about medical condition entirely   Eyes: pupils equal, anicteric  ENMT: no nasal discharge, moist mucous membranes  Cardiovascular: regular rhythm, distal pulses intact  Respiratory: breathing not labored, decr at bases  Gastrointestinal: soft non-tender, +bowel sounds  Musculoskeletal: no deformity, no tenderness to palpation  Skin: warm, dry  Neurologic:moving all extremities      HISTORY:     Principal Problem:    Sepsis ([address] (1/15/2017)    Active Problems:    Essential hypertension, benign (11/29/2011)      Bipolar 1 disorder ([address] (11/29/2011)      Dysphagia (12/30/2014)      History of laryngeal cancer (11/2/2015)      History of radiation to head and neck region (3/30/2016)      Acute respiratory failure with hypoxia (HCC) (1/15/2017)      Fibromyalgia (1/15/2017)      Chronic pain (1/15/2017)      History of tobacco use (1/15/2017)      Acute renal injury ([address] (1/15/2017)      Shock ([address] (1/15/2017)      Aspiration pneumonia of both upper lobes ([address] (1/20/2017)      Shortness of breath (1/20/2017)      Delirium (1/20/2017)      Goals of care, counseling/discussion (1/20/2017)      Debility (1/20/2017)      Past Medical History   Diagnosis Date    Bipolar 1 disorder ([address] 11/29/2011    Cancer ([address]      skin cancer buttocks    Cancer ([address]      throat    Chronic pain      FIBROMYALGIA, LEGS    Encounter for long-term (current) use of other medications 11/29/2011    Essential hypertension, benign 11/29/2011    Laryngeal cancer ([address] 11/2/2015    Laryngeal mass     Psychiatric disorder      bipolar      Past Surgical History   Procedure Laterality Date    Place percut gastrostomy tube  10/28/2014      has been removed 2015    Hx heent       BX OF NECK MASS    Hx gi       PLACEMENT OF G TUBE    Hx gi       ESOPH. DILATATION    Hx breast augmentation Bilateral      SALINE IMPLANTS    Hx gyn       tubal pregnancy    Hx hysterectomy        Family History   Problem Relation Age of Onset    Cancer Father      bone/skin/lung    Anesth Problems Neg Hx       History reviewed, no pertinent family history.   Social History   Substance Use Topics    Smoking status: Former Smoker     Packs/day: 1.00     Years: 40.00     Quit date: 8/27/2014    Smokeless tobacco: Never Used      Comment: PASSIVE SMOKE EXPOSURE,  SMOKES    Alcohol use No     No Known Allergies   Current Facility-Administered Medications   Medication Dose Route Frequency    cephALEXin (KEFLEX) capsule 500 mg  500 mg Oral Q6H    gabapentin (NEURONTIN) capsule 600 mg  600 mg Oral TID    albuterol-ipratropium (DUO-NEB) 2.5 MG-0.5 MG/3 ML  3 mL Nebulization Q6H PRN    HYDROcodone-acetaminophen (NORCO)  mg tablet 1 Tab  1 Tab Oral Q4H PRN    amLODIPine (NORVASC) tablet 10 mg  10 mg Oral DAILY    lithium carbonate SR (LITHOBID) tablet 300 mg  300 mg Oral BID    LORazepam (ATIVAN) tablet 0.5 mg  0.5 mg Oral BID and QHS    metoprolol tartrate (LOPRESSOR) tablet 25 mg  25 mg Oral Q12H    atorvastatin (LIPITOR) tablet 20 mg  20 mg Oral QHS    aspirin delayed-release tablet 81 mg  81 mg Oral DAILY    amitriptyline (ELAVIL) tablet 10 mg  10 mg Oral QHS    FLUoxetine (PROzac) capsule 20 mg  20 mg Oral DAILY    lidocaine (XYLOCAINE) 2 % viscous solution 15 mL  15 mL Mouth/Throat Q4H PRN    senna (SENOKOT) tablet 8.6 mg  1 Tab Oral DAILY    benzocaine-menthol (CHLORASEPTIC MAX) lozenge 1 Lozenge  1 Lozenge Oral Q2H PRN    traZODone (DESYREL) tablet 200 mg  200 mg Oral QHS    sodium chloride (NS) flush 5-10 mL  5-10 mL IntraVENous Q8H    sodium chloride (NS) flush 5-10 mL  5-10 mL IntraVENous PRN    acetaminophen (TYLENOL) tablet 500 mg  500 mg Oral Q4H PRN    naloxone (NARCAN) injection 0.4 mg  0.4 mg IntraVENous PRN    bisacodyl (DULCOLAX) tablet 5 mg  5 mg Oral DAILY PRN    enoxaparin (LOVENOX) injection 40 mg  40 mg SubCUTAneous Q24H          LAB AND IMAGING FINDINGS:     Lab Results   Component Value Date/Time    WBC 9.7 01/23/2017 04:42 AM    HGB 10.0 01/23/2017 04:42 AM    PLATELET 540 11/21/5140 04:42 AM     Lab Results   Component Value Date/Time    Sodium 140 01/23/2017 04:42 AM    Potassium 4.7 01/23/2017 04:42 AM    Chloride 104 01/23/2017 04:42 AM    CO2 30 01/23/2017 04:42 AM    BUN 23 01/23/2017 04:42 AM    Creatinine 1.03 01/23/2017 04:42 AM    Calcium 9.2 01/23/2017 04:42 AM    Magnesium 2.2 01/15/2017 04:25 PM    Phosphorus 3.3 12/30/2014 12:07 PM      Lab Results   Component Value Date/Time    AST 13 01/23/2017 04:42 AM    Alk. phosphatase 63 01/23/2017 04:42 AM    Protein, total 6.4 01/23/2017 04:42 AM    Albumin 2.4 01/23/2017 04:42 AM    Globulin 4.0 01/23/2017 04:42 AM     No results found for: INR, PTMR, PTP, PT1, PT2, APTT   No results found for: IRON, FE, TIBC, IBCT, PSAT, FERR   No results found for: PH, PCO2, PO2  No components found for: [place]   Lab Results   Component Value Date/Time    CK 61 12/30/2014 12:07 PM    CK - MB 5.3 01/15/2017 11:13 AM                Total time:45 mins   Counseling / coordination time: 40 mins   > 50% counseling / coordination?: yes    Prolonged service was provided for  []30 min   []75 min in face to face time in the presence of the patient. Time Start:   Time End:   Note: this can only be billed with 64657 (initial) or 40110 (follow up). If multiple start / stop times, list each separately.

## 2017-01-24 NOTE — DISCHARGE INSTRUCTIONS
PATIENT DISCHARGE INSTRUCTIONS      PATIENT DISCHARGE INSTRUCTIONS    Isabel Carisa / [id number] : 1955    Admitted 1/15/2017 Discharged: 2017       · It is important that you take the medication exactly as they are prescribed. · Keep your medication in the bottles provided by the pharmacist and keep a list of the medication names, dosages, and times to be taken in your wallet. · Do not take other medications without consulting your doctor. What to do at Home    Recommended Diet: Nectar thick liquids    Recommended Activity: Activity as tolerated. use O2 at night and as needed    If you experience any of the following symptoms  Worsening shortness of breathe,fever ,chills,worsening cough  , please follow up with More Silverio MD          Signed By: More Silverio MD     2017

## 2017-01-24 NOTE — PROGRESS NOTES
Bedside shift change report given to Letty Han (oncoming nurse) by Pilar Garcia (offgoing nurse). Report included the following information SBAR, Kardex, Intake/Output and MAR.

## 2017-01-24 NOTE — DISCHARGE SUMMARY
Physician Discharge Summary       Patient: Andre Bence MRN: [id number]  SSN: [id number]    YOB: 1955  Age: 57 y.o. Sex: female    PCP: Isra Lima MD    Admit date: 1/15/2017  Admitting Provider: Macey Price MD    Discharge date: 1/24/2017  Discharging Provider: Isra Lima MD    * Admission Diagnoses: Acute respiratory failure with hypoxia [facility]); Shock [facility])    * Discharge Diagnoses:    Hospital Problems as of 1/24/2017  Date Reviewed: 1/24/2017          Codes Class Noted - Resolved POA    * (Principal)Acute respiratory failure with hypoxia ([facility] ICD-10-CM: J96.01  ICD-9-CM: 518.81  1/15/2017 - Present Yes        Aspiration pneumonia of both upper lobes ([facility] ICD-10-CM: J69.0  ICD-9-CM: 507.0  1/20/2017 - Present Unknown        Sepsis ([facility] ICD-10-CM: A41.9  ICD-9-CM: 038.9, 995.91 Acute 1/15/2017 - Present Yes        Shock ([facility] ICD-10-CM: R57.9  ICD-9-CM: 785.50  1/15/2017 - Present Unknown        Dysphagia ICD-10-CM: R13.10  ICD-9-CM: 787.20  12/30/2014 - Present Yes        History of radiation to head and neck region ICD-10-CM: Z92.3  ICD-9-CM: V15.3  3/30/2016 - Present Yes        Shortness of breath ICD-10-CM: R06.02  ICD-9-CM: 786.05  1/20/2017 - Present Unknown        Delirium ICD-10-CM: R41.0  ICD-9-CM: 780.09  1/20/2017 - Present Unknown        Goals of care, counseling/discussion ICD-10-CM: Z71.89  ICD-9-CM: V65.49  1/20/2017 - Present Unknown        Debility ICD-10-CM: R53.81  ICD-9-CM: 799.3  1/20/2017 - Present Unknown        Fibromyalgia ICD-10-CM: M79.7  ICD-9-CM: 729.1 Chronic 1/15/2017 - Present Yes        Chronic pain (Chronic) ICD-10-CM: G89.29  ICD-9-CM: 338.29 Chronic 1/15/2017 - Present Yes        History of tobacco use ICD-10-CM: Z87.891  ICD-9-CM: V15.82 Present on Admission 1/15/2017 - Present Yes        Acute renal injury [facility]) ICD-10-CM: N17.9  ICD-9-CM: 584.9 Present on Admission 1/15/2017 - Present Yes        History of laryngeal cancer ICD-10-CM: Z85.21  ICD-9-CM: V10.21 Present on Admission 11/2/2015 - Present Yes        Essential hypertension, benign ICD-10-CM: I10  ICD-9-CM: 401.1  11/29/2011 - Present Yes        Bipolar 1 disorder ([facility] ICD-10-CM: F31.9  ICD-9-CM: 296.7  11/29/2011 - Present Yes              * Hospital Course: this elderly female,followed for complications of laryngeal ca ,bipolar disorder,chronic pain disorder,was admitted through the ER on 1/15/17 with lethargy,acute respiratory failure ,septic shock and arf. She was admitted to the ICU on pressors and Bipap  And steroids,IVF,empiric antibiotics. Seen by Pulmonary and Cardiology,please see their notes. CXR revealed bilateral infiltrates,echo demonstrated normal EF. It was felt she had bilateral aspiration pneumonia and resultant septic shock. She gradually improved and BIPAP and pressors were discontinued. Blood culture grew MSSA in 1 bottle. CT Scan of chest was markedly abnormal with infiltrates and adenopathy. Oncology was consulted because of consern for metastatic disease,and the consensus was findings likely were the result of chronic aspiration/infection. Please see Dr Brian Tate notes. She is to f/u with Dr Dilan Pritchard her oncologist and have repeat CT Scans in 3 mo. She had episodic hypomania and was obsessed with her hydrocodone dosing for her chronic pains,neck and elsewhere. Palliative Care  And Psychiatry were consulted and recommended continuing current treatment and f/u with her psychiatrist,Dr Wilks post discharge,She continued to improve throughout her stay and was discharged Home in good condition on 1/24/17        Consults: Cardiology, Pulmonary/Intensive care, Hematology/Oncology, Psychiatry and Palliative Care    Significant Diagnostic Studies: microbiology: blood culture: positive for MSSA and radiology: CT scan:Thorax multilobar infiltrates,hilar adenopathy    Discharge Exam:  Visit Vitals    /66 (BP 1 Location: Left arm, BP Patient Position: At rest)    Pulse 84  Temp 98.3 °F (36.8 °C)    Resp 18    Ht 5' 8\" (1.727 m)    Wt 150 lb (68 kg)    LMP  (LMP Unknown)    SpO2 95%    BMI 22.15 kg/m2     General appearance: alert, fatigued, cooperative, no distress, appears stated age  Lungs: clear to auscultation bilaterally  Heart: regular rate and rhythm  Abdomen: soft, non-tender. Bowel sounds normal. No masses,  no organomegaly  Extremities: extremities normal, atraumatic, no cyanosis or edema    * Discharge Condition: good  * Disposition: Home    Discharge Medications:  Current Discharge Medication List      START taking these medications    Details   albuterol-ipratropium (DUO-NEB) 2.5 mg-0.5 mg/3 ml nebu 3 mL by Nebulization route every six (6) hours as needed. Qty: 100 Nebule, Refills: 3      amLODIPine (NORVASC) 10 mg tablet Take 1 Tab by mouth daily. Qty: 30 Tab, Refills: 11      cephALEXin (KEFLEX) 500 mg capsule Take 1 Cap by mouth three (3) times daily. Qty: 15 Cap, Refills: 0      gabapentin (NEURONTIN) 300 mg capsule Take 2 Caps by mouth three (3) times daily. Qty: 90 Cap, Refills: 11      HYDROcodone-acetaminophen (NORCO)  mg tablet Take 1 Tab by mouth every four (4) hours as needed. Max Daily Amount: 6 Tabs. Qty: 120 Tab, Refills: 0      metoprolol tartrate (LOPRESSOR) 25 mg tablet Take 1 Tab by mouth every twelve (12) hours. Qty: 60 Tab, Refills: 11      guaiFENesin-dextromethorphan (MUCINEX DM) 600-30 mg per tablet Take 1 Tab by mouth two (2) times a day. Qty: 20 Tab, Refills: 3      Nebulizer & Compressor machine 1 Each by Does Not Apply route four (4) times daily as needed. Qty: 1 Each, Refills: 0         CONTINUE these medications which have NOT CHANGED    Details   lithium carbonate CR (ESKALITH CR) 450 mg CR tablet TAKE 1 TABLET BY ORAL ROUTE PER AT BEDTIME  Refills: 0    Associated Diagnoses: Bipolar 1 disorder (HCC)      FLUoxetine (PROZAC) 20 mg tablet daily. amitriptyline (ELAVIL) 10 mg tablet Take 1 Tab by mouth nightly.   Qty: 30 Tab, Refills: 5    Associated Diagnoses: Laryngeal cancer ([address] Chronic pain due to neoplasm      traZODone (DESYREL) 100 mg tablet Take 200 mg by mouth nightly. Refills: 2      bisacodyl 5 mg tab Take 1 Tab by mouth daily as needed. senna (SENNA) 8.6 mg tablet Take 1 Tab by mouth daily. SORE THROAT, BENZOCAINE-MENTH, 15-2.6 mg lozg lozenge          STOP taking these medications       HYDROcodone-Ibuprofen (VICOPROFEN)  mg tablet Comments:   Reason for Stopping:         lidocaine (XYLOCAINE) 2 % solution Comments:   Reason for Stopping:               * Follow-up Care/Patient Instructions:   Activity: Activity as tolerated  Diet: Dysphagia diet-pureed  Wound Care: None needed    Follow-up Information     Follow up With Details Comments Contact Info    Isra Lima MD   [address] [facility]  [phone]            Signed:  Isra Lima MD  1/24/2017  6:17 AM

## 2017-01-24 NOTE — PROGRESS NOTES
Bedside and Verbal shift change report given to Pato (oncoming nurse) by Elisabet Escobar RN (offgoing nurse). Report included the following information SBAR, Kardex, Intake/Output, MAR, Recent Results and Med Rec Status. Zone Phone for oncoming shift:   [phone]    Shift Summary: Pt rested well last night. Pain med  Given PRN. Scheduled for D/C today    LDAs           Triple Lumen triple lumen central line 01/15/17 Right Neck (Active)   Central Line Being Utilized No 1/23/2017  7:44 PM   Criteria for Appropriate Use Limited/no vessel suitable for conventional peripheral access 1/23/2017  7:44 PM   Site Assessment Clean, dry, & intact 1/23/2017  7:44 PM   Infiltration Assessment 0 1/23/2017  7:44 PM   Affected Extremity/Extremities Color distal to insertion site pink (or appropriate for race) 1/23/2017  7:44 PM   Date of Last Dressing Change 01/15/17 1/23/2017  7:44 PM   Dressing Status Clean, dry, & intact 1/23/2017  7:44 PM   Dressing Type Disk with Chlorhexadine Gluconate (CHG) 1/23/2017  7:44 PM   Action Taken Blood drawn 1/23/2017  3:08 PM   Proximal Hub Color/Line Status White;Capped 1/23/2017  7:44 PM   Positive Blood Return (Medial Site) Yes 1/23/2017  7:44 PM   Medial Hub Color/Line Status Blue;Capped 1/23/2017  7:44 PM   Positive Blood Return (Lateral Site) Yes 1/23/2017  7:44 PM   Distal Hub Color/Line Status Brown;Capped 1/23/2017  7:44 PM   Positive Blood Return (Site #3) Yes 1/23/2017  7:44 PM   External Catheter Length (cm) 0 centimeters 1/23/2017  3:08 PM   Alcohol Cap Used Yes 1/23/2017  7:44 PM        Peripheral IV 05/13/15 Left Hand (Active)       Peripheral IV 05/13/15 Right Forearm (Active)          Damir-Russell Drain 05/13/15 Right; Anterior Neck (Active)       PEG/Gastrostomy Tube 10/28/14 Mid Abdomen (Active)       PEG/Gastrostomy Tube Abdomen (Active)       J-Tube (Active)                  Intake & Output     Last Bowel Movement Last Bowel Movement Date: 01/22/17   Glucose Checks [] N/A  [] AC/HS  [] Q6  Concerns:   Nutrition Active Orders   Diet    DIET CARDIAC Pureed       Consults []PT  []OT  []Speech  []Case Management   Cardiac Monitoring []N/A []Yes Expires:

## 2017-01-24 NOTE — PROGRESS NOTES
Bedside and Verbal shift change report given to Sister RN (oncoming nurse) by Jono Olmos RN (offgoing nurse). Report included the following information SBAR, Kardex, Intake/Output, MAR and Recent Results. Zone Phone for oncoming shift:   [phone]    Shift Summary: none    LDAs           Triple Lumen triple lumen central line 01/15/17 Right Neck (Active)   Central Line Being Utilized No 1/23/2017  7:44 PM   Criteria for Appropriate Use Limited/no vessel suitable for conventional peripheral access 1/23/2017  7:44 PM   Site Assessment Clean, dry, & intact 1/23/2017  7:44 PM   Infiltration Assessment 0 1/23/2017  7:44 PM   Affected Extremity/Extremities Color distal to insertion site pink (or appropriate for race) 1/23/2017  7:44 PM   Date of Last Dressing Change 01/15/17 1/23/2017  7:44 PM   Dressing Status Clean, dry, & intact 1/23/2017  7:44 PM   Dressing Type Disk with Chlorhexadine Gluconate (CHG) 1/23/2017  7:44 PM   Action Taken Blood drawn 1/23/2017  3:08 PM   Proximal Hub Color/Line Status White;Capped 1/23/2017  7:44 PM   Positive Blood Return (Medial Site) Yes 1/23/2017  7:44 PM   Medial Hub Color/Line Status Blue;Capped 1/23/2017  7:44 PM   Positive Blood Return (Lateral Site) Yes 1/23/2017  7:44 PM   Distal Hub Color/Line Status Brown;Capped 1/23/2017  7:44 PM   Positive Blood Return (Site #3) Yes 1/23/2017  7:44 PM   External Catheter Length (cm) 0 centimeters 1/23/2017  3:08 PM   Alcohol Cap Used Yes 1/23/2017  7:44 PM        Peripheral IV 05/13/15 Left Hand (Active)       Peripheral IV 05/13/15 Right Forearm (Active)          Damir-Russell Drain 05/13/15 Right; Anterior Neck (Active)       PEG/Gastrostomy Tube 10/28/14 Mid Abdomen (Active)       PEG/Gastrostomy Tube Abdomen (Active)       J-Tube (Active)                  Intake & Output     Last Bowel Movement Last Bowel Movement Date: 01/22/17   Glucose Checks [x] N/A  [] AC/HS  [] Q6  Concerns:   Nutrition Active Orders   Diet    DIET CARDIAC Pureed Consults [x]PT  [x]OT  [x]Speech  []Case Management   Cardiac Monitoring [x]N/A []Yes Expires:

## 2017-01-24 NOTE — PROGRESS NOTES
Patient insurance will not cover nebulizer, MD Ogden's office called and message left, patient offered to order nebulizer out of pocket, declines at this time, she and daughter will follow up with [address] [facility] worker to try to obtain  Patient discharged home in good condiition. Discharge summary reveiwed with patient daughter and herself, given opportunity for questions and concerns, none voiced, TLC removed, hand held pressure applied education on bleeding and occlusive dressiing given, hemostasis achieved.

## 2017-01-24 NOTE — PROGRESS NOTES
Awaiting word from [address] [place] [address] regarding time of oxygen delivery. I've asked for 11a delivery. Portable oxygen in room. [facility] rep. Tito Houser states on ecin that according to the pt's account this plan will only cover nevulizer if rx is to administer acetylcysteine. Pt is aware and will discuss with PCP//[facility] if needed. She is aware she may purchase a nebulizer should she desire.

## 2017-01-25 NOTE — PROGRESS NOTES
Patient listed on discharge [facility]) report on 1/24. Patient  to [id number] [place] 1/15-1/24 for acute Resp failure with hypoxia. Attempted to contacted patient to perform post hospital discharge assessment.   LMTCB

## 2017-01-31 NOTE — MR AVS SNAPSHOT
Visit Information Date & Time Provider Department Dept. Phone Encounter #  
 1/31/2017 11:15 AM Umberto Arana [phone] [id number] Follow-up Instructions Return in about 4 weeks (around 2/28/2017). Upcoming Health Maintenance Date Due  
 MEDICARE YEARLY EXAM 5/25/1972 BREAST CANCER SCRN MAMMOGRAM 5/25/2004 FOBT Q 1 YEAR AGE 50-75 5/25/2004 Pneumococcal 19-64 Highest Risk (3 of 3 - PCV13) 12/7/2017 PAP AKA CERVICAL CYTOLOGY 2/28/2019 DTaP/Tdap/Td series (2 - Td) 12/7/2026 Allergies as of 1/31/2017  Review Complete On: 1/31/2017 By: Max Henson LPN No Known Allergies Current Immunizations  Reviewed on 12/7/2016 Name Date Influenza Vaccine 11/1/2014 Influenza Vaccine (Quad) PF 12/7/2016 Pneumococcal Polysaccharide (PPSV-23) 12/7/2016 Tdap 12/7/2016 Not reviewed this visit You Were Diagnosed With   
  
 Codes Comments Aspiration pneumonia of both upper lobes, unspecified aspiration pneumonia type [facility]    -  Primary ICD-10-CM: J69.0 ICD-9-CM: 507.0 History of laryngeal cancer     ICD-10-CM: Z85.21 
ICD-9-CM: V10.21 Pharyngoesophageal dysphagia     ICD-10-CM: R13.14 ICD-9-CM: 787.24 History of radiation to head and neck region     ICD-10-CM: Z92.3 ICD-9-CM: V15.3 Essential hypertension, benign     ICD-10-CM: I10 
ICD-9-CM: 238. 1 Chronic pain syndrome     ICD-10-CM: G89.4 ICD-9-CM: 338. 4 Vitals BP Pulse Temp Resp Height(growth percentile) Weight(growth percentile) 122/78 (BP 1 Location: Left arm, BP Patient Position: Sitting) 60 97.1 °F (36.2 °C) (Oral) 18 5' 8\" (1.727 m) 146 lb 6.4 oz (66.4 kg) LMP SpO2 BMI OB Status Smoking Status (LMP Unknown) 90% 22.26 kg/m2 Hysterectomy Former Smoker Vitals History BMI and BSA Data Body Mass Index Body Surface Area  
 22.26 kg/m 2 1.78 m 2 Preferred Pharmacy Pharmacy Name Phone [facility] [address] [place] [address] [phone] Your Updated Medication List  
  
   
This list is accurate as of: 1/31/17 11:57 AM.  Always use your most recent med list.  
  
  
  
  
 acetylcysteine 100 mg/mL (10 %) nebulizer solution Commonly known as:  MUCOMYST Take 4 mL by inhalation every four (4) hours. albuterol-ipratropium 2.5 mg-0.5 mg/3 ml Nebu Commonly known as:  DUO-NEB  
3 mL by Nebulization route every six (6) hours as needed. amitriptyline 10 mg tablet Commonly known as:  ELAVIL Take 1 Tab by mouth nightly. amLODIPine 10 mg tablet Commonly known as:  Shabbir Presser Take 1 Tab by mouth daily. bisacodyl 5 mg Tab Take 1 Tab by mouth daily as needed. FLUoxetine 20 mg tablet Commonly known as:  PROzac Take 20 mg by mouth daily. gabapentin 300 mg capsule Commonly known as:  NEURONTIN Take 2 Caps by mouth three (3) times daily. guaiFENesin-dextromethorphan -30 mg per tablet Commonly known as:  [place] [address] DM Take 1 Tab by mouth two (2) times a day. HYDROcodone-acetaminophen  mg tablet Commonly known as:  Lynnetta Davis Take 1 Tab by mouth every four (4) hours as needed. Max Daily Amount: 6 Tabs. lithium carbonate  mg CR tablet Commonly known as:  ESKALITH CR  
TAKE 1 TABLET BY ORAL ROUTE PER AT BEDTIME  
  
 metoprolol tartrate 25 mg tablet Commonly known as:  LOPRESSOR Take 1 Tab by mouth every twelve (12) hours. Nebulizer & Compressor machine 1 Each by Does Not Apply route four (4) times daily as needed. Senna 8.6 mg tablet Generic drug:  senna Take 1 Tab by mouth daily. * SORE THROAT (BENZOCAINE-MENTH) 15-2.6 mg Lozg lozenge Generic drug:  benzocaine-menthol * SORE THROAT (BENZOCAINE-MENTH) 15-2.6 mg Lozg lozenge Generic drug:  benzocaine-menthol  
  
 traZODone 100 mg tablet Commonly known as:  Jazmyn Delvalle Take 200 mg by mouth nightly. VITAMIN B-12 1,000 mcg sublingual tablet Generic drug:  cyanocobalamin * Notice: This list has 2 medication(s) that are the same as other medications prescribed for you. Read the directions carefully, and ask your doctor or other care provider to review them with you. We Performed the Following AMB POC COMPLETE CBC, AUTOMATED [35262 CPT(R)] METABOLIC PANEL, COMPREHENSIVE [60380 CPT(R)] Follow-up Instructions Return in about 4 weeks (around 2/28/2017). Introducing [facility]! Tabitha Matias introduces goCatch patient portal. Now you can access parts of your medical record, email your doctor's office, and request medication refills online. 1. In your internet browser, go to [email or web address] 2. Click on the First Time User? Click Here link in the Sign In box. You will see the New Member Sign Up page. 3. Enter your goCatch Access Code exactly as it appears below. You will not need to use this code after youve completed the sign-up process. If you do not sign up before the expiration date, you must request a new code. · goCatch Access Code: [facility] Expires: 3/7/2017  2:52 PM 
 
4. Enter the last four digits of your Social Security Number (xxxx) and Date of Birth (mm/dd/yyyy) as indicated and click Submit. You will be taken to the next sign-up page. 5. Create a goCatch ID. This will be your goCatch login ID and cannot be changed, so think of one that is secure and easy to remember. 6. Create a goCatch password. You can change your password at any time. 7. Enter your Password Reset Question and Answer. This can be used at a later time if you forget your password. 8. Enter your e-mail address. You will receive e-mail notification when new information is available in [address] 9. Click Sign Up. You can now view and download portions of your medical record.  
10. Click the Download Summary menu link to download a portable copy of your medical information. If you have questions, please visit the Frequently Asked Questions section of the [employer] website. Remember, [employer] is NOT to be used for urgent needs. For medical emergencies, dial 911. Now available from your iPhone and Android! Please provide this summary of care documentation to your next provider. Your primary care clinician is listed as Les Ray. If you have any questions after today's visit, please call [phone].

## 2017-01-31 NOTE — PROGRESS NOTES
Chief Complaint   Patient presents with   [facility] Follow Up     Oxygen low, Pt on oxygen 2 liters at night, doctor notifed.

## 2017-02-01 NOTE — PROGRESS NOTES
HISTORY OF PRESENT ILLNESS  Monie Jackson is a 57 y.o. female Transitional Care visit following hospitalization for svere aspiration pneumona/sepsis. Doing well,little cough,improving dyspnea and fatigue. Chronic neck pain persists    Hospital Follow Up   The history is provided by the patient. This is a new problem. The problem has been rapidly improving. Associated symptoms include shortness of breath. Pertinent negatives include no chest pain, no abdominal pain and no headaches. Breathing Problem   This is a recurrent problem. The problem occurs frequently. The problem has been gradually improving. Associated symptoms include neck pain and cough. Pertinent negatives include no fever, no headaches, no sputum production, no wheezing, no chest pain, no abdominal pain and no leg swelling. Review of Systems   Constitutional: Positive for malaise/fatigue. Negative for chills and fever. Respiratory: Positive for cough and shortness of breath. Negative for sputum production and wheezing. Cardiovascular: Negative for chest pain and leg swelling. Gastrointestinal: Negative for abdominal pain. Musculoskeletal: Positive for neck pain. Neurological: Negative for headaches. Psychiatric/Behavioral: Negative for depression. Physical Exam   Constitutional: She appears well-developed and well-nourished. HENT:   Head: Normocephalic and atraumatic. Right Ear: External ear normal.   Left Ear: External ear normal.   Cardiovascular: Normal rate and regular rhythm. Pulmonary/Chest: Effort normal.   Generalized diminished breathe sounds ,scattered ronchi     Abdominal: Soft. Bowel sounds are normal.   Neurological: She is alert. Skin: Skin is warm and dry. ASSESSMENT and PLAN  [facility] [place] was seen today for hospital follow up.     Diagnoses and all orders for this visit:    Aspiration pneumonia of both upper lobes, unspecified aspiration pneumonia type (HCC),much improved,to use 02 at hs and prn  - AMB POC COMPLETE CBC, AUTOMATED  -     METABOLIC PANEL, COMPREHENSIVE    History of laryngeal cancer    Pharyngoesophageal dysphagia    History of radiation to head and neck region    Essential hypertension, benign    Chronic pain syndrome      Follow-up Disposition:  Return in about 4 weeks (around 2/28/2017).

## 2017-02-05 NOTE — IP AVS SNAPSHOT
[address] [facility] 
[phone] Patient: Tosha Dominguez MRN: [id number] ZTM:[birth year] You are allergic to the following No active allergies Recent Documentation Height Weight Breastfeeding? BMI OB Status Smoking Status 1.753 m 69.2 kg No 22.54 kg/m2 Hysterectomy Former Smoker Emergency Contacts  (Rel.) Home Phone Work Phone Mobile Phone Cliff Tabares [phone] -- [phone] Cuauhtemoc Woodruff (Spouse) -- -- [phone] About your hospitalization You were admitted on:  February 6, 2017 You last received care in the:  [facility] 1 MEDICAL ONCOLOGY You were discharged on:  February 12, 2017 Why you were hospitalized Your primary diagnosis was:  Acute Respiratory Failure (Hcc) Your diagnoses also included:  Bipolar 1 Disorder (Hcc), Chronic Pain, Dysphagia, Essential Hypertension, Benign, History Of Laryngeal Cancer, History Of Radiation To Head And Neck Region, Chronic Obstructive Pulmonary Disease With Acute Exacerbation (Hcc), Aspiration Pneumonia Of Both Upper Lobes (Hcc) Providers Seen During Your Hospitalizations Provider Role Specialty Primary office phone Elmira Carrillo MD Attending Provider Emergency Medicine [phone] Webster Severs, MD Attending Provider [facility] [phone] Your Primary Care Physician (PCP) Primary Care Physician Office Phone Office Fax Jose Angel Velasquez [phone] Follow-up Information Follow up With Details Comments Contact Info Webster Severs, MD   [address] [place] [address] [id number] [phone] Current Discharge Medication List  
  
START taking these medications Dose & Instructions Dispensing Information Comments Morning Noon Evening Bedtime albuterol-ipratropium 2.5 mg-0.5 mg/3 ml Nebu Commonly known as:  Padmini Pilar Your next dose is: Today, Tomorrow Other:  _________ Dose:  3 mL  
3 mL by Nebulization route every four (4) hours as needed. Quantity:  30 Nebule Refills:  12  
     
   
   
   
  
 levoFLOXacin 500 mg tablet Commonly known as:  Avni Stevan Your next dose is: Today, Tomorrow Other:  _________ Dose:  500 mg Take 1 Tab by mouth daily for 10 days. Quantity:  7 Tab Refills:  0  
     
   
   
   
  
 predniSONE 20 mg tablet Commonly known as:  Merle Jhonathan Your next dose is: Today, Tomorrow Other:  _________ Dose:  20 mg Take 1 Tab by mouth two (2) times a day. Quantity:  10 Tab Refills:  0 CONTINUE these medications which have NOT CHANGED Dose & Instructions Dispensing Information Comments Morning Noon Evening Bedtime  
 acetylcysteine 100 mg/mL (10 %) nebulizer solution Commonly known as:  MUCOMYST Your next dose is: Today, Tomorrow Other:  _________ Dose:  4 mL Take 4 mL by inhalation every four (4) hours. Quantity:  120 mL Refills:  11  
     
   
   
   
  
 amitriptyline 10 mg tablet Commonly known as:  ELAVIL Your next dose is: Today, Tomorrow Other:  _________ Dose:  10 mg Take 1 Tab by mouth nightly. Quantity:  30 Tab Refills:  5  
     
   
   
   
  
 amLODIPine 10 mg tablet Commonly known as:  Danya Islas Your next dose is: Today, Tomorrow Other:  _________ Dose:  10 mg Take 1 Tab by mouth daily. Quantity:  30 Tab Refills:  11  
     
   
   
   
  
 diphenhydrAMINE 12.5 mg/5 mL elix 40 mL, lidocaine 2 % soln 40 mL, aluminum & magnesium hydroxide-simethicone 400-400-40 mg/5 mL susp 40 mL Your next dose is: Today, Tomorrow Other:  _________ Dose:  5 mL Take 5 mL by mouth daily. Refills:  0 FLUoxetine 20 mg tablet Commonly known as:  PROzac Your next dose is: Today, Tomorrow Other:  _________ Dose:  20 mg Take 20 mg by mouth daily. Refills:  0  
     
   
   
   
  
 gabapentin 300 mg capsule Commonly known as:  NEURONTIN Your next dose is: Today, Tomorrow Other:  _________ Dose:  600 mg Take 2 Caps by mouth three (3) times daily. Quantity:  90 Cap Refills:  11  
     
   
   
   
  
 guaiFENesin-dextromethorphan -30 mg per tablet Commonly known as:  Doyle & Doyle DM Your next dose is: Today, Tomorrow Other:  _________ Dose:  1 Tab Take 1 Tab by mouth two (2) times a day. Quantity:  20 Tab Refills:  3 HYDROcodone-acetaminophen  mg tablet Commonly known as:  Malik Lay Your next dose is: Today, Tomorrow Other:  _________ Dose:  1 Tab Take 1 Tab by mouth every four (4) hours as needed. Max Daily Amount: 6 Tabs. Quantity:  120 Tab Refills:  0  
     
   
   
   
  
 lithium carbonate  mg CR tablet Commonly known as:  ESKALITH CR Your next dose is: Today, Tomorrow Other:  _________ TAKE 1 TABLET BY ORAL ROUTE PER AT BEDTIME Refills:  0  
     
   
   
   
  
 metoprolol tartrate 25 mg tablet Commonly known as:  LOPRESSOR Your next dose is: Today, Tomorrow Other:  _________ Dose:  25 mg Take 1 Tab by mouth every twelve (12) hours. Quantity:  60 Tab Refills:  11 Senna 8.6 mg tablet Generic drug:  senna Your next dose is: Today, Tomorrow Other:  _________ Dose:  1 Tab Take 1 Tab by mouth daily. Refills:  0  
     
   
   
   
  
 traZODone 100 mg tablet Commonly known as:  Naren Milan Your next dose is: Today, Tomorrow Other:  _________  Dose:  200 mg  
 Take 200 mg by mouth nightly. Refills:  2 VITAMIN B-12 1,000 mcg sublingual tablet Generic drug:  cyanocobalamin Your next dose is: Today, Tomorrow Other:  _________ Dose:  1000 mcg  
1,000 mcg by SubLINGual route daily. Refills:  0 Where to Get Your Medications Information on where to get these meds will be given to you by the nurse or doctor. ! Ask your nurse or doctor about these medications  
  albuterol-ipratropium 2.5 mg-0.5 mg/3 ml Nebu  
 levoFLOXacin 500 mg tablet  
 predniSONE 20 mg tablet Discharge Instructions None Discharge Orders None [employer] Announcement We are excited to announce that we are making your provider's discharge notes available to you in Apixio. You will see these notes when they are completed and signed by the physician that discharged you from your recent hospital stay. If you have any questions or concerns about any information you see in [employer], please call the Health Information Department where you were seen or reach out to your Primary Care Provider for more information about your plan of care. Introducing [facility]! Octavia Carrera introduces [employer] patient portal. Now you can access parts of your medical record, email your doctor's office, and request medication refills online. 1. In your internet browser, go to [email or web address] 2. Click on the First Time User? Click Here link in the Sign In box. You will see the New Member Sign Up page. 3. Enter your [employer] Access Code exactly as it appears below. You will not need to use this code after youve completed the sign-up process. If you do not sign up before the expiration date, you must request a new code. · [employer] Access Code: [facility] Expires: 3/7/2017  2:52 PM 
 
4.  Enter the last four digits of your Social Security Number (xxxx) and Date of Birth (mm/dd/yyyy) as indicated and click Submit. You will be taken to the next sign-up page. 5. Create a YourTeamOnline ID. This will be your [employer] login ID and cannot be changed, so think of one that is secure and easy to remember. 6. Create a [employer] password. You can change your password at any time. 7. Enter your Password Reset Question and Answer. This can be used at a later time if you forget your password. 8. Enter your e-mail address. You will receive e-mail notification when new information is available in [address] 9. Click Sign Up. You can now view and download portions of your medical record. 10. Click the Download Summary menu link to download a portable copy of your medical information. If you have questions, please visit the Frequently Asked Questions section of the [employer] website. Remember, [employer] is NOT to be used for urgent needs. For medical emergencies, dial 911. Now available from your iPhone and Android! General Information Please provide this summary of care documentation to your next provider. Patient Signature:  ____________________________________________________________ Date:  ____________________________________________________________  
  
Julissa Maurice Provider Signature:  ____________________________________________________________ Date:  ____________________________________________________________

## 2017-02-06 PROBLEM — J96.00 ACUTE RESPIRATORY FAILURE (HCC): Status: ACTIVE | Noted: 2017-01-01

## 2017-02-06 NOTE — PROGRESS NOTES
Patient arrived on the unit from ED at approximately 0305. Assessment completed as much as patient would cooperate. Before her home medications and some parts of the assessment could be completed the patient stated that she was tired and wanted to sleep. With that she turned over and went to sleep.

## 2017-02-06 NOTE — PROGRESS NOTES
Pharmacy Automatic Renal Dosing Protocol - Antimicrobials    Indication for Antimicrobials: Pneumonia CAP  Current Regimen of Each Antimicrobial (Start Day & Day of Therapy):  17  Zosyn 2.25 gm iv q6h    Goal Vancomycin Level (if needed):   Level(s) Ordered (if needed):  Measured / Extrapolated Vancomycin Levels (if drawn):     Significant Cultures:     Recent Labs      17   0422  17   2134   CREA  1.02  1.19*   BUN  21*  27*   WBC  11.3*  11.7*     Temp (24hrs), Av.6 °F (37 °C), Min:98.3 °F (36.8 °C), Max:98.9 °F (37.2 °C)    Creatinine Clearance (Creatinine Clearance (ml/min)): 59.8    Impression/Plan: will change Zosyn to 3.375 gm iv q6h       Pharmacy will follow daily and adjust medications as appropriate for renal function and/or serum levels.     Thank you,  Leif Mendez, [facility]     Renal Dosing Tables on Pharmweb

## 2017-02-06 NOTE — PROGRESS NOTES
Primary Nurse Edgar Ruiz and Efrain Child, RN performed a dual skin assessment on this patient with skin being intact. No open areas noted. Angel score is 19.

## 2017-02-06 NOTE — PROGRESS NOTES
CM Initial Assessment        This is a readmit--patient discharged on 1/24/17 after being treated with PNA. Current admission assessment--  Patient came to ed with complaint of worsening sob. She also reports production of cough with yellow sputum and left sided chest pain that is exacerbated with deep inspiration. She lives in one story home with her . She states she is able to feed and bath herself. She uses home oxygen at home at nite and prn at two liters per minute. She denies using dme. She states she is attempting to purchase a cane. Her daughter lives three doors down from her. Her daughter takes her to follow up appointments. She has used home health in the past however she is not sure of the name of the agency. She has not used rehab in the past per her daughter. She states she gets her prescriptions filled at [facility]. Patient states a casemanager from [facility] [employer] [facility] comes to see her once a week. Care Management Interventions  PCP Verified by CM:  Yes  Current Support Network: Lives with Spouse  Confirm Follow Up Transport: Family  Plan discussed with Pt/Family/Caregiver: Yes  Discharge Location  Discharge Placement: Home                Kathrine PHAMBSNCRM EXT [id number]

## 2017-02-06 NOTE — CONSULTS
[facility]  Pulmonary, Critical Care, and Sleep Medicine    Initial Patient Consult    Name: Sofy Gallegos MRN: [id number]   : 1955 Hospital: [facility]   Date: 2017        IMPRESSION:   · Acute on chronic respiratory failure  · Abnormal chest ct  · Head and neck Ca      RECOMMENDATIONS:   · O2  · Empiric abx, doubt this is pneumonia  · Check BNP  · Chest CT c/w end stage metastatic disease  · Palliative care consult  · Poor overall prognosis     Subjective: This patient has been seen and evaluated at the request of Dr. Cora Paige for acute respiratory failure. Patient is a 57 y.o. female presented with SOB  No fever, no chills  Today still hypoxic, in some distress      Past Medical History   Diagnosis Date    Bipolar 1 disorder ([address] 2011    Cancer [address] [address]      skin cancer buttocks    Cancer ([facility] [address]      throat    Chronic pain      FIBROMYALGIA, LEGS    Encounter for long-term (current) use of other medications 2011    Essential hypertension, benign 2011    Laryngeal cancer ([facility] [address] 2015    Laryngeal mass     Psychiatric disorder      bipolar      Past Surgical History   Procedure Laterality Date    Place percut gastrostomy tube  10/28/2014      has been removed     Hx heent       BX OF NECK MASS    Hx gi       PLACEMENT OF G TUBE    Hx gi       ESOPH. DILATATION    Hx breast augmentation Bilateral      SALINE IMPLANTS    Hx gyn       tubal pregnancy    Hx hysterectomy        Prior to Admission medications    Medication Sig Start Date End Date Taking? Authorizing Provider   VITAMIN B-12 1,000 mcg sublingual tablet  16  Yes Historical Provider   gabapentin (NEURONTIN) 300 mg capsule Take 2 Caps by mouth three (3) times daily. 17  Yes Sveta Saavedra MD   HYDROcodone-acetaminophen [facility])  mg tablet Take 1 Tab by mouth every four (4) hours as needed. Max Daily Amount: 6 Tabs.  17  Yes Kyara Cola Savannah Hernandez MD   metoprolol tartrate (LOPRESSOR) 25 mg tablet Take 1 Tab by mouth every twelve (12) hours. 1/24/17  Yes Mj Kay MD   guaiFENesin-dextromethorphan [facility] DM) 600-30 mg per tablet Take 1 Tab by mouth two (2) times a day. 1/24/17  Yes Mj Kay MD   Nebulizer & Compressor machine 1 Each by Does Not Apply route four (4) times daily as needed. 1/24/17  Yes Mj Kay MD   senna (SENNA) 8.6 mg tablet Take 1 Tab by mouth daily. Yes Historical Provider   lithium carbonate CR (ESKALITH CR) 450 mg CR tablet TAKE 1 TABLET BY ORAL ROUTE PER AT BEDTIME 11/9/16  Yes Historical Provider   FLUoxetine (PROZAC) 20 mg tablet Take 20 mg by mouth daily. 2/5/16  Yes Historical Provider   traZODone (DESYREL) 100 mg tablet Take 200 mg by mouth nightly. 11/2/15  Yes Historical Provider   SORE THROAT, BENZOCAINE-MENTH, 15-2.6 mg lozg lozenge  11/11/16   Historical Provider   acetylcysteine (MUCOMYST) 100 mg/mL (10 %) nebulizer solution Take 4 mL by inhalation every four (4) hours. 1/26/17   Mj Kay MD   albuterol-ipratropium (DUO-NEB) 2.5 mg-0.5 mg/3 ml nebu 3 mL by Nebulization route every six (6) hours as needed. 1/24/17   Mj Kay MD   amLODIPine (NORVASC) 10 mg tablet Take 1 Tab by mouth daily. 1/24/17   Mj Kay MD   bisacodyl 5 mg tab Take 1 Tab by mouth daily as needed. Historical Provider   amitriptyline (ELAVIL) 10 mg tablet Take 1 Tab by mouth nightly.  2/29/16   Mj Kay MD   SORE THROAT, BENZOCAINE-MENTH, 15-2.6 mg lozg lozenge  11/18/15   Historical Provider     No Known Allergies   Social History   Substance Use Topics    Smoking status: Former Smoker     Packs/day: 1.00     Years: 40.00     Quit date: 8/27/2014    Smokeless tobacco: Never Used      Comment: PASSIVE SMOKE EXPOSURE,  SMOKES    Alcohol use No      Family History   Problem Relation Age of Onset    Cancer Father      bone/skin/lung  Anesth Problems Neg Hx         Current Facility-Administered Medications   Medication Dose Route Frequency    amitriptyline (ELAVIL) tablet 10 mg  10 mg Oral QHS    amLODIPine (NORVASC) tablet 10 mg  10 mg Oral DAILY    FLUoxetine (PROzac) capsule 20 mg  20 mg Oral DAILY    gabapentin (NEURONTIN) capsule 600 mg  600 mg Oral TID    lithium carbonate CR (ESKALITH CR) tablet 450 mg  450 mg Oral QHS    traZODone (DESYREL) tablet 200 mg  200 mg Oral QHS    polyethylene glycol (MIRALAX) packet 17 g  17 g Oral DAILY    heparin (porcine) injection 5,000 Units  5,000 Units SubCUTAneous Q12H    0.9% sodium chloride infusion  50 mL/hr IntraVENous CONTINUOUS    sodium chloride (NS) flush 5-10 mL  5-10 mL IntraVENous Q8H    guaiFENesin SR (MUCINEX) tablet 1,200 mg  1,200 mg Oral BID    metoprolol tartrate (LOPRESSOR) tablet 25 mg  25 mg Oral Q12H    piperacillin-tazobactam (ZOSYN) 3.375 g in 0.9% sodium chloride (MBP/ADV) 100 mL  3.375 g IntraVENous Q6H    methylPREDNISolone (PF) (SOLU-MEDROL) injection 60 mg  60 mg IntraVENous Q8H    levoFLOXacin (LEVAQUIN) 750 mg in D5W IVPB  750 mg IntraVENous Q24H    albuterol-ipratropium (DUO-NEB) 2.5 MG-0.5 MG/3 ML  3 mL Nebulization QID RT       Review of Systems:  A comprehensive review of systems was negative except for: Respiratory: positive for dyspnea on exertion    Objective:   Vital Signs:    Visit Vitals    /75 (BP 1 Location: Right arm, BP Patient Position: At rest;Post activity)    Pulse 90    Temp 99.5 °F (37.5 °C)    Resp 22    Ht 5' 9\" (1.753 m)    Wt 69.2 kg (152 lb 9.6 oz)    LMP  (LMP Unknown)    SpO2 95%    Breastfeeding No    BMI 22.54 kg/m2       O2 Device: Nasal cannula   O2 Flow Rate (L/min): 6 l/min   Temp (24hrs), Av.9 °F (37.2 °C), Min:98.3 °F (36.8 °C), Max:99.5 °F (37.5 °C)       Intake/Output:   Last shift:      701 - 1900  In: 100 [I.V.:100]  Out: -   Last 3 shifts: 1901 - 700  In: 260.4 [I.V.:260.4]  Out: 400 [Urine:400]    Intake/Output Summary (Last 24 hours) at 02/06/17 1130  Last data filed at 02/06/17 0759   Gross per 24 hour   Intake           360.42 ml   Output              400 ml   Net           -39.58 ml      Physical Exam:   General:  Alert, cooperative, no distress, appears stated age. Head:  Normocephalic, without obvious abnormality, atraumatic. Eyes:  Conjunctivae/corneas clear. PERRL, EOMs intact. Nose: Nares normal. Septum midline. Mucosa normal. No drainage or sinus tenderness. Throat: Lips, mucosa, and tongue normal. Teeth and gums normal.   Neck: Supple, symmetrical, trachea midline, no adenopathy, thyroid: no enlargment/tenderness/nodules, no carotid bruit and no JVD. Back:   Symmetric, no curvature. ROM normal.   Lungs:   Clear to auscultation bilaterally. Chest wall:  No tenderness or deformity. Heart:  Regular rate and rhythm, S1, S2 normal, no murmur, click, rub or gallop. Abdomen:   Soft, non-tender. Bowel sounds normal. No masses,  No organomegaly. Extremities: Extremities normal, atraumatic, no cyanosis or edema. Pulses: 2+ and symmetric all extremities.    Skin: Skin color, texture, turgor normal. No rashes or lesions   Lymph nodes: Cervical, supraclavicular, and axillary nodes normal.   Neurologic: Grossly nonfocal     Data review:     Recent Results (from the past 24 hour(s))   EKG, 12 LEAD, INITIAL    Collection Time: 02/05/17  8:59 PM   Result Value Ref Range    Ventricular Rate 99 BPM    Atrial Rate 99 BPM    P-R Interval 154 ms    QRS Duration 88 ms    Q-T Interval 370 ms    QTC Calculation (Bezet) 474 ms    Calculated P Axis 60 degrees    Calculated R Axis 62 degrees    Calculated T Axis 59 degrees    Diagnosis       Normal sinus rhythm  Normal ECG  When compared with ECG of 15-JOSE RAFAEL-2017 11:06,  No significant change was found     BLOOD GAS, ARTERIAL    Collection Time: 02/05/17  9:15 PM   Result Value Ref Range    pH 7.32 (L) 7.35 - 7.45 PCO2 58 (H) 35.0 - 45.0 mmHg    PO2 104 (H) 80 - 100 mmHg    O2 SAT 97 92 - 97 %    BICARBONATE 29 (H) 22 - 26 mmol/L    BASE EXCESS 1.8 mmol/L    O2 METHOD NR      O2 FLOW RATE 12.00 L/min    MODE OTHER      Sample source ARTERIAL      SITE RIGHT RADIAL      INESSA'S TEST YES     CBC WITH AUTOMATED DIFF    Collection Time: 02/05/17  9:34 PM   Result Value Ref Range    WBC 11.7 (H) 3.6 - 11.0 K/uL    RBC 3.72 (L) 3.80 - 5.20 M/uL    HGB 10.3 (L) 11.5 - 16.0 g/dL    HCT 34.4 (L) 35.0 - 47.0 %    MCV 92.5 80.0 - 99.0 FL    MCH 27.7 26.0 - 34.0 PG    MCHC 29.9 (L) 30.0 - 36.5 g/dL    RDW 15.6 (H) 11.5 - 14.5 %    PLATELET 888 966 - 466 K/uL    NEUTROPHILS 83 (H) 32 - 75 %    LYMPHOCYTES 7 (L) 12 - 49 %    MONOCYTES 7 5 - 13 %    EOSINOPHILS 3 0 - 7 %    BASOPHILS 0 0 - 1 %    ABS. NEUTROPHILS 9.6 (H) 1.8 - 8.0 K/UL    ABS. LYMPHOCYTES 0.9 0.8 - 3.5 K/UL    ABS. MONOCYTES 0.9 0.0 - 1.0 K/UL    ABS. EOSINOPHILS 0.3 0.0 - 0.4 K/UL    ABS. BASOPHILS 0.0 0.0 - 0.1 K/UL   CK W/ CKMB & INDEX    Collection Time: 02/05/17  9:34 PM   Result Value Ref Range    CK 22 (L) 26 - 192 U/L    CK - MB 1.3 <3.6 NG/ML    CK-MB Index 5.9 (H) 0 - 2.5     METABOLIC PANEL, COMPREHENSIVE    Collection Time: 02/05/17  9:34 PM   Result Value Ref Range    Sodium 137 136 - 145 mmol/L    Potassium 4.4 3.5 - 5.1 mmol/L    Chloride 101 97 - 108 mmol/L    CO2 32 21 - 32 mmol/L    Anion gap 4 (L) 5 - 15 mmol/L    Glucose 138 (H) 65 - 100 mg/dL    BUN 27 (H) 6 - 20 MG/DL    Creatinine 1.19 (H) 0.55 - 1.02 MG/DL    BUN/Creatinine ratio 23 (H) 12 - 20      GFR est AA 56 (L) >60 ml/min/1.73m2    GFR est non-AA 46 (L) >60 ml/min/1.73m2    Calcium 9.1 8.5 - 10.1 MG/DL    Bilirubin, total 0.2 0.2 - 1.0 MG/DL    ALT (SGPT) 13 12 - 78 U/L    AST (SGOT) 11 (L) 15 - 37 U/L    Alk.  phosphatase 64 45 - 117 U/L    Protein, total 7.4 6.4 - 8.2 g/dL    Albumin 3.2 (L) 3.5 - 5.0 g/dL    Globulin 4.2 (H) 2.0 - 4.0 g/dL    A-G Ratio 0.8 (L) 1.1 - 2.2     TROPONIN I Collection Time: 02/05/17  9:34 PM   Result Value Ref Range    Troponin-I, Qt. <0.04 <0.05 ng/mL   MAGNESIUM    Collection Time: 02/05/17  9:34 PM   Result Value Ref Range    Magnesium 2.3 1.6 - 2.4 mg/dL   LACTIC ACID, PLASMA    Collection Time: 02/05/17  9:34 PM   Result Value Ref Range    Lactic acid 1.0 0.4 - 2.0 MMOL/L   CBC WITH AUTOMATED DIFF    Collection Time: 02/06/17  4:22 AM   Result Value Ref Range    WBC 11.3 (H) 3.6 - 11.0 K/uL    RBC 3.84 3.80 - 5.20 M/uL    HGB 10.4 (L) 11.5 - 16.0 g/dL    HCT 36.5 35.0 - 47.0 %    MCV 95.1 80.0 - 99.0 FL    MCH 27.1 26.0 - 34.0 PG    MCHC 28.5 (L) 30.0 - 36.5 g/dL    RDW 16.2 (H) 11.5 - 14.5 %    PLATELET 018 003 - 137 K/uL    NEUTROPHILS 82 (H) 32 - 75 %    LYMPHOCYTES 10 (L) 12 - 49 %    MONOCYTES 7 5 - 13 %    EOSINOPHILS 1 0 - 7 %    BASOPHILS 0 0 - 1 %    ABS. NEUTROPHILS 9.3 (H) 1.8 - 8.0 K/UL    ABS. LYMPHOCYTES 1.1 0.8 - 3.5 K/UL    ABS. MONOCYTES 0.8 0.0 - 1.0 K/UL    ABS. EOSINOPHILS 0.1 0.0 - 0.4 K/UL    ABS.  BASOPHILS 0.0 0.0 - 0.1 K/UL    RBC COMMENTS OVALOCYTES  PRESENT        DF SMEAR SCANNED     METABOLIC PANEL, BASIC    Collection Time: 02/06/17  4:22 AM   Result Value Ref Range    Sodium 140 136 - 145 mmol/L    Potassium 4.3 3.5 - 5.1 mmol/L    Chloride 107 97 - 108 mmol/L    CO2 27 21 - 32 mmol/L    Anion gap 6 5 - 15 mmol/L    Glucose 116 (H) 65 - 100 mg/dL    BUN 21 (H) 6 - 20 MG/DL    Creatinine 1.02 0.55 - 1.02 MG/DL    BUN/Creatinine ratio 21 (H) 12 - 20      GFR est AA >60 >60 ml/min/1.73m2    GFR est non-AA 55 (L) >60 ml/min/1.73m2    Calcium 8.9 8.5 - 10.1 MG/DL   MAGNESIUM    Collection Time: 02/06/17  4:22 AM   Result Value Ref Range    Magnesium 2.4 1.6 - 2.4 mg/dL       Imaging:  I have personally reviewed the patients radiographs and have reviewed the reports:  CXR: bilateral infiltrates, worse        Ree MD Queenie

## 2017-02-06 NOTE — PROGRESS NOTES
Pharmacy Medication Reconciliation      The patient was interviewed regarding current PTA medication list, use and drug allergies; Daughter present in room and obtained permission from patient to discuss drug regimen with visitor(s) present. The patient was questioned regarding use of any other inhalers, topical products, over the counter medications, herbal medications, vitamin products or ophthalmic/nasal/otic medication use.      Allergy Update: no update      Recommendations/Findings: The following amendments were made to the patient's active medication list on file at [facility]:     1) Additions: miracle mouth wash      2) Deletions:   1. Duo-neb 2.5 mg- 0.5mg- not taking  2. Bisacodyl 5 mg tab- not taking  3. Medical equipment- nebulizer  4. Sore throat lozenge- not taking      3) Changes: added sig for vit B-12      **Patient provided lithium carbonate  mg from home to use as inpatient. Patient also has miracle mouthwash that did not have listed ingredients**      -Clarified PTA med list with rx query and patient medication brought in. PTA medication list was corrected to the following:      Prior to Admission Medications   Prescriptions Last Dose Informant Patient Reported? Taking? FLUoxetine (PROZAC) 20 mg tablet 2017   Yes Yes   Sig: Take 20 mg by mouth daily. HYDROcodone-acetaminophen (NORCO)  mg tablet 2017   No Yes   Sig: Take 1 Tab by mouth every four (4) hours as needed. Max Daily Amount: 6 Tabs. VITAMIN B-12 1,000 mcg sublingual tablet 2017   Yes Yes   Si,000 mcg by SubLINGual route daily. acetylcysteine (MUCOMYST) 100 mg/mL (10 %) nebulizer solution 2017   No Yes   Sig: Take 4 mL by inhalation every four (4) hours. amLODIPine (NORVASC) 10 mg tablet 17   No Yes   Sig: Take 1 Tab by mouth daily. amitriptyline (ELAVIL) 10 mg tablet 2017   No Yes   Sig: Take 1 Tab by mouth nightly.    diphenhydrAMINE 12.5 mg/5 mL elix 40 mL, lidocaine 2 % soln 40 mL, aluminum & magnesium hydroxide-simethicone 400-400-40 mg/5 mL susp 40 mL 2/5/2017   Yes Yes   Sig: Take 5 mL by mouth daily. gabapentin (NEURONTIN) 300 mg capsule 2/5/2017   No Yes   Sig: Take 2 Caps by mouth three (3) times daily. guaiFENesin-dextromethorphan [facility] DM) 600-30 mg per tablet 2/5/2017   No Yes   Sig: Take 1 Tab by mouth two (2) times a day. lithium carbonate CR (ESKALITH CR) 450 mg CR tablet 2/5/2017   Yes Yes   Sig: TAKE 1 TABLET BY ORAL ROUTE PER AT BEDTIME   metoprolol tartrate (LOPRESSOR) 25 mg tablet 2/5/2017   No Yes   Sig: Take 1 Tab by mouth every twelve (12) hours. senna (SENNA) 8.6 mg tablet 2/5/2017   Yes Yes   Sig: Take 1 Tab by mouth daily.    traZODone (DESYREL) 100 mg tablet 2/5/2017 Self Yes Yes   Sig: Take 200 mg by mouth nightly.       Facility-Administered Medications: None             Thank you,  Mason Summers, PharmD Candidate 2017  [facility]

## 2017-02-06 NOTE — PROGRESS NOTES
General Daily Progress Note    Admit Date: 2/5/2017  Hospital day 2    Subjective:     Patient has [address] Loni Oates    Medication side effects: none    Current Facility-Administered Medications   Medication Dose Route Frequency    acetylcysteine (MUCOMYST) 200 mg/mL (20 %) solution 400 mg  2 mL Inhalation QID RT    amitriptyline (ELAVIL) tablet 10 mg  10 mg Oral QHS    amLODIPine (NORVASC) tablet 10 mg  10 mg Oral DAILY    FLUoxetine (PROzac) capsule 20 mg  20 mg Oral DAILY    gabapentin (NEURONTIN) capsule 600 mg  600 mg Oral TID    guaiFENesin-dextromethorphan SR (HUMIBID DM) 600-30 mg tablet 1 Tab  1 Tab Oral BID    lithium carbonate CR (ESKALITH CR) tablet 450 mg  450 mg Oral QHS    traZODone (DESYREL) tablet 200 mg  200 mg Oral QHS    acetaminophen (TYLENOL) tablet 650 mg  650 mg Oral Q4H PRN    polyethylene glycol (MIRALAX) packet 17 g  17 g Oral DAILY    heparin (porcine) injection 5,000 Units  5,000 Units SubCUTAneous Q12H    albuterol-ipratropium (DUO-NEB) 2.5 MG-0.5 MG/3 ML  3 mL Nebulization Q2H PRN    0.9% sodium chloride infusion  125 mL/hr IntraVENous CONTINUOUS    sodium chloride (NS) flush 5-10 mL  5-10 mL IntraVENous Q8H    sodium chloride (NS) flush 5-10 mL  5-10 mL IntraVENous PRN    guaiFENesin SR (MUCINEX) tablet 1,200 mg  1,200 mg Oral BID    metoprolol tartrate (LOPRESSOR) tablet 25 mg  25 mg Oral Q12H        Review of Systems  Constitutional: positive for fatigue and malaise  Respiratory: positive for cough, pleurisy/chest pain or stridor  Cardiovascular: negative  Gastrointestinal: positive for dysphagia  Behavioral/Psych: positive for bipolar    Objective:     Patient Vitals for the past 8 hrs:   BP Temp Pulse Resp SpO2 Height Weight   02/06/17 0311 92/60 98.9 °F (37.2 °C) 70 19 95 % 5' 9\" (1.753 m) 152 lb 9.6 oz (69.2 kg)   02/06/17 0215 100/53 - 88 21 - - -   02/06/17 0214 - - - - 90 % - -   02/06/17 0200 99/61 - 89 14 92 % - -   02/06/17 0145 126/74 - 95 26 90 % - -   02/06/17 0130 109/63 - 90 17 96 % - -   02/06/17 0115 107/62 - 88 20 93 % - -   02/06/17 0100 105/64 - 84 15 95 % - -   02/06/17 0045 119/83 - 82 22 94 % - -   02/06/17 0039 (!) 89/55 - 72 - - - -   02/06/17 0030 (!) 89/55 - 70 14 97 % - -   02/06/17 0015 90/60 - 74 14 97 % - -   02/06/17 0001 90/59 - 75 14 96 % - -   02/05/17 2345 104/73 - 77 15 96 % - -   02/05/17 2330 95/61 - 76 13 95 % - -   02/05/17 2311 123/82 - 83 24 93 % - -     02/05 1901 - 02/06 0700  In: 218.8 [I.V.:218.8]  Out: 400 [Urine:400]       Physical Exam:   Visit Vitals    BP 92/60 (BP 1 Location: Right arm, BP Patient Position: Standing; At rest)    Pulse 70    Temp 98.9 °F (37.2 °C)    Resp 19    Ht 5' 9\" (1.753 m)    Wt 152 lb 9.6 oz (69.2 kg)    LMP  (LMP Unknown)    SpO2 95%    BMI 22.54 kg/m2     General appearance: alert, fatigued, cooperative, no distress, appears stated age  Throat: Lips, mucosa, and tongue normal. Teeth and gums normal  Lungs: diminished breath sounds DIFFUSELY  Heart: regular rate and rhythm  Abdomen: soft, non-tender.  Bowel sounds normal. No masses,  no organomegaly  Neurologic: Grossly normal      ECG: normal sinus rhythm     Data Review   Recent Results (from the past 24 hour(s))   EKG, 12 LEAD, INITIAL    Collection Time: 02/05/17  8:59 PM   Result Value Ref Range    Ventricular Rate 99 BPM    Atrial Rate 99 BPM    P-R Interval 154 ms    QRS Duration 88 ms    Q-T Interval 370 ms    QTC Calculation (Bezet) 474 ms    Calculated P Axis 60 degrees    Calculated R Axis 62 degrees    Calculated T Axis 59 degrees    Diagnosis       Normal sinus rhythm  Normal ECG  When compared with ECG of 15-JOSE RAFAEL-2017 11:06,  No significant change was found     BLOOD GAS, ARTERIAL    Collection Time: 02/05/17  9:15 PM   Result Value Ref Range    pH 7.32 (L) 7.35 - 7.45      PCO2 58 (H) 35.0 - 45.0 mmHg    PO2 104 (H) 80 - 100 mmHg    O2 SAT 97 92 - 97 %    BICARBONATE 29 (H) 22 - 26 mmol/L    BASE EXCESS 1.8 mmol/L O2 METHOD NR      O2 FLOW RATE 12.00 L/min    MODE OTHER      Sample source ARTERIAL      SITE RIGHT RADIAL      INESSA'S TEST YES     CBC WITH AUTOMATED DIFF    Collection Time: 02/05/17  9:34 PM   Result Value Ref Range    WBC 11.7 (H) 3.6 - 11.0 K/uL    RBC 3.72 (L) 3.80 - 5.20 M/uL    HGB 10.3 (L) 11.5 - 16.0 g/dL    HCT 34.4 (L) 35.0 - 47.0 %    MCV 92.5 80.0 - 99.0 FL    MCH 27.7 26.0 - 34.0 PG    MCHC 29.9 (L) 30.0 - 36.5 g/dL    RDW 15.6 (H) 11.5 - 14.5 %    PLATELET 010 786 - 420 K/uL    NEUTROPHILS 83 (H) 32 - 75 %    LYMPHOCYTES 7 (L) 12 - 49 %    MONOCYTES 7 5 - 13 %    EOSINOPHILS 3 0 - 7 %    BASOPHILS 0 0 - 1 %    ABS. NEUTROPHILS 9.6 (H) 1.8 - 8.0 K/UL    ABS. LYMPHOCYTES 0.9 0.8 - 3.5 K/UL    ABS. MONOCYTES 0.9 0.0 - 1.0 K/UL    ABS. EOSINOPHILS 0.3 0.0 - 0.4 K/UL    ABS. BASOPHILS 0.0 0.0 - 0.1 K/UL   CK W/ CKMB & INDEX    Collection Time: 02/05/17  9:34 PM   Result Value Ref Range    CK 22 (L) 26 - 192 U/L    CK - MB 1.3 <3.6 NG/ML    CK-MB Index 5.9 (H) 0 - 2.5     METABOLIC PANEL, COMPREHENSIVE    Collection Time: 02/05/17  9:34 PM   Result Value Ref Range    Sodium 137 136 - 145 mmol/L    Potassium 4.4 3.5 - 5.1 mmol/L    Chloride 101 97 - 108 mmol/L    CO2 32 21 - 32 mmol/L    Anion gap 4 (L) 5 - 15 mmol/L    Glucose 138 (H) 65 - 100 mg/dL    BUN 27 (H) 6 - 20 MG/DL    Creatinine 1.19 (H) 0.55 - 1.02 MG/DL    BUN/Creatinine ratio 23 (H) 12 - 20      GFR est AA 56 (L) >60 ml/min/1.73m2    GFR est non-AA 46 (L) >60 ml/min/1.73m2    Calcium 9.1 8.5 - 10.1 MG/DL    Bilirubin, total 0.2 0.2 - 1.0 MG/DL    ALT (SGPT) 13 12 - 78 U/L    AST (SGOT) 11 (L) 15 - 37 U/L    Alk.  phosphatase 64 45 - 117 U/L    Protein, total 7.4 6.4 - 8.2 g/dL    Albumin 3.2 (L) 3.5 - 5.0 g/dL    Globulin 4.2 (H) 2.0 - 4.0 g/dL    A-G Ratio 0.8 (L) 1.1 - 2.2     TROPONIN I    Collection Time: 02/05/17  9:34 PM   Result Value Ref Range    Troponin-I, Qt. <0.04 <0.05 ng/mL   MAGNESIUM    Collection Time: 02/05/17  9:34 PM Result Value Ref Range    Magnesium 2.3 1.6 - 2.4 mg/dL   LACTIC ACID, PLASMA    Collection Time: 02/05/17  9:34 PM   Result Value Ref Range    Lactic acid 1.0 0.4 - 2.0 MMOL/L   CBC WITH AUTOMATED DIFF    Collection Time: 02/06/17  4:22 AM   Result Value Ref Range    WBC 11.3 (H) 3.6 - 11.0 K/uL    RBC 3.84 3.80 - 5.20 M/uL    HGB 10.4 (L) 11.5 - 16.0 g/dL    HCT 36.5 35.0 - 47.0 %    MCV 95.1 80.0 - 99.0 FL    MCH 27.1 26.0 - 34.0 PG    MCHC 28.5 (L) 30.0 - 36.5 g/dL    RDW 16.2 (H) 11.5 - 14.5 %    PLATELET 668 178 - 902 K/uL    NEUTROPHILS 82 (H) 32 - 75 %    LYMPHOCYTES 10 (L) 12 - 49 %    MONOCYTES 7 5 - 13 %    EOSINOPHILS 1 0 - 7 %    BASOPHILS 0 0 - 1 %    ABS. NEUTROPHILS 9.3 (H) 1.8 - 8.0 K/UL    ABS. LYMPHOCYTES 1.1 0.8 - 3.5 K/UL    ABS. MONOCYTES 0.8 0.0 - 1.0 K/UL    ABS. EOSINOPHILS 0.1 0.0 - 0.4 K/UL    ABS.  BASOPHILS 0.0 0.0 - 0.1 K/UL    RBC COMMENTS OVALOCYTES  PRESENT        DF SMEAR SCANNED     METABOLIC PANEL, BASIC    Collection Time: 02/06/17  4:22 AM   Result Value Ref Range    Sodium 140 136 - 145 mmol/L    Potassium 4.3 3.5 - 5.1 mmol/L    Chloride 107 97 - 108 mmol/L    CO2 27 21 - 32 mmol/L    Anion gap 6 5 - 15 mmol/L    Glucose 116 (H) 65 - 100 mg/dL    BUN 21 (H) 6 - 20 MG/DL    Creatinine 1.02 0.55 - 1.02 MG/DL    BUN/Creatinine ratio 21 (H) 12 - 20      GFR est AA >60 >60 ml/min/1.73m2    GFR est non-AA 55 (L) >60 ml/min/1.73m2    Calcium 8.9 8.5 - 10.1 MG/DL   MAGNESIUM    Collection Time: 02/06/17  4:22 AM   Result Value Ref Range    Magnesium 2.4 1.6 - 2.4 mg/dL           Assessment:     Principal Problem:    Acute respiratory failure (HCC) (2/6/2017)    Active Problems:    Dysphagia (12/30/2014)      History of laryngeal cancer (11/2/2015)      History of radiation to head and neck region (3/30/2016)      Essential hypertension, benign (11/29/2011)      Bipolar 1 disorder ([facility] [address] (11/29/2011)      Chronic pain (1/15/2017)        Plan:     Remains hypoxic,requiring NRB.Afebrile,slightly hypomanic and obsessed with pain controll as usual.Continue current meds and treatments. CTA?

## 2017-02-06 NOTE — ED PROVIDER NOTES
HPI Comments: Mike Kerns is a 57 y.o. female, who presents ambulatory to the ED c/o progressively worsening SOB x yesterday. Pt reports additional productive cough with yellow sputum and L sided CP that is exacerbated with deep inspiration. Pt states she was discharged from the hospital, after being treated for PNA, on 1/24/2017. She notes adherence with her rx'd medications and states she was instructed to use home oxygen as needed. Pt states she attempted to use her nasal canula this evening with no relief of sx's. Pt denies any recent follow up with her PCP since her discharge from the hospital. Pt denies any recent fever, chills, nausea, vomiting, diarrhea, or abd pain. On evaluation in the ED, pt's initial SpO2 62% on RA, improved to the low 90s with NRB. PCP: Ofelia Oglesby MD    Allergies: NKDA  PMHx: Significant for bipolar disorder, chronic pain, Laryngeal CA  PSHx: Significant for gastrostomy tube placement / removal, BL breast implants, hysterectomy, ectopic pregnancy  Social Hx: -tobacco (former 1705), -EtOH, -Illicit Drugs    There are no other complaints, changes, or physical findings at this time. The history is provided by the spouse and the patient. Past Medical History:   Diagnosis Date    Bipolar 1 disorder ([address] 11/29/2011    Cancer ([address]      skin cancer buttocks    Cancer (HCC)      throat    Chronic pain      FIBROMYALGIA, LEGS    Encounter for long-term (current) use of other medications 11/29/2011    Essential hypertension, benign 11/29/2011    [facility] cancer [facility] 11/2/2015    Laryngeal mass     Psychiatric disorder      bipolar       Past Surgical History:   Procedure Laterality Date    Place percut gastrostomy tube  10/28/2014      has been removed 2015    Hx heent       BX OF NECK MASS    Hx gi       PLACEMENT OF G TUBE    Hx gi       ESOPH.  DILATATION    Hx breast augmentation Bilateral      SALINE IMPLANTS    Hx gyn       tubal pregnancy  Hx hysterectomy           Family History:   Problem Relation Age of Onset    Cancer Father      bone/skin/lung    Anesth Problems Neg Hx        Social History     Social History    Marital status:      Spouse name: N/A    Number of children: N/A    Years of education: N/A     Occupational History    Not on file. Social History Main Topics    Smoking status: Former Smoker     Packs/day: 1.00     Years: 40.00     Quit date: 8/27/2014    Smokeless tobacco: Never Used      Comment: PASSIVE SMOKE EXPOSURE,  SMOKES    Alcohol use No    Drug use: No    Sexual activity: Not on file     Other Topics Concern    Not on file     Social History Narrative         ALLERGIES: Review of patient's allergies indicates no known allergies. Review of Systems   Constitutional: Negative for chills, fatigue and fever. HENT: Negative for congestion, rhinorrhea and sore throat. Eyes: Negative for pain, discharge and visual disturbance. Respiratory: Positive for cough and shortness of breath. Negative for chest tightness and wheezing. Cardiovascular: Positive for chest pain (L sided). Negative for palpitations and leg swelling. Gastrointestinal: Negative for abdominal pain, constipation, diarrhea, nausea and vomiting. Genitourinary: Negative for dysuria, frequency and hematuria. Musculoskeletal: Negative for arthralgias, back pain and myalgias. Skin: Negative for rash. Neurological: Negative for dizziness, weakness, light-headedness and headaches. Psychiatric/Behavioral: Negative. Patient Vitals for the past 12 hrs:   Temp Pulse Resp BP SpO2   02/05/17 2108 - - - - 95 %   02/05/17 2107 98.3 °F (36.8 °C) (!) 101 18 137/80 (!) 62 %              Physical Exam   Constitutional: She is oriented to person, place, and time. She appears well-developed and well-nourished. No distress. HENT:   Head: Normocephalic and atraumatic. Eyes: EOM are normal. Right eye exhibits no discharge. Left eye exhibits no discharge. No scleral icterus. Neck: Normal range of motion. Neck supple. No tracheal deviation present. Cardiovascular: Regular rhythm, normal heart sounds and intact distal pulses. Tachycardia present. Exam reveals no gallop and no friction rub. No murmur heard. Pulmonary/Chest: She is in respiratory distress (mild respiratory distress). She has no wheezes. Diffuse crackles. Abdominal: Soft. She exhibits no distension. There is no tenderness. Musculoskeletal: Normal range of motion. She exhibits no edema. Lymphadenopathy:     She has no cervical adenopathy. Neurological: She is alert and oriented to person, place, and time. No focal neuro deficits   Skin: Skin is warm and dry. No rash noted. Psychiatric: She has a normal mood and affect. Nursing note and vitals reviewed. MDM  Number of Diagnoses or Management Options  Diagnosis management comments:     Differential includes metastatic disease, mucous plugging, pulmonary edema, pleural effusion, pneumonia, PE, ACS    Patient is a 58 yo female with a history of H/N cancer - she was recently admitted for severe bilateral pneumonia and had CT chest that showed probable diffuse metastatic disease. She is afebrile today without leukocytosis but significantly hypoxic which is likely due to extensive probable metastatic disease. Doubt pneumonia at this time so will defer antibiotics. Will treat with IVF, duo-nebs and admit for further management.          Amount and/or Complexity of Data Reviewed  Clinical lab tests: ordered and reviewed  Tests in the radiology section of CPT®: ordered and reviewed  Tests in the medicine section of CPT®: ordered and reviewed  Obtain history from someone other than the patient: yes (Spouse)  Review and summarize past medical records: yes  Discuss the patient with other providers: yes (Hospitalist)  Independent visualization of images, tracings, or specimens: yes        Procedures    EKG interpretation: (Preliminary)2059  Rhythm: normal sinus rhythm; and regular . Rate (approx.): 99bpm; Axis: normal; KS interval: normal; QRS interval: normal ; ST/T wave: normal; in  Lead. Written by SHAYNE Adame, as dictated by Sandy Livingston MD    PROGRESS NOTE:  11:08 PM  Pt reevaluated. Pt and spouse updated on all available lab and imaging findings. Given CXR findings will consult with hospitalist for admission. Written by SHAYNE Adame, as dictated by Sandy Livingston MD    CONSULT NOTE:   11:19 PM  Sandy Livingston MD spoke with Farhan Smith MD,   Specialty: Hospitalist  Discussed pt's hx, disposition, and available diagnostic and imaging results. Reviewed care plans. Consultant will evaluate pt for admission. Written by SHAYNE Admae, as dictated by Sandy Livingston MD.    CRITICAL CARE NOTE :    11:19 PM    IMPENDING DETERIORATION - Respiratory, Cardiovascular    ASSOCIATED RISK FACTORS - Hypoxia, hypotension    MANAGEMENT- Bedside assessment, supervision of care    INTERPRETATION -  EKG, CXR, labs, vital signs    INTERVENTIONS - Oxygen, duo-nebs, IVF    CASE REVIEW - Hospitalist, nursing, family    TREATMENT RESPONSE - Improved    PERFORMED BY - Self    NOTES:  I have spent 60 minutes of critical care time involved in lab review, consultations with specialist, family decision- making, bedside attention and documentation. During this entire length of time I was immediately available to the patient .   Sandy Livingston MD    LABORATORY TESTS:  Recent Results (from the past 12 hour(s))   BLOOD GAS, ARTERIAL    Collection Time: 02/05/17  9:15 PM   Result Value Ref Range    pH 7.32 (L) 7.35 - 7.45      PCO2 58 (H) 35.0 - 45.0 mmHg    PO2 104 (H) 80 - 100 mmHg    O2 SAT 97 92 - 97 %    BICARBONATE 29 (H) 22 - 26 mmol/L    BASE EXCESS 1.8 mmol/L    O2 METHOD NRM      O2 FLOW RATE 12.00 L/min    MODE OTHER Sample source ARTERIAL      SITE RIGHT RADIAL      INESSA'S TEST YES     CBC WITH AUTOMATED DIFF    Collection Time: 02/05/17  9:34 PM   Result Value Ref Range    WBC 11.7 (H) 3.6 - 11.0 K/uL    RBC 3.72 (L) 3.80 - 5.20 M/uL    HGB 10.3 (L) 11.5 - 16.0 g/dL    HCT 34.4 (L) 35.0 - 47.0 %    MCV 92.5 80.0 - 99.0 FL    MCH 27.7 26.0 - 34.0 PG    MCHC 29.9 (L) 30.0 - 36.5 g/dL    RDW 15.6 (H) 11.5 - 14.5 %    PLATELET 171 411 - 423 K/uL    NEUTROPHILS 83 (H) 32 - 75 %    LYMPHOCYTES 7 (L) 12 - 49 %    MONOCYTES 7 5 - 13 %    EOSINOPHILS 3 0 - 7 %    BASOPHILS 0 0 - 1 %    ABS. NEUTROPHILS 9.6 (H) 1.8 - 8.0 K/UL    ABS. LYMPHOCYTES 0.9 0.8 - 3.5 K/UL    ABS. MONOCYTES 0.9 0.0 - 1.0 K/UL    ABS. EOSINOPHILS 0.3 0.0 - 0.4 K/UL    ABS. BASOPHILS 0.0 0.0 - 0.1 K/UL   CK W/ CKMB & INDEX    Collection Time: 02/05/17  9:34 PM   Result Value Ref Range    CK 22 (L) 26 - 192 U/L    CK - MB 1.3 <3.6 NG/ML    CK-MB Index 5.9 (H) 0 - 2.5     METABOLIC PANEL, COMPREHENSIVE    Collection Time: 02/05/17  9:34 PM   Result Value Ref Range    Sodium 137 136 - 145 mmol/L    Potassium 4.4 3.5 - 5.1 mmol/L    Chloride 101 97 - 108 mmol/L    CO2 32 21 - 32 mmol/L    Anion gap 4 (L) 5 - 15 mmol/L    Glucose 138 (H) 65 - 100 mg/dL    BUN 27 (H) 6 - 20 MG/DL    Creatinine 1.19 (H) 0.55 - 1.02 MG/DL    BUN/Creatinine ratio 23 (H) 12 - 20      GFR est AA 56 (L) >60 ml/min/1.73m2    GFR est non-AA 46 (L) >60 ml/min/1.73m2    Calcium 9.1 8.5 - 10.1 MG/DL    Bilirubin, total 0.2 0.2 - 1.0 MG/DL    ALT (SGPT) 13 12 - 78 U/L    AST (SGOT) 11 (L) 15 - 37 U/L    Alk.  phosphatase 64 45 - 117 U/L    Protein, total 7.4 6.4 - 8.2 g/dL    Albumin 3.2 (L) 3.5 - 5.0 g/dL    Globulin 4.2 (H) 2.0 - 4.0 g/dL    A-G Ratio 0.8 (L) 1.1 - 2.2     TROPONIN I    Collection Time: 02/05/17  9:34 PM   Result Value Ref Range    Troponin-I, Qt. <0.04 <0.05 ng/mL   MAGNESIUM    Collection Time: 02/05/17  9:34 PM   Result Value Ref Range    Magnesium 2.3 1.6 - 2.4 mg/dL   LACTIC ACID, PLASMA    Collection Time: 02/05/17  9:34 PM   Result Value Ref Range    Lactic acid 1.0 0.4 - 2.0 MMOL/L       IMAGING RESULTS:    CXR Results  (Last 48 hours)               02/05/17 2150  XR CHEST PORT Final result    Impression:  IMPRESSION: Progressive bilateral airspace and trace suspicious for pneumonia. Narrative:  EXAM:  XR CHEST PORT       INDICATION:  Chest Pain       COMPARISON:  CT thorax 1/19/2017       FINDINGS: A portable AP radiograph of the chest was obtained at 21:33 hours. The   patient is on a cardiac monitor. There are bilateral airspace infiltrates,   progressed from priors. There is no pneumothorax or pleural effusion. The   cardiac and mediastinal contours and pulmonary vascularity are normal.  The   chest wall structures and visualized upper abdomen appear stable with no acute   interval change. [facility] MEDICATIONS GIVEN:  Medications   sodium chloride 0.9 % bolus infusion 500 mL (not administered)   albuterol-ipratropium (DUO-NEB) 2.5 MG-0.5 MG/3 ML (3 mL Nebulization Given 2/5/17 2209)   albuterol-ipratropium (DUO-NEB) 2.5 MG-0.5 MG/3 ML (3 mL Nebulization Given 2/5/17 2144)       IMPRESSION:  Respiratory failure with hypoxia and hypercapnia  Abnormal chest CT  History of head and neck cancer    PLAN:  1. Admit to hospitalist    11:20 PM  Patient is being admitted to the hospital by Dr. Dameon Boswell. The results of their tests and reasons for their admission have been discussed with them and/or available family. They convey agreement and understanding for the need to be admitted and for their admission diagnosis. Written by Gonzalez Olvera, ED Scribe, as dictated by Jamarcus Hernandez MD.        This note is prepared by Roberto Dorsey, acting as Scribe for MD Jamarcus Larson MD : The scribe's documentation has been prepared under my direction and personally reviewed by me in its entirety.  I confirm that the note above accurately reflects all work, treatment, procedures, and medical decision making performed by me. This note will not be viewable in [address]

## 2017-02-06 NOTE — CONSULTS
Palliative Medicine Consult  Jose Alberto: [phone] ([phone])    Patient Name: Joey Rae  YOB: 1955    Date of Initial Consult: 2/6/17  Reason for Consult: end stage disease   Requesting Provider: Adin Harris   Primary Care Physician: Dilcia Hernandez MD      SUMMARY:   Joey Rae is a 57y.o. year old with a past history of fibromyalgia, bipolar disorder, laryngeal cancer diagnosed in 2014 s/p chemo with Dr. Tyler Floor was admitted on 2/5/2017 from home w/ respiratory failure, on IV abx. Pt was recently admitted in Jan 2017 with multilobar PNA, MSSA bacteremia and concern for metastatic pulmonary disease seen on CT chest. Possible PET scan possible in future? Current medical issues leading to Palliative Medicine involvement include: end stage disease. I saw pt w/ Bryan Zhou LCSW last stay, however uncertain of her baseline mental status- pt very tangential. Did not complete AMD because of this, could not reach family to learn pt's baseline. Recommended  who visits her at home follow up w/ this. PALLIATIVE DIAGNOSES:   1. Chronic neck pain   2. Shortness of breath -improved   3. Fatigue        PLAN:   1. Meet w/ pt along w/ IGNACIO CoffeyW- she remembers us from previous recent stay when we talked about advanced directives. 2. Pt unable to yet complete AMD, as her  at home was doing other things at last visit. 3. Given pt's medical hx and presentation, feel that it would be best if completed AMD/code status discussion w/ the support of her dtr Jermaine Molina who was visiting this AM.   4. Pt w/ possible metastatic disease- previous notes indicate repeat CT versus PET. Per attending, in future PET and Onc consult will be done. As work up goes on, my team would be happy to help w/ overall care decisions discussion. 5. Offer to call dtr Jermaine Molina to set up a meeting at least for AMD, however pt prefers to call her first - and we can f/u with pt tmrw.    6. Plan: Follow up tmrw for AMD discussion, seeing if dtr can be present. 7. Pain: Has been a chronic issue since tx for larygenal cancer and while I don't doubt her pain- as discussed in last note, is not consistent (not tender to my exam, no nonverbal pain signs when doing general interview but then reports pain when asked specifically)- thus do not recommend changing regimen. Certainly if pt w/ malignancy, this may need to be readdressed. 8. Initial consult note routed to primary continuity provider  9. Communicated plan of care with: Palliative IDT; Lala PH[facility]; Dr Jasbir Victor / TREATMENT PREFERENCES:   [====Goals of Care====]  GOALS OF CARE:  Patient / health care proxy stated goals: all measures      TREATMENT PREFERENCES:   Code Status: Full Code    Advance Care Planning:  Advance Care Planning 2/6/2017   Patient's Healthcare Decision Maker is: Legal Next of Agustin 68   Primary Decision Maker Name Lisa Silverio,    Primary Decision Maker Phone Number [phone]   Primary Decision Maker Relationship to Patient Adult child   Secondary Decision Maker Name Wilfredo Millan, daughter and another daughter Amor Quiroz Phone Number [phone] or [phone]   Secondary Decision Maker Relationship to Patient Adult child   Confirm Advance Directive None       Other:    The palliative care team has discussed with patient / health care proxy about goals of care / treatment preferences for patient.  [====Goals of Care====]         HISTORY:     History obtained from: Pt, chart, staff    CHIEF COMPLAINT: I remember you     HPI/SUBJECTIVE:    The patient is:   [x] Verbal and participatory  [] Non-participatory due to:     Pt lying in bed, NAD, seems alert/oriented but per staff not consistent with what she says all the time.      Clinical Pain Assessment (nonverbal scale for severity on nonverbal patients):   [++++ Clinical Pain Assessment++++]  [++++Pain Severity++++]: Pain: 3  [++++Pain Character++++]: sharp, aching  [++++Pain Duration++++]: years  [++++Pain Effect++++]: decr function   [++++Pain Factors++++]:   [++++Pain Frequency++++]: all the time  [++++Pain Location++++]: neck   [++++ Clinical Pain Assessment++++]     FUNCTIONAL ASSESSMENT:     Palliative Performance Scale (PPS):  PPS: 60       PSYCHOSOCIAL/SPIRITUAL SCREENING:     Advance Care Planning:  Advance Care Planning 2/6/2017   Patient's Healthcare Decision Maker is: Legal Next of Agustin 68   Primary Decision Maker Name Jen Galarza,    Primary Decision Maker Phone Number [phone]   Primary Decision Maker Relationship to Patient Adult child   Secondary Decision Maker Name Brittney Vines, daughter and another daughter Jose Leal Phone Number [phone] or [phone]   Secondary Decision Maker Relationship to Patient Adult child   Confirm Advance Directive None        Any spiritual / Restoration concerns:  [] Yes /  [x] No    Caregiver Burnout:  [] Yes /  [] No /  [x] No Caregiver Present      Anticipatory grief assessment:   [x] Normal  / [] Maladaptive       ESAS Anxiety: Anxiety: 0    ESAS Depression: Depression: 0        REVIEW OF SYSTEMS:     Positive and pertinent negative findings in ROS are noted above in HPI. The following systems were [x] reviewed / [] unable to be reviewed as noted in HPI  Other findings are noted below. Systems: constitutional, ears/nose/mouth/throat, respiratory, gastrointestinal, genitourinary, musculoskeletal, integumentary, neurologic, psychiatric, endocrine. Positive findings noted below.   Modified ESAS Completed by: provider   Fatigue: 3 Drowsiness: 0   Depression: 0 Pain: 3   Anxiety: 0 Nausea: 0   Anorexia: 0 Dyspnea: 2     Constipation: No              PHYSICAL EXAM:     From RN flowsheet:  Wt Readings from Last 3 Encounters:   02/06/17 152 lb 9.6 oz (69.2 kg)   01/31/17 146 lb 6.4 oz (66.4 kg)   01/15/17 150 lb (68 kg)     Blood pressure 104/57, pulse 68, temperature 99.1 °F (37.3 °C), resp. rate 20, height 5' 9\" (1.753 m), weight 152 lb 9.6 oz (69.2 kg), SpO2 98 %, not currently breastfeeding. Pain Scale 1: Numeric (0 - 10)  Pain Intensity 1: 3     Pain Location 1: Back  Pain Orientation 1: Mid  Pain Description 1: Aching, Constant  Pain Intervention(s) 1: Medication (see MAR)    Constitutional: awake, alert  Eyes: pupils equal, anicteric  ENMT: no nasal discharge, moist mucous membranes  Respiratory: breathing not labored  Musculoskeletal: no deformity, no tenderness to palpation  Skin: warm, dry  Neurologic: following commands, moving all extremities  Psychiatric: full affect, no hallucinations       HISTORY:     Principal Problem:    Acute respiratory failure (HCC) (2/6/2017)    Active Problems:    Essential hypertension, benign (11/29/2011)      Bipolar 1 disorder ([facility] (11/29/2011)      Dysphagia (12/30/2014)      History of laryngeal cancer (11/2/2015)      History of radiation to head and neck region (3/30/2016)      Chronic pain (1/15/2017)      Past Medical History   Diagnosis Date    Bipolar 1 disorder ([address] 11/29/2011    Cancer ([facility]      skin cancer buttocks    Cancer (HCC)      throat    Chronic pain      FIBROMYALGIA, LEGS    Encounter for long-term (current) use of other medications 11/29/2011    Essential hypertension, benign 11/29/2011    Laryngeal cancer ([facility] [address] 11/2/2015    Laryngeal mass     Psychiatric disorder      bipolar      Past Surgical History   Procedure Laterality Date    Place percut gastrostomy tube  10/28/2014      has been removed 2015    Hx heent       BX OF NECK MASS    Hx gi       PLACEMENT OF G TUBE    Hx gi       ESOPH. DILATATION    Hx breast augmentation Bilateral      SALINE IMPLANTS    Hx gyn       tubal pregnancy    Hx hysterectomy        Family History   Problem Relation Age of Onset    Cancer Father      bone/skin/lung    Anesth Problems Neg Hx       History reviewed, no pertinent family history.   Social History Substance Use Topics    Smoking status: Former Smoker     Packs/day: 1.00     Years: 40.00     Quit date: 8/27/2014    Smokeless tobacco: Never Used      Comment: PASSIVE SMOKE EXPOSURE,  SMOKES    Alcohol use No     No Known Allergies   Current Facility-Administered Medications   Medication Dose Route Frequency    amitriptyline (ELAVIL) tablet 10 mg  10 mg Oral QHS    amLODIPine (NORVASC) tablet 10 mg  10 mg Oral DAILY    FLUoxetine (PROzac) capsule 20 mg  20 mg Oral DAILY    gabapentin (NEURONTIN) capsule 600 mg  600 mg Oral TID    lithium carbonate CR (ESKALITH CR) tablet 450 mg  450 mg Oral QHS    traZODone (DESYREL) tablet 200 mg  200 mg Oral QHS    acetaminophen (TYLENOL) tablet 650 mg  650 mg Oral Q4H PRN    polyethylene glycol (MIRALAX) packet 17 g  17 g Oral DAILY    heparin (porcine) injection 5,000 Units  5,000 Units SubCUTAneous Q12H    albuterol-ipratropium (DUO-NEB) 2.5 MG-0.5 MG/3 ML  3 mL Nebulization Q2H PRN    0.9% sodium chloride infusion  50 mL/hr IntraVENous CONTINUOUS    sodium chloride (NS) flush 5-10 mL  5-10 mL IntraVENous Q8H    sodium chloride (NS) flush 5-10 mL  5-10 mL IntraVENous PRN    guaiFENesin SR (MUCINEX) tablet 1,200 mg  1,200 mg Oral BID    metoprolol tartrate (LOPRESSOR) tablet 25 mg  25 mg Oral Q12H    piperacillin-tazobactam (ZOSYN) 3.375 g in 0.9% sodium chloride (MBP/ADV) 100 mL  3.375 g IntraVENous Q6H    methylPREDNISolone (PF) (SOLU-MEDROL) injection 60 mg  60 mg IntraVENous Q8H    HYDROcodone-acetaminophen (NORCO)  mg tablet 1 Tab  1 Tab Oral Q6H PRN    levoFLOXacin (LEVAQUIN) 750 mg in D5W IVPB  750 mg IntraVENous Q24H    albuterol-ipratropium (DUO-NEB) 2.5 MG-0.5 MG/3 ML  3 mL Nebulization QID RT          LAB AND IMAGING FINDINGS:     Lab Results   Component Value Date/Time    WBC 11.3 02/06/2017 04:22 AM    HGB 10.4 02/06/2017 04:22 AM    PLATELET 514 87/50/1723 04:22 AM     Lab Results   Component Value Date/Time    Sodium 140 02/06/2017 04:22 AM    Potassium 4.3 02/06/2017 04:22 AM    Chloride 107 02/06/2017 04:22 AM    CO2 27 02/06/2017 04:22 AM    BUN 21 02/06/2017 04:22 AM    Creatinine 1.02 02/06/2017 04:22 AM    Calcium 8.9 02/06/2017 04:22 AM    Magnesium 2.4 02/06/2017 04:22 AM    Phosphorus 3.3 12/30/2014 12:07 PM      Lab Results   Component Value Date/Time    AST (SGOT) 11 02/05/2017 09:34 PM    Alk. phosphatase 64 02/05/2017 09:34 PM    Protein, total 7.4 02/05/2017 09:34 PM    Albumin 3.2 02/05/2017 09:34 PM    Globulin 4.2 02/05/2017 09:34 PM     No results found for: INR, PTMR, PTP, PT1, PT2, APTT   No results found for: IRON, FE, TIBC, IBCT, PSAT, FERR   Lab Results   Component Value Date/Time    pH 7.32 02/05/2017 09:15 PM    PCO2 58 02/05/2017 09:15 PM    PO2 104 02/05/2017 09:15 PM     No components found for: [place]   Lab Results   Component Value Date/Time    CK 22 02/05/2017 09:34 PM    CK - MB 1.3 02/05/2017 09:34 PM                Total time: 50 min  Counseling / coordination time: 30 min   > 50% counseling / coordination?: yes    Prolonged service was provided for  []30 min   []75 min in face to face time in the presence of the patient. Time Start:   Time End:   Note: this can only be billed with 75919 (initial) or 23156 (follow up). If multiple start / stop times, list each separately.

## 2017-02-06 NOTE — PROGRESS NOTES
Palliative medicine   Dr. Sarah Johnson MD and this LCSW visited Pt for end stage disease. No family present but pt's dtr is expected later this evening. Pt recalls meeting with Palliative MD a few weeks ago. She explained her insurance care manager (comes to home on Wed.) and she spoke about the AMD but had not completed because other priorities. She is receptive to discussing AMD/care decisions during this hospitalization and including her dtr. Pt prefers to discuss with her dtr this evening vs palliative reaching out to dtr. Provided Pt with our contact information. Plan:  Palliative Medicine team will visit tomorrow to follow up on ESD/GOC. Thank you for including Palliative Medicine in [facility].       Latisha Núñez, [address] ([id number])  Work cell: [phone]

## 2017-02-06 NOTE — H&P
Hospitalist Admission Note    NAME: Adarsh Tijerina   :  1955   MRN:  [id number]     Date/Time:  2017 1:15 AM    Patient PCP: Lizabeth Brasher MD  ________________________________________________________________________    Given the patient's current clinical presentation, I have a high level of concern for decompensation if discharged from the ED. Complex decision making was performed which includes reviewing the patient's available past medical records, laboratory results, and Xray films. I have also directly communicated my plan and discussed this case with the involved ED physician. My assessment of this patient's clinical condition and my plan of care is as follows:    Assessment / Plan:  Acute hypoxic respiratory failure, possibly on chronic process, strongly suspect is due to mucus plugging  Possible metastatic process of the lung as of yet undiagnosed  Hypotension  Hypercarbia causing respiratory acidosis  Left pleuritis  -IVF NS  -aggressive pulmonary toilet, flutter valve, humidify air  -mucolytics to continue  -o2 to keep sats >92%  -may need pressors if not fluid sensitive  -no role for antibiotics at this time  -Flutter valve  -pulmonary to see patient - may need bronch with biopsy if able  -PET scan, at this time, will not likely be of help given recent pneumonia pattern  -could possibly benefit from CTA chest when more stable to r/o PE. Treat with lovenox at this time  -2 large bore IV- elevated risk for continued clinical deterioration    Dehdydration  Mild MIGUEL ÁNGEL  -creat has bumped to 1.2 and given her weight/habitus could be considered significant  -gentle IVF   -monitor daily creat    History laryngeal cancer  \"new\" chest findings with lymphadenopathy/mass  -findings on recent CT chest concerning for metastatic process - could be recurrence of skin CA, laryngeal cancer vs new cancer.   -Pulmonary is working up for \"new\" changes - PET in near future.  May need bronch to further delineate. Bipolar  -continue on home meds as was on OP PO, but not sure she is controlled - in ED she is challenging to interview - Dr Ehsan Turner to follow    Code Status: full  DVT Prophylaxis: lovenox  GI Prophylaxis: not indicated        Subjective:   CHIEF COMPLAINT: \"I am sob again\"    HISTORY OF PRESENT ILLNESS:     Jose Raul Melara is a 57 y.o.  female with known history as listed below presents to ED with complaint noted above. Available records were reviewed at the time of H&P. Patient recently dc from [facility] with PNA pattern and possible new lung mass with LAD seen on CT chest followed by pulmonary whom was requesting a repeat CT chest in 3 mos and at that time would decide further action plans. Patient, however, with worsened SOB. +cough now with yellow phlegm with left sided pleuritic chest pain. At home attempted to use her nc O2 without success in improving symptoms. In ED found sats 62% RA (is to be on home O2 though) and was placed on 100% NRB with sats increased to 90%. ABG showed hypercarbia and hypoxia with respiratory acidosis. CXR minimal changes from prior. No obvious wbc. No fever. Mild hypotension. We were asked to admit for work up and evaluation of the above problems. Past Medical History   Diagnosis Date    Bipolar 1 disorder ([address] 11/29/2011    Cancer ([address]      skin cancer buttocks    Cancer (HCC)      throat    Chronic pain      FIBROMYALGIA, LEGS    Encounter for long-term (current) use of other medications 11/29/2011    Essential hypertension, benign 11/29/2011    Laryngeal cancer ([address] 11/2/2015    Laryngeal mass     Psychiatric disorder      bipolar      Past Surgical History   Procedure Laterality Date    Place percut gastrostomy tube  10/28/2014      has been removed 2015    Hx heent       BX OF NECK MASS    Hx gi       PLACEMENT OF G TUBE    Hx gi       ESOPH.  DILATATION    Hx breast augmentation Bilateral      SALINE IMPLANTS    Hx gyn       tubal pregnancy    Hx hysterectomy       Social History   Substance Use Topics    Smoking status: Former Smoker     Packs/day: 1.00     Years: 40.00     Quit date: 8/27/2014    Smokeless tobacco: Never Used      Comment: PASSIVE SMOKE EXPOSURE,  SMOKES    Alcohol use No      Family History   Problem Relation Age of Onset    Cancer Father      bone/skin/lung    Anesth Problems Neg Hx        No Known Allergies     Prior to Admission medications    Medication Sig Start Date End Date Taking? Authorizing Provider   VITAMIN B-12 1,000 mcg sublingual tablet  11/11/16   Historical Provider   SORE THROAT, BENZOCAINE-MENTH, 15-2.6 mg lozg lozenge  11/11/16   Historical Provider   acetylcysteine (MUCOMYST) 100 mg/mL (10 %) nebulizer solution Take 4 mL by inhalation every four (4) hours. 1/26/17   Beena He MD   albuterol-ipratropium (DUO-NEB) 2.5 mg-0.5 mg/3 ml nebu 3 mL by Nebulization route every six (6) hours as needed. 1/24/17   Beena He MD   amLODIPine (NORVASC) 10 mg tablet Take 1 Tab by mouth daily. 1/24/17   Beena He MD   gabapentin (NEURONTIN) 300 mg capsule Take 2 Caps by mouth three (3) times daily. 1/24/17   Beena He MD   HYDROcodone-acetaminophen [facility])  mg tablet Take 1 Tab by mouth every four (4) hours as needed. Max Daily Amount: 6 Tabs. 1/24/17   Beena He MD   metoprolol tartrate (LOPRESSOR) 25 mg tablet Take 1 Tab by mouth every twelve (12) hours. 1/24/17   Beena He MD   guaiFENesin-dextromethorphan [facility] DM) 600-30 mg per tablet Take 1 Tab by mouth two (2) times a day. 1/24/17   Beena He MD   Nebulizer & Compressor machine 1 Each by Does Not Apply route four (4) times daily as needed. 1/24/17   Beena He MD   bisacodyl 5 mg tab Take 1 Tab by mouth daily as needed. Historical Provider   senna (SENNA) 8.6 mg tablet Take 1 Tab by mouth daily.     Historical Provider   lithium carbonate CR (ESKALITH CR) 450 mg CR tablet TAKE 1 TABLET BY ORAL ROUTE PER AT BEDTIME 11/9/16   Historical Provider   FLUoxetine (PROZAC) 20 mg tablet Take 20 mg by mouth daily. 2/5/16   Historical Provider   amitriptyline (ELAVIL) 10 mg tablet Take 1 Tab by mouth nightly. 2/29/16   Dilcia Hernandez MD   SORE THROAT, BENZOCAINE-MENTH, 15-2.6 mg lozg lozenge  11/18/15   Historical Provider   traZODone (DESYREL) 100 mg tablet Take 200 mg by mouth nightly. 11/2/15   Historical Provider     REVIEW OF SYSTEMS:  See HPI for details  General: negative for fever, chills, sweats, weakness, weight loss  Eyes: negative for blurred vision, eye pain, loss of vision, diplopia  Ear Nose and Throat: negative for rhinorrhea, pharyngitis, otalgia, tinnitus, speech or swallowing difficulties  Respiratory:  negative for pleuritic pain, +cough, occasional sputum production, ++wheezing, SOB, PENALOZA  Cardiology:  negative for chest pain, palpitations, orthopnea, PND, edema, syncope   Gastrointestinal: negative for abdominal pain, N/V, dysphagia, change in bowel habits, bleeding  Genitourinary: negative for frequency, urgency, dysuria, hematuria, incontinence  Muskuloskeletal : negative for arthralgia, myalgia  Hematology: negative for easy bruising, bleeding, lymphadenopathy  Dermatological: negative for rash, ulceration, mole change, new lesion  Endocrine: negative for hot flashes or polydipsia  Neurological: negative for headache, dizziness, confusion, focal weakness, paresthesia, memory loss, gait disturbance  Psychological: negative for anxiety, depression, agitation    Objective:   VITALS:    Visit Vitals    BP 92/60 (BP 1 Location: Right arm, BP Patient Position: Standing; At rest)    Pulse 70    Temp 98.9 °F (37.2 °C)    Resp 19    Ht 5' 9\" (1.753 m)    Wt 69.2 kg (152 lb 9.6 oz)    SpO2 95%    BMI 22.54 kg/m2     PHYSICAL EXAM:     GENERAL:    WD y   WN y   Cachectic    Thin y   Obese    Disheveled y   [address] Appearing Chronically y   Acute Distress y respiratory   Other      HEENT:    NC/AT/EOMI y   PERRLA y   Conjunctivae Pink    Conjunctivae Pale y   Moist Mucosa    Dry Mucosa y   Hearing intact to voice y   Other      NECK:    Supple y   Masses n   Thyroid Tender n   Other                   RESPIRATORY:    CTA bilaterally WITHOUT wheezing/rhonchi/rales or crackles    Wheezing y   [de-identified] y   Crackles y   Use of accessory muscles y   Other      CARDIAC:    regular rate and rhythm No murmurs/rubs/gallops    Murmur 2/6   Rubs n   Gallops n   Rate Regular/Irregular reg   Carotid Bruit Left/Right n   Lower Extremity Edema n   JVP  n   Other Normal capillary refill     ABDOMEN:    Soft non distended non tender +bowel sounds no HSM y   Rigid    Tenderness n   Hepatomegaly n   Splenomegaly n   Distended    Increased girth due to habitus y   Normal/Hyper/Hypo Active Bowel Sounds hypo   Other      SKIN / MUSCULOSKELETAL:    Rashes n   Ecchymosis n   Ulcers    Tight to palpitation    Turgor Good/Poor poor   Cyanosis/Clubbing y   Amputation(s)    Other      NEUROLOGY:    cranial nerves II-XII grossly intact y   Cranial Nerve Deficit    Facial Droop    Slurred Speech n   Aphasia    Strength Normal y   Weakness n   Meningismus/Kernig's Sign/ Brudzinsky n   Follows Commands y   Other      PSYCHIATRIC:    AAOx3 in no acute distress y   Insight Poor y   Insight Good    Alert and Oriented to Person     Alert and Oriented to Place    Alert and Oriented toTime    Depressed n   Anxious y   Agitated n   Lethargic n   Stuporous n   Sedated    Other    _______________________________________________________________________  Care Plan discussed with:    Comments   Patient x Discussed with patient in room.  POC outlined and Questions answered (25   Family      RN x  5   Care Manager                    Consultant:  josé miguel BELLA MD 10   _______________________________________________________________________  Recommended Disposition:   Home with Family y HH/PT/OT/RN    SNF/LTC    PACO    ________________________________________________________________________  TOTAL TIME:  54 Minutes    Critical Care Provided     Minutes non procedure based      Comments   >50% of visit spent in counseling and coordination of care x Chart review  Discussion with patient and/or family and questions answered     ________________________________________________________________________  Signed: Tootie Pinto MD    Procedures: see electronic medical records for all procedures/Xrays and details which were not copied into this note but were reviewed prior to creation of Plan.     LAB DATA REVIEWED:    Recent Results (from the past 24 hour(s))   EKG, 12 LEAD, INITIAL    Collection Time: 02/05/17  8:59 PM   Result Value Ref Range    Ventricular Rate 99 BPM    Atrial Rate 99 BPM    P-R Interval 154 ms    QRS Duration 88 ms    Q-T Interval 370 ms    QTC Calculation (Bezet) 474 ms    Calculated P Axis 60 degrees    Calculated R Axis 62 degrees    Calculated T Axis 59 degrees    Diagnosis       Normal sinus rhythm  Normal ECG  When compared with ECG of 15-JOSE RAFAEL-2017 11:06,  No significant change was found     BLOOD GAS, ARTERIAL    Collection Time: 02/05/17  9:15 PM   Result Value Ref Range    pH 7.32 (L) 7.35 - 7.45      PCO2 58 (H) 35.0 - 45.0 mmHg    PO2 104 (H) 80 - 100 mmHg    O2 SAT 97 92 - 97 %    BICARBONATE 29 (H) 22 - 26 mmol/L    BASE EXCESS 1.8 mmol/L    O2 METHOD NRM      O2 FLOW RATE 12.00 L/min    MODE OTHER      Sample source ARTERIAL      SITE RIGHT RADIAL      INESSA'S TEST YES     CBC WITH AUTOMATED DIFF    Collection Time: 02/05/17  9:34 PM   Result Value Ref Range    WBC 11.7 (H) 3.6 - 11.0 K/uL    RBC 3.72 (L) 3.80 - 5.20 M/uL    HGB 10.3 (L) 11.5 - 16.0 g/dL    HCT 34.4 (L) 35.0 - 47.0 %    MCV 92.5 80.0 - 99.0 FL    MCH 27.7 26.0 - 34.0 PG    MCHC 29.9 (L) 30.0 - 36.5 g/dL    RDW 15.6 (H) 11.5 - 14.5 %    PLATELET 209 994 - 476 K/uL    NEUTROPHILS 83 (H) 32 - 75 % LYMPHOCYTES 7 (L) 12 - 49 %    MONOCYTES 7 5 - 13 %    EOSINOPHILS 3 0 - 7 %    BASOPHILS 0 0 - 1 %    ABS. NEUTROPHILS 9.6 (H) 1.8 - 8.0 K/UL    ABS. LYMPHOCYTES 0.9 0.8 - 3.5 K/UL    ABS. MONOCYTES 0.9 0.0 - 1.0 K/UL    ABS. EOSINOPHILS 0.3 0.0 - 0.4 K/UL    ABS. BASOPHILS 0.0 0.0 - 0.1 K/UL   CK W/ CKMB & INDEX    Collection Time: 02/05/17  9:34 PM   Result Value Ref Range    CK 22 (L) 26 - 192 U/L    CK - MB 1.3 <3.6 NG/ML    CK-MB Index 5.9 (H) 0 - 2.5     METABOLIC PANEL, COMPREHENSIVE    Collection Time: 02/05/17  9:34 PM   Result Value Ref Range    Sodium 137 136 - 145 mmol/L    Potassium 4.4 3.5 - 5.1 mmol/L    Chloride 101 97 - 108 mmol/L    CO2 32 21 - 32 mmol/L    Anion gap 4 (L) 5 - 15 mmol/L    Glucose 138 (H) 65 - 100 mg/dL    BUN 27 (H) 6 - 20 MG/DL    Creatinine 1.19 (H) 0.55 - 1.02 MG/DL    BUN/Creatinine ratio 23 (H) 12 - 20      GFR est AA 56 (L) >60 ml/min/1.73m2    GFR est non-AA 46 (L) >60 ml/min/1.73m2    Calcium 9.1 8.5 - 10.1 MG/DL    Bilirubin, total 0.2 0.2 - 1.0 MG/DL    ALT (SGPT) 13 12 - 78 U/L    AST (SGOT) 11 (L) 15 - 37 U/L    Alk.  phosphatase 64 45 - 117 U/L    Protein, total 7.4 6.4 - 8.2 g/dL    Albumin 3.2 (L) 3.5 - 5.0 g/dL    Globulin 4.2 (H) 2.0 - 4.0 g/dL    A-G Ratio 0.8 (L) 1.1 - 2.2     TROPONIN I    Collection Time: 02/05/17  9:34 PM   Result Value Ref Range    Troponin-I, Qt. <0.04 <0.05 ng/mL   MAGNESIUM    Collection Time: 02/05/17  9:34 PM   Result Value Ref Range    Magnesium 2.3 1.6 - 2.4 mg/dL   LACTIC ACID, PLASMA    Collection Time: 02/05/17  9:34 PM   Result Value Ref Range    Lactic acid 1.0 0.4 - 2.0 MMOL/L

## 2017-02-06 NOTE — PROGRESS NOTES
Pharmacy Automatic Renal Dosing Protocol - Antimicrobials    Indication for Antimicrobials: Pneumonia HCAP (recently discharged from [facility] for PNA)  Current Regimen of Each Antimicrobial (Start Day & Day of Therapy):  Zosyn 3.375 gm IV every 6 hours (started 2/ day #1)  Vancomycin 2000 mg IV once then 1500 mg every 24 hours (started 2 day #1)    Goal Vancomycin Level (if needed): 15-20  Level(s) Ordered (if needed): none  Measured / Extrapolated Vancomycin Levels (if drawn): none    Significant Cultures: none at this time    Recent Labs      17   0422  17   2134   CREA  1.02  1.19*   BUN  21*  27*   WBC  11.3*  11.7*     Temp (24hrs), Av.6 °F (37 °C), Min:98.3 °F (36.8 °C), Max:98.9 °F (37.2 °C)    Creatinine Clearance (Creatinine Clearance (ml/min)): 59.8 ml/min     Impression/Plan:   - Patient recently discharged from [facility] for CAP. Blood culture showed staphy (not MRSA)  - Zosyn dosed appropriately based on renal function and indication. Continue same  - Vancomycin dosed appropriately based on renal function and indication. Continue same. Extrapolated trough of ~17.8 and an AUC of 660  - Order trough level before 3rd dose per protocol       Pharmacy will follow daily and adjust medications as appropriate for renal function and/or serum levels.     Thank you,  Anthony Solis, PharmD  PGY-1 Pharmacy Resident      Renal Dosing Tables on [employer]

## 2017-02-06 NOTE — ED NOTES
Pt's neb tx completed. Pt taken off NRB mask, and placed on 6 L oxygen via NC per verbal orders from Dr. Obie Flores.

## 2017-02-06 NOTE — PROGRESS NOTES
PCU SHIFT NURSING NOTE      Bedside shift change report given to Lala by Dino Almonte. Report included the following information SBAR, Kardex, ED Summary, MAR, Recent Results, Med Rec Status and Cardiac Rhythm NSR. Shift Summary:   0800 Patient requesting pain medication. No orders for pain meds. Patient states she is on Oxycodone outpatient. 0930 RN attempted 3 times to call Dr. Flor Camargo office with no answer, received continuous music playing while on hold. 1010 Intensivist consult called. [facility] with Dr. Mariaelena Gar, advised that patient states she takes pain medication as outpatient and is requesting it as inpatient. Dr. Mariaelena Gar states he will place \"some orders\". Admission Date 2/5/2017   Admission Diagnosis Acute respiratory failure ([facility]   Consults IP CONSULT TO INTENSIVIST        Consults   []PT   []OT   []Speech   []Case Management      [] Palliative      Cardiac Monitoring Order   [x]Yes   []No     IV drips   []Yes    Drip:                            Dose:  Drip:                            Dose:  Drip:                            Dose:   [x]No     GI Prophylaxis   []Yes   [x]No         DVT Prophylaxis   SCDs:             Claudio stockings:         [] Medication   []Contraindicated   []None      Activity Level Activity Level: Bed Rest, Bath Room Privileges     Activity Assistance: Partial (one person)   Purposeful Rounding every 1-2 hour? [x]Yes   Das Score  Total Score: 2   Bed Alarm (If score 3 or >)   [x]Yes   [] Refused (See signed refusal form in chart)   Angel Score  Angel Score: 19   Angel Score (if score 14 or less)   []PMT consult   []Wound Care consult      []Specialty bed   [] Nutrition consult          Needs prior to discharge:   Home O2 required:    []Yes   []No    If yes, how much O2 required?     Other:    Last Bowel Movement: Last Bowel Movement Date: 02/04/17      Influenza Vaccine Received Flu Vaccine for Current Season (usually Sept-March): Yes        Pneumonia Vaccine Diet Active Orders   Diet    DIET CARDIAC Regular      LDAs               Peripheral IV 02/05/17 Right Antecubital (Active)   Site Assessment Clean, dry, & intact 2/6/2017  3:11 AM   Phlebitis Assessment 0 2/6/2017  3:11 AM   Infiltration Assessment 0 2/6/2017  3:11 AM   Dressing Status Clean, dry, & intact 2/6/2017  3:11 AM   Dressing Type Transparent 2/6/2017  3:11 AM   Hub Color/Line Status Pink 2/6/2017  3:11 AM   Action Taken Blood drawn 2/5/2017  9:33 PM   Alcohol Cap Used No 2/6/2017  3:11 AM                      Urinary Catheter      Intake & Output   Date 02/05/17 0700 - 02/06/17 0659 02/06/17 0700 - 02/07/17 0659   Shift 8616-6188 8792-1810 24 Hour Total 3817-7475 9615-3916 24 Hour Total   I  N  T  A  K  E   I.V.  218.8  (0.3) 218.8  (0.1)         Volume (0.9% sodium chloride infusion)  218.8 218.8       Shift Total  (mL/kg)  218.8  (3.2) 218.8  (3.2)      O  U  T  P  U  T   Urine  400  (0.5) 400  (0.2)         Urine Voided  400 400       Shift Total  (mL/kg)  400  (5.8) 400  (5.8)      NET  -181.3 -181. 3      Weight (kg)  69.2 69.2 69.2 69.2 69.2         Readmission Risk Assessment Tool Score Medium Risk            14       Total Score        3 Relationship with PCP    2 Patient Living Status    4 More than 1 Admission in calendar year    5 Patient Insurance is Medicare, Medicaid or Self Pay        Criteria that do not apply:    Patient Length of Stay > 5    Charlson Comorbidity Score       Expected Length of Stay - - -   Actual Length of Stay 0

## 2017-02-06 NOTE — ED NOTES
TRANSFER - OUT REPORT:    Verbal report given to Meseret Tomas RN on [employer]  being transferred to PCU for routine progression of care       Report consisted of patients Situation, Background, Assessment and   Recommendations(SBAR). Information from the following report(s) SBAR, Kardex, ED Summary, [facility] and Recent Results was reviewed with the receiving nurse. Lines:   Peripheral IV 02/05/17 Right Antecubital (Active)   Site Assessment Clean, dry, & intact 2/5/2017  9:33 PM   Phlebitis Assessment 0 2/5/2017  9:33 PM   Infiltration Assessment 0 2/5/2017  9:33 PM   Dressing Status Clean, dry, & intact 2/5/2017  9:33 PM   Dressing Type Transparent;Tape 2/5/2017  9:33 PM   Hub Color/Line Status Pink;Flushed 2/5/2017  9:33 PM   Action Taken Blood drawn 2/5/2017  9:33 PM   Alcohol Cap Used No 2/5/2017  9:33 PM        Opportunity for questions and clarification was provided.       Patient transported with:   Monitor

## 2017-02-07 NOTE — PROGRESS NOTES
[facility]  Pulmonary, Critical Care, and Sleep Medicine      Name: Julia Moore MRN: [id number]   : 1955 Hospital: [facility]   Date: 2017        IMPRESSION:   · Acute on chronic respiratory failure  · Abnormal chest ct  · Head and neck Ca      RECOMMENDATIONS:   · Wean O2  · Empiric abx, doubt this is pneumonia  · BNP elevated  · Chest CT c/w end stage metastatic disease  · Palliative care consult  · Poor overall prognosis     Subjective:     No fever, no chills  Today still hypoxic      Current Facility-Administered Medications   Medication Dose Route Frequency    amitriptyline (ELAVIL) tablet 10 mg  10 mg Oral QHS    amLODIPine (NORVASC) tablet 10 mg  10 mg Oral DAILY    FLUoxetine (PROzac) capsule 20 mg  20 mg Oral DAILY    gabapentin (NEURONTIN) capsule 600 mg  600 mg Oral TID    lithium carbonate CR (ESKALITH CR) tablet 450 mg  450 mg Oral QHS    traZODone (DESYREL) tablet 200 mg  200 mg Oral QHS    polyethylene glycol (MIRALAX) packet 17 g  17 g Oral DAILY    heparin (porcine) injection 5,000 Units  5,000 Units SubCUTAneous Q12H    0.9% sodium chloride infusion  50 mL/hr IntraVENous CONTINUOUS    sodium chloride (NS) flush 5-10 mL  5-10 mL IntraVENous Q8H    guaiFENesin SR (MUCINEX) tablet 1,200 mg  1,200 mg Oral BID    metoprolol tartrate (LOPRESSOR) tablet 25 mg  25 mg Oral Q12H    piperacillin-tazobactam (ZOSYN) 3.375 g in 0.9% sodium chloride (MBP/ADV) 100 mL  3.375 g IntraVENous Q6H    methylPREDNISolone (PF) (SOLU-MEDROL) injection 60 mg  60 mg IntraVENous Q8H    levoFLOXacin (LEVAQUIN) 750 mg in D5W IVPB  750 mg IntraVENous Q24H    albuterol-ipratropium (DUO-NEB) 2.5 MG-0.5 MG/3 ML  3 mL Nebulization QID RT       Review of Systems:  A comprehensive review of systems was negative except for: Respiratory: positive for dyspnea on exertion    Objective:   Vital Signs:    Visit Vitals    BP 99/74 (BP 1 Location: Left arm, BP Patient Position: At rest)    Pulse 79    Temp 98.2 °F (36.8 °C)    Resp 16    Ht 5' 9\" (1.753 m)    Wt 69.2 kg (152 lb 9.6 oz)    LMP  (LMP Unknown)    SpO2 92%    Breastfeeding No    BMI 22.54 kg/m2       O2 Device: Nasal cannula   O2 Flow Rate (L/min): 3 l/min   Temp (24hrs), Av.4 °F (36.9 °C), Min:98.1 °F (36.7 °C), Max:99.1 °F (37.3 °C)       Intake/Output:   Last shift:      701 - 1900  In: 573.3 [I.V.:573.3]  Out: -   Last 3 shifts: 1901 -  07  In: 1560 [I.V.:1560]  Out: 2850 [Urine:2850]    Intake/Output Summary (Last 24 hours) at 17 0820  Last data filed at 17 0743   Gross per 24 hour   Intake          1772.91 ml   Output             1800 ml   Net           -27.09 ml      Physical Exam:   General:  Alert, cooperative, no distress, appears stated age. Head:  Normocephalic, without obvious abnormality, atraumatic. Eyes:  Conjunctivae/corneas clear. PERRL, EOMs intact. Nose: Nares normal. Septum midline. Mucosa normal. No drainage or sinus tenderness. Throat: Lips, mucosa, and tongue normal. Teeth and gums normal.   Neck: Supple, symmetrical, trachea midline, no adenopathy, thyroid: no enlargment/tenderness/nodules, no carotid bruit and no JVD. Back:   Symmetric, no curvature. ROM normal.   Lungs:   Clear to auscultation bilaterally. Chest wall:  No tenderness or deformity. Heart:  Regular rate and rhythm, S1, S2 normal, no murmur, click, rub or gallop. Abdomen:   Soft, non-tender. Bowel sounds normal. No masses,  No organomegaly. Extremities: Extremities normal, atraumatic, no cyanosis or edema. Pulses: 2+ and symmetric all extremities.    Skin: Skin color, texture, turgor normal. No rashes or lesions   Lymph nodes: Cervical, supraclavicular, and axillary nodes normal.   Neurologic: Grossly nonfocal     Data review:     Recent Results (from the past 24 hour(s))   PRO-BNP    Collection Time: 17 11:39 AM   Result Value Ref Range    NT pro-BNP 1930 (H) 0 - 125 PG/ML   BNP    Collection Time: 02/06/17 11:39 AM   Result Value Ref Range     (H) 0 - 100 pg/mL   CBC WITH AUTOMATED DIFF    Collection Time: 02/07/17  4:25 AM   Result Value Ref Range    WBC 15.3 (H) 3.6 - 11.0 K/uL    RBC 3.39 (L) 3.80 - 5.20 M/uL    HGB 9.4 (L) 11.5 - 16.0 g/dL    HCT 31.3 (L) 35.0 - 47.0 %    MCV 92.3 80.0 - 99.0 FL    MCH 27.7 26.0 - 34.0 PG    MCHC 30.0 30.0 - 36.5 g/dL    RDW 15.7 (H) 11.5 - 14.5 %    PLATELET 317 185 - 142 K/uL    NEUTROPHILS 90 (H) 32 - 75 %    LYMPHOCYTES 6 (L) 12 - 49 %    MONOCYTES 4 (L) 5 - 13 %    EOSINOPHILS 0 0 - 7 %    BASOPHILS 0 0 - 1 %    ABS. NEUTROPHILS 13.7 (H) 1.8 - 8.0 K/UL    ABS. LYMPHOCYTES 0.9 0.8 - 3.5 K/UL    ABS. MONOCYTES 0.6 0.0 - 1.0 K/UL    ABS. EOSINOPHILS 0.0 0.0 - 0.4 K/UL    ABS.  BASOPHILS 0.0 0.0 - 0.1 K/UL   METABOLIC PANEL, BASIC    Collection Time: 02/07/17  4:25 AM   Result Value Ref Range    Sodium 140 136 - 145 mmol/L    Potassium 4.8 3.5 - 5.1 mmol/L    Chloride 109 (H) 97 - 108 mmol/L    CO2 25 21 - 32 mmol/L    Anion gap 6 5 - 15 mmol/L    Glucose 137 (H) 65 - 100 mg/dL    BUN 23 (H) 6 - 20 MG/DL    Creatinine 1.00 0.55 - 1.02 MG/DL    BUN/Creatinine ratio 23 (H) 12 - 20      GFR est AA >60 >60 ml/min/1.73m2    GFR est non-AA 56 (L) >60 ml/min/1.73m2    Calcium 9.2 8.5 - 10.1 MG/DL       Imaging:  I have personally reviewed the patients radiographs and have reviewed the reports:  CXR: bilateral infiltrates, worse        Kojo Springer MD

## 2017-02-07 NOTE — PROGRESS NOTES
PCU SHIFT NURSING NOTE      Bedside shift change report given to Tamara (oncoming nurse) by Carlin Spaulding (offgoing nurse). Report included the following information SBAR, Kardex, Procedure Summary, MAR and Recent Results. Shift Summary: No phone  0830 = Patient has very labial temperament; she is very happy and talkative one moment but then very dismissive the next. She also has a tendency to be impulsive and just jump up out of bed. I will try to watch her closely    Admission Date 2/5/2017   Admission Diagnosis Acute respiratory failure ([facility] [address]   Consults IP CONSULT TO INTENSIVIST  IP CONSULT TO PALLIATIVE CARE - PROVIDER  IP CONSULT TO HEMATOLOGY        Consults   []PT   []OT   []Speech   []Case Management      [] Palliative      Cardiac Monitoring Order   [x]Yes   []No     IV drips   []Yes    Drip:                            Dose:  Drip:                            Dose:  Drip:                            Dose:   [x]No     GI Prophylaxis   []Yes   [x]No         DVT Prophylaxis   SCDs:             Claudio stockings:         [x] Medication   []Contraindicated   []None      Activity Level Activity Level: Up with Assistance     Activity Assistance: Partial (one person)   Purposeful Rounding every 1-2 hour? [x]Yes   Das Score  Total Score: 3   Bed Alarm (If score 3 or >)   [x]Yes   [] Refused (See signed refusal form in chart)   Angel Score  Angel Score: 18   Angel Score (if score 14 or less)   []PMT consult   []Wound Care consult      []Specialty bed   [] Nutrition consult          Needs prior to discharge:   Home O2 required:    []Yes   []No    If yes, how much O2 required?     Other:    Last Bowel Movement: Last Bowel Movement Date: 02/04/17      Influenza Vaccine Received Flu Vaccine for Current Season (usually Sept-March): Yes        Pneumonia Vaccine           Diet Active Orders   Diet    DIET CARDIAC Regular      LDAs               Peripheral IV 02/05/17 Right Antecubital (Active)   Site Assessment Clean, dry, & intact 2/7/2017  7:43 AM   Phlebitis Assessment 0 2/7/2017  7:43 AM   Infiltration Assessment 0 2/7/2017  7:43 AM   Dressing Status Clean, dry, & intact 2/7/2017  7:43 AM   Dressing Type Transparent;Tape 2/7/2017  7:43 AM   Hub Color/Line Status Pink; Infusing;Flushed 2/7/2017  7:43 AM   Action Taken Blood drawn 2/5/2017  9:33 PM   Alcohol Cap Used No 2/6/2017  3:11 AM                      Urinary Catheter      Intake & Output   Date 02/06/17 0700 - 02/07/17 0659 02/07/17 0700 - 02/08/17 0659   Shift [id number] 1900-0659 24 Hour Total 2442-5727 1808-0074 24 Hour Total   I  N  T  A  K  E   I.V.  (mL/kg/hr) 962.1  (1.2) 337.5  (0.4) 1299.6  (0.8) 573.3  573.3      Volume (0.9% sodium chloride infusion) 762.1 237.5 999.6 573.3  573.3      Volume (piperacillin-tazobactam (ZOSYN) 3.375 g in 0.9% sodium chloride (MBP/ADV) 100 mL) 200 100 300       Shift Total  (mL/kg) 962.1  (13.9) 337.5  (4.9) 1299.6  (18.8) 573.3  (8.3)  573.3  (8.3)   O  U  T  P  U  T   Urine  (mL/kg/hr) 1250  (1.5) 1200  (1.4) 2450  (1.5)         Urine Voided 1250 1200 2450         Urine Occurrence(s) 4 x 1 x 5 x       Shift Total  (mL/kg) 1250  (18.1) 1200  (17.3) 2450  (35.4)      NET -287.9 -862.5 -1150.4 573.3  573.3   Weight (kg) 69.2 69.2 69.2 69.2 69.2 69.2         Readmission Risk Assessment Tool Score Medium Risk            14       Total Score        3 Relationship with PCP    2 Patient Living Status    4 More than 1 Admission in calendar year    5 Patient Insurance is Medicare, Medicaid or Self Pay        Criteria that do not apply:    Patient Length of Stay > 5    Charlson Comorbidity Score       Expected Length of Stay 3d 19h   Actual Length of Stay 1

## 2017-02-07 NOTE — PROGRESS NOTES
General Daily Progress Note    Admit Date: 2/5/2017  Hospital day 3    Subjective:     Patient has improving dyspnea and cough,remains obsessed with pain medication. .   Medication side effects: none    Current Facility-Administered Medications   Medication Dose Route Frequency    HYDROcodone-acetaminophen (NORCO)  mg tablet 1 Tab  1 Tab Oral Q4H PRN    amitriptyline (ELAVIL) tablet 10 mg  10 mg Oral QHS    amLODIPine (NORVASC) tablet 10 mg  10 mg Oral DAILY    FLUoxetine (PROzac) capsule 20 mg  20 mg Oral DAILY    gabapentin (NEURONTIN) capsule 600 mg  600 mg Oral TID    lithium carbonate CR (ESKALITH CR) tablet 450 mg  450 mg Oral QHS    traZODone (DESYREL) tablet 200 mg  200 mg Oral QHS    acetaminophen (TYLENOL) tablet 650 mg  650 mg Oral Q4H PRN    polyethylene glycol (MIRALAX) packet 17 g  17 g Oral DAILY    heparin (porcine) injection 5,000 Units  5,000 Units SubCUTAneous Q12H    albuterol-ipratropium (DUO-NEB) 2.5 MG-0.5 MG/3 ML  3 mL Nebulization Q2H PRN    0.9% sodium chloride infusion  50 mL/hr IntraVENous CONTINUOUS    sodium chloride (NS) flush 5-10 mL  5-10 mL IntraVENous Q8H    sodium chloride (NS) flush 5-10 mL  5-10 mL IntraVENous PRN    guaiFENesin SR (MUCINEX) tablet 1,200 mg  1,200 mg Oral BID    metoprolol tartrate (LOPRESSOR) tablet 25 mg  25 mg Oral Q12H    piperacillin-tazobactam (ZOSYN) 3.375 g in 0.9% sodium chloride (MBP/ADV) 100 mL  3.375 g IntraVENous Q6H    methylPREDNISolone (PF) (SOLU-MEDROL) injection 60 mg  60 mg IntraVENous Q8H    levoFLOXacin (LEVAQUIN) 750 mg in D5W IVPB  750 mg IntraVENous Q24H    albuterol-ipratropium (DUO-NEB) 2.5 MG-0.5 MG/3 ML  3 mL Nebulization QID RT        Review of Systems  Constitutional: negative  Respiratory: positive for cough or stridor  Cardiovascular: negative  Gastrointestinal: positive for dysphagia  Behavioral/Psych: positive for anxiety, behavior problems and bipolar    Objective:     Patient Vitals for the past 8 hrs:   BP Temp Pulse Resp SpO2   02/07/17 0303 132/70 98.3 °F (36.8 °C) 80 16 92 %   02/06/17 2256 96/53 98.2 °F (36.8 °C) 75 18 92 %   02/06/17 2222 - - - - 94 %     02/06 1901 - 02/07 0700  In: 337.5 [I.V.:337.5]  Out: 1200 [Urine:1200]  02/05 0701 - 02/06 1900  In: 1222.5 [I.V.:1222.5]  Out: 0454 [Urine:1650]    Physical Exam:   Visit Vitals    /70 (BP 1 Location: Left arm, BP Patient Position: At rest)    Pulse 80    Temp 98.3 °F (36.8 °C)    Resp 16    Ht 5' 9\" (1.753 m)    Wt 152 lb 9.6 oz (69.2 kg)    LMP  (LMP Unknown)    SpO2 92%    Breastfeeding No    BMI 22.54 kg/m2     General appearance: alert, fatigued, cooperative, mild distress, appears stated age  Lungs: rhonchi R base, L base  Heart: regular rate and rhythm  Abdomen: soft, non-tender. Bowel sounds normal. No masses,  no organomegaly  Extremities: extremities normal, atraumatic, no cyanosis or edema  Neurologic: Grossly normal      ECG: sinus tachycardia     Data Review   Recent Results (from the past 24 hour(s))   PRO-BNP    Collection Time: 02/06/17 11:39 AM   Result Value Ref Range    NT pro-BNP 1930 (H) 0 - 125 PG/ML   BNP    Collection Time: 02/06/17 11:39 AM   Result Value Ref Range     (H) 0 - 100 pg/mL   CBC WITH AUTOMATED DIFF    Collection Time: 02/07/17  4:25 AM   Result Value Ref Range    WBC 15.3 (H) 3.6 - 11.0 K/uL    RBC 3.39 (L) 3.80 - 5.20 M/uL    HGB 9.4 (L) 11.5 - 16.0 g/dL    HCT 31.3 (L) 35.0 - 47.0 %    MCV 92.3 80.0 - 99.0 FL    MCH 27.7 26.0 - 34.0 PG    MCHC 30.0 30.0 - 36.5 g/dL    RDW 15.7 (H) 11.5 - 14.5 %    PLATELET 449 651 - 715 K/uL    NEUTROPHILS 90 (H) 32 - 75 %    LYMPHOCYTES 6 (L) 12 - 49 %    MONOCYTES 4 (L) 5 - 13 %    EOSINOPHILS 0 0 - 7 %    BASOPHILS 0 0 - 1 %    ABS. NEUTROPHILS 13.7 (H) 1.8 - 8.0 K/UL    ABS. LYMPHOCYTES 0.9 0.8 - 3.5 K/UL    ABS. MONOCYTES 0.6 0.0 - 1.0 K/UL    ABS. EOSINOPHILS 0.0 0.0 - 0.4 K/UL    ABS.  BASOPHILS 0.0 0.0 - 0.1 K/UL   METABOLIC PANEL, BASIC Collection Time: 02/07/17  4:25 AM   Result Value Ref Range    Sodium 140 136 - 145 mmol/L    Potassium 4.8 3.5 - 5.1 mmol/L    Chloride 109 (H) 97 - 108 mmol/L    CO2 25 21 - 32 mmol/L    Anion gap 6 5 - 15 mmol/L    Glucose 137 (H) 65 - 100 mg/dL    BUN 23 (H) 6 - 20 MG/DL    Creatinine 1.00 0.55 - 1.02 MG/DL    BUN/Creatinine ratio 23 (H) 12 - 20      GFR est AA >60 >60 ml/min/1.73m2    GFR est non-AA 56 (L) >60 ml/min/1.73m2    Calcium 9.2 8.5 - 10.1 MG/DL           Assessment:     Principal Problem:    Acute respiratory failure (HCC) (2/6/2017)    Active Problems:    Dysphagia (12/30/2014)      History of laryngeal cancer (11/2/2015)      History of radiation to head and neck region (3/30/2016)      Essential hypertension, benign (11/29/2011)      Bipolar 1 disorder ([facility] [address] (11/29/2011)      Chronic pain (1/15/2017)        Plan:     Dyspnea improving,sats better,now on 4lpm NC,Pulmonary consult noted,Oncology consulted. PET Scan ordered. Obsession  with pain meds,seems related to Bipolar Disorder though psych has been reluctant to address.

## 2017-02-07 NOTE — PROGRESS NOTES
PCU SHIFT NURSING NOTE      Bedside shift change report given to VERO Morales (oncoming nurse) by Truong Cruz RN (offgoing nurse). Report included the following information SBAR. Shift Summary:      Admission Date 2/5/2017   Admission Diagnosis Acute respiratory failure ([address]   Consults IP CONSULT TO INTENSIVIST  IP CONSULT TO PALLIATIVE CARE - PROVIDER  IP CONSULT TO HEMATOLOGY        Consults   []PT   []OT   []Speech   []Case Management      [] Palliative      Cardiac Monitoring Order   []Yes   []No     IV drips   []Yes    Drip:                            Dose:  Drip:                            Dose:  Drip:                            Dose:   []No     GI Prophylaxis   []Yes   []No         DVT Prophylaxis   SCDs:             Claudio stockings:         [] Medication   []Contraindicated   []None      Activity Level Activity Level: Up with Assistance     Activity Assistance: Partial (one person)   Purposeful Rounding every 1-2 hour? []Yes   Das Score  Total Score: 3   Bed Alarm (If score 3 or >)   []Yes   [] Refused (See signed refusal form in chart)   Angel Score  Angel Score: 18   Angel Score (if score 14 or less)   []PMT consult   []Wound Care consult      []Specialty bed   [] Nutrition consult          Needs prior to discharge:   Home O2 required:    []Yes   []No    If yes, how much O2 required? Other:    Last Bowel Movement: Last Bowel Movement Date: 02/04/17      Influenza Vaccine Received Flu Vaccine for Current Season (usually Sept-March): Yes        Pneumonia Vaccine           Diet Active Orders   Diet    DIET CARDIAC Regular      LDAs               Peripheral IV 02/05/17 Right Antecubital (Active)   Site Assessment Clean, dry, & intact 2/6/2017  3:00 PM   Phlebitis Assessment 0 2/6/2017  3:00 PM   Infiltration Assessment 0 2/6/2017  3:00 PM   Dressing Status Clean, dry, & intact 2/6/2017  3:00 PM   Dressing Type Tape;Transparent 2/6/2017  3:00 PM   Hub Color/Line Status Pink; Infusing;Flushed 2/6/2017 3:00 PM   Action Taken Blood drawn 2/5/2017  9:33 PM   Alcohol Cap Used No 2/6/2017  3:11 AM                      Urinary Catheter      Intake & Output   Date 02/06/17 0700 - 02/07/17 0659 02/07/17 0700 - 02/08/17 0659   Shift 7532-2531 5870-7410 24 Hour Total 9854-1536 5783-3879 24 Hour Total   I  N  T  A  K  E   I.V.  (mL/kg/hr) 962.1  (1.2)  962.1         Volume (0.9% sodium chloride infusion) 762.1  762.1         Volume (piperacillin-tazobactam (ZOSYN) 3.375 g in 0.9% sodium chloride (MBP/ADV) 100 mL) 200  200       Shift Total  (mL/kg) 962.1  (13.9)  962.1  (13.9)      O  U  T  P  U  T   Urine  (mL/kg/hr) 1250  (1.5)  1250         Urine Voided 1250  1250         Urine Occurrence(s) 4 x  4 x       Shift Total  (mL/kg) 1250  (18.1)  1250  (18.1)      NET -287.9  -287.9      Weight (kg) 69.2 69.2 69.2 69.2 69.2 69.2         Readmission Risk Assessment Tool Score Medium Risk            14       Total Score        3 Relationship with PCP    2 Patient Living Status    4 More than 1 Admission in calendar year    5 Patient Insurance is Medicare, Medicaid or Self Pay        Criteria that do not apply:    Patient Length of Stay > 5    Charlson Comorbidity Score       Expected Length of Stay 3d 19h   Actual Length of Stay 1

## 2017-02-07 NOTE — CONSULTS
[id number]   0 [facility]       Name:  Rick Montoya   MR#:  [id number]   :  1955   Account #:  [de-identified]    Date of Consultation:  2017   Date of Adm:  2017       CHIEF COMPLAINT: History of head and neck cancer, possible   metastatic disease to her chest.    REFERRING PHYSICIAN: Dr. Martin Finely: The patient is a 57-year-old   female who is a patient of one of my partners, Dr. Verner President. Dr. 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 She received concurrent chemotherapy and radiation therapy. She then had what sounds to be a Warthin tumor that was removed. The patient has been following with Dr. Verner President. She had not had any   signs of recurrence. She was admitted back in January with a   pneumonia. She had a CT scan at that time that possibly shows   progression of her lung disease suspicious for metastasis. There was   some right upper lobe consolidation and increased size of mediastinal   lymph nodes. Dr. Shonda Taylor from Pulmonary saw her and thought that we   ought to repeat the CT scan or get a PET scan. After she had gotten   over her pneumonia, she was able to be discharged; however, she   came back in the hospital with more shortness of breath. She had a   chest x-ray done that showed progressive bilateral airspace infiltrate   and trace suspicious for pneumonia. The patient's breathing is fairly   stable. We are asked to see her for further evaluation. 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 SOCIAL HISTORY: She is a former smoker. She does not drink. CURRENT MEDICATIONS   1. DuoNebs. 2. Elavil. 3. Norvasc. 4. Prozac. 5. Neurontin. 6. Mucinex. 7. Levaquin. 8. Lithium carbonate. 9. Solu-Medrol. 10. Metoprolol.    11. Zosyn.   12. Miralax. 13. Trazodone. ALLERGIES: NO KNOWN DRUG ALLERGIES. REVIEW OF SYSTEMS: A 12-point review of systems done and   negative except for as above. PHYSICAL EXAMINATION   VITAL SIGNS: Temperature 98.2, pulse 79, blood pressure 99/74,   respirations 16, saturating 94% on 3 liters nasal cannula. GENERAL: Lying in bed in no acute distress. HEENT: Normocephalic, atraumatic. HEME/LYMPH: No cervical or supraclavicular lymphadenopathy   appreciated. CARDIOVASCULAR: Regular rhythm. LUNGS: Clear to auscultation bilaterally. ABDOMEN: Normoactive bowel sounds, soft, nontender,   nondistended. No hepatosplenomegaly. EXTREMITIES: No clubbing, cyanosis or edema. NEUROLOGIC: Nonfocal.    LABORATORY VALUES: White blood cell count 15.3, hemoglobin 9.4,   platelets 340. Chemistry: Sodium 140, potassium 4.8, BUN 23,   creatinine 1. LFTs done on 02/05/2017, total bilirubin 0.2, ALT 13, AST   11, alkaline phosphatase 64. IMPRESSION AND PLAN   1. The patient is a 53-year-old [facility] American female with a history   of head and neck cancer. She had a CT scan done back in January   that showed possible metastatic lung disease. Pulmonary saw her back   then and thought that we needed to wait for her to get her over her   pneumonia and then get a PET or a CT scan. She is now back in for   several complaints. A PET scan has been ordered. This will be done   on Thursday. We will see what that shows. Depending on what that   shows, she may need a biopsy. We will continue to follow along with   you and make further recommendations as needed. 2. Possible pneumonia. The patient will continue on her antibiotics and   steroids per Pulmonary and her PCP.         MD Fannie Graham / Milton Galo   D:  02/07/2017   10:36   T:  02/07/2017   11:07   Job #:  [id number]

## 2017-02-07 NOTE — PROGRESS NOTES
Palliative Medicine:    See Donte Razo note (LCSW) for more info, but pt is not interested in speaking to our team about AMDs or goals of care. She prefers to speak w/ her PCP Dr Reyes Harper and her care manager that sees her at home. We are still available- hakeem depending on PET results. However, as pt does not want our involvement I am signing off at this time.

## 2017-02-07 NOTE — PROGRESS NOTES
Palliative Medicine   Spoke with RN this morning and was informed dtr eta 10:30 am. LCSW attempted to make brief visit to ask Pt and dtr if would be okay for MD and this SW to visit in an hour. However, upon entry into room, Pt waived her hands and said \"No, No, no. We talked and decided my care manager would help with the documents. \" SW acknowledged patient and said that was fine. Dtr had quizzical expression on face so SW explained we are from Palliative Medicine and were consulted for goals of care and one part was for Advance Directive. LCSW clarified that without a document that medical surrogates are next of kin,  and then children. Plan:  Respecting Pt's wishes, Palliative Medicine will not continue a consult with Pt. Pt will discuss goc/care decisions with her pcp. Pt to follow up with her insurance care manager [facility]?) about ACP documentation. Thank you for including Palliative Medicine in Ms. Wall's care.       Vitaliy Braxton, [address] ([id number])  Work cell: [phone]

## 2017-02-08 NOTE — PROGRESS NOTES
[facility]  Pulmonary, Critical Care, and Sleep Medicine      Name: Courtney Atwood MRN: [id number]   : 1955 Hospital: [facility]   Date: 2017        IMPRESSION:   · Acute on chronic respiratory failure  · Abnormal chest ct  · Head and neck Ca      RECOMMENDATIONS:   · Wean O2 as tolerated for goal sats >90%  · Continue empiric abx, doubt this is pneumonia  · Continue scheduled nebs, Mucinex, add IS  · Wean IVCS  · Chest CT c/w end stage metastatic disease  · PET scan for the AM  · Poor overall prognosis     Subjective:     No acute events overnight per nursing  Remains on 4L NC, with continued dyspnea. Unchanged from yesterday  Does not want Palliative Care to follow. She states this is all related to her pna.    Reports some dysphagia, states this has been a chronic issue      Current Facility-Administered Medications   Medication Dose Route Frequency    albuterol-ipratropium (DUO-NEB) 2.5 MG-0.5 MG/3 ML  3 mL Nebulization Q6H RT    amitriptyline (ELAVIL) tablet 10 mg  10 mg Oral QHS    amLODIPine (NORVASC) tablet 10 mg  10 mg Oral DAILY    FLUoxetine (PROzac) capsule 20 mg  20 mg Oral DAILY    gabapentin (NEURONTIN) capsule 600 mg  600 mg Oral TID    lithium carbonate CR (ESKALITH CR) tablet 450 mg  450 mg Oral QHS    traZODone (DESYREL) tablet 200 mg  200 mg Oral QHS    polyethylene glycol (MIRALAX) packet 17 g  17 g Oral DAILY    heparin (porcine) injection 5,000 Units  5,000 Units SubCUTAneous Q12H    0.9% sodium chloride infusion  50 mL/hr IntraVENous CONTINUOUS    sodium chloride (NS) flush 5-10 mL  5-10 mL IntraVENous Q8H    guaiFENesin SR (MUCINEX) tablet 1,200 mg  1,200 mg Oral BID    metoprolol tartrate (LOPRESSOR) tablet 25 mg  25 mg Oral Q12H    piperacillin-tazobactam (ZOSYN) 3.375 g in 0.9% sodium chloride (MBP/ADV) 100 mL  3.375 g IntraVENous Q6H    methylPREDNISolone (PF) (SOLU-MEDROL) injection 60 mg  60 mg IntraVENous Q8H    levoFLOXacin (LEVAQUIN) 750 mg in D5W IVPB  750 mg IntraVENous Q24H       Review of Systems:  A comprehensive review of systems was negative except for: Respiratory: positive for dyspnea on exertion    Objective:   Vital Signs:    Visit Vitals    BP (!) 162/97 (BP 1 Location: Left arm, BP Patient Position: At rest)    Pulse 88    Temp 98.2 °F (36.8 °C)    Resp 18    Ht 5' 9\" (1.753 m)    Wt 69.2 kg (152 lb 9.6 oz)    LMP  (LMP Unknown)    SpO2 97%    Breastfeeding No    BMI 22.54 kg/m2       O2 Device: Nasal cannula   O2 Flow Rate (L/min): 4 l/min   Temp (24hrs), Av.1 °F (36.7 °C), Min:97.9 °F (36.6 °C), Max:98.2 °F (36.8 °C)       Intake/Output:   Last shift:       07 -  190  In: 195 [I.V.:195]  Out: -   Last 3 shifts:  190 -  0700  In: 3108.3 [P.O.:595; I.V.:2513.3]  Out: 2950 [Urine:2950]    Intake/Output Summary (Last 24 hours) at 17 0818  Last data filed at 17 0740   Gross per 24 hour   Intake           2392.5 ml   Output             1750 ml   Net            642.5 ml      Physical Exam:   General:  Alert, cooperative, no distress, appears stated age. Head:  Normocephalic, without obvious abnormality, atraumatic. Eyes:  Conjunctivae/corneas clear. PERRL, EOMs intact. Nose: Nares normal. Septum midline. Mucosa normal. No drainage or sinus tenderness. Throat: Lips, mucosa, and tongue normal. Teeth and gums normal.   Neck: Supple, symmetrical, trachea midline, no adenopathy, thyroid: no enlargment/tenderness/nodules, no carotid bruit and no JVD. Back:   Symmetric, no curvature. ROM normal.   Lungs:   Fine crackles bibasalarly. Chest wall:  No tenderness or deformity. Heart:  Regular rate and rhythm, S1, S2 normal, no murmur, click, rub or gallop. Abdomen:   Soft, non-tender. Bowel sounds normal. No masses,  No organomegaly. Extremities: Extremities normal, atraumatic, no cyanosis or edema. Pulses: 2+ and symmetric all extremities.    Skin: Skin color, texture, turgor normal. No rashes or lesions   Lymph nodes: Cervical, supraclavicular, and axillary nodes normal.   Neurologic: Grossly nonfocal     Data review:     Recent Results (from the past 24 hour(s))   CBC WITH AUTOMATED DIFF    Collection Time: 02/08/17  3:43 AM   Result Value Ref Range    WBC 19.6 (H) 3.6 - 11.0 K/uL    RBC 3.52 (L) 3.80 - 5.20 M/uL    HGB 9.6 (L) 11.5 - 16.0 g/dL    HCT 32.4 (L) 35.0 - 47.0 %    MCV 92.0 80.0 - 99.0 FL    MCH 27.3 26.0 - 34.0 PG    MCHC 29.6 (L) 30.0 - 36.5 g/dL    RDW 16.1 (H) 11.5 - 14.5 %    PLATELET 732 851 - 658 K/uL    NEUTROPHILS 86 (H) 32 - 75 %    LYMPHOCYTES 8 (L) 12 - 49 %    MONOCYTES 6 5 - 13 %    EOSINOPHILS 0 0 - 7 %    BASOPHILS 0 0 - 1 %    ABS. NEUTROPHILS 16.9 (H) 1.8 - 8.0 K/UL    ABS. LYMPHOCYTES 1.6 0.8 - 3.5 K/UL    ABS. MONOCYTES 1.1 (H) 0.0 - 1.0 K/UL    ABS. EOSINOPHILS 0.0 0.0 - 0.4 K/UL    ABS. BASOPHILS 0.0 0.0 - 0.1 K/UL   METABOLIC PANEL, COMPREHENSIVE    Collection Time: 02/08/17  3:43 AM   Result Value Ref Range    Sodium 142 136 - 145 mmol/L    Potassium 5.0 3.5 - 5.1 mmol/L    Chloride 108 97 - 108 mmol/L    CO2 28 21 - 32 mmol/L    Anion gap 6 5 - 15 mmol/L    Glucose 100 65 - 100 mg/dL    BUN 25 (H) 6 - 20 MG/DL    Creatinine 1.03 (H) 0.55 - 1.02 MG/DL    BUN/Creatinine ratio 24 (H) 12 - 20      GFR est AA >60 >60 ml/min/1.73m2    GFR est non-AA 54 (L) >60 ml/min/1.73m2    Calcium 9.5 8.5 - 10.1 MG/DL    Bilirubin, total 0.3 0.2 - 1.0 MG/DL    ALT (SGPT) 17 12 - 78 U/L    AST (SGOT) 19 15 - 37 U/L    Alk. phosphatase 82 45 - 117 U/L    Protein, total 7.3 6.4 - 8.2 g/dL    Albumin 2.7 (L) 3.5 - 5.0 g/dL    Globulin 4.6 (H) 2.0 - 4.0 g/dL    A-G Ratio 0.6 (L) 1.1 - 2.2               Chelsie Lakhani, NP

## 2017-02-08 NOTE — PROGRESS NOTES
General Daily Progress Note    Admit Date: 2/5/2017  Hospital day 4    Subjective:     Patient has improving cough and dyspnea. .   Medication side effects: none    Current Facility-Administered Medications   Medication Dose Route Frequency    HYDROcodone-acetaminophen (NORCO)  mg tablet 1 Tab  1 Tab Oral Q4H PRN    albuterol-ipratropium (DUO-NEB) 2.5 MG-0.5 MG/3 ML  3 mL Nebulization Q6H RT    amitriptyline (ELAVIL) tablet 10 mg  10 mg Oral QHS    amLODIPine (NORVASC) tablet 10 mg  10 mg Oral DAILY    FLUoxetine (PROzac) capsule 20 mg  20 mg Oral DAILY    gabapentin (NEURONTIN) capsule 600 mg  600 mg Oral TID    lithium carbonate CR (ESKALITH CR) tablet 450 mg  450 mg Oral QHS    traZODone (DESYREL) tablet 200 mg  200 mg Oral QHS    acetaminophen (TYLENOL) tablet 650 mg  650 mg Oral Q4H PRN    polyethylene glycol (MIRALAX) packet 17 g  17 g Oral DAILY    heparin (porcine) injection 5,000 Units  5,000 Units SubCUTAneous Q12H    albuterol-ipratropium (DUO-NEB) 2.5 MG-0.5 MG/3 ML  3 mL Nebulization Q2H PRN    0.9% sodium chloride infusion  50 mL/hr IntraVENous CONTINUOUS    sodium chloride (NS) flush 5-10 mL  5-10 mL IntraVENous Q8H    sodium chloride (NS) flush 5-10 mL  5-10 mL IntraVENous PRN    guaiFENesin SR (MUCINEX) tablet 1,200 mg  1,200 mg Oral BID    metoprolol tartrate (LOPRESSOR) tablet 25 mg  25 mg Oral Q12H    piperacillin-tazobactam (ZOSYN) 3.375 g in 0.9% sodium chloride (MBP/ADV) 100 mL  3.375 g IntraVENous Q6H    methylPREDNISolone (PF) (SOLU-MEDROL) injection 60 mg  60 mg IntraVENous Q8H    levoFLOXacin (LEVAQUIN) 750 mg in D5W IVPB  750 mg IntraVENous Q24H        Review of Systems  Constitutional: positive for fatigue and malaise  Respiratory: positive for cough, sputum or stridor  Cardiovascular: negative  Gastrointestinal: positive for dysphagia    Objective:     Patient Vitals for the past 8 hrs:   BP Temp Pulse Resp SpO2   02/08/17 0346 132/85 98.2 °F (36.8 °C) 75 18 96 %   02/07/17 2348 128/78 98.1 °F (36.7 °C) 87 18 96 %     02/07 1901 - 02/08 0700  In: -   Out: 900 [Urine:900]  02/06 0701 - 02/07 1900  In: 2901.2 [P.O.:115; I.V.:2786.2]  Out: 3300 [Urine:3300]    Physical Exam:   Visit Vitals    /85 (BP 1 Location: Left arm, BP Patient Position: At rest;Post activity)    Pulse 75    Temp 98.2 °F (36.8 °C)    Resp 18    Ht 5' 9\" (1.753 m)    Wt 152 lb 9.6 oz (69.2 kg)    LMP  (LMP Unknown)    SpO2 96%    Breastfeeding No    BMI 22.54 kg/m2     General appearance: alert, fatigued, cooperative, no distress, appears stated age  Lungs: rhonchi R base, L base  Heart: regular rate and rhythm  Abdomen: soft, non-tender. Bowel sounds normal. No masses,  no organomegaly      ECG: normal sinus rhythm     Data Review   Recent Results (from the past 24 hour(s))   CBC WITH AUTOMATED DIFF    Collection Time: 02/08/17  3:43 AM   Result Value Ref Range    WBC 19.6 (H) 3.6 - 11.0 K/uL    RBC 3.52 (L) 3.80 - 5.20 M/uL    HGB 9.6 (L) 11.5 - 16.0 g/dL    HCT 32.4 (L) 35.0 - 47.0 %    MCV 92.0 80.0 - 99.0 FL    MCH 27.3 26.0 - 34.0 PG    MCHC 29.6 (L) 30.0 - 36.5 g/dL    RDW 16.1 (H) 11.5 - 14.5 %    PLATELET 982 239 - 464 K/uL    NEUTROPHILS 86 (H) 32 - 75 %    LYMPHOCYTES 8 (L) 12 - 49 %    MONOCYTES 6 5 - 13 %    EOSINOPHILS 0 0 - 7 %    BASOPHILS 0 0 - 1 %    ABS. NEUTROPHILS 16.9 (H) 1.8 - 8.0 K/UL    ABS. LYMPHOCYTES 1.6 0.8 - 3.5 K/UL    ABS. MONOCYTES 1.1 (H) 0.0 - 1.0 K/UL    ABS. EOSINOPHILS 0.0 0.0 - 0.4 K/UL    ABS.  BASOPHILS 0.0 0.0 - 0.1 K/UL   METABOLIC PANEL, COMPREHENSIVE    Collection Time: 02/08/17  3:43 AM   Result Value Ref Range    Sodium 142 136 - 145 mmol/L    Potassium 5.0 3.5 - 5.1 mmol/L    Chloride 108 97 - 108 mmol/L    CO2 28 21 - 32 mmol/L    Anion gap 6 5 - 15 mmol/L    Glucose 100 65 - 100 mg/dL    BUN 25 (H) 6 - 20 MG/DL    Creatinine 1.03 (H) 0.55 - 1.02 MG/DL    BUN/Creatinine ratio 24 (H) 12 - 20      GFR est AA >60 >60 ml/min/1.73m2    GFR est non-AA 54 (L) >60 ml/min/1.73m2    Calcium 9.5 8.5 - 10.1 MG/DL    Bilirubin, total 0.3 0.2 - 1.0 MG/DL    ALT (SGPT) 17 12 - 78 U/L    AST (SGOT) 19 15 - 37 U/L    Alk. phosphatase 82 45 - 117 U/L    Protein, total 7.3 6.4 - 8.2 g/dL    Albumin 2.7 (L) 3.5 - 5.0 g/dL    Globulin 4.6 (H) 2.0 - 4.0 g/dL    A-G Ratio 0.6 (L) 1.1 - 2.2             Assessment:     Principal Problem:    Acute respiratory failure (HCC) (2/6/2017)    Active Problems:    Dysphagia (12/30/2014)      History of laryngeal cancer (11/2/2015)      History of radiation to head and neck region (3/30/2016)      Essential hypertension, benign (11/29/2011)      B[facility] ([facility] (11/29/2011)      Chronic pain (1/15/2017)        Plan:     Feeling some better,SaO2 improving. Will d/c telemetry. PET/CT in am.

## 2017-02-08 NOTE — PROGRESS NOTES
PCU SHIFT NURSING NOTE      Bedside shift change report given to Tamara (oncoming nurse) by Roselyn Perea (offgoing nurse). Report included the following information SBAR, Kardex, Procedure Summary, Intake/Output and Recent Results. Shift Summary: 7309      Admission Date 2/5/2017   Admission Diagnosis Acute respiratory failure ([facility]   Consults IP CONSULT TO INTENSIVIST  IP CONSULT TO HEMATOLOGY        Consults   [x]PT   [x]OT   []Speech   []Case Management      [] Palliative      Cardiac Monitoring Order   []Yes   []No     IV drips   []Yes    Drip:                            Dose:  Drip:                            Dose:  Drip:                            Dose:   []No     GI Prophylaxis   []Yes   []No         DVT Prophylaxis   SCDs:             Claudio stockings:         [x] Medication   []Contraindicated   []None      Activity Level Activity Level: Up with Assistance     Activity Assistance: Partial (one person)   Purposeful Rounding every 1-2 hour? [x]Yes   Das Score  Total Score: 4   Bed Alarm (If score 3 or >)   []Yes   [] Refused (See signed refusal form in chart)   Angel Score  Angel Score: 18   Angel Score (if score 14 or less)   []PMT consult   []Wound Care consult      []Specialty bed   [] Nutrition consult          Needs prior to discharge:   Home O2 required:    []Yes   []No    If yes, how much O2 required?     Other:    Last Bowel Movement: Last Bowel Movement Date: 02/04/17      Influenza Vaccine Received Flu Vaccine for Current Season (usually Sept-March): Yes        Pneumonia Vaccine           Diet Active Orders   Diet    DIET CARDIAC Regular      LDAs               Peripheral IV 02/05/17 Right Antecubital (Active)   Site Assessment Clean, dry, & intact 2/8/2017  7:40 AM   Phlebitis Assessment 0 2/8/2017  7:40 AM   Infiltration Assessment 0 2/8/2017  7:40 AM   Dressing Status Clean, dry, & intact 2/8/2017  7:40 AM   Dressing Type Transparent;Tape 2/8/2017  7:40 AM   Hub Color/Line Status Pink;Flushed; Infusing 2/8/2017  7:40 AM   Action Taken Blood drawn 2/5/2017  9:33 PM   Alcohol Cap Used No 2/6/2017  3:11 AM       Peripheral IV 02/07/17 Left (Active)   Site Assessment Clean, dry, & intact 2/8/2017  7:40 AM   Phlebitis Assessment 0 2/8/2017  7:40 AM   Infiltration Assessment 0 2/8/2017  7:40 AM   Dressing Status Clean, dry, & intact 2/8/2017  7:40 AM   Dressing Type Transparent;Tape 2/8/2017  7:40 AM   Hub Color/Line Status Pink;Flushed;Capped 2/8/2017  7:40 AM                      Urinary Catheter      Intake & Output   Date 02/07/17 0700 - 02/08/17 0659 02/08/17 0700 - 02/09/17 0659   Shift 8565-9144 2464-6390 24 Hour Total 4442-1563 7812-6772 24 Hour Total   I  N  T  A  K  E   P.O. 115 480 595         P.O. 115 480 595       I.V.  (mL/kg/hr) 1486.7  (1.8) 689.2  (0.8) 2175.8  (1.3) 195  195      Volume (0.9% sodium chloride infusion) 986.7 589. 2 1575.8 195  195      Volume (piperacillin-tazobactam (ZOSYN) 3.375 g in 0.9% sodium chloride (MBP/ADV) 100 mL) 200 100 300         Volume (levoFLOXacin (LEVAQUIN) 750 mg in D5W IVPB) 300  300       Shift Total  (mL/kg) 1601.7  (23.1) 1169.2  (16.9) 2770.8  (40) 195  (2.8)  195  (2.8)   O  U  T  P  U  T   Urine  (mL/kg/hr) 850  (1) 900  (1.1) 1750  (1.1)         Urine Voided          Urine Occurrence(s) 4 x 1 x 5 x       Shift Total  (mL/kg) 850  (12.3) 900  (13) 1750  (25.3)      .7 269.2 1020.8 195  195   Weight (kg) 69.2 69.2 69.2 69.2 69.2 69.2         Readmission Risk Assessment Tool Score Medium Risk            14       Total Score        3 Relationship with PCP    2 Patient Living Status    4 More than 1 Admission in calendar year    5 Patient Insurance is Medicare, Medicaid or Self Pay        Criteria that do not apply:    Patient Length of Stay > 5    Charlson Comorbidity Score       Expected Length of Stay 3d 19h   Actual Length of Stay 2

## 2017-02-09 NOTE — PROGRESS NOTES
General Daily Progress Note    Admit Date: 2/5/2017  Hospital day 5    Subjective:     Patient has pain c/o,anxiety. .   Medication side effects: none    Current Facility-Administered Medications   Medication Dose Route Frequency    amitriptyline (ELAVIL) tablet 25 mg  25 mg Oral QHS    methylPREDNISolone (PF) (SOLU-MEDROL) injection 40 mg  40 mg IntraVENous Q8H    albuterol-ipratropium (DUO-NEB) 2.5 MG-0.5 MG/3 ML  3 mL Nebulization Q6H PRN    HYDROcodone-acetaminophen (NORCO)  mg tablet 1 Tab  1 Tab Oral Q4H PRN    amLODIPine (NORVASC) tablet 10 mg  10 mg Oral DAILY    FLUoxetine (PROzac) capsule 20 mg  20 mg Oral DAILY    gabapentin (NEURONTIN) capsule 600 mg  600 mg Oral TID    lithium carbonate CR (ESKALITH CR) tablet 450 mg  450 mg Oral QHS    traZODone (DESYREL) tablet 200 mg  200 mg Oral QHS    acetaminophen (TYLENOL) tablet 650 mg  650 mg Oral Q4H PRN    polyethylene glycol (MIRALAX) packet 17 g  17 g Oral DAILY    heparin (porcine) injection 5,000 Units  5,000 Units SubCUTAneous Q12H    albuterol-ipratropium (DUO-NEB) 2.5 MG-0.5 MG/3 ML  3 mL Nebulization Q2H PRN    0.9% sodium chloride infusion  50 mL/hr IntraVENous CONTINUOUS    sodium chloride (NS) flush 5-10 mL  5-10 mL IntraVENous Q8H    sodium chloride (NS) flush 5-10 mL  5-10 mL IntraVENous PRN    guaiFENesin SR (MUCINEX) tablet 1,200 mg  1,200 mg Oral BID    metoprolol tartrate (LOPRESSOR) tablet 25 mg  25 mg Oral Q12H    piperacillin-tazobactam (ZOSYN) 3.375 g in 0.9% sodium chloride (MBP/ADV) 100 mL  3.375 g IntraVENous Q6H    levoFLOXacin (LEVAQUIN) 750 mg in D5W IVPB  750 mg IntraVENous Q24H        Review of Systems  Constitutional: positive for malaise and anorexia  Respiratory: positive for cough, dyspnea on exertion or stridor  Cardiovascular: negative  Gastrointestinal: negative    Objective:     Patient Vitals for the past 8 hrs:   BP Temp Pulse Resp SpO2   02/09/17 0546 139/74 97.7 °F (36.5 °C) 71 17 94 % 02/07 0701 - 02/08 1900  In: 2965.8 [P.O.:595; I.V.:2370.8]  Out: 9513 [Urine:1750]    Physical Exam:   Visit Vitals    /74 (BP 1 Location: Left arm, BP Patient Position: At rest)    Pulse 71    Temp 97.7 °F (36.5 °C)    Resp 17    Ht 5' 9\" (1.753 m)    Wt 152 lb 9.6 oz (69.2 kg)    LMP  (LMP Unknown)    SpO2 94%    Breastfeeding No    BMI 22.54 kg/m2     General appearance: alert, cooperative, no distress, appears stated age  Lungs: rhonchi R base, L base, diminished breath sounds diffusely    Heart: regular rate and rhythm, S1, S2 normal, no murmur, click, rub or gallop  Abdomen: soft, non-tender. Bowel sounds normal. No masses,  no organomegaly  Extremities: extremities normal, atraumatic, no cyanosis or edema  Neurologic: Grossly normal           Data Review   Recent Results (from the past 24 hour(s))   CBC WITH AUTOMATED DIFF    Collection Time: 02/09/17  5:51 AM   Result Value Ref Range    WBC 14.2 (H) 3.6 - 11.0 K/uL    RBC 3.85 3.80 - 5.20 M/uL    HGB 10.6 (L) 11.5 - 16.0 g/dL    HCT 34.5 (L) 35.0 - 47.0 %    MCV 89.6 80.0 - 99.0 FL    MCH 27.5 26.0 - 34.0 PG    MCHC 30.7 30.0 - 36.5 g/dL    RDW 15.6 (H) 11.5 - 14.5 %    PLATELET 217 719 - 579 K/uL    NEUTROPHILS 88 (H) 32 - 75 %    LYMPHOCYTES 10 (L) 12 - 49 %    MONOCYTES 2 (L) 5 - 13 %    EOSINOPHILS 0 0 - 7 %    BASOPHILS 0 0 - 1 %    ABS. NEUTROPHILS 12.6 (H) 1.8 - 8.0 K/UL    ABS. LYMPHOCYTES 1.4 0.8 - 3.5 K/UL    ABS. MONOCYTES 0.2 0.0 - 1.0 K/UL    ABS. EOSINOPHILS 0.0 0.0 - 0.4 K/UL    ABS.  BASOPHILS 0.0 0.0 - 0.1 K/UL           Assessment:     Principal Problem:    [facility] (2/6/2017)    Active Problems:    Dysphagia (12/30/2014)      History of laryngeal cancer (11/2/2015)      History of radiation to head and neck region (3/30/2016)      Essential hypertension, benign (11/29/2011)      Bipolar 1 disorder ([facility] (11/29/2011)      Chronic pain (1/15/2017)        Plan:     Anxious,has usual pain c/o which seem more ocd driven. Breathing about the same,requiring 3-4 lpm O2 For PET/CT today.

## 2017-02-09 NOTE — PROGRESS NOTES
Oncology Nursing Communication Tool      7:40 PM  2/8/2017     Bedside shift change report given to Fareed Castillo RN (incoming nurse) by Mason Howard RN (outgoing nurse) on Andre Bence a 57 y.o. female who was admitted on 2/5/2017  8:56 PM. Report included the following information SBAR and Kardex. Significant changes during shift: none      Issues for physician to address: pt wants to speak with Dr. Rohit Castillo regarding her pain medication. She states that it is half of what she takes at home. Code Status: Full Code     Infections: No current active infections     Allergies: Review of patient's allergies indicates no known allergies. Current diet: DIET CARDIAC Regular  DIET NUTRITIONAL SUPPLEMENTS All Meals, HS Snack; Ensure Complete       Pain Controlled [] yes [x] no   Bowel Movement [] yes [x] no   Last Bowel Movement (date) ? Vital Signs:   Patient Vitals for the past 12 hrs:   Temp Pulse Resp BP SpO2   02/08/17 1227 97.8 °F (36.6 °C) 74 20 127/69 91 %   02/08/17 1034 97.8 °F (36.6 °C) 61 15 124/76 98 %      Intake & Output:     Intake/Output Summary (Last 24 hours) at 02/08/17 1940  Last data filed at 02/08/17 0740   Gross per 24 hour   Intake           1187.5 ml   Output                0 ml   Net           1187.5 ml      Laboratory Results:   No results found for this or any previous visit (from the past 12 hour(s)). Opportunity for questions and clarifications were given to the incoming nurse. Patient's bed is in low position, side rails x2, door open PRN, call bell within reach and patient not in distress.       Mason Howard RN

## 2017-02-09 NOTE — PROGRESS NOTES
PET scan reviewed  I would recommend continue to treat with Abx and repeat chest ct in 4 weeks as an out pt  If no improvement would consider CT guided bx  Hilario Mendoza MD

## 2017-02-09 NOTE — PROGRESS NOTES
[facility]  Pulmonary, Critical Care, and Sleep Medicine      Name: Krishna Frankel MRN: [id number]   : 1955 Hospital: [facility]   Date: 2017        IMPRESSION:   · Acute on chronic respiratory failure  · Abnormal chest ct  · Head and neck Ca      RECOMMENDATIONS:   · Wean O2 as tolerated for goal sats >90%  · Continue empiric abx, doubt this is pneumonia  · Continue scheduled nebs, Mucinex, IS  · Wean IVCS  · Chest CT c/w end stage metastatic disease  · PET scan today  · Poor overall prognosis     Subjective:     No acute events overnight per nursing  Does not want Palliative Care to follow. She states this is all related to her pna.    Reports some dysphagia, states this has been a chronic issue      Current Facility-Administered Medications   Medication Dose Route Frequency    amitriptyline (ELAVIL) tablet 25 mg  25 mg Oral QHS    LORazepam (ATIVAN) tablet 0.5 mg  0.5 mg Oral BID    methylPREDNISolone (PF) (SOLU-MEDROL) injection 40 mg  40 mg IntraVENous Q8H    amLODIPine (NORVASC) tablet 10 mg  10 mg Oral DAILY    FLUoxetine (PROzac) capsule 20 mg  20 mg Oral DAILY    gabapentin (NEURONTIN) capsule 600 mg  600 mg Oral TID    lithium carbonate CR (ESKALITH CR) tablet 450 mg  450 mg Oral QHS    traZODone (DESYREL) tablet 200 mg  200 mg Oral QHS    polyethylene glycol (MIRALAX) packet 17 g  17 g Oral DAILY    heparin (porcine) injection 5,000 Units  5,000 Units SubCUTAneous Q12H    0.9% sodium chloride infusion  50 mL/hr IntraVENous CONTINUOUS    sodium chloride (NS) flush 5-10 mL  5-10 mL IntraVENous Q8H    guaiFENesin SR (MUCINEX) tablet 1,200 mg  1,200 mg Oral BID    metoprolol tartrate (LOPRESSOR) tablet 25 mg  25 mg Oral Q12H    piperacillin-tazobactam (ZOSYN) 3.375 g in 0.9% sodium chloride (MBP/ADV) 100 mL  3.375 g IntraVENous Q6H    levoFLOXacin (LEVAQUIN) 750 mg in D5W IVPB  750 mg IntraVENous Q24H       Review of Systems:  A comprehensive review of systems was negative except for: Respiratory: positive for dyspnea on exertion    Objective:   Vital Signs:    Visit Vitals    /80    Pulse 66    Temp 97.7 °F (36.5 °C)    Resp 17    Ht 5' 9\" (1.753 m)    Wt 69.2 kg (152 lb 9.6 oz)    LMP  (LMP Unknown)    SpO2 94%    Breastfeeding No    BMI 22.54 kg/m2       O2 Device: Nasal cannula   O2 Flow Rate (L/min): 4 l/min   Temp (24hrs), Av.9 °F (36.6 °C), Min:97.7 °F (36.5 °C), Max:98.3 °F (36.8 °C)       Intake/Output:   Last shift:         Last 3 shifts:  1901 -  0700  In: 1364.2 [P.O.:480; I.V.:884.2]  Out: 900 [Urine:900]  No intake or output data in the 24 hours ending 17 0818   Physical Exam:   General:  Alert, cooperative, no distress, appears stated age. Head:  Normocephalic, without obvious abnormality, atraumatic. Eyes:  Conjunctivae/corneas clear. PERRL, EOMs intact. Nose: Nares normal. Septum midline. Mucosa normal. No drainage or sinus tenderness. Throat: Lips, mucosa, and tongue normal. Teeth and gums normal.   Neck: Supple, symmetrical, trachea midline, no adenopathy, thyroid: no enlargment/tenderness/nodules, no carotid bruit and no JVD. Back:   Symmetric, no curvature. ROM normal.   Lungs:   Fine crackles bibasalarly. Chest wall:  No tenderness or deformity. Heart:  Regular rate and rhythm, S1, S2 normal, no murmur, click, rub or gallop. Abdomen:   Soft, non-tender. Bowel sounds normal. No masses,  No organomegaly. Extremities: Extremities normal, atraumatic, no cyanosis or edema. Pulses: 2+ and symmetric all extremities.    Skin: Skin color, texture, turgor normal. No rashes or lesions   Lymph nodes: Cervical, supraclavicular, and axillary nodes normal.   Neurologic: Grossly nonfocal     Data review:     Recent Results (from the past 24 hour(s))   CBC WITH AUTOMATED DIFF    Collection Time: 02/09/17  5:51 AM   Result Value Ref Range    WBC 14.2 (H) 3.6 - 11.0 K/uL    RBC 3.85 3.80 - 5.20 M/uL    HGB 10.6 (L) 11.5 - 16.0 g/dL    HCT 34.5 (L) 35.0 - 47.0 %    MCV 89.6 80.0 - 99.0 FL    MCH 27.5 26.0 - 34.0 PG    MCHC 30.7 30.0 - 36.5 g/dL    RDW 15.6 (H) 11.5 - 14.5 %    PLATELET 125 781 - 840 K/uL    NEUTROPHILS 88 (H) 32 - 75 %    LYMPHOCYTES 10 (L) 12 - 49 %    MONOCYTES 2 (L) 5 - 13 %    EOSINOPHILS 0 0 - 7 %    BASOPHILS 0 0 - 1 %    ABS. NEUTROPHILS 12.6 (H) 1.8 - 8.0 K/UL    ABS. LYMPHOCYTES 1.4 0.8 - 3.5 K/UL    ABS. MONOCYTES 0.2 0.0 - 1.0 K/UL    ABS. EOSINOPHILS 0.0 0.0 - 0.4 K/UL    ABS.  BASOPHILS 0.0 0.0 - 0.1 K/UL             Martha Hough MD

## 2017-02-09 NOTE — PROGRESS NOTES
Spiritual Care Assessment/Progress Notes    Yu Perez [id number]  [id number]    1955  57 y.o.  female    Patient Telephone Number: [phone] (home)   Holiness Affiliation: [facility]   Language: English   Extended Emergency Contact Information  Primary Emergency Contact: Vern Phone: [phone]  Mobile Phone: [phone]  Relation: Child  Secondary Emergency Contact: Rick Wall  Address: [place] [address] [address] [place] [address] D[place]  Mobile Phone: [phone]  Relation: Spouse   Patient Active Problem List    Diagnosis Date Noted    Acute respiratory failure ([address] 02/06/2017    Aspiration pneumonia of both upper lobes ([address] 01/20/2017    Shortness of breath 01/20/2017    Delirium 01/20/2017    Goals of care, counseling/discussion 01/20/2017    Debility 01/20/2017    Acute respiratory failure with hypoxia ([address] 01/15/2017    Fibromyalgia 01/15/2017     Class: Chronic    Chronic pain 01/15/2017     Class: Chronic    History of tobacco use 01/15/2017     Class: Present on Admission    Sepsis ([address] 01/15/2017     Class: Acute    Acute renal injury ([address] 01/15/2017     Class: Present on Admission    Shock ([address] 01/15/2017    History of radiation to head and neck region 03/30/2016    History of laryngeal cancer 11/02/2015     Class: Present on Admission    Dysphagia 12/30/2014    Essential hypertension, benign 11/29/2011    Bipolar 1 disorder ([address] 11/29/2011    Encounter for long-term (current) use of other medications 11/29/2011        Date: 2/9/2017       Level of Holiness/Spiritual Activity:  [x]         Involved in yoly tradition/spiritual practice    []         Not involved in yoly tradition/spiritual practice  [x]         Spiritually oriented    []         Claims no spiritual orientation    []         seeking spiritual identity  []         Feels alienated from Restorationist practice/tradition  [] Feels angry about Amish practice/tradition  [x]         Spirituality/Amish tradition is a resource for coping at this time. []         Not able to assess due to medical condition    Services Provided Today:  []         crisis intervention    []         reading Scriptures  [x]         spiritual assessment    [x]         prayer  [x]         empathic listening/emotional support  []         rites and rituals (cite in comments)  []         life review     []         Amish support  []         theological development    []         advocacy  []         ethical dialog     []         blessing  []         bereavement support    []         support to family  []         anticipatory grief support   []         help with AMD  []         spiritual guidance    []         meditation      Spiritual Care Needs  [x]         Emotional Support  [x]         Spiritual/Jew Care  []         Loss/Adjustment  []         Advocacy/Referral                /Ethics  []         No needs expressed at               this time  []         Other: (note in               comments)  [address]  []         Follow up visits with               pt/family  []         Provide materials  []         Schedule sacraments  []         Contact Community               Clergy  [x]         Follow up as needed  []         Other: (note in               comments)     Comments:   Initial visit on Oncology for spiritual assessment. No visitors present. Provided supportive listening presence as patient shared she is waiting for a PET scan today and what it may reveal about her health. She reflected on the possible prognosis in light of her yoly and feels her yoly will help her cope no matter the outcome. She reads the bible regularly and prays often. Mrs. Todd Rg has the support of family and several friends as well as her Oriental orthodox community. Offered prayer and advised her of  availability.   FABY Lim, [facility], [address] [facility]     Paging Service  [phone])

## 2017-02-10 PROBLEM — J44.1 CHRONIC OBSTRUCTIVE PULMONARY DISEASE WITH ACUTE EXACERBATION (HCC): Status: ACTIVE | Noted: 2017-01-01

## 2017-02-10 NOTE — PROGRESS NOTES
General Daily Progress Note    Admit Date: 2/5/2017  Hospital day 6    Subjective:     Patient has IMPROVING DYSPNEA AND COUGH. .   Medication side effects: none    Current Facility-Administered Medications   Medication Dose Route Frequency    methylPREDNISolone (PF) (SOLU-MEDROL) injection 20 mg  20 mg IntraVENous Q12H    amitriptyline (ELAVIL) tablet 25 mg  25 mg Oral QHS    LORazepam (ATIVAN) tablet 0.5 mg  0.5 mg Oral BID    albuterol-ipratropium (DUO-NEB) 2.5 MG-0.5 MG/3 ML  3 mL Nebulization Q6H PRN    HYDROcodone-acetaminophen (NORCO)  mg tablet 1 Tab  1 Tab Oral Q4H PRN    amLODIPine (NORVASC) tablet 10 mg  10 mg Oral DAILY    FLUoxetine (PROzac) capsule 20 mg  20 mg Oral DAILY    gabapentin (NEURONTIN) capsule 600 mg  600 mg Oral TID    lithium carbonate CR (ESKALITH CR) tablet 450 mg  450 mg Oral QHS    traZODone (DESYREL) tablet 200 mg  200 mg Oral QHS    acetaminophen (TYLENOL) tablet 650 mg  650 mg Oral Q4H PRN    polyethylene glycol (MIRALAX) packet 17 g  17 g Oral DAILY    heparin (porcine) injection 5,000 Units  5,000 Units SubCUTAneous Q12H    albuterol-ipratropium (DUO-NEB) 2.5 MG-0.5 MG/3 ML  3 mL Nebulization Q2H PRN    0.9% sodium chloride infusion  50 mL/hr IntraVENous CONTINUOUS    sodium chloride (NS) flush 5-10 mL  5-10 mL IntraVENous Q8H    sodium chloride (NS) flush 5-10 mL  5-10 mL IntraVENous PRN    guaiFENesin SR (MUCINEX) tablet 1,200 mg  1,200 mg Oral BID    metoprolol tartrate (LOPRESSOR) tablet 25 mg  25 mg Oral Q12H    piperacillin-tazobactam (ZOSYN) 3.375 g in 0.9% sodium chloride (MBP/ADV) 100 mL  3.375 g IntraVENous Q6H    levoFLOXacin (LEVAQUIN) 750 mg in D5W IVPB  750 mg IntraVENous Q24H        Review of Systems  Constitutional: positive for fatigue  Respiratory: positive for cough, dyspnea on exertion or stridor  Cardiovascular: negative  Gastrointestinal: positive for dysphagia    Objective:     Patient Vitals for the past 8 hrs:   BP Temp Pulse Resp SpO2   02/10/17 0525 123/74 98.1 °F (36.7 °C) 64 17 95 %        02/08 0701 - 02/09 1900  In: 195 [I.V.:195]  Out: -     Physical Exam:   Visit Vitals    /74 (BP 1 Location: Left arm, BP Patient Position: At rest)    Pulse 64    Temp 98.1 °F (36.7 °C)    Resp 17    Ht 5' 9\" (1.753 m)    Wt 152 lb 9.6 oz (69.2 kg)    LMP  (LMP Unknown)    SpO2 95%    Breastfeeding No    BMI 22.54 kg/m2     General appearance: alert, fatigued, cooperative, no distress, appears stated age  Lungs: diminished breath sounds R base, L base  Heart: regular rate and rhythm, S1, S2 normal, no murmur, click, rub or gallop  Abdomen: soft, non-tender.  Bowel sounds normal. No masses,  no organomegaly  Extremities: extremities normal, atraumatic, no cyanosis or edema          } Data Review   Recent Results (from the past 24 hour(s))   METABOLIC PANEL, BASIC    Collection Time: 02/09/17  8:01 AM   Result Value Ref Range    Sodium 140 136 - 145 mmol/L    Potassium 4.1 3.5 - 5.1 mmol/L    Chloride 104 97 - 108 mmol/L    CO2 28 21 - 32 mmol/L    Anion gap 8 5 - 15 mmol/L    Glucose 158 (H) 65 - 100 mg/dL    BUN 31 (H) 6 - 20 MG/DL    Creatinine 1.18 (H) 0.55 - 1.02 MG/DL    BUN/Creatinine ratio 26 (H) 12 - 20      GFR est AA 56 (L) >60 ml/min/1.73m2    GFR est non-AA 46 (L) >60 ml/min/1.73m2    Calcium 9.7 8.5 - 10.1 MG/DL   GLUCOSE, POC    Collection Time: 02/09/17  1:03 PM   Result Value Ref Range    Glucose (POC) 100 65 - 100 mg/dL    Performed by [facility]    CBC WITH AUTOMATED DIFF    Collection Time: 02/10/17  5:06 AM   Result Value Ref Range    WBC 10.8 3.6 - 11.0 K/uL    RBC 4.04 3.80 - 5.20 M/uL    HGB 11.0 (L) 11.5 - 16.0 g/dL    HCT 36.2 35.0 - 47.0 %    MCV 89.6 80.0 - 99.0 FL    MCH 27.2 26.0 - 34.0 PG    MCHC 30.4 30.0 - 36.5 g/dL    RDW 15.9 (H) 11.5 - 14.5 %    PLATELET 285 724 - 875 K/uL    NEUTROPHILS PENDING %    LYMPHOCYTES PENDING %    MONOCYTES PENDING %    EOSINOPHILS PENDING %    BASOPHILS PENDING % ABS. NEUTROPHILS PENDING K/UL    ABS. LYMPHOCYTES PENDING K/UL    ABS. MONOCYTES PENDING K/UL    ABS. EOSINOPHILS PENDING K/UL    ABS. BASOPHILS PENDING K/UL    DF PENDING    METABOLIC PANEL, COMPREHENSIVE    Collection Time: 02/10/17  5:06 AM   Result Value Ref Range    Sodium 140 136 - 145 mmol/L    Potassium 5.1 3.5 - 5.1 mmol/L    Chloride 106 97 - 108 mmol/L    CO2 29 21 - 32 mmol/L    Anion gap 5 5 - 15 mmol/L    Glucose 116 (H) 65 - 100 mg/dL    BUN 33 (H) 6 - 20 MG/DL    Creatinine 1.19 (H) 0.55 - 1.02 MG/DL    BUN/Creatinine ratio 28 (H) 12 - 20      GFR est AA 56 (L) >60 ml/min/1.73m2    GFR est non-AA 46 (L) >60 ml/min/1.73m2    Calcium 9.7 8.5 - 10.1 MG/DL    Bilirubin, total 0.3 0.2 - 1.0 MG/DL    ALT (SGPT) 18 12 - 78 U/L    AST (SGOT) 8 (L) 15 - 37 U/L    Alk. phosphatase 60 45 - 117 U/L    Protein, total 7.0 6.4 - 8.2 g/dL    Albumin 2.8 (L) 3.5 - 5.0 g/dL    Globulin 4.2 (H) 2.0 - 4.0 g/dL    A-G Ratio 0.7 (L) 1.1 - 2.2             Assessment:     Principal Problem:    Acute respiratory failure (HCC) (2/6/2017)    Active Problems:    Aspiration pneumonia of both upper lobes (HCC) (1/20/2017)      Chronic obstructive pulmonary disease with acute exacerbation ([facility] (2/10/2017)      Dysphagia (12/30/2014)      History of laryngeal cancer (11/2/2015)      History of radiation to head and neck region (3/30/2016)      Essential hypertension, benign (11/29/2011)      Bipolar 1 disorder ([facility] (11/29/2011)      Chronic pain (1/15/2017)        Plan:     PET Scan results noted,appears less likely that metastatic disease is presant,though not ruled out. Feeling better,less dyspneic,anxious to go home. .Will wean steroids and can be changed to po over weekend and possibly discharged. Has Home O2,Nebulizer at home,but was unable to get cverage for DuoNeb. Will ask Case Management to assist with neb bronchodilator rx . Will need 8 days Augmentin and prednisone taper if discharged,f./u with Dr Peter Barrow as previously scheduled

## 2017-02-10 NOTE — PROGRESS NOTES
Met with pt. about her discharge needs. She has home O2 already, and a nebulizer. She states that her insurance doesn't cover her albuterol. I told her that she can get this on the $4 list at [facility]. She only needs a prescription from Dr. Dago Toure. Her IV steroids are being weaned. It appears that she may be discharged on Sat. Will ask weekend CM to follow.  --Emiliano Morris RN, [phone]

## 2017-02-10 NOTE — PROGRESS NOTES
Hematology Oncology Progress Note    Follow up for:h/o Head and Neck ca    Chart notes reviewed since last visit. Case discussed with following:    Patient complains of the following: Feels better    Additional concerns noted by the staff:     Patient Vitals for the past 24 hrs:   BP Temp Pulse Resp SpO2   02/10/17 0525 123/74 98.1 °F (36.7 °C) 64 17 95 %   02/09/17 2230 - - - - 94 %   02/09/17 2115 116/73 98.4 °F (36.9 °C) 60 16 (!) 88 %   02/09/17 1400 (!) 145/107 97.5 °F (36.4 °C) 60 16 93 %       Review of Systems:  12 point ROS done and negative except as above    Physical Examination:  Gen NAD  CV reg  Lungs clear  abd benign    Labs:  Recent Results (from the past 24 hour(s))   GLUCOSE, POC    Collection Time: 02/09/17  1:03 PM   Result Value Ref Range    Glucose (POC) 100 65 - 100 mg/dL    Performed by [facility]    CBC WITH AUTOMATED DIFF    Collection Time: 02/10/17  5:06 AM   Result Value Ref Range    WBC 10.8 3.6 - 11.0 K/uL    RBC 4.04 3.80 - 5.20 M/uL    HGB 11.0 (L) 11.5 - 16.0 g/dL    HCT 36.2 35.0 - 47.0 %    MCV 89.6 80.0 - 99.0 FL    MCH 27.2 26.0 - 34.0 PG    MCHC 30.4 30.0 - 36.5 g/dL    RDW 15.9 (H) 11.5 - 14.5 %    PLATELET 287 806 - 074 K/uL    NEUTROPHILS 84 (H) 32 - 75 %    LYMPHOCYTES 13 12 - 49 %    MONOCYTES 3 (L) 5 - 13 %    EOSINOPHILS 0 0 - 7 %    BASOPHILS 0 0 - 1 %    ABS. NEUTROPHILS 9.1 (H) 1.8 - 8.0 K/UL    ABS. LYMPHOCYTES 1.4 0.8 - 3.5 K/UL    ABS. MONOCYTES 0.3 0.0 - 1.0 K/UL    ABS. EOSINOPHILS 0.0 0.0 - 0.4 K/UL    ABS.  BASOPHILS 0.0 0.0 - 0.1 K/UL    DF SMEAR SCANNED      RBC COMMENTS ANISOCYTOSIS  1+        WBC COMMENTS TOXIC GRANULATION     METABOLIC PANEL, COMPREHENSIVE    Collection Time: 02/10/17  5:06 AM   Result Value Ref Range    Sodium 140 136 - 145 mmol/L    Potassium 5.1 3.5 - 5.1 mmol/L    Chloride 106 97 - 108 mmol/L    CO2 29 21 - 32 mmol/L    Anion gap 5 5 - 15 mmol/L    Glucose 116 (H) 65 - 100 mg/dL    BUN 33 (H) 6 - 20 MG/DL    Creatinine 1.19 (H) 0.55 - 1.02 MG/DL    BUN/Creatinine ratio 28 (H) 12 - 20      GFR est AA 56 (L) >60 ml/min/1.73m2    GFR est non-AA 46 (L) >60 ml/min/1.73m2    Calcium 9.7 8.5 - 10.1 MG/DL    Bilirubin, total 0.3 0.2 - 1.0 MG/DL    ALT (SGPT) 18 12 - 78 U/L    AST (SGOT) 8 (L) 15 - 37 U/L    Alk. phosphatase 60 45 - 117 U/L    Protein, total 7.0 6.4 - 8.2 g/dL    Albumin 2.8 (L) 3.5 - 5.0 g/dL    Globulin 4.2 (H) 2.0 - 4.0 g/dL    A-G Ratio 0.7 (L) 1.1 - 2.2       PET  IMPRESSION:   1. The area of masslike appearance in the right lower lobe on CT is resolving  and is a metabolic consistent with benign disease. 2. Bilateral small areas of apical airspace disease or hypermetabolic and  neoplasia pathology on the left is not excluded as discussed. Assessment and Plan:   H/o Head and neck Ca  -follows with Dr. Cynthia Oleary  -PET scan does not seem to show any mets although f/u will be needed    Pneumonia  -Cont abx    Will sign off. Please call with any questions.

## 2017-02-10 NOTE — PROGRESS NOTES
Oncology Nursing Communication Tool      7:13 AM  2/10/2017     Bedside shift change report given to Shyam Salgado RN (incoming nurse) by Triny Clark RN (outgoing nurse) on Vanessa Garcesa a 57 y.o. female who was admitted on 2/5/2017  8:56 PM. Report included the following information SBAR, Kardex, MAR, Accordion and Recent Results. Significant changes during shift: None      Issues for physician to address: None            Code Status: Full Code     Infections: No current active infections     Allergies: Review of patient's allergies indicates no known allergies. Current diet: DIET NUTRITIONAL SUPPLEMENTS All Meals, HS Snack; Ensure Complete  DIET CARDIAC Regular       Pain Controlled [x] yes [] no   Bowel Movement [] yes [x] no   Last Bowel Movement (date)     2/4/17            Vital Signs:   Patient Vitals for the past 12 hrs:   Temp Pulse Resp BP SpO2   02/10/17 0525 98.1 °F (36.7 °C) 64 17 123/74 95 %   02/09/17 2230 - - - - 94 %   02/09/17 2115 98.4 °F (36.9 °C) 60 16 116/73 (!) 88 %      Intake & Output:   No intake or output data in the 24 hours ending 02/10/17 0713   Laboratory Results:     Recent Results (from the past 12 hour(s))   CBC WITH AUTOMATED DIFF    Collection Time: 02/10/17  5:06 AM   Result Value Ref Range    WBC 10.8 3.6 - 11.0 K/uL    RBC 4.04 3.80 - 5.20 M/uL    HGB 11.0 (L) 11.5 - 16.0 g/dL    HCT 36.2 35.0 - 47.0 %    MCV 89.6 80.0 - 99.0 FL    MCH 27.2 26.0 - 34.0 PG    MCHC 30.4 30.0 - 36.5 g/dL    RDW 15.9 (H) 11.5 - 14.5 %    PLATELET 116 008 - 701 K/uL    NEUTROPHILS PENDING %    LYMPHOCYTES PENDING %    MONOCYTES PENDING %    EOSINOPHILS PENDING %    BASOPHILS PENDING %    ABS. NEUTROPHILS PENDING K/UL    ABS. LYMPHOCYTES PENDING K/UL    ABS. MONOCYTES PENDING K/UL    ABS. EOSINOPHILS PENDING K/UL    ABS.  BASOPHILS PENDING K/UL    DF PENDING    METABOLIC PANEL, COMPREHENSIVE    Collection Time: 02/10/17  5:06 AM   Result Value Ref Range    Sodium 140 136 - 145 mmol/L    Potassium 5.1 3.5 - 5.1 mmol/L    Chloride 106 97 - 108 mmol/L    CO2 29 21 - 32 mmol/L    Anion gap 5 5 - 15 mmol/L    Glucose 116 (H) 65 - 100 mg/dL    BUN 33 (H) 6 - 20 MG/DL    Creatinine 1.19 (H) 0.55 - 1.02 MG/DL    BUN/Creatinine ratio 28 (H) 12 - 20      GFR est AA 56 (L) >60 ml/min/1.73m2    GFR est non-AA 46 (L) >60 ml/min/1.73m2    Calcium 9.7 8.5 - 10.1 MG/DL    Bilirubin, total 0.3 0.2 - 1.0 MG/DL    ALT (SGPT) 18 12 - 78 U/L    AST (SGOT) 8 (L) 15 - 37 U/L    Alk. phosphatase 60 45 - 117 U/L    Protein, total 7.0 6.4 - 8.2 g/dL    Albumin 2.8 (L) 3.5 - 5.0 g/dL    Globulin 4.2 (H) 2.0 - 4.0 g/dL    A-G Ratio 0.7 (L) 1.1 - 2.2                Opportunity for questions and clarifications were given to the incoming nurse. Patient's bed is in low position, side rails x2, door open PRN, call bell within reach and patient not in distress.       Rocco Root, VERO

## 2017-02-10 NOTE — PROGRESS NOTES
[facility] PROGRESS NOTE  Pulmonary, Critical Care, and Sleep Medicine    Name: Charmain Halsted MRN: [id number]   : 1955 Hospital: [facility]   Date: 2/10/2017  Admission Date: 2017     Chart and notes reviewed. Data reviewed. I review the patient's current medications in the medical record at each encounter. I have evaluated and examined the patient. Overnight events reviewed:  Remains afebrile  Wbc 10.8- improved  hgb 11- improved  crt 1.19- stable    ROS: She is resting comfortably in bed. Denies chest pain, dyspnea. Has occasional dry cough. Stable on room air. Vital Signs:  Visit Vitals    /74 (BP 1 Location: Left arm, BP Patient Position: At rest)    Pulse 64    Temp 98.1 °F (36.7 °C)    Resp 17    Ht 5' 9\" (1.753 m)    Wt 69.2 kg (152 lb 9.6 oz)    LMP  (LMP Unknown)    SpO2 95%    Breastfeeding No    BMI 22.54 kg/m2       O2 Device: Nasal cannula   O2 Flow Rate (L/min): 2 l/min   Temp (24hrs), Av °F (36.7 °C), Min:97.5 °F (36.4 °C), Max:98.4 °F (36.9 °C)       Intake/Output:   Last shift:         Last 3 shifts:    No intake or output data in the 24 hours ending 02/10/17 1059     Telemetry:     Physical Exam:   General:  Alert, cooperative, no distress, appears stated age. Head:  Normocephalic, without obvious abnormality, atraumatic. Eyes:  Conjunctivae/corneas clear. Nose: Nares normal. Septum midline. Mucosa normal.   Throat: Lips, mucosa, and tongue normal.    Neck: Supple, symmetrical, trachea midline, no adenopathy. Lungs:   Clear to auscultation bilaterally. Chest wall:  No tenderness or deformity. Heart:  Regular rate and rhythm, S1, S2 normal, no murmur, click, rub or gallop. Abdomen:   Soft, non-tender. Bowel sounds normal. No masses,  No organomegaly. Extremities: Extremities normal, atraumatic, no cyanosis or edema. Pulses: 2+ and symmetric all extremities.    Skin: Skin color, texture, turgor normal. No rashes or lesions   Lymph nodes: Cervical, supraclavicular nodes normal.   Neurologic: Grossly nonfocal     DATA:  MAR reviewed and pertinent medications noted or modified as needed    Labs:  Recent Labs      02/10/17   0506  02/09/17   0551  02/08/17   0343   WBC  10.8  14.2*  19.6*   HGB  11.0*  10.6*  9.6*   HCT  36.2  34.5*  32.4*   PLT  305  269  275     Recent Labs      02/10/17   0506  02/09/17   0801  02/08/17   0343   NA  140  140  142   K  5.1  4.1  5.0   CL  106  104  108   CO2  29  28  28   GLU  116*  158*  100   BUN  33*  31*  25*   CREA  1.19*  1.18*  1.03*   CA  9.7  9.7  9.5   ALB  2.8*   --   2.7*   TBILI  0.3   --   0.3   SGOT  8*   --   19   ALT  18   --   17       Imaging:  I have personally reviewed the patients radiographs and reports. PET scan reviewed- The area of masslike appearance in the right lower lobe on CT is resolving  and is a metabolic consistent with benign disease. Bilateral small areas of apical airspace disease or hypermetabolic and  neoplasia pathology on the left is not excluded as discussed. IMPRESSION:   · Acute on chronic respiratory failure  · Abnormal chest ct  · Head and neck Ca      PLAN:   · Supplemental O2 as needed keep sats >90%  · Continue empiric abx  · Continue scheduled nebs, Mucinex, IS  · Wean IVCS  · Chest CT c/w end stage metastatic disease  · Poor overall prognosis     She has pending follow up scheduled with Dr. Mathew Villeda, keep appt as scheduled. Pulmonary will sign off and be available as needed, please call. Thank you.       Taurus Diane NP

## 2017-02-11 NOTE — ROUTINE PROCESS
Oncology Nursing Communication Tool      8:52 AM  2/11/2017     Bedside and Verbal shift change report given to Earl Daniel RN (incoming nurse) by Rachel Rodriguez RN (outgoing nurse) on Shaan Rodriguez a 57 y.o. female who was admitted on 2/5/2017  8:56 PM. Report included the following information SBAR, Kardex, Intake/Output, Recent Results and Med Rec Status. Significant changes during shift: none      Issues for physician to address:             Code Status: Full Code     Infections: No current active infections     Allergies: Review of patient's allergies indicates no known allergies. Current diet: DIET NUTRITIONAL SUPPLEMENTS All Meals, HS Snack; Ensure Complete  DIET CARDIAC Regular     Opportunity for questions and clarifications were given to the incoming nurse. Patient's bed is in low position, side rails x2, door open PRN, call bell within reach and patient not in distress.       Rachel Rodriguez RN

## 2017-02-11 NOTE — PROGRESS NOTES
Nutrition Services      Nutrition Screen:  Wt Readings from Last 10 Encounters:   02/06/17 69.2 kg (152 lb 9.6 oz)   01/31/17 66.4 kg (146 lb 6.4 oz)   01/15/17 68 kg (150 lb)   12/07/16 67.9 kg (149 lb 12.8 oz)   11/22/16 67.8 kg (149 lb 6.4 oz)   10/07/16 65.9 kg (145 lb 3.2 oz)   09/07/16 64.5 kg (142 lb 3.2 oz)   09/04/16 64.2 kg (141 lb 8.6 oz)   08/24/16 64.9 kg (143 lb)   08/15/16 65.9 kg (145 lb 3.2 oz)     Body mass index is 22.54 kg/(m^2). Supplements:                        _____ ordered ______  declined. __ __  Pt is nutritionally stable at this time, will rescreen in 7 days. _x __    Pt is at nutritional risk and will be rescreened in 2-4 days. __ __  Pt is at moderate or high nutritional risk, will refer to RD for assessment.        Judge Rothman  Dietetic Technician, Registered

## 2017-02-11 NOTE — PROGRESS NOTES
General Daily Progress Note    Admit Date: 2/5/2017  Hospital day 6    Subjective:     Patient has no complaint of shortness of breath. Improving from admission. ..   Medication side effects: none    Current Facility-Administered Medications   Medication Dose Route Frequency    methylPREDNISolone (PF) (SOLU-MEDROL) injection 20 mg  20 mg IntraVENous Q12H    amitriptyline (ELAVIL) tablet 25 mg  25 mg Oral QHS    LORazepam (ATIVAN) tablet 0.5 mg  0.5 mg Oral BID    albuterol-ipratropium (DUO-NEB) 2.5 MG-0.5 MG/3 ML  3 mL Nebulization Q6H PRN    HYDROcodone-acetaminophen (NORCO)  mg tablet 1 Tab  1 Tab Oral Q4H PRN    amLODIPine (NORVASC) tablet 10 mg  10 mg Oral DAILY    FLUoxetine (PROzac) capsule 20 mg  20 mg Oral DAILY    gabapentin (NEURONTIN) capsule 600 mg  600 mg Oral TID    lithium carbonate CR (ESKALITH CR) tablet 450 mg  450 mg Oral QHS    traZODone (DESYREL) tablet 200 mg  200 mg Oral QHS    acetaminophen (TYLENOL) tablet 650 mg  650 mg Oral Q4H PRN    polyethylene glycol (MIRALAX) packet 17 g  17 g Oral DAILY    heparin (porcine) injection 5,000 Units  5,000 Units SubCUTAneous Q12H    albuterol-ipratropium (DUO-NEB) 2.5 MG-0.5 MG/3 ML  3 mL Nebulization Q2H PRN    0.9% sodium chloride infusion  50 mL/hr IntraVENous CONTINUOUS    sodium chloride (NS) flush 5-10 mL  5-10 mL IntraVENous Q8H    sodium chloride (NS) flush 5-10 mL  5-10 mL IntraVENous PRN    guaiFENesin SR (MUCINEX) tablet 1,200 mg  1,200 mg Oral BID    metoprolol tartrate (LOPRESSOR) tablet 25 mg  25 mg Oral Q12H    piperacillin-tazobactam (ZOSYN) 3.375 g in 0.9% sodium chloride (MBP/ADV) 100 mL  3.375 g IntraVENous Q6H    levoFLOXacin (LEVAQUIN) 750 mg in D5W IVPB  750 mg IntraVENous Q24H        Review of Systems  Pertinent items are noted in HPI.     Objective:     Patient Vitals for the past 8 hrs:   BP Temp Pulse Resp SpO2   02/11/17 0940 110/60 - 67 - -   02/11/17 0533 122/72 98.3 °F (36.8 °C) 85 18 93 % Physical Exam:   Visit Vitals    /60    Pulse 67    Temp 98.3 °F (36.8 °C)    Resp 18    Ht 5' 9\" (1.753 m)    Wt 152 lb 9.6 oz (69.2 kg)    LMP  (LMP Unknown)    SpO2 93%    Breastfeeding No    BMI 22.54 kg/m2     Lungs: clear to auscultation bilaterally  Heart: regular rate and rhythm, S1, S2 normal, no murmur, click, rub or gallop  Abdomen: soft, non-tender. Bowel sounds normal. No masses,  no organomegaly  Extremities: extremities normal, atraumatic, no cyanosis or edema      ECG: normal sinus rhythm     Data Review No results found for this or any previous visit (from the past 24 hour(s)). Assessment:     Principal Problem:    Acute respiratory failure ([facility] [address] (2/6/2017)    Active Problems:    Essential hypertension, benign (11/29/2011)      Bipolar 1 disorder ([address] (11/29/2011)      Dysphagia (12/30/2014)      History of laryngeal cancer (11/2/2015)      History of radiation to head and neck region (3/30/2016)      Chronic pain (1/15/2017)      Aspiration pneumonia of both upper lobes (HCC) (1/20/2017)      Chronic obstructive pulmonary disease with acute exacerbation ([facility] [address] (2/10/2017)        Plan:     1. COPD exacerbation- improving. Continue levaquin and iv steroids. 2. Hx Throat cancer  3. Patchy hypermetabolic areas on PET scan- outpatient followup. 4. Fibromyalgia, chronic pain.

## 2017-02-12 NOTE — PROGRESS NOTES
Care Management Discharge Note    Discharge orders written, discharging to home. COPD exacerbation- improved. Medically stable for discharge. Care Management Interventions  PCP Verified by CM:  Yes  Transition of Care Consult (CM Consult): Discharge Planning (Discharging to home with follow up in MD office 3-5 days)  Current Support Network: Lives with Spouse  Confirm Follow Up Transport: Family  Plan discussed with Pt/Family/Caregiver: Yes (CM met with patient.)  Discharge Location  Discharge Placement: Home    Jake Perry RN, BSN, [facility]  ED Care Management  [phone]

## 2017-02-12 NOTE — PROGRESS NOTES
Oncology Nursing Communication Tool      7:27 PM  2/11/2017     Bedside shift change report given to Zoey Cade RN (incoming nurse) by Ortega Martinez (outgoing nurse) on Guillaume Denson a 57 y.o. female who was admitted on 2/5/2017  8:56 PM. Report included the following information SBAR, Kardex, Intake/Output, MAR and Recent Results. Significant changes during shift:       Issues for physician to address:             Code Status: Full Code     Infections: No current active infections     Allergies: Review of patient's allergies indicates no known allergies. Current diet: DIET NUTRITIONAL SUPPLEMENTS All Meals, HS Snack; Ensure Complete  DIET CARDIAC Regular       Pain Controlled [] yes [] no   Bowel Movement [] yes [] no   Last Bowel Movement (date)                 Vital Signs:   Patient Vitals for the past 12 hrs:   Temp Pulse Resp BP SpO2   02/11/17 1252 98.3 °F (36.8 °C) (!) 57 18 128/81 95 %   02/11/17 0940 - 67 - 110/60 -      Intake & Output:   No intake or output data in the 24 hours ending 02/11/17 1927   Laboratory Results:   No results found for this or any previous visit (from the past 12 hour(s)). Opportunity for questions and clarifications were given to the incoming nurse. Patient's bed is in low position, side rails x2, door open PRN, call bell within reach and patient not in distress.       Ortega Martinez

## 2017-02-12 NOTE — ROUTINE PROCESS
Oncology Nursing Communication Tool      7:27 AM  2/12/2017     Bedside shift change report given to Daryn Noel RN (incoming nurse) by Casandra Ortega RN (outgoing nurse) on Erroll Merced a 57 y.o. female who was admitted on 2/5/2017  8:56 PM. Report included the following information SBAR, Kardex, Intake/Output and MAR. Significant changes during shift: med for pain x2 overnight otherwise pt slept well. No labs. Issues for physician to address: none            Code Status: Full Code     Infections: No current active infections     Allergies: Review of patient's allergies indicates no known allergies. Current diet: DIET NUTRITIONAL SUPPLEMENTS All Meals, HS Snack; Ensure Complete  DIET CARDIAC Regular       Pain Controlled [x] yes [] no   Bowel Movement [] yes [x] no   Last Bowel Movement (date)     2/4/17            Vital Signs:   Patient Vitals for the past 12 hrs:   Temp Pulse Resp BP SpO2   02/12/17 0633 98.2 °F (36.8 °C) 61 17 113/78 94 %   02/11/17 2159 98.6 °F (37 °C) 62 20 134/74 94 %      Intake & Output:     Intake/Output Summary (Last 24 hours) at 02/12/17 0727  Last data filed at 02/12/17 0893   Gross per 24 hour   Intake            753.3 ml   Output                0 ml   Net            753.3 ml      Laboratory Results:   No results found for this or any previous visit (from the past 12 hour(s)). Opportunity for questions and clarifications were given to the incoming nurse. Patient's bed is in low position, side rails x2, door open PRN, call bell within reach and patient not in distress.       Casandra Ortega RN

## 2017-02-12 NOTE — DISCHARGE SUMMARY
[id number]   DISCHARGE SUMMARY       Name:  Giles Jimenez   MR#:  [id number]   :  1955   Account #:  [de-identified]        Date of Adm:  2017       FINAL DIAGNOSES:   1. Chronic obstructive pulmonary disease exacerbation. 2. History of laryngeal cancer. 3. Acute renal failure. 4. Bipolar illness. 5. Hypotension. 6. Respiratory failure with hypercarbia and hypoxemia. 7. Fibromyalgia. DISCHARGE MEDICATIONS:   1. Mucomyst via nebulizer q. 4 hours. 2. DuoNebs via nebulizer q.4h. p.r.n.   3. Amitriptyline 10 mg at bedtime. 4. Amlodipine 10 mg at bedtime. 5. Prozac 20 mg daily. 6. Neurontin 300 mg 2 tabs t.i.d.   7. Mucinex DM b.i.d.   8. Norco 10/325 q.4h. p.r.n. pain. 9. Levaquin 500 mg daily. 10. Lithium 450 mg at bedtime. 11. Metoprolol 25 mg twice daily. 12. Prednisone 20 mg b.i.d.   13. Senna 8.6 mg daily. 14. Trazodone 100 mg 2 tablets at bedtime. 15. Vitamin B12 once daily. FOLLOWUP: With Dr. Coco Chase later this week. HISTORY OF PRESENT ILLNESS: The patient is a very pleasant 57  year-old  female with a known history of throat cancer. She   came to the emergency room with complaints of shortness breath. The   patient had recently been discharged from [facility] for pneumonia and   a questionable new lung mass on CT scan. Pulmonary suggested   doing a repeat CT scan in 3 months. The patient had worsened   shortness of breath and cough with yellow sputum and left sided   pleuritic chest pain. Tried to use her nasal oxygen at home without . In the emergency room was found to have saturations of 62% and that   was with 100% nonrebreather with saturations increasing to 90%. ABG   showed hypercarbia and hypoxia with respiratory acidosis. Chest x-ray   showed minimal changes from prior. CHEST X-RAY: No obvious   leukocytosis, there was mild hypertension.     PAST MEDICAL HISTORY: Significant for:    1. Bipolar illness. 2. Skin cancer. 3. Throat cancer, laryngeal cancer. 4. Fibromyalgia. 5. Hypertension. PAST SURGICAL HISTORY: Includes placement of gastrostomy tube. Biopsy of neck mass, placement of G-tube, esophageal dilation,   tubal pregnancy and hysterectomy. SOCIAL HISTORY: Former smoker, 1 pack a day, quit in 2014. ALLERGIES: NONE KNOWN. MEDICATIONS ON ADMISSION:   1. Vitamin B12 1000 mcg daily. 2. Mucomyst 100 mL 4 mL by nebulizer every 4 hours. 3. DuoNebs every 6 hours. 4. Amlodipine 10 mg daily. 5. Neurontin 300 mg 2 tabs t.i.d.   6. Hydrocodone 10/325 p.r.n.   7. Metoprolol 25 mg b.i.d.   8. Mucinex DM b.i.d.   9. Bisacodyl 5 mg daily. 10. Senna 8.6 mg daily. 11. Lithium carbonate 450 mg daily. 12. Fluoxetine 20 mg daily. 13. Amitriptyline 10 mg at bedtime. 14. Trazodone 100 mg 2 at bedtime. REVIEW OF SYSTEMS   Please see HPI. PHYSICAL EXAMINATION   VITAL SIGNS: Blood pressure 90/60, pulse 70, temperature 98.9,   respiratory rate 20, height 5 foot 9, weight 152, BMI 22.5. GENERAL: Well-developed, well-nourished, pleasant white female. EARS, NOSE AND THROAT: No hyperemia posterior oropharynx. LUNGS: Crackles bilaterally. CARDIOVASCULAR: Regular rhythm and rate, 2/6 systolic murmur. ABDOMEN: Soft, nontender. EXTREMITIES: Without edema. HOSPITAL COURSE: The patient was admitted, treated with IV   Levaquin and IV Solu-Medrol and DuoNebs. On admission on added   oxygen 12 liters of oxygen, pO2 of 104, pCO2 of 58, pH 7.32. The   patient improved slowly on the above treatment. Her Solu-Medrol dose   was weaned down, was breathing well, walking around the room, was   quite anxious to go home. Did get a PET scan done while in the   hospital. There had been a mass-like appearance on the right lower   lobe on CT scan, which was felt to be resolving and was probably felt   to be more consistent with benign disease.  However, she also had   bilateral areas of airspace disease which were hypometabolic. Again   these can be followed up as an outpatient. The patient was followed   and seen in consultation by Dr. Jasper Ramirez and Dr. Nancy Guzmán. Also Palliative   Medicine was consulted. The patient not ready for hospice yet. She   was stable and discharged home on 02/12/2016.         uY Jaramillo MD MS / SOHAN   D:  02/12/2017   08:01   T:  02/12/2017   08:50   Job #:  [id number]

## 2017-02-14 NOTE — PROGRESS NOTES
Patient listed on discharge [facility]) report on . Patient  to [facility] - with COPD. Contacted patient to perform post hospital discharge assessment. Verified  and address with patient as identifiers. Provided introduction to self, and explanation of the NN role. Performed medication reconciliation with patient, and patient verbalizes understanding of administration of home medications. Reviewed discharge instructions with patient. Patient verbalizes understand of discharge instructions and follow-up care. Patient scheduled to f/u with Dr Caden Denson on  (per her request after the ). Reviewed red flags with patient, and patient verbalizes understanding. Patient given an opportunity to ask questions. Contact information provided to the patient for future reference or further questions. Patient states she still has a cough-non productive. Has intermittent pain on (R) side of head, arms and legs. Her pain is a 8/10 and states that when she was D/C from the hospital she was taking 2 Norco  Q4h and that was decreased to 1-Q4H. Red Flags:  Fever, worse cough, C/P, worse SOB, worse pain in head, arms or legs.

## 2017-02-15 NOTE — TELEPHONE ENCOUNTER
Pt taking two pills for pain because of the pain in her neck, now she is out and requesting refill on her Hydrocodone.

## 2017-02-15 NOTE — TELEPHONE ENCOUNTER
Last filled 1/24/17 Patient states that Dr. Areli Magana only gave her 120 tablets he usually gives her 240, she states that the pain is really bad and she has been taking 2 tablets instead of the prescribed 1 tablets every 4 hours, she states she is running  Out of medication.  Please call patient [phone]

## 2017-02-21 NOTE — PROGRESS NOTES
Chief Complaint   Patient presents with   [facility] Follow Up     F/U from [facility].

## 2017-02-21 NOTE — MR AVS SNAPSHOT
Visit Information Date & Time Provider Department Dept. Phone Encounter #  
 2/21/2017 11:15 AM Umberto Huitron [phone] [id number] Follow-up Instructions Return in about 4 weeks (around 3/21/2017). Upcoming Health Maintenance Date Due  
 MEDICARE YEARLY EXAM 5/25/1972 BREAST CANCER SCRN MAMMOGRAM 5/25/2004 FOBT Q 1 YEAR AGE 50-75 5/25/2004 Pneumococcal 19-64 Highest Risk (3 of 3 - PCV13) 12/7/2017 PAP AKA CERVICAL CYTOLOGY 2/28/2019 DTaP/Tdap/Td series (2 - Td) 12/7/2026 Allergies as of 2/21/2017  Review Complete On: 2/21/2017 By: Gadiel Santana No Known Allergies Current Immunizations  Reviewed on 12/7/2016 Name Date Influenza Vaccine 11/1/2014 Influenza Vaccine (Quad) PF 12/7/2016 Pneumococcal Polysaccharide (PPSV-23) 12/7/2016 Tdap 12/7/2016 Not reviewed this visit You Were Diagnosed With   
  
 Codes Comments Aspiration pneumonia of both upper lobes, unspecified aspiration pneumonia type ([facility] [address]    -  Primary ICD-10-CM: J69.0 ICD-9-CM: 507.0 Chronic obstructive pulmonary disease with acute exacerbation (HCC)     ICD-10-CM: J44.1 ICD-9-CM: 491.21 Bipolar 1 disorder (HCC)     ICD-10-CM: F31.9 ICD-9-CM: 296.7 Pharyngoesophageal dysphagia     ICD-10-CM: R13.14 ICD-9-CM: 787.24 History of radiation to head and neck region     ICD-10-CM: Z92.3 ICD-9-CM: V15.3 Essential hypertension, benign     ICD-10-CM: I10 
ICD-9-CM: 248. 1 Chronic pain syndrome     ICD-10-CM: G89.4 ICD-9-CM: 338. 4 Vitals BP Pulse Temp Resp Height(growth percentile) Weight(growth percentile) 116/74 (BP 1 Location: Right arm, BP Patient Position: Sitting) (!) 109 99.1 °F (37.3 °C) (Oral) 30 5' 9\" (1.753 m) 145 lb 9.6 oz (66 kg) LMP SpO2 BMI OB Status Smoking Status (LMP Unknown) (!) 89% 21.5 kg/m2 Hysterectomy Former Smoker Vitals History BMI and BSA Data Body Mass Index Body Surface Area  
 21.5 kg/m 2 1.79 m 2 Preferred Pharmacy Pharmacy Name Phone Lorne Licona [facility] [phone] Your Updated Medication List  
  
   
This list is accurate as of: 2/21/17 12:09 PM.  Always use your most recent med list.  
  
  
  
  
 acetylcysteine 100 mg/mL (10 %) nebulizer solution Commonly known as:  MUCOMYST Take 4 mL by inhalation every four (4) hours. albuterol-ipratropium 2.5 mg-0.5 mg/3 ml Nebu Commonly known as:  DUO-NEB  
3 mL by Nebulization route every four (4) hours as needed. amitriptyline 10 mg tablet Commonly known as:  ELAVIL Take 1 Tab by mouth nightly. amLODIPine 10 mg tablet Commonly known as:  [place] Citron Take 1 Tab by mouth daily. diphenhydrAMINE 12.5 mg/5 mL elix 40 mL, lidocaine 2 % soln 40 mL, aluminum & magnesium hydroxide-simethicone 400-400-40 mg/5 mL susp 40 mL Take 5 mL by mouth daily. FLUoxetine 20 mg tablet Commonly known as:  PROzac Take 20 mg by mouth daily. gabapentin 300 mg capsule Commonly known as:  NEURONTIN Take 2 Caps by mouth three (3) times daily. guaiFENesin-dextromethorphan -30 mg per tablet Commonly known as:  Doyle [employer] Doyle DM Take 1 Tab by mouth two (2) times a day. HYDROcodone-acetaminophen  mg tablet Commonly known as:  Ivis Barrs Take 2 Tabs by mouth every four (4) hours as needed. Max Daily Amount: 12 Tabs. lithium carbonate  mg CR tablet Commonly known as:  ESKALITH CR  
TAKE 1 TABLET BY ORAL ROUTE PER AT BEDTIME  
  
 metoprolol tartrate 25 mg tablet Commonly known as:  LOPRESSOR Take 1 Tab by mouth every twelve (12) hours. Senna 8.6 mg tablet Generic drug:  senna Take 1 Tab by mouth daily. traZODone 100 mg tablet Commonly known as:  Floyd Fray Take 200 mg by mouth nightly. VITAMIN B-12 1,000 mcg sublingual tablet Generic drug:  cyanocobalamin 1,000 mcg by SubLINGual route daily. Prescriptions Printed Refills HYDROcodone-acetaminophen (NORCO)  mg tablet 0 Sig: Take 2 Tabs by mouth every four (4) hours as needed. Max Daily Amount: 12 Tabs. Class: Print Route: Oral  
  
We Performed the Following CBC WITH AUTOMATED DIFF [33131 CPT(R)] METABOLIC PANEL, COMPREHENSIVE [86245 CPT(R)] Follow-up Instructions Return in about 4 weeks (around 3/21/2017). To-Do List   
 03/15/2017 10:30 AM  
  Appointment with [facility] 2 at [facility] ([phone]) NON-CONTRAST STUDY: 1. Bring any non [facility] facility films/images pertaining to the area of interest with you on the day of appointment. 2. Check in at registration at least 30 minutes before appt time unless you were instructed to do otherwise. 3. If you have to drink oral contrast please pick it up any weekday prior to your appointment, if you cannot please check in 2 hrs  before appt time. Introducing [facility]! Urban Christianson introduces 5app patient portal. Now you can access parts of your medical record, email your doctor's office, and request medication refills online. 1. In your internet browser, go to [email or web address] 2. Click on the First Time User? Click Here link in the Sign In box. You will see the New Member Sign Up page. 3. Enter your 5app Access Code exactly as it appears below. You will not need to use this code after youve completed the sign-up process. If you do not sign up before the expiration date, you must request a new code. · 5app Access Code: [facility] Expires: 3/7/2017  2:52 PM 
 
4. Enter the last four digits of your Social Security Number (xxxx) and Date of Birth (mm/dd/yyyy) as indicated and click Submit. You will be taken to the next sign-up page. 5. Create a 5app ID.  This will be your 5app login ID and cannot be changed, so think of one that is secure and easy to remember. 6. Create a [facility] password. You can change your password at any time. 7. Enter your Password Reset Question and Answer. This can be used at a later time if you forget your password. 8. Enter your e-mail address. You will receive e-mail notification when new information is available in [address] 9. Click Sign Up. You can now view and download portions of your medical record. 10. Click the Download Summary menu link to download a portable copy of your medical information. If you have questions, please visit the Frequently Asked Questions section of the [employer] website. Remember, [employer] is NOT to be used for urgent needs. For medical emergencies, dial 911. Now available from your iPhone and Android! Please provide this summary of care documentation to your next provider. Your primary care clinician is listed as Yehuda Ring. If you have any questions after today's visit, please call [phone].

## 2017-02-22 NOTE — PROGRESS NOTES
HISTORY OF PRESENT ILLNESS  Whit Turcios is a 57 y.o. female. f/u hospitalization for bilateral pulmonary infiltrates ,resp failure. ent ca with dysphagia,chronic aspiration,chronic pain  Hospital Follow Up   The history is provided by the patient. This is a chronic problem. The problem occurs daily. The problem has not changed since onset. Associated symptoms include shortness of breath. Pertinent negatives include no chest pain. Dysphagia   This is a chronic problem. The problem occurs daily. The problem has not changed since onset. Associated symptoms include shortness of breath. Pertinent negatives include no chest pain. Neck Pain   This is a chronic problem. The problem occurs daily. The problem has not changed since onset. Associated symptoms include shortness of breath. Pertinent negatives include no chest pain. Review of Systems   Constitutional: Negative for diaphoresis and fever. Respiratory: Positive for cough and shortness of breath. Cardiovascular: Negative for chest pain and palpitations. Musculoskeletal: Positive for neck pain. Physical Exam   Constitutional: She appears well-developed and well-nourished. She appears distressed. HENT:   Head: Normocephalic and atraumatic. Right Ear: External ear normal.   Left Ear: External ear normal.   Nose: Nose normal.   Mouth/Throat: Oropharynx is clear and moist.   Cardiovascular: Normal rate and regular rhythm. Pulmonary/Chest: Effort normal and breath sounds normal.   Abdominal: Soft. Bowel sounds are normal.   Neurological: She is alert. Skin: Skin is warm and dry. ASSESSMENT and PLAN  Earl Morillo was seen today for hospital follow up.     Diagnoses and all orders for this visit:    Aspiration pneumonia of both upper lobes, unspecified aspiration pneumonia type ([address]  -     METABOLIC PANEL, COMPREHENSIVE  -     CBC WITH AUTOMATED DIFF    Chronic obstructive pulmonary disease with acute exacerbation (HCC)    Bipolar 1 disorder ([facility] [address]    Pharyngoesophageal dysphagia,will discuss feeding tube with GI ,Pulmonary to allow infiltrates to resolve. CT,PET Scan worrisome for metastatic disease    History of radiation to head and neck region  -     HYDROcodone-acetaminophen (NORCO)  mg tablet; Take 2 Tabs by mouth every four (4) hours as needed. Max Daily Amount: 12 Tabs. Essential hypertension, benign    Chronic pain syndrome,inadequately controlled  -     HYDROcodone-acetaminophen (NORCO)  mg tablet; Take 2 Tabs by mouth every four (4) hours as needed. Max Daily Amount: 12 Tabs. Follow-up Disposition:  Return in about 4 weeks (around 3/21/2017).

## 2017-02-23 PROBLEM — J96.90 RESPIRATORY FAILURE (HCC): Status: ACTIVE | Noted: 2017-01-01

## 2017-02-23 NOTE — CONSULTS
[facility]  Pulmonary, Critical Care, and Sleep Medicine    Initial Patient Consult    Name: Guillaume Denson MRN: [id number]   : 1955 Hospital: [facility]   Date: 2017        IMPRESSION:   · Acute/chronic hypoxic respiratory failure  · Health-care-associated pneumonia  · Sepsis  · H/O laryngeal cancer  · Bipolar disorder      RECOMMENDATIONS:   · BiPAP for now, wean to O2 as tolerated  · Bronchodilators  · Cultures  · Empiric antibiotics for HCAP  · DVT prophylaxis     Subjective: This patient has been seen and evaluated at the request of Dr. Marie Dewitt for respiratory failure. Patient is a 57 y.o. female with h/o throat cancer and chronic hypoxemia (?COPD), recently admitted with severe community-acquired pneumonia, presented this morning with severe hypoxia and dyspnea which was acute in onset. She was markedly dyspneic on arrival and was started on BiPAP. She is feeling a lot better now with dyspnea close to her baseline. Her main complaint is of dry mouth from the BiPAP. Past Medical History:   Diagnosis Date    Bipolar 1 disorder ([facility] [address] 2011    Cancer ([facility] [address]     skin cancer buttocks    Cancer (HCC)     throat    Chronic pain     FIBROMYALGIA, LEGS    Encounter for long-term (current) use of other medications 2011    Essential hypertension, benign 2011    Laryngeal cancer ([facility] [address] 2015    Laryngeal mass     Psychiatric disorder     bipolar      Past Surgical History:   Procedure Laterality Date    HX BREAST AUGMENTATION Bilateral     SALINE IMPLANTS    HX GI      PLACEMENT OF G TUBE    HX GI      ESOPH. DILATATION    HX GYN      tubal pregnancy    HX HEENT      BX OF NECK MASS    HX HYSTERECTOMY      PLACE PERCUT GASTROSTOMY TUBE  10/28/2014     has been removed       Prior to Admission medications    Medication Sig Start Date End Date Taking?  Authorizing Provider   HYDROcodone-acetaminophen (NORCO)  mg tablet Take 2 Tabs by mouth every four (4) hours as needed. Max Daily Amount: 12 Tabs. 2/21/17   Tyson Don MD   albuterol-ipratropium (DUO-NEB) 2.5 mg-0.5 mg/3 ml nebu 3 mL by Nebulization route every four (4) hours as needed. 2/12/17   Katt Simpson MD   diphenhydrAMINE 12.5 mg/5 mL elix 40 mL, lidocaine 2 % soln 40 mL, aluminum & magnesium hydroxide-simethicone 400-400-40 mg/5 mL susp 40 mL Take 5 mL by mouth daily. Historical Provider   VITAMIN B-12 1,000 mcg sublingual tablet 1,000 mcg by SubLINGual route daily. 11/11/16   Historical Provider   acetylcysteine (MUCOMYST) 100 mg/mL (10 %) nebulizer solution Take 4 mL by inhalation every four (4) hours. 1/26/17   Tyson Don MD   amLODIPine (NORVASC) 10 mg tablet Take 1 Tab by mouth daily. 1/24/17   Tyson Don MD   gabapentin (NEURONTIN) 300 mg capsule Take 2 Caps by mouth three (3) times daily. 1/24/17   Tyson Don MD   metoprolol tartrate (LOPRESSOR) 25 mg tablet Take 1 Tab by mouth every twelve (12) hours. 1/24/17   Tyson Don MD   guaiFENesin-dextromethorphan [facility] DM) 600-30 mg per tablet Take 1 Tab by mouth two (2) times a day. 1/24/17   Tyson Don MD   senna (SENNA) 8.6 mg tablet Take 1 Tab by mouth daily. Historical Provider   lithium carbonate CR (ESKALITH CR) 450 mg CR tablet TAKE 1 TABLET BY ORAL ROUTE PER AT BEDTIME 11/9/16   Historical Provider   FLUoxetine (PROZAC) 20 mg tablet Take 20 mg by mouth daily. 2/5/16   Historical Provider   amitriptyline (ELAVIL) 10 mg tablet Take 1 Tab by mouth nightly. 2/29/16   Tyson Don MD   traZODone (DESYREL) 100 mg tablet Take 200 mg by mouth nightly.  11/2/15   Historical Provider     No Known Allergies   Social History   Substance Use Topics    Smoking status: Former Smoker     Packs/day: 1.00     Years: 40.00     Quit date: 8/27/2014    Smokeless tobacco: Never Used      Comment: PASSIVE SMOKE EXPOSURE,  SMOKES    Alcohol use No      Family History   Problem Relation Age of Onset    Cancer Father      bone/skin/lung    Anesth Problems Neg Hx         Current Facility-Administered Medications   Medication Dose Route Frequency    cefepime (MAXIPIME) 2 g in 0.9% sodium chloride (MBP/ADV) 100 mL  2 g IntraVENous Q12H    metroNIDAZOLE (FLAGYL) IVPB premix 500 mg  500 mg IntraVENous Q8H    albuterol-ipratropium (DUO-NEB) 2.5 MG-0.5 MG/3 ML  3 mL Nebulization Q6H RT    [START ON 2017] gabapentin (NEURONTIN) capsule 600 mg  600 mg Oral TID    [START ON 2017] lithium carbonate CR (ESKALITH CR) tablet 450 mg  450 mg Oral DAILY    sodium chloride (NS) flush 5-10 mL  5-10 mL IntraVENous Q8H    enoxaparin (LOVENOX) injection 40 mg  40 mg SubCUTAneous Q24H    0.9% sodium chloride infusion  100 mL/hr IntraVENous CONTINUOUS    [START ON 2017] metoprolol tartrate (LOPRESSOR) tablet 25 mg  25 mg Oral Q12H    vancomycin (VANCOCIN) 1750 mg in  ml infusion  1,750 mg IntraVENous ONCE       Review of Systems:  A comprehensive review of systems was negative except for that written in the HPI. Objective:   Vital Signs:    Visit Vitals    /64 (BP 1 Location: Right arm)    Pulse (!) 114    Temp (!) 100.9 °F (38.3 °C)    Resp 22    Ht 5' 9\" (1.753 m)    Wt 67.6 kg (149 lb 0.5 oz)    LMP  (LMP Unknown)    SpO2 98%    BMI 22.01 kg/m2       O2 Device: BIPAP   O2 Flow Rate (L/min): 15 l/min   Temp (24hrs), Av.9 °F (38.3 °C), Min:100.9 °F (38.3 °C), Max:100.9 °F (38.3 °C)       Intake/Output:   Last shift:         Last 3 shifts:    No intake or output data in the 24 hours ending 17 1018   Physical Exam:   General:  Alert, cooperative, no distress, on BiPAP, chronically ill appearing   Head:  Normocephalic, without obvious abnormality, atraumatic. Eyes:  Conjunctivae/corneas clear. Nose: Nares normal. Septum midline.  Mucosa normal.     Throat: Lips, mucosa normal.     Neck: Supple, symmetrical, trachea midline    Back:   Symmetric, no curvature. ROM normal.   Lungs:   Rales left > right    Chest wall:  No tenderness or deformity. Heart:  Regular rate and rhythm    Abdomen:   Soft, non-tender. Bowel sounds normal.     Extremities: Extremities normal, atraumatic, no cyanosis . Skin: Skin color, texture, turgor normal. No rashes or lesions   Lymph nodes: Cervical, supraclavicular nodes normal.   Neurologic: Grossly nonfocal     Data review:     Recent Results (from the past 24 hour(s))   EKG, 12 LEAD, INITIAL    Collection Time: 02/23/17  8:02 AM   Result Value Ref Range    Ventricular Rate 121 BPM    Atrial Rate 121 BPM    P-R Interval 136 ms    QRS Duration 74 ms    Q-T Interval 326 ms    QTC Calculation (Bezet) 462 ms    Calculated P Axis 67 degrees    Calculated R Axis 54 degrees    Calculated T Axis 49 degrees    Diagnosis       Sinus tachycardia  Possible Left atrial enlargement  Nonspecific ST abnormality  Abnormal ECG  When compared with ECG of 05-FEB-2017 20:59,  No significant change was found     LACTIC ACID, PLASMA    Collection Time: 02/23/17  8:09 AM   Result Value Ref Range    Lactic acid 1.6 0.4 - 2.0 MMOL/L   METABOLIC PANEL, COMPREHENSIVE    Collection Time: 02/23/17  8:09 AM   Result Value Ref Range    Sodium 139 136 - 145 mmol/L    Potassium 4.5 3.5 - 5.1 mmol/L    Chloride 103 97 - 108 mmol/L    CO2 29 21 - 32 mmol/L    Anion gap 7 5 - 15 mmol/L    Glucose 147 (H) 65 - 100 mg/dL    BUN 21 (H) 6 - 20 MG/DL    Creatinine 1.14 (H) 0.55 - 1.02 MG/DL    BUN/Creatinine ratio 18 12 - 20      GFR est AA 59 (L) >60 ml/min/1.73m2    GFR est non-AA 48 (L) >60 ml/min/1.73m2    Calcium 9.3 8.5 - 10.1 MG/DL    Bilirubin, total 0.3 0.2 - 1.0 MG/DL    ALT (SGPT) 17 12 - 78 U/L    AST (SGOT) 13 (L) 15 - 37 U/L    Alk.  phosphatase 89 45 - 117 U/L    Protein, total 7.2 6.4 - 8.2 g/dL    Albumin 2.7 (L) 3.5 - 5.0 g/dL    Globulin 4.5 (H) 2.0 - 4.0 g/dL    A-G Ratio 0.6 (L) 1.1 - 2.2     CBC WITH AUTOMATED DIFF    Collection Time: 02/23/17  8:09 AM   Result Value Ref Range    WBC 13.0 (H) 3.6 - 11.0 K/uL    RBC 4.23 3.80 - 5.20 M/uL    HGB 11.8 11.5 - 16.0 g/dL    HCT 38.4 35.0 - 47.0 %    MCV 90.8 80.0 - 99.0 FL    MCH 27.9 26.0 - 34.0 PG    MCHC 30.7 30.0 - 36.5 g/dL    RDW 16.8 (H) 11.5 - 14.5 %    PLATELET 036 299 - 385 K/uL    NEUTROPHILS 80 %    BAND NEUTROPHILS 10 %    LYMPHOCYTES 5 %    MONOCYTES 3 %    EOSINOPHILS 1 %    BASOPHILS 1 %    ABS. NEUTROPHILS 11.7 K/UL    ABS. LYMPHOCYTES 0.7 K/UL    ABS. MONOCYTES 0.4 K/UL    ABS. EOSINOPHILS 0.1 K/UL    ABS.  BASOPHILS 0.1 K/UL    RBC COMMENTS NORMOCYTIC, NORMOCHROMIC      DF MANUAL     BLOOD GAS, ARTERIAL    Collection Time: 02/23/17  8:20 AM   Result Value Ref Range    pH 7.38 7.35 - 7.45      PCO2 54 (H) 35.0 - 45.0 mmHg    PO2 254 (H) 80 - 100 mmHg    O2  (H) 92 - 97 %    BICARBONATE 31 (H) 22 - 26 mmol/L    BASE EXCESS 4.5 mmol/L    O2 METHOD BIPAP      FIO2 100 %    SPONTANEOUS RATE 41.0      IPAP/PIP 12.0      EPAP/CPAP/PEEP 8.0      Sample source ARTERIAL      SITE RIGHT RADIAL      INESSA'S TEST YES     INFLUENZA A & B AG (RAPID TEST)    Collection Time: 02/23/17  8:27 AM   Result Value Ref Range    Influenza A Antigen NEGATIVE  NEG      Influenza B Antigen NEGATIVE  NEG         Imaging:  I have personally reviewed the patients radiographs and have reviewed the reports:  Marked left sided pneumonia, new from earlier this month        Simi Seo MD

## 2017-02-23 NOTE — ED PROVIDER NOTES
HPI Comments: Adarsh Tijerina is a 57 y.o. female with PMhx significant for HTN, throat cancer, and PNA who presents via EMS to the ED with cc of gradually worsening SOB since last night. Pt states that she is on 2 L NC at baseline, and she suspects that the NC fell out while she was sleeping last night, leading to her symptoms. Pt was last seen in the ED on 2/5/17 for PNA and was admitted to the hospital; she was discharged on 2/12/17 for additional follow up with her PCP. Per EMS, pt's SpO2 was 70% upon initial evaluation, and pt was experiencing CP with inspiration and expiration; pt was placed on 15 L O2 via NRB and SpO2 improved to 98% in ambulance. Pt was then given a Neb treatment while en route, which dropped her saturation levels to 78%; EMS then placed pt back on oxygen via NRB with no further improvement. Pt denies allergies to any medications. Pt denies h/o COPD; per records, pt has hx of COPD. Pt is otherwise without complaint at this time. SHx: -EtOH, former tobacco, -illicit drug use    PCP: Lizabeth Brasher MD    There are no other complaints, changes or physical findings at this time. The history is provided by the patient and the EMS personnel. No  was used. Past Medical History:   Diagnosis Date    Bipolar 1 disorder ([address] 11/29/2011    Cancer ([address]     skin cancer buttocks    Cancer (HCC)     throat    Chronic pain     FIBROMYALGIA, LEGS    Encounter for long-term (current) use of other medications 11/29/2011    Essential hypertension, benign 11/29/2011    Laryngeal cancer ([address] 11/2/2015    Laryngeal mass     Psychiatric disorder     bipolar       Past Surgical History:   Procedure Laterality Date    HX BREAST AUGMENTATION Bilateral     SALINE IMPLANTS    HX GI      PLACEMENT OF G TUBE    HX GI      ESOPH.  DILATATION    HX GYN      tubal pregnancy    HX HEENT      BX OF NECK MASS    HX HYSTERECTOMY      PLACE PERCUT GASTROSTOMY TUBE 10/28/2014     has been removed 2015         Family History:   Problem Relation Age of Onset    Cancer Father      bone/skin/lung    Anesth Problems Neg Hx        Social History     Social History    Marital status:      Spouse name: N/A    Number of children: N/A    Years of education: N/A     Occupational History    Not on file. Social History Main Topics    Smoking status: Former Smoker     Packs/day: 1.00     Years: 40.00     Quit date: 8/27/2014    Smokeless tobacco: Never Used      Comment: PASSIVE SMOKE EXPOSURE,  SMOKES    Alcohol use No    Drug use: No    Sexual activity: Not on file     Other Topics Concern    Not on file     Social History Narrative         ALLERGIES: Review of patient's allergies indicates no known allergies. Review of Systems   Constitutional: Negative for chills and fever. HENT: Negative for congestion, rhinorrhea, sneezing and sore throat. Eyes: Negative for redness and visual disturbance. Respiratory: Positive for shortness of breath. Cardiovascular: Positive for chest pain (with inspiration/expiration). Negative for leg swelling. Gastrointestinal: Negative for abdominal pain, nausea and vomiting. Genitourinary: Negative for difficulty urinating and frequency. Musculoskeletal: Negative for back pain, myalgias and neck stiffness. Skin: Negative for rash. Neurological: Negative for dizziness, syncope, weakness and headaches. Hematological: Negative for adenopathy. Patient Vitals for the past 12 hrs:   Temp Pulse Resp BP SpO2   02/23/17 0835 - - - - 98 %   02/23/17 0833 - - - - 99 %   02/23/17 0817 - (!) 114 22 101/64 99 %   02/23/17 0814 - - - - 99 %   02/23/17 0807 - - - - (!) 81 %   02/23/17 0802 (!) 100.9 °F (38.3 °C) (!) 121 (!) 35 124/80 (!) 74 %            Physical Exam   Constitutional: She is oriented to person, place, and time. She appears well-developed and well-nourished.    Appears fatigued; speaking in 3 to 4 word sentences   HENT:   Head: Normocephalic and atraumatic. Mouth/Throat: Oropharynx is clear and moist.   Eyes: Conjunctivae and EOM are normal.   Neck: Normal range of motion and full passive range of motion without pain. Neck supple. Cardiovascular: Regular rhythm, S1 normal, S2 normal, normal heart sounds, intact distal pulses and normal pulses. Tachycardia present. No murmur heard. Pulmonary/Chest: Tachypnea noted. She has no wheezes. She has rhonchi (upper lung fields). She has rales (lower lung fields). Mild to severe respiratory distress   Abdominal: Soft. Normal appearance and bowel sounds are normal. She exhibits no distension. There is no tenderness. There is no rebound. Musculoskeletal: Normal range of motion. Neurological: She is oriented to person, place, and time. She has normal strength. Skin: Skin is warm, dry and intact. No rash noted. Psychiatric: She has a normal mood and affect. Her speech is normal and behavior is normal. Judgment and thought content normal.   Nursing note and vitals reviewed.        MDM  Number of Diagnoses or Management Options  Acute respiratory failure with hypoxia [facility]):   Diagnosis management comments:   DDx: pneumonia, influenza, respiratory failure, volume overload, PE, lung CA, COPD exacerbation, reactive airway disease       Amount and/or Complexity of Data Reviewed  Clinical lab tests: ordered and reviewed  Tests in the radiology section of CPT®: ordered and reviewed  Tests in the medicine section of CPT®: reviewed and ordered  Obtain history from someone other than the patient: yes (EMS)  Review and summarize past medical records: yes  Discuss the patient with other providers: yes (PCP, Hospitalist, Pulmonology)  Independent visualization of images, tracings, or specimens: yes    Risk of Complications, Morbidity, and/or Mortality  Presenting problems: high  Diagnostic procedures: high  Management options: high    Critical Care  Total time providing critical care:  minutes    Patient Progress  Patient progress: improved    ED Course       Procedures    ED EKG interpretation: 08:02  Rhythm: sinus tachycardia; and regular . Rate (approx.): 121; Axis: normal; P wave: normal; QRS interval: normal ; ST/T wave: non-specific changes; Other findings: abnormal ekg. This EKG was interpreted by Taylor Vargas MD,ED Provider. Progress Note:  8:13 AM  Pt placed on BiPAP, satting ~91%. Pt then coughed up large mucous plug, oxygen saturation is currently 99% on BiPAP. Written by SHAYNE Adams, as dictated by Taylor Vargas MD.      CONSULT NOTE:  9:26 AM  Taylor Vargas MD spoke with Dr. Wendie Perez,  Specialty: PCP  Discussed patient's hx, disposition, and available diagnostic and imaging results. Reviewed care plans. Consultant agrees with plans as outlined. Dr. Wendie Perez recommends admission, requests consult to Pulmonology. Written by SHAYNE Adams, as dictated by Taylor Vargas MD.      CONSULT NOTE:   9:33 AM  Taylor Vargas MD spoke with Dr. Harper Gibson,   Specialty: Hospitalist  Discussed pt's hx, disposition, and available diagnostic and imaging results. Reviewed care plans. Consultant will evaluate pt for admission. Written by SHAYNE Adams, as dictated by Taylor Vargas MD.    CONSULT NOTE:  9:52 AM  Taylor Vargas MD spoke with Dr. Darrell Zuluaga  Specialty: Pulmonology  Discussed pts hx, disposition, and available diagnostic and imaging results. Reviewed care plans. Consultant agrees with plans as outlined.      CRITICAL CARE NOTE :    3:13 PM    IMPENDING DETERIORATION -Airway, Respiratory and Metabolic    ASSOCIATED RISK FACTORS - Hypotension, Hypoxia and Metabolic changes    MANAGEMENT- Bedside Assessment and Supervision of Care    INTERPRETATION -  Xrays, ECG and Blood Pressure    INTERVENTIONS - hemodynamic mngmt, vent mngmt and Metobolic interventions    CASE REVIEW - Hospitalist, Medical Sub-Specialist, Nursing, Family and PCP    TREATMENT RESPONSE -Initially improved, now hypotensive again    PERFORMED BY - Self    NOTES   :    I have spent 75 minutes of critical care time involved in lab review, consultations with specialist, family decision- making, bedside attention and documentation. During this entire length of time I was immediately available to the patient . Yasemin Joyner MD      LABORATORY TESTS:  Recent Results (from the past 12 hour(s))   EKG, 12 LEAD, INITIAL    Collection Time: 02/23/17  8:02 AM   Result Value Ref Range    Ventricular Rate 121 BPM    Atrial Rate 121 BPM    P-R Interval 136 ms    QRS Duration 74 ms    Q-T Interval 326 ms    QTC Calculation (Bezet) 462 ms    Calculated P Axis 67 degrees    Calculated R Axis 54 degrees    Calculated T Axis 49 degrees    Diagnosis       Sinus tachycardia  Possible Left atrial enlargement  Nonspecific ST abnormality  Abnormal ECG  When compared with ECG of 05-FEB-2017 20:59,  No significant change was found     LACTIC ACID, PLASMA    Collection Time: 02/23/17  8:09 AM   Result Value Ref Range    Lactic acid 1.6 0.4 - 2.0 MMOL/L   METABOLIC PANEL, COMPREHENSIVE    Collection Time: 02/23/17  8:09 AM   Result Value Ref Range    Sodium 139 136 - 145 mmol/L    Potassium 4.5 3.5 - 5.1 mmol/L    Chloride 103 97 - 108 mmol/L    CO2 29 21 - 32 mmol/L    Anion gap 7 5 - 15 mmol/L    Glucose 147 (H) 65 - 100 mg/dL    BUN 21 (H) 6 - 20 MG/DL    Creatinine 1.14 (H) 0.55 - 1.02 MG/DL    BUN/Creatinine ratio 18 12 - 20      GFR est AA 59 (L) >60 ml/min/1.73m2    GFR est non-AA 48 (L) >60 ml/min/1.73m2    Calcium 9.3 8.5 - 10.1 MG/DL    Bilirubin, total 0.3 0.2 - 1.0 MG/DL    ALT (SGPT) 17 12 - 78 U/L    AST (SGOT) 13 (L) 15 - 37 U/L    Alk.  phosphatase 89 45 - 117 U/L    Protein, total 7.2 6.4 - 8.2 g/dL    Albumin 2.7 (L) 3.5 - 5.0 g/dL    Globulin 4.5 (H) 2.0 - 4.0 g/dL    A-G Ratio 0.6 (L) 1.1 - 2.2     CBC WITH AUTOMATED DIFF Collection Time: 02/23/17  8:09 AM   Result Value Ref Range    WBC 13.0 (H) 3.6 - 11.0 K/uL    RBC 4.23 3.80 - 5.20 M/uL    HGB 11.8 11.5 - 16.0 g/dL    HCT 38.4 35.0 - 47.0 %    MCV 90.8 80.0 - 99.0 FL    MCH 27.9 26.0 - 34.0 PG    MCHC 30.7 30.0 - 36.5 g/dL    RDW 16.8 (H) 11.5 - 14.5 %    PLATELET 619 163 - 016 K/uL    NEUTROPHILS 80 %    BAND NEUTROPHILS 10 %    LYMPHOCYTES 5 %    MONOCYTES 3 %    EOSINOPHILS 1 %    BASOPHILS 1 %    ABS. NEUTROPHILS 11.7 K/UL    ABS. LYMPHOCYTES 0.7 K/UL    ABS. MONOCYTES 0.4 K/UL    ABS. EOSINOPHILS 0.1 K/UL    ABS. BASOPHILS 0.1 K/UL    RBC COMMENTS NORMOCYTIC, NORMOCHROMIC      DF MANUAL     BLOOD GAS, ARTERIAL    Collection Time: 02/23/17  8:20 AM   Result Value Ref Range    pH 7.38 7.35 - 7.45      PCO2 54 (H) 35.0 - 45.0 mmHg    PO2 254 (H) 80 - 100 mmHg    O2  (H) 92 - 97 %    BICARBONATE 31 (H) 22 - 26 mmol/L    BASE EXCESS 4.5 mmol/L    O2 METHOD BIPAP      FIO2 100 %    SPONTANEOUS RATE 41.0      IPAP/PIP 12.0      EPAP/CPAP/PEEP 8.0      Sample source ARTERIAL      SITE RIGHT RADIAL      INESSA'S TEST YES     INFLUENZA A & B AG (RAPID TEST)    Collection Time: 02/23/17  8:27 AM   Result Value Ref Range    Influenza A Antigen NEGATIVE  NEG      Influenza B Antigen NEGATIVE  NEG         IMAGING RESULTS:  XR CHEST PORT   Final Result   INDICATION: . Sepsis  COMPARISON: Previous chest xray, 2/5/2017. PET CT, 2/9/2017. CT of the chest,  1/19/2017. LIMITATIONS: Portable technique. Mabeline Sink FINDINGS: Single frontal view of the chest.   .  Lines/tubes/surgical: None. Heart/mediastinum: Calcifications in the aortic arch. Lungs/pleura: Patchy opacities in the lungs with relative sparing of the apices  and bases, progressed in the left lung. No visualized pleural effusion or  pneumothorax. Additional Comments: None. Mabeline Sink IMPRESSION  IMPRESSION:  1. Progression of opacity in the left lung with persistent opacity in the right  lung.  Findings are concerning for progression of pneumonia and/or edema. Lymphangitic spread of tumor is considered much less likely. MEDICATIONS GIVEN:  Medications   sodium chloride (NS) flush 5-10 mL (not administered)   cefepime (MAXIPIME) 2 g in 0.9% sodium chloride (MBP/ADV) 100 mL (not administered)   metroNIDAZOLE (FLAGYL) IVPB premix 500 mg (not administered)   . PHARMACY TO SUBSTITUTE PER PROTOCOL (not administered)   albuterol-ipratropium (DUO-NEB) 2.5 MG-0.5 MG/3 ML (not administered)   . PHARMACY TO SUBSTITUTE PER PROTOCOL (not administered)   gabapentin (NEURONTIN) capsule 600 mg (not administered)   lithium carbonate CR (ESKALITH CR) tablet 450 mg (not administered)   sodium chloride (NS) flush 5-10 mL (not administered)   sodium chloride (NS) flush 5-10 mL (not administered)   ondansetron (ZOFRAN) injection 4 mg (not administered)   enoxaparin (LOVENOX) injection 40 mg (not administered)   0.9% sodium chloride infusion (not administered)   metoprolol tartrate (LOPRESSOR) tablet 25 mg (not administered)   sodium chloride 0.9 % bolus infusion 2,028 mL (2,028 mL IntraVENous New Bag 2/23/17 0902)   acetaminophen (OFIRMEV) infusion 1,000 mg (1,000 mg IntraVENous Given 2/23/17 0903)   albuterol-ipratropium (DUO-NEB) 2.5 MG-0.5 MG/3 ML (3 mL Nebulization Given 2/23/17 0835)       IMPRESSION:  1. Acute respiratory failure with hypoxia ([facility]    2. Sepsis, due to unspecified organism ([facility]    3. HCAP (healthcare-associated pneumonia)        PLAN:  1. Admit to Hospitalist      ADMIT NOTE:  9:33 AM  Patient is being admitted to the hospital by Dr. Mason Henderson. The results of their tests and reasons for their admission have been discussed with them and/or available family. They convey agreement and understanding for the need to be admitted and for their admission diagnosis. Consultation has been made with the inpatient physician specialist for hospitalization.       This note is prepared by Carmen Morris, acting as Scribe for Angella Rae MD.    Janki Motley MD: The scribe's documentation has been prepared under my direction and personally reviewed by me in its entirety. I confirm that the note above accurately reflects all work, treatment, procedures, and medical decision making performed by me.

## 2017-02-23 NOTE — H&P
Hospitalist Admission Note    NAME: Rell Tinsley   :  1955   MRN:  [id number]     Date/Time:  2017 9:47 AM    Patient PCP: Shonda Yepez MD  ________________________________________________________________________    My assessment of this patient's clinical condition and my plan of care is as follows. Assessment / Plan:  Sepsis ( febrile, leucocytosis with bandemia) with probable worsening pneumonia  Probably ongoing aspirations secondary to  History of throat cancer and oropharyngeal dysphagia  Acute on chronic respiratory failure ( baseline 2 litres)    Empiric vanco, cefepime and flagyl  Iv hydration  Aggressive pulmonary toilet  Wean the bipap as tolerated  Pulmonary eval ( seen in the lst admission as well) there was concern for metastatic disease, got the PET ct neg, and plan was to repeat CT chest in 4 weeks    Chest xray Progression of opacity in the left lung with persistent opacity in the right  lung. Findings are concerning for progression of pneumonia and/or edema. Lymphangitic spread of tumor is considered much less likely. Last echo in 2017 with ef 60% no wall motion abn    Defer to PCP regarding GI and consideration of feeding tube with ongoing aspirations  Seen by oncology dr. Danii Dobbins in recent admission    Hypertension  PTA norvasc, and metoprolol  Resume as appropriate        Bipolar disorder  Cont home meds    History of laryngeal cancer, history of radiation to head and neck        Code Status: full  Surrogate Decision Maker:    DVT Prophylaxis:   GI Prophylaxis: not indicated    Baseline: fair functional status   Pt is critically ill at present and may deteriorate. Complex decision making was performed which includes reviewing the patient's past medical records, current laboratory results, and actual Xray films. I have also discussed this case with the involved ED physician.         Subjective:   CHIEF COMPLAINT: sob    HISTORY OF PRESENT ILLNESS: Bernardo Perez is a 57 y.o.   female who with history of laryngeal cancer, and radiation to head and neck  Recent discharge on 2/13, treated for pneumonia with antibiotics and steroids  Got the PTE ct  Consistent with benign disease, plan was to follow up in 4 weeks and was dishcharged  Was doing ok, did see dr. Wendie Perez on 2 days, and presented to the ED with with sob since last night  She felt her NC fell off, while sleeping,   when ems arrived 70% , placed on the NRB and given nebs  Brought here, placed on the bipap  Complaining of chest tightness more with inspiration  We were asked to admit for work up and evaluation of the above problems. Past Medical History:   Diagnosis Date    Bipolar 1 disorder ([facility] [address] 11/29/2011    Cancer ([facility] [address]     skin cancer buttocks    Cancer (HCC)     throat    Chronic pain     FIBROMYALGIA, LEGS    Encounter for long-term (current) use of other medications 11/29/2011    Essential hypertension, benign 11/29/2011    Laryngeal cancer ([facility] [address] 11/2/2015    Laryngeal mass     Psychiatric disorder     bipolar        Past Surgical History:   Procedure Laterality Date    HX BREAST AUGMENTATION Bilateral     SALINE IMPLANTS    HX GI      PLACEMENT OF G TUBE    HX GI      ESOPH. DILATATION    HX GYN      tubal pregnancy    HX HEENT      BX OF NECK MASS    HX HYSTERECTOMY      PLACE PERCUT GASTROSTOMY TUBE  10/28/2014     has been removed 2015       Social History   Substance Use Topics    Smoking status: Former Smoker     Packs/day: 1.00     Years: 40.00     Quit date: 8/27/2014    Smokeless tobacco: Never Used      Comment: PASSIVE SMOKE EXPOSURE,  SMOKES    Alcohol use No        Family History   Problem Relation Age of Onset    Cancer Father      bone/skin/lung    Anesth Problems Neg Hx      No Known Allergies     Prior to Admission medications    Medication Sig Start Date End Date Taking?  Authorizing Provider   HYDROcodone-acetaminophen (NORCO)  mg tablet Take 2 Tabs by mouth every four (4) hours as needed. Max Daily Amount: 12 Tabs. 2/21/17   Dilcia Hernandez MD   albuterol-ipratropium (DUO-NEB) 2.5 mg-0.5 mg/3 ml nebu 3 mL by Nebulization route every four (4) hours as needed. 2/12/17   Wade Hodgkins, MD   diphenhydrAMINE 12.5 mg/5 mL elix 40 mL, lidocaine 2 % soln 40 mL, aluminum & magnesium hydroxide-simethicone 400-400-40 mg/5 mL susp 40 mL Take 5 mL by mouth daily. Historical Provider   VITAMIN B-12 1,000 mcg sublingual tablet 1,000 mcg by SubLINGual route daily. 11/11/16   Historical Provider   acetylcysteine (MUCOMYST) 100 mg/mL (10 %) nebulizer solution Take 4 mL by inhalation every four (4) hours. 1/26/17   Dilcai Hernandez MD   amLODIPine (NORVASC) 10 mg tablet Take 1 Tab by mouth daily. 1/24/17   Dilcia Hernandez MD   gabapentin (NEURONTIN) 300 mg capsule Take 2 Caps by mouth three (3) times daily. 1/24/17   Dilcia Hernandez MD   metoprolol tartrate (LOPRESSOR) 25 mg tablet Take 1 Tab by mouth every twelve (12) hours. 1/24/17   Dilcia Hernandez MD   guaiFENesin-dextromethorphan [facility] DM) 600-30 mg per tablet Take 1 Tab by mouth two (2) times a day. 1/24/17   Dilcia Hernandez MD   senna (SENNA) 8.6 mg tablet Take 1 Tab by mouth daily. Historical Provider   lithium carbonate CR (ESKALITH CR) 450 mg CR tablet TAKE 1 TABLET BY ORAL ROUTE PER AT BEDTIME 11/9/16   Historical Provider   FLUoxetine (PROZAC) 20 mg tablet Take 20 mg by mouth daily. 2/5/16   Historical Provider   amitriptyline (ELAVIL) 10 mg tablet Take 1 Tab by mouth nightly. 2/29/16   Dilcia Hernandez MD   traZODone (DESYREL) 100 mg tablet Take 200 mg by mouth nightly. 11/2/15   Historical Provider       REVIEW OF SYSTEMS:     I am not able to complete the review of systems because:    The patient is intubated and sedated    The patient has altered mental status due to his acute medical problems    The patient has baseline aphasia from prior stroke(s) The patient has baseline dementia and is not reliable historian   y The patient is in acute medical distress and unable to provide information             Objective:   VITALS:    Visit Vitals    /64 (BP 1 Location: Right arm)    Pulse (!) 114    Temp (!) 100.9 °F (38.3 °C)    Resp 22    Ht 5' 9\" (1.753 m)    Wt 67.6 kg (149 lb 0.5 oz)    SpO2 98%    BMI 22.01 kg/m2       PHYSICAL EXAM:    General:    Alert, cooperative, moderate distress on bipap, appears stated age. HEENT: Atraumatic, anicteric sclerae, pink conjunctivae     No oral ulcers, mucosa moist, throat clear, dentition fair  Neck:  Supple, symmetrical,  thyroid: non tender  Lungs:   B/l decreased air entry, diffuse rhonchi. No rales. Chest wall:  No tenderness  No Accessory muscle use. Heart:   Regular  rhythm,  No  murmur   No edema  Abdomen:   Soft, non-tender. Not distended. Bowel sounds normal  Extremities: No cyanosis. No clubbing    Skin:     Not pale. Not Jaundiced  No rashes   Psych:  Good insight. Not depressed. Not anxious or agitated. Neurologic: EOMs intact. No facial asymmetry. No aphasia or slurred speech. Symmetrical strength, Sensation grossly intact.  Alert and oriented X 4.     _______________________________________________________________________  Care Plan discussed with:    Comments   Patient     Family      RN y    Care Manager                    Consultant:      _______________________________________________________________________  Expected  Disposition:   Home with Family    HH/PT/OT/RN y   SNF/LTC    PACO    ________________________________________________________________________  TOTAL TIME:   Minutes    Critical Care Provided  72   Minutes non procedure based      Comments    y Reviewed previous records   >50% of visit spent in counseling and coordination of care y Discussion with patient and/or family and questions answered ________________________________________________________________________  Signed: Marilee Nagel MD    Procedures: see electronic medical records for all procedures/Xrays and details which were not copied into this note but were reviewed prior to creation of Plan. LAB DATA REVIEWED:    Recent Results (from the past 24 hour(s))   EKG, 12 LEAD, INITIAL    Collection Time: 02/23/17  8:02 AM   Result Value Ref Range    Ventricular Rate 121 BPM    Atrial Rate 121 BPM    P-R Interval 136 ms    QRS Duration 74 ms    Q-T Interval 326 ms    QTC Calculation (Bezet) 462 ms    Calculated P Axis 67 degrees    Calculated R Axis 54 degrees    Calculated T Axis 49 degrees    Diagnosis       Sinus tachycardia  Possible Left atrial enlargement  Nonspecific ST abnormality  Abnormal ECG  When compared with ECG of 05-FEB-2017 20:59,  No significant change was found     LACTIC ACID, PLASMA    Collection Time: 02/23/17  8:09 AM   Result Value Ref Range    Lactic acid 1.6 0.4 - 2.0 MMOL/L   METABOLIC PANEL, COMPREHENSIVE    Collection Time: 02/23/17  8:09 AM   Result Value Ref Range    Sodium 139 136 - 145 mmol/L    Potassium 4.5 3.5 - 5.1 mmol/L    Chloride 103 97 - 108 mmol/L    CO2 29 21 - 32 mmol/L    Anion gap 7 5 - 15 mmol/L    Glucose 147 (H) 65 - 100 mg/dL    BUN 21 (H) 6 - 20 MG/DL    Creatinine 1.14 (H) 0.55 - 1.02 MG/DL    BUN/Creatinine ratio 18 12 - 20      GFR est AA 59 (L) >60 ml/min/1.73m2    GFR est non-AA 48 (L) >60 ml/min/1.73m2    Calcium 9.3 8.5 - 10.1 MG/DL    Bilirubin, total 0.3 0.2 - 1.0 MG/DL    ALT (SGPT) 17 12 - 78 U/L    AST (SGOT) 13 (L) 15 - 37 U/L    Alk.  phosphatase 89 45 - 117 U/L    Protein, total 7.2 6.4 - 8.2 g/dL    Albumin 2.7 (L) 3.5 - 5.0 g/dL    Globulin 4.5 (H) 2.0 - 4.0 g/dL    A-G Ratio 0.6 (L) 1.1 - 2.2     CBC WITH AUTOMATED DIFF    Collection Time: 02/23/17  8:09 AM   Result Value Ref Range    WBC 13.0 (H) 3.6 - 11.0 K/uL    RBC 4.23 3.80 - 5.20 M/uL    HGB 11.8 11.5 - 16.0 g/dL    HCT 38.4 35.0 - 47.0 %    MCV 90.8 80.0 - 99.0 FL    MCH 27.9 26.0 - 34.0 PG    MCHC 30.7 30.0 - 36.5 g/dL    RDW 16.8 (H) 11.5 - 14.5 %    PLATELET 299 532 - 206 K/uL    NEUTROPHILS 80 %    BAND NEUTROPHILS 10 %    LYMPHOCYTES 5 %    MONOCYTES 3 %    EOSINOPHILS 1 %    BASOPHILS 1 %    ABS. NEUTROPHILS 11.7 K/UL    ABS. LYMPHOCYTES 0.7 K/UL    ABS. MONOCYTES 0.4 K/UL    ABS. EOSINOPHILS 0.1 K/UL    ABS.  BASOPHILS 0.1 K/UL    RBC COMMENTS NORMOCYTIC, NORMOCHROMIC      DF MANUAL     BLOOD GAS, ARTERIAL    Collection Time: 02/23/17  8:20 AM   Result Value Ref Range    pH 7.38 7.35 - 7.45      PCO2 54 (H) 35.0 - 45.0 mmHg    PO2 254 (H) 80 - 100 mmHg    O2  (H) 92 - 97 %    BICARBONATE 31 (H) 22 - 26 mmol/L    BASE EXCESS 4.5 mmol/L    O2 METHOD BIPAP      FIO2 100 %    SPONTANEOUS RATE 41.0      IPAP/PIP 12.0      EPAP/CPAP/PEEP 8.0      Sample source ARTERIAL      SITE RIGHT RADIAL      INESSA'S TEST YES     INFLUENZA A & B AG (RAPID TEST)    Collection Time: 02/23/17  8:27 AM   Result Value Ref Range    Influenza A Antigen NEGATIVE  NEG      Influenza B Antigen NEGATIVE  NEG

## 2017-02-23 NOTE — PROGRESS NOTES
Pharmacy Automatic Renal Dosing Protocol - Antimicrobials    Indication for Antimicrobials: HCAP  Current Regimen of Each Antimicrobial (Start Day & Day of Therapy):  Vancomycin - pharmacy to dose (start , day 1)  Levofloxacin 750 mg every 24 hours (, day 1)  Cefepime 2 gm every 12 hours (, day 1)  Metronidazole 500 mg every 8 hours (, day 1)    Goal Vancomycin Level (if needed): 15-20    Significant Cultures:  Bcx: pending  CAPD, Hemodialysis or Renal Replacement Therapy: n/a   Paralysis, amputations, malnutrition: n/a  Recent Labs      17   0809   CREA  1.14*   BUN  21*   WBC  13.0*     Temp (24hrs), Av.1 °F (37.8 °C), Min:99.2 °F (37.3 °C), Max:100.9 °F (38.3 °C)    Creatinine Clearance (Creatinine Clearance (ml/min)): 54    Impression/Plan:   Vancomycin 1750 mg x 1 dose, followed by 1250 mg every 16 hours   Cefepime, levofloxacin, and metronidazole dosed appropriately for CrCl       Pharmacy will follow daily and adjust medications as appropriate for renal function and/or serum levels.     Thank you,  Kinza Scott, PHARMD

## 2017-02-23 NOTE — PROGRESS NOTES
This is a 57 yr old  female who presents to ER via EMS with complaint of worsening SOB with a history of HTN, throat CA, PNA, COPD, dysphagia and bipolar disorder. Patient reports being on 2L of oxygen at baseline at home and that she is able to perform her own activities of daily living. This is patient 3rd admission in the last 6 months related to respiratory failure. On previous admission Palliative Care was consulted for care goals but patient declined. Patient is currently a FULL code. Patient reports support of her daughter who lives locally and her spouse. CM to follow patient's hospital course as GI has been consulted for potential PEG placement. Anticipate need for home care services. Care Management Interventions  PCP Verified by CM: Yes  MyChart Signup: No  Discharge Durable Medical Equipment: No (has home oxygen)  Physical Therapy Consult: No  Occupational Therapy Consult: No  Speech Therapy Consult: No  Current Support Network: Lives with Spouse  Plan discussed with Pt/Family/Caregiver:  Yes

## 2017-02-23 NOTE — IP AVS SNAPSHOT
[address] [facility] 
[phone] Patient: Dontrell Luu MRN: [id number] You are allergic to the following No active allergies Recent Documentation Height 1.753 m Emergency Contacts Name Discharge Info Relation Home Work [facility] HOSPITAL DISCHARGE CAREGIVER [3] Child [2] [phone] [address] CAREGIVER [3] Spouse [3]   [phone] About your hospitalization You were admitted on:  2017 You last received care in the:  [facility] 2 PROGRESSIVE CARE You were discharged on:  March 3, 2017 Unit phone number:  [phone] Why you were hospitalized Your primary diagnosis was:  Not on File Your diagnoses also included:  Respiratory Failure (Hcc), History Of Laryngeal Cancer, History Of Radiation To Head And Neck Region, Dysphagia, Chronic Obstructive Pulmonary Disease With Acute Exacerbation (Hcc), Acute Respiratory Failure (Hcc), Atrial Fibrillation With Rvr (Hcc), Chronic Pulmonary Aspiration, Bipolar 1 Disorder (Hcc), Chronic Pain Providers Seen During Your Hospitalizations Provider Role Specialty Primary office phone Rosalio Espinoza MD Attending Provider Emergency Medicine [phone] Talha Thomas MD Attending Provider [facility] [phone] Your Primary Care Physician (PCP) Primary Care Physician Office Phone Office Fax [employer] [phone] Follow-up Information Follow up With Details Comments Contact Info Talha Thomas MD On 3/9/2017 Appointment scheduled for 2017 at  [address] [place] [address] [phone] [phone] [facility]  will be providing home health services. starting on 3/4/2017 [address] [facility], [address] [facility] [phone] Your Appointments Thursday March 09, 2017 11:15 AM EST TRANSITIONAL CARE MANAGEMENT with Mathieu Daugherty MD  
[facility]) [address] [facility] [place] [address] [phone] Wednesday March 15, 2017 10:30 AM EDT  
CT CHEST WO CONT with ED [facility] CT 2 MRM RAD CT ([address]) [address] [facility]  
[phone] NON-CONTRAST STUDY: 1. Bring any non [facility] facility films/images pertaining to the area of interest with you on the day of appointment. 2. Check in at registration at least 30 minutes before appt time unless you were instructed to do otherwise. 3. If you have to drink oral contrast please pick it up any weekday prior to your appointment, if you cannot please check in 2 hrs  before appt time. Patient should report to outpatient registration ([facility]) 30 minutes prior to the appointment time unless instructed otherwise. Current Discharge Medication List  
  
START taking these medications Dose & Instructions Dispensing Information Comments Morning Noon Evening Bedtime HYDROmorphone 4 mg tablet Commonly known as:  DILAUDID Your next dose is: Today, Tomorrow Other:  _________ Dose:  4 mg Take 1 Tab by mouth every four (4) hours as needed. Max Daily Amount: 24 mg. Indications: Pain Quantity:  180 Tab Refills:  0  
     
   
   
   
  
 pregabalin 100 mg capsule Commonly known as:  Reuel Lizz Your next dose is: Today, Tomorrow Other:  _________ Dose:  100 mg Take 1 Cap by mouth three (3) times daily. Max Daily Amount: 300 mg. Indications: FIBROMYALGIA Quantity:  90 Cap Refills:  11 CONTINUE these medications which have NOT CHANGED Dose & Instructions Dispensing Information Comments Morning Noon Evening Bedtime  
 acetylcysteine 100 mg/mL (10 %) nebulizer solution Commonly known as:  MUCOMYST Your next dose is: Today, Tomorrow Other:  _________ Dose:  4 mL Take 4 mL by inhalation every four (4) hours. Quantity:  120 mL Refills:  11  
     
   
   
   
  
 albuterol-ipratropium 2.5 mg-0.5 mg/3 ml Nebu Commonly known as:  Cyrus Punches Your next dose is: Today, Tomorrow Other:  _________ Dose:  3 mL  
3 mL by Nebulization route every four (4) hours as needed. Quantity:  30 Nebule Refills:  12  
     
   
   
   
  
 diphenhydrAMINE 12.5 mg/5 mL elix 40 mL, lidocaine 2 % soln 40 mL, aluminum & magnesium hydroxide-simethicone 400-400-40 mg/5 mL susp 40 mL Your next dose is: Today, Tomorrow Other:  _________ Dose:  5 mL Take 5 mL by mouth daily. Refills:  0 FLUoxetine 20 mg tablet Commonly known as:  PROzac Your next dose is: Today, Tomorrow Other:  _________ Dose:  20 mg Take 20 mg by mouth daily. Refills:  0  
     
   
   
   
  
 guaiFENesin-dextromethorphan -30 mg per tablet Commonly known as:  [address] DM Your next dose is: Today, Tomorrow Other:  _________ Dose:  1 Tab Take 1 Tab by mouth two (2) times a day. Quantity:  20 Tab Refills:  3  
     
   
   
   
  
 lithium carbonate  mg CR tablet Commonly known as:  ESKALITH CR Your next dose is: Today, Tomorrow Other:  _________ TAKE 1 TABLET BY ORAL ROUTE PER AT BEDTIME Refills:  0  
     
   
   
   
  
 metoprolol tartrate 25 mg tablet Commonly known as:  LOPRESSOR Your next dose is: Today, Tomorrow Other:  _________ Dose:  25 mg Take 1 Tab by mouth every twelve (12) hours. Quantity:  60 Tab Refills:  11 Senna 8.6 mg tablet Generic drug:  senna Your next dose is: Today, Tomorrow Other:  _________ Dose:  1 Tab Take 1 Tab by mouth daily. Refills:  0  
     
   
   
   
  
 traZODone 100 mg tablet Commonly known as:  Danilo Rm Your next dose is: Today, Tomorrow Other:  _________ Dose:  200 mg Take 200 mg by mouth nightly. Refills:  2 VITAMIN B-12 1,000 mcg sublingual tablet Generic drug:  cyanocobalamin Your next dose is: Today, Tomorrow Other:  _________ Dose:  1000 mcg  
1,000 mcg by SubLINGual route daily. Refills:  0 STOP taking these medications   
 amitriptyline 10 mg tablet Commonly known as:  ELAVIL  
   
  
 amLODIPine 10 mg tablet Commonly known as:  NORVASC  
   
  
 gabapentin 300 mg capsule Commonly known as:  NEURONTIN HYDROcodone-acetaminophen  mg tablet Commonly known as:  Rosa Mura Where to Get Your Medications Information on where to get these meds will be given to you by the nurse or doctor. ! Ask your nurse or doctor about these medications HYDROmorphone 4 mg tablet  
 pregabalin 100 mg capsule Discharge Instructions PATIENT DISCHARGE INSTRUCTIONS PATIENT DISCHARGE INSTRUCTIONS Maciel Fermin / [id number] : 1955 Admitted 2017 Discharged: 3/3/2017 · It is important that you take the medication exactly as they are prescribed. · Keep your medication in the bottles provided by the pharmacist and keep a list of the medication names, dosages, and times to be taken in your wallet. · Do not take other medications without consulting your doctor. What to do at [facility] Recommended Diet: PEG feeding as directed,avoid thin liquids by mouth Recommended Activity: Activity as tolerated If you experience any of the following symptoms  Worsening shortness of breathe , please follow up with Lizabeth Brasher MD 
. Signed By: Lizabeth Brasher MD   
 March 3, 2017 Discharge Orders Procedure Order Date Status Priority Quantity Spec Type Associated Dx REFERRAL TO HOME HEALTH 03/03/17 0645 Normal Routine 1  Acute respiratory failure with hypoxia ([facility] [address] [[id number]] Comments:  Please evaluate patient for PT,NURSING,RT. [facility] Announcement We are excited to announce that we are making your provider's discharge notes available to you in [employer]. You will see these notes when they are completed and signed by the physician that discharged you from your recent hospital stay. If you have any questions or concerns about any information you see in [facility], please call the Health Information Department where you were seen or reach out to your Primary Care Provider for more information about your plan of care. Introducing [facility]! [facility] introduces [employer] patient portal. Now you can access parts of your medical record, email your doctor's office, and request medication refills online. 1. In your internet browser, go to [email or web address] 2. Click on the First Time User? Click Here link in the Sign In box. You will see the New Member Sign Up page. 3. Enter your [employer] Access Code exactly as it appears below. You will not need to use this code after youve completed the sign-up process. If you do not sign up before the expiration date, you must request a new code. · [employer] Access Code: [facility] Expires: 3/7/2017  2:52 PM 
 
4. Enter the last four digits of your Social Security Number (xxxx) and Date of Birth (mm/dd/yyyy) as indicated and click Submit. You will be taken to the next sign-up page. 5. Create a [employer] ID. This will be your [employer] login ID and cannot be changed, so think of one that is secure and easy to remember. 6. Create a Flow[employer] password. You can change your password at any time. 7. Enter your Password Reset Question and Answer. This can be used at a later time if you forget your password. 8. Enter your e-mail address. You will receive e-mail notification when new information is available in [address] 9. Click Sign Up. You can now view and download portions of your medical record. 10. Click the Download Summary menu link to download a portable copy of your medical information. If you have questions, please visit the Frequently Asked Questions section of the [employer] website. Remember, FlowPlay is NOT to be used for urgent needs. For medical emergencies, dial 911. Now available from your iPhone and Android! General Information Please provide this summary of care documentation to your next provider. Patient Signature:  ____________________________________________________________ Date:  ____________________________________________________________  
  
Barney Carreon Provider Signature:  ____________________________________________________________ Date:  ____________________________________________________________

## 2017-02-23 NOTE — IP AVS SNAPSHOT
Current Discharge Medication List  
  
Take these medications at their scheduled times Dose & Instructions Dispensing Information Comments Morning Noon Evening Bedtime  
 acetylcysteine 100 mg/mL (10 %) nebulizer solution Commonly known as:  MUCOMYST Your next dose is: Today, Tomorrow Other:  ____________ Dose:  4 mL Take 4 mL by inhalation every four (4) hours. Quantity:  120 mL Refills:  11  
     
   
   
   
  
 diphenhydrAMINE 12.5 mg/5 mL elix 40 mL, lidocaine 2 % soln 40 mL, aluminum & magnesium hydroxide-simethicone 400-400-40 mg/5 mL susp 40 mL Your next dose is: Today, Tomorrow Other:  ____________ Dose:  5 mL Take 5 mL by mouth daily. Refills:  0 FLUoxetine 20 mg tablet Commonly known as:  PROzac Your next dose is: Today, Tomorrow Other:  ____________ Dose:  20 mg Take 20 mg by mouth daily. Refills:  0  
     
   
   
   
  
 guaiFENesin-dextromethorphan -30 mg per tablet Commonly known as:  Doyle & Doyle DM Your next dose is: Today, Tomorrow Other:  ____________ Dose:  1 Tab Take 1 Tab by mouth two (2) times a day. Quantity:  20 Tab Refills:  3  
     
   
   
   
  
 metoprolol tartrate 25 mg tablet Commonly known as:  LOPRESSOR Your next dose is: Today, Tomorrow Other:  ____________ Dose:  25 mg Take 1 Tab by mouth every twelve (12) hours. Quantity:  60 Tab Refills:  11  
     
   
   
   
  
 pregabalin 100 mg capsule Commonly known as:  Debbie Forrest Your next dose is: Today, Tomorrow Other:  ____________ Dose:  100 mg Take 1 Cap by mouth three (3) times daily. Max Daily Amount: 300 mg. Indications: FIBROMYALGIA Quantity:  90 Cap Refills:  11 Senna 8.6 mg tablet Generic drug:  senna Your next dose is: Today, Tomorrow Other:  ____________ Dose:  1 Tab Take 1 Tab by mouth daily. Refills:  0  
     
   
   
   
  
 traZODone 100 mg tablet Commonly known as:  Pia Mariah Your next dose is: Today, Tomorrow Other:  ____________ Dose:  200 mg Take 200 mg by mouth nightly. Refills:  2 VITAMIN B-12 1,000 mcg sublingual tablet Generic drug:  cyanocobalamin Your next dose is: Today, Tomorrow Other:  ____________ Dose:  1000 mcg  
1,000 mcg by SubLINGual route daily. Refills:  0 Take these medications as needed Dose & Instructions Dispensing Information Comments Morning Noon Evening Bedtime  
 albuterol-ipratropium 2.5 mg-0.5 mg/3 ml Nebu Commonly known as:  Lin Asters Your next dose is: Today, Tomorrow Other:  ____________ Dose:  3 mL  
3 mL by Nebulization route every four (4) hours as needed. Quantity:  30 Nebule Refills:  12 HYDROmorphone 4 mg tablet Commonly known as:  DILAUDID Your next dose is: Today, Tomorrow Other:  ____________ Dose:  4 mg Take 1 Tab by mouth every four (4) hours as needed. Max Daily Amount: 24 mg. Indications: Pain Quantity:  180 Tab Refills:  0 Take these medications as directed Dose & Instructions Dispensing Information Comments Morning Noon Evening Bedtime  
 lithium carbonate  mg CR tablet Commonly known as:  ESKALITH CR Your next dose is: Today, Tomorrow Other:  ____________ TAKE 1 TABLET BY ORAL ROUTE PER AT BEDTIME Refills:  0 Where to Get Your Medications Information about where to get these medications is not yet available ! Ask your nurse or doctor about these medications HYDROmorphone 4 mg tablet  
 pregabalin 100 mg capsule

## 2017-02-23 NOTE — ED NOTES
Patient presented with c/o shortness of breath; arrived with non rebreather on; sats on room air were 74%, placed back on non rebreather with sats up to 85%; pt placed on bipap.

## 2017-02-23 NOTE — ED NOTES
TRANSFER - OUT REPORT:    Verbal report given to Lisette Campbell RN on [employer]  being transferred to PCU for routine progression of care       Report consisted of patients Situation, Background, Assessment and   Recommendations(SBAR). Information from the following report(s) SBAR, ED Summary and MAR was reviewed with the receiving nurse. Lines:   Peripheral IV 02/23/17 Right Antecubital (Active)   Site Assessment Clean, dry, & intact 2/23/2017  8:07 AM   Dressing Status Clean, dry, & intact 2/23/2017  8:07 AM       Peripheral IV 02/23/17 Left Wrist (Active)   Site Assessment Clean, dry, & intact 2/23/2017  8:10 AM   Dressing Status Clean, dry, & intact 2/23/2017  8:10 AM        Opportunity for questions and clarification was provided.       Patient transported with:   Registered Nurse  Tech

## 2017-02-24 PROBLEM — I48.91 ATRIAL FIBRILLATION WITH RVR (HCC): Status: ACTIVE | Noted: 2017-01-01

## 2017-02-24 NOTE — PROGRESS NOTES
Feeling some better. Unclear etiology of sudden development of respiratory failure,pneumonitis. Will ask GI to se for possible PEG placement next week as recommended by ENT to prevent further aspiration

## 2017-02-24 NOTE — PROGRESS NOTES
Verbal report given to oncoming nurse [place], RN    Report consisted of patients Situation, Background, Assessment, and   Recommendations    Information was reviewed with the receiving nurse. Opportunity for questions and clarification was provided.

## 2017-02-24 NOTE — PROGRESS NOTES
Bedside shift change report given to Chaim Garcia RN (oncoming nurse) by Teja Humphreys RN (offgoing nurse). Report included the following information SBAR, Kardex, MAR and Recent Results.

## 2017-02-24 NOTE — PROGRESS NOTES
Initial Nutrition Assessment:    INTERVENTIONS/RECOMMENDATIONS:   ·  Enteral Nutrition: If plans are for PEG placement, recommend Jevity 1.5 @ 20 mL/hr and advance as tolerated by 10 mL q 8 hours to a goal rate of 50 mL/hr + 130 mL water flushes q 4 hours (provides 1800 kcal, 77g protein, and 1692 mL water)    ASSESSMENT:   Patient medically noted for respiratory failure, sepsis, pneumonia, and dysphagia. PMH for laryngeal cancer, COPD, HTN, and bipolar. Patient currently NPO. Concerns for ongoing aspiration. Per MD notes, consulting GI for possible PEG placement as recommended by ENT to prevent further aspiration. During admission in January 2017, patient was receiving a pureed diet with ONS TID. No recent weight loss. Will monitor progress/plan of care. TF recommendations provided if patient moves forward with PEG. Bolus recommendations are Jevity 1.5 240 mL 5x/day + 150 mL bolus with each bolus (1800kcal, 77g protein, 1662 mL water)    Diet Order: NPO  % Eaten:  No data found. Pertinent Medications: [x]Reviewed []Other: prozac, lopressor, flagyl, trazodone   Pertinent Labs: [x]Reviewed []Other:   Food Allergies: [x]None []Other   Last BM: no documentation   []Active     []Hyperactive  []Hypoactive       [] Absent BS  Skin:    [x] Intact   [] Incision  [] Breakdown  [] Other:    Anthropometrics:   Height: 5' 9\" (175.3 cm) Weight: 68.4 kg (150 lb 12.7 oz)   IBW (%IBW):   ( ) UBW (%UBW):   (  %)   Last Weight Metrics:  Weight Loss Metrics 2/24/2017 2/21/2017 2/6/2017 1/31/2017 1/15/2017 12/7/2016 11/22/2016   Today's Wt 150 lb 12.7 oz 145 lb 9.6 oz 152 lb 9.6 oz 146 lb 6.4 oz 150 lb 149 lb 12.8 oz 149 lb 6.4 oz   BMI 22.27 kg/m2 21.5 kg/m2 22.54 kg/m2 22.26 kg/m2 22.15 kg/m2 22.12 kg/m2 22.06 kg/m2       BMI: Body mass index is 22.27 kg/(m^2).     This BMI is indicative of:   []Underweight    [x]Normal    []Overweight    [] Obesity   [] Extreme Obesity (BMI>40)     Estimated Nutrition Needs (Based on):   7657 Kcals/day (BMR (1304) x 1. 3AF) , 68 g (-82g (1.0-1.2 g/kg bw)) Protein  Carbohydrate: At Least 130 g/day  Fluids: 1700 mL/day (1ml/kcal)    Pt expected to meet estimated nutrient needs: []Yes [x]No (currently NPO)    NUTRITION DIAGNOSES:   Problem:  Swallowing difficulty      Etiology: related to hx of layngeal cancer     Signs/Symptoms: as evidenced by reccurent aspiration, Pneumonia       NUTRITION INTERVENTIONS:   Enteral/Parenteral Nutrition                   GOAL:   Diet advanced vs nutrition support next 2-4 days     LEARNING NEEDS (Diet, Food/Nutrient-Drug Interaction):    [x] None Identified   [] Identified and Education Provided/Documented   [] Identified and Pt declined/was not appropriate     Cultureal, Scientologist, OR Ethnic Dietary Needs:    [x] None Identified   [] Identified and Addressed     [x] Interdisciplinary Care Plan Reviewed/Documented    [x] Discharge Planning: Will monitor plan/progress to determine nutrition discharge plan       MONITORING /EVALUATION:   Food/Nutrient Intake Outcomes:  Total energy intake, Enteral/parenteral nutrition intake, IV fluids  Physical Signs/Symptoms Outcomes: Weight/weight change, Electrolyte and renal profile, Glucose profile, GI profile    NUTRITION RISK:    [x] High              [] Moderate           []  Low  []  Minimal/Uncompromised    PT SEEN FOR:    []  MD Consult: []Calorie Count      []Diabetic Diet Education        []Diet Education     []Electrolyte Management     []General Nutrition Management and Supplements     []Management of Tube Feeding     []TPN Recommendations    [x]  RN Referral:  []MST score >=2     [x]Enteral/Parenteral Nutrition PTA     []Pregnant: Gestational DM or Multigestation     []Pressure Ulcer/Wound Care needs        []  Low BMI  []  DTR Referral       Tiffanie Laureano  Pager [phone]                 Weekend Pager [phone]

## 2017-02-24 NOTE — PROGRESS NOTES
Bedside shift change report given to Mirian Sexton RN (oncoming nurse) by Jaimee Sandoval RN (offgoing nurse). Report included the following information SBAR, Kardex, MAR and Recent Results.

## 2017-02-24 NOTE — PROGRESS NOTES
1830 TRANSFER - IN REPORT:    Verbal report received from [facility] (name) on Jose Villa  being received from ED (unit) for routine progression of care      Report consisted of patients Situation, Background, Assessment and   Recommendations(SBAR). Information from the following report(s) SBAR, Kardex and ED Summary was reviewed with the receiving nurse. Opportunity for questions and clarification was provided. Assessment completed upon patients arrival to unit and care assumed. [facility] Shaw Chavez RN and Lucia Cardoza RN performed a dual skin assessment on this patient Impairment noted- see wound doc flow sheet. Scratch noted to left hand, multiple small scratches/abrasions noted to right buttock. Callouses noted to soles of feet. Angel score is 20    1845 Attempted complete admission database, but patient irritated at this time and does not want to answer database questions. 1915 Bedside and Verbal shift change report given to Kate Cabot RN (oncoming nurse) by Pia Cody RN (offgoing nurse).  Report included the following information SBAR, Kardex, ED Summary, Intake/Output, MAR, Accordion, Recent Results, Med Rec Status and Cardiac Rhythm NSr.

## 2017-02-24 NOTE — CDMP QUERY
Confirmation of Sepsis POA, as noted in Hospitalist H/P in setting of worsening PNA requiring NS bolus, Maxipime IV, Flagyl IV, Vanco IV, Levaquin IV in ED  =>Aspiration PNA POA  =>Unable to Determine (no explanation of clinical findings)    The medical record reflects the following clinical findings, treatment, and risk factors:    Risk Factors: 57 WF w/hx laryngeal CA/rad to head/neck, HTN, recent d/c 2/13 for PNA, presented w/hypoxia 74%RA  Clinical Indicators: temp 100.9, tachycardic 121- 114, Hypotensive 101/64- 87/60, WBC 13.0 w/10 bands, cxr w/Progression of opacity in the left lung with persistent opacity in the right  lung. Findings are concerning for progression of pneumonia and/or edema  Treatment: as above, pulm cx, bipap, Maxipime IV, Flagyl IV, Levaquin IV, Vancomycin IV, NS @100 cc/hr and per your note GI cx for poss PEG placement    Please clarify and document your clinical opinion in the progress notes and discharge summary including the definitive and/or presumptive diagnosis, (suspected or probable), related to the above clinical findings. Please include clinical findings supporting your diagnosis.      Thank Daniele Barragan Primrose   [phone]

## 2017-02-24 NOTE — PROGRESS NOTES
8:20 AM  Noted to be in/out of AFib on tele, no noted history of this in chart, called tele monitor to ask for conversion strip. Pt has been awake/alert, no c/o.     8:40 AM  Pt currently in sinus, Called Dr [place] to notify and TORB to consult cardiology. 8:47 AM  Consult called to cardiology office, Dr Olivia Quinones assigned. 11:01 AM  Cardiology NP Mayra at bedside.

## 2017-02-24 NOTE — PROGRESS NOTES
Bedside report received from [facility]                Assessment, Background, Procedure summary, Intake/Output, MAR, and recent results discussed. Care assumed.

## 2017-02-24 NOTE — PROGRESS NOTES
Spoke with MD on call - order received for Norco and may have milk (thickened) but monitor for aspiration

## 2017-02-24 NOTE — WOUND CARE
Wound care consulted by staff nurse for \"abrasions to right buttock\". Patient is a 58 y/o CF who at first refused to let me look at her buttocks explaining \" there is nothing to look at. I have these small little places that open and close all the time. \" Patient agreed to let me make sure nothing was open at this time and there is NOTHING open at this time. She has 2-3 little pink/epiteliazed areas aprox 0.1x0.1 cm's.  Unsure of the etiology, but definitely not a concern at this time  Juan Davis RN, CWON, zone ph# [phone]

## 2017-02-24 NOTE — PROGRESS NOTES
Pharmacy Automatic Renal Dosing Protocol - Antimicrobials    Indication for Antimicrobials: HCAP  Current Regimen of Each Antimicrobial (Start Day & Day of Therapy):  Vancomycin - pharmacy to dose (start , day 2  Levofloxacin 750 mg every 24 hours (, day 2  Cefepime 2 gm every 12 hours (, day 2  Metronidazole 500 mg every 8 hours (, day 2    Goal Vancomycin Level (if needed): 15-20    Significant Cultures:  Bcx: pending  CAPD, Hemodialysis or Renal Replacement Therapy: n/a   Paralysis, amputations, malnutrition: n/a  Recent Labs      17   0516  17   0809   CREA  0.80  1.14*   BUN  16  21*   WBC  20.1*  13.0*     Temp (24hrs), Av.2 °F (36.8 °C), Min:97.9 °F (36.6 °C), Max:98.5 °F (36.9 °C)    Creatinine Clearance (Creatinine Clearance (ml/min)): >50    Impression/Plan:   Continue with the current dose of Levofloxacin, vancomycin and metronidazole. Will adjust the dose of cefepime to 2g IV q 8 hours. Vancomycin trough on  at 11 AM.        Pharmacy will follow daily and adjust medications as appropriate for renal function and/or serum levels.     Thank you,  Johanna Leahy, PHARMD

## 2017-02-24 NOTE — PROGRESS NOTES
[facility]  Pulmonary, Critical Care, and Sleep Medicine    Name: Xiao Marte MRN: [id number]   : 1955 [facility]   Date: 2017        IMPRESSION:   · Acute/chronic hypoxic respiratory failure  · Health-care-associated pneumonia, may be aspiration as well  · Sepsis  · H/O laryngeal cancer  · Bipolar disorder      RECOMMENDATIONS:   · O2 with PRN BiPaP  · Bronchodilators  · Follow up cultures  · Empiric antibiotics for HCAP  · Speech eval  · Likely needs PEG  · DVT prophylaxis  · Will check back 17 or sooner PRN     Subjective:     17: no events. Off BiPAP. Still short of breath. She says she can only cough up sputum when she drinks milk. .. This patient has been seen and evaluated at the request of Dr. Mason Henderson for respiratory failure. Patient is a 57 y.o. female with h/o throat cancer and chronic hypoxemia (?COPD), recently admitted with severe community-acquired pneumonia, presented this morning with severe hypoxia and dyspnea which was acute in onset. She was markedly dyspneic on arrival and was started on BiPAP. She is feeling a lot better now with dyspnea close to her baseline. Her main complaint is of dry mouth from the BiPAP. Past Medical History:   Diagnosis Date    Bipolar 1 disorder ([address] 2011    Cancer ([facility]     skin cancer buttocks    Cancer (HCC)     throat    Chronic pain     FIBROMYALGIA, LEGS    Encounter for long-term (current) use of other medications 2011    Essential hypertension, benign 2011    Laryngeal cancer ([facility] 2015    Laryngeal mass     Psychiatric disorder     bipolar      Past Surgical History:   Procedure Laterality Date    HX BREAST AUGMENTATION Bilateral     SALINE IMPLANTS    HX GI      PLACEMENT OF G TUBE    HX GI      ESOPH.  DILATATION    HX GYN      tubal pregnancy    HX HEENT      BX OF NECK MASS    HX HYSTERECTOMY      PLACE PERCUT GASTROSTOMY TUBE 10/28/2014     has been removed 2015      Prior to Admission medications    Medication Sig Start Date End Date Taking? Authorizing Provider   HYDROcodone-acetaminophen (NORCO)  mg tablet Take 2 Tabs by mouth every four (4) hours as needed. Max Daily Amount: 12 Tabs. 2/21/17   Araceli Florentino MD   albuterol-ipratropium (DUO-NEB) 2.5 mg-0.5 mg/3 ml nebu 3 mL by Nebulization route every four (4) hours as needed. 2/12/17   Tera Dahl MD   diphenhydrAMINE 12.5 mg/5 mL elix 40 mL, lidocaine 2 % soln 40 mL, aluminum & magnesium hydroxide-simethicone 400-400-40 mg/5 mL susp 40 mL Take 5 mL by mouth daily. Historical Provider   VITAMIN B-12 1,000 mcg sublingual tablet 1,000 mcg by SubLINGual route daily. 11/11/16   Historical Provider   acetylcysteine (MUCOMYST) 100 mg/mL (10 %) nebulizer solution Take 4 mL by inhalation every four (4) hours. 1/26/17   Araceli Florentino MD   amLODIPine (NORVASC) 10 mg tablet Take 1 Tab by mouth daily. 1/24/17   Araceli Florentino MD   gabapentin (NEURONTIN) 300 mg capsule Take 2 Caps by mouth three (3) times daily. 1/24/17   Araceli Florentino MD   metoprolol tartrate (LOPRESSOR) 25 mg tablet Take 1 Tab by mouth every twelve (12) hours. 1/24/17   Araceli Florentino MD   guaiFENesin-dextromethorphan [facility] DM) 600-30 mg per tablet Take 1 Tab by mouth two (2) times a day. 1/24/17   Araceli Florentino MD   senna (SENNA) 8.6 mg tablet Take 1 Tab by mouth daily. Historical Provider   lithium carbonate CR (ESKALITH CR) 450 mg CR tablet TAKE 1 TABLET BY ORAL ROUTE PER AT BEDTIME 11/9/16   Historical Provider   FLUoxetine (PROZAC) 20 mg tablet Take 20 mg by mouth daily. 2/5/16   Historical Provider   amitriptyline (ELAVIL) 10 mg tablet Take 1 Tab by mouth nightly. 2/29/16   Araceli Florentino MD   traZODone (DESYREL) 100 mg tablet Take 200 mg by mouth nightly.  11/2/15   Historical Provider     No Known Allergies   Social History   Substance Use Topics    Smoking status: Former Smoker     Packs/day: 1.00     Years: 40.00     Quit date: 2014    Smokeless tobacco: Never Used      Comment: PASSIVE SMOKE EXPOSURE,  SMOKES    Alcohol use No      Family History   Problem Relation Age of Onset    Cancer Father      bone/skin/lung    Anesth Problems Neg Hx         Current Facility-Administered Medications   Medication Dose Route Frequency    cefepime (MAXIPIME) 2 g in 0.9% sodium chloride (MBP/ADV) 100 mL  2 g IntraVENous Q12H    metroNIDAZOLE (FLAGYL) IVPB premix 500 mg  500 mg IntraVENous Q8H    albuterol-ipratropium (DUO-NEB) 2.5 MG-0.5 MG/3 ML  3 mL Nebulization Q6H RT    FLUoxetine (PROzac) capsule 20 mg  20 mg Oral DAILY    gabapentin (NEURONTIN) capsule 600 mg  600 mg Oral TID    lithium carbonate CR (ESKALITH CR) tablet 450 mg  450 mg Oral DAILY    sodium chloride (NS) flush 5-10 mL  5-10 mL IntraVENous Q8H    enoxaparin (LOVENOX) injection 40 mg  40 mg SubCUTAneous Q24H    0.9% sodium chloride infusion  100 mL/hr IntraVENous CONTINUOUS    metoprolol tartrate (LOPRESSOR) tablet 25 mg  25 mg Oral Q12H    levoFLOXacin (LEVAQUIN) 750 mg in D5W IVPB  750 mg IntraVENous Q24H    vancomycin (VANCOCIN) 1250 mg in  ml infusion  1,250 mg IntraVENous Q16H    acetylcysteine (MUCOMYST) 200 mg/mL (20 %) solution 400 mg  2 mL Inhalation TID    traZODone (DESYREL) tablet 200 mg  200 mg Oral QHS       Review of Systems:  A comprehensive review of systems was negative except for that written in the HPI.     Objective:   Vital Signs:    Visit Vitals    BP 97/56 (BP 1 Location: Left arm, BP Patient Position: At rest)    Pulse (!) 101    Temp 98.5 °F (36.9 °C)    Resp 18    Ht 5' 9\" (1.753 m)    Wt 68.4 kg (150 lb 12.7 oz)    LMP  (LMP Unknown)    SpO2 92%    BMI 22.27 kg/m2       O2 Device: Nasal cannula   O2 Flow Rate (L/min): 5 l/min   Temp (24hrs), Av.4 °F (36.9 °C), Min:97.9 °F (36.6 °C), Max:99.2 °F (37.3 °C) Intake/Output:   Last shift:         Last 3 shifts: 02/22 1901 - 02/24 0700  In: 1580 [P.O.:480; I.V.:1100]  Out: 3000 [Urine:3000]    Intake/Output Summary (Last 24 hours) at 02/24/17 0933  Last data filed at 02/24/17 3531   Gross per 24 hour   Intake             1580 ml   Output             3000 ml   Net            -1420 ml      Physical Exam:   General:  Alert, cooperative, no distress   Head:  Normocephalic, without obvious abnormality, atraumatic. Eyes:  Conjunctivae/corneas clear. Nose: Nares normal. Septum midline. Mucosa normal.     Throat: Lips, mucosa normal.     Back:   Symmetric, no curvature. ROM normal.   Lungs:   Rales left > right    Heart:  Regular rate and rhythm    Abdomen:   Soft, non-tender. Bowel sounds normal.     Extremities: Extremities normal, atraumatic, no cyanosis .    Skin: Skin color, texture, turgor normal. No rashes or lesions   Neurologic: Grossly nonfocal     Data:   Labs:  Recent Labs      02/24/17   0516  02/23/17   0809   WBC  20.1*  13.0*   HGB  8.6*  11.8   HCT  29.3*  38.4   PLT  258  306     Recent Labs      02/24/17   0516  02/23/17   0809   NA  145  139   K  4.3  4.5   CL  111*  103   CO2  28  29   GLU  104*  147*   BUN  16  21*   CREA  0.80  1.14*   CA  9.2  9.3   ALB   --   2.7*   TBILI   --   0.3   SGOT   --   13*   ALT   --   17     Recent Labs      02/23/17   0820   PH  7.38   PCO2  54*   PO2  254*   HCO3  31*   FIO2  100       Imaging:  I have personally reviewed the patients radiographs:  None today        Shaye Crouch MD

## 2017-02-24 NOTE — CONSULTS
[address] [place] [address] 200 [address] [facility]     Date of  Admission: 2/23/2017  7:59 AM     Admission type:Emergency    Consult for: a-fib   Consult by: family practice     Subjective:     Adarsh Tijerina is a 57 y.o. female  With PMH bipolar, laryngeal cancer, HTN, and resp failure admitted for Respiratory failure ([address] Per H&P, recent discharge from the hospital on 2/13 treated for PNA. She presented back to the hospital for c/o SOB. Patient was placed on bipap initially. Ms. Karson Velez does not follow with cardiology. She was seen by us during her last hospitalization for resp failure with an elevated troponin in setting of septic shock. Last ECHO (1/16/17) EF 55-60% ; no wall motion abnormalities. Ms. Karson Velez was initially uncooperative this morning with assessment and at first refused to answer questions. Limited ROS revealed SOB that developed suddenly, but has now improved. Left sided chest pain that radiates to the right and worsens with breathing and sometimes is made worse or better with movement. Pain and SOB do not worsen with exertion. She has a productive cough that has been present for 3 years and has not changed. She denies dizziness, \"sometimes\" has palpitations, and has numbness in her right hand.          Cardiac risk factors: smoking/ tobacco exposure, hypertension, post-menopausal.      Patient Active Problem List    Diagnosis Date Noted    Atrial fibrillation with RVR ([address] 02/24/2017    Respiratory failure ([address] 02/23/2017    Chronic obstructive pulmonary disease with acute exacerbation ([address] 02/10/2017    Acute respiratory failure ([address] 02/06/2017    Aspiration pneumonia of both upper lobes ([address] 01/20/2017    Shortness of breath 01/20/2017    Delirium 01/20/2017    Goals of care, counseling/discussion 01/20/2017    Debility 01/20/2017    Acute respiratory failure with hypoxia ([address] 01/15/2017    Fibromyalgia 01/15/2017     Class: Chronic    Chronic pain 01/15/2017     Class: Chronic    History of tobacco use 01/15/2017     Class: Present on Admission    Sepsis ([address] 01/15/2017     Class: Acute    Acute renal injury ([address] 01/15/2017     Class: Present on Admission    Shock ([address] 01/15/2017    History of radiation to head and neck region 03/30/2016    History of laryngeal cancer 11/02/2015     Class: Present on Admission    Dysphagia 12/30/2014    Essential hypertension, benign 11/29/2011    Bipolar 1 disorder ([address] 11/29/2011    Encounter for long-term (current) use of other medications 11/29/2011      Shonda Yepez MD  Past Medical History:   Diagnosis Date    Bipolar 1 disorder ([facility] 11/29/2011    Cancer ([facility]     skin cancer buttocks    Cancer ([facility]     throat    Chronic pain     FIBROMYALGIA, LEGS    Encounter for long-term (current) use of other medications 11/29/2011    Essential hypertension, benign 11/29/2011    Laryngeal cancer ([address] 11/2/2015    Laryngeal mass     Psychiatric disorder     bipolar      Social History     Social History    Marital status:      Spouse name: N/A    Number of children: N/A    Years of education: N/A     Social History Main Topics    Smoking status: Former Smoker     Packs/day: 1.00     Years: 40.00     Quit date: 8/27/2014    Smokeless tobacco: Never Used      Comment: PASSIVE SMOKE EXPOSURE,  SMOKES    Alcohol use No    Drug use: No    Sexual activity: Not Asked     Other Topics Concern    None     Social History Narrative     No Known Allergies   Family History   Problem Relation Age of Onset    Cancer Father      bone/skin/lung    Anesth Problems Neg Hx       Current Facility-Administered Medications   Medication Dose Route Frequency    sodium chloride (NS) flush 5-10 mL  5-10 mL IntraVENous PRN    cefepime (MAXIPIME) 2 g in 0.9% sodium chloride (MBP/ADV) 100 mL  2 g IntraVENous Q12H    metroNIDAZOLE (FLAGYL) IVPB premix 500 mg  500 mg IntraVENous Q8H    albuterol-ipratropium (DUO-NEB) 2.5 MG-0.5 MG/3 ML  3 mL Nebulization Q6H RT    FLUoxetine (PROzac) capsule 20 mg  20 mg Oral DAILY    gabapentin (NEURONTIN) capsule 600 mg  600 mg Oral TID    lithium carbonate CR (ESKALITH CR) tablet 450 mg  450 mg Oral DAILY    sodium chloride (NS) flush 5-10 mL  5-10 mL IntraVENous Q8H    sodium chloride (NS) flush 5-10 mL  5-10 mL IntraVENous PRN    ondansetron (ZOFRAN) injection 4 mg  4 mg IntraVENous Q4H PRN    enoxaparin (LOVENOX) injection 40 mg  40 mg SubCUTAneous Q24H    0.9% sodium chloride infusion  100 mL/hr IntraVENous CONTINUOUS    metoprolol tartrate (LOPRESSOR) tablet 25 mg  25 mg Oral Q12H    levoFLOXacin (LEVAQUIN) 750 mg in D5W IVPB  750 mg IntraVENous Q24H    artificial saliva (MOUTH KOTE) 1 Spray  1 Spray Oral PRN    vancomycin (VANCOCIN) 1250 mg in  ml infusion  1,250 mg IntraVENous Q16H    acetylcysteine (MUCOMYST) 200 mg/mL (20 %) solution 400 mg  2 mL Inhalation TID    HYDROcodone-acetaminophen (NORCO)  mg tablet 2 Tab  2 Tab Oral Q4H PRN    traZODone (DESYREL) tablet 200 mg  200 mg Oral QHS        Review of Symptoms:   Constitutional: negative  Eyes: negative   Ears, nose, mouth, throat, and face: negative  Respiratory: SOB, cough  Cardiovascular: chest pain  Gastrointestinal: negative  Genitourinary:negative   Musculoskeletal:R hand numbness  Neurological: negative   Endocrine: negative          Objective:      Visit Vitals    BP 94/61    Pulse 88    Temp 98.5 °F (36.9 °C)    Resp 18    Ht 5' 9\" (1.753 m)    Wt 68.4 kg (150 lb 12.7 oz)    SpO2 92%    BMI 22.27 kg/m2       Physical:   General:  female resting in bed in [facility], however, appears frustrated; flushed skin warm   Heart: RRR, no m/S3/JVD  Lungs: bilateral rhonchi throughout   Abdomen: Soft, +BS, NTND   Extremities: LE luis +DP/PT, no edema   Neurologic: Grossly normal    Data Review: Recent Labs      02/24/17   0516  02/23/17   0809   WBC  20.1*  13.0*   HGB  8.6*  11.8   HCT  29.3*  38.4   PLT  258  306     Recent Labs      02/24/17   0516  02/23/17   0809   NA  145  139   K  4.3  4.5   CL  111*  103   CO2  28  29   GLU  104*  147*   BUN  16  21*   CREA  0.80  1.14*   CA  9.2  9.3   ALB   --   2.7*   TBILI   --   0.3   SGOT   --   13*   ALT   --   17       No results for input(s): TROIQ, CPK, CKMB in the last 72 hours. Intake/Output Summary (Last 24 hours) at 02/24/17 1148  Last data filed at 02/24/17 1110   Gross per 24 hour   Intake              960 ml   Output             3000 ml   Net            -2040 ml        Cardiographics    Telemetry: ST, but multiple episodes of a-fib with RVR identified   ECG: (2/23/) ST; no significant changes    Echocardiogram: last as above   CXRAY: bilateral lung opacities: ? PNA        Assessment:       Active Problems:    Dysphagia (12/30/2014)      History of laryngeal cancer (11/2/2015)      History of radiation to head and neck region (3/30/2016)      Acute respiratory failure (HCC) (2/6/2017)      Chronic obstructive pulmonary disease with acute exacerbation (HCC) (2/10/2017)      Respiratory failure (HCC) (2/23/2017)      Atrial fibrillation with RVR ([address] (2/24/2017)         Plan:       A-fib with RVR: currently resolved. In setting of acute on chronic resp failure with possible PNA. No significant EKG changes. No troponin results. Some hypotension yesterday. Bilateral chest pain. CHADSVASC=2    · Home BB has been on held due to hypotension; possibly contributing to tachycardia; hopefully BP will tolerate soon- will cut dose in half     · Borderline BP is not ideal for cardizem if a-fib with RVR returns; amio not ideal for patient's  Age, but could be used temporarily if needed.      · A-fib will likely resolve with treatment of respiratory symptoms and may not need long-term anticoagulation, but will increase lovenox dosing in this acute period while watching further. · Add-on troponin to morning labs, however, believe chest pain to be related to pulmonary process        Thank you for consulting [facility]    Rehan Null NP  DNP, RN, AGACNP-BC      Pt seen and examined in details. Agree with NP A&P. Transient a. Fib in presence of resp issues. Will follow. D/w pt.      Naya Peng MD

## 2017-02-24 NOTE — PROGRESS NOTES
PCU SHIFT NURSING NOTE      Bedside shift change report given to Lauri (oncoming nurse) by Paula Kraus (offgoing nurse). Report included the following information SBAR and Kardex. Shift Summary:       Admission Date 2/23/2017   Admission Diagnosis Respiratory failure ([address]   Consults IP CONSULT TO PRIMARY CARE PROVIDER  IP CONSULT TO INTENSIVIST        Consults   []PT   []OT   []Speech   [x]Case Management      [] Palliative      Cardiac Monitoring Order   [x]Yes   []No     IV drips   []Yes    Drip:                            Dose:  Drip:                            Dose:  Drip:                            Dose:   [x]No     GI Prophylaxis   []Yes   [x]No         DVT Prophylaxis   SCDs:             Claudio stockings:         [x] Medication   []Contraindicated   []None      Activity Level Activity Level: Up with Assistance     Activity Assistance: Partial (one person)   Purposeful Rounding every 1-2 hour? [x]Yes   Das Score  Total Score: 3   Bed Alarm (If score 3 or >)   [x]Yes   [] Refused (See signed refusal form in chart)   Angel Score  Angel Score: 20   Angel Score (if score 14 or less)   []PMT consult   []Wound Care consult      []Specialty bed   [] Nutrition consult          Needs prior to discharge:   Home O2 required:    [x]Yes   []No    If yes, how much O2 required? TBD; pt using 2L NC PTA    Other:    Last Bowel Movement:        Influenza Vaccine Received Flu Vaccine for Current Season (usually Sept-March): Yes        Pneumonia Vaccine           Diet Active Orders   Diet    DIET NPO      LDAs               Peripheral IV 02/24/17 Left Wrist (Active)   Site Assessment Clean, dry, & intact 2/24/2017  5:20 AM   Phlebitis Assessment 0 2/24/2017  5:20 AM   Dressing Status Clean, dry, & intact 2/24/2017  5:20 AM   Dressing Type Transparent;Tape 2/24/2017  5:20 AM   Hub Color/Line Status Blue;Flushed;Patent; Infusing 2/24/2017  5:20 AM   Action Taken Blood drawn 2/24/2017  5:20 AM                      Urinary Catheter      Intake & Output   Date 02/23/17 1900 - 02/24/17 0659 02/24/17 0700 - 02/25/17 0659   Shift 3303-4017 24 Hour Total 2870-9634 8476-9427 24 Hour Total   I  N  T  A  K  E   P.O. 480 480       P. O. 480 480     I.V.  (mL/kg/hr)  1100         I.V.  1100       Shift Total  (mL/kg) 480  (7) 1580  (23.1) 960  (14) 240  (3.5) 1200  (17.5)   O  U  T  P  U  T   Urine  (mL/kg/hr) 3000 3000         Urine Voided 3000 3000       Shift Total  (mL/kg) 3000  (43.9) 3000  (43.9)      NET -2520 -8050    Weight (kg) 68.4 68.4 68.4 68.4 68.4         Readmission Risk Assessment Tool Score Medium Risk            15       Total Score        3 Relationship with PCP    4 More than 1 Admission in calendar year    5 Patient Insurance is Medicare, Medicaid or Self Pay    3 Charlson Comorbidity Score        Criteria that do not apply:    Patient Living Status    Patient Length of Stay > 5       Expected Length of Stay - - -   Actual Length of Stay 1

## 2017-02-25 NOTE — PROGRESS NOTES
Patient was visiting with family members and said this was not a good time for respiratory treatment. She asked for the medications to be held.   Marlon Saenz, RT

## 2017-02-25 NOTE — PROGRESS NOTES
2/25/2017 8:55 AM    Admit Date: 2/23/2017    Admit Diagnosis: Respiratory failure ([address]    Subjective:     Maciel Fermin feels better. No further a. Fib.      Visit Vitals    BP 97/63 (BP 1 Location: Right arm, BP Patient Position: At rest)    Pulse 84    Temp 98 °F (36.7 °C)    Resp 20    Ht 5' 9\" (1.753 m)    Wt 151 lb 3.8 oz (68.6 kg)    LMP  (LMP Unknown)    SpO2 95%    BMI 22.33 kg/m2     Current Facility-Administered Medications   Medication Dose Route Frequency    enoxaparin (LOVENOX) injection 40 mg  40 mg SubCUTAneous Q24H    metoprolol tartrate (LOPRESSOR) tablet 12.5 mg  12.5 mg Oral Q12H    cefepime (MAXIPIME) 2 g in 0.9% sodium chloride (MBP/ADV) 100 mL  2 g IntraVENous Q8H    sodium chloride (NS) flush 5-10 mL  5-10 mL IntraVENous PRN    metroNIDAZOLE (FLAGYL) IVPB premix 500 mg  500 mg IntraVENous Q8H    albuterol-ipratropium (DUO-NEB) 2.5 MG-0.5 MG/3 ML  3 mL Nebulization Q6H RT    FLUoxetine (PROzac) capsule 20 mg  20 mg Oral DAILY    gabapentin (NEURONTIN) capsule 600 mg  600 mg Oral TID    lithium carbonate CR (ESKALITH CR) tablet 450 mg  450 mg Oral DAILY    sodium chloride (NS) flush 5-10 mL  5-10 mL IntraVENous Q8H    sodium chloride (NS) flush 5-10 mL  5-10 mL IntraVENous PRN    ondansetron (ZOFRAN) injection 4 mg  4 mg IntraVENous Q4H PRN    0.9% sodium chloride infusion  100 mL/hr IntraVENous CONTINUOUS    levoFLOXacin (LEVAQUIN) 750 mg in D5W IVPB  750 mg IntraVENous Q24H    artificial saliva (MOUTH KOTE) 1 Spray  1 Spray Oral PRN    vancomycin (VANCOCIN) 1250 mg in  ml infusion  1,250 mg IntraVENous Q16H    acetylcysteine (MUCOMYST) 200 mg/mL (20 %) solution 400 mg  2 mL Inhalation TID    HYDROcodone-acetaminophen (NORCO)  mg tablet 2 Tab  2 Tab Oral Q4H PRN    traZODone (DESYREL) tablet 200 mg  200 mg Oral QHS         Objective:      Visit Vitals    BP 97/63 (BP 1 Location: Right arm, BP Patient Position: At rest)    Pulse 84  Temp 98 °F (36.7 °C)    Resp 20    Ht 5' 9\" (1.753 m)    Wt 151 lb 3.8 oz (68.6 kg)    SpO2 95%    BMI 22.33 kg/m2       Physical Exam:  Abdomen: soft, non-tender. Bowel sounds normal.  Extremities: extremities normal, atraumatic, no cyanosis or edema  Heart: regular rate and rhythm, S1, S2 normal, no murmur, click, rub or gallop  Lungs: bilateral ronchii  Neurologic: Grossly normal    Data Review:   Labs:    Recent Results (from the past 24 hour(s))   TROPONIN I    Collection Time: 02/24/17 12:20 PM   Result Value Ref Range    Troponin-I, Qt. <0.04 <0.05 ng/mL   CBC WITH AUTOMATED DIFF    Collection Time: 02/25/17  4:12 AM   Result Value Ref Range    WBC 18.4 (H) 3.6 - 11.0 K/uL    RBC 2.93 (L) 3.80 - 5.20 M/uL    HGB 8.1 (L) 11.5 - 16.0 g/dL    HCT 27.2 (L) 35.0 - 47.0 %    MCV 92.8 80.0 - 99.0 FL    MCH 27.6 26.0 - 34.0 PG    MCHC 29.8 (L) 30.0 - 36.5 g/dL    RDW 17.5 (H) 11.5 - 14.5 %    PLATELET 586 269 - 125 K/uL    NEUTROPHILS 88 (H) 32 - 75 %    LYMPHOCYTES 7 (L) 12 - 49 %    MONOCYTES 4 (L) 5 - 13 %    EOSINOPHILS 1 0 - 7 %    BASOPHILS 0 0 - 1 %    ABS. NEUTROPHILS 16.2 (H) 1.8 - 8.0 K/UL    ABS. LYMPHOCYTES 1.3 0.8 - 3.5 K/UL    ABS. MONOCYTES 0.7 0.0 - 1.0 K/UL    ABS. EOSINOPHILS 0.2 0.0 - 0.4 K/UL    ABS.  BASOPHILS 0.0 0.0 - 0.1 K/UL   METABOLIC PANEL, BASIC    Collection Time: 02/25/17  4:12 AM   Result Value Ref Range    Sodium 144 136 - 145 mmol/L    Potassium 4.5 3.5 - 5.1 mmol/L    Chloride 110 (H) 97 - 108 mmol/L    CO2 27 21 - 32 mmol/L    Anion gap 7 5 - 15 mmol/L    Glucose 99 65 - 100 mg/dL    BUN 16 6 - 20 MG/DL    Creatinine 0.81 0.55 - 1.02 MG/DL    BUN/Creatinine ratio 20 12 - 20      GFR est AA >60 >60 ml/min/1.73m2    GFR est non-AA >60 >60 ml/min/1.73m2    Calcium 8.9 8.5 - 10.1 MG/DL       Telemetry: normal sinus rhythm      Assessment:     Active Problems:    Dysphagia (12/30/2014)      History of laryngeal cancer (11/2/2015)      History of radiation to head and neck region (3/30/2016)      Acute respiratory failure (HCC) (2/6/2017)      Chronic obstructive pulmonary disease with acute exacerbation ([facility] (2/10/2017)      Respiratory failure ([facility] (2/23/2017)      Atrial fibrillation with RVR ([facility] (2/24/2017)        Plan:     Transient a. Fib. Continue low dose BB. No need for therapeutic dose lovenox. Will lower dose. D/w pt.

## 2017-02-25 NOTE — PROGRESS NOTES
General Daily Progress Note    Admit Date: 2/23/2017  Hospital day 3    Subjective:     Patient has  improvingdyspnea ,cough. .   Medication side effects: none    Current Facility-Administered Medications   Medication Dose Route Frequency    enoxaparin (LOVENOX) injection 70 mg  1 mg/kg SubCUTAneous Q12H    metoprolol tartrate (LOPRESSOR) tablet 12.5 mg  12.5 mg Oral Q12H    cefepime (MAXIPIME) 2 g in 0.9% sodium chloride (MBP/ADV) 100 mL  2 g IntraVENous Q8H    sodium chloride (NS) flush 5-10 mL  5-10 mL IntraVENous PRN    metroNIDAZOLE (FLAGYL) IVPB premix 500 mg  500 mg IntraVENous Q8H    albuterol-ipratropium (DUO-NEB) 2.5 MG-0.5 MG/3 ML  3 mL Nebulization Q6H RT    FLUoxetine (PROzac) capsule 20 mg  20 mg Oral DAILY    gabapentin (NEURONTIN) capsule 600 mg  600 mg Oral TID    lithium carbonate CR (ESKALITH CR) tablet 450 mg  450 mg Oral DAILY    sodium chloride (NS) flush 5-10 mL  5-10 mL IntraVENous Q8H    sodium chloride (NS) flush 5-10 mL  5-10 mL IntraVENous PRN    ondansetron (ZOFRAN) injection 4 mg  4 mg IntraVENous Q4H PRN    0.9% sodium chloride infusion  100 mL/hr IntraVENous CONTINUOUS    levoFLOXacin (LEVAQUIN) 750 mg in D5W IVPB  750 mg IntraVENous Q24H    artificial saliva (MOUTH KOTE) 1 Spray  1 Spray Oral PRN    vancomycin (VANCOCIN) 1250 mg in  ml infusion  1,250 mg IntraVENous Q16H    acetylcysteine (MUCOMYST) 200 mg/mL (20 %) solution 400 mg  2 mL Inhalation TID    HYDROcodone-acetaminophen (NORCO)  mg tablet 2 Tab  2 Tab Oral Q4H PRN    traZODone (DESYREL) tablet 200 mg  200 mg Oral QHS        Review of Systems  Constitutional: positive for fatigue and malaise  Respiratory: positive for cough, sputum or stridor  Cardiovascular: negative  Gastrointestinal: negative  Musculoskeletal:negative    Objective:     Patient Vitals for the past 8 hrs:   BP Temp Pulse Resp SpO2 Weight   02/25/17 0517 - - - - - 151 lb 3.8 oz (68.6 kg)   02/25/17 0406 93/65 98.6 °F (37 °C) 81 20 93 % -   02/24/17 2340 96/62 98.8 °F (37.1 °C) 73 18 94 % -        02/23 1901 - 02/25 0700  In: 3560 [P.O.:2160; I.V.:1400]  Out: 0 [Urine:4280]    Physical Exam:   Visit Vitals    BP 93/65 (BP 1 Location: Right arm, BP Patient Position: At rest)    Pulse 81    Temp 98.6 °F (37 °C)    Resp 20    Ht 5' 9\" (1.753 m)    Wt 151 lb 3.8 oz (68.6 kg)    LMP  (LMP Unknown)    SpO2 93%    BMI 22.33 kg/m2     General appearance: alert, fatigued, cooperative, no distress, appears stated age  Lungs: rhonchi R base, L base  Heart: regular rate and rhythm  Abdomen: soft, non-tender. Bowel sounds normal. No masses,  no organomegaly  Extremities: extremities normal, atraumatic, no cyanosis or edema      ECG: normal sinus rhythm     Data Review   Recent Results (from the past 24 hour(s))   CULTURE, RESPIRATORY/SPUTUM/BRONCH W GRAM STAIN    Collection Time: 02/24/17  7:24 AM   Result Value Ref Range    Special Requests: NO SPECIAL REQUESTS      GRAM STAIN 4+  WBCS SEEN        GRAM STAIN OCCASIONAL  EPITHELIAL CELLS SEEN        GRAM STAIN 3+  GRAM POSITIVE COCCI  IN CLUSTERS        GRAM STAIN FEW  GRAM POSITIVE RODS        GRAM STAIN FEW  YEAST        Culture result: PENDING    TROPONIN I    Collection Time: 02/24/17 12:20 PM   Result Value Ref Range    Troponin-I, Qt. <0.04 <0.05 ng/mL   CBC WITH AUTOMATED DIFF    Collection Time: 02/25/17  4:12 AM   Result Value Ref Range    WBC 18.4 (H) 3.6 - 11.0 K/uL    RBC 2.93 (L) 3.80 - 5.20 M/uL    HGB 8.1 (L) 11.5 - 16.0 g/dL    HCT 27.2 (L) 35.0 - 47.0 %    MCV 92.8 80.0 - 99.0 FL    MCH 27.6 26.0 - 34.0 PG    MCHC 29.8 (L) 30.0 - 36.5 g/dL    RDW 17.5 (H) 11.5 - 14.5 %    PLATELET 390 038 - 377 K/uL    NEUTROPHILS 88 (H) 32 - 75 %    LYMPHOCYTES 7 (L) 12 - 49 %    MONOCYTES 4 (L) 5 - 13 %    EOSINOPHILS 1 0 - 7 %    BASOPHILS 0 0 - 1 %    ABS. NEUTROPHILS 16.2 (H) 1.8 - 8.0 K/UL    ABS. LYMPHOCYTES 1.3 0.8 - 3.5 K/UL    ABS. MONOCYTES 0.7 0.0 - 1.0 K/UL    ABS.  EOSINOPHILS 0.2 0.0 - 0.4 K/UL    ABS. BASOPHILS 0.0 0.0 - 0.1 K/UL   METABOLIC PANEL, BASIC    Collection Time: 02/25/17  4:12 AM   Result Value Ref Range    Sodium 144 136 - 145 mmol/L    Potassium 4.5 3.5 - 5.1 mmol/L    Chloride 110 (H) 97 - 108 mmol/L    CO2 27 21 - 32 mmol/L    Anion gap 7 5 - 15 mmol/L    Glucose 99 65 - 100 mg/dL    BUN 16 6 - 20 MG/DL    Creatinine 0.81 0.55 - 1.02 MG/DL    BUN/Creatinine ratio 20 12 - 20      GFR est AA >60 >60 ml/min/1.73m2    GFR est non-AA >60 >60 ml/min/1.73m2    Calcium 8.9 8.5 - 10.1 MG/DL           Assessment:     Active Problems:    Acute respiratory failure (HCC) (2/6/2017)      Chronic obstructive pulmonary disease with acute exacerbation (HCC) (2/10/2017)      Dysphagia (12/30/2014)      History of laryngeal cancer (11/2/2015)      History of radiation to head and neck region (3/30/2016)      Respiratory failure (HCC) (2/23/2017)      Atrial fibrillation with RVR (HCC) (2/24/2017)        Plan:     Feeling some better,less cough sob. Hb has dropped,will watch. Continue current meds and treatments.

## 2017-02-25 NOTE — PROGRESS NOTES
PCU SHIFT NURSING NOTE      Bedside shift change report given to Tai Rivera RN (oncoming nurse) by Nano Lamas RN (offgoing nurse). Report included the following information SBAR, Kardex, ED Summary, Intake/Output, MAR, Recent Results and Cardiac Rhythm NSR. Shift Summary:       Admission Date 2/23/2017   Admission Diagnosis Respiratory failure ([address]   Consults IP CONSULT TO PRIMARY CARE PROVIDER  IP CONSULT TO INTENSIVIST  IP CONSULT TO CARDIOLOGY        Consults   []PT   []OT   []Speech   []Case Management      [] Palliative      Cardiac Monitoring Order   []Yes   []No     IV drips   []Yes    Drip:                            Dose:  Drip:                            Dose:  Drip:                            Dose:   []No     GI Prophylaxis   []Yes   []No         DVT Prophylaxis   SCDs:             Claudio stockings:         [] Medication   []Contraindicated   []None      Activity Level Activity Level: Up with Assistance     Activity Assistance: Partial (one person)   Purposeful Rounding every 1-2 hour? [x]Yes   Das Score  Total Score: 3   Bed Alarm (If score 3 or >)   [x]Yes   [] Refused (See signed refusal form in chart)   Angel Score  Angel Score: 19   Angel Score (if score 14 or less)   []PMT consult   []Wound Care consult      []Specialty bed   [] Nutrition consult          Needs prior to discharge:   Home O2 required:    []Yes   []No    If yes, how much O2 required?     Other:    Last Bowel Movement:        Influenza Vaccine Received Flu Vaccine for Current Season (usually Sept-March): Yes        Pneumonia Vaccine           Diet Active Orders   Diet    DIET NPO      LDAs               Peripheral IV 02/24/17 Left Wrist (Active)   Site Assessment Clean, dry, & intact 2/24/2017  7:39 PM   Phlebitis Assessment 0 2/24/2017  7:39 PM   Infiltration Assessment 0 2/24/2017  7:39 PM   Dressing Status Clean, dry, & intact 2/24/2017  7:39 PM   Dressing Type Transparent;Tape 2/24/2017  7:39 PM   Hub Color/Line Status Blue;Patent; Infusing 2/24/2017  7:39 PM   Action Taken Blood drawn 2/24/2017  5:20 AM       Peripheral IV 02/24/17 Right Wrist (Active)   Site Assessment Clean, dry, & intact 2/24/2017  7:39 PM   Phlebitis Assessment 0 2/24/2017  7:39 PM   Infiltration Assessment 0 2/24/2017  7:39 PM   Dressing Status Clean, dry, & intact 2/24/2017  7:39 PM   Dressing Type Transparent;Tape 2/24/2017  7:39 PM   Hub Color/Line Status Blue;Patent 2/24/2017  7:39 PM                      Urinary Catheter      Intake & Output   Date 02/24/17 0700 - 02/25/17 0659 02/25/17 0700 - 02/26/17 0659   Shift 9206-4368 9426-0402 24 Hour Total 1751-6012 9768-5301 24 Hour Total   I  N  T  A  K  E   P.O.          P. O.        Shift Total  (mL/kg) 960  (14) 240  (3.5) 1200  (17.5)      O  U  T  P  U  T   Urine  (mL/kg/hr)  800 800         Urine Voided  800 800       Shift Total  (mL/kg)  800  (11.7) 800  (11.7)       -560 400      Weight (kg) 68.4 68.4 68.4 68.4 68.4 68.4         Readmission Risk Assessment Tool Score Medium Risk            15       Total Score        3 Relationship with PCP    4 More than 1 Admission in calendar year    5 Patient Insurance is Medicare, Medicaid or Self Pay    3 Charlson Comorbidity Score        Criteria that do not apply:    Patient Living Status    Patient Length of Stay > 5       Expected Length of Stay 3d 19h   Actual Length of Stay 2

## 2017-02-26 NOTE — PROGRESS NOTES
7:00 AM  Pt restless \"I can't breathe\" SpO2 80's on 6L (has been on 4L past two days) and tachycardic.    7:05AM  Cori RT notified of need for treatment ASAP.     7:10AM  Called Dr James Frias; Lena Woo for CXR, ABG and oxygen mask. Pt drank 5 milk's on nightshift. Explained to patient despite throat pain/feeling need to drink, she needs to comply with NPO due to probable silent aspiration. Pt agreeable at this time. 7:20 AM  Pt receiving nebulizer and NC at 6L; ABG's drawn by RT. Will monitor oxygen and utilize venti-mask as needed, after nebulizer. 7:38 AM  Notifed MD of ABG results, will apply ventimask and monitor. Pt instructed to stay NPO    7:50 AM  SpO2 improved on NC/Ventimask; Dr James Frias at bedside. Will have pulmonary come by to see her. 9:34 AM  Venimask turned down to 40% as pt has been %; pt panicked \"Turn it back up!! I can't breathe\" held pt hand and encouraged her to stop talking/breathing techniques. SpO2 96% on 40% ventimask. Pt requesting milk, reiterated NPO and offered mouth spray/swabs. 2:23 PM  Sputum culture resulted, discussed with pharmacy for recommendations and relayed info to Dr James Frias, orders to be written. 10:27 PM  Pt sleeping, SPO2 100% on 4L NC. Pt very anxious when awake regarding pain medicine and oxygen.

## 2017-02-26 NOTE — PROGRESS NOTES
2/26/2017 10:15 AM    Admit Date: 2/23/2017    Admit Diagnosis: Respiratory failure (HCC)    Subjective:     No cardiac events overnight. Concern of aspiration.      Visit Vitals    BP 99/61 (BP 1 Location: Right arm, BP Patient Position: At rest)    Pulse 95    Temp 98.4 °F (36.9 °C)    Resp 24    Ht 5' 9\" (1.753 m)    Wt 152 lb 1.9 oz (69 kg)    LMP  (LMP Unknown)    SpO2 94%    BMI 22.46 kg/m2     Current Facility-Administered Medications   Medication Dose Route Frequency    vancomycin (VANCOCIN) 1250 mg in  ml infusion  1,250 mg IntraVENous Q12H    enoxaparin (LOVENOX) injection 40 mg  40 mg SubCUTAneous Q24H    metoprolol tartrate (LOPRESSOR) tablet 12.5 mg  12.5 mg Oral Q12H    cefepime (MAXIPIME) 2 g in 0.9% sodium chloride (MBP/ADV) 100 mL  2 g IntraVENous Q8H    sodium chloride (NS) flush 5-10 mL  5-10 mL IntraVENous PRN    metroNIDAZOLE (FLAGYL) IVPB premix 500 mg  500 mg IntraVENous Q8H    albuterol-ipratropium (DUO-NEB) 2.5 MG-0.5 MG/3 ML  3 mL Nebulization Q6H RT    FLUoxetine (PROzac) capsule 20 mg  20 mg Oral DAILY    gabapentin (NEURONTIN) capsule 600 mg  600 mg Oral TID    lithium carbonate CR (ESKALITH CR) tablet 450 mg  450 mg Oral DAILY    sodium chloride (NS) flush 5-10 mL  5-10 mL IntraVENous Q8H    sodium chloride (NS) flush 5-10 mL  5-10 mL IntraVENous PRN    ondansetron (ZOFRAN) injection 4 mg  4 mg IntraVENous Q4H PRN    0.9% sodium chloride infusion  100 mL/hr IntraVENous CONTINUOUS    levoFLOXacin (LEVAQUIN) 750 mg in D5W IVPB  750 mg IntraVENous Q24H    artificial saliva (MOUTH KOTE) 1 Spray  1 Spray Oral PRN    acetylcysteine (MUCOMYST) 200 mg/mL (20 %) solution 400 mg  2 mL Inhalation TID    HYDROcodone-acetaminophen (NORCO)  mg tablet 2 Tab  2 Tab Oral Q4H PRN    traZODone (DESYREL) tablet 200 mg  200 mg Oral QHS         Objective:      Visit Vitals    BP 99/61 (BP 1 Location: Right arm, BP Patient Position: At rest)    Pulse 95    Temp 98.4 °F (36.9 °C)    Resp 24    Ht 5' 9\" (1.753 m)    Wt 152 lb 1.9 oz (69 kg)    SpO2 94%    BMI 22.46 kg/m2       Physical Exam:  Abdomen: soft, non-tender. Bowel sounds normal.   Extremities: no cyanosis or edema  Heart: regular rate and rhythm, S1, S2 normal, no murmur, click, rub or gallop  Lungs: bilateral ronchii  Neurologic: Grossly normal    Data Review:   Labs:    Recent Results (from the past 24 hour(s))   CBC WITH AUTOMATED DIFF    Collection Time: 02/26/17  4:28 AM   Result Value Ref Range    WBC 14.6 (H) 3.6 - 11.0 K/uL    RBC 2.96 (L) 3.80 - 5.20 M/uL    HGB 8.2 (L) 11.5 - 16.0 g/dL    HCT 27.4 (L) 35.0 - 47.0 %    MCV 92.6 80.0 - 99.0 FL    MCH 27.7 26.0 - 34.0 PG    MCHC 29.9 (L) 30.0 - 36.5 g/dL    RDW 17.6 (H) 11.5 - 14.5 %    PLATELET 022 353 - 606 K/uL    NEUTROPHILS 90 (H) 32 - 75 %    LYMPHOCYTES 5 (L) 12 - 49 %    MONOCYTES 4 (L) 5 - 13 %    EOSINOPHILS 1 0 - 7 %    BASOPHILS 0 0 - 1 %    ABS. NEUTROPHILS 13.1 (H) 1.8 - 8.0 K/UL    ABS. LYMPHOCYTES 0.7 (L) 0.8 - 3.5 K/UL    ABS. MONOCYTES 0.6 0.0 - 1.0 K/UL    ABS. EOSINOPHILS 0.2 0.0 - 0.4 K/UL    ABS.  BASOPHILS 0.0 0.0 - 0.1 K/UL   METABOLIC PANEL, BASIC    Collection Time: 02/26/17  4:28 AM   Result Value Ref Range    Sodium 144 136 - 145 mmol/L    Potassium 4.5 3.5 - 5.1 mmol/L    Chloride 108 97 - 108 mmol/L    CO2 28 21 - 32 mmol/L    Anion gap 8 5 - 15 mmol/L    Glucose 128 (H) 65 - 100 mg/dL    BUN 14 6 - 20 MG/DL    Creatinine 0.77 0.55 - 1.02 MG/DL    BUN/Creatinine ratio 18 12 - 20      GFR est AA >60 >60 ml/min/1.73m2    GFR est non-AA >60 >60 ml/min/1.73m2    Calcium 8.9 8.5 - 10.1 MG/DL   IRON PROFILE    Collection Time: 02/26/17  4:28 AM   Result Value Ref Range    Iron 10 (L) 35 - 150 ug/dL    TIBC 169 (L) 250 - 450 ug/dL    Iron % saturation 6 (L) 20 - 50 %   FERRITIN    Collection Time: 02/26/17  4:28 AM   Result Value Ref Range    Ferritin 250 8 - 252 NG/ML   RETICULOCYTE COUNT    Collection Time: 02/26/17 4:28 AM   Result Value Ref Range    Reticulocyte count 1.6 0.7 - 2.1 %   VANCOMYCIN, TROUGH    Collection Time: 02/26/17  4:28 AM   Result Value Ref Range    Vancomycin,trough 12.9 (H) 5.0 - 10.0 ug/mL    Reported dose date: NOT PROVIDED      Reported dose time: NOT PROVIDED      Reported dose: NOT PROVIDED UNITS   BLOOD GAS, ARTERIAL    Collection Time: 02/26/17  7:24 AM   Result Value Ref Range    pH 7.32 (L) 7.35 - 7.45      PCO2 61 (H) 35.0 - 45.0 mmHg    PO2 50 (L) 80 - 100 mmHg    O2 SAT 82 (L) 92 - 97 %    BICARBONATE 31 (H) 22 - 26 mmol/L    BASE EXCESS 2.9 mmol/L    O2 METHOD NASAL O2      O2 FLOW RATE 6.00 L/min    SPONTANEOUS RATE 28.0      Sample source ARTERIAL      SITE RIGHT BRACHIAL      NIESSA'S TEST N/A         Telemetry: normal sinus rhythm      Assessment:     Active Problems:    Dysphagia (12/30/2014)      History of laryngeal cancer (11/2/2015)      History of radiation to head and neck region (3/30/2016)      Acute respiratory failure (HCC) (2/6/2017)      Chronic obstructive pulmonary disease with acute exacerbation (HCC) (2/10/2017)      Respiratory failure (HCC) (2/23/2017)      Atrial fibrillation with RVR ([facility] [address] (2/24/2017)        Plan:     Transient a. Fib. Continue low dose BB. No need for therapeutic dose lovenox.  Will follow as needed.

## 2017-02-26 NOTE — CONSULTS
GI Consultation Note Angeline Cox for Alexia Avalos)    NAME: Tosha Dominguez : 1955 MRN: [id number]   ATTG/ PCP: Webster Severs, MD  Date/Time:  2017 3:59 PM  Subjective:   REASON FOR CONSULT:        Gary Noriega is a 57 y.o. W female who I was asked to see for ongoing and recurrent aspiration PNA, in the setting of laryngeal CA and oropharyngeal dysphagia. She was admitted with presumed PNA, and remains on empiric antibiotic therapy. This is a recurrent issue for her. She had  PEG tube placed in  by Dr. Alexia Avalos, since removed. She is now willing to undergo PEG tube placement again, but previously had been against it. We discussed the risks, benefits, and alternatives to PEG tube placement in detail today. She tells me her main problem is shortness of breath, especially with any activity at all. Past Medical History:   Diagnosis Date    Bipolar 1 disorder ([facility] [address] 2011    Cancer ([facility] [address]     skin cancer buttocks    Cancer (HCC)     throat    Chronic pain     FIBROMYALGIA, LEGS    Encounter for long-term (current) use of other medications 2011    Essential hypertension, benign 2011    Laryngeal cancer ([facility] [address] 2015    Laryngeal mass     Psychiatric disorder     bipolar      Past Surgical History:   Procedure Laterality Date    HX BREAST AUGMENTATION Bilateral     SALINE IMPLANTS    HX GI      PLACEMENT OF G TUBE    HX GI      ESOPH.  DILATATION    HX GYN      tubal pregnancy    HX HEENT      BX OF NECK MASS    HX HYSTERECTOMY      PLACE PERCUT GASTROSTOMY TUBE  10/28/2014     has been removed      Social History   Substance Use Topics    Smoking status: Former Smoker     Packs/day: 1.00     Years: 40.00     Quit date: 2014    Smokeless tobacco: Never Used      Comment: PASSIVE SMOKE EXPOSURE,  SMOKES    Alcohol use No      Family History   Problem Relation Age of Onset    Cancer Father      bone/skin/lung    Anesth Problems Neg Hx       No Known Allergies   Home Medications:  Prior to Admission Medications   Prescriptions Last Dose Informant Patient Reported? Taking? FLUoxetine (PROZAC) 20 mg tablet   Yes No   Sig: Take 20 mg by mouth daily. HYDROcodone-acetaminophen (NORCO)  mg tablet   No No   Sig: Take 2 Tabs by mouth every four (4) hours as needed. Max Daily Amount: 12 Tabs. VITAMIN B-12 1,000 mcg sublingual tablet   Yes No   Si,000 mcg by SubLINGual route daily. acetylcysteine (MUCOMYST) 100 mg/mL (10 %) nebulizer solution   No No   Sig: Take 4 mL by inhalation every four (4) hours. albuterol-ipratropium (DUO-NEB) 2.5 mg-0.5 mg/3 ml nebu Not Taking at Unknown time  No No   Sig: 3 mL by Nebulization route every four (4) hours as needed. amLODIPine (NORVASC) 10 mg tablet   No No   Sig: Take 1 Tab by mouth daily. amitriptyline (ELAVIL) 10 mg tablet   No No   Sig: Take 1 Tab by mouth nightly. diphenhydrAMINE 12.5 mg/5 mL elix 40 mL, lidocaine 2 % soln 40 mL, aluminum & magnesium hydroxide-simethicone 400-400-40 mg/5 mL susp 40 mL   Yes No   Sig: Take 5 mL by mouth daily. gabapentin (NEURONTIN) 300 mg capsule   No No   Sig: Take 2 Caps by mouth three (3) times daily. guaiFENesin-dextromethorphan (MUCINEX DM) 600-30 mg per tablet   No No   Sig: Take 1 Tab by mouth two (2) times a day. lithium carbonate CR (ESKALITH CR) 450 mg CR tablet   Yes No   Sig: TAKE 1 TABLET BY ORAL ROUTE PER AT BEDTIME   metoprolol tartrate (LOPRESSOR) 25 mg tablet   No No   Sig: Take 1 Tab by mouth every twelve (12) hours. senna (SENNA) 8.6 mg tablet   Yes No   Sig: Take 1 Tab by mouth daily. traZODone (DESYREL) 100 mg tablet  Self Yes No   Sig: Take 200 mg by mouth nightly.       Facility-Administered Medications: None     Hospital medications:  Current Facility-Administered Medications   Medication Dose Route Frequency    ceFAZolin (ANCEF) 1 g in 0.9% sodium chloride (MBP/ADV) 50 mL  1 g IntraVENous Q6H    enoxaparin (LOVENOX) injection 40 mg 40 mg SubCUTAneous Q24H    metoprolol tartrate (LOPRESSOR) tablet 12.5 mg  12.5 mg Oral Q12H    sodium chloride (NS) flush 5-10 mL  5-10 mL IntraVENous PRN    metroNIDAZOLE (FLAGYL) IVPB premix 500 mg  500 mg IntraVENous Q8H    albuterol-ipratropium (DUO-NEB) 2.5 MG-0.5 MG/3 ML  3 mL Nebulization Q6H RT    FLUoxetine (PROzac) capsule 20 mg  20 mg Oral DAILY    gabapentin (NEURONTIN) capsule 600 mg  600 mg Oral TID    lithium carbonate CR (ESKALITH CR) tablet 450 mg  450 mg Oral DAILY    sodium chloride (NS) flush 5-10 mL  5-10 mL IntraVENous Q8H    sodium chloride (NS) flush 5-10 mL  5-10 mL IntraVENous PRN    ondansetron (ZOFRAN) injection 4 mg  4 mg IntraVENous Q4H PRN    0.9% sodium chloride infusion  100 mL/hr IntraVENous CONTINUOUS    levoFLOXacin (LEVAQUIN) 750 mg in D5W IVPB  750 mg IntraVENous Q24H    artificial saliva (MOUTH KOTE) 1 Spray  1 Spray Oral PRN    acetylcysteine (MUCOMYST) 200 mg/mL (20 %) solution 400 mg  2 mL Inhalation TID    HYDROcodone-acetaminophen (NORCO)  mg tablet 2 Tab  2 Tab Oral Q4H PRN    traZODone (DESYREL) tablet 200 mg  200 mg Oral QHS     REVIEW OF SYSTEMS:     []     Unable to obtain  ROS due to  []    mental status change  []    sedated   []    intubated   [x]    Total of 11 systems reviewed as follows:  Const:   negative fever, negative chills, negative weight loss  Eyes:   negative diplopia or visual changes, negative eye pain  ENT:   negative coryza, negative sore throat  Resp:   negative cough, hemoptysis, dyspnea  Cards:  negative for chest pain, palpitations, lower extremity edema  :  negative for frequency, dysuria and hematuria  Skin:   negative for rash and pruritus  Heme:   negative for easy bruising and gum/nose bleeding  MS:  negative for myalgias, arthralgias, back pain and muscle weakness  Neurolo:  negative for headaches, dizziness, vertigo, memory problems   Psych:  negative for feelings of anxiety, depression     Pertinent Positives include :    Objective:   VITALS:    Visit Vitals    /78 (BP Patient Position: At rest;Post activity)    Pulse 100    Temp 98 °F (36.7 °C)    Resp 22    Ht 5' 9\" (1.753 m)    Wt 69 kg (152 lb 1.9 oz)    LMP  (LMP Unknown)    SpO2 94%    BMI 22.46 kg/m2     Temp (24hrs), Av.3 °F (36.8 °C), Min:98 °F (36.7 °C), Max:98.6 °F (37 °C)    PHYSICAL EXAM:   General:    Alert, cooperative, no distress, appears stated age. Head:   Normocephalic, without obvious abnormality, atraumatic. Eyes:   Conjunctivae clear, anicteric sclerae. Pupils are equal  Nose:  Nares normal. No drainage or sinus tenderness. Throat:    Lips, mucosa, and tongue normal.  No Thrush. VM on face  Neck:  Supple, symmetrical,  no adenopathy, thyroid: non tender  Back:    Symmetric,  No CVA tenderness. Lungs:   Coarse bilaterally, faint end exp wheezing  Chest wall:  No tenderness or deformity. No Accessory muscle use. Heart:   Regular rate and rhythm,  no murmur, rub or gallop. Abdomen:   Soft, non-tender. Not distended. Prev peg tube scar noted. Bowel sounds normal. No masses  Extremities: Atraumatic, No cyanosis. No edema. No clubbing  Skin:     Texture, turgor normal. No rashes/lesions/jaundice  Lymph: Cervical, supraclavicular normal.  Psych:  Good insight. Not depressed. Not anxious or agitated. Neurologic: EOMs intact. No facial asymmetry. No aphasia or slurred speech. Normal  strength, A/O X 3.      LAB DATA REVIEWED:    Recent Results (from the past 48 hour(s))   CBC WITH AUTOMATED DIFF    Collection Time: 17  4:12 AM   Result Value Ref Range    WBC 18.4 (H) 3.6 - 11.0 K/uL    RBC 2.93 (L) 3.80 - 5.20 M/uL    HGB 8.1 (L) 11.5 - 16.0 g/dL    HCT 27.2 (L) 35.0 - 47.0 %    MCV 92.8 80.0 - 99.0 FL    MCH 27.6 26.0 - 34.0 PG    MCHC 29.8 (L) 30.0 - 36.5 g/dL    RDW 17.5 (H) 11.5 - 14.5 %    PLATELET 270 442 - 494 K/uL    NEUTROPHILS 88 (H) 32 - 75 %    LYMPHOCYTES 7 (L) 12 - 49 %    MONOCYTES 4 (L) 5 - 13 % EOSINOPHILS 1 0 - 7 %    BASOPHILS 0 0 - 1 %    ABS. NEUTROPHILS 16.2 (H) 1.8 - 8.0 K/UL    ABS. LYMPHOCYTES 1.3 0.8 - 3.5 K/UL    ABS. MONOCYTES 0.7 0.0 - 1.0 K/UL    ABS. EOSINOPHILS 0.2 0.0 - 0.4 K/UL    ABS. BASOPHILS 0.0 0.0 - 0.1 K/UL   METABOLIC PANEL, BASIC    Collection Time: 02/25/17  4:12 AM   Result Value Ref Range    Sodium 144 136 - 145 mmol/L    Potassium 4.5 3.5 - 5.1 mmol/L    Chloride 110 (H) 97 - 108 mmol/L    CO2 27 21 - 32 mmol/L    Anion gap 7 5 - 15 mmol/L    Glucose 99 65 - 100 mg/dL    BUN 16 6 - 20 MG/DL    Creatinine 0.81 0.55 - 1.02 MG/DL    BUN/Creatinine ratio 20 12 - 20      GFR est AA >60 >60 ml/min/1.73m2    GFR est non-AA >60 >60 ml/min/1.73m2    Calcium 8.9 8.5 - 10.1 MG/DL   CBC WITH AUTOMATED DIFF    Collection Time: 02/26/17  4:28 AM   Result Value Ref Range    WBC 14.6 (H) 3.6 - 11.0 K/uL    RBC 2.96 (L) 3.80 - 5.20 M/uL    HGB 8.2 (L) 11.5 - 16.0 g/dL    HCT 27.4 (L) 35.0 - 47.0 %    MCV 92.6 80.0 - 99.0 FL    MCH 27.7 26.0 - 34.0 PG    MCHC 29.9 (L) 30.0 - 36.5 g/dL    RDW 17.6 (H) 11.5 - 14.5 %    PLATELET 092 061 - 766 K/uL    NEUTROPHILS 90 (H) 32 - 75 %    LYMPHOCYTES 5 (L) 12 - 49 %    MONOCYTES 4 (L) 5 - 13 %    EOSINOPHILS 1 0 - 7 %    BASOPHILS 0 0 - 1 %    ABS. NEUTROPHILS 13.1 (H) 1.8 - 8.0 K/UL    ABS. LYMPHOCYTES 0.7 (L) 0.8 - 3.5 K/UL    ABS. MONOCYTES 0.6 0.0 - 1.0 K/UL    ABS. EOSINOPHILS 0.2 0.0 - 0.4 K/UL    ABS.  BASOPHILS 0.0 0.0 - 0.1 K/UL   METABOLIC PANEL, BASIC    Collection Time: 02/26/17  4:28 AM   Result Value Ref Range    Sodium 144 136 - 145 mmol/L    Potassium 4.5 3.5 - 5.1 mmol/L    Chloride 108 97 - 108 mmol/L    CO2 28 21 - 32 mmol/L    Anion gap 8 5 - 15 mmol/L    Glucose 128 (H) 65 - 100 mg/dL    BUN 14 6 - 20 MG/DL    Creatinine 0.77 0.55 - 1.02 MG/DL    BUN/Creatinine ratio 18 12 - 20      GFR est AA >60 >60 ml/min/1.73m2    GFR est non-AA >60 >60 ml/min/1.73m2    Calcium 8.9 8.5 - 10.1 MG/DL   IRON PROFILE    Collection Time: 02/26/17  4:28 AM   Result Value Ref Range    Iron 10 (L) 35 - 150 ug/dL    TIBC 169 (L) 250 - 450 ug/dL    Iron % saturation 6 (L) 20 - 50 %   FERRITIN    Collection Time: 02/26/17  4:28 AM   Result Value Ref Range    Ferritin 250 8 - 252 NG/ML   RETICULOCYTE COUNT    Collection Time: 02/26/17  4:28 AM   Result Value Ref Range    Reticulocyte count 1.6 0.7 - 2.1 %   VANCOMYCIN, TROUGH    Collection Time: 02/26/17  4:28 AM   Result Value Ref Range    Vancomycin,trough 12.9 (H) 5.0 - 10.0 ug/mL    Reported dose date: NOT PROVIDED      Reported dose time: NOT PROVIDED      Reported dose: NOT PROVIDED UNITS   BLOOD GAS, ARTERIAL    Collection Time: 02/26/17  7:24 AM   Result Value Ref Range    pH 7.32 (L) 7.35 - 7.45      PCO2 61 (H) 35.0 - 45.0 mmHg    PO2 50 (L) 80 - 100 mmHg    O2 SAT 82 (L) 92 - 97 %    BICARBONATE 31 (H) 22 - 26 mmol/L    BASE EXCESS 2.9 mmol/L    O2 METHOD NASAL O2      O2 FLOW RATE 6.00 L/min    SPONTANEOUS RATE 28.0      Sample source ARTERIAL      SITE RIGHT BRACHIAL      INESSA'S TEST N/A       IMAGING RESULTS:  N/A    Assessment/Plan:      Active Problems:    Dysphagia (12/30/2014)      History of laryngeal cancer (11/2/2015)      History of radiation to head and neck region (3/30/2016)      Acute respiratory failure (HCC) (2/6/2017)      Chronic obstructive pulmonary disease with acute exacerbation ([facility]) (2/10/2017)      Respiratory failure (HCC) (2/23/2017)      Atrial fibrillation with RVR ([facility]) (2/24/2017)    56 yo F with severe COPD, recurrent aspiration PNA, laryngeal CA and s/p radiation and surgery, chemo and residual oropharyngeal dysphagia. High pulmonary risk for procedure, but in the OR, PEG tube could be placed. She's had previous bad experience with a GI doctor she'd like to not see, but she cannot remember, who.  It was not Dr. Sam Montoya.  ___________________________________________________  RECOMMENDATIONS:      -- discussed PEG tube risks, benefits, alternatives with patient - she's agreeable to proceed  -- needs to be evaluated by anesthesia prior to PEG tube, and will presumably require intubation and the case to be performed in the OR  -- due to above, cannot place tomorrow.  I will hand off to Dr. Rakan Loja he can coordinate when optimal placement can be arranged tomorrow    Discussed Code Status:    [x]    Full Code      []    DNR    ___________________________________________________  Care Plan discussed with:    [x]    Patient   []    Family   []    Nursing   [x]    Attending  Total Time :  45  minutes   ___________________________________________________  GI: Winifred Cranker, MD

## 2017-02-26 NOTE — PROGRESS NOTES
PCU SHIFT NURSING NOTE      Bedside shift change report given to Emerson Motley RN (oncoming nurse) by Lachelle Rojas RN (offgoing nurse). Report included the following information SBAR, Kardex, ED Summary, Intake/Output, MAR, Recent Results and Cardiac Rhythm NSR. Shift Summary:       Admission Date 2/23/2017   Admission Diagnosis Respiratory failure ([address]   Consults IP CONSULT TO PRIMARY CARE PROVIDER  IP CONSULT TO INTENSIVIST  IP CONSULT TO CARDIOLOGY        Consults   []PT   []OT   []Speech   []Case Management      [] Palliative      Cardiac Monitoring Order   []Yes   []No     IV drips   []Yes    Drip:                            Dose:  Drip:                            Dose:  Drip:                            Dose:   []No     GI Prophylaxis   []Yes   []No         DVT Prophylaxis   SCDs:             Claudio stockings:         [] Medication   []Contraindicated   []None      Activity Level Activity Level: Up with Assistance     Activity Assistance: Partial (one person)   Purposeful Rounding every 1-2 hour? [x]Yes   Das Score  Total Score: 3   Bed Alarm (If score 3 or >)   [x]Yes   [] Refused (See signed refusal form in chart)   Angel Score  Angel Score: 19   Angel Score (if score 14 or less)   []PMT consult   []Wound Care consult      []Specialty bed   [] Nutrition consult          Needs prior to discharge:   Home O2 required:    []Yes   []No    If yes, how much O2 required?     Other:    Last Bowel Movement: Last Bowel Movement Date: 02/24/17      Influenza Vaccine Received Flu Vaccine for Current Season (usually Sept-March): Yes        Pneumonia Vaccine           Diet Active Orders   Diet    DIET NPO      LDAs               Peripheral IV 02/24/17 Left Wrist (Active)   Site Assessment Clean, dry, & intact 2/25/2017  7:16 PM   Phlebitis Assessment 0 2/25/2017  7:16 PM   Infiltration Assessment 0 2/25/2017  7:16 PM   Dressing Status Clean, dry, & intact 2/25/2017  7:16 PM   Dressing Type Transparent;Tape 2/25/2017  7:16 PM   Hub Color/Line Status Blue;Patent 2/25/2017  7:16 PM   Action Taken Blood drawn 2/24/2017  5:20 AM       Peripheral IV 02/24/17 Right Wrist (Active)   Site Assessment Clean, dry, & intact 2/25/2017  7:16 PM   Phlebitis Assessment 0 2/25/2017  7:16 PM   Infiltration Assessment 0 2/25/2017  7:16 PM   Dressing Status Clean, dry, & intact 2/25/2017  7:16 PM   Dressing Type Transparent;Tape 2/25/2017  7:16 PM   Hub Color/Line Status Blue;Patent 2/25/2017  7:16 PM                      Urinary Catheter      Intake & Output   Date 02/25/17 0700 - 02/26/17 0659 02/26/17 0700 - 02/27/17 0659   Shift 1049-5649 5533-5540 24 Hour Total 5839-9314 2743-8453 24 Hour Total   I  N  T  A  K  E   P. O.          P. O.        I.V.  (mL/kg/hr)  800 800         Volume (metroNIDAZOLE (FLAGYL) IVPB premix 500 mg)  200 200         Volume (levoFLOXacin (LEVAQUIN) 750 mg in D5W IVPB)  150 150         Volume (vancomycin (VANCOCIN) 1250 mg in  ml infusion)  250 250         Volume (cefepime (MAXIPIME) 2 g in 0.9% sodium chloride (MBP/ADV) 100 mL)  200 200       Shift Total  (mL/kg) 720  (10.5) 1520  (22.2) 2240  (32.7)      O  U  T  P  U  T   Urine  (mL/kg/hr) 900  (1.1) 1810 2710         Urine Voided 900 1810 2710       Shift Total  (mL/kg) 900  (13.1) 1810  (26.4) 2710  (39.5)      NET -180 -290 -470      Weight (kg) 68.6 68.6 68.6 68.6 68.6 68.6         Readmission Risk Assessment Tool Score Medium Risk            15       Total Score        3 Relationship with PCP    4 More than 1 Admission in calendar year    5 Patient Insurance is Medicare, Medicaid or Self Pay    3 Charlson Comorbidity Score        Criteria that do not apply:    Patient Living Status    Patient Length of Stay > 5       Expected Length of Stay 3d 19h   Actual Length of Stay 3

## 2017-02-26 NOTE — ROUTINE PROCESS
Walked patient to bathroom. She started feeling weak after voiding. While walking back, her legs began to buckle. Helped back to bed, pt states she has been having weak feelings and inability to hold items since before admission, couldn't put her socks on, phone drops out of hand while trying to hold it, difficulty holding cups, etc..

## 2017-02-26 NOTE — PROGRESS NOTES
Pharmacy Automatic Renal Dosing Protocol - Antimicrobials    Indication for Antimicrobials: HCAP  Current Regimen of Each Antimicrobial (Start Day & Day of Therapy):  Vancomycin - 1250 mg q 16 hours (start , day 4  Levofloxacin 750 mg every 24 hours (, day 4  Cefepime 2 gm every 8 hours (, day 4  Metronidazole 500 mg every 8 hours (, day 4    Goal Vancomycin Level (if needed): 15-20  Vancomycin trough on  at 04:28 was 12.9 mcg/ml    Significant Cultures:    Bcx: pending  : sputum cx: heavy staph aureus    CAPD, Hemodialysis or Renal Replacement Therapy: n/a   Paralysis, amputations, malnutrition: n/a  Recent Labs      17   0428  17   0412  17   0516   CREA  0.77  0.81  0.80   BUN  14  16  16   WBC  14.6*  18.4*  20.1*     Temp (24hrs), Av.5 °F (36.9 °C), Min:98.4 °F (36.9 °C), Max:98.6 °F (37 °C)    Creatinine Clearance (Creatinine Clearance (ml/min)): >50    Impression/Plan:   Continue with the current dose of Levofloxacin, and metronidazole. Vancomycin trough of 12.9 mcg/ml and the goal is 15 - 20 mcg/ml. Will adjust the vancomycin to 1250 mg q 12 hours and this will yield and estimated trough of 19 mcg/ml  WBC trending down  Will follow sputum cx        Pharmacy will follow daily and adjust medications as appropriate for renal function and/or serum levels.     Thank you,  Maris Pate, PHARMD

## 2017-02-27 NOTE — PROGRESS NOTES
General Daily Progress Note    Admit Date: 2/23/2017  Hospital day 5    Subjective:     Patient has IMPROVING COUGH AND SOB. .   Medication side effects: none    Current Facility-Administered Medications   Medication Dose Route Frequency    ceFAZolin (ANCEF) 1 g in 0.9% sodium chloride (MBP/ADV) 50 mL  1 g IntraVENous Q6H    enoxaparin (LOVENOX) injection 40 mg  40 mg SubCUTAneous Q24H    metoprolol tartrate (LOPRESSOR) tablet 12.5 mg  12.5 mg Oral Q12H    sodium chloride (NS) flush 5-10 mL  5-10 mL IntraVENous PRN    metroNIDAZOLE (FLAGYL) IVPB premix 500 mg  500 mg IntraVENous Q8H    albuterol-ipratropium (DUO-NEB) 2.5 MG-0.5 MG/3 ML  3 mL Nebulization Q6H RT    FLUoxetine (PROzac) capsule 20 mg  20 mg Oral DAILY    gabapentin (NEURONTIN) capsule 600 mg  600 mg Oral TID    lithium carbonate CR (ESKALITH CR) tablet 450 mg  450 mg Oral DAILY    sodium chloride (NS) flush 5-10 mL  5-10 mL IntraVENous Q8H    sodium chloride (NS) flush 5-10 mL  5-10 mL IntraVENous PRN    ondansetron (ZOFRAN) injection 4 mg  4 mg IntraVENous Q4H PRN    0.9% sodium chloride infusion  100 mL/hr IntraVENous CONTINUOUS    levoFLOXacin (LEVAQUIN) 750 mg in D5W IVPB  750 mg IntraVENous Q24H    artificial saliva (MOUTH KOTE) 1 Spray  1 Spray Oral PRN    acetylcysteine (MUCOMYST) 200 mg/mL (20 %) solution 400 mg  2 mL Inhalation TID    HYDROcodone-acetaminophen (NORCO)  mg tablet 2 Tab  2 Tab Oral Q4H PRN    traZODone (DESYREL) tablet 200 mg  200 mg Oral QHS        Review of Systems  Constitutional: negative  Respiratory: positive for cough, sputum, wheezing or stridor  Cardiovascular: negative  Gastrointestinal: negative    Objective:     Patient Vitals for the past 8 hrs:   BP Temp Pulse Resp SpO2   02/27/17 0229 - - - - 95 %   02/27/17 0005 123/70 99 °F (37.2 °C) (!) 101 20 93 %   02/26/17 2307 132/73 98.2 °F (36.8 °C) 99 22 92 %        02/25 0701 - 02/26 1900  In: 9831 [P.O.:1980; I.V.:1518]  Out: 5987 [GZUB]    Physical Exam:   Visit Vitals    /70 (BP 1 Location: Right arm, BP Patient Position: At rest)    Pulse (!) 101    Temp 99 °F (37.2 °C)    Resp 20    Ht 5' 9\" (1.753 m)    Wt 152 lb 1.9 oz (69 kg)    LMP  (LMP Unknown)    SpO2 95%    BMI 22.46 kg/m2     General appearance: alert, fatigued, cooperative, no distress, appears stated age  Lungs: rhonchi R base, L base, wheezes DIFFUSE  Heart: regular rate and rhythm  Abdomen: soft, non-tender. Bowel sounds normal. No masses,  no organomegaly      ECG: normal sinus rhythm     Data Review   Recent Results (from the past 24 hour(s))   BLOOD GAS, ARTERIAL    Collection Time: 17  7:24 AM   Result Value Ref Range    pH 7.32 (L) 7.35 - 7.45      PCO2 61 (H) 35.0 - 45.0 mmHg    PO2 50 (L) 80 - 100 mmHg    O2 SAT 82 (L) 92 - 97 %    BICARBONATE 31 (H) 22 - 26 mmol/L    BASE EXCESS 2.9 mmol/L    O2 METHOD NASAL O2      O2 FLOW RATE 6.00 L/min    SPONTANEOUS RATE 28.0      Sample source ARTERIAL      SITE RIGHT BRACHIAL      INESSA'S TEST N/A     CBC WITH AUTOMATED DIFF    Collection Time: 17  4:26 AM   Result Value Ref Range    WBC 11.3 (H) 3.6 - 11.0 K/uL    RBC 3.40 (L) 3.80 - 5.20 M/uL    HGB 9.2 (L) 11.5 - 16.0 g/dL    HCT 31.0 (L) 35.0 - 47.0 %    MCV 91.2 80.0 - 99.0 FL    MCH 27.1 26.0 - 34.0 PG    MCHC 29.7 (L) 30.0 - 36.5 g/dL    RDW 17.6 (H) 11.5 - 14.5 %    PLATELET 937 713 - 081 K/uL    NEUTROPHILS 81 (H) 32 - 75 %    LYMPHOCYTES 10 (L) 12 - 49 %    MONOCYTES 7 5 - 13 %    EOSINOPHILS 2 0 - 7 %    BASOPHILS 0 0 - 1 %    ABS. NEUTROPHILS 9.2 (H) 1.8 - 8.0 K/UL    ABS. LYMPHOCYTES 1.1 0.8 - 3.5 K/UL    ABS. MONOCYTES 0.7 0.0 - 1.0 K/UL    ABS. EOSINOPHILS 0.3 0.0 - 0.4 K/UL    ABS.  BASOPHILS 0.0 0.0 - 0.1 K/UL   METABOLIC PANEL, BASIC    Collection Time: 17  4:26 AM   Result Value Ref Range    Sodium 144 136 - 145 mmol/L    Potassium 4.0 3.5 - 5.1 mmol/L    Chloride 106 97 - 108 mmol/L    CO2 27 21 - 32 mmol/L    Anion gap 11 5 - 15 mmol/L    Glucose 100 65 - 100 mg/dL    BUN 14 6 - 20 MG/DL    Creatinine 0.83 0.55 - 1.02 MG/DL    BUN/Creatinine ratio 17 12 - 20      GFR est AA >60 >60 ml/min/1.73m2    GFR est non-AA >60 >60 ml/min/1.73m2    Calcium 9.8 8.5 - 10.1 MG/DL           Assessment:     Active Problems:    Acute respiratory failure (HCC) (2/6/2017)      Chronic obstructive pulmonary disease with acute exacerbation ([address] (2/10/2017)      Dysphagia (12/30/2014)      History of laryngeal cancer (11/2/2015)      History of radiation to head and neck region (3/30/2016)      Respiratory failure ([address] (2/23/2017)      Atrial fibrillation with RVR ([facility] (2/24/2017)        Plan:     Clearly aspirated yesterday am,improving ,though lungs sound bad. Remains npo,for PEG when stable.

## 2017-02-27 NOTE — PROGRESS NOTES
Spiritual Care Assessment/Progress Notes    Mari Frost [id number]  [id number]    1955  57 y.o.  female    Patient Telephone Number: [phone] (home)   Orthodoxy Affiliation: Colby Ramirez   Language: English   Extended Emergency Contact Information  Primary Emergency Contact: Vern Phone: [phone]  Mobile Phone: [phone]  Relation: Child  Secondary Emergency Contact: Rick Wall  Address: [place] [address] [address] [place] [address] [employer]  Mobile Phone: [phone]  Relation: Spouse   Patient Active Problem List    Diagnosis Date Noted    Atrial fibrillation with RVR ([address] 02/24/2017    Respiratory failure ([address] 02/23/2017    Chronic obstructive pulmonary disease with acute exacerbation ([address] 02/10/2017    Acute respiratory failure ([address] 02/06/2017    Aspiration pneumonia of both upper lobes ([address] 01/20/2017    Shortness of breath 01/20/2017    Delirium 01/20/2017    Goals of care, counseling/discussion 01/20/2017    Debility 01/20/2017    Acute respiratory failure with hypoxia ([address] 01/15/2017    Fibromyalgia 01/15/2017     Class: Chronic    Chronic pain 01/15/2017     Class: Chronic    History of tobacco use 01/15/2017     Class: Present on Admission    Sepsis ([address] 01/15/2017     Class: Acute    Acute renal injury ([address] 01/15/2017     Class: Present on Admission    Shock ([address] 01/15/2017    History of radiation to head and neck region 03/30/2016    History of laryngeal cancer 11/02/2015     Class: Present on Admission    Dysphagia 12/30/2014    Essential hypertension, benign 11/29/2011    Bipolar 1 disorder ([address] 11/29/2011    Encounter for long-term (current) use of other medications 11/29/2011        Date: 2/27/2017       Level of Orthodoxy/Spiritual Activity:  []         Involved in yoly tradition/spiritual practice    []         Not involved in yoly tradition/spiritual practice  [x]         Spiritually oriented    []         Claims no spiritual orientation    []         seeking spiritual identity  []         Feels alienated from Hindu practice/tradition  []         Feels angry about Hindu practice/tradition  []         Spirituality/Hindu tradition is a resource for coping at this time. []         Not able to assess due to medical condition    Services Provided Today:  []         crisis intervention    []         reading Scriptures  [x]         spiritual assessment    []         prayer  []         empathic listening/emotional support  []         rites and rituals (cite in comments)  []         life review     []         Hindu support  []         theological development   []         advocacy  []         ethical dialog     []         blessing  []         bereavement support    []         support to family  []         anticipatory grief support   []         help with AMD  []         spiritual guidance    []         meditation      Spiritual Care Needs  []         Emotional Support  []         Spiritual/Gnosticist Care  []         Loss/Adjustment  []         Advocacy/Referral                /Ethics  []         No needs expressed at               this time  []         Other: (note in               comments)  [address]  []         Follow up visits with               pt/family  []         Provide materials  []         Schedule sacraments  []         Contact Community               Clergy  [x]         Follow up as needed  []         Other: (note in               comments)     Comments:   Initial visit with patient on PCU whose call ball was ringing. Assisted patient with her needs; she commented on my cross necklace and proceeded to share with me about her cross; she was distracted. I assured her of support as needed. She desired to rest at this time; pastoral care is available to follow up with patient at a later time; please page as needed/desired. [phone]. Visit by: Rebecca Owen. Sanjay Verde. Lulu Hills MA, [facility]    Lead [facility] [employer] Development & Advancement

## 2017-02-27 NOTE — PROGRESS NOTES
GI Progress Note Sienagagandeep Jaimecristobal)  NAME:Jamia Wall :1955 LHE:[id number]   ATTG: MD Margo  PCP: Tiffanie Ratliff MD  Date/Time:  2017 6216   Assessment:   · Dysphagia- related to her H & N CA requiring PEG in past, now with recurrent aspiration and pneumonia. She is tolerating her pills. Most recent BS showed tolerance of solids but not liquids. Her anxiety and pulmonary status are improved but will not make PEG placement easier. PEG will not eliminate her aspiration risk     Plan:   · Will offer PEG tomorrow at 1130am  · NPO after MN  · Continue abx     Subjective:   Discussed with RN events overnight. Very anxious. Complaint Y/N Description   Abdominal Pain n    Hematemesis n    Hematochezia n    Melena n    Constipation n    Diarrhea n    Dyspepsia n    Dysphagia y    Jaundiced n    Nausea/vomiting n      Review of Systems:  Symptom Y/N Comments  Symptom Y/N Comments   Fever/Chills n   Chest Pain n    Cough n   Headaches y    Sputum y   Joint Pain n    SOB/PENALOZA y   Pruritis/Rash n    Tolerating Diet  NPO except sips  Other       Could NOT obtain due to:      Objective:   VITALS:   Last 24hrs VS reviewed since prior progress note. Most recent are:  Visit Vitals    /68 (BP 1 Location: Right arm, BP Patient Position: At rest)    Pulse 95    Temp 98.4 °F (36.9 °C)    Resp 20    Ht 5' 9\" (1.753 m)    Wt 68.4 kg (150 lb 12.7 oz)    SpO2 91%    BMI 22.27 kg/m2       Intake/Output Summary (Last 24 hours) at 17 1701  Last data filed at 17 1328   Gross per 24 hour   Intake              900 ml   Output             1300 ml   Net             -400 ml     PHYSICAL EXAM:  General: WD, WN. Alert, cooperative, no acute distress    HEENT: NC, Atraumatic. PERRL. Anicteric sclerae. Lungs:  Bilaterally Wheezing and rhonchi. L>R.   Heart:  Regular  rhythm,  No murmur/Rub/Gallops  Abdomen: S, NT, ND.  + Old PEG site.  +BS, no HSM  Extremities: No c/c/e  Neurologic:  CN 2-12 gi, A/O X 3.  No acute neurological distress   Psych:   Good insight. Not agitated. +anxious    Lab and Radiology Data Reviewed: (see below)    Medications Reviewed: (see below)  PMH/SH reviewed - no change compared to H&P  ________________________________________________________________________  Total time spent with patient: 15 minutes ________________________________________________________________________  Care Plan discussed with:  Patient y   Family     RN               Consultant:       Lexa Cohn MD     Procedures: see electronic medical records for all procedures/Xrays and details which were not copied into this note but were reviewed prior to creation of Plan. LABS:  Recent Labs      02/27/17 0426 02/26/17 0428   WBC  11.3*  14.6*   HGB  9.2*  8.2*   HCT  31.0*  27.4*   PLT  288  265     Recent Labs      02/27/17 0426 02/26/17 0428 02/25/17   0412   NA  144  144  144   K  4.0  4.5  4.5   CL  106  108  110*   CO2  27  28  27   BUN  14  14  16   CREA  0.83  0.77  0.81   GLU  100  128*  99   CA  9.8  8.9  8.9     No results for input(s): SGOT, GPT, AP, TBIL, TP, ALB, GLOB, GGT, AML, LPSE in the last 72 hours. No lab exists for component: AMYP, HLPSE  No results for input(s): INR, PTP, APTT in the last 72 hours. No lab exists for component: INREXT   Recent Labs      02/26/17 0428   FE  10*   TIBC  169*   PSAT  6*   FERR  250      No results found for: FOL, RBCF  Recent Labs      02/26/17   0724   PH  7.32*   PCO2  61*   PO2  50*     No results for input(s): CPK, CKMB in the last 72 hours.     No lab exists for component: TROPONINI  Lab Results   Component Value Date/Time    Color YELLOW/STRAW 02/23/2017 07:36 PM    Appearance CLEAR 02/23/2017 07:36 PM    Specific gravity 1.008 02/23/2017 07:36 PM    pH (UA) 6.5 02/23/2017 07:36 PM    Protein NEGATIVE  02/23/2017 07:36 PM    Glucose NEGATIVE  02/23/2017 07:36 PM    Ketone NEGATIVE  02/23/2017 07:36 PM    Bilirubin NEGATIVE  02/23/2017 07:36 PM Urobilinogen 0.2 02/23/2017 07:36 PM    Nitrites NEGATIVE  02/23/2017 07:36 PM    Leukocyte Esterase NEGATIVE  02/23/2017 07:36 PM    Epithelial cells FEW 01/15/2017 11:13 AM    Bacteria NEGATIVE  01/15/2017 11:13 AM    WBC 0-4 01/15/2017 11:13 AM    RBC 0-5 01/15/2017 11:13 AM       MEDICATIONS:  Current Facility-Administered Medications   Medication Dose Route Frequency    ceFAZolin (ANCEF) 1 g in 0.9% sodium chloride (MBP/ADV) 50 mL  1 g IntraVENous Q6H    enoxaparin (LOVENOX) injection 40 mg  40 mg SubCUTAneous Q24H    metoprolol tartrate (LOPRESSOR) tablet 12.5 mg  12.5 mg Oral Q12H    sodium chloride (NS) flush 5-10 mL  5-10 mL IntraVENous PRN    metroNIDAZOLE (FLAGYL) IVPB premix 500 mg  500 mg IntraVENous Q8H    albuterol-ipratropium (DUO-NEB) 2.5 MG-0.5 MG/3 ML  3 mL Nebulization Q6H RT    FLUoxetine (PROzac) capsule 20 mg  20 mg Oral DAILY    gabapentin (NEURONTIN) capsule 600 mg  600 mg Oral TID    lithium carbonate CR (ESKALITH CR) tablet 450 mg  450 mg Oral DAILY    sodium chloride (NS) flush 5-10 mL  5-10 mL IntraVENous Q8H    sodium chloride (NS) flush 5-10 mL  5-10 mL IntraVENous PRN    ondansetron (ZOFRAN) injection 4 mg  4 mg IntraVENous Q4H PRN    0.9% sodium chloride infusion  100 mL/hr IntraVENous CONTINUOUS    levoFLOXacin (LEVAQUIN) 750 mg in D5W IVPB  750 mg IntraVENous Q24H    artificial saliva (MOUTH KOTE) 1 Spray  1 Spray Oral PRN    acetylcysteine (MUCOMYST) 200 mg/mL (20 %) solution 400 mg  2 mL Inhalation TID    HYDROcodone-acetaminophen (NORCO)  mg tablet 2 Tab  2 Tab Oral Q4H PRN    traZODone (DESYREL) tablet 200 mg  200 mg Oral QHS

## 2017-02-27 NOTE — PROGRESS NOTES
23:05  Bedside shift change report given to Chi Nuno RN (oncoming nurse) by Zurdo Basilio RN (offgoing nurse). Report included the following information SBAR, Kardex, MAR and Recent Results. 07:20  Bedside shift change report given to Osman Woody RN (oncoming nurse) by Chi Nuno RN (offgoing nurse). Report included the following information SBAR, Kardex, MAR and Recent Results.

## 2017-02-27 NOTE — PROGRESS NOTES
[facility] [place]  Pulmonary, Critical Care, and Sleep Medicine    Name: Guillaume Denson MRN: [id number]   : 1955 [facility]   Date: 2017        IMPRESSION:   · Acute/chronic hypoxic respiratory failure  · MSSA Health-care-associated pneumonia, may be aspiration as well  · Dysphagia  · Sepsis  · H/O laryngeal cancer  · Bipolar disorder      RECOMMENDATIONS:   · O2 with PRN BiPaP  · Bronchodilators  · IV abx until PEG placed  · PEG per GI  · DVT prophylaxis     Subjective:        events of weekend noted. Now on Nasal O2. Mucopurulent sputum. Milks helps per pt. No fever. Agrees to PEG  17: no events. Off BiPAP. Still short of breath. She says she can only cough up sputum when she drinks milk. .. This patient has been seen and evaluated at the request of Dr. Marie Dewitt for respiratory failure. Patient is a 57 y.o. female with h/o throat cancer and chronic hypoxemia (?COPD), recently admitted with severe community-acquired pneumonia, presented this morning with severe hypoxia and dyspnea which was acute in onset. She was markedly dyspneic on arrival and was started on BiPAP. She is feeling a lot better now with dyspnea close to her baseline. Her main complaint is of dry mouth from the BiPAP. Past Medical History:   Diagnosis Date    Bipolar 1 disorder ([address] 2011    Cancer ([facility] [address]     skin cancer buttocks    Cancer (HCC)     throat    Chronic pain     FIBROMYALGIA, LEGS    Encounter for long-term (current) use of other medications 2011    Essential hypertension, benign 2011    Laryngeal cancer ([facility] [address] 2015    Laryngeal mass     Psychiatric disorder     bipolar      Past Surgical History:   Procedure Laterality Date    HX BREAST AUGMENTATION Bilateral     SALINE IMPLANTS    HX GI      PLACEMENT OF G TUBE    HX GI      ESOPH.  DILATATION    HX GYN      tubal pregnancy    HX HEENT      BX OF NECK MASS    HX HYSTERECTOMY      PLACE PERCUT GASTROSTOMY TUBE  10/28/2014     has been removed 2015      Prior to Admission medications    Medication Sig Start Date End Date Taking? Authorizing Provider   HYDROcodone-acetaminophen (NORCO)  mg tablet Take 2 Tabs by mouth every four (4) hours as needed. Max Daily Amount: 12 Tabs. 2/21/17   Da Moore MD   albuterol-ipratropium (DUO-NEB) 2.5 mg-0.5 mg/3 ml nebu 3 mL by Nebulization route every four (4) hours as needed. 2/12/17   Kiki Gan MD   diphenhydrAMINE 12.5 mg/5 mL elix 40 mL, lidocaine 2 % soln 40 mL, aluminum & magnesium hydroxide-simethicone 400-400-40 mg/5 mL susp 40 mL Take 5 mL by mouth daily. Historical Provider   VITAMIN B-12 1,000 mcg sublingual tablet 1,000 mcg by SubLINGual route daily. 11/11/16   Historical Provider   acetylcysteine (MUCOMYST) 100 mg/mL (10 %) nebulizer solution Take 4 mL by inhalation every four (4) hours. 1/26/17   Da Moore MD   amLODIPine (NORVASC) 10 mg tablet Take 1 Tab by mouth daily. 1/24/17   Da Moore MD   gabapentin (NEURONTIN) 300 mg capsule Take 2 Caps by mouth three (3) times daily. 1/24/17   Da Moore MD   metoprolol tartrate (LOPRESSOR) 25 mg tablet Take 1 Tab by mouth every twelve (12) hours. 1/24/17   Da Moore MD   guaiFENesin-dextromethorphan [facility] DM) 600-30 mg per tablet Take 1 Tab by mouth two (2) times a day. 1/24/17   Da Moore MD   senna (SENNA) 8.6 mg tablet Take 1 Tab by mouth daily. Historical Provider   lithium carbonate CR (ESKALITH CR) 450 mg CR tablet TAKE 1 TABLET BY ORAL ROUTE PER AT BEDTIME 11/9/16   Historical Provider   FLUoxetine (PROZAC) 20 mg tablet Take 20 mg by mouth daily. 2/5/16   Historical Provider   amitriptyline (ELAVIL) 10 mg tablet Take 1 Tab by mouth nightly. 2/29/16   Da Moore MD   traZODone (DESYREL) 100 mg tablet Take 200 mg by mouth nightly.  11/2/15   Historical Provider     No Known Allergies   Social History   Substance Use Topics    Smoking status: Former Smoker     Packs/day: 1.00     Years: 40.00     Quit date: 2014    Smokeless tobacco: Never Used      Comment: PASSIVE SMOKE EXPOSURE,  SMOKES    Alcohol use No      Family History   Problem Relation Age of Onset    Cancer Father      bone/skin/lung    Anesth Problems Neg Hx         Current Facility-Administered Medications   Medication Dose Route Frequency    ceFAZolin (ANCEF) 1 g in 0.9% sodium chloride (MBP/ADV) 50 mL  1 g IntraVENous Q6H    enoxaparin (LOVENOX) injection 40 mg  40 mg SubCUTAneous Q24H    metoprolol tartrate (LOPRESSOR) tablet 12.5 mg  12.5 mg Oral Q12H    metroNIDAZOLE (FLAGYL) IVPB premix 500 mg  500 mg IntraVENous Q8H    albuterol-ipratropium (DUO-NEB) 2.5 MG-0.5 MG/3 ML  3 mL Nebulization Q6H RT    FLUoxetine (PROzac) capsule 20 mg  20 mg Oral DAILY    gabapentin (NEURONTIN) capsule 600 mg  600 mg Oral TID    lithium carbonate CR (ESKALITH CR) tablet 450 mg  450 mg Oral DAILY    sodium chloride (NS) flush 5-10 mL  5-10 mL IntraVENous Q8H    0.9% sodium chloride infusion  100 mL/hr IntraVENous CONTINUOUS    levoFLOXacin (LEVAQUIN) 750 mg in D5W IVPB  750 mg IntraVENous Q24H    acetylcysteine (MUCOMYST) 200 mg/mL (20 %) solution 400 mg  2 mL Inhalation TID    traZODone (DESYREL) tablet 200 mg  200 mg Oral QHS       Review of Systems:  A comprehensive review of systems was negative except for that written in the HPI.     Objective:   Vital Signs:    Visit Vitals    /89 (BP 1 Location: Right arm, BP Patient Position: At rest)    Pulse (!) 106    Temp 98.4 °F (36.9 °C)    Resp 20    Ht 5' 9\" (1.753 m)    Wt 68.4 kg (150 lb 12.7 oz)    LMP  (LMP Unknown)    SpO2 99%    BMI 22.27 kg/m2       O2 Device: Nasal cannula   O2 Flow Rate (L/min): 4 l/min   Temp (24hrs), Av.4 °F (36.9 °C), Min:98 °F (36.7 °C), Max:99 °F (37.2 °C)       Intake/Output:   Last shift:         Last 3 shifts: 02/25 1901 - 02/27 0700  In: 2778 [P.O.:1260; I.V.:1518]  Out: 4151 [Urine:3330]    Intake/Output Summary (Last 24 hours) at 02/27/17 0951  Last data filed at 02/26/17 1557   Gross per 24 hour   Intake               60 ml   Output              900 ml   Net             -840 ml      Physical Exam:   General:  Alert, cooperative, no distress   Head:  Normocephalic, without obvious abnormality, atraumatic. Eyes:  Conjunctivae/corneas clear. Nose: Nares normal. Septum midline. Mucosa normal.     Throat: Lips, mucosa normal.     Back:   Symmetric, no curvature. ROM normal.   Lungs:   Rales left > right    Heart:  Regular rate and rhythm    Abdomen:   Soft, non-tender. Bowel sounds normal.     Extremities: Extremities normal, atraumatic, no cyanosis .    Skin: Skin color, texture, turgor normal. No rashes or lesions   Neurologic: Grossly nonfocal     Data:   Labs:  Recent Labs      02/27/17   0426  02/26/17   0428  02/25/17   0412   WBC  11.3*  14.6*  18.4*   HGB  9.2*  8.2*  8.1*   HCT  31.0*  27.4*  27.2*   PLT  288  265  257     Recent Labs      02/27/17   0426  02/26/17   0428  02/25/17   0412   NA  144  144  144   K  4.0  4.5  4.5   CL  106  108  110*   CO2  27  28  27   GLU  100  128*  99   BUN  14  14  16   CREA  0.83  0.77  0.81   CA  9.8  8.9  8.9     Recent Labs      02/26/17   0724   PH  7.32*   PCO2  61*   PO2  50*   HCO3  31*       Imaging:  I have personally reviewed the patients radiographs:  None today        Josias Glover MD

## 2017-02-28 NOTE — PROGRESS NOTES
PEG tube inserted by Dr. Clayton Roberson and GLO Rosa  [facility] catalog # [id number]  Product #   Lot # [id number]   Expiration date 80/1/35

## 2017-02-28 NOTE — ANESTHESIA PREPROCEDURE EVALUATION
Anesthetic History   No history of anesthetic complications            Review of Systems / Medical History  Patient summary reviewed, nursing notes reviewed and pertinent labs reviewed    Pulmonary    COPD: mild      Smoker         Neuro/Psych         Psychiatric history     Cardiovascular    Hypertension: well controlled        Dysrhythmias       Exercise tolerance: >4 METS     GI/Hepatic/Renal  Within defined limits              Endo/Other  Within defined limits           Other Findings   Comments: SCCa of pyriform sinus   FIBROMYALGIA            Physical Exam    Airway  Mallampati: II  TM Distance: > 6 cm  Neck ROM: normal range of motion   Mouth opening: Normal     Cardiovascular  Regular rate and rhythm,  S1 and S2 normal,  no murmur, click, rub, or gallop  Rhythm: regular  Rate: normal         Dental    Dentition: Edentulous, Full upper dentures and Full lower dentures     Pulmonary  Breath sounds clear to auscultation               Abdominal  GI exam deferred       Other Findings            Anesthetic Plan    ASA: 3  Anesthesia type: total IV anesthesia          Induction: Intravenous  Anesthetic plan and risks discussed with: Patient

## 2017-02-28 NOTE — PROGRESS NOTES
[facility] [place]  Pulmonary, Critical Care, and Sleep Medicine    Name: Isabel Younger MRN: [id number]   : 1955 [facility]: [facility]    Date: 2017        IMPRESSION:   · Acute/chronic hypoxic respiratory failure  · MSSA Health-care-associated pneumonia, may be aspiration as well  · Dysphagia  · Sepsis  · H/O laryngeal cancer  · Bipolar disorder      RECOMMENDATIONS:   · O2 with PRN BiPaP  · Bronchodilators  · IV abx until PEG placed  · PEG per GI  · DVT prophylaxis     Subjective:     - resting in bed, sats stable on 4LPM, states breathing is about the same. C/o congested cough productive of moderate amount yellow sputum. No cp or wheezing. Dyspnea with exertion. PEG to be placed today.  events of weekend noted. Now on Nasal O2. Mucopurulent sputum. Milks helps per pt. No fever. Agrees to PEG  17: no events. Off BiPAP. Still short of breath. She says she can only cough up sputum when she drinks milk. .. This patient has been seen and evaluated at the request of Dr. Denice Whelan for respiratory failure. Patient is a 57 y.o. female with h/o throat cancer and chronic hypoxemia (?COPD), recently admitted with severe community-acquired pneumonia, presented this morning with severe hypoxia and dyspnea which was acute in onset. She was markedly dyspneic on arrival and was started on BiPAP. She is feeling a lot better now with dyspnea close to her baseline. Her main complaint is of dry mouth from the BiPAP.     Past Medical History:   Diagnosis Date    Bipolar 1 disorder ([address] 2011    Cancer ([facility] [address]     skin cancer buttocks    Cancer (HCC)     throat    Chronic pain     FIBROMYALGIA, LEGS    Encounter for long-term (current) use of other medications 2011    Essential hypertension, benign 2011    Laryngeal cancer ([facility] [facility] 2015    Laryngeal mass     Psychiatric disorder     bipolar      Past Surgical History:   Procedure Laterality Date  HX BREAST AUGMENTATION Bilateral     SALINE IMPLANTS    HX GI      PLACEMENT OF G TUBE    HX GI      ESOPH. DILATATION    HX GYN      tubal pregnancy    HX HEENT      BX OF NECK MASS    HX HYSTERECTOMY      PLACE PERCUT GASTROSTOMY TUBE  10/28/2014     has been removed 2015      Prior to Admission medications    Medication Sig Start Date End Date Taking? Authorizing Provider   HYDROcodone-acetaminophen (NORCO)  mg tablet Take 2 Tabs by mouth every four (4) hours as needed. Max Daily Amount: 12 Tabs. 2/21/17   Chalino Sánchez MD   albuterol-ipratropium (DUO-NEB) 2.5 mg-0.5 mg/3 ml nebu 3 mL by Nebulization route every four (4) hours as needed. 2/12/17   Marcos Campbell MD   diphenhydrAMINE 12.5 mg/5 mL elix 40 mL, lidocaine 2 % soln 40 mL, aluminum & magnesium hydroxide-simethicone 400-400-40 mg/5 mL susp 40 mL Take 5 mL by mouth daily. Historical Provider   VITAMIN B-12 1,000 mcg sublingual tablet 1,000 mcg by SubLINGual route daily. 11/11/16   Historical Provider   acetylcysteine (MUCOMYST) 100 mg/mL (10 %) nebulizer solution Take 4 mL by inhalation every four (4) hours. 1/26/17   Chalino Sánchez MD   amLODIPine (NORVASC) 10 mg tablet Take 1 Tab by mouth daily. 1/24/17   Chalino Sánchez MD   gabapentin (NEURONTIN) 300 mg capsule Take 2 Caps by mouth three (3) times daily. 1/24/17   Chalino Sánchez MD   metoprolol tartrate (LOPRESSOR) 25 mg tablet Take 1 Tab by mouth every twelve (12) hours. 1/24/17   Chalino Sánchez MD   guaiFENesin-dextromethorphan [facility] DM) 600-30 mg per tablet Take 1 Tab by mouth two (2) times a day. 1/24/17   Chalino Sánchez MD   senna (SENNA) 8.6 mg tablet Take 1 Tab by mouth daily. Historical Provider   lithium carbonate CR (ESKALITH CR) 450 mg CR tablet TAKE 1 TABLET BY ORAL ROUTE PER AT BEDTIME 11/9/16   Historical Provider   FLUoxetine (PROZAC) 20 mg tablet Take 20 mg by mouth daily.  2/5/16   Historical Provider amitriptyline (ELAVIL) 10 mg tablet Take 1 Tab by mouth nightly. 2/29/16   Tanvir Oliveira MD   traZODone (DESYREL) 100 mg tablet Take 200 mg by mouth nightly. 11/2/15   Historical Provider     No Known Allergies   Social History   Substance Use Topics    Smoking status: Former Smoker     Packs/day: 1.00     Years: 40.00     Quit date: 8/27/2014    Smokeless tobacco: Never Used      Comment: PASSIVE SMOKE EXPOSURE,  SMOKES    Alcohol use No      Family History   Problem Relation Age of Onset    Cancer Father      bone/skin/lung    Anesth Problems Neg Hx         Current Facility-Administered Medications   Medication Dose Route Frequency    pregabalin (LYRICA) capsule 100 mg  100 mg Oral BID    DULoxetine (CYMBALTA) capsule 20 mg  20 mg Oral DAILY    ceFAZolin (ANCEF) 1 g in 0.9% sodium chloride (MBP/ADV) 50 mL  1 g IntraVENous Q6H    enoxaparin (LOVENOX) injection 40 mg  40 mg SubCUTAneous Q24H    metoprolol tartrate (LOPRESSOR) tablet 12.5 mg  12.5 mg Oral Q12H    metroNIDAZOLE (FLAGYL) IVPB premix 500 mg  500 mg IntraVENous Q8H    albuterol-ipratropium (DUO-NEB) 2.5 MG-0.5 MG/3 ML  3 mL Nebulization Q6H RT    lithium carbonate CR (ESKALITH CR) tablet 450 mg  450 mg Oral DAILY    sodium chloride (NS) flush 5-10 mL  5-10 mL IntraVENous Q8H    0.9% sodium chloride infusion  100 mL/hr IntraVENous CONTINUOUS    levoFLOXacin (LEVAQUIN) 750 mg in D5W IVPB  750 mg IntraVENous Q24H    acetylcysteine (MUCOMYST) 200 mg/mL (20 %) solution 400 mg  2 mL Inhalation TID    traZODone (DESYREL) tablet 200 mg  200 mg Oral QHS       Review of Systems:  A comprehensive review of systems was negative except for that written in the HPI.     Objective:   Vital Signs:    Visit Vitals    /82    Pulse 97    Temp 98.2 °F (36.8 °C)    Resp 20    Ht 5' 9\" (1.753 m)    Wt 67.6 kg (149 lb 0.5 oz)    LMP  (LMP Unknown)    SpO2 95%    BMI 22.01 kg/m2       O2 Device: Nasal cannula   O2 Flow Rate (L/min): 4 l/min   Temp (24hrs), Av.4 °F (36.9 °C), Min:98.2 °F (36.8 °C), Max:98.9 °F (37.2 °C)       Intake/Output:   Last shift:         Last 3 shifts:  190 -  0700  In: 900 [P.O.:100; I.V.:800]  Out: 4175 [Urine:4175]    Intake/Output Summary (Last 24 hours) at 17 4235  Last data filed at 17 5211   Gross per 24 hour   Intake              900 ml   Output             4175 ml   Net            -3275 ml      Physical Exam:   General:  Alert, cooperative, no distress   Head:  Normocephalic, without obvious abnormality, atraumatic. Eyes:  Conjunctivae/corneas clear. Nose: Nares normal. Septum midline. Mucosa normal.     Throat: Lips, mucosa normal.     Back:   Symmetric, no curvature. ROM normal.   Lungs:   Rales left > right    Heart:  Regular rate and rhythm    Abdomen:   Soft, non-tender. Bowel sounds normal.     Extremities: Extremities normal, atraumatic, no cyanosis .    Skin: Skin color, texture, turgor normal. No rashes or lesions   Neurologic: Grossly nonfocal     Data:   Labs:  Recent Labs      17   0329  17   0426  17   0428   WBC  8.3  11.3*  14.6*   HGB  8.8*  9.2*  8.2*   HCT  29.3*  31.0*  27.4*   PLT  300  288  265     Recent Labs      17   0329  17   0426  17   0428   NA  143  144  144   K  4.2  4.0  4.5   CL  105  106  108   CO2  33*  27  28   GLU  97  100  128*   BUN  12  14  14   CREA  0.74  0.83  0.77   CA  9.5  9.8  8.9     Recent Labs      17   0724   PH  7.32*   PCO2  61*   PO2  50*   HCO3  31*       Imaging:  I have personally reviewed the patients radiographs:  None today        Nicole Sepulveda NP

## 2017-02-28 NOTE — PROGRESS NOTES
Md called re pain , able to give extra dose and also re meds not able to crush, and med's revamped per pharmacy and dr Miguelangel Becker. Called for pump and Tube feeding and to D/c npo so dietary would sent tube feeding.

## 2017-02-28 NOTE — ANESTHESIA POSTPROCEDURE EVALUATION
Post-Anesthesia Evaluation and Assessment    Patient: Marva Dixon MRN: [id number]  SSN: [id number]    YOB: 1955  Age: 57 y.o. Sex: female       Cardiovascular Function/Vital Signs  Visit Vitals    /89    Pulse (!) 108    Temp 37.3 °C (99.2 °F)    Resp 24    Ht 5' 9\" (1.753 m)    Wt 67.6 kg (149 lb 0.5 oz)    SpO2 93%    Breastfeeding No    BMI 22.01 kg/m2       Patient is status post total IV anesthesia anesthesia for Procedure(s):  ESOPHAGOGASTRODUODENOSCOPY (EGD)  PERCUTANEOUS ENDOSCOPIC GASTROSTOMY TUBE INSERTION. Nausea/Vomiting: None    Postoperative hydration reviewed and adequate. Pain:  Pain Scale 1: Visual (02/28/17 1211)  Pain Intensity 1: 0 (02/28/17 1211)   Managed    Neurological Status: At baseline    Mental Status and Level of Consciousness: Arousable    Pulmonary Status:   O2 Device: Nasal cannula (02/28/17 1211)   Adequate oxygenation and airway patent    Complications related to anesthesia: None    Post-anesthesia assessment completed.  No concerns    Signed By: Latisha Marion MD     February 28, 2017

## 2017-02-28 NOTE — ROUTINE PROCESS
TRANSFER - OUT REPORT:    Verbal report given to Radha Marte RN on [address]  being transferred to [facility] for routine progression of care       Report consisted of patients Situation, Background, Assessment and   Recommendations(SBAR). Information from the following report(s) Procedure Summary was reviewed with the receiving nurse. Opportunity for questions and clarification was provided.       Patient transported with:  N gautam cannula @ 3L/min and IV fluid

## 2017-02-28 NOTE — PROGRESS NOTES
General Daily Progress Note    Admit Date: 2/23/2017  Hospital day 6    Subjective:     Patient has cough,sob. .   Medication side effects: none    Current Facility-Administered Medications   Medication Dose Route Frequency    ceFAZolin (ANCEF) 1 g in 0.9% sodium chloride (MBP/ADV) 50 mL  1 g IntraVENous Q6H    enoxaparin (LOVENOX) injection 40 mg  40 mg SubCUTAneous Q24H    metoprolol tartrate (LOPRESSOR) tablet 12.5 mg  12.5 mg Oral Q12H    sodium chloride (NS) flush 5-10 mL  5-10 mL IntraVENous PRN    metroNIDAZOLE (FLAGYL) IVPB premix 500 mg  500 mg IntraVENous Q8H    albuterol-ipratropium (DUO-NEB) 2.5 MG-0.5 MG/3 ML  3 mL Nebulization Q6H RT    FLUoxetine (PROzac) capsule 20 mg  20 mg Oral DAILY    gabapentin (NEURONTIN) capsule 600 mg  600 mg Oral TID    lithium carbonate CR (ESKALITH CR) tablet 450 mg  450 mg Oral DAILY    sodium chloride (NS) flush 5-10 mL  5-10 mL IntraVENous Q8H    sodium chloride (NS) flush 5-10 mL  5-10 mL IntraVENous PRN    ondansetron (ZOFRAN) injection 4 mg  4 mg IntraVENous Q4H PRN    0.9% sodium chloride infusion  100 mL/hr IntraVENous CONTINUOUS    levoFLOXacin (LEVAQUIN) 750 mg in D5W IVPB  750 mg IntraVENous Q24H    artificial saliva (MOUTH KOTE) 1 Spray  1 Spray Oral PRN    acetylcysteine (MUCOMYST) 200 mg/mL (20 %) solution 400 mg  2 mL Inhalation TID    HYDROcodone-acetaminophen (NORCO)  mg tablet 2 Tab  2 Tab Oral Q4H PRN    traZODone (DESYREL) tablet 200 mg  200 mg Oral QHS        Review of Systems  Constitutional: positive for fatigue and malaise  Respiratory: positive for cough, sputum or stridor  Cardiovascular: negative    Objective:     Patient Vitals for the past 8 hrs:   BP Temp Pulse Resp SpO2 Weight   02/28/17 0325 - - - - - 149 lb 0.5 oz (67.6 kg)   02/28/17 0322 117/71 98.4 °F (36.9 °C) 93 22 97 % -   02/28/17 0228 - - - - 97 % -   02/27/17 2322 107/66 98.2 °F (36.8 °C) 99 20 98 % -        02/26 1901 - 02/28 0700  In: 900 [P.O.:100; I.V.:800]  Out: 4175 [Urine:4175]    Physical Exam:   Visit Vitals    /71    Pulse 93    Temp 98.4 °F (36.9 °C)    Resp 22    Ht 5' 9\" (1.753 m)    Wt 149 lb 0.5 oz (67.6 kg)    LMP  (LMP Unknown)    SpO2 97%    BMI 22.01 kg/m2     General appearance: alert, fatigued, cooperative, mild distress, appears stated age  Lungs: rhonchi R base, L base  Heart: regular rate and rhythm  Abdomen: soft, non-tender. Bowel sounds normal. No masses,  no organomegaly  Extremities: extremities normal, atraumatic, no cyanosis or edema      ECG: normal sinus rhythm     Data Review   Recent Results (from the past 24 hour(s))   METABOLIC PANEL, BASIC    Collection Time: 02/28/17  3:29 AM   Result Value Ref Range    Sodium 143 136 - 145 mmol/L    Potassium 4.2 3.5 - 5.1 mmol/L    Chloride 105 97 - 108 mmol/L    CO2 33 (H) 21 - 32 mmol/L    Anion gap 5 5 - 15 mmol/L    Glucose 97 65 - 100 mg/dL    BUN 12 6 - 20 MG/DL    Creatinine 0.74 0.55 - 1.02 MG/DL    BUN/Creatinine ratio 16 12 - 20      GFR est AA >60 >60 ml/min/1.73m2    GFR est non-AA >60 >60 ml/min/1.73m2    Calcium 9.5 8.5 - 10.1 MG/DL   CBC WITH AUTOMATED DIFF    Collection Time: 02/28/17  3:29 AM   Result Value Ref Range    WBC 8.3 3.6 - 11.0 K/uL    RBC 3.22 (L) 3.80 - 5.20 M/uL    HGB 8.8 (L) 11.5 - 16.0 g/dL    HCT 29.3 (L) 35.0 - 47.0 %    MCV 91.0 80.0 - 99.0 FL    MCH 27.3 26.0 - 34.0 PG    MCHC 30.0 30.0 - 36.5 g/dL    RDW 17.4 (H) 11.5 - 14.5 %    PLATELET 624 452 - 055 K/uL    NEUTROPHILS 71 32 - 75 %    LYMPHOCYTES 15 12 - 49 %    MONOCYTES 10 5 - 13 %    EOSINOPHILS 4 0 - 7 %    BASOPHILS 0 0 - 1 %    ABS. NEUTROPHILS 6.0 1.8 - 8.0 K/UL    ABS. LYMPHOCYTES 1.3 0.8 - 3.5 K/UL    ABS. MONOCYTES 0.8 0.0 - 1.0 K/UL    ABS. EOSINOPHILS 0.3 0.0 - 0.4 K/UL    ABS.  BASOPHILS 0.0 0.0 - 0.1 K/UL           Assessment:     Active Problems:    Acute respiratory failure (HCC) (2/6/2017)      Chronic obstructive pulmonary disease with acute exacerbation (HCC) (2/10/2017)      Dysphagia (12/30/2014)      History of laryngeal cancer (11/2/2015)      History of radiation to head and neck region (3/30/2016)      Respiratory failure ([facility] (2/23/2017)      Atrial fibrillation with RVR ([facility] (2/24/2017)        Plan:     Stable dyspnea and cough. ongoing obsession with pain disorder. For PEG today

## 2017-02-28 NOTE — ROUTINE PROCESS
TRANSFER - IN REPORT:    Verbal report received from [facility], RN on Mu Fischer  being received from [phone]  for routine progression of care      Report consisted of patients Situation, Background, Assessment and   Recommendations(SBAR). Information from the following report(s) Procedure Summary was reviewed with the receiving nurse. Opportunity for questions and clarification was provided. Assessment completed upon patients arrival to unit and care assumed.

## 2017-02-28 NOTE — PROCEDURES
NAME:  Gini Lawler   :   1955   MRN:   [id number]     Date/Time:  2017 12:19 PM    Percutaneous Endoscopically-placed Gastostomy (PEG) Procedure Note     Procedure: Esophagogastroduodenoscopy with placement of a percutaneous endoscopic gastrostomy tube     Indication:  Dyphagia/odynophagia - 787.2  Pre-operative Diagnosis: see indication above  Post-operative Diagnosis: see findings below  : Denice Galindo MD  Referring Provider: -Moe Durant MD     Exam:  Airway: clear, no airway problems anticipated  Heart: RRR, without gallops or rubs  Lungs: clear bilaterally without wheezes, crackles, or rhonchi  Abdomen: soft, nontender, nondistended, bowel sounds present  Mental Status: awake, alert and oriented to person, place and time      Anethesia/Sedation: MAC anesthesia Propofol 100mg IV  Procedure Details   After informed consent was obtained for the procedure, with all risks and benefits of procedure explained the patient was taken to the endoscopy suite and placed in the left lateral decubitus position. Following sequential administration of sedation as per above, the [id number] gastroscope was inserted into the mouth and advanced under direct vision to second portion of the duodenum. A careful inspection was made as the gastroscope was withdrawn, including a retroflexed view of the proximal stomach, A site was identified via transillumination and indent, deemed appropriate for PEG site in the epigastrum. The site was prepped and draped in sterile manner with 1% local lidocaine injected with easy advancement of needle into gastric lumen with negative pressure applied. An incision was made at the skin through which trochar was advanced and sheath left in place after grasped with snare. A wire placed via sheath into stomach was withdrawn via mouth using snare, after which a 20FR PEG gastrostomy tube was advanced over wire and secured at 3.5cm at the external;/skin bolster.  placed ; findings and interventions are described below. Findings:   ESOPHAGUS: The esophagus is normal without stricture. The proximal, mid, and distal portions are normal. The Z-Line is intact. STOMACH: The fundus on antegrade and retroflex views is normal. The body, antrum, and pylorus are normal.   DUODENUM: The bulb and second portions are normal.      Therapies: 20FR PEG tube placement, secured at 3.5cm at external bolster  Specimens: None  EBL: None. Complications: None; patient tolerated the procedure well.      Impression:   -Normal upper endoscopy, with no endoscopic evidence of neoplasia or mucosal abnormality. ,  -Successful 20F PEG tube placement, secured at 3.5cm at external bolster    Recommendations:  -Post PEG orders are left on chart    Discharge disposition:  To room after recovery in Endoscopy    Ish Christopher MD

## 2017-02-28 NOTE — PROGRESS NOTES
23:46  Bedside shift change report given to Darcie Samuel RN (oncoming nurse) by Denny Acevedo RN (offgoing nurse). Report included the following information SBAR, Kardex, MAR and Recent Results. 07:10  Bedside shift change report given to Brit Schofield RN (oncoming nurse) by Darcie Samuel RN (offgoing nurse). Report included the following information SBAR, Kardex, MAR and Recent Results.

## 2017-02-28 NOTE — ROUTINE PROCESS
Rahatcary Wall  1955  [id number]    Situation:  Verbal report received from: Dell Khoury RN  Procedure: Procedure(s):  ESOPHAGOGASTRODUODENOSCOPY (EGD)  PERCUTANEOUS ENDOSCOPIC GASTROSTOMY TUBE INSERTION    Background:    Preoperative diagnosis: recurrent aspiration  Postoperative diagnosis: iAspiration, dysphagia    :  Dr. Nessa Martines  Assistant(s): Endoscopy Technician-1: Larissa Wang  Endoscopy RN-1: Andrea Crump RN    Specimens: * No specimens in log *  H. Pylori  no    Assessment:  Intra-procedure medications       Anesthesia gave intra-procedure sedation and medications, see anesthesia flow sheet yes    Intravenous fluids: NS@ KVO     Vital signs stable       Abdominal assessment: round and soft       Recommendation:  Discharge patient per MD order  . Return to floor yes        Family or Friend  Family upstairs, per pt.     Permission to share finding with family or friend yes

## 2017-02-28 NOTE — PROGRESS NOTES
Nutrition Assessment:    INTERVENTIONS/RECOMMENDATIONS:   Enteral/Parenteral Nutrition: Initiate enteral nutrition: Jevity 1.5 @ 20 mL/hr and advance as tolerated by 10 mL q 8 hours to a goal rate of 50 mL/hr + 130 mL water flushes q 4 hours (provides 1800 kcal, 77g protein, 1692 mL water)    ASSESSMENT:   Patient medically noted for recurrent aspiration, respiratory failure, pneumonia, and s/p PEG placement today. TF to be started this afternoon. Will monitor tolerance. Can transition to bolus once tolerating continuous feeding at goal rate; recommend Jevity 1.5 240 mL bolus 5x/day + 150 mL water flushes with each bolus. Diet Order: NPO  % Eaten:  Patient Vitals for the past 72 hrs:   % Diet Eaten   02/28/17 0934 0 %   02/28/17 0858 0 %   02/28/17 0744 0 %   02/28/17 0721 0 %   02/27/17 0821 0 %   02/26/17 1557 0 %   02/26/17 0952 0 %     Pertinent Medications: [x] Reviewed []Other: cymbalta, lopressor, flagyl, trazodone   Pertinent Labs: [x]Reviewed  []Other:   Food Allergies: [x]None []Other:     Last BM: 2/24   []Active     []Hyperactive  []Hypoactive       [] Absent  BS  Skin:    [x] Intact   [] Incision  [] Breakdown   []Edema   []Other:    Anthropometrics: Height: 5' 9\" (175.3 cm) Weight: 67.6 kg (149 lb 0.5 oz)    IBW (%IBW):   ( ) UBW (%UBW):   (  %)    BMI: Body mass index is 22.01 kg/(m^2). This BMI is indicative of:  []Underweight   [x]Normal   []Overweight   [] Obesity   [] Extreme Obesity (BMI>40)  Last Weight Metrics:  Weight Loss Metrics 2/28/2017 2/21/2017 2/6/2017 1/31/2017 1/15/2017 12/7/2016 11/22/2016   Today's Wt 149 lb 0.5 oz 145 lb 9.6 oz 152 lb 9.6 oz 146 lb 6.4 oz 150 lb 149 lb 12.8 oz 149 lb 6.4 oz   BMI 22.01 kg/m2 21.5 kg/m2 22.54 kg/m2 22.26 kg/m2 22.15 kg/m2 22.12 kg/m2 22.06 kg/m2       Estimated Nutrition Needs (Based on): 0176 Kcals/day (BMR (1304) x 1. 3AF) , 68 g (-82g (1.0-1.2 g/kg bw)) Protein  Carbohydrate:  At Least 130 g/day  Fluids: 1700 mL/day     Pt expected to meet estimated nutrient needs: [x]Yes []No    NUTRITION DIAGNOSES:   Problem:  Swallowing difficulty      Etiology: related to hx of layngeal cancer     Signs/Symptoms: as evidenced by reccurent aspiration, Pneumonia       NUTRITION INTERVENTIONS:    Enteral/Parenteral Nutrition: Initiate enteral nutrition                GOAL:   TF started and advanced to goal rate with residual <250 mL next 2-4 days     NUTRITION MONITORING AND EVALUATION   Food/Nutrient Intake Outcomes: Enteral/parenteral nutrition intake  Physical Signs/Symptoms Outcomes: Weight/weight change, Electrolyte and renal profile, Glucose profile, GI profile    Previous Goal Met:   [x] Met              [] Progressing Towards Goal              [] Not Progressing Towards Goal   Previous Recommendations:   [x] Implemented          [] Not Implemented          [] Not Applicable    LEARNING NEEDS (Diet, Food/Nutrient-Drug Interaction):    [x] None Identified   [] Identified and Education Provided/Documented   [] Identified and Pt declined/was not appropriate     Cultural, Buddhism, OR Ethnic Dietary Needs:    [x] None Identified   [] Identified and Addressed     [x] Interdisciplinary Care Plan Reviewed/Documented    [x] Discharge Planning: Jevity 1.5 240 mL bolus 5x/day + 150 mL water flush q bolus (provides 1800 kcal, 77g protein, 1662 mL water)   [] Participated in Interdisciplinary Rounds    NUTRITION RISK:    [x] High              [] Moderate           []  Low  []  Minimal/Uncompromised      Julio Zhong  Pager [phone]              Weekend Pager [phone]

## 2017-02-28 NOTE — H&P
See prior Consultation and Progress Notes. The patient was reexamined. No interval change in the last 30 days. Proceed with procedure(s) with deep sedation or conscious sedation as clinically indicated.

## 2017-03-01 NOTE — PROGRESS NOTES
GI Progress Note Mariann Olivarez)  NAME:Jamia Wall :1955 Z:[id number]   ATTG: MD Margo  PCP: Eileen Maciel MD  Date/Time:  3/1/2017 1443    Assessment:   · Dysphagia- related to her H & N CA requiring repeat PEG in past given recurrent aspiration and pneumonia. She is tolerating her pills. Most recent BS showed tolerance of solids but not liquids. PEG will not eliminate her aspiration risk     Plan:   · Continue use of her PEG with advancement of TF to goal  · Will sign off     Subjective:   Discussed with RN events overnight. Tolerating TF. C/o mild discomfort at PEG site. Remains anxious. Complaint Y/N Description   Abdominal Pain n    Hematemesis n    Hematochezia n    Melena n    Constipation n    Diarrhea n    Dyspepsia n    Dysphagia y    Jaundiced n    Nausea/vomiting n      Review of Systems:  Symptom Y/N Comments  Symptom Y/N Comments   Fever/Chills n   Chest Pain n    Cough n   Headaches y    Sputum y   Joint Pain n    SOB/PENALOZA y   Pruritis/Rash n    Tolerating Diet y TF at 40cc/hr  Other       Could NOT obtain due to:      Objective:   VITALS:   Last 24hrs VS reviewed since prior progress note. Most recent are:  Visit Vitals    /70    Pulse 95    Temp 98.3 °F (36.8 °C)    Resp 18    Ht 5' 9\" (1.753 m)    Wt 66.6 kg (146 lb 13.2 oz)    SpO2 95%    Breastfeeding No    BMI 21.68 kg/m2       Intake/Output Summary (Last 24 hours) at 17 1459  Last data filed at 17 1343   Gross per 24 hour   Intake             2040 ml   Output             1200 ml   Net              840 ml     PHYSICAL EXAM:  General: WD, WN. Alert, cooperative, no acute distress    HEENT: NC, Atraumatic. PERRL. Anicteric sclerae. Lungs:  Bilaterally Wheezing and rhonchi. L>R. Heart:  Regular  rhythm,  No murmur/Rub/Gallops  Abdomen: S, NT, ND.  + PEG site c/d/i with min T.  +BS  Extremities: No c/c/e  Neurologic:  CN 2-12 gi, A/O X 3.   No acute neurological distress   Psych:   Good insight. Not agitated. +anxious    Lab and Radiology Data Reviewed: (see below)    Medications Reviewed: (see below)  PMH/SH reviewed - no change compared to H&P  ________________________________________________________________________  Total time spent with patient: 15 minutes ________________________________________________________________________  Care Plan discussed with:  Patient y   Family     RN y              Consultant:       Radha Box MD     Procedures: see electronic medical records for all procedures/Xrays and details which were not copied into this note but were reviewed prior to creation of Plan. LABS:  Recent Labs      03/01/17 0603 02/28/17   0329   WBC  6.8  8.3   HGB  9.8*  8.8*   HCT  32.0*  29.3*   PLT  319  300     Recent Labs      03/01/17 0603 02/28/17 0329  02/27/17   0426   NA  144  143  144   K  3.7  4.2  4.0   CL  108  105  106   CO2  29  33*  27   BUN  13  12  14   CREA  0.75  0.74  0.83   GLU  122*  97  100   CA  8.9  9.5  9.8     No results for input(s): SGOT, GPT, AP, TBIL, TP, ALB, GLOB, GGT, AML, LPSE in the last 72 hours. No lab exists for component: AMYP, HLPSE  No results for input(s): INR, PTP, APTT in the last 72 hours. No lab exists for component: INREXT, INREXT   No results for input(s): FE, TIBC, PSAT, FERR in the last 72 hours. No results found for: FOL, RBCF  No results for input(s): PH, PCO2, PO2 in the last 72 hours. No results for input(s): CPK, CKMB in the last 72 hours.     No lab exists for component: TROPONINI  Lab Results   Component Value Date/Time    Color YELLOW/STRAW 02/23/2017 07:36 PM    Appearance CLEAR 02/23/2017 07:36 PM    Specific gravity 1.008 02/23/2017 07:36 PM    pH (UA) 6.5 02/23/2017 07:36 PM    Protein NEGATIVE  02/23/2017 07:36 PM    Glucose NEGATIVE  02/23/2017 07:36 PM    Ketone NEGATIVE  02/23/2017 07:36 PM    Bilirubin NEGATIVE  02/23/2017 07:36 PM    Urobilinogen 0.2 02/23/2017 07:36 PM    Nitrites NEGATIVE  02/23/2017 07:36 PM    Leukocyte Esterase NEGATIVE  02/23/2017 07:36 PM    Epithelial cells FEW 01/15/2017 11:13 AM    Bacteria NEGATIVE  01/15/2017 11:13 AM    WBC 0-4 01/15/2017 11:13 AM    RBC 0-5 01/15/2017 11:13 AM       MEDICATIONS:  Current Facility-Administered Medications   Medication Dose Route Frequency    HYDROmorphone (DILAUDID) tablet 2 mg  2 mg Oral Q4H PRN    pregabalin (LYRICA) capsule 100 mg  100 mg Oral TID    bisacodyl (DULCOLAX) suppository 10 mg  10 mg Rectal QHS PRN    lithium carbonate capsule 300 mg  300 mg Oral BID    ceFAZolin (ANCEF) 1 g in 0.9% sodium chloride (MBP/ADV) 50 mL  1 g IntraVENous Q6H    enoxaparin (LOVENOX) injection 40 mg  40 mg SubCUTAneous Q24H    metoprolol tartrate (LOPRESSOR) tablet 12.5 mg  12.5 mg Oral Q12H    sodium chloride (NS) flush 5-10 mL  5-10 mL IntraVENous PRN    metroNIDAZOLE (FLAGYL) IVPB premix 500 mg  500 mg IntraVENous Q8H    albuterol-ipratropium (DUO-NEB) 2.5 MG-0.5 MG/3 ML  3 mL Nebulization Q6H RT    sodium chloride (NS) flush 5-10 mL  5-10 mL IntraVENous Q8H    sodium chloride (NS) flush 5-10 mL  5-10 mL IntraVENous PRN    ondansetron (ZOFRAN) injection 4 mg  4 mg IntraVENous Q4H PRN    0.9% sodium chloride infusion  100 mL/hr IntraVENous CONTINUOUS    levoFLOXacin (LEVAQUIN) 750 mg in D5W IVPB  750 mg IntraVENous Q24H    artificial saliva (MOUTH KOTE) 1 Spray  1 Spray Oral PRN    HYDROcodone-acetaminophen (NORCO)  mg tablet 2 Tab  2 Tab Oral Q4H PRN    traZODone (DESYREL) tablet 200 mg  200 mg Oral QHS

## 2017-03-01 NOTE — PROGRESS NOTES
[facility]  Pulmonary, Critical Care, and Sleep Medicine    Name: Sofy Gallegos MRN: [id number]   : 1955 Hospital: [facility]   Date: 3/1/2017        IMPRESSION:   · Acute/chronic hypoxic respiratory failure  · [facility] Health-care-associated pneumonia, may be aspiration as well; pt has been on multiple antibiotics for about 5 days; i think she has completed enough abx  · Dysphagia  · S/p PEG  · Sepsis  · H/O laryngeal cancer  · Bipolar disorder      RECOMMENDATIONS:   · O2 with PRN BiPaP  · Bronchodilators  · D/c abx  · DVT prophylaxis     Subjective:       3/1/17 Stomach sore. No SOB. Sputum still thick. Some blood in sputum. No nausea. No fever. Nasal O2 2 LPM    - resting in bed, sats stable on 4LPM, states breathing is about the same. C/o congested cough productive of moderate amount yellow sputum. No cp or wheezing. Dyspnea with exertion. PEG to be placed today.  events of weekend noted. Now on Nasal O2. Mucopurulent sputum. Milks helps per pt. No fever. Agrees to PEG  17: no events. Off BiPAP. Still short of breath. She says she can only cough up sputum when she drinks milk. .. This patient has been seen and evaluated at the request of Dr. Jose Franks for respiratory failure. Patient is a 57 y.o. female with h/o throat cancer and chronic hypoxemia (?COPD), recently admitted with severe community-acquired pneumonia, presented this morning with severe hypoxia and dyspnea which was acute in onset. She was markedly dyspneic on arrival and was started on BiPAP. She is feeling a lot better now with dyspnea close to her baseline. Her main complaint is of dry mouth from the BiPAP.     Past Medical History:   Diagnosis Date    Bipolar 1 disorder ([facility] [address] 2011    Cancer ([facility] [address]     skin cancer buttocks    Cancer (HCC)     throat    Chronic pain     FIBROMYALGIA, LEGS    Encounter for long-term (current) use of other medications 2011    Essential hypertension, benign 11/29/2011    Laryngeal cancer ([facility] [address] 11/2/2015    Laryngeal mass     Psychiatric disorder     bipolar      Past Surgical History:   Procedure Laterality Date    HX BREAST AUGMENTATION Bilateral     SALINE IMPLANTS    HX GI      PLACEMENT OF G TUBE    HX GI      ESOPH. DILATATION    HX GYN      tubal pregnancy    HX HEENT      BX OF NECK MASS    HX HYSTERECTOMY      PLACE PERCUT GASTROSTOMY TUBE  10/28/2014     has been removed 2015    PLACE PERCUT GASTROSTOMY TUBE  2/28/2017           Prior to Admission medications    Medication Sig Start Date End Date Taking? Authorizing Provider   HYDROcodone-acetaminophen (NORCO)  mg tablet Take 2 Tabs by mouth every four (4) hours as needed. Max Daily Amount: 12 Tabs. 2/21/17   Ofelia Oglesby MD   albuterol-ipratropium (DUO-NEB) 2.5 mg-0.5 mg/3 ml nebu 3 mL by Nebulization route every four (4) hours as needed. 2/12/17   Yesenia Taylor MD   diphenhydrAMINE 12.5 mg/5 mL elix 40 mL, lidocaine 2 % soln 40 mL, aluminum & magnesium hydroxide-simethicone 400-400-40 mg/5 mL susp 40 mL Take 5 mL by mouth daily. Historical Provider   VITAMIN B-12 1,000 mcg sublingual tablet 1,000 mcg by SubLINGual route daily. 11/11/16   Historical Provider   acetylcysteine (MUCOMYST) 100 mg/mL (10 %) nebulizer solution Take 4 mL by inhalation every four (4) hours. 1/26/17   Ofelia Oglesby MD   amLODIPine (NORVASC) 10 mg tablet Take 1 Tab by mouth daily. 1/24/17   Ofelia Oglesby MD   gabapentin (NEURONTIN) 300 mg capsule Take 2 Caps by mouth three (3) times daily. 1/24/17   Ofelia Oglesby MD   metoprolol tartrate (LOPRESSOR) 25 mg tablet Take 1 Tab by mouth every twelve (12) hours. 1/24/17   Ofelia Oglesby MD   guaiFENesin-dextromethorphan [facility] DM) 600-30 mg per tablet Take 1 Tab by mouth two (2) times a day. 1/24/17   Ofelia Oglesby MD   senna (SENNA) 8.6 mg tablet Take 1 Tab by mouth daily. Historical Provider   lithium carbonate CR (ESKALITH CR) 450 mg CR tablet TAKE 1 TABLET BY ORAL ROUTE PER AT BEDTIME 11/9/16   Historical Provider   FLUoxetine (PROZAC) 20 mg tablet Take 20 mg by mouth daily. 2/5/16   Historical Provider   amitriptyline (ELAVIL) 10 mg tablet Take 1 Tab by mouth nightly. 2/29/16   Dilcia Hernandez MD   traZODone (DESYREL) 100 mg tablet Take 200 mg by mouth nightly. 11/2/15   Historical Provider     No Known Allergies   Social History   Substance Use Topics    Smoking status: Former Smoker     Packs/day: 1.00     Years: 40.00     Quit date: 8/27/2014    Smokeless tobacco: Never Used      Comment: PASSIVE SMOKE EXPOSURE,  SMOKES    Alcohol use No      Family History   Problem Relation Age of Onset    Cancer Father      bone/skin/lung    Anesth Problems Neg Hx         Current Facility-Administered Medications   Medication Dose Route Frequency    pregabalin (LYRICA) capsule 100 mg  100 mg Oral TID    lithium carbonate capsule 300 mg  300 mg Oral BID    ceFAZolin (ANCEF) 1 g in 0.9% sodium chloride (MBP/ADV) 50 mL  1 g IntraVENous Q6H    enoxaparin (LOVENOX) injection 40 mg  40 mg SubCUTAneous Q24H    metoprolol tartrate (LOPRESSOR) tablet 12.5 mg  12.5 mg Oral Q12H    metroNIDAZOLE (FLAGYL) IVPB premix 500 mg  500 mg IntraVENous Q8H    albuterol-ipratropium (DUO-NEB) 2.5 MG-0.5 MG/3 ML  3 mL Nebulization Q6H RT    sodium chloride (NS) flush 5-10 mL  5-10 mL IntraVENous Q8H    0.9% sodium chloride infusion  100 mL/hr IntraVENous CONTINUOUS    levoFLOXacin (LEVAQUIN) 750 mg in D5W IVPB  750 mg IntraVENous Q24H    traZODone (DESYREL) tablet 200 mg  200 mg Oral QHS       Review of Systems:  A comprehensive review of systems was negative except for that written in the HPI.     Objective:   Vital Signs:    Visit Vitals    /70    Pulse 95    Temp 98.3 °F (36.8 °C)    Resp 18    Ht 5' 9\" (1.753 m)    Wt 66.6 kg (146 lb 13.2 oz)    LMP  (LMP Unknown)    SpO2 95%    Breastfeeding No    BMI 21.68 kg/m2       O2 Device: Nasal cannula   O2 Flow Rate (L/min): 2.5 l/min   Temp (24hrs), Av.2 °F (36.8 °C), Min:98.1 °F (36.7 °C), Max:98.4 °F (36.9 °C)       Intake/Output:   Last shift:         Last 3 shifts:  190 -  0700  In: 3713 [I.V.:1900]  Out: 3463 [Urine:3375]    Intake/Output Summary (Last 24 hours) at 17 1531  Last data filed at 17 1343   Gross per 24 hour   Intake             1990 ml   Output             1200 ml   Net              790 ml      Physical Exam:   General:  Alert, cooperative, no distress   Head:  Normocephalic, without obvious abnormality, atraumatic. Eyes:  Conjunctivae/corneas clear. Nose: Nares normal. Septum midline. Mucosa normal.     Throat: Lips, mucosa normal.     Back:   Symmetric, no curvature. ROM normal.   Lungs:   Rales left > right    Heart:  Regular rate and rhythm    Abdomen:   Soft, non-tender. Bowel sounds normal.     Extremities: Extremities normal, atraumatic, no cyanosis . Skin: Skin color, texture, turgor normal. No rashes or lesions   Neurologic: Grossly nonfocal     Data:   Labs:  Recent Labs      17   0603  17   0329  17   0426   WBC  6.8  8.3  11.3*   HGB  9.8*  8.8*  9.2*   HCT  32.0*  29.3*  31.0*   PLT  319  300  288     Recent Labs      17   0603  17   0329  17   0426   NA  144  143  144   K  3.7  4.2  4.0   CL  108  105  106   CO2  29  33*  27   GLU  122*  97  100   BUN  13  12  14   CREA  0.75  0.74  0.83   CA  8.9  9.5  9.8     No results for input(s): PH, PCO2, PO2, HCO3, FIO2 in the last 72 hours.     Imaging:  I have personally reviewed the patients radiographs:  None today        Jarred Morocho MD

## 2017-03-01 NOTE — CONSULTS
Palliative Medicine Consult  Jose Alberto: [phone]    Patient Name: Maciel Fermin  YOB: 1955    Date of Initial Consult: 3/1/2017  Reason for Consult: Pain management  Requesting Provider: Dr. Sharon Brown  Primary Care Physician: Sharon Brown MD      SUMMARY:   Maciel Fermin is a 57 y.o. with a past history of HTN, laryngeal/throat cancer, s/p resection and XRT in 2014, oxygen dependent at at h ome 2 liters n/c, PNA, skin cancer on buttocks, fibromyalgia, chronic pain, bipolar disorder, who was admitted on 2/23/2017 from home with a diagnosis of SOB, fatigue, cough, and dysphagia. Current medical issues leading to Palliative Medicine involvement include: abdominal and overall body pain, fibromyalgia, s/p PEG placement 2/28/17, respiratory distress, dysphagia, and aspiration pneumonia. This is patient 3rd admission in the last 6 months related to respiratory failure. On previous admission Palliative Care was consulted for care goals but patient declined. Patient is currently a FULL code. Patient reports support of her daughter who lives locally and her spouse. Reviewed - Patient has been taking Norco 10/325 for many years prescribed only by Dr. Buddy Nicholson her PCP. PALLIATIVE DIAGNOSES:   1. Laryngeal/ throat cancer s/p resection and XRT in 2014- complains of pain at site since surgery  2. Overall body pain-fibromyalgia  3. Abdominal pain. Had PEG in 2014 has since been removed and replaced on 2/28/17  4. SOB- chronic oxygen dependent at home, was on Bipap for  One day when admitted  5. Cough- non productive  6. Bipolar disorder  7. Chronic opioid usage- Taking Norco 10/325 for many years        PLAN:   1. Met and introduced myself to patient. I did not talk to her about code status or GOC as she DID NOT WANT palliative involved in her care last admission. 2. She tells me her pain is mostly abdominal where they placed the PEG tube yesterday.   She is rating her pain 7-8/10. She says it is constant and is mostly sharp and the pain occasionally shots. She also complains of right sided face pain where she has previous surgery to remove her cancer. She states she has fibromyalgia which cause her to be in constant overall body pain. 3. She takes Lyrica 100 mg twice a day. Norco 10/325 two tablets every 4 hours (and has for years). 4. Recommendation- start dilaudid 2 mg every 4 hours prn pain to be given PEG. Left Norco available as I titrate the correct dosage for her. Increase Lyrica to 100 mg three times a day. 5. Ordered dulcolax suppository prn. She tells me she had a large BM today even though she has not been taking anything by mouth and just had PEG placed. She said she usually has a BM about every 3-4 days. 6. She denies nausea. She is tolerating new PEG tubes well. PEG site is clean and dry. Pain from abdomen is at the insertion site. 7. Initial consult note routed to primary continuity provider  8. Communicated plan of care with: Palliative IDT       GOALS OF CARE / TREATMENT PREFERENCES:   [====Goals of Care====]  GOALS OF CARE:  Patient / health care proxy stated goals:  To get pain under control and go home      TREATMENT PREFERENCES:   Code Status: Full Code    Advance Care Planning:  Advance Care Planning 2/23/2017   Patient's Healthcare Decision Maker is: Legal Next of Agustin 68   Primary Decision Maker Name Erika Alaniz,    Primary Decision Maker Phone Number [phone]   Primary Decision Maker Relationship to Patient Spouse   Secondary Decision Maker Name Devin Ivan, daughter and another daughter Mahnaz Yip Phone Number [phone]   Secondary Decision Maker Relationship to Patient Adult child   Confirm Advance Directive None       Other:    The palliative care team has discussed with patient / health care proxy about goals of care / treatment preferences for patient.  [====Goals of Care====]         HISTORY: History obtained from: chart and patient    CHIEF COMPLAINT: Pain- overall body pain, especially pain from new PEG tube    HPI/SUBJECTIVE:    The patient is:   [x] Verbal and participatory  [] Non-participatory due to:   3/1: Ms. Miles Kirk is a 57year old female admitted for respiratory distress. She is having dysphagia due to her previous laryngeal/throat cancer. This is causing her to have aspiration pneumonia. PEG placed 2/28. She is aware this may not stop aspiration. She says she is missing taking food by mouth. Pain complicated due to her chronic pain issues of fibromyalgia and previous surgery to remove cancer. She tells me that pain in her face from the surgery has never really gone away. Pain in her abdomen from yesterday's PEG is her biggest issue at present. She tells me that old pain medications- [facility] 10/325) is no longer working for her. She is no longer requiring bipap. She is back on her nasal canula although her lungs sound wet and she is having much congestion and non productive cough.     Clinical Pain Assessment (nonverbal scale for severity on nonverbal patients):   [++++ Clinical Pain Assessment++++]  [++++Pain Severity++++]: Pain: 7  [++++Pain Character++++]: sharp/shooting  [++++Pain Duration++++]: since PEG placed 2/28  [++++Pain Effect++++]: makes her fatigued  [++++Pain Factors++++]: new PEG placed  [++++Pain Frequency++++]: constant  [++++Pain Location++++]: abdomen at site of new peg  [++++ Clinical Pain Assessment++++]      [++++ Clinical Pain Assessment++++]  [++++Pain Severity++++]: Pain: 7  [++++Pain Character++++]: sharp, shooting  [++++Pain Duration++++]: for years  [++++Pain Effect++++]: debility  [++++Pain Factors++++]: fibromyalgia  [++++Pain Frequency++++]: constant  [++++Pain Location++++]: all over, mostly back  [++++ Clinical Pain Assessment++++]       FUNCTIONAL ASSESSMENT:     Palliative Performance Scale (PPS):  PPS: 60       PSYCHOSOCIAL/SPIRITUAL SCREENING: Advance Care Planning:  Advance Care Planning 2/23/2017   Patient's Healthcare Decision Maker is: Legal Next of Agustin 68   Primary Decision Maker Name Judy Hassan,    Primary Decision Maker Phone Number [phone]   Primary Decision Maker Relationship to Patient Spouse   Secondary Decision Maker Name Ben Yusuf, daughter and another daughter Rocky Shi Phone Number [phone]   Secondary Decision Maker Relationship to Patient Adult child   Confirm Advance Directive None        Any spiritual / Latter-day concerns:  [] Yes /  [x] No    Caregiver Burnout:  [] Yes /  [] No /  [x] No Caregiver Present      Anticipatory grief assessment:   [x] Normal  / [] Maladaptive       ESAS Anxiety: Anxiety: 1    ESAS Depression: Depression: 1        REVIEW OF SYSTEMS:     Positive and pertinent negative findings in ROS are noted above in HPI. The following systems were [x] reviewed / [] unable to be reviewed as noted in HPI  Other findings are noted below. Systems: constitutional, ears/nose/mouth/throat, respiratory, gastrointestinal, genitourinary, musculoskeletal, integumentary, neurologic, psychiatric, endocrine. Positive findings noted below. Modified ESAS Completed by: provider   Fatigue: 3 Drowsiness: 2   Depression: 1 Pain: 7   Anxiety: 1 Nausea: 0   Anorexia: 3 Dyspnea: 2     Constipation: No     Stool Occurrence(s): 1        PHYSICAL EXAM:     From RN flowsheet:  Wt Readings from Last 3 Encounters:   03/01/17 146 lb 13.2 oz (66.6 kg)   02/21/17 145 lb 9.6 oz (66 kg)   02/06/17 152 lb 9.6 oz (69.2 kg)     Blood pressure 127/70, pulse 95, temperature 98.3 °F (36.8 °C), resp. rate 18, height 5' 9\" (1.753 m), weight 146 lb 13.2 oz (66.6 kg), SpO2 95 %, not currently breastfeeding.     Pain Scale 1: Numeric (0 - 10)  Pain Intensity 1: 0  Pain Onset 1: s/p PEG placement  Pain Location 1: Abdomen  Pain Orientation 1: Mid  Pain Description 1: Aching  Pain Intervention(s) 1: Medication (see MAR)  Last bowel movement, if known: today    Constitutional: Late middle aged female laying in bed with covers up to neck in NAD  Eyes: pupils equal, anicteric  ENMT: no nasal discharge, moist mucous membranes  Cardiovascular: regular rhythm, distal pulses intact  Respiratory: breathing not labored, symmetric, some SOB, PENALOZA, non productive cough, coarse BS throughout and especially in upper airway  Gastrointestinal: soft non-tender, +bowel sounds  Musculoskeletal: no deformity, no tenderness to palpation  Skin: warm, dry  Neurologic: following commands, moving all extremities  Psychiatric: full affect, no hallucinations  Other:       HISTORY:     Active Problems:    Dysphagia (12/30/2014)      History of laryngeal cancer (11/2/2015)      History of radiation to head and neck region (3/30/2016)      Acute respiratory failure ([address] (2/6/2017)      Chronic obstructive pulmonary disease with acute exacerbation ([address] (2/10/2017)      Respiratory failure ([address] (2/23/2017)      Atrial fibrillation with RVR ([address] (2/24/2017)      Past Medical History:   Diagnosis Date    Bipolar 1 disorder ([address] 11/29/2011    Cancer ([address]     skin cancer buttocks    Cancer (HCC)     throat    Chronic pain     FIBROMYALGIA, LEGS    Encounter for long-term (current) use of other medications 11/29/2011    Essential hypertension, benign 11/29/2011    Laryngeal cancer ([address] 11/2/2015    Laryngeal mass     Psychiatric disorder     bipolar      Past Surgical History:   Procedure Laterality Date    HX BREAST AUGMENTATION Bilateral     SALINE IMPLANTS    HX GI      PLACEMENT OF G TUBE    HX GI      ESOPH.  DILATATION    HX GYN      tubal pregnancy    HX HEENT      BX OF NECK MASS    HX HYSTERECTOMY      PLACE PERCUT GASTROSTOMY TUBE  10/28/2014     has been removed 2015    PLACE PERCUT GASTROSTOMY TUBE  2/28/2017           Family History   Problem Relation Age of Onset    Cancer Father      bone/skin/lung    Anesth Problems Neg Hx History reviewed, no pertinent family history.   Social History   Substance Use Topics    Smoking status: Former Smoker     Packs/day: 1.00     Years: 40.00     Quit date: 8/27/2014    Smokeless tobacco: Never Used      Comment: PASSIVE SMOKE EXPOSURE,  SMOKES    Alcohol use No     No Known Allergies   Current Facility-Administered Medications   Medication Dose Route Frequency    HYDROmorphone (DILAUDID) tablet 2 mg  2 mg Oral Q4H PRN    pregabalin (LYRICA) capsule 100 mg  100 mg Oral TID    bisacodyl (DULCOLAX) suppository 10 mg  10 mg Rectal QHS PRN    lithium carbonate capsule 300 mg  300 mg Oral BID    enoxaparin (LOVENOX) injection 40 mg  40 mg SubCUTAneous Q24H    metoprolol tartrate (LOPRESSOR) tablet 12.5 mg  12.5 mg Oral Q12H    sodium chloride (NS) flush 5-10 mL  5-10 mL IntraVENous PRN    albuterol-ipratropium (DUO-NEB) 2.5 MG-0.5 MG/3 ML  3 mL Nebulization Q6H RT    sodium chloride (NS) flush 5-10 mL  5-10 mL IntraVENous Q8H    sodium chloride (NS) flush 5-10 mL  5-10 mL IntraVENous PRN    ondansetron (ZOFRAN) injection 4 mg  4 mg IntraVENous Q4H PRN    0.9% sodium chloride infusion  50 mL/hr IntraVENous CONTINUOUS    artificial saliva (MOUTH KOTE) 1 Spray  1 Spray Oral PRN    HYDROcodone-acetaminophen (NORCO)  mg tablet 2 Tab  2 Tab Oral Q4H PRN    traZODone (DESYREL) tablet 200 mg  200 mg Oral QHS          LAB AND IMAGING FINDINGS:     Lab Results   Component Value Date/Time    WBC 6.8 03/01/2017 06:03 AM    HGB 9.8 03/01/2017 06:03 AM    PLATELET 184 19/26/7306 06:03 AM     Lab Results   Component Value Date/Time    Sodium 144 03/01/2017 06:03 AM    Potassium 3.7 03/01/2017 06:03 AM    Chloride 108 03/01/2017 06:03 AM    CO2 29 03/01/2017 06:03 AM    BUN 13 03/01/2017 06:03 AM    Creatinine 0.75 03/01/2017 06:03 AM    Calcium 8.9 03/01/2017 06:03 AM    Magnesium 2.4 02/06/2017 04:22 AM    Phosphorus 3.3 12/30/2014 12:07 PM      Lab Results   Component Value Date/Time    AST (SGOT) 13 02/23/2017 08:09 AM    Alk. phosphatase 89 02/23/2017 08:09 AM    Protein, total 7.2 02/23/2017 08:09 AM    Albumin 2.7 02/23/2017 08:09 AM    Globulin 4.5 02/23/2017 08:09 AM     No results found for: INR, PTMR, PTP, PT1, PT2, APTT   Lab Results   Component Value Date/Time    Iron 10 02/26/2017 04:28 AM    TIBC 169 02/26/2017 04:28 AM    Iron % saturation 6 02/26/2017 04:28 AM    Ferritin 250 02/26/2017 04:28 AM      Lab Results   Component Value Date/Time    pH 7.32 02/26/2017 07:24 AM    PCO2 61 02/26/2017 07:24 AM    PO2 50 02/26/2017 07:24 AM     No components found for: [place]   Lab Results   Component Value Date/Time    CK 22 02/05/2017 09:34 PM    CK - MB 1.3 02/05/2017 09:34 PM                Total time: 70 minutes  Counseling / coordination time: 50 minutes  > 50% counseling / coordination?: yes    Prolonged service was provided for  []30 min   []75 min in face to face time in the presence of the patient. Time Start:   Time End:   Note: this can only be billed with 75039 (initial) or 64438 (follow up). If multiple start / stop times, list each separately.

## 2017-03-01 NOTE — PROGRESS NOTES
Bedside shift change report given to Georgina Lucas RN (oncoming nurse) by Darcie Samuel RN (offgoing nurse). Report included the following information SBAR, Kardex, MAR and Recent Results.

## 2017-03-01 NOTE — PROGRESS NOTES
General Daily Progress Note    Admit Date: 2/23/2017  Hospital day 6    Subjective:     Patient has cough and dyspnea. .   Medication side effects: cough    Current Facility-Administered Medications   Medication Dose Route Frequency    pregabalin (LYRICA) capsule 100 mg  100 mg Oral BID    lithium carbonate capsule 300 mg  300 mg Oral BID    ceFAZolin (ANCEF) 1 g in 0.9% sodium chloride (MBP/ADV) 50 mL  1 g IntraVENous Q6H    enoxaparin (LOVENOX) injection 40 mg  40 mg SubCUTAneous Q24H    metoprolol tartrate (LOPRESSOR) tablet 12.5 mg  12.5 mg Oral Q12H    sodium chloride (NS) flush 5-10 mL  5-10 mL IntraVENous PRN    metroNIDAZOLE (FLAGYL) IVPB premix 500 mg  500 mg IntraVENous Q8H    albuterol-ipratropium (DUO-NEB) 2.5 MG-0.5 MG/3 ML  3 mL Nebulization Q6H RT    sodium chloride (NS) flush 5-10 mL  5-10 mL IntraVENous Q8H    sodium chloride (NS) flush 5-10 mL  5-10 mL IntraVENous PRN    ondansetron (ZOFRAN) injection 4 mg  4 mg IntraVENous Q4H PRN    0.9% sodium chloride infusion  100 mL/hr IntraVENous CONTINUOUS    levoFLOXacin (LEVAQUIN) 750 mg in D5W IVPB  750 mg IntraVENous Q24H    artificial saliva (MOUTH KOTE) 1 Spray  1 Spray Oral PRN    acetylcysteine (MUCOMYST) 200 mg/mL (20 %) solution 400 mg  2 mL Inhalation TID    HYDROcodone-acetaminophen (NORCO)  mg tablet 2 Tab  2 Tab Oral Q4H PRN    traZODone (DESYREL) tablet 200 mg  200 mg Oral QHS        Review of Systems  Constitutional: positive for fatigue and malaise  Respiratory: positive for cough  Cardiovascular: negative  Gastrointestinal: positive for dysphagia    Objective:     Patient Vitals for the past 8 hrs:   BP Temp Pulse Resp SpO2 Weight   03/01/17 0334 - - - - - 146 lb 13.2 oz (66.6 kg)   02/28/17 2347 (!) 116/94 98.2 °F (36.8 °C) (!) 101 22 95 % -     02/28 1901 - 03/01 0700  In: 260   Out: 600 [Urine:600]  02/27 0701 - 02/28 1900  In: 3070 [P.O.:100;  I.V.:2700]  Out: 4775 [Urine:4775]    Physical Exam:   Visit Vitals    BP (!) 116/94    Pulse (!) 101    Temp 98.2 °F (36.8 °C)    Resp 22    Ht 5' 9\" (1.753 m)    Wt 146 lb 13.2 oz (66.6 kg)    LMP  (LMP Unknown)    SpO2 95%    Breastfeeding No    BMI 21.68 kg/m2     General appearance: alert, fatigued, cooperative, mild distress, appears stated age  Lungs: clear to auscultation bilaterally  Heart: regular rate and rhythm  Abdomen: soft, non-tender. Bowel sounds normal. No masses,  no organomegaly  Extremities: extremities normal, atraumatic, no cyanosis or edema      ECG: normal sinus rhythm     Data Review   Recent Results (from the past 24 hour(s))   CBC WITH AUTOMATED DIFF    Collection Time: 03/01/17  6:03 AM   Result Value Ref Range    WBC 6.8 3.6 - 11.0 K/uL    RBC 3.58 (L) 3.80 - 5.20 M/uL    HGB 9.8 (L) 11.5 - 16.0 g/dL    HCT 32.0 (L) 35.0 - 47.0 %    MCV 89.4 80.0 - 99.0 FL    MCH 27.4 26.0 - 34.0 PG    MCHC 30.6 30.0 - 36.5 g/dL    RDW 17.0 (H) 11.5 - 14.5 %    PLATELET 446 453 - 699 K/uL    NEUTROPHILS 73 32 - 75 %    LYMPHOCYTES 14 12 - 49 %    MONOCYTES 9 5 - 13 %    EOSINOPHILS 4 0 - 7 %    BASOPHILS 0 0 - 1 %    ABS. NEUTROPHILS 5.0 1.8 - 8.0 K/UL    ABS. LYMPHOCYTES 0.9 0.8 - 3.5 K/UL    ABS. MONOCYTES 0.6 0.0 - 1.0 K/UL    ABS. EOSINOPHILS 0.3 0.0 - 0.4 K/UL    ABS. BASOPHILS 0.0 0.0 - 0.1 K/UL           Assessment:     Active Problems:    Acute respiratory failure (HCC) (2/6/2017)      Chronic obstructive pulmonary disease with acute exacerbation ([facility] (2/10/2017)      Dysphagia (12/30/2014)      History of laryngeal cancer (11/2/2015)      History of radiation to head and neck region (3/30/2016)      Respiratory failure ([facility] (2/23/2017)      Atrial fibrillation with RVR ([facility] (2/24/2017)        Plan: Tolerated PEG ok. Resp failure slowly improving. Will ask Palliative to assist with Pain control

## 2017-03-02 PROBLEM — T17.908A CHRONIC PULMONARY ASPIRATION: Status: ACTIVE | Noted: 2017-01-01

## 2017-03-02 NOTE — PROGRESS NOTES
Consults for [facility] [place] and Nutrition needs at home acknowledged. Patient lives with her  but is home alone while he is at work. Daughter lives nearby and can be available as needed. Pt states \"I have a Life Alert\". Patient is clear that her wishes are to be discharged home. Patient stated \"that Dr. Chichi Cadet was going to find someone to stay with her for a week\". I explained to patient that [facility] [place] is an option but that they would only come in a few times a week to provide care and only stay for about one hour. Patient verbalized understanding. Home Health to be provided by [facility] confirmed via ecin. Anticipated d/c date is 3/2/2017. Will provide skilled nursing and education regarding tube feeds. Rich sent referral via ecin for [facility] tube feeds. PT/OT orders in system. CM called and left message on voicemail for PT requesting early visit on 3/3/2017 due to anticipated discharge. Patient has been using cane at home and using walker in facility this admission. Evaluate for at home equipment needs. CM will continue to follow.     Laura Victor RN CM  Ext [phone]

## 2017-03-02 NOTE — PROGRESS NOTES
Cm reviewed tube feeds with Lee Evans RN. Patient is presently at goal and will transition to bolus prior to discharge. Patient has had previous experience with tube feeds. PCU nursing staff will reinforce teaching prior to discharge. [facility] can provide start of care on 3/4/2017.     [facility]  Ext [phone]

## 2017-03-02 NOTE — PROGRESS NOTES
No response from [address]. CM called [phone] to follow up. Spoke with Lay in Enteral.  They are presently reviewing and she will call to update CM.    [address] [place] [address] cannot provide service due to insurance. Referral submitted to [facility] via [employer]. [facility]. Cm will continue to follow.     Eliu Hunter RN CM  Ext [phone]

## 2017-03-02 NOTE — PROGRESS NOTES
General Daily Progress Note    Admit Date: 2/23/2017  Hospital day 7    Subjective:     Patient has  Improving pain c/o,less dyspnea. .   Medication side effects: none    Current Facility-Administered Medications   Medication Dose Route Frequency    HYDROmorphone (DILAUDID) tablet 2 mg  2 mg Oral Q4H PRN    pregabalin (LYRICA) capsule 100 mg  100 mg Oral TID    bisacodyl (DULCOLAX) suppository 10 mg  10 mg Rectal QHS PRN    lithium carbonate capsule 300 mg  300 mg Oral BID    enoxaparin (LOVENOX) injection 40 mg  40 mg SubCUTAneous Q24H    metoprolol tartrate (LOPRESSOR) tablet 12.5 mg  12.5 mg Oral Q12H    sodium chloride (NS) flush 5-10 mL  5-10 mL IntraVENous PRN    albuterol-ipratropium (DUO-NEB) 2.5 MG-0.5 MG/3 ML  3 mL Nebulization Q6H RT    sodium chloride (NS) flush 5-10 mL  5-10 mL IntraVENous Q8H    sodium chloride (NS) flush 5-10 mL  5-10 mL IntraVENous PRN    ondansetron (ZOFRAN) injection 4 mg  4 mg IntraVENous Q4H PRN    0.9% sodium chloride infusion  50 mL/hr IntraVENous CONTINUOUS    artificial saliva (MOUTH KOTE) 1 Spray  1 Spray Oral PRN    HYDROcodone-acetaminophen (NORCO)  mg tablet 2 Tab  2 Tab Oral Q4H PRN    traZODone (DESYREL) tablet 200 mg  200 mg Oral QHS        Review of Systems  Constitutional: positive for fatigue  Respiratory: positive for cough or dyspnea on exertion  Cardiovascular: negative  Gastrointestinal: positive for abdominal pain    Objective:     Patient Vitals for the past 8 hrs:   BP Temp Pulse Resp SpO2   03/02/17 0345 153/83 98.1 °F (36.7 °C) 98 16 93 %   03/02/17 0116 139/79 98.3 °F (36.8 °C) 94 17 97 %     03/01 1901 - 03/02 0700  In: 260   Out: 1200 [Urine:1200]  02/28 0701 - 03/01 1900  In: 5804 [I.V.:1900]  Out: 1450 [Urine:1450]    Physical Exam:   Visit Vitals    /83    Pulse 98    Temp 98.1 °F (36.7 °C)    Resp 16    Ht 5' 9\" (1.753 m)    Wt 146 lb 13.2 oz (66.6 kg)    LMP  (LMP Unknown)    SpO2 93%    Breastfeeding No    BMI 21.68 kg/m2     General appearance: alert, fatigued, cooperative, no distress, appears stated age  Lungs: clear to auscultation bilaterally, rhonchi R base, L base  Heart: regular rate and rhythm, S1, S2 normal, no murmur, click, rub or gallop  Abdomen: soft, non-tender. Bowel sounds normal. No masses,  no organomegaly      ECG: normal sinus rhythm     Data Review   Recent Results (from the past 24 hour(s))   METABOLIC PANEL, BASIC    Collection Time: 03/02/17  3:55 AM   Result Value Ref Range    Sodium 141 136 - 145 mmol/L    Potassium 4.2 3.5 - 5.1 mmol/L    Chloride 105 97 - 108 mmol/L    CO2 30 21 - 32 mmol/L    Anion gap 6 5 - 15 mmol/L    Glucose 81 65 - 100 mg/dL    BUN 16 6 - 20 MG/DL    Creatinine 0.81 0.55 - 1.02 MG/DL    BUN/Creatinine ratio 20 12 - 20      GFR est AA >60 >60 ml/min/1.73m2    GFR est non-AA >60 >60 ml/min/1.73m2    Calcium 9.5 8.5 - 10.1 MG/DL           Assessment:     Active Problems:    Acute respiratory failure (HCC) (2/6/2017)      Chronic obstructive pulmonary disease with acute exacerbation (HCC) (2/10/2017)      Dysphagia (12/30/2014)      History of laryngeal cancer (11/2/2015)      History of radiation to head and neck region (3/30/2016)      Respiratory failure (HCC) (2/23/2017)      Atrial fibrillation with RVR (HCC) (2/24/2017)        Plan:     Feeling better,will try soft diet,to avoid  Thin liquids. Palliative Care help appreciated. need advice on long term maintaince pain meds for cancer pain. Pt seems obsessed with pain control,and requires higher doses than anticipated.

## 2017-03-02 NOTE — PROGRESS NOTES
Cm acknowledged discharge planning consult. CM met with patient. Discharge needs:  PT/OT evaluation for equipment at home. Patient has a cane but is using a walker on site. Patient has oxygen at home. Vendor unknown. HH orders, Nutrition orders for tube feeds. Landry oDs Santos RN confirmed with MD for orders to be initiated. Rn will enter PT/OT orders. Cm will continue to follow.     Christos Palomo RN CM  Ext [phone]

## 2017-03-02 NOTE — PROGRESS NOTES
Bedside shift change report given to Wilfrido Ordoñez (oncoming nurse) by Rhonda Noguera RN (offgoing nurse). Report included the following information SBAR, Procedure Summary, Intake/Output, MAR, Recent Results and Cardiac Rhythm Lines.

## 2017-03-02 NOTE — PROGRESS NOTES
[facility]  Pulmonary, Critical Care, and Sleep Medicine    Name: George Shaw MRN: [id number]   : 1955 Hospital: [facility]   Date: 3/2/2017        IMPRESSION:   · Acute/chronic hypoxic respiratory failure has home O2  · [facility]-associated pneumonia, may be aspiration as well; pt has been on multiple antibiotics for about 5 days; i think she has completed enough abx  · Dysphagia for liquids  · S/p PEG for hydration  · Sepsis- resolved  · H/O laryngeal cancer  · Bipolar disorder      RECOMMENDATIONS:   · O2 at baseline  · bronchodilators  · D/c abx  · Would recommend new baseline CXR in couple of months and hope there has been some resolution   · Nothing to add, will sign off, glad she is better  · Aspiration precautions  · DVT prophylaxis     Subjective:     3/2 Feels better. Has home O2. No fever. Explained to her that CXR may take awhile to improve but she will likely have some scarring. Need to protect lung from further aspiration, injury. NO SOB    3/1/17 Stomach sore. No SOB. Sputum still thick. Some blood in sputum. No nausea. No fever. Nasal O2 2 LPM    - resting in bed, sats stable on 4LPM, states breathing is about the same. C/o congested cough productive of moderate amount yellow sputum. No cp or wheezing. Dyspnea with exertion. PEG to be placed today.  events of weekend noted. Now on Nasal O2. Mucopurulent sputum. Milks helps per pt. No fever. Agrees to PEG  17: no events. Off BiPAP. Still short of breath. She says she can only cough up sputum when she drinks milk. .. This patient has been seen and evaluated at the request of Dr. Tani Kaur for respiratory failure. Patient is a 57 y.o. female with h/o throat cancer and chronic hypoxemia (?COPD), recently admitted with severe community-acquired pneumonia, presented this morning with severe hypoxia and dyspnea which was acute in onset.   She was markedly dyspneic on arrival and was started on BiPAP. She is feeling a lot better now with dyspnea close to her baseline. Her main complaint is of dry mouth from the BiPAP. Past Medical History:   Diagnosis Date    Bipolar 1 disorder ([facility] 11/29/2011    Cancer ([facility] [facility]     skin cancer buttocks    Cancer (HCC)     throat    Chronic pain     FIBROMYALGIA, LEGS    Encounter for long-term (current) use of other medications 11/29/2011    Essential hypertension, benign 11/29/2011    Laryngeal cancer ([facility] [address] 11/2/2015    Laryngeal mass     Psychiatric disorder     bipolar      Past Surgical History:   Procedure Laterality Date    HX BREAST AUGMENTATION Bilateral     SALINE IMPLANTS    HX GI      PLACEMENT OF G TUBE    HX GI      ESOPH. DILATATION    HX GYN      tubal pregnancy    HX HEENT      BX OF NECK MASS    HX HYSTERECTOMY      PLACE PERCUT GASTROSTOMY TUBE  10/28/2014     has been removed 2015    PLACE PERCUT GASTROSTOMY TUBE  2/28/2017           Prior to Admission medications    Medication Sig Start Date End Date Taking? Authorizing Provider   HYDROcodone-acetaminophen (NORCO)  mg tablet Take 2 Tabs by mouth every four (4) hours as needed. Max Daily Amount: 12 Tabs. 2/21/17   Wolfgang Martinez MD   albuterol-ipratropium (DUO-NEB) 2.5 mg-0.5 mg/3 ml nebu 3 mL by Nebulization route every four (4) hours as needed. 2/12/17   Robert Brennan MD   diphenhydrAMINE 12.5 mg/5 mL elix 40 mL, lidocaine 2 % soln 40 mL, aluminum & magnesium hydroxide-simethicone 400-400-40 mg/5 mL susp 40 mL Take 5 mL by mouth daily. Historical Provider   VITAMIN B-12 1,000 mcg sublingual tablet 1,000 mcg by SubLINGual route daily. 11/11/16   Historical Provider   acetylcysteine (MUCOMYST) 100 mg/mL (10 %) nebulizer solution Take 4 mL by inhalation every four (4) hours. 1/26/17   Wolfgang Martinez MD   amLODIPine (NORVASC) 10 mg tablet Take 1 Tab by mouth daily.  1/24/17   Wolfgang Martinez MD   gabapentin (NEURONTIN) 300 mg capsule Take 2 Caps by mouth three (3) times daily. 1/24/17   Anabelle Fermin MD   metoprolol tartrate (LOPRESSOR) 25 mg tablet Take 1 Tab by mouth every twelve (12) hours. 1/24/17   Anabelle Fermin MD   guaiFENesin-dextromethorphan [facility] DM) 600-30 mg per tablet Take 1 Tab by mouth two (2) times a day. 1/24/17   Anabelle Fermin MD   senna (SENNA) 8.6 mg tablet Take 1 Tab by mouth daily. Historical Provider   lithium carbonate CR (ESKALITH CR) 450 mg CR tablet TAKE 1 TABLET BY ORAL ROUTE PER AT BEDTIME 11/9/16   Historical Provider   FLUoxetine (PROZAC) 20 mg tablet Take 20 mg by mouth daily. 2/5/16   Historical Provider   amitriptyline (ELAVIL) 10 mg tablet Take 1 Tab by mouth nightly. 2/29/16   Anabelle Fermin MD   traZODone (DESYREL) 100 mg tablet Take 200 mg by mouth nightly. 11/2/15   Historical Provider     No Known Allergies   Social History   Substance Use Topics    Smoking status: Former Smoker     Packs/day: 1.00     Years: 40.00     Quit date: 8/27/2014    Smokeless tobacco: Never Used      Comment: PASSIVE SMOKE EXPOSURE,  SMOKES    Alcohol use No      Family History   Problem Relation Age of Onset    Cancer Father      bone/skin/lung    Anesth Problems Neg Hx         Current Facility-Administered Medications   Medication Dose Route Frequency    pregabalin (LYRICA) capsule 100 mg  100 mg Oral TID    lithium carbonate capsule 300 mg  300 mg Oral BID    enoxaparin (LOVENOX) injection 40 mg  40 mg SubCUTAneous Q24H    metoprolol tartrate (LOPRESSOR) tablet 12.5 mg  12.5 mg Oral Q12H    albuterol-ipratropium (DUO-NEB) 2.5 MG-0.5 MG/3 ML  3 mL Nebulization Q6H RT    sodium chloride (NS) flush 5-10 mL  5-10 mL IntraVENous Q8H    0.9% sodium chloride infusion  50 mL/hr IntraVENous CONTINUOUS    traZODone (DESYREL) tablet 200 mg  200 mg Oral QHS       Review of Systems:  A comprehensive review of systems was negative except for that written in the HPI.     Objective:   Vital Signs: Visit Vitals    BP (!) 144/92    Pulse (!) 102    Temp 98.1 °F (36.7 °C)    Resp 16    Ht 5' 9\" (1.753 m)    Wt 66.6 kg (146 lb 13.2 oz)    LMP  (LMP Unknown)    SpO2 93%    Breastfeeding No    BMI 21.68 kg/m2       O2 Device: Nasal cannula   O2 Flow Rate (L/min): 2 l/min   Temp (24hrs), Av.5 °F (36.9 °C), Min:98.1 °F (36.7 °C), Max:99.3 °F (37.4 °C)       Intake/Output:   Last shift:      701 - 1900  In: 180   Out: 300 [Urine:300]  Last 3 shifts: 1901 -  07  In: 520   Out: 0 [Urine:]    Intake/Output Summary (Last 24 hours) at 17 1017  Last data filed at 17 0830   Gross per 24 hour   Intake              440 ml   Output             1750 ml   Net            -1310 ml      Physical Exam:   General:  Alert, cooperative, no distress   Head:  Normocephalic, without obvious abnormality, atraumatic. Eyes:  Conjunctivae/corneas clear. Nose: Nares normal. Septum midline. Mucosa normal.     Throat: Lips, mucosa normal.     Back:   Symmetric, no curvature. ROM normal.   Lungs:   Rales left > right    Heart:  Regular rate and rhythm    Abdomen:   Soft, non-tender. Bowel sounds normal.     Extremities: Extremities normal, atraumatic, no cyanosis . Skin: Skin color, texture, turgor normal. No rashes or lesions   Neurologic: Grossly nonfocal     Data:   Labs:  Recent Labs      17   0355  17   0603  17   0329   WBC  6.8  6.8  8.3   HGB  10.0*  9.8*  8.8*   HCT  32.7*  32.0*  29.3*   PLT  351  319  300     Recent Labs      17   0355  17   0603  17   0329   NA  141  144  143   K  4.2  3.7  4.2   CL  105  108  105   CO2  30  29  33*   GLU  81  122*  97   BUN  16  13  12   CREA  0.81  0.75  0.74   CA  9.5  8.9  9.5     No results for input(s): PH, PCO2, PO2, HCO3, FIO2 in the last 72 hours.     Imaging:  I have personally reviewed the patients radiographs:  None today        Jennifer Nielsen MD

## 2017-03-02 NOTE — CONSULTS
Palliative Medicine Consult  Jose Alberto: [phone]    Patient Name: Sofy Gallegos  YOB: 1955    Date of Initial Consult: 3/1/2017  Reason for Consult: Pain management  Requesting Provider: Dr. Sveta Saavedra  Primary Care Physician: Sveta Saavedra MD      SUMMARY:   Sofy Gallegos is a 57 y.o. with a past history of HTN, laryngeal/throat cancer, s/p resection and XRT in 2014, oxygen dependent at at h ome 2 liters n/c, PNA, skin cancer on buttocks, fibromyalgia, chronic pain, bipolar disorder, who was admitted on 2/23/2017 from home with a diagnosis of SOB, fatigue, cough, and dysphagia. Current medical issues leading to Palliative Medicine involvement include: abdominal and overall body pain, fibromyalgia, s/p PEG placement 2/28/17, respiratory distress, dysphagia, and aspiration pneumonia. This is patient 3rd admission in the last 6 months related to respiratory failure. On previous admission Palliative Care was consulted for care goals but patient declined. Patient is currently a FULL code. Patient reports support of her daughter who lives locally and her spouse. Reviewed - Patient has been taking Norco 10/325 for many years prescribed only by Dr. Stephanie Genao her PCP. PALLIATIVE DIAGNOSES:   1. Laryngeal/ throat cancer s/p resection and XRT in 2014- complains of pain at site since surgery  2. Overall body pain-fibromyalgia  3. Abdominal pain. Had PEG in 2014 has since been removed and replaced on 2/28/17  4. SOB- chronic oxygen dependent at home, was on Bipap for  One day when admitted  5. Cough- non productive  6. Bipolar disorder  7. Chronic opioid usage- Taking Norco 10/325 for many years        PLAN:   1. Patient asleep when I entered room. Had to say her name multiple times and rub her arm to get her to wake. We talked about her pain which she tells me is a 4/10. Pain mostly in stomach area from PEG and right side of face where she had surgery in 2014.  She used 7 doses of  Norco and 5 doses of dilaudid in the last 24 hours. She tells me she needs the Ivis Barrs for her \"addiction\" and it is not really helping her pain. She says the dilaudid is working ok. We talked about her \"addiction\". She tells me she has started taking Ivis Barrs over 2 years ago after her diagnosis and feels mentally that she \"needs it\". I explained that I think we need to stop the Ivis Barrs and use the dilaudid for pain. We compromised with 4 mg dilaudid every 4 hours PRN. I explained that her stomach pain where PEG was placed should be getting less painful as it has been 2 days since it was placed and it does not look red or swollen. She takes Lyrica 100 mg twice a day. Norco 10/325 two tablets every 4 hours (and has for years). 2. Dilaudid 4 mg every 4 hours prn pain to be given PEG. Discontinued Norco.  Increased Lyrica to 100 mg three times a day yesterday- this will take a few days to take effect. 3. Dulcolax suppository prn. She said she usually has a BM about every 3-4 days. 4. She denies nausea. She is tolerating new PEG tubes well. PEG site is clean and dry. Pain from abdomen is at the insertion site. 5. Initial consult note routed to primary continuity provider  6. Communicated plan of care with: Palliative IDT       GOALS OF CARE / TREATMENT PREFERENCES:   [====Goals of Care====]  GOALS OF CARE:  Patient / health care proxy stated goals:  To get pain under control and go home      TREATMENT PREFERENCES:   Code Status: Full Code    Advance Care Planning:  Advance Care Planning 2/23/2017   Patient's Healthcare Decision Maker is: Legal Next of Agust[address] 68   Primary Decision Maker Name Jaye Kennedy,    Primary Decision Maker Phone Number [phone]   Primary Decision Maker Relationship to Patient Spouse   Secondary Decision Maker Name Valery Friedman, daughter and another daughter Cem Lew Phone Number [phone]   Secondary Decision Maker Relationship to Patient Adult child   Confirm Advance Directive None       Other:    The palliative care team has discussed with patient / health care proxy about goals of care / treatment preferences for patient.  [====Goals of Care====]         HISTORY:     History obtained from: chart and patient    CHIEF COMPLAINT: Pain- overall body pain, especially pain from new PEG tube and right side of face from previous surgery    HPI/SUBJECTIVE:    The patient is:   [x] Verbal and participatory  [] Non-participatory due to:   3/1: Ms. Darlyn Gant is a 57year old female admitted for respiratory distress. She is having dysphagia due to her previous laryngeal/throat cancer. This is causing her to have aspiration pneumonia. PEG placed 2/28. She is aware this may not stop aspiration. She says she is missing taking food by mouth. Pain complicated due to her chronic pain issues of fibromyalgia and previous surgery to remove cancer. She tells me that pain in her face from the surgery has never really gone away. Pain in her abdomen from yesterday's PEG is her biggest issue at present. She tells me that old pain medications- [facility] 10/325) is no longer working for her. She is no longer requiring bipap. She is back on her nasal canula although her lungs sound wet and she is having much congestion and non productive cough. 3/2: Tolerating tube feeds well. Is able to eat a small amount of food. Pain better but still complaining of pain despite 70 mg of Hydrocodone and 10 mg of dilaudid in last 24 hours.     Clinical Pain Assessment (nonverbal scale for severity on nonverbal patients):   [++++ Clinical Pain Assessment++++]  [++++Pain Severity++++]: Pain: 4  [++++Pain Character++++]: sharp/shooting  [++++Pain Duration++++]: since PEG placed 2/28  [++++Pain Effect++++]: makes her fatigued  [++++Pain Factors++++]: new PEG placed  [++++Pain Frequency++++]: constant  [++++Pain Location++++]: abdomen at site of new peg  [++++ Clinical Pain Assessment++++]      [++++ Clinical Pain Assessment++++]  [++++Pain Severity++++]: Pain: 4  [++++Pain Character++++]: sharp, shooting  [++++Pain Duration++++]: for years  [++++Pain Effect++++]: debility  [++++Pain Factors++++]: fibromyalgia  [++++Pain Frequency++++]: constant  [++++Pain Location++++]: all over, mostly back  [++++ Clinical Pain Assessment++++]       FUNCTIONAL ASSESSMENT:     Palliative Performance Scale (PPS):  PPS: 60       PSYCHOSOCIAL/SPIRITUAL SCREENING:     Advance Care Planning:  Advance Care Planning 2/23/2017   Patient's Healthcare Decision Maker is: Legal Next of Agust[address] 68   Primary Decision Maker Name Lisa Silverio,    Primary Decision Maker Phone Number [phone]   Primary Decision Maker Relationship to Patient Spouse   Secondary Decision Maker Name Wilfredo Millan, daughter and another daughter Amor Quiroz Phone Number [phone]   Secondary Decision Maker Relationship to Patient Adult child   Confirm Advance Directive None        Any spiritual / Evangelical concerns:  [] Yes /  [x] No    Caregiver Burnout:  [] Yes /  [] No /  [x] No Caregiver Present      Anticipatory grief assessment:   [x] Normal  / [] Maladaptive       ESAS Anxiety: Anxiety: 1    ESAS Depression: Depression: 1        REVIEW OF SYSTEMS:     Positive and pertinent negative findings in ROS are noted above in HPI. The following systems were [x] reviewed / [] unable to be reviewed as noted in HPI  Other findings are noted below. Systems: constitutional, ears/nose/mouth/throat, respiratory, gastrointestinal, genitourinary, musculoskeletal, integumentary, neurologic, psychiatric, endocrine. Positive findings noted below.   Modified ESAS Completed by: provider   Fatigue: 4 Drowsiness: 4   Depression: 1 Pain: 4   Anxiety: 1 Nausea: 0   Anorexia: 3 Dyspnea: 1     Constipation: No     Stool Occurrence(s): 1        PHYSICAL EXAM:     From RN flowsheet:  Wt Readings from Last 3 Encounters:   03/01/17 146 lb 13.2 oz (66.6 kg)   02/21/17 145 lb 9.6 oz (66 kg)   02/06/17 152 lb 9.6 oz (69.2 kg)     Blood pressure (!) 144/92, pulse (!) 102, temperature 98.1 °F (36.7 °C), resp. rate 16, height 5' 9\" (1.753 m), weight 146 lb 13.2 oz (66.6 kg), SpO2 93 %, not currently breastfeeding.     Pain Scale 1: Numeric (0 - 10)  Pain Intensity 1: 8  Pain Onset 1: s/p PEG placement  Pain Location 1: Abdomen, Generalized  Pain Orientation 1: Mid  Pain Description 1: Aching, Burning, Sore, Sharp, Shooting  Pain Intervention(s) 1: Medication (see MAR)  Last bowel movement, if known: today    Constitutional: Late middle aged female laying in bed, asleep, easy to arouse  Eyes: pupils equal, anicteric  ENMT: no nasal discharge, moist mucous membranes  Cardiovascular: regular rhythm, distal pulses intact  Respiratory: breathing not labored, symmetric, some SOB, PENALOZA, non productive cough, coarse BS throughout and especially in upper airway  Gastrointestinal: soft non-tender, +bowel sounds  Musculoskeletal: no deformity, no tenderness to palpation  Skin: warm, dry  Neurologic: following commands, moving all extremities  Psychiatric: full affect, no hallucinations  Other:       HISTORY:     Active Problems:    Dysphagia (12/30/2014)      History of laryngeal cancer (11/2/2015)      History of radiation to head and neck region (3/30/2016)      Acute respiratory failure (HCC) (2/6/2017)      Chronic obstructive pulmonary disease with acute exacerbation (HCC) (2/10/2017)      Respiratory failure (HCC) (2/23/2017)      Atrial fibrillation with RVR ([facility] (2/24/2017)      Past Medical History:   Diagnosis Date    Bipolar 1 disorder ([address] 11/29/2011    Cancer ([address]     skin cancer buttocks    Cancer (HCC)     throat    Chronic pain     FIBROMYALGIA, LEGS    Encounter for long-term (current) use of other medications 11/29/2011    Essential hypertension, benign 11/29/2011    Laryngeal cancer ([address] 11/2/2015    Laryngeal mass     Psychiatric disorder     bipolar      Past Surgical History:   Procedure Laterality Date    HX BREAST AUGMENTATION Bilateral     SALINE IMPLANTS    HX GI      PLACEMENT OF G TUBE    HX GI      ESOPH. DILATATION    HX GYN      tubal pregnancy    HX HEENT      BX OF NECK MASS    HX HYSTERECTOMY      PLACE PERCUT GASTROSTOMY TUBE  10/28/2014     has been removed 2015    PLACE PERCUT GASTROSTOMY TUBE  2/28/2017           Family History   Problem Relation Age of Onset    Cancer Father      bone/skin/lung    Anesth Problems Neg Hx       History reviewed, no pertinent family history.   Social History   Substance Use Topics    Smoking status: Former Smoker     Packs/day: 1.00     Years: 40.00     Quit date: 8/27/2014    Smokeless tobacco: Never Used      Comment: PASSIVE SMOKE EXPOSURE,  SMOKES    Alcohol use No     No Known Allergies   Current Facility-Administered Medications   Medication Dose Route Frequency    HYDROmorphone (DILAUDID) tablet 4 mg  4 mg Oral Q4H PRN    pregabalin (LYRICA) capsule 100 mg  100 mg Oral TID    bisacodyl (DULCOLAX) suppository 10 mg  10 mg Rectal QHS PRN    lithium carbonate capsule 300 mg  300 mg Oral BID    enoxaparin (LOVENOX) injection 40 mg  40 mg SubCUTAneous Q24H    metoprolol tartrate (LOPRESSOR) tablet 12.5 mg  12.5 mg Oral Q12H    sodium chloride (NS) flush 5-10 mL  5-10 mL IntraVENous PRN    albuterol-ipratropium (DUO-NEB) 2.5 MG-0.5 MG/3 ML  3 mL Nebulization Q6H RT    sodium chloride (NS) flush 5-10 mL  5-10 mL IntraVENous Q8H    sodium chloride (NS) flush 5-10 mL  5-10 mL IntraVENous PRN    ondansetron (ZOFRAN) injection 4 mg  4 mg IntraVENous Q4H PRN    0.9% sodium chloride infusion  50 mL/hr IntraVENous CONTINUOUS    artificial saliva (MOUTH KOTE) 1 Spray  1 Spray Oral PRN    traZODone (DESYREL) tablet 200 mg  200 mg Oral QHS          LAB AND IMAGING FINDINGS:     Lab Results   Component Value Date/Time    WBC 6.8 03/02/2017 03:55 AM    HGB 10.0 03/02/2017 03:55 AM    PLATELET 706 88/47/3377 03:55 AM     Lab Results   Component Value Date/Time    Sodium 141 03/02/2017 03:55 AM    Potassium 4.2 03/02/2017 03:55 AM    Chloride 105 03/02/2017 03:55 AM    CO2 30 03/02/2017 03:55 AM    BUN 16 03/02/2017 03:55 AM    Creatinine 0.81 03/02/2017 03:55 AM    Calcium 9.5 03/02/2017 03:55 AM    Magnesium 2.4 02/06/2017 04:22 AM    Phosphorus 3.3 12/30/2014 12:07 PM      Lab Results   Component Value Date/Time    AST (SGOT) 13 02/23/2017 08:09 AM    Alk. phosphatase 89 02/23/2017 08:09 AM    Protein, total 7.2 02/23/2017 08:09 AM    Albumin 2.7 02/23/2017 08:09 AM    Globulin 4.5 02/23/2017 08:09 AM     No results found for: INR, PTMR, PTP, PT1, PT2, APTT   Lab Results   Component Value Date/Time    Iron 10 02/26/2017 04:28 AM    TIBC 169 02/26/2017 04:28 AM    Iron % saturation 6 02/26/2017 04:28 AM    Ferritin 250 02/26/2017 04:28 AM      Lab Results   Component Value Date/Time    pH 7.32 02/26/2017 07:24 AM    PCO2 61 02/26/2017 07:24 AM    PO2 50 02/26/2017 07:24 AM     No components found for: [place]   Lab Results   Component Value Date/Time    CK 22 02/05/2017 09:34 PM    CK - MB 1.3 02/05/2017 09:34 PM                Total time: 60 minutes  Counseling / coordination time: 40 minutes  > 50% counseling / coordination?: yes    Prolonged service was provided for  []30 min   []75 min in face to face time in the presence of the patient. Time Start: 1100  Time End: 1200  Note: this can only be billed with  (initial) or 21 [phone] (follow up). If multiple start / stop times, list each separately.

## 2017-03-03 NOTE — PROGRESS NOTES
Pt hypotensive. MD paged. 1:10 AM  Spoke with Dr. Sp Kay. No new orders received. Repositioned pt and rechecked bp, now more normalized as charted. Will continue to monitor.

## 2017-03-03 NOTE — PROGRESS NOTES
Occupational Therapy EVALUATION/discharge  Patient: Krishna Frankel (34 y.o. female)  Date: 3/3/2017  Primary Diagnosis: Respiratory failure ([address]  recurrent aspiration  Procedure(s) (LRB):  ESOPHAGOGASTRODUODENOSCOPY (EGD) (N/A)  PERCUTANEOUS ENDOSCOPIC GASTROSTOMY TUBE INSERTION (N/A) 3 Days Post-Op   Precautions: Fall       ASSESSMENT:   Based on the objective data described below, the patient presents at baseline for basic ADL tasks. She lives at home with  who works during the day and daughter is around the corner and works from home. She was independent in ADL tasks without AD and on 2 L O2. Provided brief education on bathroom safety including environmental set up, energy conservation and sequencing with good understanding. She demonstrated ability to complete LB dressing, toileting and grooming at sink with independent- mod independent with grab bar for toileting. Further skilled acute occupational therapy is not indicated at this time. Patient agreeable to therapy  Discharge Recommendations: None  Further Equipment Recommendations for Discharge: none for OT , see PT for mobility DME     SUBJECTIVE:   Patient stated I ready to go back home.     OBJECTIVE DATA SUMMARY:   HISTORY:   Past Medical History:   Diagnosis Date    Bipolar 1 disorder ([address] 11/29/2011    Cancer ([facility]     skin cancer buttocks    Cancer (HCC)     throat    Chronic pain     FIBROMYALGIA, LEGS    Encounter for long-term (current) use of other medications 11/29/2011    Essential hypertension, benign 11/29/2011    Laryngeal cancer ([address] 11/2/2015    Laryngeal mass     Psychiatric disorder     bipolar     Past Surgical History:   Procedure Laterality Date    HX BREAST AUGMENTATION Bilateral     SALINE IMPLANTS    HX GI      PLACEMENT OF G TUBE    HX GI      ESOPH.  DILATATION    HX GYN      tubal pregnancy    HX HEENT      BX OF NECK MASS    HX HYSTERECTOMY      PLACE PERCUT GASTROSTOMY TUBE  10/28/2014     has been removed 2015    PLACE PERCUT GASTROSTOMY TUBE  2/28/2017            Prior Level of Function/Home Situation: Home with , alone during day. Daughter close by and works from home. Expanded or extensive additional review of patient history: BiPolar, larygeal ca, PEG  Home Situation  Home Environment: Private residence  # Steps to Enter: 4  One/Two Story Residence: One story  Living Alone: No  Support Systems: Spouse/Significant Other/Partner  Patient Expects to be Discharged to[de-identified] Private residence  Current DME Used/Available at Home: Oxygen, portable  Tub or Shower Type: Tub/Shower combination  [x]  Right hand dominant   []  Left hand dominant    EXAMINATION OF PERFORMANCE DEFICITS:  Cognitive/Behavioral Status:  Neurologic State: Alert  Orientation Level: Oriented X4           Safety/Judgement: Decreased awareness of environment (baseline)  Skin: uppers intact  Edema: uppers none    Vision/Perceptual:                           Acuity: Within Defined Limits;Able to read clock/calendar on wall without difficulty    Corrective Lenses: Reading glasses  Range of Motion:  BUE WDL           Strength:  BUE WDL     Coordination:     Fine Motor Skills-Upper: Left Intact; Right Intact    Gross Motor Skills-Upper: Left Intact; Right Intact  Tone & Sensation:  Tone: intact normal  Sensation: intact to touch normal             Balance:  Sitting: Intact  Standing: Intact (brief standing ADLs)    Functional Mobility and Transfers for ADLs:  Bed Mobility:  Rolling: Independent  Supine to Sit: Independent  Scooting: Independent    Transfers:  Sit to Stand: Modified independent  Stand to Sit: Modified independent  Toilet Transfer : Modified independent    ADL Assessment:  Feeding: Modified independent  Oral Facial Hygiene/Grooming: Modified Independent  Bathing: Supervision  Upper Body Dressing: Independent  Lower Body Dressing: Independent  Toileting: Modified independent          ADL Intervention and task modifications: Provided education on role of OT. Safety training for toileting and grooming with verbal cues for sequencing, safety and environmental set up. Patient appears to be at baseline. Left sitting up in chair. Uses 2 L o2 at home at baseline. Grooming  Washing Hands: Modified independent  Brushing Teeth: Modified independent    Lower Body Dressing Assistance  Socks: Independent    Toileting  Toileting Assistance: Modified independent  Bladder Hygiene: Modified independent  Clothing Management: Modified independent  Adaptive Equipment: Grab bars    Cognitive Retraining  Safety/Judgement: Decreased awareness of environment (baseline)    Functional Measure:  Barthel Index:    Bathin  Bladder: 10  Bowels: 10  Groomin  Dressing: 10  Feeding: 10  Mobility: 0  Stairs: 0  Toilet Use: 10  Transfer (Bed to Chair and Back): 15  Total: 75       Barthel and G-code impairment scale:  Percentage of impairment CH  0% CI  1-19% CJ  20-39% CK  40-59% CL  60-79% CM  80-99% CN  100%   Barthel Score 0-100 100 99-80 79-60 59-40 20-39 1-19   0   Barthel Score 0-20 20 17-19 13-16 9-12 5-8 1-4 0      The Barthel ADL Index: Guidelines  1. The index should be used as a record of what a patient does, not as a record of what a patient could do. 2. The main aim is to establish degree of independence from any help, physical or verbal, however minor and for whatever reason. 3. The need for supervision renders the patient not independent. 4. A patient's performance should be established using the best available evidence. Asking the patient, friends/relatives and nurses are the usual sources, but direct observation and common sense are also important. However direct testing is not needed. 5. Usually the patient's performance over the preceding 24-48 hours is important, but occasionally longer periods will be relevant. 6. Middle categories imply that the patient supplies over 50 per cent of the effort.   7. Use of aids to be independent is allowed. Aileen Langley., Barthel, D.W. ([id number]). Functional evaluation: the Barthel Index. [address] (14)2. MARLENA Sandhu, Hanane Georges.Catherine., [place], [address] (1999). Measuring the change indisability after inpatient rehabilitation; comparison of the responsiveness of the Barthel Index and Functional [place] Measure. Journal of Neurology, Neurosurgery, and Psychiatry, 66(4), 545-010. CHICA Mendoza, HOLLEY Toscano, & Nikita Holt M.A. (2004.) Assessment of post-stroke quality of life in cost-effectiveness studies: The usefulness of the Barthel Index and the EuroQoL-5D. Quality of Life Research, 13, 538-78         G codes: In compliance with CMSs Claims Based Outcome Reporting, the following G-code set was chosen for this patient based on their primary functional limitation being treated: The outcome measure chosen to determine the severity of the functional limitation was the Barthel Index with a score of 75/100 which was correlated with the impairment scale. ? Self Care:     - CURRENT STATUS: CJ - 20%-39% impaired, limited or restricted    - GOAL STATUS: CI - 1%-19% impaired, limited or restricted    - D/C STATUS:  ---------------To be determined---------------     Occupational Therapy Evaluation Charge Determination   History Examination Decision-Making   LOW Complexity : Brief history review  MEDIUM Complexity : 3-5 performance deficits relating to physical, cognitive , or psychosocial skils that result in activity limitations and / or participation restrictions MEDIUM Complexity : Patient may present with comorbidities that affect occupational performnce.  Miniml to moderate modification of tasks or assistance (eg, physical or verbal ) with assesment(s) is necessary to enable patient to complete evaluation       Based on the above components, the patient evaluation is determined to be of the following complexity level: LOW Pain:  Pain Scale 1: Numeric (0 - 10)  Pain Intensity 1: 8  Pain Location 1: Back  Pain Orientation 1: Mid  Pain Description 1: Aching  Pain Intervention(s) 1: Medication (see MAR)  Activity Tolerance: On 2 L O2: 93-96%. Patient attempting toilteing without OT, per preference and reports she does this at home. SpO2 decrease to 86-88%. After treatment:   [x]  Patient left in no apparent distress sitting up in chair  []  Patient left in no apparent distress in bed  [x]  Call bell left within reach  [x]  Nursing notified  []  Caregiver present  []  Bed alarm activated    COMMUNICATION/EDUCATION:   Communication/Collaboration:  [x]      Home safety education was provided and the patient/caregiver indicated understanding. [x]      Patient/family have participated as able and agree with findings and recommendations. []      Patient is unable to participate in plan of care at this time.   Findings and recommendations were discussed with: Physical Therapist, Registered Nurse and Patient    Samira Guzman OT

## 2017-03-03 NOTE — ROUTINE PROCESS
I have reviewed discharge instructions with the patient. The patient verbalized understanding.  The patient was provided with her prescriptions, her IV was removed and she was discharged with her daughter

## 2017-03-03 NOTE — DISCHARGE INSTRUCTIONS
PATIENT DISCHARGE INSTRUCTIONS      PATIENT DISCHARGE INSTRUCTIONS    Keri Wharton / [id number] : 1955    Admitted 2017 Discharged: 3/3/2017       · It is important that you take the medication exactly as they are prescribed. · Keep your medication in the bottles provided by the pharmacist and keep a list of the medication names, dosages, and times to be taken in your wallet. · Do not take other medications without consulting your doctor.      What to do at Home    Recommended Diet: PEG feeding as directed,avoid thin liquids by mouth      Recommended Activity: Activity as tolerated    If you experience any of the following symptoms  Worsening shortness of breathe  , please follow up with Zelda Nix MD  .        Signed By: Zelda Nix MD     March 3, 2017

## 2017-03-03 NOTE — DISCHARGE SUMMARY
Physician Discharge Summary       Patient: Guillaume Denson MRN: [id number]  SSN: [id number]    YOB: 1955  Age: 57 y.o. Sex: female    PCP: Mehnaz Mckeon MD    Admit date: 2/23/2017  Admitting Provider: Gilberto Verma MD    Discharge date: 3/3/2017  Discharging Provider: Mehnaz Mckeon MD    * Admission Diagnoses: Respiratory failure (HCC);recurrent aspiration    * Discharge Diagnoses:    Hospital Problems as of 3/3/2017  Date Reviewed: 3/3/2017          Codes Class Noted - Resolved POA    Acute respiratory failure ([facility] ICD-10-CM: J96.00  ICD-9-CM: 518.81  2/6/2017 - Present Yes        Chronic obstructive pulmonary disease with acute exacerbation ([facility] ICD-10-CM: J44.1  ICD-9-CM: 491.21  2/10/2017 - Present Yes        Chronic pulmonary aspiration ICD-10-CM: T17.908A  ICD-9-CM: 934.9  3/2/2017 - Present Unknown        Dysphagia ICD-10-CM: R13.10  ICD-9-CM: 787.20  12/30/2014 - Present Yes        History of laryngeal cancer ICD-10-CM: Z85.21  ICD-9-CM: V10.21 Present on Admission 11/2/2015 - Present Yes        History of radiation to head and neck region ICD-10-CM: Z92.3  ICD-9-CM: V15.3  3/30/2016 - Present Yes        Atrial fibrillation with RVR ([facility] ICD-10-CM: I48.91  ICD-9-CM: 427.31  2/24/2017 - Present Unknown        Respiratory failure ([facility] ICD-10-CM: J96.90  ICD-9-CM: 518.81  2/23/2017 - Present Unknown        Chronic pain (Chronic) ICD-10-CM: G89.29  ICD-9-CM: 338.29 Chronic 1/15/2017 - Present Yes        Bipolar 1 disorder ([facility] ICD-10-CM: F31.9  ICD-9-CM: 296.7  11/29/2011 - Present Yes              * Hospital Course: Unfortunate bipolar female ,followed for Hx Head and Neck Ca s/p surgery RT ,chemo with chronic pain and dysphagia ; was admitted through the ER on2/23/17 in respiratory failure with hypoxia and hypercapnea requiring BIPAP. She was seen by Pulmonary and placed on empiric tratment for recurrent aspiration. Gi was consulted,Dr Bowen,and PEG tube was placed to control chronic aspiration of thin liquids. She developed brief afib with rver and was seen by Cardiology,Dr Walton,please see notes. Palliative care was consulted for ongoing multifactorial pain issues and recommended dilaudid q 4 prn. She gradually improved and was discharged home in good conditin on 3/3/17    * Procedures: *  Procedure(s):  ESOPHAGOGASTRODUODENOSCOPY (EGD)  PERCUTANEOUS ENDOSCOPIC GASTROSTOMY TUBE INSERTION      Consults: Cardiology, Pulmonary/Intensive care and GI    Significant Diagnostic Studies: radiology: CXR: infiltrate(s): diffuse    Discharge Exam:  Visit Vitals    /88 (BP 1 Location: Left arm, BP Patient Position: At rest)    Pulse 97    Temp 99 °F (37.2 °C)    Resp 30    Ht 5' 9\" (1.753 m)    Wt 142 lb 13.7 oz (64.8 kg)    LMP  (LMP Unknown)    SpO2 95%    Breastfeeding No    BMI 21.1 kg/m2     General appearance: alert, cooperative, no distress, appears stated age  Lungs: clear to auscultation bilaterally  Heart: regular rate and rhythm, S1, S2 normal, no murmur, click, rub or gallop  Abdomen: soft, non-tender. Bowel sounds normal. No masses,  no organomegaly  Extremities: extremities normal, atraumatic, no cyanosis or edema    * Discharge Condition: good  * Disposition: Home    Discharge Medications:  Current Discharge Medication List      START taking these medications    Details   HYDROmorphone (DILAUDID) 4 mg tablet Take 1 Tab by mouth every four (4) hours as needed. Max Daily Amount: 24 mg. Indications: Pain  Qty: 180 Tab, Refills: 0      pregabalin (LYRICA) 100 mg capsule Take 1 Cap by mouth three (3) times daily. Max Daily Amount: 300 mg. Indications: FIBROMYALGIA  Qty: 90 Cap, Refills: 11         CONTINUE these medications which have NOT CHANGED    Details   albuterol-ipratropium (DUO-NEB) 2.5 mg-0.5 mg/3 ml nebu 3 mL by Nebulization route every four (4) hours as needed.   Qty: 30 Nebule, Refills: 12      diphenhydrAMINE 12.5 mg/5 mL elix 40 mL, lidocaine 2 % soln 40 mL, aluminum & magnesium hydroxide-simethicone 400-400-40 mg/5 mL susp 40 mL Take 5 mL by mouth daily. VITAMIN B-12 1,000 mcg sublingual tablet 1,000 mcg by SubLINGual route daily. acetylcysteine (MUCOMYST) 100 mg/mL (10 %) nebulizer solution Take 4 mL by inhalation every four (4) hours. Qty: 120 mL, Refills: 11      metoprolol tartrate (LOPRESSOR) 25 mg tablet Take 1 Tab by mouth every twelve (12) hours. Qty: 60 Tab, Refills: 11      guaiFENesin-dextromethorphan (MUCINEX DM) 600-30 mg per tablet Take 1 Tab by mouth two (2) times a day. Qty: 20 Tab, Refills: 3      senna (SENNA) 8.6 mg tablet Take 1 Tab by mouth daily. lithium carbonate CR (ESKALITH CR) 450 mg CR tablet TAKE 1 TABLET BY ORAL ROUTE PER AT BEDTIME  Refills: 0    Associated Diagnoses: Bipolar 1 disorder (HCC)      FLUoxetine (PROZAC) 20 mg tablet Take 20 mg by mouth daily. traZODone (DESYREL) 100 mg tablet Take 200 mg by mouth nightly. Refills: 2         STOP taking these medications       HYDROcodone-acetaminophen (NORCO)  mg tablet Comments:   Reason for Stopping:         amLODIPine (NORVASC) 10 mg tablet Comments:   Reason for Stopping:         gabapentin (NEURONTIN) 300 mg capsule Comments:   Reason for Stopping:         amitriptyline (ELAVIL) 10 mg tablet Comments:   Reason for Stopping:               * Follow-up Care/Patient Instructions: Activity: Activity as tolerated  Diet: Regular Diet,supplemented with tube feeding  Wound Care: None needed    Follow-up Information     Follow up With Details Comments Contact Info    Wilson Galicia MD On 3/9/2017 Appointment scheduled for March 9, 2017 at  [address] [place] [address]  [phone]      All [address] [place] [address]  will be providing home health services.   [facility]  [phone]          Signed:  Wilson Galicia MD  3/3/2017  7:45 AM

## 2017-03-03 NOTE — PROGRESS NOTES
Pt is medically stable for dc home today. TF supplies have been arranged with [facility] and will be delivered to pt's home later today. Home Nursing has been arranged through [facility] for start of service on 3/4.      CARLOS MANUEL Adair

## 2017-03-03 NOTE — PROGRESS NOTES
physical Therapy EVALUATION/DISCHARGE  Patient: Mike Kerns (11 y.o. female)  Date: 3/3/2017  Primary Diagnosis: Respiratory failure (HCC)  recurrent aspiration  Procedure(s) (LRB):  ESOPHAGOGASTRODUODENOSCOPY (EGD) (N/A)  PERCUTANEOUS ENDOSCOPIC GASTROSTOMY TUBE INSERTION (N/A) 3 Days Post-Op   Precautions: Fall, PEG     ASSESSMENT :  Based on the objective data described below, the patient presents at her baseline, independent/safe with all mobility without a device. Pt is impulsive, distractible but safe, amb 200 ft with no device + up/down 6 steps with one rail, step over step, VSS on 2 LPM O2 ( baseline ). Pt states she will go with her dtr to pick out a cane for amb down the street to visit dtr. .    Skilled physical therapy is not indicated at this time. PLAN :  Discharge Recommendations: None  Further Equipment Recommendations for Discharge: none     SUBJECTIVE:   Patient stated I feel good now.     OBJECTIVE DATA SUMMARY:   HISTORY:    Past Medical History:   Diagnosis Date    Bipolar 1 disorder ([facility] 11/29/2011    Cancer ([facility]     skin cancer buttocks    Cancer (HCC)     throat    Chronic pain     FIBROMYALGIA, LEGS    Encounter for long-term (current) use of other medications 11/29/2011    Essential hypertension, benign 11/29/2011    Laryngeal cancer ([facility] 11/2/2015    Laryngeal mass     Psychiatric disorder     bipolar     Past Surgical History:   Procedure Laterality Date    HX BREAST AUGMENTATION Bilateral     SALINE IMPLANTS    HX GI      PLACEMENT OF G TUBE    HX GI      ESOPH. DILATATION    HX GYN      tubal pregnancy    HX HEENT      BX OF NECK MASS    HX HYSTERECTOMY      PLACE PERCUT GASTROSTOMY TUBE  10/28/2014     has been removed 2015    PLACE PERCUT GASTROSTOMY TUBE  2/28/2017          Prior Level of Function/Home Situation: Independent,  works, dtr lives 3 doors away & works from home.   Personal factors and/or comorbidities impacting plan of care:     Home Situation  Home Environment: Private residence  # Steps to Enter: 4  One/Two Story Residence: One story  Living Alone: No  Support Systems: Spouse/Significant Other/Partner  Patient Expects to be Discharged to[de-identified] Private residence  Current DME Used/Available at Home: Oxygen, portable  Tub or Shower Type: Tub/Shower combination    EXAMINATION/PRESENTATION/DECISION MAKING:   Critical Behavior:  Neurologic State: Alert  Orientation Level: Oriented X4     Safety/Judgement: Follows commands, impulsive      Strength:    Strength: Generally decreased, functional                    Tone & Sensation:   Tone: Normal              Sensation: Intact              Range Of Motion:  AROM: Within functional limits                       Coordination:  Coordination: Within functional limits    Functional Mobility:  Bed Mobility:  Rolling: Independent  Supine to Sit: Independent     Scooting: Independent  Transfers:  Sit to Stand: Independent  Stand to Sit: Independent                       Balance:   Sitting: Intact  Standing: Intact  Ambulation/Gait Training:  Distance (ft): 200 Feet (ft)     Ambulation - Level of Assistance: Stand-by asssistance                       Speed/[place]: Fluctuations  Step Length: Left shortened;Right shortened                         Stairs:  Number of Stairs Trained: 6  Stairs - Level of Assistance: Stand-by asssistance               Functional Measure:  Barthel Index:    Bathin  Bladder: 10  Bowels: 10  Groomin  Dressing: 10  Feeding: 10  Mobility: 10  Stairs: 10  Toilet Use: 10  Transfer (Bed to Chair and Back): 15  Total: 95       Barthel and G-code impairment scale:  Percentage of impairment CH  0% CI  1-19% CJ  20-39% CK  40-59% CL  60-79% CM  80-99% CN  100%   Barthel Score 0-100 100 99-80 79-60 59-40 20-39 1-19   0   Barthel Score 0-20 20 17-19 13-16 9-12 5-8 1-4 0      The Barthel ADL Index: Guidelines  1.  The index should be used as a record of what a patient does, not as a record of what a patient could do. 2. The main aim is to establish degree of independence from any help, physical or verbal, however minor and for whatever reason. 3. The need for supervision renders the patient not independent. 4. A patient's performance should be established using the best available evidence. Asking the patient, friends/relatives and nurses are the usual sources, but direct observation and common sense are also important. However direct testing is not needed. 5. Usually the patient's performance over the preceding 24-48 hours is important, but occasionally longer periods will be relevant. 6. Middle categories imply that the patient supplies over 50 per cent of the effort. 7. Use of aids to be independent is allowed. [address] Barthel, DNusratW. ([id number]). Functional evaluation: the Barthel Index. [address] [facility] (14)2. [address] gabino MARLENA Matthews, Chelle Spaulding., Joretevan Guillaume., [place], [address] [place] [address] (1999). Measuring the change indisability after inpatient rehabilitation; comparison of the responsiveness of the Barthel Index and Functional [place] Measure. Journal of Neurology, Neurosurgery, and Psychiatry, 66(4), 707-081. Nicole Guerrero, N.J.A, Akila Morales,  MEÑO.JODEE.M, & Vianey Pete, M.A. (2004.) Assessment of post-stroke quality of life in cost-effectiveness studies: The usefulness of the Barthel Index and the EuroQoL-5D. Quality of Life Research, 13, 802-20       G codes: In compliance with CMSs Claims Based Outcome Reporting, the following G-code set was chosen for this patient based on their primary functional limitation being treated: The outcome measure chosen to determine the severity of the functional limitation was the Barthel with a score of 95/100 which was correlated with the impairment scale.     ? Mobility - Walking and Moving Around:     - CURRENT STATUS: CI - 1%-19% impaired, limited or restricted    - GOAL STATUS: CI - 1%-19% impaired, limited or restricted    - D/C STATUS:  CI - 1%-19% impaired, limited or restricted        Physical Therapy Evaluation Charge Determination   History Examination Presentation Decision-Making   LOW Complexity : Zero comorbidities / personal factors that will impact the outcome / POC LOW Complexity : 1-2 Standardized tests and measures addressing body structure, function, activity limitation and / or participation in recreation  LOW Complexity : Stable, uncomplicated  LOW Complexity : FOTO score of       Based on the above components, the patient evaluation is determined to be of the following complexity level: LOW     Pain:  Pain Scale 1: Numeric (0 - 10)  Pain Intensity 1: 8  Pain Location 1: Back  Activity Tolerance:   VSS on 2 LPM O2  Please refer to the flowsheet for vital signs taken during this treatment. After treatment:   [x]   Patient left in no apparent distress sitting up in chair  []   Patient left in no apparent distress in bed  []   Call bell left within reach  [x]   Nursing notified  []   Caregiver present  [x]   Bed alarm activated    COMMUNICATION/EDUCATION:   Communication/Collaboration:  [x]   Fall prevention education was provided and the patient/caregiver indicated understanding. [x]   Patient/family have participated as able and agree with findings and recommendations. []   Patient is unable to participate in plan of care at this time.   Findings and recommendations were discussed with: Occupational Therapist and Registered Nurse    Thank you for this referral.  Juliette Acosta, PT   Time Calculation: 24 mins

## 2017-03-03 NOTE — PROGRESS NOTES
Bedside shift change report given to Connie Thayer RN (oncoming nurse) by CHELSEA  TIFF [facility] AND [facility], RN (offgoing nurse). Report included the following information SBAR.

## 2017-03-06 PROBLEM — J96.21 ACUTE ON CHRONIC RESPIRATORY FAILURE WITH HYPOXEMIA (HCC): Status: ACTIVE | Noted: 2017-01-01

## 2017-03-06 NOTE — ED NOTES
PT arrives to the ED via EMS for c/s of SOB and hypotension. PT arrives on 2L NC, oxygen saturation 80%, hypotensive. EMS reports giving the pt a Duo-Neb treatment en route. Pt arrives lethargic, alert, and periodic confusion.

## 2017-03-06 NOTE — ED PROVIDER NOTES
HPI Comments: Isabel Younger is a 57 y.o. female, pmhx significant for bipolar type 1, laryngeal mass, HTN, recurrent aspiration pneumonia who presents via EMS to the ED c/o progressively worsening SOB and uncontrolled HTN since earlier today. Pt additionally complains of productive cough with grey sputum and a subjective fever. She states that she has had multiple bowel movements recently, but cannot recall when her last bowel movement was. Per pt's family, she appears to be confused and more lethargic since yesterday and report that she's been eating ice cream and Boost by mouth. They've had to increase her supplemental oxygen, reporting that she is usually on 2L NC oxygen at home. Pt arrived to ED on 2L NC with oxygen saturation around 88%. Family states patient has had a decrease in appetite. Pt is currently diagnosed with throat cancer, but denies being actively treated for the cancer. Pt denies hx of cardiac illness. Pt is a poor historian. PCP: More Silverio MD    Social Hx: -tobacco (former & passive smoke exposure -  smokes), -EtOH, -Illicit Drugs     History is otherwise limited secondary to altered mental status. The history is provided by the patient and the EMS personnel. No  was used. Past Medical History:   Diagnosis Date    Bipolar 1 disorder ([address] 11/29/2011    Cancer ([address]     skin cancer buttocks    Cancer (HCC)     throat    Chronic pain     FIBROMYALGIA, LEGS    Encounter for long-term (current) use of other medications 11/29/2011    Essential hypertension, benign 11/29/2011    Laryngeal cancer ([facility] 11/2/2015    Laryngeal mass     Psychiatric disorder     bipolar       Past Surgical History:   Procedure Laterality Date    HX BREAST AUGMENTATION Bilateral     SALINE IMPLANTS    HX GI      PLACEMENT OF G TUBE    HX GI      ESOPH.  DILATATION    HX GYN      tubal pregnancy    HX HEENT      BX OF NECK MASS    HX HYSTERECTOMY      PLACE PERCUT GASTROSTOMY TUBE  10/28/2014     has been removed 2015    PLACE PERCUT GASTROSTOMY TUBE  2/28/2017              Family History:   Problem Relation Age of Onset    Cancer Father      bone/skin/lung    Anesth Problems Neg Hx        Social History     Social History    Marital status:      Spouse name: N/A    Number of children: N/A    Years of education: N/A     Occupational History    Not on file. Social History Main Topics    Smoking status: Former Smoker     Packs/day: 1.00     Years: 40.00     Quit date: 8/27/2014    Smokeless tobacco: Never Used      Comment: PASSIVE SMOKE EXPOSURE,  SMOKES    Alcohol use No    Drug use: No    Sexual activity: Not on file     Other Topics Concern    Not on file     Social History Narrative         ALLERGIES: Review of patient's allergies indicates no known allergies. Review of Systems   Unable to perform ROS: Mental status change       Patient Vitals for the past 12 hrs:   Temp Pulse Resp BP SpO2   03/06/17 2000 - 86 18 (!) 82/55 92 %   03/06/17 1901 98.8 °F (37.1 °C) - - - -   03/06/17 1900 - 96 21 109/63 (!) 89 %   03/06/17 1845 - 94 22 111/62 (!) 88 %   03/06/17 1815 - 82 15 97/60 (!) 88 %   03/06/17 1814 - - - - (!) 88 %   03/06/17 1803 99 °F (37.2 °C) 84 22 (!) 83/53 91 %   03/06/17 1800 - 82 15 (!) 83/53 (!) 89 %   03/06/17 1750 - - - 91/54 -     Physical Exam   Constitutional: No distress. Chronically ill, pale   HENT:   Head: Normocephalic and atraumatic. Eyes: EOM are normal. Right eye exhibits no discharge. Left eye exhibits no discharge. No scleral icterus. Neck: Normal range of motion. Neck supple. No tracheal deviation present. Cardiovascular: Normal rate, regular rhythm, normal heart sounds and intact distal pulses. Exam reveals no gallop and no friction rub. No murmur heard. Pulmonary/Chest: Tachypnea noted. No respiratory distress. She has no wheezes. She has no rales.    Diffused rhonchi    Abdominal: Soft. Bowel sounds are normal. She exhibits no distension. There is no tenderness. There is no rebound and no guarding. PEG tube in mid abdomen   Musculoskeletal: Normal range of motion. She exhibits no edema. Lymphadenopathy:     She has no cervical adenopathy. Neurological: She is alert. No focal neuro deficits  Alert to self   Skin: Skin is warm and dry. No rash noted. Psychiatric: She has a normal mood and affect. Nursing note and vitals reviewed. MDM  Number of Diagnoses or Management Options  Acute on chronic respiratory failure with hypoxia ([address]): Aspiration pneumonia of both lungs, unspecified aspiration pneumonia type, unspecified part of lung [facility]):   Septic shock [facility]):   Diagnosis management comments:     Patient presents to ED in respiratory failure and septic shock due to recurrent aspiration pneumonia. Blood pressure has improved with IVF. Treated with 30 cc/kg according to sepsis protocol. Will treat with vanc, cefepime and flagyl. Admit for further management. Amount and/or Complexity of Data Reviewed  Clinical lab tests: ordered and reviewed  Tests in the radiology section of CPT®: ordered and reviewed  Tests in the medicine section of CPT®: ordered and reviewed  Review and summarize past medical records: yes  Independent visualization of images, tracings, or specimens: yes      ED Course       Procedures    EKG interpretation: (Preliminary) 5:45 PM  Rhythm: normal sinus rhythm; and regular . Rate (approx.): 83; Axis: normal; ND interval: normal; QRS interval: prolonged; ST/T wave: T wave inverted in inferior leads; Other findings: 1mm elevation in Lead 6. Written by SHAYNE Washingtonibkathya, as dictated by Cheyenne Amin MD.     PROGRESS NOTE   5:57 PM  Chart review indicates patient was admitted to [facility] from 2/23/17-3/3/17 for respiratory failure secondary to aspiration.  She had a PEG tube placed during this admission and also had cardiology consult for a fib with RVR. Written by Tl Bender ED Scribe, as dictated by Mindi Sever, MD.     CONSULT NOTE:   8:06 PM  Mindi Sever, MD spoke with Dr. Sussy Keen,   Specialty: Hospitalist  Discussed pt's hx, disposition, and available diagnostic and imaging results. Reviewed care plans. Consultant will evaluate pt for admission. Written by Tl Bender ED Scribe, as dictated by Mindi Sever, MD.    CRITICAL CARE NOTE :    9:41 PM    IMPENDING DETERIORATION - Cardiovascular and Metabolic    ASSOCIATED RISK FACTORS - Hypotension, septic shock    MANAGEMENT- Bedside Assessment and Supervision of Care    INTERPRETATION -  EKG, blood pressure, labs, CXR    INTERVENTIONS - hemodynamic mngmt, IVF, antibiotics    CASE REVIEW - Hospitalist, Nursing and Family    TREATMENT RESPONSE -Improved    PERFORMED BY - Self    NOTES:  I have spent 75 minutes of critical care time involved in lab review, consultations with specialist, family decision- making, bedside attention and documentation. During this entire length of time I was immediately available to the patient .     Mindi Sever, MD    LABORATORY TESTS:  Recent Results (from the past 12 hour(s))   EKG, 12 LEAD, INITIAL    Collection Time: 03/06/17  5:45 PM   Result Value Ref Range    Ventricular Rate 83 BPM    Atrial Rate 83 BPM    P-R Interval 154 ms    QRS Duration 88 ms    Q-T Interval 436 ms    QTC Calculation (Bezet) 512 ms    Calculated P Axis -17 degrees    Calculated R Axis -7 degrees    Calculated T Axis -15 degrees    Diagnosis       Normal sinus rhythm  Voltage criteria for left ventricular hypertrophy  Prolonged QT  Abnormal ECG  When compared with ECG of 23-FEB-2017 08:02,  Questionable change in QRS axis  T wave inversion now evident in Inferior leads     CBC WITH AUTOMATED DIFF    Collection Time: 03/06/17  6:00 PM   Result Value Ref Range    WBC 31.5 (H) 3.6 - 11.0 K/uL    RBC 3.71 (L) 3.80 - 5.20 M/uL    HGB 10.1 (L) 11.5 - 16.0 g/dL    HCT 33.2 (L) 35.0 - 47.0 %    MCV 89.5 80.0 - 99.0 FL    MCH 27.2 26.0 - 34.0 PG    MCHC 30.4 30.0 - 36.5 g/dL    RDW 16.8 (H) 11.5 - 14.5 %    PLATELET 427 (H) 223 - 400 K/uL    NEUTROPHILS 73 %    BAND NEUTROPHILS 12 %    LYMPHOCYTES 11 %    MONOCYTES 4 %    EOSINOPHILS 0 %    BASOPHILS 0 %    ABS. NEUTROPHILS 26.7 K/UL    ABS. LYMPHOCYTES 3.5 K/UL    ABS. MONOCYTES 1.3 K/UL    ABS. EOSINOPHILS 0.0 K/UL    ABS. BASOPHILS 0.0 K/UL    RBC COMMENTS ANISOCYTOSIS  1+        DF MANUAL     CK W/ CKMB & INDEX    Collection Time: 03/06/17  6:00 PM   Result Value Ref Range    CK 29 26 - 192 U/L    CK - MB <1.0 <3.6 NG/ML    CK-MB Index Cannot be calulated 0 - 2.5     METABOLIC PANEL, COMPREHENSIVE    Collection Time: 03/06/17  6:00 PM   Result Value Ref Range    Sodium 138 136 - 145 mmol/L    Potassium 5.0 3.5 - 5.1 mmol/L    Chloride 100 97 - 108 mmol/L    CO2 34 (H) 21 - 32 mmol/L    Anion gap 4 (L) 5 - 15 mmol/L    Glucose 104 (H) 65 - 100 mg/dL    BUN 29 (H) 6 - 20 MG/DL    Creatinine 1.32 (H) 0.55 - 1.02 MG/DL    BUN/Creatinine ratio 22 (H) 12 - 20      GFR est AA 49 (L) >60 ml/min/1.73m2    GFR est non-AA 41 (L) >60 ml/min/1.73m2    Calcium 9.5 8.5 - 10.1 MG/DL    Bilirubin, total 0.3 0.2 - 1.0 MG/DL    ALT (SGPT) 8 (L) 12 - 78 U/L    AST (SGOT) 19 15 - 37 U/L    Alk.  phosphatase 83 45 - 117 U/L    Protein, total 7.9 6.4 - 8.2 g/dL    Albumin 2.6 (L) 3.5 - 5.0 g/dL    Globulin 5.3 (H) 2.0 - 4.0 g/dL    A-G Ratio 0.5 (L) 1.1 - 2.2     TROPONIN I    Collection Time: 03/06/17  6:00 PM   Result Value Ref Range    Troponin-I, Qt. <0.04 <0.05 ng/mL   PRO-BNP    Collection Time: 03/06/17  6:00 PM   Result Value Ref Range    NT pro- (H) 0 - 125 PG/ML   MAGNESIUM    Collection Time: 03/06/17  6:00 PM   Result Value Ref Range    Magnesium 2.4 1.6 - 2.4 mg/dL   URINALYSIS W/ REFLEX CULTURE    Collection Time: 03/06/17  7:02 PM   Result Value Ref Range    Color YELLOW/STRAW      Appearance CLOUDY (A) CLEAR      Specific gravity 1.015 1.003 - 1.030      pH (UA) 6.0 5.0 - 8.0      Protein NEGATIVE  NEG mg/dL    Glucose NEGATIVE  NEG mg/dL    Ketone NEGATIVE  NEG mg/dL    Bilirubin NEGATIVE  NEG      Blood NEGATIVE  NEG      Urobilinogen 0.2 0.2 - 1.0 EU/dL    Nitrites NEGATIVE  NEG      Leukocyte Esterase NEGATIVE  NEG      WBC 0-4 0 - 4 /hpf    RBC 0-5 0 - 5 /hpf    Epithelial cells FEW FEW /lpf    Bacteria NEGATIVE  NEG /hpf    UA:UC IF INDICATED CULTURE NOT INDICATED BY UA RESULT CNI      Hyaline cast 2-5 0 - 5 /lpf       IMAGING RESULTS:  CXR Results  (Last 48 hours)               03/06/17 1928  XR CHEST PORT Final result    Impression:  Impression:       1. Increased mild left pleural effusion with relatively unchanged significant   airspace disease throughout the left mid and lower chest.   2. Improved airspace disease in the right lower lobe. Narrative:  Chest portable AP       History: Dyspnea. History of recurrent aspiration. Comparison: 2/26/2017       Findings: There is a mild left pleural effusion that has increased in the   interval. There is diffuse patchy opacification throughout the mid and left   lower lung zones. There is improved patchy airspace disease in the right lower   hemithorax. The cardiac mediastinal silhouette is unremarkable. The visualized   osseous structures appear unremarkable.                  MEDICATIONS GIVEN:  Medications   sodium chloride (NS) flush 5-10 mL (not administered)   sodium chloride 0.9 % bolus infusion 1,500 mL (1,500 mL IntraVENous New Bag 3/6/17 2011)   cefepime (MAXIPIME) 2 g in 0.9% sodium chloride (MBP/ADV) 100 mL (2 g IntraVENous New Bag 3/6/17 2011)   metroNIDAZOLE (FLAGYL) IVPB premix 500 mg (not administered)   vancomycin (VANCOCIN) 1500 mg in  ml infusion (not administered)   sodium chloride 0.9 % bolus infusion 500 mL (0 mL IntraVENous IV Completed 3/6/17 1901)   albuterol-ipratropium (DUO-NEB) 2.5 MG-0.5 MG/3 ML (3 mL Nebulization Given 3/6/17 1816)       IMPRESSION:  1. Septic shock ([address]    2. Aspiration pneumonia of both lungs, unspecified aspiration pneumonia type, unspecified part of lung ([address]    3. Acute on chronic respiratory failure with hypoxia ([facility]        PLAN: Admit to hospitalist  Admission Note:  8:07 PM  Patient is being admitted to the hospital by Dr. Jani Lake. The results of their tests and reasons for their admission have been discussed with them and/or available family. They convey agreement and understanding for the need to be admitted and for their admission diagnosis. Written by Charis Robison, SHAYNE Scribe, as dictated by Mary Quiles MD.    Attestation: This note is prepared by Charis Robison, acting as Scribe for Mary Quiles MD.    Mary Quiles MD: The scribe's documentation has been prepared under my direction and personally reviewed by me in its entirety. I confirm that the note above accurately reflects all work, treatment, procedures, and medical decision making performed by me.

## 2017-03-06 NOTE — PROGRESS NOTES
Nurse Navigator Note- called and talked to patient daughter Victoriano Baldwin, she does not think her mother is doing that well. Sleeping a lot and when she is awake very lethargic. They have turned her O2 up to 3/L. She wants to take her back to the Hospital but not [facility], thinking about [facility] but Dr Nancy Rm does not round there, informed her he does not round at [facility] either. Then stated that it was not the nurses or Dr's fault, her mother has been difficult. Told her that I would let Dr Nancy Rm know and get him to call.

## 2017-03-06 NOTE — IP AVS SNAPSHOT
[address] [place] [phone] Patient: Jose Villa MRN: [id number] MKV:[id number]/[id number] You are allergic to the following No active allergies Recent Documentation Height Weight BMI OB Status Smoking Status 1.753 m 65.7 kg 21.4 kg/m2 Hysterectomy Former Smoker Unresulted Labs Order Current Status BLOOD GAS, ARTERIAL In process OVA & PARASITES, STOOL In process CULTURE, BLOOD Preliminary result CULTURE, BLOOD Preliminary result Emergency Contacts Name Discharge Info Relation Home Work Mobile [employer] Shift DISCHARGE CAREGIVER [3] Child [2] [phone] [address] CAREGIVER [3] Spouse [3]   [phone] About your hospitalization You were admitted on:  March 6, 2017 You last received care in the:  [facility] 3 NEUROSCIENCE TELEMETRY You were discharged on:  March 10, 2017 Unit phone number:  [phone] Why you were hospitalized Your primary diagnosis was:  Not on File Your diagnoses also included:  Acute On Chronic Respiratory Failure With Hypoxemia (Hcc), Dysphagia, Bipolar 1 Disorder (Hcc), Sepsis (Hcc), Aspiration Pneumonia Of Both Upper Lobes (Hcc), History Of Laryngeal Cancer, Chronic Pain Providers Seen During Your Hospitalizations Provider Role Specialty Primary office phone Daly Nielsen MD Attending Provider Emergency Medicine [phone] Mis Cunningham MD Attending Provider [facility] [phone] Your Primary Care Physician (PCP) Primary Care Physician Office Phone Office Fax Bharath Howard [phone] Follow-up Information Follow up With Details Comments Contact Info Mis Cunningham MD  The nurse will call you to set up your appointment. [address] [facility] [id number] [phone] [address]  This is your home health provider of choice. If you do not hear from them within 24 - 48 hours after discharge please contact them directly  [address] [address] [phone] Your Appointments Wednesday March 15, 2017 10:30 AM EDT  
CT CHEST WO CONT with ED [facility] CT 2 MRM RAD CT ([address]) [address] [facility]  
[phone] NON-CONTRAST STUDY: 1. Bring any non [facility] facility films/images pertaining to the area of interest with you on the day of appointment. 2. Check in at registration at least 30 minutes before appt time unless you were instructed to do otherwise. 3. If you have to drink oral contrast please pick it up any weekday prior to your appointment, if you cannot please check in 2 hrs  before appt time. Patient should report to outpatient registration ([facility]) 30 minutes prior to the appointment time unless instructed otherwise. Current Discharge Medication List  
  
START taking these medications Dose & Instructions Dispensing Information Comments Morning Noon Evening Bedtime  
 amoxicillin-clavulanate 250-62.5 mg/5 mL suspension Commonly known as:  AUGMENTIN Your next dose is: Today, Tomorrow Other:  _________ Dose:  500 mg Take 10 mL by mouth every eight (8) hours. Quantity:  150 mL Refills:  0  
     
   
   
   
  
 oxyCODONE 20 mg/mL concentrated solution Commonly known as:  Enrico Capes Your next dose is: Today, Tomorrow Other:  _________ Dose:  10 mg Take 0.5 mL by mouth every four (4) hours as needed. Max Daily Amount: 60 mg.  
 Quantity:  60 mL Refills:  0  
     
   
   
   
  
 predniSONE 10 mg tablet Commonly known as:  Merle Jhonathan Your next dose is: Today, Tomorrow Other:  _________ 2 tabs daily for 2 days,then 1 tab daily for 2 days,then 1 tab daily for 2 days,then 1/2 tab daily for 2 days Quantity:  10 Tab Refills:  0 CONTINUE these medications which have NOT CHANGED Dose & Instructions Dispensing Information Comments Morning Noon Evening Bedtime  
 acetylcysteine 100 mg/mL (10 %) nebulizer solution Commonly known as:  MUCOMYST Your next dose is: Today, Tomorrow Other:  _________ Dose:  4 mL Take 4 mL by inhalation every four (4) hours. Quantity:  120 mL Refills:  11  
     
   
   
   
  
 albuterol-ipratropium 2.5 mg-0.5 mg/3 ml Nebu Commonly known as:  Jean Carlos Parkinson Your next dose is: Today, Tomorrow Other:  _________ Dose:  3 mL  
3 mL by Nebulization route every four (4) hours as needed. Quantity:  30 Nebule Refills:  12  
     
   
   
   
  
 bisacodyl 5 mg Tab Your next dose is: Today, Tomorrow Other:  _________ Dose:  1 Tab Take 1 Tab by mouth daily as needed (constipation). Refills:  0  
     
   
   
   
  
 diphenhydrAMINE 12.5 mg/5 mL elix 40 mL, lidocaine 2 % soln 40 mL, aluminum & magnesium hydroxide-simethicone 400-400-40 mg/5 mL susp 40 mL Your next dose is: Today, Tomorrow Other:  _________ Dose:  5 mL Take 5 mL by mouth daily. Swish and spit or swallow. Refills:  0 FLUoxetine 20 mg tablet Commonly known as:  PROzac Your next dose is: Today, Tomorrow Other:  _________ Dose:  20 mg Take 20 mg by mouth daily. Refills:  0  
     
   
   
   
  
 lithium carbonate  mg CR tablet Commonly known as:  ESKALITH CR Your next dose is: Today, Tomorrow Other:  _________ TAKE 1 TABLET BY ORAL ROUTE PER AT BEDTIME Refills:  0  
     
   
   
   
  
 metoprolol tartrate 25 mg tablet Commonly known as:  LOPRESSOR  
   
 Your next dose is: Today, Tomorrow Other:  _________ Dose:  25 mg Take 1 Tab by mouth every twelve (12) hours. Quantity:  60 Tab Refills:  11 NORVASC 10 mg tablet Generic drug:  amLODIPine Your next dose is: Today, Tomorrow Other:  _________ Dose:  10 mg Take 10 mg by mouth daily. Refills:  0  
     
   
   
   
  
 traZODone 100 mg tablet Commonly known as:  Danilo Bump Your next dose is: Today, Tomorrow Other:  _________ Dose:  200 mg Take 200 mg by mouth nightly. Refills:  2 VITAMIN B-12 1,000 mcg sublingual tablet Generic drug:  cyanocobalamin Your next dose is: Today, Tomorrow Other:  _________ Dose:  1000 mcg  
1,000 mcg by SubLINGual route daily. Refills:  0 STOP taking these medications   
 guaiFENesin-dextromethorphan -30 mg per tablet Commonly known as:  MUCINEX DM  
   
  
 NORCO  mg tablet Generic drug:  HYDROcodone-acetaminophen Where to Get Your Medications Information on where to get these meds will be given to you by the nurse or doctor. ! Ask your nurse or doctor about these medications  
  amoxicillin-clavulanate 250-62.5 mg/5 mL suspension  
 oxyCODONE 20 mg/mL concentrated solution  
 predniSONE 10 mg tablet Discharge Instructions PATIENT DISCHARGE INSTRUCTIONS PATIENT DISCHARGE INSTRUCTIONS Aminah Stacey / [id number] : 1955 Admitted 3/6/2017 Discharged: 3/10/2017 · It is important that you take the medication exactly as they are prescribed. · Keep your medication in the bottles provided by the pharmacist and keep a list of the medication names, dosages, and times to be taken in your wallet. · Do not take other medications without consulting your doctor. What to do at [facility] Recommended Diet: PEG feeding as directed Recommended Activity: Activity as tolerated If you experience any of the following symptoms  Worsening shortness of breathe 
, please follow up with Sveta Saavedra MD 
 
 
 
 
Signed By: Sveta Saavedra MD   
 March 10, 2017 Discharge Orders Procedure Order Date Status Priority Quantity Spec Type Associated Dx REFERRAL TO HOME HEALTH 03/10/17 0626 Normal Routine 1  Chronic pain syndrome [341771] Comments:  Please evaluate patient for  Tube feeding,pt/ot,niursing. [employer] Announcement We are excited to announce that we are making your provider's discharge notes available to you in [employer]. You will see these notes when they are completed and signed by the physician that discharged you from your recent hospital stay. If you have any questions or concerns about any information you see in [employer], please call the Health Information Department where you were seen or reach out to your Primary Care Provider for more information about your plan of care. Introducing [facility]! Paulo Lewis introduces [employer] patient portal. Now you can access parts of your medical record, email your doctor's office, and request medication refills online. 1. In your internet browser, go to [email or web address] 2. Click on the First Time User? Click Here link in the Sign In box. You will see the New Member Sign Up page. 3. Enter your [employer] Access Code exactly as it appears below. You will not need to use this code after youve completed the sign-up process. If you do not sign up before the expiration date, you must request a new code. · [employer] Access Code: [email or web address] Expires: 6/5/2017  3:08 PM 
 
4. Enter the last four digits of your Social Security Number (xxxx) and Date of Birth (mm/dd/yyyy) as indicated and click Submit. You will be taken to the next sign-up page. 5. Create a [employer] ID.  This will be your [employer] login ID and cannot be changed, so think of one that is secure and easy to remember. 6. Create a [employer] password. You can change your password at any time. 7. Enter your Password Reset Question and Answer. This can be used at a later time if you forget your password. 8. Enter your e-mail address. You will receive e-mail notification when new information is available in [address] 9. Click Sign Up. You can now view and download portions of your medical record. 10. Click the Download Summary menu link to download a portable copy of your medical information. If you have questions, please visit the Frequently Asked Questions section of the [employer] website. Remember, [employer] is NOT to be used for urgent needs. For medical emergencies, dial 911. Now available from your iPhone and Android! General Information Please provide this summary of care documentation to your next provider. Patient Signature:  ____________________________________________________________ Date:  ____________________________________________________________  
  
Ari Rodríguezve Provider Signature:  ____________________________________________________________ Date:  ____________________________________________________________

## 2017-03-07 NOTE — ED NOTES
Pt refusing to use yanker for suctioning sputum from mouth. Pt observed spitting on the floor. When educated about using the suction for sputum, patient stated \"because I can, if you don't like it lady, you can leave. \"

## 2017-03-07 NOTE — PROGRESS NOTES
Speech Therapy    Orders received and chart reviewed. Attempted to see patient for clinical swallow evaluation. She was drowsy and resting. Initially agreeable to milk or ice cream only. When SLP returned with milk, a gentleman visitor arrived, stating patient cannot have milk due to a hole in her esophagus. He was unable to given much information about this \"hole\" other than it being diagnosed on her last admission and was thought to be related to radiation. Patient was then drowsy, refusing to sit up to participate in bedside swallow evaluation. Her visitor did indicate she has been using PEG for all nutrition other than small amounts of ice cream by mouth. Medications via PEG at home. Reviewed previous admission notes in detail. Note PEG was replaced on that admission. Note from PEG placement does not mention a hole in her esophagus. Reviewed most recent MBS 1/18/17 showing, \"Based on the objective data described below, the patient presents with mild to mod oral and mod pharyngeal dysphagia. Orally she is slow to masticate solids and discoordinated oral mastication. Some extraneous movements orally. Pharyngeal swallow is characterized by mild to mod swallow delay. With thins and nectar thick liquids there is a swallow delay to the aryepiglottic folds and into the laryngeal vestibule. There is reduced laryngeal closure/elevation and laryngeal penetration occurring before and during the swallow that does not clear with the swallow. They penetrated to mid vestibule and eventually to the TVCs but in minimal trace amt. She does not cough spontaneously due to reduced sensation. When cued to cough she is able to clear most if not all of the trace penetrated liquid. [facility] \" At that time, puree/minced diet with thin liquids was recommended with small bites and sips, cough after every few bites to clear penetrated material.     Note patient has had multiple admissions in a short period of time.  Highly suspect that given her pain and mental illness, she has limited carry-over of safe swallow strategies when outside of the hospital. Risk for aspiration is likely high when not supervised with PO. Discussed with Palliative. For now, recommend NPO with meds and nutrition via PEG. If patient becomes able to sit up and maintain alertness to participate, will proceed with clinical swallow evaluation, though it would be helpful for her and her family to establish goals, as even if she does well with PO bedside, we will likely never fully eliminate her aspiration risk. Thank you. Marilou Mao, M.S., CCC-SLP

## 2017-03-07 NOTE — ED NOTES
Bedside shift change report given to Korin Garcia RN (oncoming nurse) by Maritza Barillas RN/VERO Hernandez (offgoing nurse). Report included the following information SBAR, Kardex and ED Summary.

## 2017-03-07 NOTE — ED NOTES
TRANSFER - OUT REPORT:    Verbal report given to Adi Vasquez RN (name) on Jesse Aguilar  being transferred to Neuro (unit) for routine progression of care       Report consisted of patients Situation, Background, Assessment and   Recommendations(SBAR). Information from the following report(s) SBAR, Kardex, ED Summary, Intake/Output, MAR, Recent Results and Med Rec Status was reviewed with the receiving nurse. Lines:   Peripheral IV 03/06/17 Right Antecubital (Active)   Site Assessment Clean, dry, & intact 3/6/2017  6:14 PM   Phlebitis Assessment 0 3/6/2017  6:14 PM   Infiltration Assessment 0 3/6/2017  6:14 PM   Dressing Status Clean, dry, & intact 3/6/2017  6:14 PM   Dressing Type Transparent 3/6/2017  6:14 PM   Hub Color/Line Status Pink 3/6/2017  6:14 PM       Peripheral IV 03/06/17 Right Arm (Active)   Site Assessment Clean, dry, & intact 3/6/2017  6:14 PM   Phlebitis Assessment 0 3/6/2017  6:14 PM   Infiltration Assessment 0 3/6/2017  6:14 PM   Dressing Status Clean, dry, & intact 3/6/2017  6:14 PM        Opportunity for questions and clarification was provided.       Patient transported with:   [employer]

## 2017-03-07 NOTE — PROGRESS NOTES
Initial Nutrition Assessment:    INTERVENTIONS/RECOMMENDATIONS:   · Enteral/Parenteral Nutrition: Initiate enteral nutrition: Recommend Jevity 1.5 @ 25 mL/hr and advance as tolerated by 10 mL q 8 hours to goal rate of 45 mL/hr + 140 mL water flushes q 4 hours (Provides 1620 kcal, 69g protein, 1661 mL water)     ASSESSMENT:   Patient medically noted for acute on chronic respiratory failure, HTN, and aspiration pneumonia. PMH for bipolar, dysphagia, and laryngeal cancer. Currently NPO. PEG recently replaced. TF recommendations provided. Has been taking in some PO at home; mostly ice cream. TF via PEG has been main source of nutrition. Will monitor tolerance and provide further recommendations as needed. Diet Order: NPO  % Eaten:  No data found. Pertinent Medications: [x]Reviewed []Other: prozac, solu-medrol, flagyl, trazodone   Pertinent Labs: [x]Reviewed []Other:Phos 1.5   Food Allergies: [x]None []Other   Last BM: PTA   [x]Active     []Hyperactive  []Hypoactive       [] Absent BS  Skin:    [x] Intact   [] Incision  [] Breakdown  [] Other:    Anthropometrics:   Height: 5' 9\" (175.3 cm) Weight: 63.5 kg (140 lb)   IBW (%IBW):   ( ) UBW (%UBW):   (  %)   Last Weight Metrics:  Weight Loss Metrics 3/6/2017 3/2/2017 2/21/2017 2/6/2017 1/31/2017 1/15/2017 12/7/2016   Today's Wt 140 lb 142 lb 13.7 oz 145 lb 9.6 oz 152 lb 9.6 oz 146 lb 6.4 oz 150 lb 149 lb 12.8 oz   BMI 20.67 kg/m2 21.1 kg/m2 21.5 kg/m2 22.54 kg/m2 22.26 kg/m2 22.15 kg/m2 22.12 kg/m2       BMI: Body mass index is 20.67 kg/(m^2). This BMI is indicative of:   []Underweight    [x]Normal    []Overweight    [] Obesity   [] Extreme Obesity (BMI>40)     Estimated Nutrition Needs (Based on):   1644 Kcals/day (BMR (1264) x 1. 3AF) , 64 g (-77g (1.0-1.2 g/kg bw)) Protein  Carbohydrate:  At Least 130 g/day  Fluids: 1650 mL/day (1ml/kcal)    Pt expected to meet estimated nutrient needs: [x]Yes []No    NUTRITION DIAGNOSES:   Problem:  Inadequate protein-energy intake      Etiology: related to current medical condition     Signs/Symptoms: as evidenced by NPO status; TF not started       NUTRITION INTERVENTIONS:    Enteral/Parenteral Nutrition: Initiate enteral nutrition                GOAL:   TF started and advanced to goal rate next 1-3 days    LEARNING NEEDS (Diet, Food/Nutrient-Drug Interaction):    [x] None Identified   [] Identified and Education Provided/Documented   [] Identified and Pt declined/was not appropriate     Cultureal, Islam, OR Ethnic Dietary Needs:    [x] None Identified   [] Identified and Addressed     [x] Interdisciplinary Care Plan Reviewed/Documented    [x] Discharge Planning: Jevity 1.5 @ 45 mL/hr + 140 mL water flushes q 4 hours OR Jevity 1.5 240 mL bolus 3x/day + 360 mL bolus 1x/day       MONITORING /EVALUATION:   Food/Nutrient Intake Outcomes: Enteral/parenteral nutrition intake  Physical Signs/Symptoms Outcomes: Weight/weight change, Electrolyte and renal profile, Glucose profile    NUTRITION RISK:    [x] High              [] Moderate           []  Low  []  Minimal/Uncompromised    PT SEEN FOR:    [x]  MD Consult: []Calorie Count      []Diabetic Diet Education        []Diet Education     []Electrolyte Management     []General Nutrition Management and Supplements     [x]Management of Tube Feeding     []TPN Recommendations    []  RN Referral:  []MST score >=2     []Enteral/Parenteral Nutrition PTA     []Pregnant: Gestational DM or Multigestation     []Pressure Ulcer/Wound Care needs        []  Low BMI  []  DTR Referral       [facility]icit[facility]  Pager [phone]                 Weekend Pager [phone]

## 2017-03-07 NOTE — PROGRESS NOTES
General Daily Progress Note    Admit Date: 3/6/2017  Hospital day 2    Subjective:     Patient has dyspnea,cough,focussed on pain meds. .   Medication side effects: none    Current Facility-Administered Medications   Medication Dose Route Frequency    albuterol-ipratropium (DUO-NEB) 2.5 MG-0.5 MG/3 ML  3 mL Nebulization Q6H RT    HYDROcodone-acetaminophen (NORCO)  mg tablet 1 Tab  1 Tab Oral Q4H PRN    sodium chloride (NS) flush 5-10 mL  5-10 mL IntraVENous PRN    vancomycin (VANCOCIN) 1,000 mg in 0.9% sodium chloride (MBP/ADV) 250 mL  1,000 mg IntraVENous Q18H    FLUoxetine (PROzac) capsule 20 mg  20 mg Oral DAILY    lithium carbonate CR (ESKALITH CR) tablet 450 mg  450 mg Oral QHS    pregabalin (LYRICA) capsule 100 mg  100 mg Oral TID    traZODone (DESYREL) tablet 200 mg  200 mg Oral QHS    0.9% sodium chloride infusion  100 mL/hr IntraVENous CONTINUOUS    acetaminophen (TYLENOL) tablet 650 mg  650 mg Oral Q4H PRN    metroNIDAZOLE (FLAGYL) IVPB premix 500 mg  500 mg IntraVENous Q8H    HYDROmorphone (PF) (DILAUDID) injection 1 mg  1 mg IntraVENous Q4H PRN    heparin (porcine) injection 5,000 Units  5,000 Units SubCUTAneous Q12H    guaiFENesin (ROBITUSSIN) 100 mg/5 mL oral liquid 400 mg  400 mg Oral TID    acetylcysteine (MUCOMYST) 200 mg/mL (20 %) solution 600 mg  600 mg Nebulization Q12H    cefepime (MAXIPIME) 2 g in 0.9% sodium chloride (MBP/ADV) 100 mL  2 g IntraVENous Q12H     Current Outpatient Prescriptions   Medication Sig    amLODIPine (NORVASC) 10 mg tablet Take 10 mg by mouth daily.  HYDROcodone-acetaminophen (NORCO)  mg tablet Take 1 Tab by mouth every four (4) hours as needed for Pain.  VITAMIN B-12 1,000 mcg sublingual tablet 1,000 mcg by SubLINGual route daily.  metoprolol tartrate (LOPRESSOR) 25 mg tablet Take 1 Tab by mouth every twelve (12) hours.     lithium carbonate CR (ESKALITH CR) 450 mg CR tablet TAKE 1 TABLET BY ORAL ROUTE PER AT BEDTIME    FLUoxetine (PROZAC) 20 mg tablet Take 20 mg by mouth daily.  traZODone (DESYREL) 100 mg tablet Take 200 mg by mouth nightly.  HYDROmorphone (DILAUDID) 4 mg tablet Take 1 Tab by mouth every four (4) hours as needed. Max Daily Amount: 24 mg. Indications: Pain    pregabalin (LYRICA) 100 mg capsule Take 1 Cap by mouth three (3) times daily. Max Daily Amount: 300 mg. Indications: FIBROMYALGIA    albuterol-ipratropium (DUO-NEB) 2.5 mg-0.5 mg/3 ml nebu 3 mL by Nebulization route every four (4) hours as needed.  diphenhydrAMINE 12.5 mg/5 mL elix 40 mL, lidocaine 2 % soln 40 mL, aluminum & magnesium hydroxide-simethicone 400-400-40 mg/5 mL susp 40 mL Take 5 mL by mouth daily.  acetylcysteine (MUCOMYST) 100 mg/mL (10 %) nebulizer solution Take 4 mL by inhalation every four (4) hours.  guaiFENesin-dextromethorphan (MUCINEX DM) 600-30 mg per tablet Take 1 Tab by mouth two (2) times a day.  senna (SENNA) 8.6 mg tablet Take 1 Tab by mouth daily.         Review of Systems  Constitutional: positive for fatigue and malaise  Respiratory: positive for cough, sputum or stridor  Cardiovascular: negative  Gastrointestinal: negative    Objective:     Patient Vitals for the past 8 hrs:   BP Temp Pulse Resp SpO2   03/07/17 0530 137/67 - (!) 107 18 98 %   03/07/17 0430 155/89 98.4 °F (36.9 °C) (!) 115 28 94 %   03/07/17 0330 137/71 - (!) 112 23 93 %   03/07/17 0230 117/64 - (!) 109 18 97 %   03/07/17 0200 146/84 99.1 °F (37.3 °C) (!) 111 25 95 %   03/07/17 0100 133/72 - (!) 108 20 92 %   03/07/17 0000 127/67 - (!) 105 26 94 %   03/06/17 2300 104/53 - (!) 104 16 96 %   03/06/17 2230 132/79 - (!) 106 17 95 %             Physical Exam:   Visit Vitals    /67 (BP 1 Location: Left arm, BP Patient Position: At rest)    Pulse (!) 107    Temp 98.4 °F (36.9 °C)    Resp 18    Ht 5' 9\" (1.753 m)    Wt 140 lb (63.5 kg)    LMP  (LMP Unknown)    SpO2 98%    BMI 20.67 kg/m2     General appearance: alert, fatigued, cooperative, mild distress, appears stated age  Lungs: rhonchi L base, wheezes R base, L base  Heart: regular rate and rhythm  Abdomen: soft, non-tender. Bowel sounds normal. No masses,  no organomegaly  Extremities: extremities normal, atraumatic, no cyanosis or edema      ECG: sinus tachycardia     Data Review   Recent Results (from the past 24 hour(s))   EKG, 12 LEAD, INITIAL    Collection Time: 03/06/17  5:45 PM   Result Value Ref Range    Ventricular Rate 83 BPM    Atrial Rate 83 BPM    P-R Interval 154 ms    QRS Duration 88 ms    Q-T Interval 436 ms    QTC Calculation (Bezet) 512 ms    Calculated P Axis -17 degrees    Calculated R Axis -7 degrees    Calculated T Axis -15 degrees    Diagnosis       Normal sinus rhythm  Voltage criteria for left ventricular hypertrophy  Prolonged QT  Abnormal ECG  When compared with ECG of 23-FEB-2017 08:02,  Questionable change in QRS axis  T wave inversion now evident in Inferior leads     CBC WITH AUTOMATED DIFF    Collection Time: 03/06/17  6:00 PM   Result Value Ref Range    WBC 31.5 (H) 3.6 - 11.0 K/uL    RBC 3.71 (L) 3.80 - 5.20 M/uL    HGB 10.1 (L) 11.5 - 16.0 g/dL    HCT 33.2 (L) 35.0 - 47.0 %    MCV 89.5 80.0 - 99.0 FL    MCH 27.2 26.0 - 34.0 PG    MCHC 30.4 30.0 - 36.5 g/dL    RDW 16.8 (H) 11.5 - 14.5 %    PLATELET 532 (H) 412 - 400 K/uL    NEUTROPHILS 73 %    BAND NEUTROPHILS 12 %    LYMPHOCYTES 11 %    MONOCYTES 4 %    EOSINOPHILS 0 %    BASOPHILS 0 %    ABS. NEUTROPHILS 26.7 K/UL    ABS. LYMPHOCYTES 3.5 K/UL    ABS. MONOCYTES 1.3 K/UL    ABS. EOSINOPHILS 0.0 K/UL    ABS.  BASOPHILS 0.0 K/UL    RBC COMMENTS ANISOCYTOSIS  1+        DF MANUAL     CK W/ CKMB & INDEX    Collection Time: 03/06/17  6:00 PM   Result Value Ref Range    CK 29 26 - 192 U/L    CK - MB <1.0 <3.6 NG/ML    CK-MB Index Cannot be calulated 0 - 2.5     METABOLIC PANEL, COMPREHENSIVE    Collection Time: 03/06/17  6:00 PM   Result Value Ref Range    Sodium 138 136 - 145 mmol/L    Potassium 5.0 3.5 - 5.1 mmol/L Chloride 100 97 - 108 mmol/L    CO2 34 (H) 21 - 32 mmol/L    Anion gap 4 (L) 5 - 15 mmol/L    Glucose 104 (H) 65 - 100 mg/dL    BUN 29 (H) 6 - 20 MG/DL    Creatinine 1.32 (H) 0.55 - 1.02 MG/DL    BUN/Creatinine ratio 22 (H) 12 - 20      GFR est AA 49 (L) >60 ml/min/1.73m2    GFR est non-AA 41 (L) >60 ml/min/1.73m2    Calcium 9.5 8.5 - 10.1 MG/DL    Bilirubin, total 0.3 0.2 - 1.0 MG/DL    ALT (SGPT) 8 (L) 12 - 78 U/L    AST (SGOT) 19 15 - 37 U/L    Alk.  phosphatase 83 45 - 117 U/L    Protein, total 7.9 6.4 - 8.2 g/dL    Albumin 2.6 (L) 3.5 - 5.0 g/dL    Globulin 5.3 (H) 2.0 - 4.0 g/dL    A-G Ratio 0.5 (L) 1.1 - 2.2     TROPONIN I    Collection Time: 03/06/17  6:00 PM   Result Value Ref Range    Troponin-I, Qt. <0.04 <0.05 ng/mL   PRO-BNP    Collection Time: 03/06/17  6:00 PM   Result Value Ref Range    NT pro- (H) 0 - 125 PG/ML   MAGNESIUM    Collection Time: 03/06/17  6:00 PM   Result Value Ref Range    Magnesium 2.4 1.6 - 2.4 mg/dL   URINALYSIS W/ REFLEX CULTURE    Collection Time: 03/06/17  7:02 PM   Result Value Ref Range    Color YELLOW/STRAW      Appearance CLOUDY (A) CLEAR      Specific gravity 1.015 1.003 - 1.030      pH (UA) 6.0 5.0 - 8.0      Protein NEGATIVE  NEG mg/dL    Glucose NEGATIVE  NEG mg/dL    Ketone NEGATIVE  NEG mg/dL    Bilirubin NEGATIVE  NEG      Blood NEGATIVE  NEG      Urobilinogen 0.2 0.2 - 1.0 EU/dL    Nitrites NEGATIVE  NEG      Leukocyte Esterase NEGATIVE  NEG      WBC 0-4 0 - 4 /hpf    RBC 0-5 0 - 5 /hpf    Epithelial cells FEW FEW /lpf    Bacteria NEGATIVE  NEG /hpf    UA:UC IF INDICATED CULTURE NOT INDICATED BY UA RESULT CNI      Hyaline cast 2-5 0 - 5 /lpf   LACTIC ACID, PLASMA    Collection Time: 03/06/17  8:10 PM   Result Value Ref Range    Lactic acid 0.8 0.4 - 2.0 MMOL/L   LITHIUM    Collection Time: 03/07/17 12:49 AM   Result Value Ref Range    Lithium 0.95 0.60 - 1.20 MMOL/L    Reported dose date: NOT PROVIDED      Reported dose time: NOT PROVIDED      Reported dose: NOT PROVIDED UNITS   LACTIC ACID, PLASMA    Collection Time: 03/07/17  1:36 AM   Result Value Ref Range    Lactic acid 0.7 0.4 - 2.0 MMOL/L   CBC WITH AUTOMATED DIFF    Collection Time: 03/07/17  4:44 AM   Result Value Ref Range    WBC 20.5 (H) 3.6 - 11.0 K/uL    RBC 3.45 (L) 3.80 - 5.20 M/uL    HGB 9.3 (L) 11.5 - 16.0 g/dL    HCT 30.7 (L) 35.0 - 47.0 %    MCV 89.0 80.0 - 99.0 FL    MCH 27.0 26.0 - 34.0 PG    MCHC 30.3 30.0 - 36.5 g/dL    RDW 17.0 (H) 11.5 - 14.5 %    PLATELET 390 (H) 012 - 400 K/uL    NEUTROPHILS 80 %    BAND NEUTROPHILS 7 %    LYMPHOCYTES 9 %    MONOCYTES 3 %    EOSINOPHILS 0 %    BASOPHILS 1 %    ABS. NEUTROPHILS 17.9 K/UL    ABS. LYMPHOCYTES 1.8 K/UL    ABS. MONOCYTES 0.6 K/UL    ABS. EOSINOPHILS 0.0 K/UL    ABS. BASOPHILS 0.2 K/UL    DF MANUAL      RBC COMMENTS NORMOCYTIC, NORMOCHROMIC     METABOLIC PANEL, COMPREHENSIVE    Collection Time: 03/07/17  4:44 AM   Result Value Ref Range    Sodium 143 136 - 145 mmol/L    Potassium 4.9 3.5 - 5.1 mmol/L    Chloride 109 (H) 97 - 108 mmol/L    CO2 27 21 - 32 mmol/L    Anion gap 7 5 - 15 mmol/L    Glucose 110 (H) 65 - 100 mg/dL    BUN 16 6 - 20 MG/DL    Creatinine 0.81 0.55 - 1.02 MG/DL    BUN/Creatinine ratio 20 12 - 20      GFR est AA >60 >60 ml/min/1.73m2    GFR est non-AA >60 >60 ml/min/1.73m2    Calcium 8.9 8.5 - 10.1 MG/DL    Bilirubin, total 0.3 0.2 - 1.0 MG/DL    ALT (SGPT) 8 (L) 12 - 78 U/L    AST (SGOT) 10 (L) 15 - 37 U/L    Alk.  phosphatase 79 45 - 117 U/L    Protein, total 7.1 6.4 - 8.2 g/dL    Albumin 2.1 (L) 3.5 - 5.0 g/dL    Globulin 5.0 (H) 2.0 - 4.0 g/dL    A-G Ratio 0.4 (L) 1.1 - 2.2     MAGNESIUM    Collection Time: 03/07/17  4:44 AM   Result Value Ref Range    Magnesium 2.0 1.6 - 2.4 mg/dL   PHOSPHORUS    Collection Time: 03/07/17  4:44 AM   Result Value Ref Range    Phosphorus 1.5 (L) 2.6 - 4.7 MG/DL           Assessment:     Active Problems:    Aspiration pneumonia of both upper lobes (HCC) (1/20/2017)      [facility] (1/15/2017)      Dysphagia (12/30/2014)      Bipolar 1 disorder ([facility] (11/29/2011)      Acute on chronic respiratory failure with hypoxemia ([facility] (3/6/2017)        Plan:     Recurrent resp failure,likely recurrent aspiration. NPO except meds for now,to use PEG. Continue current meds and treatments.

## 2017-03-07 NOTE — CONSULTS
Palliative Medicine Consult  Jose Alberto: [phone] ([phone])    Patient Name: Adarsh Tijerina  YOB: 1955    Date of Initial Consult: 3/7/17  Reason for Consult: care decisions  Requesting [address]  Primary Care Physician: Lizabeth Brasher MD      SUMMARY:   Adarsh Tijerina is a 57 y.o. with a past history of for bipolar type 1, laryngeal mass, and HTN, who presents via EMS to the ED c/o progressively worsening SOB and uncontrolled HTN since earlier today, who was admitted on 3/6/2017 from home with a diagnosis of acute on chronic respiratory failure with hypoxia. Current medical issues leading to Palliative Medicine involvement include: h/o head and neck cancer, respiratory failure, aspiration PNA. Per : pt has been receiving monthly opioid prescriptions on a regular basis from Dr. Gaudencio Jansen or Dr. Sarah Jones, the last filled was for Norco 10/325mg #180/30 days. PALLIATIVE DIAGNOSES:   1. Acute encephalopathy  2. Acute on chronic respiratory failure  3. Dysphagia   4. Chronic aspiration   5. 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 Met with daughters Malissa Acevedo and Jennifer Deras, obtained history, discussed goals of care. Counseled daughters about silent aspirations, pt most likely aspirating saliva, or possibly the foods she was eating PTA, that aspiration will be a life long problem that PEG will not completely prevent. 2. Will continue family support and education. 3. Schedule liquid hydrocodone; whether for pain or physical dependence. 4. Discontinue dilaudid. 5. With Dr. Blease Homans permission, refer to outpatient Palliative Medicine to continue support. 6. Initial consult note routed to primary continuity provider  7.  Communicated plan of care with: Palliative IDT, RN     GOALS OF CARE / TREATMENT PREFERENCES:   [====Goals of Care====]  GOALS OF CARE:  Patient / health care proxy stated goals:    Pending meeting with pt      TREATMENT PREFERENCES:   Code Status: Full Code    Advance Care Planning:  Advance Care Planning 3/7/2017   Patient's Healthcare Decision Maker is: Legal Next of Kin   Primary Decision Maker Name ashley   Primary Decision Maker Phone Number [phone]   Primary Decision Maker Relationship to Patient Spouse   Secondary Decision Maker Name -   Secondary Decision [address] Phone Number -   Secondary Decision Maker Relationship to Patient -   Confirm Advance Directive None   Patient Would Like to Complete Advance Directive No   Does the patient have other document types Other (comment)       Other:    The palliative care team has discussed with patient / health care proxy about goals of care / treatment preferences for patient.  [====Goals of Care====]         HISTORY:     History obtained from:     CHIEF COMPLAINT: SOB    HPI/SUBJECTIVE:    The patient is:   [x] Verbal and participatory  [] Non-participatory due to: confused    BIBA with c/o progressively worsening SOB, productive cough and subjective fever. Was recently admitted for PEG placement. Pt is s/p chemo, XRT, surgery for head and neck cancer, chronic pain and dysphagia with chronic aspiration of thin liquids. Family is concerned that she does not get her brand of lithium when here at [facility], and that Gabapentin and Dilaudid cause lethargy and confusion.        Clinical Pain Assessment (nonverbal scale for severity on nonverbal patients):   [++++ Clinical Pain Assessment++++]  [++++Pain Severity++++]: Pain: 4  [++++Pain Character++++]:   [++++Pain Duration++++]:   [++++Pain Effect++++]:   [++++Pain Factors++++]:   [++++Pain Frequency++++]:   [++++Pain Location++++]:   [++++ Clinical Pain Assessment++++]     FUNCTIONAL ASSESSMENT:     Palliative Performance Scale (PPS):  PPS: 30       PSYCHOSOCIAL/SPIRITUAL SCREENING:     Advance Care Planning:  Advance Care Planning 3/7/2017   Patient's Healthcare Decision Maker is: Legal Next of Kin   Primary Decision Maker Name ashley   Primary Decision Maker Phone Number [phone]   Primary Decision Maker Relationship to Patient Spouse   Secondary Decision Maker Name -   Secondary Decision Maker Phone Number -   Secondary Decision Maker Relationship to Patient -   Confirm Advance Directive None   Patient Would Like to Complete Advance Directive No   Does the patient have other document types Other (comment)        Any spiritual / Restorationism concerns:  [] Yes /  [x] No    Caregiver Burnout:  [] Yes /  [] No /  [x] No Caregiver Present      Anticipatory grief assessment:   [x] Normal  / [] Maladaptive       ESAS Anxiety: Anxiety: 4    ESAS Depression: Depression: 4        REVIEW OF SYSTEMS:     Positive and pertinent negative findings in ROS are noted above in HPI. The following systems were [x] reviewed / [] unable to be reviewed as noted in HPI  Other findings are noted below. Systems: constitutional, ears/nose/mouth/throat, respiratory, gastrointestinal, genitourinary, musculoskeletal, integumentary, neurologic, psychiatric, endocrine. Positive findings noted below. Modified ESAS Completed by: provider   Fatigue: 6 Drowsiness: 0   Depression: 4 Pain: 4   Anxiety: 4 Nausea: 0   Anorexia: 0 Dyspnea: 2     Constipation: No              PHYSICAL EXAM:     From RN flowsheet:  Wt Readings from Last 3 Encounters:   03/06/17 140 lb (63.5 kg)   03/02/17 142 lb 13.7 oz (64.8 kg)   02/21/17 145 lb 9.6 oz (66 kg)     Blood pressure 115/75, pulse (!) 110, temperature 98.5 °F (36.9 °C), resp. rate 24, height 5' 9\" (1.753 m), weight 140 lb (63.5 kg), SpO2 95 %.     Pain Scale 1: Numeric (0 - 10)  Pain Intensity 1: 0     Pain Location 1: Generalized     Pain Description 1: Aching  Pain Intervention(s) 1: Emotional support  Last bowel movement, if known:     Constitutional: WD, WN, SOB  Eyes: pupils equal, anicteric  ENMT: no nasal discharge, moist mucous membranes, poor dentition  Cardiovascular: tachy, regular rhythm, distal pulses intact  Respiratory: breathing not labored, symmetric, wheezing  Gastrointestinal: soft non-tender, +bowel sounds, PEG tube  Musculoskeletal: no deformity, no tenderness to palpation  Skin: warm, dry  Neurologic: following commands, moving all extremities  Psychiatric: flat affect, confused         HISTORY:     Active Problems:    Bipolar 1 disorder ([address] (11/29/2011)      Dysphagia (12/30/2014)      Sepsis ([address] (1/15/2017)      Aspiration pneumonia of both upper lobes ([address] (1/20/2017)      Acute on chronic respiratory failure with hypoxemia ([address] (3/6/2017)      Past Medical History:   Diagnosis Date    Bipolar 1 disorder ([address] 11/29/2011    Cancer ([address]     skin cancer buttocks    Cancer (HCC)     throat    Chronic pain     FIBROMYALGIA, LEGS    Encounter for long-term (current) use of other medications 11/29/2011    Essential hypertension, benign 11/29/2011    Laryngeal cancer ([address] 11/2/2015    Laryngeal mass     Psychiatric disorder     bipolar      Past Surgical History:   Procedure Laterality Date    HX BREAST AUGMENTATION Bilateral     SALINE IMPLANTS    HX GI      PLACEMENT OF G TUBE    HX GI      ESOPH. DILATATION    HX GYN      tubal pregnancy    HX HEENT      BX OF NECK MASS    HX HYSTERECTOMY      PLACE PERCUT GASTROSTOMY TUBE  10/28/2014     has been removed 2015    PLACE PERCUT GASTROSTOMY TUBE  2/28/2017           Family History   Problem Relation Age of Onset    Cancer Father      bone/skin/lung    Anesth Problems Neg Hx       History reviewed, no pertinent family history.   Social History   Substance Use Topics    Smoking status: Former Smoker     Packs/day: 1.00     Years: 40.00     Quit date: 8/27/2014    Smokeless tobacco: Never Used      Comment: PASSIVE SMOKE EXPOSURE,  SMOKES    Alcohol use No     No Known Allergies   Current Facility-Administered Medications   Medication Dose Route Frequency    albuterol-ipratropium (DUO-NEB) 2.5 MG-0.5 MG/3 ML  3 mL Nebulization Q4H RT    guaiFENesin (ROBITUSSIN) 100 mg/5 mL oral liquid 400 mg  400 mg Oral QID    methylPREDNISolone (PF) (SOLU-MEDROL) injection 40 mg  40 mg IntraVENous Q6H    HYDROcodone-acetaminophen (HYCET) 0.5-21.7 mg/mL oral solution 10 mg  10 mg Oral Q4H    vancomycin (VANCOCIN) 1,000 mg in 0.9% sodium chloride (MBP/ADV) 250 mL  1,000 mg IntraVENous Q12H    cefepime (MAXIPIME) 2 g in 0.9% sodium chloride (MBP/ADV) 100 mL  2 g IntraVENous Q8H    [START ON 3/8/2017] Vancomycin trough reminder note   Other ONCE    HYDROmorphone (PF) (DILAUDID) injection 1 mg  1 mg IntraVENous Q4H PRN    sodium chloride (NS) flush 5-10 mL  5-10 mL IntraVENous PRN    FLUoxetine (PROzac) capsule 20 mg  20 mg Oral DAILY    lithium carbonate CR (ESKALITH CR) tablet 450 mg  450 mg Oral QHS    traZODone (DESYREL) tablet 200 mg  200 mg Oral QHS    0.9% sodium chloride infusion  100 mL/hr IntraVENous CONTINUOUS    acetaminophen (TYLENOL) tablet 650 mg  650 mg Oral Q4H PRN    metroNIDAZOLE (FLAGYL) IVPB premix 500 mg  500 mg IntraVENous Q8H    heparin (porcine) injection 5,000 Units  5,000 Units SubCUTAneous Q12H          LAB AND IMAGING FINDINGS:     Lab Results   Component Value Date/Time    WBC 20.5 03/07/2017 04:44 AM    HGB 9.3 03/07/2017 04:44 AM    PLATELET 384 69/52/0675 04:44 AM     Lab Results   Component Value Date/Time    Sodium 143 03/07/2017 04:44 AM    Potassium 4.9 03/07/2017 04:44 AM    Chloride 109 03/07/2017 04:44 AM    CO2 27 03/07/2017 04:44 AM    BUN 16 03/07/2017 04:44 AM    Creatinine 0.81 03/07/2017 04:44 AM    Calcium 8.9 03/07/2017 04:44 AM    Magnesium 2.0 03/07/2017 04:44 AM    Phosphorus 1.5 03/07/2017 04:44 AM      Lab Results   Component Value Date/Time    AST (SGOT) 10 03/07/2017 04:44 AM    Alk.  phosphatase 79 03/07/2017 04:44 AM    Protein, total 7.1 03/07/2017 04:44 AM    Albumin 2.1 03/07/2017 04:44 AM    Globulin 5.0 03/07/2017 04:44 AM     No results found for: INR, PTMR, PTP, PT1, PT2, APTT   Lab Results   Component Value Date/Time    Iron 10 02/26/2017 04:28 AM    TIBC 169 02/26/2017 04:28 AM    Iron % saturation 6 02/26/2017 04:28 AM    Ferritin 250 02/26/2017 04:28 AM      Lab Results   Component Value Date/Time    pH 7.32 02/26/2017 07:24 AM    PCO2 61 02/26/2017 07:24 AM    PO2 50 02/26/2017 07:24 AM     No components found for: [place]   Lab Results   Component Value Date/Time    CK 29 03/06/2017 06:00 PM    CK - MB <1.0 03/06/2017 06:00 PM                Total time: 75 min  Counseling / coordination time: 65 min  > 50% counseling / coordination?: yes    Prolonged service was provided for  []30 min   []75 min in face to face time in the presence of the patient. Time Start:   Time End:   Note: this can only be billed with 28638 (initial) or 24360 (follow up). If multiple start / stop times, list each separately.

## 2017-03-07 NOTE — PROGRESS NOTES
Physical Therapy    Received PT eval order. Chart reviewed. Pt now being readied for transfer to IP floor. Do note some behavorial issues in notes with pt spitting on floor and throwing bed pan on floor; nurse states due to bipolar hx and pt is cooperative but that behavior can change quickly. Will see for eval when appropriate and available on the IP floor.     Jackie Corey PT

## 2017-03-07 NOTE — ED NOTES
Bedside and Verbal shift change report given to [facility] (oncoming nurse) by Berna Koehler (offgoing nurse). Report included the following information SBAR, ED Summary, Intake/Output, MAR and Recent Results. Monitor x 3. Call bell in reach. Bed in lowest position, with side rails up x 2. Pt updated on plan of care. Appears to be sleeping at this time.

## 2017-03-07 NOTE — PROGRESS NOTES
Pharmacy Automatic Renal Dosing Protocol - Antimicrobials    Indication for Antimicrobials: Aspiration Pneumonia, HCAP  Current Regimen of Each Antimicrobial:  Metronidazole 500mg IV q8h - started 3/6 day 2  Vancomycin 1000 mg IV q18h - started 3/6 day 2  Cefepime 2gm IV q12h - started 3/6 day 2     Microbiology Results (Current encounter)   Date/Time Order Name Specimen Source Lab Status   3/6/17 2113 C. DIFFICILE (DNA) -- --   3/6/17 2113 OVA & PARASITES, STOOL Stool --   3/6/17 2113 CULTURE, STOOL Stool --   3/6/17 1800 CULTURE, BLOOD Blood Preliminary result   3/6/17 1800 CULTURE, BLOOD Blood Preliminary result       CAPD, Intermittent Hemodialysis or Renal Replacement Therapy: no  Paralysis, amputations, malnutrition: no      Estimated Creatinine Clearance: 72.2 mL/min (based on Cr of 0.81). Estimated Creatinine Clearance (using IBW): 75.3 mL/min  Recent Labs      17   0444  17   1800   CREA  0.81  1.32*   BUN  16  29*   WBC  20.5*  31.5*   BANDS  7  12   NA  143  138   K  4.9  5.0   MG  2.0  2.4   CA  8.9  9.5   PHOS  1.5*   --    ALB  2.1*  2.6*   HGB  9.3*  10.1*   HCT  30.7*  33.2*   PLT  418*  532*   ALT  8*  8*     Temp (24hrs), Av.8 °F (37.1 °C), Min:98.4 °F (36.9 °C), Max:99.1 °F (37.3 °C)    Goal Vancomycin Level(s): 15-20      Impression/Plan:   Afebrile, improved leukocytosis and improved SCr. Adjusted Vancomycin to 1000mg IV q12h with next dose now. Adjusted Cefepime to 2gm IV q8h with next dose due 3/8 18:00. Continue Flagyl 500mg IV q8h. Vancomycin trough ordered before 3rd dose 3/9 10am.           Pharmacy will follow daily and adjust doses of monitored medications as appropriate for renal function and/or serum levels.      Thank you,  Sandro Seals, [facility]

## 2017-03-07 NOTE — ED NOTES
Walked into pt's room and pt appeared to be sleeping without oxygen on, pt's face appeared to be grey. Pt's O2 sats at 77%. Pt awoken easily and placed back on nasal cannula at 6L, now at 97% O2. Family at bedside.

## 2017-03-07 NOTE — ED NOTES
Pt states she can not take her liquid medications, discussed this concern with speech therapist and palliative care. Per palliative care, document when patient unable to take PO meds. Documentation updated on MAR.

## 2017-03-07 NOTE — ED NOTES
Made hospitalist aware regarding patient unable to get PO medications as she is NPO and has peg tube and orders state PO verses Peg tube, some medications can not be crushed and patient does not take some medications that are listed on the [facility]. Hospitalist stated clarify in the am with attending.

## 2017-03-07 NOTE — ED NOTES
Pt removed bedpan from under self and dumped pan on the floor, did not use call bell to inform staff. When staff entered room to do hourly rounding asked patient about bedpan on the floor and patient shrugged her shoulders.

## 2017-03-07 NOTE — ED NOTES
Bedside and Verbal shift change report given to Seamus Reardon RN  (oncoming nurse) by Alexia Damon RN  (offgoing nurse). Report included the following information SBAR, Kardex, ED Summary, Intake/Output, MAR, Recent Results and Med Rec Status.

## 2017-03-07 NOTE — ED NOTES
Discussed pt's PO med statues with Dr. Gary Leon, per Dr. Gary Leon pt is okay to take PO meds by mouth.

## 2017-03-07 NOTE — CONSULTS
[facility]  Pulmonary, Critical Care, and Sleep Medicine    Initial Patient Consult    Name: Maciel Fermin MRN: [id number]   : 1955 Hospital: [facility]   Date: 3/7/2017        IMPRESSION:   · Acute on chronic respiratory failure  · Aspiration pneumonia  · Sepsis  · COPD exacerbation  · Non compliance  · Bipolar disorder  · Laryngeal cancer      RECOMMENDATIONS:   · O2  · Jet nebs  · Steroids  · Empiric abx  · Mucolytics  · Tube feeds  · Full code? ??  · Palliative care consult today     Subjective: This patient has been seen and evaluated at the request of Dr. Buddy Nicholson for bilateral pneumonia. Patient is a 57 y.o. female recently discharged from [facility] against Dr Magdalena Melton advise  Returns with sob, cough  Started on IVF, IV abx  Today weak debilitated in no severe distress      Past Medical History:   Diagnosis Date    Bipolar 1 disorder ([address] 2011    Cancer ([address]     skin cancer buttocks    Cancer ([address]     throat    Chronic pain     FIBROMYALGIA, LEGS    Encounter for long-term (current) use of other medications 2011    Essential hypertension, benign 2011    L[facility] 2015    Laryngeal mass     Psychiatric disorder     bipolar      Past Surgical History:   Procedure Laterality Date    HX BREAST AUGMENTATION Bilateral     SALINE IMPLANTS    HX GI      PLACEMENT OF G TUBE    HX GI      ESOPH. DILATATION    HX GYN      tubal pregnancy    HX HEENT      BX OF NECK MASS    HX HYSTERECTOMY      PLACE PERCUT GASTROSTOMY TUBE  10/28/2014     has been removed     PLACE PERCUT GASTROSTOMY TUBE  2017           Prior to Admission medications    Medication Sig Start Date End Date Taking? Authorizing Provider   amLODIPine (NORVASC) 10 mg tablet Take 10 mg by mouth daily. Yes Phys Other, MD   HYDROcodone-acetaminophen (NORCO)  mg tablet Take 1 Tab by mouth every four (4) hours as needed for Pain.    Yes Phys MD Becky   VITAMIN B-12 1,000 mcg sublingual tablet 1,000 mcg by SubLINGual route daily. 11/11/16  Yes Historical Provider   metoprolol tartrate (LOPRESSOR) 25 mg tablet Take 1 Tab by mouth every twelve (12) hours. 1/24/17  Yes Marna Fabry, MD   lithium carbonate CR (ESKALITH CR) 450 mg CR tablet TAKE 1 TABLET BY ORAL ROUTE PER AT BEDTIME 11/9/16  Yes Historical Provider   FLUoxetine (PROZAC) 20 mg tablet Take 20 mg by mouth daily. 2/5/16  Yes Historical Provider   traZODone (DESYREL) 100 mg tablet Take 200 mg by mouth nightly. 11/2/15  Yes Historical Provider   HYDROmorphone (DILAUDID) 4 mg tablet Take 1 Tab by mouth every four (4) hours as needed. Max Daily Amount: 24 mg. Indications: Pain 3/3/17   Marna Fabry, MD   pregabalin (LYRICA) 100 mg capsule Take 1 Cap by mouth three (3) times daily. Max Daily Amount: 300 mg. Indications: FIBROMYALGIA 3/3/17   Marna Fabry, MD   albuterol-ipratropium (DUO-NEB) 2.5 mg-0.5 mg/3 ml nebu 3 mL by Nebulization route every four (4) hours as needed. 2/12/17   Yessi Montenegro MD   diphenhydrAMINE 12.5 mg/5 mL elix 40 mL, lidocaine 2 % soln 40 mL, aluminum & magnesium hydroxide-simethicone 400-400-40 mg/5 mL susp 40 mL Take 5 mL by mouth daily. Historical Provider   acetylcysteine (MUCOMYST) 100 mg/mL (10 %) nebulizer solution Take 4 mL by inhalation every four (4) hours. 1/26/17   Marna Fabry, MD   guaiFENesin-dextromethorphan [facility] DM) 600-30 mg per tablet Take 1 Tab by mouth two (2) times a day. 1/24/17   Marna Fabry, MD   senna (SENNA) 8.6 mg tablet Take 1 Tab by mouth daily.     Historical Provider     No Known Allergies   Social History   Substance Use Topics    Smoking status: Former Smoker     Packs/day: 1.00     Years: 40.00     Quit date: 8/27/2014    Smokeless tobacco: Never Used      Comment: PASSIVE SMOKE EXPOSURE,  SMOKES    Alcohol use No      Family History   Problem Relation Age of Onset    Cancer Father      bone/skin/lung    Anesth Problems Neg Hx         Current Facility-Administered Medications   Medication Dose Route Frequency    albuterol-ipratropium (DUO-NEB) 2.5 MG-0.5 MG/3 ML  3 mL Nebulization Q4H RT    guaiFENesin (ROBITUSSIN) 100 mg/5 mL oral liquid 400 mg  400 mg Oral QID    vancomycin (VANCOCIN) 1,000 mg in 0.9% sodium chloride (MBP/ADV) 250 mL  1,000 mg IntraVENous Q18H    FLUoxetine (PROzac) capsule 20 mg  20 mg Oral DAILY    lithium carbonate CR (ESKALITH CR) tablet 450 mg  450 mg Oral QHS    pregabalin (LYRICA) capsule 100 mg  100 mg Oral TID    traZODone (DESYREL) tablet 200 mg  200 mg Oral QHS    0.9% sodium chloride infusion  100 mL/hr IntraVENous CONTINUOUS    metroNIDAZOLE (FLAGYL) IVPB premix 500 mg  500 mg IntraVENous Q8H    heparin (porcine) injection 5,000 Units  5,000 Units SubCUTAneous Q12H    cefepime (MAXIPIME) 2 g in 0.9% sodium chloride (MBP/ADV) 100 mL  2 g IntraVENous Q12H       Review of Systems:  A comprehensive review of systems was negative except for: Respiratory: positive for cough, wheezing or dyspnea on exertion    Objective:   Vital Signs:    Visit Vitals    BP (!) 175/93    Pulse (!) 115    Temp 98.4 °F (36.9 °C)    Resp 30    Ht 5' 9\" (1.753 m)    Wt 63.5 kg (140 lb)    LMP  (LMP Unknown)    SpO2 95%    BMI 20.67 kg/m2       O2 Device: Nasal cannula   O2 Flow Rate (L/min): 6 l/min   Temp (24hrs), Av.8 °F (37.1 °C), Min:98.4 °F (36.9 °C), Max:99.1 °F (37.3 °C)       Intake/Output:   Last shift:         Last 3 shifts:    No intake or output data in the 24 hours ending 17 0926   Physical Exam:   General:  Alert, cooperative, no distress, appears stated age. Head:  Normocephalic, without obvious abnormality, atraumatic. Eyes:  Conjunctivae/corneas clear. PERRL, EOMs intact. Nose: Nares normal. Septum midline. Mucosa normal. No drainage or sinus tenderness.    Throat: Lips, mucosa, and tongue normal. Teeth and gums normal.   Neck: Supple, symmetrical, trachea midline, no adenopathy, thyroid: no enlargment/tenderness/nodules, no carotid bruit and no JVD. Back:   Symmetric, no curvature. ROM normal.   Lungs:   Decreased BS. Chest wall:  No tenderness or deformity. Heart:  Regular rate and rhythm, S1, S2 normal, no murmur, click, rub or gallop. Abdomen:   Soft, non-tender. Bowel sounds normal. No masses,  No organomegaly. Extremities: Extremities normal, atraumatic, no cyanosis or edema. Pulses: 2+ and symmetric all extremities. Skin: Skin color, texture, turgor normal. No rashes or lesions   Lymph nodes: Cervical, supraclavicular, and axillary nodes normal.   Neurologic: Grossly nonfocal     Data review:     Recent Results (from the past 24 hour(s))   EKG, 12 LEAD, INITIAL    Collection Time: 03/06/17  5:45 PM   Result Value Ref Range    Ventricular Rate 83 BPM    Atrial Rate 83 BPM    P-R Interval 154 ms    QRS Duration 88 ms    Q-T Interval 436 ms    QTC Calculation (Bezet) 512 ms    Calculated P Axis -17 degrees    Calculated R Axis -7 degrees    Calculated T Axis -15 degrees    Diagnosis       Normal sinus rhythm  Voltage criteria for left ventricular hypertrophy  Prolonged QT  Abnormal ECG  When compared with ECG of 23-FEB-2017 08:02,  Questionable change in QRS axis  T wave inversion now evident in Inferior leads     CBC WITH AUTOMATED DIFF    Collection Time: 03/06/17  6:00 PM   Result Value Ref Range    WBC 31.5 (H) 3.6 - 11.0 K/uL    RBC 3.71 (L) 3.80 - 5.20 M/uL    HGB 10.1 (L) 11.5 - 16.0 g/dL    HCT 33.2 (L) 35.0 - 47.0 %    MCV 89.5 80.0 - 99.0 FL    MCH 27.2 26.0 - 34.0 PG    MCHC 30.4 30.0 - 36.5 g/dL    RDW 16.8 (H) 11.5 - 14.5 %    PLATELET 594 (H) 125 - 400 K/uL    NEUTROPHILS 73 %    BAND NEUTROPHILS 12 %    LYMPHOCYTES 11 %    MONOCYTES 4 %    EOSINOPHILS 0 %    BASOPHILS 0 %    ABS. NEUTROPHILS 26.7 K/UL    ABS. LYMPHOCYTES 3.5 K/UL    ABS. MONOCYTES 1.3 K/UL    ABS. EOSINOPHILS 0.0 K/UL    ABS.  BASOPHILS 0.0 K/UL    RBC COMMENTS ANISOCYTOSIS  1+        DF MANUAL     CK W/ CKMB & INDEX    Collection Time: 03/06/17  6:00 PM   Result Value Ref Range    CK 29 26 - 192 U/L    CK - MB <1.0 <3.6 NG/ML    CK-MB Index Cannot be calulated 0 - 2.5     METABOLIC PANEL, COMPREHENSIVE    Collection Time: 03/06/17  6:00 PM   Result Value Ref Range    Sodium 138 136 - 145 mmol/L    Potassium 5.0 3.5 - 5.1 mmol/L    Chloride 100 97 - 108 mmol/L    CO2 34 (H) 21 - 32 mmol/L    Anion gap 4 (L) 5 - 15 mmol/L    Glucose 104 (H) 65 - 100 mg/dL    BUN 29 (H) 6 - 20 MG/DL    Creatinine 1.32 (H) 0.55 - 1.02 MG/DL    BUN/Creatinine ratio 22 (H) 12 - 20      GFR est AA 49 (L) >60 ml/min/1.73m2    GFR est non-AA 41 (L) >60 ml/min/1.73m2    Calcium 9.5 8.5 - 10.1 MG/DL    Bilirubin, total 0.3 0.2 - 1.0 MG/DL    ALT (SGPT) 8 (L) 12 - 78 U/L    AST (SGOT) 19 15 - 37 U/L    Alk.  phosphatase 83 45 - 117 U/L    Protein, total 7.9 6.4 - 8.2 g/dL    Albumin 2.6 (L) 3.5 - 5.0 g/dL    Globulin 5.3 (H) 2.0 - 4.0 g/dL    A-G Ratio 0.5 (L) 1.1 - 2.2     TROPONIN I    Collection Time: 03/06/17  6:00 PM   Result Value Ref Range    Troponin-I, Qt. <0.04 <0.05 ng/mL   PRO-BNP    Collection Time: 03/06/17  6:00 PM   Result Value Ref Range    NT pro- (H) 0 - 125 PG/ML   MAGNESIUM    Collection Time: 03/06/17  6:00 PM   Result Value Ref Range    Magnesium 2.4 1.6 - 2.4 mg/dL   CULTURE, BLOOD    Collection Time: 03/06/17  6:00 PM   Result Value Ref Range    Special Requests: NO SPECIAL REQUESTS      Culture result: NO GROWTH AFTER 11 HOURS     CULTURE, BLOOD    Collection Time: 03/06/17  6:00 PM   Result Value Ref Range    Special Requests: NO SPECIAL REQUESTS      Culture result: NO GROWTH AFTER 11 HOURS     URINALYSIS W/ REFLEX CULTURE    Collection Time: 03/06/17  7:02 PM   Result Value Ref Range    Color YELLOW/STRAW      Appearance CLOUDY (A) CLEAR      Specific gravity 1.015 1.003 - 1.030      pH (UA) 6.0 5.0 - 8.0      Protein NEGATIVE  NEG mg/dL Glucose NEGATIVE  NEG mg/dL    Ketone NEGATIVE  NEG mg/dL    Bilirubin NEGATIVE  NEG      Blood NEGATIVE  NEG      Urobilinogen 0.2 0.2 - 1.0 EU/dL    Nitrites NEGATIVE  NEG      Leukocyte Esterase NEGATIVE  NEG      WBC 0-4 0 - 4 /hpf    RBC 0-5 0 - 5 /hpf    Epithelial cells FEW FEW /lpf    Bacteria NEGATIVE  NEG /hpf    UA:UC IF INDICATED CULTURE NOT INDICATED BY UA RESULT CNI      Hyaline cast 2-5 0 - 5 /lpf   LACTIC ACID, PLASMA    Collection Time: 03/06/17  8:10 PM   Result Value Ref Range    Lactic acid 0.8 0.4 - 2.0 MMOL/L   LITHIUM    Collection Time: 03/07/17 12:49 AM   Result Value Ref Range    Lithium 0.95 0.60 - 1.20 MMOL/L    Reported dose date: NOT PROVIDED      Reported dose time: NOT PROVIDED      Reported dose: NOT PROVIDED UNITS   LACTIC ACID, PLASMA    Collection Time: 03/07/17  1:36 AM   Result Value Ref Range    Lactic acid 0.7 0.4 - 2.0 MMOL/L   CBC WITH AUTOMATED DIFF    Collection Time: 03/07/17  4:44 AM   Result Value Ref Range    WBC 20.5 (H) 3.6 - 11.0 K/uL    RBC 3.45 (L) 3.80 - 5.20 M/uL    HGB 9.3 (L) 11.5 - 16.0 g/dL    HCT 30.7 (L) 35.0 - 47.0 %    MCV 89.0 80.0 - 99.0 FL    MCH 27.0 26.0 - 34.0 PG    MCHC 30.3 30.0 - 36.5 g/dL    RDW 17.0 (H) 11.5 - 14.5 %    PLATELET 294 (H) 777 - 400 K/uL    NEUTROPHILS 80 %    BAND NEUTROPHILS 7 %    LYMPHOCYTES 9 %    MONOCYTES 3 %    EOSINOPHILS 0 %    BASOPHILS 1 %    ABS. NEUTROPHILS 17.9 K/UL    ABS. LYMPHOCYTES 1.8 K/UL    ABS. MONOCYTES 0.6 K/UL    ABS. EOSINOPHILS 0.0 K/UL    ABS.  BASOPHILS 0.2 K/UL    DF MANUAL      RBC COMMENTS NORMOCYTIC, NORMOCHROMIC     METABOLIC PANEL, COMPREHENSIVE    Collection Time: 03/07/17  4:44 AM   Result Value Ref Range    Sodium 143 136 - 145 mmol/L    Potassium 4.9 3.5 - 5.1 mmol/L    Chloride 109 (H) 97 - 108 mmol/L    CO2 27 21 - 32 mmol/L    Anion gap 7 5 - 15 mmol/L    Glucose 110 (H) 65 - 100 mg/dL    BUN 16 6 - 20 MG/DL    Creatinine 0.81 0.55 - 1.02 MG/DL    BUN/Creatinine ratio 20 12 - 20      GFR est AA >60 >60 ml/min/1.73m2    GFR est non-AA >60 >60 ml/min/1.73m2    Calcium 8.9 8.5 - 10.1 MG/DL    Bilirubin, total 0.3 0.2 - 1.0 MG/DL    ALT (SGPT) 8 (L) 12 - 78 U/L    AST (SGOT) 10 (L) 15 - 37 U/L    Alk.  phosphatase 79 45 - 117 U/L    Protein, total 7.1 6.4 - 8.2 g/dL    Albumin 2.1 (L) 3.5 - 5.0 g/dL    Globulin 5.0 (H) 2.0 - 4.0 g/dL    A-G Ratio 0.4 (L) 1.1 - 2.2     MAGNESIUM    Collection Time: 03/07/17  4:44 AM   Result Value Ref Range    Magnesium 2.0 1.6 - 2.4 mg/dL   PHOSPHORUS    Collection Time: 03/07/17  4:44 AM   Result Value Ref Range    Phosphorus 1.5 (L) 2.6 - 4.7 MG/DL       Imaging:  I have personally reviewed the patients radiographs and have reviewed the reports:  CXR: marsha Fuentes MD

## 2017-03-07 NOTE — PROGRESS NOTES
SPEECH THERAPY SCREENING:  SERVICES MAY BE INDICATED     [facility] therapy based on results obtained during the nursing admission assessment. The patients chart was reviewed and the patient may be appropriate for a skilled therapy evaluation. Please order a consult for speech therapy if you are in agreement and would like an evaluation to be completed. She had a MBS on a previous admission and was mostly drinking thin liquids. She likes carnation instant breakfast. Thank you.     Isai Garcia, SLP

## 2017-03-07 NOTE — ED NOTES
Bedside and Verbal shift change report given to [address] [place] [address] Avenue (oncoming nurse) by VERO Galvez (offgoing nurse). Report included the following information SBAR, ED Summary, Intake/Output, MAR and Recent Results. Monitor x 3. Call bell in reach. Bed in lowest position, with side rails up x 2. Pt updated on plan of care.

## 2017-03-07 NOTE — H&P
Hospitalist Admission Note    NAME: Aminah Ibarra   :  1955   MRN:  [id number]     Date/Time:  3/6/2017 9:15 PM    Patient PCP: Mj Kay MD  ________________________________________________________________________    My assessment of this patient's clinical condition and my plan of care is as follows. Assessment / Plan:  Acute on Chronic Hypoxic Respiratory Failure POA: O2 sat 88% on 5L, will c/w supplemental O2. Septic Shock: start IVF and monitor in stepdown unit. Aspiration PNA: mucus plugging? Will start bronchodilators, Cefepime/Flagyl/Vancomycin, check sputum. Get Pulmonology input. Diarrhea: to r/o C. Diff, keep on isolation, on IV Flagyl for now. Dysphagia/PEG tube: hope to resume tube feedings tomorrow, will get Nutrition advise. HTN: BP in the low side, hold BP meds. Bipolar Disorder: c/w home regimen, check lithium levels  Encephalopathy: likely 2ry to sepsis, will monitor. ARF: c/w IVF and monitor. Code Status: Full Code  Surrogate Decision Maker: LAZ Boyce  DVT Prophylaxis: Heparin  GI Prophylaxis: not indicated  Baseline: Fairly independent but growing weak, was recently D/c from [facility]. Subjective:   CHIEF COMPLAINT: Confused patient can not provide a meaningful history    HISTORY OF PRESENT ILLNESS:     Wilma Power is a 57 y.o.  female with  pmhx significant for bipolar type 1, laryngeal mass, and HTN, who presents via EMS to the ED c/o progressively worsening SOB and uncontrolled HTN since earlier today. Pt additionally complains of productive cough with grey sputum and a subjective fever. She states that she's had a lot of bowel movements recently, but cannot recall when her last bowel movement was. Per pt's family, she appears to be confused and more lethargic since yesterday and report that she's been eating ice cream and Boost by mouth.  They've had to increase her supplemental oxygen, reporting that she is usually on 2L NC oxygen at home. Pt arrived to ED on 2L NC with oxygen saturation around 88%. She states she's had a decrease in appetite, stating that she's only been eating ice cream and being fed via her peg tube. Pt is currently diagnosed with throat cancer, but denies being actively treated for the cancer. Pt denies hx of cardiac illness. At this time patient is lying in bed is confused and can not provide a meaningful history, data collected from chart, no family in the room. We were asked to admit for work up and evaluation of the above problems. Past Medical History:   Diagnosis Date    Bipolar 1 disorder ([facility] 11/29/2011    Cancer ([facility]     skin cancer buttocks    Cancer (HCC)     throat    Chronic pain     FIBROMYALGIA, LEGS    Encounter for long-term (current) use of other medications 11/29/2011    Essential hypertension, benign 11/29/2011    Laryngeal cancer ([facility] 11/2/2015    Laryngeal mass     Psychiatric disorder     bipolar        Past Surgical History:   Procedure Laterality Date    HX BREAST AUGMENTATION Bilateral     SALINE IMPLANTS    HX GI      PLACEMENT OF G TUBE    HX GI      ESOPH. DILATATION    HX GYN      tubal pregnancy    HX HEENT      BX OF NECK MASS    HX HYSTERECTOMY      PLACE PERCUT GASTROSTOMY TUBE  10/28/2014     has been removed 2015    PLACE PERCUT GASTROSTOMY TUBE  2/28/2017            Social History   Substance Use Topics    Smoking status: Former Smoker     Packs/day: 1.00     Years: 40.00     Quit date: 8/27/2014    Smokeless tobacco: Never Used      Comment: PASSIVE SMOKE EXPOSURE,  SMOKES    Alcohol use No        Family History   Problem Relation Age of Onset    Cancer Father      bone/skin/lung    Anesth Problems Neg Hx      No Known Allergies     Prior to Admission medications    Medication Sig Start Date End Date Taking? Authorizing Provider   HYDROmorphone (DILAUDID) 4 mg tablet Take 1 Tab by mouth every four (4) hours as needed. Max Daily Amount: 24 mg. Indications: Pain 3/3/17   Del Milton MD   pregabalin (LYRICA) 100 mg capsule Take 1 Cap by mouth three (3) times daily. Max Daily Amount: 300 mg. Indications: FIBROMYALGIA 3/3/17   Del Milton MD   albuterol-ipratropium (DUO-NEB) 2.5 mg-0.5 mg/3 ml nebu 3 mL by Nebulization route every four (4) hours as needed. 2/12/17   Tana Knox MD   diphenhydrAMINE 12.5 mg/5 mL elix 40 mL, lidocaine 2 % soln 40 mL, aluminum & magnesium hydroxide-simethicone 400-400-40 mg/5 mL susp 40 mL Take 5 mL by mouth daily. Historical Provider   VITAMIN B-12 1,000 mcg sublingual tablet 1,000 mcg by SubLINGual route daily. 11/11/16   Historical Provider   acetylcysteine (MUCOMYST) 100 mg/mL (10 %) nebulizer solution Take 4 mL by inhalation every four (4) hours. 1/26/17   Del Milton MD   metoprolol tartrate (LOPRESSOR) 25 mg tablet Take 1 Tab by mouth every twelve (12) hours. 1/24/17   Del Milton MD   guaiFENesin-dextromethorphan [facility] DM) 600-30 mg per tablet Take 1 Tab by mouth two (2) times a day. 1/24/17   Del Milton MD   senna (SENNA) 8.6 mg tablet Take 1 Tab by mouth daily. Historical Provider   lithium carbonate CR (ESKALITH CR) 450 mg CR tablet TAKE 1 TABLET BY ORAL ROUTE PER AT BEDTIME 11/9/16   Historical Provider   FLUoxetine (PROZAC) 20 mg tablet Take 20 mg by mouth daily. 2/5/16   Historical Provider   traZODone (DESYREL) 100 mg tablet Take 200 mg by mouth nightly. 11/2/15   Historical Provider       REVIEW OF SYSTEMS:     I am not able to complete the review of systems because:    The patient is intubated and sedated   y The patient has altered mental status due to his acute medical problems    The patient has baseline aphasia from prior stroke(s)    The patient has baseline dementia and is not reliable historian    The patient is in acute medical distress and unable to provide information           Total of 12 systems reviewed as follows:       POSITIVE= underlined text  Negative = text not underlined  General:  fever, chills, sweats, generalized weakness, weight loss/gain,      loss of appetite   Eyes:    blurred vision, eye pain, loss of vision, double vision  ENT:    rhinorrhea, pharyngitis   Respiratory:   cough, sputum production, SOB, PENALOZA, wheezing, pleuritic pain   Cardiology:   chest pain, palpitations, orthopnea, PND, edema, syncope   Gastrointestinal:  abdominal pain , N/V, diarrhea, dysphagia, constipation, bleeding   Genitourinary:  frequency, urgency, dysuria, hematuria, incontinence   Muskuloskeletal :  arthralgia, myalgia, back pain  Hematology:  easy bruising, nose or gum bleeding, lymphadenopathy   Dermatological: rash, ulceration, pruritis, color change / jaundice  Endocrine:   hot flashes or polydipsia   Neurological:  headache, dizziness, confusion, focal weakness, paresthesia,     Speech difficulties, memory loss, gait difficulty  Psychological: Feelings of anxiety, depression, agitation    Objective:   VITALS:    Visit Vitals    /70 (BP 1 Location: Left arm, BP Patient Position: At rest)    Pulse 96    Temp 98.8 °F (37.1 °C)    Resp 28    Ht 5' 9\" (1.753 m)    Wt 63.5 kg (140 lb)    SpO2 91%    BMI 20.67 kg/m2       PHYSICAL EXAM:    General:    Alert, cooperative, SOB, appears stated age. HEENT: Atraumatic, anicteric sclerae, pink conjunctivae     No oral ulcers, mucosa moist, throat clear, dentition poor  Neck:  Supple, symmetrical,  thyroid: non tender  Lungs:   Coarse BS, rhonchi, wheezing. Crackle sin bases  Chest wall:  No tenderness  No Accessory muscle use. Heart:   Regular  rhythm,  No  murmur   No edema  Abdomen:   Soft, non-tender. Not distended. Bowel sounds normal  Extremities: No cyanosis. No clubbing      Capillary refill normal,  Radial pulse 2+  Skin:     Not pale. Not Jaundiced  No rashes   Psych:  Poor  insight. Not depressed. Not anxious or agitated.   Neurologic: Awake, oriented x1, confused, GCS [id number]     _______________________________________________________________________  Care Plan discussed with:    Comments   Patient y    Family      RN y    Care Manager                    Consultant:      _______________________________________________________________________  Expected  Disposition:   Home with Family    HH/PT/OT/RN    SNF/LTC y   [de-identified]    ________________________________________________________________________  TOTAL TIME:   Minutes    Critical Care Provided  61   Minutes non procedure based      Comments    y Reviewed previous records   >50% of visit spent in counseling and coordination of care  Discussion with patient and/or family and questions answered       ________________________________________________________________________  Signed: Gary Carroll MD    Procedures: see electronic medical records for all procedures/Xrays and details which were not copied into this note but were reviewed prior to creation of Plan.     LAB DATA REVIEWED:    Recent Results (from the past 24 hour(s))   EKG, 12 LEAD, INITIAL    Collection Time: 03/06/17  5:45 PM   Result Value Ref Range    Ventricular Rate 83 BPM    Atrial Rate 83 BPM    P-R Interval 154 ms    QRS Duration 88 ms    Q-T Interval 436 ms    QTC Calculation (Bezet) 512 ms    Calculated P Axis -17 degrees    Calculated R Axis -7 degrees    Calculated T Axis -15 degrees    Diagnosis       Normal sinus rhythm  Voltage criteria for left ventricular hypertrophy  Prolonged QT  Abnormal ECG  When compared with ECG of 23-FEB-2017 08:02,  Questionable change in QRS axis  T wave inversion now evident in Inferior leads     CBC WITH AUTOMATED DIFF    Collection Time: 03/06/17  6:00 PM   Result Value Ref Range    WBC 31.5 (H) 3.6 - 11.0 K/uL    RBC 3.71 (L) 3.80 - 5.20 M/uL    HGB 10.1 (L) 11.5 - 16.0 g/dL    HCT 33.2 (L) 35.0 - 47.0 %    MCV 89.5 80.0 - 99.0 FL    MCH 27.2 26.0 - 34.0 PG    MCHC 30.4 30.0 - 36.5 g/dL    RDW 16.8 (H) 11.5 - 14.5 %    PLATELET 532 (H) 150 - 400 K/uL    NEUTROPHILS 73 %    BAND NEUTROPHILS 12 %    LYMPHOCYTES 11 %    MONOCYTES 4 %    EOSINOPHILS 0 %    BASOPHILS 0 %    ABS. NEUTROPHILS 26.7 K/UL    ABS. LYMPHOCYTES 3.5 K/UL    ABS. MONOCYTES 1.3 K/UL    ABS. EOSINOPHILS 0.0 K/UL    ABS. BASOPHILS 0.0 K/UL    RBC COMMENTS ANISOCYTOSIS  1+        DF MANUAL     CK W/ CKMB & INDEX    Collection Time: 03/06/17  6:00 PM   Result Value Ref Range    CK 29 26 - 192 U/L    CK - MB <1.0 <3.6 NG/ML    CK-MB Index Cannot be calulated 0 - 2.5     METABOLIC PANEL, COMPREHENSIVE    Collection Time: 03/06/17  6:00 PM   Result Value Ref Range    Sodium 138 136 - 145 mmol/L    Potassium 5.0 3.5 - 5.1 mmol/L    Chloride 100 97 - 108 mmol/L    CO2 34 (H) 21 - 32 mmol/L    Anion gap 4 (L) 5 - 15 mmol/L    Glucose 104 (H) 65 - 100 mg/dL    BUN 29 (H) 6 - 20 MG/DL    Creatinine 1.32 (H) 0.55 - 1.02 MG/DL    BUN/Creatinine ratio 22 (H) 12 - 20      GFR est AA 49 (L) >60 ml/min/1.73m2    GFR est non-AA 41 (L) >60 ml/min/1.73m2    Calcium 9.5 8.5 - 10.1 MG/DL    Bilirubin, total 0.3 0.2 - 1.0 MG/DL    ALT (SGPT) 8 (L) 12 - 78 U/L    AST (SGOT) 19 15 - 37 U/L    Alk.  phosphatase 83 45 - 117 U/L    Protein, total 7.9 6.4 - 8.2 g/dL    Albumin 2.6 (L) 3.5 - 5.0 g/dL    Globulin 5.3 (H) 2.0 - 4.0 g/dL    A-G Ratio 0.5 (L) 1.1 - 2.2     TROPONIN I    Collection Time: 03/06/17  6:00 PM   Result Value Ref Range    Troponin-I, Qt. <0.04 <0.05 ng/mL   PRO-BNP    Collection Time: 03/06/17  6:00 PM   Result Value Ref Range    NT pro- (H) 0 - 125 PG/ML   MAGNESIUM    Collection Time: 03/06/17  6:00 PM   Result Value Ref Range    Magnesium 2.4 1.6 - 2.4 mg/dL   URINALYSIS W/ REFLEX CULTURE    Collection Time: 03/06/17  7:02 PM   Result Value Ref Range    Color YELLOW/STRAW      Appearance CLOUDY (A) CLEAR      Specific gravity 1.015 1.003 - 1.030      pH (UA) 6.0 5.0 - 8.0      Protein NEGATIVE  NEG mg/dL    Glucose NEGATIVE  NEG mg/dL    Ketone NEGATIVE  NEG mg/dL Bilirubin NEGATIVE  NEG      Blood NEGATIVE  NEG      Urobilinogen 0.2 0.2 - 1.0 EU/dL    Nitrites NEGATIVE  NEG      Leukocyte Esterase NEGATIVE  NEG      WBC 0-4 0 - 4 /hpf    RBC 0-5 0 - 5 /hpf    Epithelial cells FEW FEW /lpf    Bacteria NEGATIVE  NEG /hpf    UA:UC IF INDICATED CULTURE NOT INDICATED BY UA RESULT CNI      Hyaline cast 2-5 0 - 5 /lpf   LACTIC ACID, PLASMA    Collection Time: 03/06/17  8:10 PM   Result Value Ref Range    Lactic acid 0.8 0.4 - 2.0 MMOL/L

## 2017-03-08 NOTE — PROGRESS NOTES
General Daily Progress Note    Admit Date: 3/6/2017  Hospital day 3    Subjective:     Patient has improving dyspnea,pain c/o. Ailyn Riser    Medication side effects: none    Current Facility-Administered Medications   Medication Dose Route Frequency    guaiFENesin (ROBITUSSIN) 100 mg/5 mL oral liquid 400 mg  400 mg Oral QID    methylPREDNISolone (PF) (SOLU-MEDROL) injection 40 mg  40 mg IntraVENous Q6H    HYDROcodone-acetaminophen (HYCET) 0.5-21.7 mg/mL oral solution 10 mg  10 mg Oral Q4H    vancomycin (VANCOCIN) 1,000 mg in 0.9% sodium chloride (MBP/ADV) 250 mL  1,000 mg IntraVENous Q12H    cefepime (MAXIPIME) 2 g in 0.9% sodium chloride (MBP/ADV) 100 mL  2 g IntraVENous Q8H    Vancomycin trough reminder note   Other ONCE    albuterol-ipratropium (DUO-NEB) 2.5 MG-0.5 MG/3 ML  3 mL Nebulization Q6H RT    albuterol-ipratropium (DUO-NEB) 2.5 MG-0.5 MG/3 ML  3 mL Nebulization Q4H PRN    sodium chloride (NS) flush 5-10 mL  5-10 mL IntraVENous PRN    FLUoxetine (PROzac) capsule 20 mg  20 mg Oral DAILY    lithium carbonate CR (ESKALITH CR) tablet 450 mg  450 mg Oral QHS    traZODone (DESYREL) tablet 200 mg  200 mg Oral QHS    0.9% sodium chloride infusion  100 mL/hr IntraVENous CONTINUOUS    metroNIDAZOLE (FLAGYL) IVPB premix 500 mg  500 mg IntraVENous Q8H    heparin (porcine) injection 5,000 Units  5,000 Units SubCUTAneous Q12H        Review of Systems  Constitutional: positive for fatigue and malaise  Respiratory: positive for cough, pleurisy/chest pain or dyspnea on exertion  Cardiovascular: negative  Gastrointestinal: positive for dysphagia    Objective:     Patient Vitals for the past 8 hrs:   BP Temp Pulse Resp SpO2   03/08/17 0654 149/83 98 °F (36.7 °C) (!) 103 20 92 %   03/08/17 0417 146/89 98 °F (36.7 °C) (!) 107 20 95 %   03/08/17 0206 - - - - 92 %   03/07/17 2358 120/69 98.2 °F (36.8 °C) (!) 103 - 95 %     03/07 1901 - 03/08 0700  In: 140   Out: 1100 [Urine:1100]  03/06 0701 - 03/07 1900  In: -   Out: 1200 [Urine:1200]    Physical Exam:   Visit Vitals    /83 (BP 1 Location: Right arm, BP Patient Position: At rest)    Pulse (!) 103    Temp 98 °F (36.7 °C)    Resp 20    Ht 5' 9\" (1.753 m)    Wt 140 lb (63.5 kg)    LMP  (LMP Unknown)    SpO2 92%    BMI 20.67 kg/m2     General appearance: alert, fatigued, cooperative, no distress, appears stated age  Lungs: rhonchi L base  Heart: regular rate and rhythm  Abdomen: soft, non-tender. Bowel sounds normal. No masses,  no organomegaly           Data Review   Recent Results (from the past 24 hour(s))   CBC WITH AUTOMATED DIFF    Collection Time: 03/08/17  4:20 AM   Result Value Ref Range    WBC 9.1 3.6 - 11.0 K/uL    RBC 3.27 (L) 3.80 - 5.20 M/uL    HGB 8.6 (L) 11.5 - 16.0 g/dL    HCT 28.9 (L) 35.0 - 47.0 %    MCV 88.4 80.0 - 99.0 FL    MCH 26.3 26.0 - 34.0 PG    MCHC 29.8 (L) 30.0 - 36.5 g/dL    RDW 17.5 (H) 11.5 - 14.5 %    PLATELET 688 (H) 570 - 400 K/uL    NEUTROPHILS 89 (H) 32 - 75 %    LYMPHOCYTES 9 (L) 12 - 49 %    MONOCYTES 2 (L) 5 - 13 %    EOSINOPHILS 0 0 - 7 %    BASOPHILS 0 0 - 1 %    ABS. NEUTROPHILS 8.2 (H) 1.8 - 8.0 K/UL    ABS. LYMPHOCYTES 0.8 0.8 - 3.5 K/UL    ABS. MONOCYTES 0.2 0.0 - 1.0 K/UL    ABS. EOSINOPHILS 0.0 0.0 - 0.4 K/UL    ABS. BASOPHILS 0.0 0.0 - 0.1 K/UL           Assessment:     Active Problems:    Aspiration pneumonia of both upper lobes (HCC) (1/20/2017)      Sepsis ([facility] (1/15/2017)      Dysphagia (12/30/2014)      Bipolar 1 disorder ([facility] (11/29/2011)      Acute on chronic respiratory failure with hypoxemia ([facility] (3/6/2017)        Plan:     Feeling some better. Less cough ,dyspnea. Pain control fair. Hypoxia improving. Continue current meds and treatments

## 2017-03-08 NOTE — PROGRESS NOTES
Pharmacy Medication Reconciliation     The patient was interviewed regarding current PTA medication list, use and drug allergies. The patient was questioned regarding use of any other inhalers, topical products, over the counter medications, herbal medications, vitamin products or ophthalmic/nasal/otic medication use. Allergy Update: see chart    Recommendations/Findings: The following amendments were made to the patient's active medication list on file at [facility]:     1) Additions:   - Bisacodyl 5 mg daily as needed for constipation    2) Deletions:   - Hydromorphone 4 mg by mouth every 4 hours as needed (patient states she does not take this medication and it is not in Rx query)  - Pregabalin (Lyrica) 100 mg by mouth three times daily for fibromyalgia (last filled per Rx query 16)  - Senna 8.6 mg once daily (patient takes bisacodyl instead)     3) Patient states that she threw away both gabapentin and pregabalin. Gabapentin was last filled for a 15 day supply on 17 with instructions of \"600 mg three times daily. \" Pregabalin has not been filled in over a year (16). 4) Patient states that she either swishes and spits or swallows the diphenhydramine/lidocaine/aluminum and magnesium hydroxide/simethicone suspension for throat pain. 5) Patient uses [facility] and [facility] on [facility]. -Clarified PTA med list with patient. PTA medication list was corrected to the following:     Prior to Admission Medications   Prescriptions Last Dose Informant Patient Reported? Taking? FLUoxetine (PROZAC) 20 mg tablet   Yes Yes   Sig: Take 20 mg by mouth daily. HYDROcodone-acetaminophen (NORCO)  mg tablet   Yes Yes   Sig: Take 1 Tab by mouth every four (4) hours as needed for Pain. Max of 6 tablets daily. VITAMIN B-12 1,000 mcg sublingual tablet   Yes Yes   Si,000 mcg by SubLINGual route daily.    acetylcysteine (MUCOMYST) 100 mg/mL (10 %) nebulizer solution Unknown at Unknown time  No No   Sig: Take 4 mL by inhalation every four (4) hours. albuterol-ipratropium (DUO-NEB) 2.5 mg-0.5 mg/3 ml nebu Unknown at Unknown time  No No   Sig: 3 mL by Nebulization route every four (4) hours as needed. amLODIPine (NORVASC) 10 mg tablet   Yes Yes   Sig: Take 10 mg by mouth daily. bisacodyl 5 mg tab   Yes Yes   Sig: Take 1 Tab by mouth daily as needed (constipation). diphenhydrAMINE 12.5 mg/5 mL elix 40 mL, lidocaine 2 % soln 40 mL, aluminum & magnesium hydroxide-simethicone 400-400-40 mg/5 mL susp 40 mL Unknown at Unknown time  Yes No   Sig: Take 5 mL by mouth daily. Swish and spit or swallow. guaiFENesin-dextromethorphan (MUCINEX DM) 600-30 mg per tablet Unknown at Unknown time  No No   Sig: Take 1 Tab by mouth two (2) times a day. lithium carbonate CR (ESKALITH CR) 450 mg CR tablet   Yes Yes   Sig: TAKE 1 TABLET BY ORAL ROUTE PER AT BEDTIME   metoprolol tartrate (LOPRESSOR) 25 mg tablet   No Yes   Sig: Take 1 Tab by mouth every twelve (12) hours. traZODone (DESYREL) 100 mg tablet  Self Yes Yes   Sig: Take 200 mg by mouth nightly.       Facility-Administered Medications: None     Thank you,  Joby Barnes  PharmD Candidate [id number]

## 2017-03-08 NOTE — PROGRESS NOTES
SPEECH LANGUAGE PATHOLOGY BEDSIDE SWALLOW EVALUATION  Patient: Charmain Halsted (49 y.o. female)  Date: 3/8/2017  Primary Diagnosis: Acute on chronic respiratory failure with hypoxemia [facility])        Precautions: aspiration  Fall      ASSESSMENT :  Based on the objective data described below, the patient presents with mild oral and mod to severe pharyngeal dysphagia. Has only a top denture and is otherwise edentulous. Mild swallow delay and weak, reduced hyolaryngeal excursion palpated during the swallow. No coughing noted but she is a silent aspirator from previous MBS. Since she has had a PEG and is continuing to aspirate (saliva?) and get pneumonia, recommend strict NPO. She is currently eating ice chips per her physician's approval. For comfort may have to let her continue with risk of aspiration accepted. Most likely will need comfort care. Patient will benefit from skilled intervention to address the above impairments. Patients rehabilitation potential is considered to be Fair  Factors which may influence rehabilitation potential include:   [ ]            None noted  [X]            Mental ability/status  [X]            Medical condition  [X]            Home/family situation and support systems  [ ]            Safety awareness  [ ]            Pain tolerance/management  [ ]            Other:        PLAN :  Recommendations and Planned Interventions:  Could offer swallowing tx. Unsure if she received OP or home health speech /swallowing tx that was recommended in January. Comfort care should be considered. Frequency/Duration: Patient will be followed by speech-language pathology 2 times a week to address goals. Discharge Recommendations: To Be Determined       SUBJECTIVE:   Patient stated she did not eat ice cream after getting her PEG.        OBJECTIVE:       Past Medical History:   Diagnosis Date    Bipolar 1 disorder ([facility] 11/29/2011    Cancer ([facility] [facility]       skin cancer buttocks    Cancer ([facility] [address] throat    Chronic pain       FIBROMYALGIA, LEGS    Encounter for long-term (current) use of other medications 11/29/2011    Essential hypertension, benign 11/29/2011    L[facility] cancer ([facility] [address] 11/2/2015    Laryngeal mass      Psychiatric disorder       bipolar     Past Surgical History:   Procedure Laterality Date    HX BREAST AUGMENTATION Bilateral       SALINE IMPLANTS    HX GI         PLACEMENT OF G TUBE    HX GI         ESOPH. DILATATION    HX GYN         tubal pregnancy    HX HEENT         BX OF NECK MASS    HX HYSTERECTOMY        PLACE PERCUT GASTROSTOMY TUBE   10/28/2014      has been removed 2015    PLACE PERCUT GASTROSTOMY TUBE   2/28/2017           Prior Level of Function/Home Situation:   Home Situation  Home Environment: Private residence  # Steps to Enter: 4  Rails to Enter: Yes  Hand Rails : Bilateral  Wheelchair Ramp: No  One/Two Story Residence: One story  Living Alone: No  Support Systems: Family member(s)  Patient Expects to be Discharged to[de-identified] Private residence  Current DME Used/Available at Home: Oxygen, portable  Tub or Shower Type: Tub/Shower combination  Diet prior to admission: ice chips per pt  Current Diet:  Ice chips per Dr. Wendie Perez   Cognitive and Communication Status:  Neurologic State: Alert  Orientation Level: Oriented X4  Cognition: Appropriate for age attention/concentration, Appropriate decision making  Perception: Appears intact  Perseveration: No perseveration noted  Safety/Judgement: Awareness of environment  Oral Assessment:  Oral Assessment  Labial: No impairment  Dentition: Upper dentures;Edentulous  Lingual: No impairment  Mandible: No impairment  P.O. Trials:  Patient Position: upright in bed  Vocal quality prior to P.O.: No impairment  Consistency Presented: Thin liquid; Ice chips  How Presented: Self-fed/presented     Bolus Acceptance: No impairment  Bolus Formation/Control: Impaired  Type of Impairment: Delayed;Mastication  Propulsion: No impairment  Oral Residue: None  Initiation of Swallow: No impairment  Laryngeal Elevation: Decreased;Weak  Aspiration Signs/Symptoms: None  Pharyngeal Phase Characteristics: No impairment, issues, or problems               Oral Phase Severity: Mild  Pharyngeal Phase Severity : Moderate     NOMS:   The NOMS functional outcome measure was used to quantify this patient's level of swallowing impairment. Based on the NOMS, the patient was determined to be at level 2 for swallow function      G Codes: In compliance with CMSs Claims Based Outcome Reporting, the following G-code set was chosen for this patient based the use of the NOMS functional outcome to quantify this patient's level of swallowing impairment. Using the NOMS, the patient was determined to be at level 2 for swallow function which correlates with the CM= 80-99% level of severity. Based on the objective assessment provided within this note, the current, goal, and discharge g-codes are as follows:     Swallow  Swallowing:   Swallow Current Status CM= 80-99%   Swallow Goal Status CL= 60-79%         NOMS Swallowing Levels:  Level 1 (CN): NPO  Level 2 (CM): NPO but takes consistency in therapy  Level 3 (CL): Takes less than 50% of nutrition p.o. and continues with nonoral feedings; and/or safe with mod cues; and/or max diet restriction  Level 4 (CK): Safe swallow but needs mod cues; and/or mod diet restriction; and/or still requires some nonoral feeding/supplements  Level 5 (CJ): Safe swallow with min diet restriction; and/or needs min cues  Level 6 (CI): Independent with p.o.; rare cues; usually self cues; may need to avoid some foods or needs extra time  Level 7 ([address] [place] [address]): Independent for all p.o.  PETTY. (2003). National Outcomes Measurement System (NOMS): Adult Speech-Language Pathology User's Guide.            Pain:  Pain Scale 1: Numeric (0 - 10)        After treatment:   [ ]            Patient left in no apparent distress sitting up in chair  [X] Patient left in no apparent distress in bed  [X]            Call bell left within reach  [X]            Nursing notified  [ ]            Caregiver present  [ ]            Bed alarm activated      COMMUNICATION/EDUCATION:   The patients plan of care including recommendations, planned interventions, and recommended diet changes were discussed with: Registered Nurse. Patient was educated regarding Her deficit(s) of dysphagia as this relates to Her diagnosis of H and N CA. She demonstrated Fair understanding as evidenced by impaired cognition. [ ]            Posted safety precautions in patient's room. [X]            Patient/family have participated as able in goal setting and plan of care. [ ]            Patient/family agree to work toward stated goals and plan of care. [ ]            Patient understands intent and goals of therapy, but is neutral about his/her participation. [ ]            Patient is unable to participate in goal setting and plan of care.      Thank you for this referral.  Nesha Strong SLP  Time Calculation: 10 mins

## 2017-03-08 NOTE — PROGRESS NOTES
[facility]  Pulmonary, Critical Care, and Sleep Medicine      Name: Courtney Atwood MRN: [id number]   : 1955 Hospital: [address]   Date: 3/8/2017        IMPRESSION:   · Acute on chronic respiratory failure  · Aspiration pneumonia  · Sepsis  · COPD exacerbation  · Non compliance  · Bipolar disorder  · Laryngeal cancer      RECOMMENDATIONS:   · O2  · Jet nebs  · Taper Steroids  · Empiric abx  · Mucolytics  · Tube feeds  · Full code? ??  · Palliative care      Subjective:     Much improved today  No acute distress        Current Facility-Administered Medications   Medication Dose Route Frequency    guaiFENesin (ROBITUSSIN) 100 mg/5 mL oral liquid 400 mg  400 mg Oral QID    methylPREDNISolone (PF) (SOLU-MEDROL) injection 40 mg  40 mg IntraVENous Q6H    HYDROcodone-acetaminophen (HYCET) 0.5-21.7 mg/mL oral solution 10 mg  10 mg Oral Q4H    vancomycin (VANCOCIN) 1,000 mg in 0.9% sodium chloride (MBP/ADV) 250 mL  1,000 mg IntraVENous Q12H    cefepime (MAXIPIME) 2 g in 0.9% sodium chloride (MBP/ADV) 100 mL  2 g IntraVENous Q8H    albuterol-ipratropium (DUO-NEB) 2.5 MG-0.5 MG/3 ML  3 mL Nebulization Q6H RT    FLUoxetine (PROzac) capsule 20 mg  20 mg Oral DAILY    lithium carbonate CR (ESKALITH CR) tablet 450 mg  450 mg Oral QHS    traZODone (DESYREL) tablet 200 mg  200 mg Oral QHS    0.9% sodium chloride infusion  100 mL/hr IntraVENous CONTINUOUS    metroNIDAZOLE (FLAGYL) IVPB premix 500 mg  500 mg IntraVENous Q8H    heparin (porcine) injection 5,000 Units  5,000 Units SubCUTAneous Q12H       Review of Systems:  A comprehensive review of systems was negative except for: Respiratory: positive for cough, wheezing or dyspnea on exertion    Objective:   Vital Signs:    Visit Vitals    /83 (BP 1 Location: Right arm, BP Patient Position: At rest)    Pulse (!) 103    Temp 98 °F (36.7 °C)    Resp 20    Ht 5' 9\" (1.753 m)    Wt 63.5 kg (140 lb)    LMP  (LMP Unknown)    SpO2 91%    BMI 20.67 kg/m2       O2 Device: Nasal cannula   O2 Flow Rate (L/min): 4 l/min   Temp (24hrs), Av.4 °F (36.9 °C), Min:98 °F (36.7 °C), Max:98.9 °F (37.2 °C)       Intake/Output:   Last shift:         Last 3 shifts:  1901 -  0700  In: 7806 [I.V.:2900]  Out: 2700 [Urine:2700]    Intake/Output Summary (Last 24 hours) at 17 4501  Last data filed at 17 0417   Gross per 24 hour   Intake             3320 ml   Output             2700 ml   Net              620 ml      Physical Exam:   General:  Alert, cooperative, no distress, appears stated age. Head:  Normocephalic, without obvious abnormality, atraumatic. Eyes:  Conjunctivae/corneas clear. PERRL, EOMs intact. Nose: Nares normal. Septum midline. Mucosa normal. No drainage or sinus tenderness. Throat: Lips, mucosa, and tongue normal. Teeth and gums normal.   Neck: Supple, symmetrical, trachea midline, no adenopathy, thyroid: no enlargment/tenderness/nodules, no carotid bruit and no JVD. Back:   Symmetric, no curvature. ROM normal.   Lungs:   Decreased BS. Chest wall:  No tenderness or deformity. Heart:  Regular rate and rhythm, S1, S2 normal, no murmur, click, rub or gallop. Abdomen:   Soft, non-tender. Bowel sounds normal. No masses,  No organomegaly. Extremities: Extremities normal, atraumatic, no cyanosis or edema. Pulses: 2+ and symmetric all extremities.    Skin: Skin color, texture, turgor normal. No rashes or lesions   Lymph nodes: Cervical, supraclavicular, and axillary nodes normal.   Neurologic: Grossly nonfocal     Data review:     Recent Results (from the past 24 hour(s))   CBC WITH AUTOMATED DIFF    Collection Time: 17  4:20 AM   Result Value Ref Range    WBC 9.1 3.6 - 11.0 K/uL    RBC 3.27 (L) 3.80 - 5.20 M/uL    HGB 8.6 (L) 11.5 - 16.0 g/dL    HCT 28.9 (L) 35.0 - 47.0 %    MCV 88.4 80.0 - 99.0 FL    MCH 26.3 26.0 - 34.0 PG    MCHC 29.8 (L) 30.0 - 36.5 g/dL    RDW 17.5 (H) 11.5 - 14.5 %    PLATELET 018 (H) 206 - 400 K/uL    NEUTROPHILS 89 (H) 32 - 75 %    LYMPHOCYTES 9 (L) 12 - 49 %    MONOCYTES 2 (L) 5 - 13 %    EOSINOPHILS 0 0 - 7 %    BASOPHILS 0 0 - 1 %    ABS. NEUTROPHILS 8.2 (H) 1.8 - 8.0 K/UL    ABS. LYMPHOCYTES 0.8 0.8 - 3.5 K/UL    ABS. MONOCYTES 0.2 0.0 - 1.0 K/UL    ABS. EOSINOPHILS 0.0 0.0 - 0.4 K/UL    ABS.  BASOPHILS 0.0 0.0 - 0.1 K/UL       Imaging:  I have personally reviewed the patients radiographs and have reviewed the reports:  CXR: marsha Sebastian MD

## 2017-03-08 NOTE — ROUTINE PROCESS
Bedside and Verbal shift change report given to [address], RN (oncoming nurse) by Al Mejia RN (offgoing nurse).  Report included the following information SBAR, Kardex, Recent Results and Cardiac Rhythm SINUS TACH.     Zone Phone: [phone]        Significant changes during shift: Tube feeding increased to 45 ml/hr   Patient Information     Gini Lawler  57 y.o.  3/6/2017 5:36 PM by Padmini Young MD. Gini Lawler was admitted from Home     Problem List           Patient Active Problem List     Diagnosis Date Noted    Acute on chronic respiratory failure with hypoxemia ([address] 03/06/2017    Chronic pulmonary aspiration 03/02/2017    Atrial fibrillation with RVR ([address] 02/24/2017    Respiratory failure ([address] 02/23/2017    Chronic obstructive pulmonary disease with acute exacerbation ([address] 02/10/2017    Acute respiratory failure ([address] 02/06/2017    Aspiration pneumonia of both upper lobes ([address] 01/20/2017    Shortness of breath 01/20/2017    Delirium 01/20/2017    Goals of care, counseling/discussion 01/20/2017    Debility 01/20/2017    Acute respiratory failure with hypoxia ([address] 01/15/2017    Fibromyalgia 01/15/2017       Class: Chronic    Chronic pain 01/15/2017       Class: Chronic    History of tobacco use 01/15/2017       Class: Present on Admission    Sepsis ([address] 01/15/2017       Class: Acute    Acute renal injury ([address] 01/15/2017       Class: Present on Admission    Shock ([address] 01/15/2017    History of radiation to head and neck region 03/30/2016    History of laryngeal cancer 11/02/2015       Class: Present on Admission    Dysphagia 12/30/2014    Essential hypertension, benign 11/29/2011    Bipolar 1 disorder ([address] 11/29/2011    Encounter for long-term (current) use of other medications 11/29/2011           Past Medical History:   Diagnosis Date    Bipolar 1 disorder ([address] 11/29/2011    Cancer (HCC)       skin cancer buttocks    Cancer (HCC)       throat    Chronic pain       FIBROMYALGIA, LEGS    Encounter for long-term (current) use of other medications 11/29/2011    Essential hypertension, benign 11/29/2011    Laryngeal cancer ([facility] [address] 11/2/2015    Laryngeal mass      Psychiatric disorder       bipolar            Core Measures:                Activity Status:     OOB to Chair No  Ambulated this shift No   Bed Rest Yes     Supplemental O2: (If Applicable)     NC Yes  NRB No  Venti-mask No  On 4 Liters/min        LINES AND DRAINS: piv 18g DVT prophylaxis:                Patient Safety:     Falls Score Total Score: 4  Safety Level_______  Bed Alarm On? No  Sitter?  No     Plan for upcoming shift: monitor i& o           Discharge Plan: Yes CM to see pt     Active Consults:  IP CONSULT TO PULMONOLOGY  IP CONSULT TO PRIMARY CARE PROVIDER  IP CONSULT TO PALLIATIVE CARE - PROVIDER

## 2017-03-08 NOTE — PROGRESS NOTES
Problem: Self Care Deficits Care Plan (Adult)  Goal: *Acute Goals and Plan of Care (Insert Text)  Occupational Therapy Goals  Initiated 3/8/2017  1. Patient will perform grooming with modified independence within 7 day(s). 2. Patient will perform bathing with modified independence within 7 day(s). 3. Patient will perform lower body dressing with modified independence within 7 day(s). 4. Patient will perform toilet transfers with modified independence within 7 day(s). 5. Patient will perform all aspects of toileting with modified independence within 7 day(s). OCCUPATIONAL THERAPY EVALUATION  Patient: Sukhdev Jain (62 y.o. female)  Date: 3/8/2017  Primary Diagnosis: Acute on chronic respiratory failure with hypoxemia [facility])        Precautions:   Fall      ASSESSMENT :  Based on the objective data described below, the patient presents with deficits in self-care as compared to her functional baseline, primarily due to decreased activity tolerance and general weakness. She is NPO with a PEG tube (for the past 3 years) and uses 2 liters of O2 at baseline. Her  works during the day, so she will need to be independent with ADL. She will benefit from brief skilled OT treatment to increase independence and safety in ADL and light IADL. Patient will benefit from skilled intervention to address the above impairments.   Patients rehabilitation potential is considered to be Good  Factors which may influence rehabilitation potential include:   [X]             None noted  [ ]             Mental ability/status  [ ]             Medical condition  [ ]             Home/family situation and support systems  [ ]             Safety awareness  [ ]             Pain tolerance/management  [ ]             Other:        PLAN :  Recommendations and Planned Interventions:  [X]               Self Care Training                  [X]        Therapeutic Activities  [X]               Functional Mobility Training    [ ] Cognitive Retraining  [ ]               Therapeutic Exercises           [X]        Endurance Activities  [ ]               Balance Training                   [ ]        Neuromuscular Re-Education  [ ]               Visual/Perceptual Training     [X]   Home Safety Training  [X]               Patient Education                 [X]        Family Training/Education  [ ]               Other (comment):     Frequency/Duration: Patient will be followed by occupational therapy 3 times a week to address goals. Discharge Recommendations: None  Further Equipment Recommendations for Discharge: None       SUBJECTIVE:   Patient stated I haven't had a meal in 3 years.       OBJECTIVE DATA SUMMARY:   HISTORY:   Past Medical History:   Diagnosis Date    Bipolar 1 disorder ([facility] 11/29/2011    Cancer ([facility] [address]     skin cancer buttocks    Cancer (HCC)     throat    Chronic pain     FIBROMYALGIA, LEGS    Encounter for long-term (current) use of other medications 11/29/2011    Essential hypertension, benign 11/29/2011    Laryngeal cancer ([facility] [address] 11/2/2015    Laryngeal mass     Psychiatric disorder     bipolar     Past Surgical History:   Procedure Laterality Date    HX BREAST AUGMENTATION Bilateral     SALINE IMPLANTS    HX GI      PLACEMENT OF G TUBE    HX GI      ESOPH. DILATATION    HX GYN      tubal pregnancy    HX HEENT      BX OF NECK MASS    HX HYSTERECTOMY      PLACE PERCUT GASTROSTOMY TUBE  10/28/2014     has been removed 2015    PLACE PERCUT GASTROSTOMY TUBE  2/28/2017             Prior Level of Function/Home Situation: Lives with . On 2 liters O2 at baseline.  works during the day. Manages self-care and light IADL Mod I to I. She says she feels like a cane might be helpful because she often holds on to things around the house.      Expanded or extensive additional review of patient history:      Home Situation  Home Environment: Private residence  # Steps to Enter: 4  Rails to Enter: Yes  [employer] : Bilateral  Wheelchair Ramp: No  One/Two Story Residence: One story  Living Alone: No  Support Systems: Family member(s)  Patient Expects to be Discharged to[de-identified] Private residence  Current DME Used/Available at Home: Oxygen, portable  Tub or Shower Type: Tub/Shower combination  [X]  Right hand dominant             [ ]  Left hand dominant     EXAMINATION OF PERFORMANCE DEFICITS:  Cognitive/Behavioral Status:  Neurologic State: Alert  Orientation Level: Oriented X4  Cognition: Appropriate decision making  Perception: Appears intact  Perseveration: No perseveration noted  Safety/Judgement: Awareness of environment     Vision/Perceptual:                           Acuity: Within Defined Limits    Corrective Lenses: Glasses     Range of Motion:  AROM: Within functional limits                       Strength:  Strength: Within functional limits              Coordination:  Coordination: Within functional limits  Fine Motor Skills-Upper: Left Intact; Right Intact (Grossly; She c/o intermittent tingling/numbness L hand)    Gross Motor Skills-Upper: Left Intact; Right Intact     Tone & Sensation:  Tone: Normal                          Balance:  Sitting: Intact; Without support  Standing: Impaired; Without support  Standing - Static: Good  Standing - Dynamic : Fair     Functional Mobility and Transfers for ADLs:  Bed Mobility:  Rolling: Independent  Supine to Sit: Independent  Scooting: Independent     Transfers:  Sit to Stand: Supervision  Stand to Sit: Supervision  Toilet Transfer : Supervision     ADL Assessment:  Feeding:  (NPO - PEG tube)     Oral Facial Hygiene/Grooming: Independent     Bathing: Stand-by assistance     Upper Body Dressing: Supervision     Lower Body Dressing: Stand-by assistance     Toileting: Stand by assistance                 Functional Measure:  Barthel Index:      Bathin  Bladder: 10  Bowels: 10  Groomin  Dressin  Feedin (NPO/PEG)  Mobility: 10  Stairs: 5  Toilet Use: 5  Transfer (Bed to Chair and Back): 10  Total: 60         Barthel and G-code impairment scale:  Percentage of impairment CH  0% CI  1-19% CJ  20-39% CK  40-59% CL  60-79% CM  80-99% CN  100%   Barthel Score 0-100 100 99-80 79-60 59-40 20-39 1-19    0   Barthel Score 0-20 20 17-19 13-16 9-12 5-8 1-4 0      The Barthel ADL Index: Guidelines  1. The index should be used as a record of what a patient does, not as a record of what a patient could do. 2. The main aim is to establish degree of independence from any help, physical or verbal, however minor and for whatever reason. 3. The need for supervision renders the patient not independent. 4. A patient's performance should be established using the best available evidence. Asking the patient, friends/relatives and nurses are the usual sources, but direct observation and common sense are also important. However direct testing is not needed. 5. Usually the patient's performance over the preceding 24-48 hours is important, but occasionally longer periods will be relevant. 6. Middle categories imply that the patient supplies over 50 per cent of the effort. 7. Use of aids to be independent is allowed. Sabrina Csatro., Barthel, D.W. (2822). Functional evaluation: the Barthel Index. [address] [facility] (14)2. MARLENA Ashford, Guzman Mcdaniel., Rachid Boswell., [place], [address] [place] [address] (1999). Measuring the change indisability after inpatient rehabilitation; comparison of the responsiveness of the Barthel Index and Functional [place] Measure. Journal of Neurology, Neurosurgery, and Psychiatry, 66(4), 159-491. Lisseth Amezcua, N.J.A, HOLLEY Toscano, & JUANI YoungA. (2004.) Assessment of post-stroke quality of life in cost-effectiveness studies: The usefulness of the Barthel Index and the EuroQoL-5D. Quality of Life Research, 13, 081-26         G codes:   In compliance with CMSs Claims Based Outcome Reporting, the following G-code set was chosen for this patient based on their primary functional limitation being treated: The outcome measure chosen to determine the severity of the functional limitation was the Barthel Index with a score of 60/100 which was correlated with the impairment scale. · Self Care:               - CURRENT STATUS:    CJ - 20%-39% impaired, limited or restricted               - GOAL STATUS:           CI - 1%-19% impaired, limited or restricted               - D/C STATUS:                       ---------------To be determined---------------      Occupational Therapy Evaluation Charge Determination   History Examination Decision-Making   LOW Complexity : Brief history review  LOW Complexity : 1-3 performance deficits relating to physical, cognitive , or psychosocial skils that result in activity limitations and / or participation restrictions  LOW Complexity : No comorbidities that affect functional and no verbal or physical assistance needed to complete eval tasks       Based on the above components, the patient evaluation is determined to be of the following complexity level: LOW   Pain:  No complaints                 Activity Tolerance:   Fair; O2 saturation 92-94% on 4 liters O2  Please refer to the flowsheet for vital signs taken during this treatment. After treatment:   [X] Patient left in no apparent distress sitting up in chair  [ ] Patient left in no apparent distress in bed  [X] Call bell left within reach  [X] Nursing notified  [ ] Caregiver present  [ ] Bed alarm activated      COMMUNICATION/EDUCATION:   The patients plan of care was discussed with: Physical Therapist and Registered Nurse.  [X] Home safety education was provided and the patient/caregiver indicated understanding. [ ] Patient/family have participated as able in goal setting and plan of care. [X] Patient/family agree to work toward stated goals and plan of care. [ ] Patient understands intent and goals of therapy, but is neutral about his/her participation.   [ ] Patient is unable to participate in goal setting and plan of care. This patients plan of care is appropriate for delegation to [facility].      Thank you for this referral.  Umm Yu, OTR/L  Time Calculation: 18 mins

## 2017-03-08 NOTE — ROUTINE PROCESS
..  * No surgery found *  Bedside and Verbal shift change report given to Erika (oncoming nurse) by Soila Carrera (offgoing nurse). Report included the following information SBAR, Kardex, Recent Results and Cardiac Rhythm SINUS TACH. Zone Phone:   [phone]      Significant changes during shift:  Coughing up thick tan sputum.  Tube feeding increased to 35ml /hr      Patient Information    Erin Roth Locks  57 y.o.  3/6/2017  5:36 PM by Rita Toro MD. Jesse Aguilar was admitted from Home    Problem List    Patient Active Problem List    Diagnosis Date Noted    Acute on chronic respiratory failure with hypoxemia ([address] 03/06/2017    Chronic pulmonary aspiration 03/02/2017    Atrial fibrillation with RVR ([address] 02/24/2017    Respiratory failure ([address] 02/23/2017    Chronic obstructive pulmonary disease with acute exacerbation ([address] 02/10/2017    Acute respiratory failure ([address] 02/06/2017    Aspiration pneumonia of both upper lobes ([address] 01/20/2017    Shortness of breath 01/20/2017    Delirium 01/20/2017    Goals of care, counseling/discussion 01/20/2017    Debility 01/20/2017    Acute respiratory failure with hypoxia ([address] 01/15/2017    Fibromyalgia 01/15/2017     Class: Chronic    Chronic pain 01/15/2017     Class: Chronic    History of tobacco use 01/15/2017     Class: Present on Admission    Sepsis ([address] 01/15/2017     Class: Acute    Acute renal injury ([address] 01/15/2017     Class: Present on Admission    Shock ([address] 01/15/2017    History of radiation to head and neck region 03/30/2016    History of laryngeal cancer 11/02/2015     Class: Present on Admission    Dysphagia 12/30/2014    Essential hypertension, benign 11/29/2011    Bipolar 1 disorder ([address] 11/29/2011    Encounter for long-term (current) use of other medications 11/29/2011     Past Medical History:   Diagnosis Date    Bipolar 1 disorder ([address] 11/29/2011    Cancer (HCC)     skin cancer buttocks    Cancer (HCC)     throat    Chronic pain     FIBROMYALGIA, LEGS    Encounter for long-term (current) use of other medications 11/29/2011    Essential hypertension, benign 11/29/2011    Laryngeal cancer ([facility] [address] 11/2/2015    Laryngeal mass     Psychiatric disorder     bipolar         Core Measures: Activity Status:    OOB to Chair No  Ambulated this shift No   Bed Rest Yes    Supplemental O2: (If Applicable)    NC Yes  NRB No  Venti-mask No  On 4 Liters/min      LINES AND DRAINS:          piv 18g                                                                                                                DVT prophylaxis:            Patient Safety:    Falls Score Total Score: 4  Safety Level_______  Bed Alarm On? No  Sitter?  No    Plan for upcoming shift: monitor i& o        Discharge Plan: Yes CM to see pt    Active Consults:  IP CONSULT TO PULMONOLOGY  IP CONSULT TO PRIMARY CARE PROVIDER  IP CONSULT TO PALLIATIVE CARE - PROVIDER

## 2017-03-08 NOTE — PROGRESS NOTES
Problem: Mobility Impaired (Adult and Pediatric)  Goal: *Acute Goals and Plan of Care (Insert Text)  Physical Therapy Goals  Initiated 3/8/2017  1. Patient will transfer from bed to chair and chair to bed with independence using the least restrictive device within 7 day(s). 2. Patient will perform sit to stand with independence within 7 day(s). 3. Patient will ambulate with independence for 500 feet with the least restrictive device within 7 day(s). 4. Patient will ascend/descend 12 steps with single handrail(s) with independence within 7 day(s). PHYSICAL THERAPY EVALUATION  Patient: George Shaw (86 y.o. female)  Date: 3/8/2017  Primary Diagnosis: Acute on chronic respiratory failure with hypoxemia [facility])        Precautions:   Fall      ASSESSMENT :  Based on the objective data described below, the patient presents near baseline functional mobility, limited at this time mildly by activity intolerance, mild instability and weakness. Patient I PTA and on 2L NCO2 at all times. Received in supine, agreeable, presenting with typical signs of individual affected with Bipolar disorder as mildly impulsive and distracted. She mobilized with CGA at most as noted below, ambulating without device and at times with HHA as she began to reach; suggest use of SPC next session which she agrees with and desires. 4L NCO2 stable throughout. Will f/u on short frequency ahead as near baseline mobility and able to mobilize ahead with RN staff. Patient likely appropriate to return to home with [facility] safety evaluation for smooth disposition process. .     Patient will benefit from skilled intervention to address the above impairments.   Patients rehabilitation potential is considered to be Good  Factors which may influence rehabilitation potential include:   [ ]         None noted  [X]         Mental ability/status  [ ]         Medical condition  [ ]         Home/family situation and support systems  [ ]         Safety awareness  [ ] Pain tolerance/management  [ ]         Other:        PLAN :  Recommendations and Planned Interventions:  [X]           Bed Mobility Training             [ ]    Neuromuscular Re-Education  [X]           Transfer Training                   [ ]    Orthotic/Prosthetic Training  [X]           Gait Training                         [ ]    Modalities  [X]           Therapeutic Exercises           [ ]    Edema Management/Control  [X]           Therapeutic Activities            [X]    Patient and Family Training/Education  [ ]           Other (comment):     Frequency/Duration: Patient will be followed by physical therapy  3 times a week to address goals. Discharge Recommendations: Home Health safety evaluation  Further Equipment Recommendations for Discharge: will trial [facility] next session       SUBJECTIVE:   Patient stated he's named [facility] [employer] and I dislike him.       OBJECTIVE DATA SUMMARY:   HISTORY:    Past Medical History:   Diagnosis Date    Bipolar 1 disorder ([facility] 11/29/2011    Cancer ([facility] [facility]       skin cancer buttocks    Cancer (HCC)       throat    Chronic pain       FIBROMYALGIA, LEGS    Encounter for long-term (current) use of other medications 11/29/2011    Essential hypertension, benign 11/29/2011    Laryngeal cancer ([facility] [address] 11/2/2015    Laryngeal mass      Psychiatric disorder       bipolar     Past Surgical History:   Procedure Laterality Date    HX BREAST AUGMENTATION Bilateral       SALINE IMPLANTS    HX GI         PLACEMENT OF G TUBE    HX GI         ESOPH. DILATATION    HX GYN         tubal pregnancy    HX HEENT         BX OF NECK MASS    HX HYSTERECTOMY        PLACE PERCUT GASTROSTOMY TUBE   10/28/2014      has been removed 2015    PLACE PERCUT GASTROSTOMY TUBE   2/28/2017           Prior Level of Function/Home Situation: Independent, falls (does not elaborate), on 2L NCO2, furniture walking at times, desires a SPC to use 'but noone gets it for me'. Lives with spouse whom works.    Personal factors and/or comorbidities impacting plan of care:      Home Situation  Home Environment: Private residence  # Steps to Enter: 4  Rails to Enter: Yes  Hand Rails : Bilateral  Wheelchair Ramp: No  One/Two Story Residence: One story  Living Alone: No  Support Systems: Family member(s)  Patient Expects to be Discharged to[de-identified] Private residence  Current DME Used/Available at Home: Oxygen, portable  Tub or Shower Type: Tub/Shower combination     EXAMINATION/PRESENTATION/DECISION MAKING:   Critical Behavior:  Neurologic State: Alert  Orientation Level: Oriented X4  Cognition: Appropriate for age attention/concentration, Appropriate decision making  Safety/Judgement: Awareness of environment  Strength:    Strength: Generally decreased, functional                    Tone & Sensation:   Tone: Normal              Sensation: Intact               Range Of Motion:  AROM: Within functional limits                       Coordination:  Coordination: Within functional limits     Functional Mobility:  Bed Mobility:  Rolling: Independent  Supine to Sit: Independent     Scooting: Independent  Transfers:  Sit to Stand: Supervision  Stand to Sit: Supervision                       Balance:   Sitting: Intact; Without support  Standing: Impaired; Without support  Standing - Static: Good  Standing - Dynamic : Fair  Ambulation/Gait Training:  Distance (ft): 150 Feet (ft)  Assistive Device: Gait belt; Other (comment) (occasionaly L HHA)  Ambulation - Level of Assistance: Contact guard assistance     Gait Description (WDL): Exceptions to WDL                 Speed/[place]: Pace decreased (<100 feet/min)                                No instability noted, but hesitant with minimal arm swing   Functional Measure:  Barthel Index:      Bathin  Bladder: 10  Bowels: 10  Groomin  Dressin  Feedin (NPO/PEG)  Mobility: 10  Stairs: 0  Toilet Use: 5  Transfer (Bed to Chair and Back): 10  Total: 55         Barthel and G-code impairment scale:  Percentage of impairment CH  0% CI  1-19% CJ  20-39% CK  40-59% CL  60-79% CM  80-99% CN  100%   Barthel Score 0-100 100 99-80 79-60 59-40 20-39 1-19    0   Barthel Score 0-20 20 17-19 13-16 9-12 5-8 1-4 0      The Barthel ADL Index: Guidelines  1. The index should be used as a record of what a patient does, not as a record of what a patient could do. 2. The main aim is to establish degree of independence from any help, physical or verbal, however minor and for whatever reason. 3. The need for supervision renders the patient not independent. 4. A patient's performance should be established using the best available evidence. Asking the patient, friends/relatives and nurses are the usual sources, but direct observation and common sense are also important. However direct testing is not needed. 5. Usually the patient's performance over the preceding 24-48 hours is important, but occasionally longer periods will be relevant. 6. Middle categories imply that the patient supplies over 50 per cent of the effort. 7. Use of aids to be independent is allowed. Ahsan Sky., Barthel, D.W. (2340). Functional evaluation: the Barthel Index. [address] [facility] (14)2. MARLENA Marquez, Myles Gunter.Aram., [place], [address] [place] [address] (1999). Measuring the change indisability after inpatient rehabilitation; comparison of the responsiveness of the Barthel Index and Functional [place] Measure. Journal of Neurology, Neurosurgery, and Psychiatry, 66(4), 651-784. Hu Donohue, N.JODEE.CHARISSA, HOLLEY Toscano, & Ariel Gomes M.A. (2004.) Assessment of post-stroke quality of life in cost-effectiveness studies: The usefulness of the Barthel Index and the EuroQoL-5D. Quality of Life Research, 13, 530-76            G codes: In compliance with CMSs Claims Based Outcome Reporting, the following G-code set was chosen for this patient based on their primary functional limitation being treated:      The outcome measure chosen to determine the severity of the functional limitation was the Barthel with a score of 55/100 which was correlated with the impairment scale. · Mobility - Walking and Moving Around:               - CURRENT STATUS:    CK - 40%-59% impaired, limited or restricted               - GOAL STATUS:           CI - 1%-19% impaired, limited or restricted               - D/C STATUS:                       ---------------To be determined---------------   Activity Tolerance: WNL on 4L NCO2 throughout. Please refer to the flowsheet for vital signs taken during this treatment. After treatment:   [X]         Patient left in no apparent distress sitting up in chair  [ ]         Patient left in no apparent distress in bed  [X]         Call bell left within reach  [X]         Nursing notified  [ ]         Caregiver present  [ ]         Bed alarm activated      COMMUNICATION/EDUCATION:   The patients plan of care was discussed with: Registered Nurse.  [X]         Fall prevention education was provided and the patient/caregiver indicated understanding. [X]         Patient/family have participated as able in goal setting and plan of care. [X]         Patient/family agree to work toward stated goals and plan of care. [ ]         Patient understands intent and goals of therapy, but is neutral about his/her participation. [ ]         Patient is unable to participate in goal setting and plan of care.      Thank you for this referral.  Neri Mancini, PT, DPT    Time Calculation: 16 mins

## 2017-03-08 NOTE — CONSULTS
Palliative Medicine Consult  Jose Alberto: [phone])    Patient Name: Guillaume Denson  YOB: 1955    Date of Initial Consult: 3/7/17  Reason for Consult: care decisions  Requesting [address]  Primary Care Physician: Mehnaz Mckeon MD      SUMMARY:   Guillaume Denson is a 57 y.o. with a past history of for bipolar type 1, laryngeal mass, and HTN, who presents via EMS to the ED c/o progressively worsening SOB and uncontrolled HTN since earlier today, who was admitted on 3/6/2017 from home with a diagnosis of acute on chronic respiratory failure with hypoxia. Current medical issues leading to Palliative Medicine involvement include: h/o head and neck cancer, respiratory failure, aspiration PNA. Per : pt has been receiving monthly opioid prescriptions on a regular basis from Dr. Hany Jett or Dr. Parmjit Reyes, the last filled was for Norco 10/325mg #180/30 days. PALLIATIVE DIAGNOSES:   1. Acute encephalopathy  2. Acute on chronic respiratory failure  3. Dysphagia   4. Chronic aspiration   5. 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 Met with pt, discussed ongoing problem with aspiration, including saliva, etc, discussed QOL, trying to manage symptoms optimally with some QOL. 2. Discussed pt's expectations of pain medications, counseled that pain will never be 0/10, goal is to be tolerable. Will trial opioid rotation, counseled pt that higher  doses of opioids will not help pain any more than recommended daily doses, therefore, will not plan to go up on the dose if there is no pain relief at current dose. 3. Trial oxycodone 10mg q. 4 hours prn.   4. With Dr. Schmitz  permission, refer to outpatient Palliative Medicine to continue support. 5. Initial consult note routed to primary continuity provider  6.  Communicated plan of care with: Palliative IDT, RN     GOALS OF CARE / TREATMENT PREFERENCES:   [====Goals of Care====]  GOALS OF CARE:  Patient / health care proxy stated goals:    Better pain control       TREATMENT PREFERENCES:   Code Status: Full Code    Advance Care Planning:  Advance Care Planning 3/7/2017   Patient's Healthcare Decision Maker is: Legal Next of Kin   Primary Decision Maker Name ashley   Primary Decision Maker Phone Number [phone]   Primary Decision Maker Relationship to Patient Spouse   Secondary Decision Maker Name -   Secondary Decision [address] Phone Number -   Secondary Decision Maker Relationship to Patient -   Confirm Advance Directive None   Patient Would Like to Complete Advance Directive No   Does the patient have other document types Other (comment)       Other:    The palliative care team has discussed with patient / health care proxy about goals of care / treatment preferences for patient.  [====Goals of Care====]         HISTORY:     History obtained from: chart, pt  CHIEF COMPLAINT: SOB    HPI/SUBJECTIVE:    The patient is:   [x] Verbal and participatory  [] Non-participatory due to: confused    BIBA with c/o progressively worsening SOB, productive cough and subjective fever. Was recently admitted for PEG placement. Pt is s/p chemo, XRT, surgery for head and neck cancer, chronic pain and dysphagia with chronic aspiration of thin liquids. Family is concerned that she does not get her brand of lithium when here at [facility], and that Gabapentin and Dilaudid cause lethargy and confusion. 3/8: pt c/o severe left chest pain ongoing for years, insists hydrocodone no longer effective, wants something stronger. Clinical Pain Assessment (nonverbal scale for severity on nonverbal patients):   [++++ Clinical Pain Assessment++++]  [++++Pain Severity++++]: Pain: 4  [++++Pain Character++++]: dull, achy  [++++Pain Duration++++]:    years  [++++Pain Effect++++]:      Affects mobility and mood  [++++Pain Factors++++]:   [++++Pain Frequency++++]:   chronic  [++++Pain Location++++]:   Left chest  [++++ Clinical Pain Assessment++++]     FUNCTIONAL ASSESSMENT:     Palliative Performance Scale (PPS):  PPS: 40       PSYCHOSOCIAL/SPIRITUAL SCREENING:     Advance Care Planning:  Advance Care Planning 3/7/2017   Patient's Healthcare Decision Maker is: Legal Next of Agustin 68   Primary Decision Maker Name [address]   Primary Decision Maker Phone Number [phone]   Primary Decision Maker Relationship to Patient Spouse   Secondary Decision Maker Name -   Secondary Decision [address] Phone Number -   Secondary Decision Maker Relationship to Patient -   Confirm Advance Directive None   Patient Would Like to Complete Advance Directive No   Does the patient have other document types Other (comment)        Any spiritual / Hindu concerns:  [] Yes /  [x] No    Caregiver Burnout:  [] Yes /  [] No /  [x] No Caregiver Present      Anticipatory grief assessment:   [x] Normal  / [] Maladaptive       ESAS Anxiety: Anxiety: 4    ESAS Depression: Depression: 4        REVIEW OF SYSTEMS:     Positive and pertinent negative findings in ROS are noted above in HPI. The following systems were [x] reviewed / [] unable to be reviewed as noted in HPI  Other findings are noted below. Systems: constitutional, ears/nose/mouth/throat, respiratory, gastrointestinal, genitourinary, musculoskeletal, integumentary, neurologic, psychiatric, endocrine. Positive findings noted below. Modified ESAS Completed by: provider   Fatigue: 6 Drowsiness: 0   Depression: 4 Pain: 4   Anxiety: 4 Nausea: 0   Anorexia: 0 Dyspnea: 0     Constipation: No              PHYSICAL EXAM:     From RN flowsheet:  Wt Readings from Last 3 Encounters:   03/06/17 140 lb (63.5 kg)   03/02/17 142 lb 13.7 oz (64.8 kg)   02/21/17 145 lb 9.6 oz (66 kg)     Blood pressure 133/82, pulse (!) 109, temperature 98.9 °F (37.2 °C), resp. rate 18, height 5' 9\" (1.753 m), weight 140 lb (63.5 kg), SpO2 96 %.     Pain Scale 1: Numeric (0 - 10)  Pain Intensity 1: 0     Pain Location 1: Generalized     Pain Description 1: Aching  Pain Intervention(s) 1: Emotional support  Last bowel movement, if known:     Constitutional: WD, WN, PENALOZA  Eyes: pupils equal, anicteric  ENMT: no nasal discharge, moist mucous membranes, poor dentition  Cardiovascular: tachy, regular rhythm, distal pulses intact  Respiratory: breathing not labored, symmetric, frequently coughing  Gastrointestinal: soft non-tender, +bowel sounds, PEG tube  Musculoskeletal: no deformity, no tenderness to palpation  Skin: warm, dry  Neurologic: following commands, moving all extremities  Psychiatric: flat affect,          HISTORY:     Active Problems:    Bipolar 1 disorder ([address] (11/29/2011)      Dysphagia (12/30/2014)      Sepsis ([address] (1/15/2017)      Aspiration pneumonia of both upper lobes ([address] (1/20/2017)      Acute on chronic respiratory failure with hypoxemia ([address] (3/6/2017)      Past Medical History:   Diagnosis Date    Bipolar 1 disorder ([address] 11/29/2011    Cancer ([facility]     skin cancer buttocks    Cancer (HCC)     throat    Chronic pain     FIBROMYALGIA, LEGS    Encounter for long-term (current) use of other medications 11/29/2011    Essential hypertension, benign 11/29/2011    Laryngeal cancer ([address] 11/2/2015    Laryngeal mass     Psychiatric disorder     bipolar      Past Surgical History:   Procedure Laterality Date    HX BREAST AUGMENTATION Bilateral     SALINE IMPLANTS    HX GI      PLACEMENT OF G TUBE    HX GI      ESOPH. DILATATION    HX GYN      tubal pregnancy    HX HEENT      BX OF NECK MASS    HX HYSTERECTOMY      PLACE PERCUT GASTROSTOMY TUBE  10/28/2014     has been removed 2015    PLACE PERCUT GASTROSTOMY TUBE  2/28/2017           Family History   Problem Relation Age of Onset    Cancer Father      bone/skin/lung    Anesth Problems Neg Hx       History reviewed, no pertinent family history.   Social History   Substance Use Topics    Smoking status: Former Smoker     Packs/day: 1.00     Years: 40.00     Quit date: 8/27/2014    Smokeless tobacco: Never Used Comment: PASSIVE SMOKE EXPOSURE,  SMOKES    Alcohol use No     No Known Allergies   Current Facility-Administered Medications   Medication Dose Route Frequency    methylPREDNISolone (PF) (SOLU-MEDROL) injection 40 mg  40 mg IntraVENous Q12H    oxyCODONE (ROXICODONE INTENSOL) 20 mg/mL concentrated solution 10 mg  10 mg Oral Q4H PRN    guaiFENesin (ROBITUSSIN) 100 mg/5 mL oral liquid 400 mg  400 mg Oral QID    vancomycin (VANCOCIN) 1,000 mg in 0.9% sodium chloride (MBP/ADV) 250 mL  1,000 mg IntraVENous Q12H    cefepime (MAXIPIME) 2 g in 0.9% sodium chloride (MBP/ADV) 100 mL  2 g IntraVENous Q8H    albuterol-ipratropium (DUO-NEB) 2.5 MG-0.5 MG/3 ML  3 mL Nebulization Q6H RT    albuterol-ipratropium (DUO-NEB) 2.5 MG-0.5 MG/3 ML  3 mL Nebulization Q4H PRN    sodium chloride (NS) flush 5-10 mL  5-10 mL IntraVENous PRN    FLUoxetine (PROzac) capsule 20 mg  20 mg Oral DAILY    lithium carbonate CR (ESKALITH CR) tablet 450 mg  450 mg Oral QHS    traZODone (DESYREL) tablet 200 mg  200 mg Oral QHS    0.9% sodium chloride infusion  50 mL/hr IntraVENous CONTINUOUS    metroNIDAZOLE (FLAGYL) IVPB premix 500 mg  500 mg IntraVENous Q8H    heparin (porcine) injection 5,000 Units  5,000 Units SubCUTAneous Q12H          LAB AND IMAGING FINDINGS:     Lab Results   Component Value Date/Time    WBC 9.1 03/08/2017 04:20 AM    HGB 8.6 03/08/2017 04:20 AM    PLATELET 908 32/02/7983 04:20 AM     Lab Results   Component Value Date/Time    Sodium 143 03/07/2017 04:44 AM    Potassium 4.9 03/07/2017 04:44 AM    Chloride 109 03/07/2017 04:44 AM    CO2 27 03/07/2017 04:44 AM    BUN 16 03/07/2017 04:44 AM    Creatinine 0.81 03/07/2017 04:44 AM    Calcium 8.9 03/07/2017 04:44 AM    Magnesium 2.0 03/07/2017 04:44 AM    Phosphorus 1.5 03/07/2017 04:44 AM      Lab Results   Component Value Date/Time    AST (SGOT) 10 03/07/2017 04:44 AM    Alk.  phosphatase 79 03/07/2017 04:44 AM    Protein, total 7.1 03/07/2017 04:44 AM    Albumin 2.1 03/07/2017 04:44 AM    Globulin 5.0 03/07/2017 04:44 AM     No results found for: INR, PTMR, PTP, PT1, PT2, APTT   Lab Results   Component Value Date/Time    Iron 10 02/26/2017 04:28 AM    TIBC 169 02/26/2017 04:28 AM    Iron % saturation 6 02/26/2017 04:28 AM    Ferritin 250 02/26/2017 04:28 AM      Lab Results   Component Value Date/Time    pH 7.32 02/26/2017 07:24 AM    PCO2 61 02/26/2017 07:24 AM    PO2 50 02/26/2017 07:24 AM     No components found for: [place]   Lab Results   Component Value Date/Time    CK 29 03/06/2017 06:00 PM    CK - MB <1.0 03/06/2017 06:00 PM                Total time: 45 min  Counseling / coordination time: 35 min  > 50% counseling / coordination?: yes    Prolonged service was provided for  []30 min   []75 min in face to face time in the presence of the patient. Time Start:   Time End:   Note: this can only be billed with 67151 (initial) or 17566 (follow up). If multiple start / stop times, list each separately.

## 2017-03-09 NOTE — PROGRESS NOTES
1950 Shift change report received from off going nurse. Report given with Kardex, Intake/Output, MAR and Recent Results. The patient is in bed chatting on the phone. She demanded that I bring her her pain med NOW! . I replayed that I would bring it to her in just a few minutes. To this she yelled \"what ever you just do want to do it. You think you are more important\". Then she went back to chatting on the phone. 1957 Pain med given. Blood pressure 146/77, pulse (!) 107, temperature 98.6 °F (37 °C), resp. rate 18, height 5' 9\" (1.753 m), weight 65.7 kg (144 lb 14.4 oz), SpO2 98 %. 2054  Patient back to bed from [facility]. Neb tx  2108 Patient's IV pump and feeding pump found to be turned off. Both restarted. I now have the lock on the IV pump. 2132 Meds given via peg. Patient is much calmer. 2140 Patients Peg disconnected and meds ran out on the bed. New linens. Patient back to bed looking at card making on TV.    0730 Patient has dozed off and on all night. Up to [facility] with minimal assist.  Shift change report given to on coming nurse. Report given with Kardex, Intake/Output, MAR and Recent Results.        Patient Vitals for the past 24 hrs:   Temp Pulse Resp BP SpO2   03/09/17 0546 97.9 °F (36.6 °C) (!) 101 20 151/68 97 %   03/09/17 0123 - - - - 97 %   03/08/17 2355 98.8 °F (37.1 °C) 100 16 137/56 99 %   03/08/17 2056 - - - - 98 %   03/08/17 2035 98.6 °F (37 °C) (!) 107 18 146/77 97 %   03/08/17 1542 98.9 °F (37.2 °C) (!) 109 18 133/82 96 %   03/08/17 1417 - - - - 96 %   03/08/17 1202 98.1 °F (36.7 °C) (!) 104 18 144/88 97 %   03/08/17 0740 - - - - 91 %

## 2017-03-09 NOTE — PROGRESS NOTES
Problem: Mobility Impaired (Adult and Pediatric)  Goal: *Acute Goals and Plan of Care (Insert Text)  Physical Therapy Goals  Initiated 3/8/2017  1. Patient will transfer from bed to chair and chair to bed with independence using the least restrictive device within 7 day(s). 2. Patient will perform sit to stand with independence within 7 day(s). 3. Patient will ambulate with independence for 500 feet with the least restrictive device within 7 day(s). 4. Patient will ascend/descend 12 steps with single handrail(s) with independence within 7 day(s). PHYSICAL THERAPY TREATMENT  Patient: Rell Tinsley (14 y.o. female)  Date: 3/9/2017  Diagnosis: Acute on chronic respiratory failure with hypoxemia (HCC) <principal problem not specified>       Precautions: Fall      ASSESSMENT:  Patient making good progress, able to complete extension of gait period this date in addition to stair training without significant difficulty. O2 remains stable on 4L NCO2 with no notable increase in work of breathing and stable saturation throughout. Attempted utilization of SPC in R hand with immediate increased difficulty, slowed gait speed and impaired coordination with patient preferring to ambulate without device and author in agreement. Patient describes and demonstrates painful gait located to R dorsum of foot, 2-3 metatarsal/toe which she states has been ongoing since 3 weeks prior with no precipitating event to cause pain; RN notified as it is indeed TTP.  Patient will benefit from continued gait training ahead for stability work and confidence boost. She is able to ambulate with RN staff ahead with RN notified as per her request.      Progression toward goals:  [X]    Improving appropriately and progressing toward goals  [ ]    Improving slowly and progressing toward goals  [ ]    Not making progress toward goals and plan of care will be adjusted       PLAN:  Patient continues to benefit from skilled intervention to address the above impairments. Continue treatment per established plan of care. Discharge Recommendations:  [place] [facility]  Further Equipment Recommendations for Discharge:  None        SUBJECTIVE:   Patient stated I remember you Charlie Jones. Carols Razo.       OBJECTIVE DATA SUMMARY:   Critical Behavior:  Neurologic State: Alert  Orientation Level: Oriented X4  Cognition: Appropriate for age attention/concentration  Safety/Judgement: Awareness of environment  Functional Mobility Training:  Bed Mobility:  Rolling: Independent  Supine to Sit: Independent     Scooting: Independent        Transfers:  Sit to Stand: Independent  Stand to Sit: Independent                             Balance:  Sitting: Intact  Standing: Impaired; Without support (no overt instability but remains cautious )  Standing - Static: Good  Standing - Dynamic : Good  Ambulation/Gait Training:  Distance (ft): 200 Feet (ft)  Assistive Device: Gait belt (attempted SPC )  Ambulation - Level of Assistance: Stand-by asssistance        Gait Abnormalities: Altered arm swing;Decreased step clearance; Antalgic (antalgic R stance phase 2/2 pain at dorsum 2-3 metatarsal)              Speed/[place]: Pace decreased (<100 feet/min)                                Short period of SPC use with hand over hand guidance of use demonstrating significantly slowed speed, coordination thus aborted. Stairs:  Number of Stairs Trained: 4  Stairs - Level of Assistance: Contact guard assistance              Rail Use: Right  (step to)  Pain:  Pain Scale 1: Numeric (0 - 10)  Pain Intensity 1: 0  Pain Location 1: Generalized     Pain Description 1: Aching  Pain Intervention(s) 1: Medication (see MAR)  Activity Tolerance: WNL on 4L NCO2     Please refer to the flowsheet for vital signs taken during this treatment.   After treatment:   [X]    Patient left in no apparent distress sitting up in chair  [ ]    Patient left in no apparent distress in bed  [X]    Call bell left within reach  [X]    Nursing notified  [ ]    Caregiver present  [X]    Bed alarm activated      COMMUNICATION/COLLABORATION:   The patients plan of care was discussed with: Registered Nurse     Kun Michaels, PT, DPT    Time Calculation: 27 mins

## 2017-03-09 NOTE — PROGRESS NOTES
Problem: Dysphagia (Adult)  Goal: *Acute Goals and Plan of Care (Insert Text)  3/8/2017  Speech path goals  1. MBS as medically indicated   [id number] [address] TREATMENT  Patient: Dontrell Luu (84 y.o. female)  Date: 3/9/2017  Diagnosis: Acute on chronic respiratory failure with hypoxemia (HCC) <principal problem not specified>       Precautions: aspiration Fall      ASSESSMENT:  Patient upright in chair and was eager to learn/participate in laryngeal strengthening exercises. Patient with difficulty initiating a swallow today with her own saliva, ice chips and applesauce trial. She was aware of increased difficulty she was having. Extensive education provided on risks of aspiration. Encouraged patient to avoid all other PO at home with the exception of small amounts of ice chips in order to prevent further disuse atrophy. Explained that with added sugars (like in ice cream) this increases risk of infection if material is aspirated. Patient verbalized understanding and agreeable to stick with ice chips only (she has a crushed  at home). Provided patient with handouts on exercises and reviewed extensively. Encouraged her to practice several times a day, every day. She also reports she never received HH or OP ST upon discharge from hospital on last admission but would like to. Discussed HH versus OP. She is concerned about driving to appointments. Discussed how at this time, given severity of swallow and how she is in with aspiration PNA, would not recommend repeat MBS at this time but rather work with ST and practice exercises at home for a couple weeks. Once patient's respiratory status improves, could consider OP MBS for objective assessment. Patient understanding and in agreement.       Progression toward goals:  [ ]         Improving appropriately and progressing toward goals  [X]         Improving slowly and progressing toward goals  [ ]         Not making progress toward goals and plan of care will be adjusted       PLAN:  Recommendations and Planned Interventions:  NPO with TF via PEG. George Ashby for ice chips after oral care   Swallow exercises- patient has been educated and given handouts  ** [address] upon discharge     Patient continues to benefit from skilled intervention to address the above impairments. Continue treatment per established plan of care. Discharge Recommendations:  Home Health vs Outpatient       SUBJECTIVE:   Patient stated this is important to me. Extensive time providing education/reviewing handouts  today. OBJECTIVE:   Cognitive and Communication Status:  Neurologic State: Alert  Orientation Level: Oriented X4  Cognition: Appropriate for age attention/concentration  Perception: Appears intact  Perseveration: No perseveration noted  Safety/Judgement: Awareness of environment  Dysphagia Treatment:        Exercises:  Laryngeal Exercises:           Mendelsohn Maneuver: Yes  Sets : 1  Reps :  (1)  Effortful Swallow: Yes  Sets : 1  Reps : 5  Supraglottic Swallow: Yes  Sets : 1  Reps : 5                       Angleina: Yes  Sets : 1  Reps :  (1)  Sing \"EEE\": Yes  Sets : 2  Reps : 5  Shaker: Yes (CTTR)  Sets : 2  Reps : 10  Look Up at Ceiling/Gargle: Yes        Pain:  Pain Scale 1: Numeric (0 - 10)  Pain Intensity 1: 0  Pain Location 1: Generalized  After treatment:   [X]              Patient left in no apparent distress sitting up in chair  [ ]              Patient left in no apparent distress in bed  [X]              Call bell left within reach  [X]              Nursing notified  [ ]              Caregiver present  [ ]              Bed alarm activated      COMMUNICATION/EDUCATION:      The patients plan of care including recommendations, planned interventions, and recommended diet changes were discussed with: Registered Nurse. [ ]              Posted safety precautions in patient's room.   Tray Vail M.S., CCC-SLP  Time Calculation: 90 mins

## 2017-03-09 NOTE — PROGRESS NOTES
Spiritual Care Partner Volunteer visited patient in Neuro on March 9, 2017.   Documented by:  FABY Bhatia, [facility], [address]     Paging Service  [phone])

## 2017-03-09 NOTE — PROGRESS NOTES
General Daily Progress Note    Admit Date: 3/6/2017  Hospital day 4    Subjective:     Patient has adequate pain control(!) and improving dyspneaand cough. .   Medication side effects: none    Current Facility-Administered Medications   Medication Dose Route Frequency    methylPREDNISolone (PF) (SOLU-MEDROL) injection 40 mg  40 mg IntraVENous Q12H    oxyCODONE (ROXICODONE INTENSOL) 20 mg/mL concentrated solution 10 mg  10 mg Oral Q4H PRN    guaiFENesin (ROBITUSSIN) 100 mg/5 mL oral liquid 400 mg  400 mg Oral QID    vancomycin (VANCOCIN) 1,000 mg in 0.9% sodium chloride (MBP/ADV) 250 mL  1,000 mg IntraVENous Q12H    cefepime (MAXIPIME) 2 g in 0.9% sodium chloride (MBP/ADV) 100 mL  2 g IntraVENous Q8H    albuterol-ipratropium (DUO-NEB) 2.5 MG-0.5 MG/3 ML  3 mL Nebulization Q6H RT    albuterol-ipratropium (DUO-NEB) 2.5 MG-0.5 MG/3 ML  3 mL Nebulization Q4H PRN    sodium chloride (NS) flush 5-10 mL  5-10 mL IntraVENous PRN    FLUoxetine (PROzac) capsule 20 mg  20 mg Oral DAILY    lithium carbonate CR (ESKALITH CR) tablet 450 mg  450 mg Oral QHS    traZODone (DESYREL) tablet 200 mg  200 mg Oral QHS    0.9% sodium chloride infusion  50 mL/hr IntraVENous CONTINUOUS    metroNIDAZOLE (FLAGYL) IVPB premix 500 mg  500 mg IntraVENous Q8H    heparin (porcine) injection 5,000 Units  5,000 Units SubCUTAneous Q12H        Review of Systems  Constitutional: positive for fatigue and malaise  Respiratory: positive for cough or dyspnea on exertion  Cardiovascular: negative  Gastrointestinal: negative  Musculoskeletal:negative  Behavioral/Psych: negative for anxiety    Objective:     Patient Vitals for the past 8 hrs:   BP Temp Pulse Resp SpO2   03/09/17 0546 151/68 97.9 °F (36.6 °C) (!) 101 20 97 %   03/09/17 0123 - - - - 97 %   03/08/17 2355 137/56 98.8 °F (37.1 °C) 100 16 99 %     03/08 1901 - 03/09 0700  In: 1493.3 [I.V.:1038.3]  Out: 1050 [Urine:1050]  03/07 0701 - 03/08 1900  In: 4946 [I.V.:3935]  Out: 0564 [Urine:2700]    Physical Exam:   Visit Vitals    /68    Pulse (!) 101    Temp 97.9 °F (36.6 °C)    Resp 20    Ht 5' 9\" (1.753 m)    Wt 144 lb 14.4 oz (65.7 kg)    LMP  (LMP Unknown)    SpO2 97%    BMI 21.4 kg/m2     General appearance: alert, fatigued, cooperative, no distress, appears stated age  Lungs: clear to auscultation bilaterally  Heart: regular rate and rhythm  Abdomen: soft, non-tender. Bowel sounds normal. No masses,  no organomegaly  Extremities: extremities normal, atraumatic, no cyanosis or edema           Data Review   Recent Results (from the past 24 hour(s))   VANCOMYCIN, TROUGH    Collection Time: 03/08/17  9:11 AM   Result Value Ref Range    Vancomycin,trough 17.6 (H) 5.0 - 10.0 ug/mL    Reported dose date: 20170307      Reported dose time: 2200      Reported dose: 1000MG UNITS           Assessment:     Active Problems:    Aspiration pneumonia of both upper lobes ([address] (1/20/2017)      Sepsis ([address] (1/15/2017)      Dysphagia (12/30/2014)      Bipolar 1 disorder ([address] (11/29/2011)      Acute on chronic respiratory failure with hypoxemia ([address] (3/6/2017)        Plan:     Feeling much better. Less dyspnea and cough. Adequate pain control. .Will change to antibiotic per PEG and aim for discharge home tomorrow if Pulmonary concurs

## 2017-03-09 NOTE — PROGRESS NOTES
Nutrition Assessment:    INTERVENTIONS/RECOMMENDATIONS:   Enteral/Parenteral Nutrition: Modify rate, concentration, composition, and schedule: Jevity 1.5 240 mL bolus 5x/day + 150 mL water flushes q bolus (provides 1800 kcal, 77 protein, and 1662 mL water)     ASSESSMENT:   Patient is tolerating Jevity 1.5 TF @ goal of 45 mL/hr. 0mL residual noted per flowsheets. Patient well versed in administering TF at home. Discussed leaving the continuous feeding on for now as MD anticipating discharge tomorrow or transitioning to bolus now. Patient would prefer to transition to bolus today. Will monitor tolerance and provide further recommendations as needed. Diet Order: NPO (Jevity 1.5 @ 45 mL/hr + 140 mL water flushes q 4 hours; provides 1620 kcal, 69g protein, 1661 mL water)  % Eaten:  No data found. Pertinent Medications: [x] Reviewed []Other: prozac, trazodone   Pertinent Labs: [x]Reviewed  []Other:   Food Allergies: [x]None []Other:     Last BM: 3/8   [x]Active     []Hyperactive  []Hypoactive       [] Absent  BS  Skin:    [x] Intact   [] Incision  [] Breakdown   []Edema   []Other:    Anthropometrics: Height: 5' 9\" (175.3 cm) Weight: 65.7 kg (144 lb 14.4 oz)    IBW (%IBW):   ( ) UBW (%UBW):   (  %)    BMI: Body mass index is 21.4 kg/(m^2). This BMI is indicative of:  []Underweight   [x]Normal   []Overweight   [] Obesity   [] Extreme Obesity (BMI>40)  Last Weight Metrics:  Weight Loss Metrics 3/8/2017 3/6/2017 3/2/2017 2/21/2017 2/6/2017 1/31/2017 1/15/2017   Today's Wt 144 lb 14.4 oz - 142 lb 13.7 oz 145 lb 9.6 oz 152 lb 9.6 oz 146 lb 6.4 oz 150 lb   BMI - 21.4 kg/m2 21.1 kg/m2 21.5 kg/m2 22.54 kg/m2 22.26 kg/m2 22.15 kg/m2       Estimated Nutrition Needs (Based on): 1670 Kcals/day (BMR (1284) x 1. 3AF) , 66 g (1.0 g/kg bw) Protein  Carbohydrate:  At Least 130 g/day  Fluids: 1700 mL/day     Pt expected to meet estimated nutrient needs: [x]Yes []No    NUTRITION DIAGNOSES:   Problem:  Inadequate protein-energy intake      Etiology: related to current medical condition     Signs/Symptoms: as evidenced by NPO status; TF not started     Previous diagnosis resolved; TF initiated and tolerating at goal - now transitioning to bolus schedule     NUTRITION INTERVENTIONS:    Enteral/Parenteral Nutrition: Modify rate, concentration, composition, and schedule                GOAL:   Patient will tolerate TF @ goal with residual <250 mL next 3-5 days     NUTRITION MONITORING AND EVALUATION   Food/Nutrient Intake Outcomes: Enteral/parenteral nutrition intake  Physical Signs/Symptoms Outcomes: Weight/weight change, Electrolyte and renal profile, Glucose profile    Previous Goal Met:   [x] Met              [] Progressing Towards Goal              [] Not Progressing Towards Goal   Previous Recommendations:   [x] Implemented          [] Not Implemented          [] Not Applicable    LEARNING NEEDS (Diet, Food/Nutrient-Drug Interaction):    [x] None Identified   [] Identified and Education Provided/Documented   [] Identified and Pt declined/was not appropriate     Cultural, [employer]aritan, OR Ethnic Dietary Needs:    [x] None Identified   [] Identified and Addressed     [x] Interdisciplinary Care Plan Reviewed/Documented    [x] Discharge Planning: Jevity 1.5 240 mL bolus 5x/day + 150 mL water flushes q 4 hours    [] Participated in Interdisciplinary Rounds    NUTRITION RISK:    [] High              [x] Moderate           []  Low  []  Minimal/Uncompromised      Temple Grate  Pager [phone]              Weekend Pager [phone]

## 2017-03-10 NOTE — PROGRESS NOTES
Palliative Medicine Consult  Jose Albreto: [phone]     Patient Name: Whit Turcios  YOB: 1955     Date of Initial Consult: 3/7/17  Reason for Consult: care decisions  Requesting [address]  [address]  Primary Care Physician: Bubba Batista MD      RN reports pt had a great day, pain well controlled. Pt is asleep, will see tomorrow. I will send a referral for outpatient Palliative Clinic for pain management tomorrow if Dr. Tara Goodson indicates this is ok, otherwise, he will resume her outpatient pain management.

## 2017-03-10 NOTE — ROUTINE PROCESS
Bedside and Verbal shift change report given to Robert Turcios RN (oncoming nurse) by Woody Lopez RN (offgoing nurse).  Report included the following information SBAR, Kardex, Recent Results and Cardiac Rhythm  SR        Zone Phone: [phone]          Significant changes during shift: None  Patient Information  Megan Acevedo  57 y.o.  3/6/2017 5:36 PM by Elle Garcia MD. Whit Turcios was admitted from [facility]   Patient Active Problem List      Diagnosis Date Noted    Acute on chronic respiratory failure with hypoxemia ([address] 03/06/2017    Chronic pulmonary aspiration 03/02/2017    Atrial fibrillation with RVR ([address] 02/24/2017    Respiratory failure ([address] 02/23/2017    Chronic obstructive pulmonary disease with acute exacerbation ([address] 02/10/2017    Acute respiratory failure ([address] 02/06/2017    Aspiration pneumonia of both upper lobes ([address] 01/20/2017    Shortness of breath 01/20/2017    Delirium 01/20/2017    Goals of care, counseling/discussion 01/20/2017    Debility 01/20/2017    Acute respiratory failure with hypoxia ([address] 01/15/2017    Fibromyalgia 01/15/2017         Class: Chronic    Chronic pain 01/15/2017         Class: Chronic    History of tobacco use 01/15/2017         Class: Present on Admission    Sepsis ([address] 01/15/2017         Class: Acute    Acute renal injury ([address] 01/15/2017         Class: Present on Admission    Shock ([address] 01/15/2017    History of radiation to head and neck region 03/30/2016    History of laryngeal cancer 11/02/2015         Class: Present on Admission    Dysphagia 12/30/2014    Essential hypertension, benign 11/29/2011    Bipolar 1 disorder ([address] 11/29/2011    Encounter for long-term (current) use of other medications 11/29/2011                  Past Medical History:   Diagnosis Date    Bipolar 1 disorder ([address] 11/29/2011    Cancer (HCC)         skin cancer buttocks    Cancer (HCC)         throat    Chronic pain         FIBROMYALGIA, LEGS    Encounter for long-term (current) use of other medications 11/29/2011    Essential hypertension, benign 11/29/2011    Laryngeal cancer (HCC) 11/2/2015    Laryngeal mass       Psychiatric disorder         bipolar               Core Measures:                  Activity Status:      OOB to Chair No  Ambulated this shift No   Bed Rest Yes      Supplemental O2: (If Applicable)      NC Yes  NRB No  Venti-mask No  On 4 Liters/min          LINES AND DRAINS: piv 18g DVT prophylaxis:                  Patient Safety:      Falls Score Total Score: 4  Safety Level_______  Bed Alarm On? No  Sitter?  No      Plan for upcoming shift: D/C HOME              Discharge Plan: Yes CM to see pt      Active Consults:  IP CONSULT TO PULMONOLOGY  IP CONSULT TO PRIMARY CARE PROVIDER  IP CONSULT TO PALLIATIVE CARE - PROVIDER

## 2017-03-10 NOTE — PROGRESS NOTES
I have reviewed discharge instructions with the patient and daughter. The patient and daughter verbalized understanding. AVS, quick disclosure, mychart, prescriptions, medication information and appointment information completed/given. IV and monitor removed.     Patient discharged per nursing

## 2017-03-10 NOTE — PROGRESS NOTES
Discharge Note:     Pt will be discharged home today with [facility] PT/SLP. CM contacted pharmacy to determine cost for Roxicodone 20 mg ml. CM was advised pt would qualify for 10 day supply as prescription is written per insurance ($3.30) and pt will re responsible for the remainder of the medication at private pay (estimated $250). CM spoke with pt and pt's daughter, Mrs. Oneil Garcia regarding this matter and they both agreed that they could afford this option. CM advised floor nurse and faxed prescription to [address] [place] [address]. CM provided pt with second IM letter. CM made [facility] referral to [facility] as per pt request. CM placed copy of FOC and second IM letter on pt bedside chart. There were no additional discharge needs. Care Management Interventions  PCP Verified by CM: Yes (Dr. Nancy Rm )  Mode of Transport at Discharge:  Other (see comment) (private vehicle )  Transition of Care Consult (CM Consult): Home Health ([facility] )  [address]: Yes  Discharge Durable Medical Equipment: No (pt has access to oxygen, nebulizer and feeding tube supplies )  Health Maintenance Reviewed: Yes  Physical Therapy Consult: Yes  Occupational Therapy Consult: Yes  Speech Therapy Consult: Yes  Current Support Network: Lives with Spouse  Confirm Follow Up Transport: Self  Freedom of Choice Offered: Yes ([facility] )  Discharge Location  Discharge Placement: Home with home health    CARLOS MANUEL Castro, [address] [phone]

## 2017-03-10 NOTE — PROGRESS NOTES
Spoke with daughter Azam Casas regarding patient now home from the hospital as of today -   Name and  were used as patient identifiers. Reviewed medications with CG, CG is concerned that she does not have more than 20 doses of pain medication. Patient reportedly eating and drinking well at this time. LEIGHANN appointment was made for 9:15 AM on 3/16 Thursday. Contact info was given to CG in case of need.

## 2017-03-10 NOTE — ROUTINE PROCESS
Bedside and Verbal shift change report given to Ly Hernadez RN (oncoming nurse) by Luis A Jerome RN (offgoing nurse). Report included the following information SBAR, Kardex, Recent Results and Cardiac Rhythm SINUS TACH.       Zone Phone: [phone]          Significant changes during shift: None  Patient Information  Fausto Simmons  57 y.o.  3/6/2017 5:36 PM by Abhijeet Jaramillo MD. Samia Quinonez was admitted from Home      Problem List                Patient Active Problem List      Diagnosis Date Noted    Acute on chronic respiratory failure with hypoxemia ([address] 03/06/2017    Chronic pulmonary aspiration 03/02/2017    Atrial fibrillation with RVR ([address] 02/24/2017    Respiratory failure ([address] 02/23/2017    Chronic obstructive pulmonary disease with acute exacerbation ([address] 02/10/2017    Acute respiratory failure ([address] 02/06/2017    Aspiration pneumonia of both upper lobes ([address] 01/20/2017    Shortness of breath 01/20/2017    Delirium 01/20/2017    Goals of care, counseling/discussion 01/20/2017    Debility 01/20/2017    Acute respiratory failure with hypoxia ([address] 01/15/2017    Fibromyalgia 01/15/2017         Class: Chronic    Chronic pain 01/15/2017         Class: Chronic    History of tobacco use 01/15/2017         Class: Present on Admission    Sepsis ([address] 01/15/2017         Class: Acute    Acute renal injury ([address] 01/15/2017         Class: Present on Admission    Shock ([address] 01/15/2017    History of radiation to head and neck region 03/30/2016    History of laryngeal cancer 11/02/2015         Class: Present on Admission    Dysphagia 12/30/2014    Essential hypertension, benign 11/29/2011    Bipolar 1 disorder ([address] 11/29/2011    Encounter for long-term (current) use of other medications 11/29/2011               Past Medical History:   Diagnosis Date    Bipolar 1 disorder ([address] 11/29/2011    Cancer (HCC)         skin cancer buttocks    Cancer (HCC)         throat    Chronic pain       FIBROMYALGIA, LEGS    Encounter for long-term (current) use of other medications 11/29/2011    Essential hypertension, benign 11/29/2011    Laryngeal cancer (HCC) 11/2/2015    Laryngeal mass       Psychiatric disorder         bipolar               Core Measures:                  Activity Status:      OOB to Chair No  Ambulated this shift No   Bed Rest Yes      Supplemental O2: (If Applicable)      NC Yes  NRB No  Venti-mask No  On 4 Liters/min          LINES AND DRAINS: piv 18g DVT prophylaxis:                  Patient Safety:      Falls Score Total Score: 4  Safety Level_______  Bed Alarm On? No  Sitter?  No      Plan for upcoming shift: monitor i& o              Discharge Plan: Yes CM to see pt      Active Consults:  IP CONSULT TO PULMONOLOGY  IP CONSULT TO PRIMARY CARE PROVIDER  IP CONSULT TO PALLIATIVE CARE - PROVIDER

## 2017-03-10 NOTE — PROGRESS NOTES
Patient blood pressure 180/100. Dr. Chichi Cadet notified, orders were given for 5 mg of Amlodipine Oral Now. No other orders at this time.

## 2017-03-10 NOTE — DISCHARGE INSTRUCTIONS
PATIENT DISCHARGE INSTRUCTIONS      PATIENT DISCHARGE INSTRUCTIONS    Dale Mariscal / [id number] : 1955    Admitted 3/6/2017 Discharged: 3/10/2017       · It is important that you take the medication exactly as they are prescribed. · Keep your medication in the bottles provided by the pharmacist and keep a list of the medication names, dosages, and times to be taken in your wallet. · Do not take other medications without consulting your doctor.      What to do at Home    Recommended Diet: PEG feeding as directed    Recommended Activity: Activity as tolerated    If you experience any of the following symptoms  Worsening shortness of breathe  , please follow up with Wilson Galicia MD          Signed By: Wilson Galicia MD     March 10, 2017

## 2017-03-16 NOTE — PROGRESS NOTES
HISTORY OF PRESENT ILLNESS  Jesse Aguilar is a 57 y.o. female. Transitional Care visit following hospitalization for recurrent aspiration pneumonia,respiratory failure. Using PEG for all but ice chips. Feeling better,less cough and dyspnes. Maintained on 3 lpm    Hospital Follow Up   The history is provided by the patient. This is a new problem. The problem occurs daily. The problem has not changed since onset. Associated symptoms include shortness of breath. Pertinent negatives include no chest pain and no abdominal pain. Dysphagia   This is a chronic problem. The problem occurs daily. The problem has not changed since onset. Associated symptoms include shortness of breath. Pertinent negatives include no chest pain and no abdominal pain. Shortness of Breath   This is a chronic problem. The problem occurs intermittently. The problem has not changed since onset. Pertinent negatives include no fever, no orthopnea, no chest pain, no abdominal pain and no rash. Review of Systems   Constitutional: Positive for malaise/fatigue. Negative for fever. Respiratory: Positive for shortness of breath. Cardiovascular: Negative for chest pain, palpitations and orthopnea. Gastrointestinal: Negative for abdominal pain. Skin: Negative for rash. Physical Exam   Constitutional: She appears well-developed and well-nourished. HENT:   Head: Normocephalic and atraumatic. Right Ear: External ear normal.   Left Ear: External ear normal.   Nose: Nose normal.   Mouth/Throat: Oropharynx is clear and moist.   Cardiovascular: Normal rate and regular rhythm. Pulmonary/Chest: Effort normal and breath sounds normal.   Abdominal: Soft. Skin: Skin is warm and dry. Psychiatric: She has a normal mood and affect. ASSESSMENT and PLAN  Deborah La was seen today for hospital follow up, dysphagia and shortness of breath.     Diagnoses and all orders for this visit:    Acute on chronic respiratory failure with hypoxemia [facility]    Aspiration pneumonia of both upper lobes, unspecified aspiration pneumonia type ([facility]  -     CBC WITH AUTOMATED DIFF    Chronic obstructive pulmonary disease with acute exacerbation (HCC)    Bipolar 1 disorder (HCC)    Pharyngoesophageal dysphagia  -     METABOLIC PANEL, COMPREHENSIVE  -     CBC WITH AUTOMATED DIFF    Chronic pain syndrome  -     oxyCODONE (ROXICODONE INTENSOL) 20 mg/mL concentrated solution; Take 0.5 mL by mouth every four (4) hours as needed. Max Daily Amount: 60 mg.     Improving,needs f/u with Palliative Care  Follow-up Disposition: Not on File

## 2017-03-16 NOTE — PROGRESS NOTES
Chief Complaint   Patient presents with   [facility] Follow Up     F/U from [facility].    Dysphagia     Pt state she is having problems swallowing.  Shortness of Breath     Pt states she is having SOB.

## 2017-03-16 NOTE — MR AVS SNAPSHOT
Visit Information Date & Time Provider Department Dept. Phone Encounter #  
 3/16/2017  9:15 AM Umberto Couch [phone] [id number] Follow-up Instructions Return in about 4 weeks (around 4/13/2017). Upcoming Health Maintenance Date Due  
 MEDICARE YEARLY EXAM 5/25/1972 BREAST CANCER SCRN MAMMOGRAM 5/25/2004 FOBT Q 1 YEAR AGE 50-75 5/25/2004 Pneumococcal 19-64 Highest Risk (3 of 3 - PCV13) 12/7/2017 PAP AKA CERVICAL CYTOLOGY 2/28/2019 DTaP/Tdap/Td series (2 - Td) 12/7/2026 Allergies as of 3/16/2017  Review Complete On: 3/16/2017 By: Talha Thomas MD  
 No Known Allergies Current Immunizations  Reviewed on 3/8/2017 Name Date Influenza Vaccine 11/1/2014 Influenza Vaccine (Quad) PF 12/7/2016 Pneumococcal Polysaccharide (PPSV-23) 12/7/2016 Tdap 12/7/2016 Not reviewed this visit You Were Diagnosed With   
  
 Codes Comments Acute on chronic respiratory failure with hypoxemia (HCC)    -  Primary ICD-10-CM: O31.81 
ICD-9-CM: 518.84 Aspiration pneumonia of both upper lobes, unspecified aspiration pneumonia type ([facility]     ICD-10-CM: J69.0 ICD-9-CM: 507.0 Chronic obstructive pulmonary disease with acute exacerbation (HCC)     ICD-10-CM: J44.1 ICD-9-CM: 491.21 Bipolar 1 disorder (HCC)     ICD-10-CM: F31.9 ICD-9-CM: 296.7 Pharyngoesophageal dysphagia     ICD-10-CM: R13.14 ICD-9-CM: 787.24 Chronic pain syndrome     ICD-10-CM: G89.4 ICD-9-CM: 338. 4 Vitals BP Pulse Temp Resp Height(growth percentile) Weight(growth percentile) 130/90 (BP 1 Location: Left arm, BP Patient Position: Sitting) 85 97.6 °F (36.4 °C) (Oral) 26 5' 9\" (1.753 m) 135 lb (61.2 kg) LMP SpO2 BMI OB Status Smoking Status (LMP Unknown) 94% 19.94 kg/m2 Hysterectomy Former Smoker Vitals History BMI and BSA Data Body Mass Index Body Surface Area  19.94 kg/m 2 1.73 m 2  
  
  
 Preferred Pharmacy Pharmacy Name Phone [facility] [phone] Your Updated Medication List  
  
   
This list is accurate as of: 3/16/17  3:04 PM.  Always use your most recent med list.  
  
  
  
  
 acetylcysteine 100 mg/mL (10 %) nebulizer solution Commonly known as:  MUCOMYST Take 4 mL by inhalation every four (4) hours. albuterol-ipratropium 2.5 mg-0.5 mg/3 ml Nebu Commonly known as:  DUO-NEB  
3 mL by Nebulization route every four (4) hours as needed. amoxicillin-clavulanate 250-62.5 mg/5 mL suspension Commonly known as:  AUGMENTIN Take 10 mL by mouth every eight (8) hours. bisacodyl 5 mg Tab Take 1 Tab by mouth daily as needed (constipation). diphenhydrAMINE 12.5 mg/5 mL elix 40 mL, lidocaine 2 % soln 40 mL, aluminum & magnesium hydroxide-simethicone 400-400-40 mg/5 mL susp 40 mL Take 5 mL by mouth daily. Swish and spit or swallow. FLUoxetine 20 mg tablet Commonly known as:  PROzac Take 20 mg by mouth daily. lithium carbonate  mg CR tablet Commonly known as:  ESKALITH CR  
TAKE 1 TABLET BY ORAL ROUTE PER AT BEDTIME  
  
 metoprolol tartrate 25 mg tablet Commonly known as:  LOPRESSOR Take 1 Tab by mouth every twelve (12) hours. NORVASC 10 mg tablet Generic drug:  amLODIPine Take 10 mg by mouth daily. oxyCODONE 20 mg/mL concentrated solution Commonly known as:  Georgiann Ours Take 0.5 mL by mouth every four (4) hours as needed. Max Daily Amount: 60 mg.  
  
 traZODone 100 mg tablet Commonly known as:  Maddany Saas Take 200 mg by mouth nightly. VITAMIN B-12 1,000 mcg sublingual tablet Generic drug:  cyanocobalamin  
1,000 mcg by SubLINGual route daily. Prescriptions Printed  Refills  
 oxyCODONE (ROXICODONE INTENSOL) 20 mg/mL concentrated solution 0  
 Sig: Take 0.5 mL by mouth every four (4) hours as needed. Max Daily Amount: 60 mg.  
 Class: Print Route: Oral  
  
We Performed the Following CBC WITH AUTOMATED DIFF [56789 CPT(R)] METABOLIC PANEL, COMPREHENSIVE [71740 CPT(R)] Follow-up Instructions Return in about 4 weeks (around 4/13/2017). To-Do List   
 03/17/2017 10:15 AM  
  Appointment with Yandy Jones PTA at [address] [place] [address]  
  
 03/20/2017 To Be Determined Appointment with Yandy Jones PTA at [address] [place] [address]  
  
 03/21/2017 10:30 AM  
  Appointment with JAZ Qureshi at [address] [place] [address]  
  
 03/22/2017 To Be Determined Appointment with Yandy Jones PTA at [address] [place] [address]  
  
 03/23/2017 10:30 AM  
  Appointment with JAZ Qureshi at [address] [place] [address]  
  
 03/27/2017 To Be Determined Appointment with JAZ Qureshi at [address] [place] [address]  
  
 03/28/2017 To Be Determined Appointment with Yandy Jones PTA at [address] [place] [address]  
  
 03/29/2017 To Be Determined Appointment with JAZ Qureshi at [address] [place] [address]  
  
 03/30/2017 To Be Determined Appointment with Yandy Jones PTA at [address] [place] [address]  
  
 04/03/2017 To Be Determined Appointment with JAZ Qureshi at [address] [place] [address]  
  
 04/04/2017 To Be Determined Appointment with Yandy Jones PTA at [address] [place] [address]  
  
 04/05/2017 To Be Determined Appointment with JAZ Qureshi at [address] [place] [address]  
  
 04/06/2017 To Be Determined Appointment with Yandy Jones PTA at [address] [place] [address]  
  
 04/11/2017 To Be Determined Appointment with Elle Blevins PTA at [place] [address]  
  
 04/13/2017 To Be Determined Appointment with Blaire Boswell PT at [place] [address] Introducing [facility] SERVICES! Fredy Hale introduces [employer] patient portal. Now you can access parts of your medical record, email your doctor's office, and request medication refills online. 1. In your internet browser, go to [email or web address] 2. Click on the First Time User? Click Here link in the Sign In box. You will see the New Member Sign Up page. 3. Enter your AP[employer]s Access Code exactly as it appears below. You will not need to use this code after youve completed the sign-up process. If you do not sign up before the expiration date, you must request a new code. · [employer] Access Code: [email or web address] Expires: 6/5/2017  4:08 PM 
 
4. Enter the last four digits of your Social Security Number (xxxx) and Date of Birth (mm/dd/yyyy) as indicated and click Submit. You will be taken to the next sign-up page. 5. Create a APIm[employer]s ID. This will be your [employer]s login ID and cannot be changed, so think of one that is secure and easy to remember. 6. Create a [employer]s password. You can change your password at any time. 7. Enter your Password Reset Question and Answer. This can be used at a later time if you forget your password. 8. Enter your e-mail address. You will receive e-mail notification when new information is available in [address] 9. Click Sign Up. You can now view and download portions of your medical record. 10. Click the Download Summary menu link to download a portable copy of your medical information. If you have questions, please visit the Frequently Asked Questions section of the AP[employer]s website. Remember, [employer]s is NOT to be used for urgent needs. For medical emergencies, dial 911. Now available from your iPhone and Android! Please provide this summary of care documentation to your next provider. Your primary care clinician is listed as Sergio Hdez. If you have any questions after today's visit, please call [phone].

## 2017-03-21 NOTE — IP AVS SNAPSHOT
Current Discharge Medication List  
  
START taking these medications Dose & Instructions Dispensing Information Comments Morning Noon Evening Bedtime  
 predniSONE 10 mg tablet Commonly known as:  Barrett Perez Your last dose was: Your next dose is:    
   
   
 2 tabs daily for 3 days,then 1 tab daily for 3 days,then 1/2 tab daily till empty Quantity:  14 Tab Refills:  0 CONTINUE these medications which have NOT CHANGED Dose & Instructions Dispensing Information Comments Morning Noon Evening Bedtime  
 acetylcysteine 100 mg/mL (10 %) nebulizer solution Commonly known as:  MUCOMYST Your last dose was: Your next dose is:    
   
   
 Dose:  4 mL Take 4 mL by inhalation every four (4) hours. Quantity:  120 mL Refills:  11  
     
   
   
   
  
 albuterol-ipratropium 2.5 mg-0.5 mg/3 ml Nebu Commonly known as:  Debby Pham Your last dose was: Your next dose is:    
   
   
 Dose:  3 mL  
3 mL by Nebulization route every four (4) hours as needed. Quantity:  30 Nebule Refills:  12  
     
   
   
   
  
 amoxicillin-clavulanate 250-62.5 mg/5 mL suspension Commonly known as:  AUGMENTIN Your last dose was: Your next dose is:    
   
   
 Dose:  500 mg Take 10 mL by mouth every eight (8) hours. Quantity:  100 mL Refills:  0  
     
   
   
   
  
 bisacodyl 5 mg Tab Your last dose was: Your next dose is:    
   
   
 Dose:  1 Tab Take 1 Tab by mouth daily as needed (constipation). Refills:  0  
     
   
   
   
  
 diphenhydrAMINE 12.5 mg/5 mL elix 40 mL, lidocaine 2 % soln 40 mL, aluminum & magnesium hydroxide-simethicone 400-400-40 mg/5 mL susp 40 mL Your last dose was: Your next dose is:    
   
   
 Dose:  5 mL Take 5 mL by mouth daily. Swish and spit or swallow. Refills:  0 FLUoxetine 20 mg tablet Commonly known as:  PROzac Your last dose was: Your next dose is:    
   
   
 Dose:  20 mg Take 20 mg by mouth daily. Refills:  0  
     
   
   
   
  
 lithium carbonate  mg CR tablet Commonly known as:  ESKALITH CR Your last dose was: Your next dose is: TAKE 1 TABLET BY ORAL ROUTE PER AT BEDTIME Refills:  0  
     
   
   
   
  
 metoprolol tartrate 25 mg tablet Commonly known as:  LOPRESSOR Your last dose was: Your next dose is:    
   
   
 Dose:  25 mg Take 1 Tab by mouth every twelve (12) hours. Quantity:  60 Tab Refills:  11 NORVASC 10 mg tablet Generic drug:  amLODIPine Your last dose was: Your next dose is:    
   
   
 Dose:  10 mg Take 10 mg by mouth daily. Refills:  0  
     
   
   
   
  
 oxyCODONE 20 mg/mL concentrated solution Commonly known as:  Ayan Bowser Your last dose was: Your next dose is:    
   
   
 Dose:  10 mg Take 0.5 mL by mouth every four (4) hours as needed. Max Daily Amount: 60 mg.  
 Quantity:  60 mL Refills:  0  
     
   
   
   
  
 traZODone 100 mg tablet Commonly known as:  Serbian Never Your last dose was: Your next dose is:    
   
   
 Dose:  200 mg Take 200 mg by mouth nightly. Refills:  2 VITAMIN B-12 1,000 mcg sublingual tablet Generic drug:  cyanocobalamin Your last dose was: Your next dose is:    
   
   
 Dose:  1000 mcg  
1,000 mcg by SubLINGual route daily. Refills:  0 Where to Get Your Medications These medications were sent to [facility] [address] [place] [address], [address] [place] [address] Phone:  [phone]  
  amoxicillin-clavulanate 250-62.5 mg/5 mL suspension  
 predniSONE 10 mg tablet

## 2017-03-21 NOTE — PROGRESS NOTES
Nurse Navigator Note- called and spoke to Fer Cooper RN in [address] regarding the recent referral.  They have 1 Provider doing home visits and they are able to come out on Mondays or Fridays. Has clinic- [facility] they could come out would be Monday and if not then Friday. Will call when they know more about schedule.

## 2017-03-21 NOTE — IP AVS SNAPSHOT
[address] [facility] 
[phone] Patient: Adarsh Francis MRN: [id number] VBL:3/39/1923 You are allergic to the following No active allergies Recent Documentation Height Weight BMI OB Status Smoking Status 1.727 m 61.2 kg 20.53 kg/m2 Hysterectomy Former Smoker Emergency Contacts Name Discharge Info Relation Home Work Mobile [address] CAREGIVER [3] Spouse [3]   [phone] [facility] DISCHARGE CAREGIVER [3] Child [2] [phone] Star Rubio  Child [2]   [phone] About your hospitalization You were admitted on:  March 22, 2017 You last received care in the:  [facility] 2 GENERAL SURGERY You were discharged on:  March 27, 2017 Unit phone number:  [phone] Why you were hospitalized Your primary diagnosis was:  Not on File Your diagnoses also included:  Acute On Chronic Respiratory Failure With Hypoxemia (Hcc), Bipolar 1 Disorder (Hcc), Chronic Pain, Chronic Pulmonary Aspiration, Debility, Dysphagia, History Of Laryngeal Cancer, Respiratory Failure With Hypoxia (Hcc), Counseling Regarding Advanced Directives And Goals Of Care Providers Seen During Your Hospitalizations Provider Role Specialty Primary office phone Thuy Tee DO Attending Provider Emergency Medicine [phone] Zain Hernandez MD Attending Provider [facility] [phone] Your Primary Care Physician (PCP) Primary Care Physician Office Phone Office Fax Cecilio Noss [phone] Follow-up Information Follow up With Details Comments Contact Info Zain Hernandez MD On 3/29/2017 APPOINTMENT TIME: 11:15AM [address] [facility] [address] [address] 
[phone] [address] On 3/27/2017 THIS IS YOUR HOME HEALTH AGENCY.   IF YOU DONT HEAR FROM THEM WITHIN 24-48 HOURS, PLEASE CONTACT THEM DIRECTLY [address] [place] [address] Chey Tena [address] 
[phone] Your Appointments Wednesday March 29, 2017 11:15 AM EDT TRANSITIONAL CARE MANAGEMENT with Monica Carrillo MD  
[facility] [address] [facility] [address] [id number] [phone] Current Discharge Medication List  
  
START taking these medications Dose & Instructions Dispensing Information Comments Morning Noon Evening Bedtime  
 predniSONE 10 mg tablet Commonly known as:  Rande Frost Your last dose was: Your next dose is:    
   
   
 2 tabs daily for 3 days,then 1 tab daily for 3 days,then 1/2 tab daily till empty Quantity:  14 Tab Refills:  0 CONTINUE these medications which have NOT CHANGED Dose & Instructions Dispensing Information Comments Morning Noon Evening Bedtime  
 acetylcysteine 100 mg/mL (10 %) nebulizer solution Commonly known as:  MUCOMYST Your last dose was: Your next dose is:    
   
   
 Dose:  4 mL Take 4 mL by inhalation every four (4) hours. Quantity:  120 mL Refills:  11  
     
   
   
   
  
 albuterol-ipratropium 2.5 mg-0.5 mg/3 ml Nebu Commonly known as:  Sydell Badder Your last dose was: Your next dose is:    
   
   
 Dose:  3 mL  
3 mL by Nebulization route every four (4) hours as needed. Quantity:  30 Nebule Refills:  12  
     
   
   
   
  
 amoxicillin-clavulanate 250-62.5 mg/5 mL suspension Commonly known as:  AUGMENTIN Your last dose was: Your next dose is:    
   
   
 Dose:  500 mg Take 10 mL by mouth every eight (8) hours. Quantity:  100 mL Refills:  0  
     
   
   
   
  
 bisacodyl 5 mg Tab Your last dose was: Your next dose is:    
   
   
 Dose:  1 Tab Take 1 Tab by mouth daily as needed (constipation). Refills:  0  
     
   
   
   
  
 diphenhydrAMINE 12.5 mg/5 mL elix 40 mL, lidocaine 2 % soln 40 mL, aluminum & magnesium hydroxide-simethicone 400-400-40 mg/5 mL susp 40 mL Your last dose was: Your next dose is:    
   
   
 Dose:  5 mL Take 5 mL by mouth daily. Swish and spit or swallow. Refills:  0 FLUoxetine 20 mg tablet Commonly known as:  PROzac Your last dose was: Your next dose is:    
   
   
 Dose:  20 mg Take 20 mg by mouth daily. Refills:  0  
     
   
   
   
  
 lithium carbonate  mg CR tablet Commonly known as:  ESKALITH CR Your last dose was: Your next dose is: TAKE 1 TABLET BY ORAL ROUTE PER AT BEDTIME Refills:  0  
     
   
   
   
  
 metoprolol tartrate 25 mg tablet Commonly known as:  LOPRESSOR Your last dose was: Your next dose is:    
   
   
 Dose:  25 mg Take 1 Tab by mouth every twelve (12) hours. Quantity:  60 Tab Refills:  11 NORVASC 10 mg tablet Generic drug:  amLODIPine Your last dose was: Your next dose is:    
   
   
 Dose:  10 mg Take 10 mg by mouth daily. Refills:  0  
     
   
   
   
  
 oxyCODONE 20 mg/mL concentrated solution Commonly known as:  Rajwinder Stew Your last dose was: Your next dose is:    
   
   
 Dose:  10 mg Take 0.5 mL by mouth every four (4) hours as needed. Max Daily Amount: 60 mg.  
 Quantity:  60 mL Refills:  0  
     
   
   
   
  
 traZODone 100 mg tablet Commonly known as:  [place] Belling Your last dose was: Your next dose is:    
   
   
 Dose:  200 mg Take 200 mg by mouth nightly. Refills:  2 VITAMIN B-12 1,000 mcg sublingual tablet Generic drug:  cyanocobalamin Your last dose was: Your next dose is:    
   
   
 Dose:  1000 mcg  
1,000 mcg by SubLINGual route daily. Refills:  0 Where to Get Your Medications These [facility] were [facility] to [facility]  [place] [place] [address] Phone:  [phone]  
  amoxicillin-clavulanate 250-62.5 mg/5 mL suspension  
 predniSONE 10 mg tablet Discharge Instructions PATIENT DISCHARGE INSTRUCTIONS PATIENT DISCHARGE INSTRUCTIONS Tashi Bettencourt / [id number] : 1955 Admitted 3/21/2017 Discharged: 3/27/2017 · It is important that you take the medication exactly as they are prescribed. · Keep your medication in the bottles provided by the pharmacist and keep a list of the medication names, dosages, and times to be taken in your wallet. · Do not take other medications without consulting your doctor. What to do at [facility] Recommended Diet: PEG feeding as before,only meds and ice chips by mouth Recommended Activity: Activity as tolerated,O2 at 4 lpm 
 
If you experience any of the following symptoms  Worsening shortness of breathe 
, please follow up with Rell Salmeron MD 
. Signed By: Rell Salmeron MD   
 2017 Discharge Orders Procedure Order Date Status Priority Quantity Spec Type Associated Dx 200 [facility] 17 1141 Normal Routine 1  Pharyngoesophageal dysphagia [[id number]] Comments:  Please evaluate patient for nursing,RT,Speech Introducing [facility] & HEALTH SERVICES! Colby Dickson introduces [employer] patient portal. Now you can access parts of your medical record, email your doctor's office, and request medication refills online. 1. In your internet browser, go to [email or web address] 2. Click on the First Time User? Click Here link in the Sign In box. You will see the New Member Sign Up page. 3. Enter your [employer] Access Code exactly as it appears below.  You will not need to use this code after youve completed the sign-up process. If you do not sign up before the expiration date, you must request a new code. · Network [employer] Access Code: [email or web address] Expires: 6/5/2017  4:08 PM 
 
4. Enter the last four digits of your Social Security Number (xxxx) and Date of Birth (mm/dd/yyyy) as indicated and click Submit. You will be taken to the next sign-up page. 5. Create a Network Chemistry ID. This will be your Network [employer] login ID and cannot be changed, so think of one that is secure and easy to remember. 6. Create a Network Chemistry password. You can change your password at any time. 7. Enter your Password Reset Question and Answer. This can be used at a later time if you forget your password. 8. Enter your e-mail address. You will receive e-mail notification when new information is available in [address] 9. Click Sign Up. You can now view and download portions of your medical record. 10. Click the Download Summary menu link to download a portable copy of your medical information. If you have questions, please visit the Frequently Asked Questions section of the Network [employer] website. Remember, [employer] is NOT to be used for urgent needs. For medical emergencies, dial 911. Now available from your iPhone and Android! General Information Please provide this summary of care documentation to your next provider. Patient Signature:  ____________________________________________________________ Date:  ____________________________________________________________  
  
Melodie Mata Provider Signature:  ____________________________________________________________ Date:  ____________________________________________________________

## 2017-03-21 NOTE — ED NOTES
Bedside shift change report given to Nicole Lynne (oncoming nurse) by Nuria Leal (offgoing nurse). Report included the following information SBAR and ED Summary.

## 2017-03-22 PROBLEM — J96.91 RESPIRATORY FAILURE WITH HYPOXIA (HCC): Status: ACTIVE | Noted: 2017-01-01

## 2017-03-22 NOTE — HOME CARE
Please note that this patient was open to [facility] INPATIENT PT and ST services at the time of hospital admission. If services will be needed at hospital d/c, please order to resume them.    Alva

## 2017-03-22 NOTE — CDMP QUERY
1 of 2:  Dr. Geno Waggoner M.D. :  Please clarify if this patient is being treated/managed for:    =>sepsis poa in setting of acute organ dysfunction based on wbc 40-34, bands 6-3; tx: 500 bolus NS with [email or web address], flagyl; Maxipime;Vancomycin,   =>Other Explanation of clinical findings  =>Unable to Determine (no explanation of clinical findings)    The medical record reflects the following clinical findings, treatment, and risk factors:    Risk Factors: age  Clinical Indicators: wbc 40-34, bands 6-3,  aspiration pneumonia, A/C respiratory failure, noted sepsis in HP and pulmonary notes. Treatment: 500 bolus flagyl; Maxipime; vancomycin, [email or web address]     Please clarify and document your clinical opinion in the progress notes and discharge summary including the definitive and/or presumptive diagnosis, (suspected or probable), related to the above clinical findings. Please include clinical findings supporting your diagnosis. Thank Chico Manning  [address]; BSX;7512

## 2017-03-22 NOTE — PROGRESS NOTES
General Daily Progress Note    Admit Date: 3/21/2017  Hospital day 1    Subjective:     Patient has  Dyspnea,cough. .   Medication side effects: none    Current Facility-Administered Medications   Medication Dose Route Frequency    piperacillin-tazobactam (ZOSYN) 3.375 g in 0.9% sodium chloride (MBP/ADV) 100 mL  3.375 g IntraVENous Q8H    heparin (porcine) injection 5,000 Units  5,000 Units SubCUTAneous Q8H    albuterol-ipratropium (DUO-NEB) 2.5 MG-0.5 MG/3 ML  3 mL Nebulization Q4H RT    0.9% sodium chloride infusion  125 mL/hr IntraVENous CONTINUOUS    levoFLOXacin (LEVAQUIN) 750 mg in D5W IVPB  750 mg IntraVENous Q48H    vancomycin (VANCOCIN) 1,000 mg in 0.9% sodium chloride (MBP/ADV) 250 mL  1,000 mg IntraVENous Q16H    oxyCODONE (ROXICODONE INTENSOL) 20 mg/mL concentrated solution 10 mg  10 mg Oral Q4H PRN    methylPREDNISolone (PF) (SOLU-MEDROL) injection 40 mg  40 mg IntraVENous Q6H        Review of Systems  Constitutional: positive for fatigue, malaise and anorexia  Respiratory: positive for cough or stridor  Cardiovascular: negative  Gastrointestinal: negative  Behavioral/Psych: positive for anxiety    Objective:     Patient Vitals for the past 8 hrs:   BP Pulse Resp SpO2   03/22/17 0045 101/58 93 24 90 %   03/22/17 0000 97/51 91 24 90 %             Physical Exam:   Visit Vitals    /58    Pulse 93    Temp 99.8 °F (37.7 °C)    Resp 24    Ht 5' 8\" (1.727 m)    Wt 135 lb (61.2 kg)    LMP  (LMP Unknown)    SpO2 90%    BMI 20.53 kg/m2     General appearance: alert, fatigued, cooperative, mild distress, appears stated age  Lungs: rhonchi R base, L base  Heart: regular rate and rhythm, S1, S2 normal, no murmur, click, rub or gallop  Abdomen: soft, non-tender.  Bowel sounds normal. No masses,  no organomegaly  Extremities: extremities normal, atraumatic, no cyanosis or edema           Data Review   Recent Results (from the past 24 hour(s))   GLUCOSE, POC    Collection Time: 03/21/17  7:03 PM Result Value Ref Range    Glucose (POC) 142 (H) 65 - 100 mg/dL    Performed by Zoie Franks    CBC WITH AUTOMATED DIFF    Collection Time: 03/21/17  7:12 PM   Result Value Ref Range    WBC 40.4 (H) 3.6 - 11.0 K/uL    RBC 3.98 3.80 - 5.20 M/uL    HGB 10.5 (L) 11.5 - 16.0 g/dL    HCT 36.5 35.0 - 47.0 %    MCV 91.7 80.0 - 99.0 FL    MCH 26.4 26.0 - 34.0 PG    MCHC 28.8 (L) 30.0 - 36.5 g/dL    RDW 17.7 (H) 11.5 - 14.5 %    PLATELET 239 821 - 322 K/uL    NEUTROPHILS 87 %    BAND NEUTROPHILS 6 %    LYMPHOCYTES 4 %    MONOCYTES 3 %    EOSINOPHILS 0 %    BASOPHILS 0 %    ABS. NEUTROPHILS 37.6 K/UL    ABS. LYMPHOCYTES 1.6 K/UL    ABS. MONOCYTES 1.2 K/UL    ABS. EOSINOPHILS 0.0 K/UL    ABS. BASOPHILS 0.0 K/UL    DF MANUAL      RBC COMMENTS ANISOCYTOSIS  1+        WBC COMMENTS TOXIC GRANULATION     METABOLIC PANEL, COMPREHENSIVE    Collection Time: 03/21/17  7:12 PM   Result Value Ref Range    Sodium 136 136 - 145 mmol/L    Potassium 4.8 3.5 - 5.1 mmol/L    Chloride 99 97 - 108 mmol/L    CO2 31 21 - 32 mmol/L    Anion gap 6 5 - 15 mmol/L    Glucose 121 (H) 65 - 100 mg/dL    BUN 22 (H) 6 - 20 MG/DL    Creatinine 1.16 (H) 0.55 - 1.02 MG/DL    BUN/Creatinine ratio 19 12 - 20      GFR est AA 57 (L) >60 ml/min/1.73m2    GFR est non-AA 47 (L) >60 ml/min/1.73m2    Calcium 10.2 (H) 8.5 - 10.1 MG/DL    Bilirubin, total 0.4 0.2 - 1.0 MG/DL    ALT (SGPT) 29 12 - 78 U/L    AST (SGOT) 16 15 - 37 U/L    Alk.  phosphatase 74 45 - 117 U/L    Protein, total 7.4 6.4 - 8.2 g/dL    Albumin 2.9 (L) 3.5 - 5.0 g/dL    Globulin 4.5 (H) 2.0 - 4.0 g/dL    A-G Ratio 0.6 (L) 1.1 - 2.2     CK W/ REFLX CKMB    Collection Time: 03/21/17  7:12 PM   Result Value Ref Range    CK 15 (L) 26 - 192 U/L   TROPONIN I    Collection Time: 03/21/17  7:12 PM   Result Value Ref Range    Troponin-I, Qt. <0.04 <0.05 ng/mL   LACTIC ACID, PLASMA    Collection Time: 03/21/17  7:12 PM   Result Value Ref Range    Lactic acid 0.9 0.4 - 2.0 MMOL/L   INFLUENZA A & B AG (RAPID TEST)    Collection Time: 03/21/17  7:38 PM   Result Value Ref Range    Influenza A Antigen NEGATIVE  NEG      Influenza B Antigen NEGATIVE  NEG     URINALYSIS W/ RFLX MICROSCOPIC    Collection Time: 03/21/17  8:47 PM   Result Value Ref Range    Color YELLOW/STRAW      Appearance CLEAR CLEAR      Specific gravity 1.016 1.003 - 1.030      pH (UA) 7.0 5.0 - 8.0      Protein TRACE (A) NEG mg/dL    Glucose NEGATIVE  NEG mg/dL    Ketone NEGATIVE  NEG mg/dL    Bilirubin NEGATIVE  NEG      Blood NEGATIVE  NEG      Urobilinogen 0.2 0.2 - 1.0 EU/dL    Nitrites NEGATIVE  NEG      Leukocyte Esterase SMALL (A) NEG      WBC 5-10 0 - 4 /hpf    RBC 5-10 0 - 5 /hpf    Epithelial cells FEW FEW /lpf    Bacteria 1+ (A) NEG /hpf    Yeast PRESENT (A) NEG      Other: Renal Epithelial cells Present     BLOOD GAS, ARTERIAL    Collection Time: 03/21/17  9:37 PM   Result Value Ref Range    pH 7.35 7.35 - 7.45      PCO2 60 (H) 35.0 - 45.0 mmHg    PO2 56 (L) 80 - 100 mmHg    O2 SAT 87 (L) 92 - 97 %    BICARBONATE 32 (H) 22 - 26 mmol/L    BASE EXCESS 4.6 mmol/L    O2 METHOD NRM      FIO2 100 %    Sample source ARTERIAL      SITE RIGHT RADIAL      INESSA'S TEST YES     CBC WITH AUTOMATED DIFF    Collection Time: 03/22/17  4:32 AM   Result Value Ref Range    WBC 34.3 (H) 3.6 - 11.0 K/uL    RBC 3.33 (L) 3.80 - 5.20 M/uL    HGB 8.8 (L) 11.5 - 16.0 g/dL    HCT 30.5 (L) 35.0 - 47.0 %    MCV 91.6 80.0 - 99.0 FL    MCH 26.4 26.0 - 34.0 PG    MCHC 28.9 (L) 30.0 - 36.5 g/dL    RDW 18.3 (H) 11.5 - 14.5 %    PLATELET 998 639 - 576 K/uL    NEUTROPHILS 91 (H) 32 - 75 %    BAND NEUTROPHILS 3 0 - 6 %    LYMPHOCYTES 3 (L) 12 - 49 %    MONOCYTES 3 (L) 5 - 13 %    EOSINOPHILS 0 0 - 7 %    BASOPHILS 0 0 - 1 %    ABS. NEUTROPHILS 32.3 (H) 1.8 - 8.0 K/UL    ABS. LYMPHOCYTES 1.0 0.8 - 3.5 K/UL    ABS. MONOCYTES 1.0 0.0 - 1.0 K/UL    ABS. EOSINOPHILS 0.0 0.0 - 0.4 K/UL    ABS.  BASOPHILS 0.0 0.0 - 0.1 K/UL    DF MANUAL      RBC COMMENTS ANISOCYTOSIS  1+        WBC COMMENTS TOXIC GRANULATION     METABOLIC PANEL, BASIC    Collection Time: 03/22/17  4:32 AM   Result Value Ref Range    Sodium 139 136 - 145 mmol/L    Potassium 5.0 3.5 - 5.1 mmol/L    Chloride 104 97 - 108 mmol/L    CO2 30 21 - 32 mmol/L    Anion gap 5 5 - 15 mmol/L    Glucose 111 (H) 65 - 100 mg/dL    BUN 17 6 - 20 MG/DL    Creatinine 0.91 0.55 - 1.02 MG/DL    BUN/Creatinine ratio 19 12 - 20      GFR est AA >60 >60 ml/min/1.73m2    GFR est non-AA >60 >60 ml/min/1.73m2    Calcium 9.4 8.5 - 10.1 MG/DL   MAGNESIUM    Collection Time: 03/22/17  4:32 AM   Result Value Ref Range    Magnesium 2.2 1.6 - 2.4 mg/dL   PHOSPHORUS    Collection Time: 03/22/17  4:32 AM   Result Value Ref Range    Phosphorus 2.0 (L) 2.6 - 4.7 MG/DL           Assessment:     Active Problems:    Chronic pulmonary aspiration (3/2/2017)      Acute on chronic respiratory failure with hypoxemia (HCC) (3/6/2017)      Dysphagia (12/30/2014)      History of laryngeal cancer (11/2/2015)      Bipolar 1 disorder ([facility] [address] (11/29/2011)      Chronic pain (1/15/2017)      Debility (1/20/2017)        Plan:     Unfortunately recurrence of sudden respiratory failure. Only taking ice chips po,not clear she has aspirated. Pain fairly well controlled. Start usual treatments

## 2017-03-22 NOTE — PROGRESS NOTES
Pharmacy Automatic Renal Dosing Protocol - Antimicrobials    Indication for Antimicrobials: Recurrent aspiration PNA  Current Regimen of Each Antimicrobial (Start Day & Day of Therapy):  Levofloxacin 750 mg IV every 48 hours (3/22 Day #1)  Zosyn 3.375 g IV every 8 hours (3/22 Day #1)  Vancomycin 1750 mg IV load followed by 1000 mg IV every 16 hours (3/22 Day #1)    Goal Vancomycin Level (if needed): 15-20 mcg/mL    Significant Cultures:   3/21 Blood: NGTD x 12 hours- pending  CAPD, Hemodialysis or Renal Replacement Therapy: None documencted   Paralysis, amputations, malnutrition: None documented  Recent Labs      17   0432  17   1912   CREA  0.91  1.16*   BUN  17  *   WBC  34.3*  40.4*     Temp (24hrs), Av.8 °F (37.1 °C), Min:97.9 °F (36.6 °C), Max:99.8 °F (37.7 °C)    Creatinine Clearance (Creatinine Clearance (ml/min)): 65 mL/min      Impression/Plan: Will change levofloxacin to 750 mg IV every 24 hours for CrCl > 50 mL/min per renal dosing protocol. Will continue current regimen for vancomycin and Zosyn as appropriate for indication and renal function. Pharmacy will follow daily and adjust medications as appropriate for renal function and/or serum levels.     Thank you,  Roselia Stubbs, PHARMD

## 2017-03-22 NOTE — H&P
[facility], [address]   HISTORY AND PHYSICAL       Name:  Karla Ramos   MR#:  [id number]   :  1955   Account #:  [de-identified]        Date of Adm:  2017       CHIEF COMPLAINT: \"I lost my breath. \"    HISTORY OF PRESENT ILLNESS: The patient is a 57-year-old    female with past medical history of recurrent aspiration   pneumonia. She is currently wheelchair bound. She also has a history   of laryngeal cancer, history of bipolar disorder. She was brought to the   emergency department on account of progressive shortness of breath. The patient reports that over the last 1 week, her shortness of breath   has gradually worsened. She is complaining of fever with chills. Temperature at home was 100.8 with associated generalized body   weakness, lethargy, nausea with cough productive of sputum. The   patient also reports that she is normally on 3 L of oxygen via nasal   cannula but today oxygen had to be increased to 4 L. Overall, the   patient is a very poor historian. PAST MEDICAL HISTORY   1. History of bipolar disorder. 2. Laryngeal carcinoma. 3. Hypertension. 4. Fibromyalgia. 5. Chronic pain. PAST SURGICAL HISTORY: She is status post breast augmentation   with saline implants, status post G-tube placement, status post   hysterectomy. HOME MEDICATIONS: This could not be obtained at this time. ALLERGIES: SHE HAS NO DOCUMENTED DRUG ALLERGIES. FAMILY HISTORY: Her father had cancer. Otherwise, family history   could not be obtained at this time. SOCIAL HISTORY: She is . She is a former smoker, but she   states her  still smokes. She denies illicit drug use. No alcohol   use. REVIEW OF SYSTEMS   CONSTITUTIONAL: Positive fever with malaise. No unintentional   weight loss. EYES: She denies any visual disturbances. RESPIRATORY: Positive cough productive of sputum.  She could not   tell me the color of the sputum. Positive worsening shortness of breath. CARDIOVASCULAR: No chest pain or palpitations. GI: Positive nausea but no vomiting. No abdominal pain. GENITOURINARY: No dysuria, urgency, or frequency. MUSCULOSKELETAL: No back pain. NEUROLOGIC: She reports generalized body weakness. No syncopal   attacks. PHYSICAL EXAMINATION   TRIAGE VITAL SIGNS: Temperature of 99.8, pulse 89 beats per   minute, respirations 18, blood pressure 110/58 mmHg, oxygen   saturation was 89%. GENERAL APPEARANCE: She is lying on the bed. She looks acutely   ill-looking, but not in any obvious distress. She looks well-developed   and well-nourished. HEENT: Atraumatic, normocephalic. Pupils equal, round and reactive   to light and accommodation. Extraocular muscles intact. Oral mucosa   is dry. NECK: Supple. No jugular venous distention. RESPIRATORY: Bilateral.  She has coarse breath sounds in bilateral   lung fields. No expiratory wheeze. CARDIOVASCULAR: S1 and S2 heard. Regular rate and rhythm. No   gallops or murmurs. ABDOMEN: Soft. Normoactive bowel sounds. She has a PEG tube in   place with no surrounding erythema. EXTREMITIES: No pitting pedal edema. +2 dorsalis pedis pulses. SKIN: Appears very warm but dry. No rashes seen. NEUROLOGICAL: She is alert and awake. Orientation could not be   assessed. She appears to move all extremities. LABORATORY EVALUATION: White blood cell count is 40.4,   hemoglobin 10.5, platelets 703, neutrophils 87. Chemistry: Sodium   136, potassium 4.8, chloride 99, bicarbonate 31, glucose 121, ALT 29,   AST 16. Influenza antigen were negative. Chest x-ray shows no acute cardiopulmonary process. ASSESSMENT AND PLAN: The patient is a 58-year-old    female with past medical history of recurrent aspiration pneumonia   who is presenting to the emergency department on account of   worsening shortness of breath with fever and cough.    1. Possible aspiration pneumonia with some sepsis. Current chest x-  ray is without any acute cardiopulmonary process. Will advise that PA   and lateral chest x-ray be repeated in the a.m. For there might be a   radiologic lag. Considering underlying sepsis, we will empirically start   her on levofloxacin, vancomycin and Zosyn. We will obtain sputum   cultures with Gram stain. We will repeat chest x-ray, PA and lateral   view in a.m.   2. Acute on chronic hypoxic respiratory failure, possibly secondary to   underlying aspiration pneumonia in the setting of probably chronic   obstructive pulmonary disease. We will continue oxygen via nasal   cannula. We will titrate to oxygen saturation greater than 91%. We will   also start DuoNeb around the clock q.4h.   3. We will resume home medications after medication reconciliation by   Pharmacy. 4. Deep vein thrombosis prophylaxis. Heparin 5000 units every 8   hours. 5. Sepsis present on admission. She has marked leukocytosis with a   fever of at least 100. We will continue above antibiotics. Blood cultures   were obtained. We will followup with blood culture results. Hydrate with NS IVF. Lactic acid within normal limits. DISPOSITION The patient will be admitted to telemetry for routine   progression of care. On behalf of the hospitalist group, thank you for the privilege of being   involved in the care of this lady. Time spent: About 65 minutes was used in seeing patient.         Tj Tanner MD      NA / BA   D:  03/22/2017   01:53   T:  03/22/2017   08:40   Job #:  [id number]

## 2017-03-22 NOTE — PROGRESS NOTES
PCU SHIFT NURSING NOTE    Shift Summary:     3878 Pt arrived to PCU. 100% on NRB mask, switched to 6L NC to see how she tolerates. Sats now 92%. 0900 Breathing tx given by RT. RT assessed pt, recommends stay on 6L NC with target O2 sats in low 90s given pt hx. No bipap at this time. 2691 Dr. Lorenzo Kang at bedside, agrees with 6L NC, wean as tolerated to pt's baseline of 4L. Initial EKG completed. Pt c/o 6/10 pain, which she states she has chronically. Given prn roxicodone. 1141 Oxygen weaned to 5L NC, pt tolerating well. 1410 Oxygen weaned down to 4L NC (pt's baseline). RR 20, O2 sat 94%. Prn roxicodone given for 7/10 back pain. Pt has no other complaints at this time. 1600 Request for kangaroo pump called in to [facility]. Jevity 1.5 requested from dietary. 1800 Jevity 1.5 bolus of 240mL given with 150mL water flush as per diet orders. [address] roxicodone given for c/o pain. 1920 Bedside and Verbal shift change report given to Festus Estrella (oncoming nurse) by Jackqueline Mohs RN (offgoing nurse). Report included the following information SBAR, Kardex, Intake/Output, MAR, Accordion, Recent Results, Med Rec Status and Cardiac Rhythm NSR. Admission Date 3/21/2017   Admission Diagnosis Respiratory failure with hypoxia ([facility] [address]   Consults IP CONSULT TO PULMONOLOGY  IP CONSULT TO PALLIATIVE CARE - PROVIDER        Consults   []PT   []OT   []Speech   []Case Management      [x] Palliative      Cardiac Monitoring Order   [x]Yes   []No     IV drips   []Yes    Drip:                            Dose:  Drip:                            Dose:  Drip:                            Dose:   [x]No     GI Prophylaxis   []Yes   []No         DVT Prophylaxis   SCDs:             Claudio stockings:         [x] Medication   []Contraindicated   []None      Activity Level Activity Level: Bed Rest     Activity Assistance: Partial (one person)   Purposeful Rounding every 1-2 hour?    [x]Yes   Das Score  Total Score: 3   Bed Alarm (If score 3 or >)   [x]Yes   [] Refused (See signed refusal form in chart)   Angel Score  Angel Score: 17   Angel Score (if score 14 or less)   []PMT consult   []Wound Care consult      []Specialty bed   [x] Nutrition consult          Needs prior to discharge:   Home O2 required:    [x]Yes   []No    If yes, how much O2 required? 4L    Other:    Last Bowel Movement: Last Bowel Movement Date: 03/21/17      Influenza Vaccine Received Flu Vaccine for Current Season (usually Sept-March): Yes (Dec 2016)        Pneumonia Vaccine           Diet Active Orders   Diet    DIET NPO With Tube Feedings      LDAs               Peripheral IV 03/21/17 Right Antecubital (Active)   Site Assessment Clean, dry, & intact 3/22/2017  3:09 PM   Phlebitis Assessment 0 3/22/2017  3:09 PM   Infiltration Assessment 0 3/22/2017  3:09 PM   Dressing Status Clean, dry, & intact 3/22/2017  3:09 PM   Dressing Type Transparent;Tape 3/22/2017  3:09 PM   Hub Color/Line Status Pink; Infusing;Patent 3/22/2017  3:09 PM          PEG/Gastrostomy Tube 02/28/17 (Active)                Urinary Catheter      Intake & Output   Date 03/21/17 1900 - 03/22/17 0659 03/22/17 0700 - 03/23/17 0659   Shift 1779-2156 24 Hour Total 9380-5071 8129-7838 24 Hour Total   I  N  T  A  K  E   Shift Total  (mL/kg)        O  U  T  P  U  T   Urine  (mL/kg/hr) 400 400 900  (1.2)  900      Urine Voided 400 400 900  900    Shift Total  (mL/kg) 400  (6.5) 400  (6.5) 900  (14.7)  900  (14.7)   NET -400 -400 -900  -900   Weight (kg) 61.2 61.2 61.2 61.2 61.2         Readmission Risk Assessment Tool Score High Risk            24       Total Score        3 Relationship with PCP    2 Patient Living Status    11 More than 1 Admission in calendar year    5 Patient Insurance is Medicare, Medicaid or Self Pay    3 Charlson Comorbidity Score        Criteria that do not apply:    Patient Length of Stay > 5       Expected Length of Stay 4d 16h   Actual Length of Stay 0

## 2017-03-22 NOTE — CONSULTS
[facility]  Pulmonary, Critical Care, and Sleep Medicine    Initial Patient Consult    Name: Kike Saenz MRN: [id number]   : 1955 Hospital: [facility]   Date: 3/22/2017        IMPRESSION:   · Acute/chronic hypoxic respiratory failure  · Recurrent aspiration pneumonia  · Sepsis   · Incomplete compliance with NPO status - continues with ice chips, water, and coffee  · Laryngeal cancer  · Bipolar disorder  · COPD +/- exacerbation  · H/O tobacco use      RECOMMENDATIONS:   · O2  · Bronchodilators  · Systemic steroids  · Empiric antibiotics for recurrent aspiration  · Encourage complete NPO status  · Palliative care consultation - if she want to continue to drink, etc against advice, would reconsider code status and whether or not rehospitalization is appropriate     Subjective: This patient has been seen and evaluated at the request of Dr. Alise Castellano for respiratory failure. Patient is a 57 y.o. female with laryngeal cancer and recurrent aspiration pneumonia. She was recently discharged. Had a PEG placed on 17 for recurrent aspiration pneumonia as she has uncontrolled aspiration. She developed acute onset of worsening dyspnea yesterday and worsening hypoxia. She continues to eat ice chips and drink water and coffee daily. She feels like she has been getting weaker. Past Medical History:   Diagnosis Date    Bipolar 1 disorder ([address] 2011    Cancer ([address]     skin cancer buttocks    Cancer (HCC)     throat    Chronic pain     FIBROMYALGIA, LEGS    Encounter for long-term (current) use of other medications 2011    Essential hypertension, benign 2011    Ill-defined condition     Laryngeal cancer ([address] 2015    Laryngeal mass     Psychiatric disorder     bipolar      Past Surgical History:   Procedure Laterality Date    HX BREAST AUGMENTATION Bilateral     SALINE IMPLANTS    HX GI      PLACEMENT OF G TUBE    HX GI      ESOPH. DILATATION    HX GYN      tubal pregnancy    HX HEENT      BX OF NECK MASS    HX HYSTERECTOMY      PLACE PERCUT GASTROSTOMY TUBE  10/28/2014     has been removed 2015    PLACE PERCUT GASTROSTOMY TUBE  2/28/2017           Prior to Admission medications    Medication Sig Start Date End Date Taking? Authorizing Provider   oxyCODONE (ROXICODONE INTENSOL) 20 mg/mL concentrated solution Take 0.5 mL by mouth every four (4) hours as needed. Max Daily Amount: 60 mg. 3/16/17  Yes María Damico MD   amLODIPine (NORVASC) 10 mg tablet Take 10 mg by mouth daily. Yes Stephany Pena MD   albuterol-ipratropium (DUO-NEB) 2.5 mg-0.5 mg/3 ml nebu 3 mL by Nebulization route every four (4) hours as needed. 2/12/17  Yes Janiya Smith MD   VITAMIN B-12 1,000 mcg sublingual tablet 1,000 mcg by SubLINGual route daily. 11/11/16  Yes Historical Provider   acetylcysteine (MUCOMYST) 100 mg/mL (10 %) nebulizer solution Take 4 mL by inhalation every four (4) hours. 1/26/17  Yes María Damico MD   metoprolol tartrate (LOPRESSOR) 25 mg tablet Take 1 Tab by mouth every twelve (12) hours. 1/24/17  Yes María Damico MD   lithium carbonate CR (ESKALITH CR) 450 mg CR tablet TAKE 1 TABLET BY ORAL ROUTE PER AT BEDTIME 11/9/16  Yes Historical Provider   FLUoxetine (PROZAC) 20 mg tablet Take 20 mg by mouth daily. 2/5/16  Yes Historical Provider   traZODone (DESYREL) 100 mg tablet Take 200 mg by mouth nightly. 11/2/15  Yes Historical Provider   amoxicillin-clavulanate (AUGMENTIN) 250-62.5 mg/5 mL suspension Take 10 mL by mouth every eight (8) hours. 3/10/17   María Damico MD   bisacodyl 5 mg tab Take 1 Tab by mouth daily as needed (constipation). Historical Provider   diphenhydrAMINE 12.5 mg/5 mL elix 40 mL, lidocaine 2 % soln 40 mL, aluminum & magnesium hydroxide-simethicone 400-400-40 mg/5 mL susp 40 mL Take 5 mL by mouth daily. Swish and spit or swallow.     Historical Provider     No Known Allergies   Social History   Substance Use Topics    Smoking status: Former Smoker     Packs/day: 1.00     Years: 40.00     Quit date: 2014    Smokeless tobacco: Never Used      Comment: PASSIVE SMOKE EXPOSURE,  SMOKES    Alcohol use No      Family History   Problem Relation Age of Onset    Cancer Father      bone/skin/lung    Pneumonia Mother     Anesth Problems Neg Hx         Current Facility-Administered Medications   Medication Dose Route Frequency    piperacillin-tazobactam (ZOSYN) 3.375 g in 0.9% sodium chloride (MBP/ADV) 100 mL  3.375 g IntraVENous Q8H    heparin (porcine) injection 5,000 Units  5,000 Units SubCUTAneous Q8H    albuterol-ipratropium (DUO-NEB) 2.5 MG-0.5 MG/3 ML  3 mL Nebulization Q4H RT    0.9% sodium chloride infusion  125 mL/hr IntraVENous CONTINUOUS    levoFLOXacin (LEVAQUIN) 750 mg in D5W IVPB  750 mg IntraVENous Q48H    vancomycin (VANCOCIN) 1,000 mg in 0.9% sodium chloride (MBP/ADV) 250 mL  1,000 mg IntraVENous Q16H    methylPREDNISolone (PF) (SOLU-MEDROL) injection 40 mg  40 mg IntraVENous Q6H       Review of Systems:  A comprehensive review of systems was negative except for that written in the HPI.     Objective:   Vital Signs:    Visit Vitals    /71 (BP 1 Location: Left arm, BP Patient Position: At rest)    Pulse 86    Temp 97.9 °F (36.6 °C)    Resp (!) 32    Ht 5' 8\" (1.727 m)    Wt 61.2 kg (135 lb)    LMP  (LMP Unknown)    SpO2 92%    BMI 20.53 kg/m2       O2 Device: Non-rebreather mask   O2 Flow Rate (L/min): 15 l/min   Temp (24hrs), Av.2 °F (37.3 °C), Min:97.9 °F (36.6 °C), Max:100.1 °F (37.8 °C)       Intake/Output:   Last shift:         Last 3 shifts:  1901 -  0700  In: -   Out: 400 [Urine:400]    Intake/Output Summary (Last 24 hours) at 17 9822  Last data filed at 17 0505   Gross per 24 hour   Intake                0 ml   Output              400 ml   Net             -400 ml      Physical Exam:   General:  Alert, cooperative, no distress    Head:  Normocephalic, without obvious abnormality, atraumatic. Eyes:  Conjunctivae/corneas clear. Nose: Nares normal. Septum midline. Mucosa normal.    Throat: Lips, mucosa, and tongue normal.     Neck: Supple, symmetrical, trachea midline    Back:   Symmetric, no curvature. ROM normal.   Lungs:   Bronchial breath sounds in right and left bases   Chest wall:  No tenderness or deformity. Heart:  Regular rate and rhythm   Abdomen:   Soft, non-tender. Bowel sounds normal.    Extremities: Extremities normal, atraumatic, no cyanosis or edema. Skin: Skin color, texture, turgor normal. No rashes or lesions   Lymph nodes: Cervical, supraclavicular nodes normal.   Neurologic: Grossly nonfocal     Data review:     Recent Results (from the past 24 hour(s))   GLUCOSE, POC    Collection Time: 03/21/17  7:03 PM   Result Value Ref Range    Glucose (POC) 142 (H) 65 - 100 mg/dL    Performed by Jay Vitale    CBC WITH AUTOMATED DIFF    Collection Time: 03/21/17  7:12 PM   Result Value Ref Range    WBC 40.4 (H) 3.6 - 11.0 K/uL    RBC 3.98 3.80 - 5.20 M/uL    HGB 10.5 (L) 11.5 - 16.0 g/dL    HCT 36.5 35.0 - 47.0 %    MCV 91.7 80.0 - 99.0 FL    MCH 26.4 26.0 - 34.0 PG    MCHC 28.8 (L) 30.0 - 36.5 g/dL    RDW 17.7 (H) 11.5 - 14.5 %    PLATELET 534 634 - 761 K/uL    NEUTROPHILS 87 %    BAND NEUTROPHILS 6 %    LYMPHOCYTES 4 %    MONOCYTES 3 %    EOSINOPHILS 0 %    BASOPHILS 0 %    ABS. NEUTROPHILS 37.6 K/UL    ABS. LYMPHOCYTES 1.6 K/UL    ABS. MONOCYTES 1.2 K/UL    ABS. EOSINOPHILS 0.0 K/UL    ABS.  BASOPHILS 0.0 K/UL    DF MANUAL      RBC COMMENTS ANISOCYTOSIS  1+        WBC COMMENTS TOXIC GRANULATION     METABOLIC PANEL, COMPREHENSIVE    Collection Time: 03/21/17  7:12 PM   Result Value Ref Range    Sodium 136 136 - 145 mmol/L    Potassium 4.8 3.5 - 5.1 mmol/L    Chloride 99 97 - 108 mmol/L    CO2 31 21 - 32 mmol/L    Anion gap 6 5 - 15 mmol/L    Glucose 121 (H) 65 - 100 mg/dL    BUN 22 (H) 6 - 20 MG/DL    Creatinine 1.16 (H) 0.55 - 1.02 MG/DL    BUN/Creatinine ratio 19 12 - 20      GFR est AA 57 (L) >60 ml/min/1.73m2    GFR est non-AA 47 (L) >60 ml/min/1.73m2    Calcium 10.2 (H) 8.5 - 10.1 MG/DL    Bilirubin, total 0.4 0.2 - 1.0 MG/DL    ALT (SGPT) 29 12 - 78 U/L    AST (SGOT) 16 15 - 37 U/L    Alk.  phosphatase 74 45 - 117 U/L    Protein, total 7.4 6.4 - 8.2 g/dL    Albumin 2.9 (L) 3.5 - 5.0 g/dL    Globulin 4.5 (H) 2.0 - 4.0 g/dL    A-G Ratio 0.6 (L) 1.1 - 2.2     CK W/ REFLX CKMB    Collection Time: 03/21/17  7:12 PM   Result Value Ref Range    CK 15 (L) 26 - 192 U/L   TROPONIN I    Collection Time: 03/21/17  7:12 PM   Result Value Ref Range    Troponin-I, Qt. <0.04 <0.05 ng/mL   LACTIC ACID, PLASMA    Collection Time: 03/21/17  7:12 PM   Result Value Ref Range    Lactic acid 0.9 0.4 - 2.0 MMOL/L   CULTURE, BLOOD, PAIRED    Collection Time: 03/21/17  7:12 PM   Result Value Ref Range    Special Requests: NO SPECIAL REQUESTS      Culture result: NO GROWTH AFTER 12 HOURS     INFLUENZA A & B AG (RAPID TEST)    Collection Time: 03/21/17  7:38 PM   Result Value Ref Range    Influenza A Antigen NEGATIVE  NEG      Influenza B Antigen NEGATIVE  NEG     URINALYSIS W/ RFLX MICROSCOPIC    Collection Time: 03/21/17  8:47 PM   Result Value Ref Range    Color YELLOW/STRAW      Appearance CLEAR CLEAR      Specific gravity 1.016 1.003 - 1.030      pH (UA) 7.0 5.0 - 8.0      Protein TRACE (A) NEG mg/dL    Glucose NEGATIVE  NEG mg/dL    Ketone NEGATIVE  NEG mg/dL    Bilirubin NEGATIVE  NEG      Blood NEGATIVE  NEG      Urobilinogen 0.2 0.2 - 1.0 EU/dL    Nitrites NEGATIVE  NEG      Leukocyte Esterase SMALL (A) NEG      WBC 5-10 0 - 4 /hpf    RBC 5-10 0 - 5 /hpf    Epithelial cells FEW FEW /lpf    Bacteria 1+ (A) NEG /hpf    Yeast PRESENT (A) NEG      Other: Renal Epithelial cells Present     BLOOD GAS, ARTERIAL    Collection Time: 03/21/17  9:37 PM   Result Value Ref Range    pH 7.35 7.35 - 7.45      PCO2 60 (H) 35.0 - 45.0 mmHg    PO2 56 (L) 80 - 100 mmHg    O2 SAT 87 (L) 92 - 97 %    BICARBONATE 32 (H) 22 - 26 mmol/L    BASE EXCESS 4.6 mmol/L    O2 METHOD NRM      FIO2 100 %    Sample source ARTERIAL      SITE RIGHT RADIAL      INESSA'S TEST YES     CBC WITH AUTOMATED DIFF    Collection Time: 03/22/17  4:32 AM   Result Value Ref Range    WBC 34.3 (H) 3.6 - 11.0 K/uL    RBC 3.33 (L) 3.80 - 5.20 M/uL    HGB 8.8 (L) 11.5 - 16.0 g/dL    HCT 30.5 (L) 35.0 - 47.0 %    MCV 91.6 80.0 - 99.0 FL    MCH 26.4 26.0 - 34.0 PG    MCHC 28.9 (L) 30.0 - 36.5 g/dL    RDW 18.3 (H) 11.5 - 14.5 %    PLATELET 805 561 - 018 K/uL    NEUTROPHILS 91 (H) 32 - 75 %    BAND NEUTROPHILS 3 0 - 6 %    LYMPHOCYTES 3 (L) 12 - 49 %    MONOCYTES 3 (L) 5 - 13 %    EOSINOPHILS 0 0 - 7 %    BASOPHILS 0 0 - 1 %    ABS. NEUTROPHILS 32.3 (H) 1.8 - 8.0 K/UL    ABS. LYMPHOCYTES 1.0 0.8 - 3.5 K/UL    ABS. MONOCYTES 1.0 0.0 - 1.0 K/UL    ABS. EOSINOPHILS 0.0 0.0 - 0.4 K/UL    ABS.  BASOPHILS 0.0 0.0 - 0.1 K/UL    DF MANUAL      RBC COMMENTS ANISOCYTOSIS  1+        WBC COMMENTS TOXIC GRANULATION     METABOLIC PANEL, BASIC    Collection Time: 03/22/17  4:32 AM   Result Value Ref Range    Sodium 139 136 - 145 mmol/L    Potassium 5.0 3.5 - 5.1 mmol/L    Chloride 104 97 - 108 mmol/L    CO2 30 21 - 32 mmol/L    Anion gap 5 5 - 15 mmol/L    Glucose 111 (H) 65 - 100 mg/dL    BUN 17 6 - 20 MG/DL    Creatinine 0.91 0.55 - 1.02 MG/DL    BUN/Creatinine ratio 19 12 - 20      GFR est AA >60 >60 ml/min/1.73m2    GFR est non-AA >60 >60 ml/min/1.73m2    Calcium 9.4 8.5 - 10.1 MG/DL   MAGNESIUM    Collection Time: 03/22/17  4:32 AM   Result Value Ref Range    Magnesium 2.2 1.6 - 2.4 mg/dL   PHOSPHORUS    Collection Time: 03/22/17  4:32 AM   Result Value Ref Range    Phosphorus 2.0 (L) 2.6 - 4.7 MG/DL       Imaging:  I have personally reviewed the patients radiographs and have reviewed the reports:  Decreased left sided infiltrate, increased right middle lobe infiltrate (difficult to interpret due to prior infiltrates from her recent episodes)        Judith Solis MD

## 2017-03-22 NOTE — PROGRESS NOTES
TRANSFER - OUT REPORT:    Verbal report given to David Beltran RN (name) on Chencho Mora  being transferred to PCU (unit) for change in patient condition (respiratory insufficiency)    Report consisted of patients Situation, Background, Assessment and   Recommendations(SBAR). Information from the following report(s) SBAR, Kardex, Intake/Output, MAR and Recent Results was reviewed with the receiving nurse. Lines:   Peripheral IV 03/21/17 Right Antecubital (Active)   Site Assessment Clean, dry, & intact 3/21/2017  7:14 PM   Phlebitis Assessment 0 3/21/2017  7:14 PM   Infiltration Assessment 0 3/21/2017  7:14 PM   Dressing Status Clean, dry, & intact 3/21/2017  7:14 PM        Opportunity for questions and clarification was provided.       Patient transported with:  O2 non-rebreather mask 15 L

## 2017-03-22 NOTE — PROGRESS NOTES
Interdisciplinary team rounds were held 3/22/2017 with the following team members:Care Management, Nursing, Nutrition, Pharmacy, Physical Therapy and Physician and the patient. Plan of care discussed. See clinical pathway and/or care plan for interventions and desired outcomes.

## 2017-03-22 NOTE — ED PROVIDER NOTES
HPI Comments: Tashi Bettencourt is a 57 y.o. female with pmhx significant for recurrent aspiration pneumonia who presents in a wheelchair with family to the ED c/o waking up with onset of progressively worsening shortness of breath today. Associated symptoms include fever (tmax 100.8 F), extremity weakness, lethargy, nausea and productive cough. Family reports has been treated for pneumonia intermittently since January 2017 and been to the admitted to the hospital for similar cc 5 times. They notes patient's sx's worsen when she finishes a course of abx. Per family, pt is normally on 3L O2 NC, but they pushed it up to 4L at home. Family is concerned because the patient is making \"jerky and involuntary\" movements of her extremities. Per family, they had a difficulty arousing her today, but the patient was fine yesterday. At baseline, pt is able to ambulate without a cane. Of note, the pt takes all meals by the PEG tube and has had 3 boxes of food. Family states the patient may have been exposed to influenza recently. Family denies pt has any h/o heart failure. She specifically denies any vomiting, chest pain or decreased appetite. PCP: Rell Salmeron MD    There are no other complaints, changes, or physical findings at this time. The history is provided by the patient and a relative. Past Medical History:   Diagnosis Date    Bipolar 1 disorder ([address] 11/29/2011    Cancer ([facility] [address]     skin cancer buttocks    Cancer (HCC)     throat    Chronic pain     FIBROMYALGIA, LEGS    Encounter for long-term (current) use of other medications 11/29/2011    Essential hypertension, benign 11/29/2011    Ill-defined condition     Laryngeal cancer ([address] 11/2/2015    Laryngeal mass     Psychiatric disorder     bipolar       Past Surgical History:   Procedure Laterality Date    HX BREAST AUGMENTATION Bilateral     SALINE IMPLANTS    HX GI      PLACEMENT OF G TUBE    HX GI      ESOPH.  DILATATION    HX GYN tubal pregnancy    HX HEENT      BX OF NECK MASS    HX HYSTERECTOMY      PLACE PERCUT GASTROSTOMY TUBE  10/28/2014     has been removed 2015    PLACE PERCUT GASTROSTOMY TUBE  2/28/2017              Family History:   Problem Relation Age of Onset    Cancer Father      bone/skin/lung    Pneumonia Mother     Anesth Problems Neg Hx        Social History     Social History    Marital status:      Spouse name: N/A    Number of children: N/A    Years of education: N/A     Occupational History    Not on file. Social History Main Topics    Smoking status: Former Smoker     Packs/day: 1.00     Years: 40.00     Quit date: 8/27/2014    Smokeless tobacco: Never Used      Comment: PASSIVE SMOKE EXPOSURE,  SMOKES    Alcohol use No    Drug use: No    Sexual activity: No     Other Topics Concern    Not on file     Social History Narrative         ALLERGIES: Review of patient's allergies indicates no known allergies. Review of Systems   Constitutional: Positive for fever. Negative for fatigue. HENT: Negative. Negative for sore throat. Eyes: Negative. Respiratory: Positive for cough and shortness of breath. Negative for wheezing. Cardiovascular: Negative for chest pain and leg swelling. Gastrointestinal: Positive for nausea. Negative for blood in stool, constipation, diarrhea and vomiting. Endocrine: Negative. Genitourinary: Negative for difficulty urinating, dysuria and hematuria. Musculoskeletal: Negative. Negative for back pain. Skin: Negative for rash. Allergic/Immunologic: Negative. Neurological: Positive for weakness. Negative for numbness. Hematological: Negative. Psychiatric/Behavioral: Negative.          Patient Vitals for the past 12 hrs:   Temp Pulse Resp BP SpO2   03/21/17 2045 - 79 21 114/70 98 %   03/21/17 2030 - 80 25 92/61 94 %   03/21/17 2000 - 82 19 96/57 93 %   03/21/17 1907 - - - - (!) 89 %   03/21/17 1856 - 88 21 - 93 %   03/21/17 1841 99.8 °F (37.7 °C) 89 18 110/58 (!) 89 %     Physical Exam   Constitutional: She is oriented to person, place, and time. She appears well-developed and well-nourished. No distress. HENT:   Head: Normocephalic and atraumatic. Mouth/Throat: Oropharynx is clear and moist.   Eyes: Conjunctivae and EOM are normal.   Neck: Neck supple. No JVD present. No tracheal deviation present. Cardiovascular: Normal rate, regular rhythm and intact distal pulses. Exam reveals no gallop and no friction rub. No murmur heard. No peripheral edema   Pulmonary/Chest: No stridor. Tachypnea noted. No respiratory distress. Patient is on 5L NC  Coarse rhonchi in bilateral lung fields   Abdominal: Soft. Bowel sounds are normal. She exhibits no mass. There is no tenderness. There is no guarding. Abdomen mildly distended  PEG tube in place   Musculoskeletal: Normal range of motion. She exhibits no edema or tenderness. No deformity   Neurological: She is alert and oriented to person, place, and time. She has normal strength. No focal deficits   Skin: Skin is warm, dry and intact. No rash noted. There is pallor. Psychiatric: She has a normal mood and affect. Her behavior is normal. Judgment and thought content normal.   Nursing note and vitals reviewed. MDM  Number of Diagnoses or Management Options  Acute on chronic respiratory failure with hypoxemia [facility]):   Diagnosis management comments: Patient with a hx of respiratory failure and aspiration pneumonia presenting with dyspnea, hypoxia, and increased lethargy. DDx includes aspiration pneumonia, pulmonary edema, acs, sepsis, metabolic abnormality. Will check labs, CXR, cardiac enzymes, ekg. Will treat with duoneb, then reassess. Admit.         Amount and/or Complexity of Data Reviewed  Clinical lab tests: ordered and reviewed  Tests in the radiology section of CPT®: ordered and reviewed  Tests in the medicine section of CPT®: ordered and reviewed  Decide to obtain previous medical records or to obtain history from someone other than the patient: yes  Obtain history from someone other than the patient: yes (family)  Review and summarize past medical records: yes  Discuss the patient with other providers: yes (hospitalist)  Independent visualization of images, tracings, or specimens: yes    Patient Progress  Patient progress: stable    ED Course       Procedures     EKG interpretation: (Preliminary)  1916  Rhythm: normal sinus rhythm; and regular . Rate (approx.): 84 bpm; Axis: normal; P wave: normal; QRS interval: normal ; ST/T wave: non-specific ST changes;   Written by Scott Boateng ED Scribe, as dictated by Shanell Wiggins DO.    CRITICAL CARE NOTE :        IMPENDING DETERIORATION -Airway, Respiratory, Cardiovascular and Metabolic    ASSOCIATED RISK FACTORS - Hypotension, Hypoxia, Dysrhythmia, Metabolic changes and Dehydration    MANAGEMENT- Bedside Assessment and Supervision of Care    INTERPRETATION -  Xrays, ECG and Blood Pressure    INTERVENTIONS - hemodynamic mngmt, vascular control and Metobolic interventions    CASE REVIEW - Hospitalist, Nursing and Family    TREATMENT RESPONSE -Improved    PERFORMED BY - Self        NOTES   :      I have spent 76 minutes of critical care time involved in lab review, consultations with specialist, family decision- making, bedside attention and documentation. During this entire length of time I was immediately available to the patient .     Shanell Wiggins DO                                                  LABORATORY TESTS:  Recent Results (from the past 12 hour(s))   GLUCOSE, POC    Collection Time: 03/21/17  7:03 PM   Result Value Ref Range    Glucose (POC) 142 (H) 65 - 100 mg/dL    Performed by [employer]    CBC WITH AUTOMATED DIFF    Collection Time: 03/21/17  7:12 PM   Result Value Ref Range    WBC 40.4 (H) 3.6 - 11.0 K/uL    RBC 3.98 3.80 - 5.20 M/uL    HGB 10.5 (L) 11.5 - 16.0 g/dL    HCT 36.5 35.0 - 47.0 %    MCV 91.7 80.0 - 99.0 FL    MCH 26.4 26.0 - 34.0 PG    MCHC 28.8 (L) 30.0 - 36.5 g/dL    RDW 17.7 (H) 11.5 - 14.5 %    PLATELET 384 093 - 857 K/uL    NEUTROPHILS 87 %    BAND NEUTROPHILS 6 %    LYMPHOCYTES 4 %    MONOCYTES 3 %    EOSINOPHILS 0 %    BASOPHILS 0 %    ABS. NEUTROPHILS 37.6 K/UL    ABS. LYMPHOCYTES 1.6 K/UL    ABS. MONOCYTES 1.2 K/UL    ABS. EOSINOPHILS 0.0 K/UL    ABS. BASOPHILS 0.0 K/UL    DF MANUAL      RBC COMMENTS ANISOCYTOSIS  1+        WBC COMMENTS TOXIC GRANULATION     METABOLIC PANEL, COMPREHENSIVE    Collection Time: 03/21/17  7:12 PM   Result Value Ref Range    Sodium 136 136 - 145 mmol/L    Potassium 4.8 3.5 - 5.1 mmol/L    Chloride 99 97 - 108 mmol/L    CO2 31 21 - 32 mmol/L    Anion gap 6 5 - 15 mmol/L    Glucose 121 (H) 65 - 100 mg/dL    BUN 22 (H) 6 - 20 MG/DL    Creatinine 1.16 (H) 0.55 - 1.02 MG/DL    BUN/Creatinine ratio 19 12 - 20      GFR est AA 57 (L) >60 ml/min/1.73m2    GFR est non-AA 47 (L) >60 ml/min/1.73m2    Calcium 10.2 (H) 8.5 - 10.1 MG/DL    Bilirubin, total 0.4 0.2 - 1.0 MG/DL    ALT (SGPT) 29 12 - 78 U/L    AST (SGOT) 16 15 - 37 U/L    Alk.  phosphatase 74 45 - 117 U/L    Protein, total 7.4 6.4 - 8.2 g/dL    Albumin 2.9 (L) 3.5 - 5.0 g/dL    Globulin 4.5 (H) 2.0 - 4.0 g/dL    A-G Ratio 0.6 (L) 1.1 - 2.2     CK W/ REFLX CKMB    Collection Time: 03/21/17  7:12 PM   Result Value Ref Range    CK 15 (L) 26 - 192 U/L   TROPONIN I    Collection Time: 03/21/17  7:12 PM   Result Value Ref Range    Troponin-I, Qt. <0.04 <0.05 ng/mL   LACTIC ACID, PLASMA    Collection Time: 03/21/17  7:12 PM   Result Value Ref Range    Lactic acid 0.9 0.4 - 2.0 MMOL/L   INFLUENZA A & B AG (RAPID TEST)    Collection Time: 03/21/17  7:38 PM   Result Value Ref Range    Influenza A Antigen NEGATIVE  NEG      Influenza B Antigen NEGATIVE  NEG     URINALYSIS W/ RFLX MICROSCOPIC    Collection Time: 03/21/17  8:47 PM   Result Value Ref Range    Color YELLOW/STRAW      Appearance CLEAR CLEAR      Specific gravity 1.016 1.003 - 1.030      pH (UA) 7.0 5.0 - 8.0      Protein TRACE (A) NEG mg/dL    Glucose NEGATIVE  NEG mg/dL    Ketone NEGATIVE  NEG mg/dL    Bilirubin NEGATIVE  NEG      Blood NEGATIVE  NEG      Urobilinogen 0.2 0.2 - 1.0 EU/dL    Nitrites NEGATIVE  NEG      Leukocyte Esterase SMALL (A) NEG      WBC 5-10 0 - 4 /hpf    RBC 5-10 0 - 5 /hpf    Epithelial cells FEW FEW /lpf    Bacteria 1+ (A) NEG /hpf    Yeast PRESENT (A) NEG      Other: Renal Epithelial cells Present     BLOOD GAS, ARTERIAL    Collection Time: 03/21/17  9:37 PM   Result Value Ref Range    pH 7.35 7.35 - 7.45      PCO2 60 (H) 35.0 - 45.0 mmHg    PO2 56 (L) 80 - 100 mmHg    O2 SAT 87 (L) 92 - 97 %    BICARBONATE 32 (H) 22 - 26 mmol/L    BASE EXCESS 4.6 mmol/L    O2 METHOD NRM      FIO2 100 %    Sample source ARTERIAL      SITE RIGHT RADIAL      INESSA'S TEST YES         IMAGING RESULTS:  XR CHEST PORT   Final Result          INDICATION: . Shortness of breath  COMPARISON: Previous chest xray, 3/6/2017. LIMITATIONS: Portable technique. Wolsharif Pato FINDINGS: Single frontal view of the chest.   .  Lines/tubes/surgical: Cardiac monitor leads overly the patient. Heart/mediastinum: Unremarkable. Lungs/pleura: Opacities occupying the majority of the left midlung and left base  as well as the medial right base. Interstitial prominence throughout the lungs. Overall, these findings are similar to comparison. No visible pneumothorax. .  IMPRESSION  IMPRESSION:  1. No radiographic evidence of acute cardiopulmonary disease. MEDICATIONS GIVEN:  Medications   metroNIDAZOLE (FLAGYL) IVPB premix 500 mg (not administered)   cefepime (MAXIPIME) 2 g in 0.9% sodium chloride (MBP/ADV) 100 mL (not administered)   sodium chloride 0.9 % bolus infusion 500 mL (0 mL IntraVENous IV Completed 3/21/17 2030)   albuterol-ipratropium (DUO-NEB) 2.5 MG-0.5 MG/3 ML (3 mL Nebulization Given 3/21/17 2003)       IMPRESSION:  1.  Acute on chronic respiratory failure with hypoxemia (HCC)        PLAN: 1. Admit    ADMIT NOTE:  9:43 PM  Patient is being admitted to the hospital by Dr. Jamal Caceres. The results of their tests and reasons for their admission have been discussed with them and/or available family. They convey agreement and understanding for the need to be admitted and for their admission diagnosis. Consultation has been made with the inpatient physician specialist for hospitalization. This note is prepared by Annell Lefort, acting as Scribe for Nancy Mccurdy DO. Nancy Mccurdy DO: The the scribe's documentation has been prepared under my direction and personally reviewed by me in its entirety. I confirm that the note above accurately reflects all work, treatment, procedures, and medical decision making performed by me.

## 2017-03-22 NOTE — CONSULTS
Palliative Medicine Consult  Jose Alberto: [phone] ([phone])    Patient Name: Veronica Montgomery  YOB: 1955    Date of Initial Consult: 3/22/17  Reason for Consult: overwhelming sxs  Requesting Provider: Reji Woo   Primary Care Physician: Reji Woo MD      SUMMARY:   Veronica Montgomery is a 57 y.o. with a past history of bipolar type 1, laryngeal mass, and HTN, recurrent aspiration PNA, chronic pain secondary to neoplasm, who was admitted on 3/21/2017 from home with a diagnosis of acute on chronic respiratory failure with hypoxemia. Current medical issues leading to Palliative Medicine involvement include: this is pt's 5th admission for aspiration PNA. Pt is a former smoker, is exposed to 's cigarette smoke. PALLIATIVE DIAGNOSES:   1. SOB  2. Dysphagia, PEG dependent  3. Chronic aspiration: silent aspiration per MBS  4. Chronic pain secondary to neoplasm  5. Debility: baseline ambulates with cane   6. Weakness      PLAN:   1. Met with pt, who is a little confused today. Spoke with daughter Sherren Kirsch, who reports pt on most days is capable of making her own complex decisions. Sherren Kirsch wanted to know if she could have home 02 that goes higher than 5L; if they could provide pt with higher levels of 02 perhaps they could avoid another admission because pt responded so well to the higher level of 02 in the ED. She also had questions about home IV abx, as every time the abx are stopped, pt develops PNA. I explained that pt is most likely chronically aspirating her own saliva, which is causing the PNA. Discussed Palliative outpatient clinic, hopefully they can assist with obtaining higher levels of 02 at home and IV or oral abx. Pt has been working with speech therapy at home with hopes of reducing the dysphagia. 2. Notified Palliative Clinic about pt's needs; will let them know when pt is ready for discharge so they can set up an appt. 3. Will assist pt with AMD tomorrow. 4. Initial consult note routed to primary continuity provider  5. Communicated plan of care with: Palliative IDT, RN     GOALS OF CARE / TREATMENT PREFERENCES:   [====Goals of Care====]  GOALS OF CARE:  Patient / health care proxy stated goals:     1. Go home  2. Stay home (pt does not want to keep coming back to the hospital, but does want to continue abx therapy)      TREATMENT PREFERENCES:   Code Status: Prior    Advance Care Planning:  Advance Care Planning 3/22/2017   Patient's Healthcare Decision Maker is: Verbal statement (Legal Next of Kin remains as decision maker)   Primary Decision Maker Name Peter Garcia   Primary Decision Maker Phone Number [phone]   Primary Decision Maker Relationship to Patient Adult child   Secondary Decision Maker Name Hawa Barrera   Secondary Decision Maker Phone Number [phone]   Secondary Decision Maker Relationship to Patient Adult child   Confirm Advance Directive None   Patient Would Like to Complete Advance Directive -   Does the patient have other document types -       Other:    The palliative care team has discussed with patient / health care proxy about goals of care / treatment preferences for patient.  [====Goals of Care====]         HISTORY:     History obtained from: chart, pt, family    CHIEF COMPLAINT: dyspnea    HPI/SUBJECTIVE:    The patient is:   [x] Verbal and participatory  [] Non-participatory due to:     Presents to ED with c/o progressive SOB x 1 week with associated T. 100.8, generalized weakness, lethargy, nausea, and productive cough. Pt is on home 02 at 3L however, has increased it to 4L due to this illness. Pt lives at home with . Pt uses PEG herself, typically has 4 \"boxes\" of liquid nutrition daily, bolus feeds. When she gets sick, like yesterday, her daughter has to help pt with the feeding, getting to bathroom, etc.  Pt reports that her pain is well controlled on the current regimen of oxycodone 10mg q. 4 hours.      Clinical Pain Assessment (nonverbal scale for severity on nonverbal patients):   [++++ Clinical Pain Assessment++++]  [++++Pain Severity++++]: Pain: 4   [++++Pain Character++++]: achy  [++++Pain Duration++++]:    years  [++++Pain Effect++++]:   [++++Pain Factors++++]:   [++++Pain Frequency++++]: chronic   [++++Pain Location++++]:    Left chest, back  [++++ Clinical Pain Assessment++++]     FUNCTIONAL ASSESSMENT:     Palliative Performance Scale (PPS):  PPS: 40       PSYCHOSOCIAL/SPIRITUAL SCREENING:     Advance Care Planning:  Advance Care Planning 3/22/2017   Patient's Healthcare Decision Maker is: Verbal statement (Legal Next of Kin remains as decision maker)   Primary Decision Maker Name Hemant hCun   Primary Decision Maker Phone Number [phone]   Primary Decision Maker Relationship to Patient Adult child   Secondary Decision Maker Name Nancy Garzon   Secondary Decision Maker Phone Number [phone]   Secondary Decision Maker Relationship to Patient Adult child   Confirm Advance Directive None   Patient Would Like to Complete Advance Directive -   Does the patient have other document types -        Any spiritual / Latter day concerns:  [] Yes /  [x] No    Caregiver Burnout:  [] Yes /  [] No /  [x] No Caregiver Present      Anticipatory grief assessment:   [x] Normal  / [] Maladaptive       ESAS Anxiety: Anxiety: 0    ESAS Depression: Depression: 3        REVIEW OF SYSTEMS:     Positive and pertinent negative findings in ROS are noted above in HPI. The following systems were [x] reviewed / [] unable to be reviewed as noted in HPI  Other findings are noted below. Systems: constitutional, ears/nose/mouth/throat, respiratory, gastrointestinal, genitourinary, musculoskeletal, integumentary, neurologic, psychiatric, endocrine. Positive findings noted below.   Modified ESAS Completed by: provider   Fatigue: 8 Drowsiness: 3   Depression: 3 Pain: 4   Anxiety: 0 Nausea: 0     Dyspnea: 2     Constipation: No PHYSICAL EXAM:     From RN flowsheet:  Wt Readings from Last 3 Encounters:   03/21/17 135 lb (61.2 kg)   03/20/17 134 lb 6.4 oz (61 kg)   03/16/17 135 lb (61.2 kg)     Blood pressure 117/63, pulse 83, temperature 98 °F (36.7 °C), resp. rate 20, height 5' 8\" (1.727 m), weight 135 lb (61.2 kg), SpO2 94 %.     Pain Scale 1: Numeric (0 - 10)  Pain Intensity 1: 4  Pain Onset 1: chronic  Pain Location 1: Back     Pain Description 1: Aching  Pain Intervention(s) 1: Medication (see MAR)  Last bowel movement, if known:     Constitutional: WD, WN, slight confusion, weak  Eyes: pupils equal, anicteric  ENMT: no nasal discharge, moist mucous membranes, edentulous with upper dentures  Cardiovascular: regular rhythm, distal pulses intact  Respiratory: breathing not labored, symmetric, coarse BS, productive cough of tan colored phlegm  Gastrointestinal: soft non-tender, +bowel sounds  Musculoskeletal: no deformity, no tenderness to palpation  Skin: warm, dry  Neurologic: following commands, moving all extremities  Psychiatric: full affect, no hallucinations     HISTORY:     Active Problems:    Bipolar 1 disorder ([address] (11/29/2011)      Dysphagia (12/30/2014)      History of laryngeal cancer (11/2/2015)      Chronic pain (1/15/2017)      Debility (1/20/2017)      Chronic pulmonary aspiration (3/2/2017)      Acute on chronic respiratory failure with hypoxemia (HCC) (3/6/2017)      Respiratory failure with hypoxia ([address] (3/22/2017)      Past Medical History:   Diagnosis Date    Bipolar 1 disorder ([address] 11/29/2011    Cancer ([address]     skin cancer buttocks    Cancer (HCC)     throat    Chronic pain     FIBROMYALGIA, LEGS    Encounter for long-term (current) use of other medications 11/29/2011    Essential hypertension, benign 11/29/2011    Ill-defined condition     Laryngeal cancer ([address] 11/2/2015    Laryngeal mass     Psychiatric disorder     bipolar      Past Surgical History:   Procedure Laterality Date    HX BREAST AUGMENTATION Bilateral     SALINE IMPLANTS    HX GI      PLACEMENT OF G TUBE    HX GI      ESOPH. DILATATION    HX GYN      tubal pregnancy    HX HEENT      BX OF NECK MASS    HX HYSTERECTOMY      PLACE PERCUT GASTROSTOMY TUBE  10/28/2014     has been removed 2015    PLACE PERCUT GASTROSTOMY TUBE  2/28/2017           Family History   Problem Relation Age of Onset    Cancer Father      bone/skin/lung    Pneumonia Mother     Anesth Problems Neg Hx       History reviewed, no pertinent family history.   Social History   Substance Use Topics    Smoking status: Former Smoker     Packs/day: 1.00     Years: 40.00     Quit date: 8/27/2014    Smokeless tobacco: Never Used      Comment: PASSIVE SMOKE EXPOSURE,  SMOKES    Alcohol use No     No Known Allergies   Current Facility-Administered Medications   Medication Dose Route Frequency    piperacillin-tazobactam (ZOSYN) 3.375 g in 0.9% sodium chloride (MBP/ADV) 100 mL  3.375 g IntraVENous Q8H    heparin (porcine) injection 5,000 Units  5,000 Units SubCUTAneous Q8H    albuterol-ipratropium (DUO-NEB) 2.5 MG-0.5 MG/3 ML  3 mL Nebulization Q4H RT    0.9% sodium chloride infusion  125 mL/hr IntraVENous CONTINUOUS    vancomycin (VANCOCIN) 1,000 mg in 0.9% sodium chloride (MBP/ADV) 250 mL  1,000 mg IntraVENous Q16H    oxyCODONE (ROXICODONE INTENSOL) 20 mg/mL concentrated solution 10 mg  10 mg Oral Q4H PRN    methylPREDNISolone (PF) (SOLU-MEDROL) injection 40 mg  40 mg IntraVENous Q12H    [START ON 3/23/2017] levoFLOXacin (LEVAQUIN) 750 mg in D5W IVPB  750 mg IntraVENous Q24H          LAB AND IMAGING FINDINGS:     Lab Results   Component Value Date/Time    WBC 34.3 03/22/2017 04:32 AM    HGB 8.8 03/22/2017 04:32 AM    PLATELET 067 31/95/3423 04:32 AM     Lab Results   Component Value Date/Time    Sodium 139 03/22/2017 04:32 AM    Potassium 5.0 03/22/2017 04:32 AM    Chloride 104 03/22/2017 04:32 AM    CO2 30 03/22/2017 04:32 AM    BUN 17 03/22/2017 04:32 AM Creatinine 0.91 03/22/2017 04:32 AM    Calcium 9.4 03/22/2017 04:32 AM    Magnesium 2.2 03/22/2017 04:32 AM    Phosphorus 2.0 03/22/2017 04:32 AM      Lab Results   Component Value Date/Time    AST (SGOT) 16 03/21/2017 07:12 PM    Alk. phosphatase 74 03/21/2017 07:12 PM    Protein, total 7.4 03/21/2017 07:12 PM    Albumin 2.9 03/21/2017 07:12 PM    Globulin 4.5 03/21/2017 07:12 PM     No results found for: INR, PTMR, PTP, PT1, PT2, APTT   Lab Results   Component Value Date/Time    Iron 10 02/26/2017 04:28 AM    TIBC 169 02/26/2017 04:28 AM    Iron % saturation 6 02/26/2017 04:28 AM    Ferritin 250 02/26/2017 04:28 AM      Lab Results   Component Value Date/Time    pH 7.35 03/21/2017 09:37 PM    PCO2 60 03/21/2017 09:37 PM    PO2 56 03/21/2017 09:37 PM     No components found for: [place]   Lab Results   Component Value Date/Time    CK 29 03/06/2017 06:00 PM    CK - MB <1.0 03/06/2017 06:00 PM                Total time: 75 min  Counseling / coordination time: 65 min  > 50% counseling / coordination?: yes  Prolonged service was provided for  []30 min   []75 min in face to face time in the presence of the patient. Time Start:   Time End:   Note: this can only be billed with 16925 (initial) or 39357 (follow up). If multiple start / stop times, list each separately.

## 2017-03-22 NOTE — PROGRESS NOTES
Initial Nutrition Assessment:    INTERVENTIONS/RECOMMENDATIONS:   · Enteral/Parenteral Nutrition: Initiate enteral nutrition: Resume bolus TF regimen. Recommend Jevity 1.5 240 mL bolus 5x/day + 150 mL water flush with each bolus (provides 1800 kcal, 77g protein, and 1662 mL water)     ASSESSMENT:   Patient medically noted for respiratory failure. PMH for dysphagia, laryngeal cancer, chronic aspiration, COPD, and HTN. Patient reports taking ensure at home. Typically consumes 5 bottles/day via PEG. Patient needing to use restroom during visit so conversation cut short. Recommendations provided above using standard TF formula. Monitor tolerance and provide further recommendations as needed. Diet Order: NPO  % Eaten:  No data found. Pertinent Medications: [x]Reviewed []Other: solu-medrol   Pertinent Labs: [x]Reviewed []Other: Phos 2.0  Food Allergies: [x]None []Other   Last BM: 3/21   [x]Active     []Hyperactive  []Hypoactive       [] Absent BS  Skin:    [x] Intact   [] Incision  [] Breakdown  [] Other:    Anthropometrics:   Height: 5' 8\" (172.7 cm) Weight: 61.2 kg (135 lb)   IBW (%IBW):   ( ) UBW (%UBW):   (  %)   Last Weight Metrics:  Weight Loss Metrics 3/21/2017 3/20/2017 3/16/2017 3/12/2017 3/8/2017 3/6/2017 3/2/2017   Today's Wt 135 lb 134 lb 6.4 oz 135 lb 144 lb 144 lb 14.4 oz - 142 lb 13.7 oz   BMI 20.53 kg/m2 19.85 kg/m2 19.94 kg/m2 21.27 kg/m2 - 21.4 kg/m2 21.1 kg/m2       BMI: Body mass index is 20.53 kg/(m^2). This BMI is indicative of:   []Underweight    [x]Normal    []Overweight    [] Obesity   [] Extreme Obesity (BMI>40)     Estimated Nutrition Needs (Based on):   1584 Kcals/day (BMR (1219) x 1. 3AF) , 61 g (-73g (1.0-1.2 g/kg bw)) Protein  Carbohydrate:  At Least 130 g/day  Fluids: 1600 mL/day (1ml/kcal)    Pt expected to meet estimated nutrient needs: [x]Yes []No    NUTRITION DIAGNOSES:   Problem:  Swallowing difficulty      Etiology: related to hx laryngeal cancer      Signs/Symptoms: as evidenced by recurrent aspiration, need for TF for nutrition       NUTRITION INTERVENTIONS:    Enteral/Parenteral Nutrition: Initiate enteral nutrition                GOAL:   Patient will tolerate bolus TF with residual <250 mL next 2-4 days     LEARNING NEEDS (Diet, Food/Nutrient-Drug Interaction):    [x] None Identified   [] Identified and Education Provided/Documented   [] Identified and Pt declined/was not appropriate     Cultureal, Tenriism, OR Ethnic Dietary Needs:    [x] None Identified   [] Identified and Addressed     [x] Interdisciplinary Care Plan Reviewed/Documented    [x] Discharge Planning:  Resume home TF Regimen      MONITORING /EVALUATION:   Food/Nutrient Intake Outcomes: Enteral/parenteral nutrition intake  Physical Signs/Symptoms Outcomes: Weight/weight change, Electrolyte and renal profile, Glucose profile    NUTRITION RISK:    [x] High              [] Moderate           []  Low  []  Minimal/Uncompromised    PT SEEN FOR:    [x]  MD Consult: []Calorie Count      []Diabetic Diet Education        []Diet Education     []Electrolyte Management     [x]General Nutrition Management and Supplements     []Management of Tube Feeding     []TPN Recommendations    []  RN Referral:  []MST score >=2     []Enteral/Parenteral Nutrition PTA     []Pregnant: Gestational DM or Multigestation     []Pressure Ulcer/Wound Care needs        []  Low BMI  []  DTR Referral       [facility]  Pager [phone]                 Weekend Pager [phone]

## 2017-03-22 NOTE — CDMP QUERY
2 of 2  Dr. Sirisha Calles M.D. :  Please clarify if this patient is being treated/managed for:    =>MIGUEL ÁNGEL in setting of bun/crt  22/1.16---17/0.91 with GFRNa 47-60, (baseline:18/0.8; gfr 60); laryngeal cancer, Ensure use at home, pneumonia,   =>Other Explanation of clinical findings  =>Unable to Determine (no explanation of clinical findings)    The medical record reflects the following clinical findings, treatment, and risk factors:    Risk Factors: age, baseline from 3/7/17 bun/crt 16/.81 with GFR 60)  Clinical Indicators:bun/crt  22/1.16---17/0.91 with GFRNa 47-60, laryngeal cancer, Ensure use at home, pneumonia  Treatment: 500 cc bolus NS, [email or web address],  serial labs,     Please clarify and document your clinical opinion in the progress notes and discharge summary including the definitive and/or presumptive diagnosis, (suspected or probable), related to the above clinical findings. Please include clinical findings supporting your diagnosis. Thank Magalys Adams RN [address] [place] [address]; [facility];[phone]

## 2017-03-22 NOTE — ROUTINE PROCESS
* No surgery found *  Bedside and Verbal shift change report given to [address] (oncoming nurse) by [facility] RN (offgoing nurse). Report included the following information SBAR, Kardex, MAR and Recent Results. Zone Phone:   [phone]      Significant changes during shift:    1) admission  2) patient on 15L non-rebreather holding sats at 92-93%          Patient Information    Elvai Wangs  57 y.o.  3/21/2017  6:42 PM by Halina Yoo DO.  Sincere Chun was admitted from Home    Problem List    Patient Active Problem List    Diagnosis Date Noted    Acute on chronic respiratory failure with hypoxemia ([address] 03/06/2017    Chronic pulmonary aspiration 03/02/2017    Atrial fibrillation with RVR ([address] 02/24/2017    Respiratory failure ([address] 02/23/2017    Chronic obstructive pulmonary disease with acute exacerbation ([address] 02/10/2017    Acute respiratory failure ([address] 02/06/2017    Aspiration pneumonia of both upper lobes ([address] 01/20/2017    Shortness of breath 01/20/2017    Delirium 01/20/2017    Goals of care, counseling/discussion 01/20/2017    Debility 01/20/2017    Acute respiratory failure with hypoxia ([address] 01/15/2017    Fibromyalgia 01/15/2017     Class: Chronic    Chronic pain 01/15/2017     Class: Chronic    History of tobacco use 01/15/2017     Class: Present on Admission    Sepsis ([address] 01/15/2017     Class: Acute    Acute renal injury ([address] 01/15/2017     Class: Present on Admission    Shock ([address] 01/15/2017    History of radiation to head and neck region 03/30/2016    History of laryngeal cancer 11/02/2015     Class: Present on Admission    Dysphagia 12/30/2014    Essential hypertension, benign 11/29/2011    Bipolar 1 disorder ([address] 11/29/2011    Encounter for long-term (current) use of other medications 11/29/2011     Past Medical History:   Diagnosis Date    Bipolar 1 disorder ([address] 11/29/2011    Cancer (HCC)     skin cancer buttocks    Cancer (HCC)     throat    Chronic pain FIBROMYALGIA, LEGS    Encounter for long-term (current) use of other medications 11/29/2011    Essential hypertension, benign 11/29/2011    Ill-defined condition     Laryngeal cancer ([facility] 11/2/2015    Laryngeal mass     Psychiatric disorder     bipolar         Core Measures:    CVA: No Not applicable  CHF:No Not applicable  PNA:Yes Yes    Activity Status:    OOB to Chair No  Ambulated this shift No   Bed Rest Yes    Supplemental O2: (If Applicable)    NC No  NRB Yes  Venti-mask No  On 15 Liters/min      LINES AND DRAINS: RAC 20 with /hour  PEG left upper abd    Central Line? No     PICC LINE? No     Urinary Catheter? No     DVT prophylaxis:    DVT prophylaxis Med- Yes  DVT prophylaxis SCD or DEBORAH- No     Wounds: (If Applicable)    Wounds- No    Location heparin    Patient Safety:    Falls Score Total Score: 4  Safety Level_______  Bed Alarm On? No  Sitter?  No    Plan for upcoming shift: antibiotics, breathing treatments        Discharge Plan: No just arrived    Active Consults:  IP CONSULT TO PULMONOLOGY  IP CONSULT TO PALLIATIVE CARE - PROVIDER

## 2017-03-22 NOTE — PROGRESS NOTES
TRANSFER - IN REPORT:    Verbal report received from [facility] (name) on Clara Dow  being received from Neuro (unit) for change in patient condition(respiratory distress)      Report consisted of patients Situation, Background, Assessment and   Recommendations(SBAR). Information from the following report(s) SBAR, Kardex, ED Summary, Recent Results and Med Rec Status was reviewed with the receiving nurse. Opportunity for questions and clarification was provided. Assessment completed upon patients arrival to unit and care assumed.

## 2017-03-23 PROBLEM — Z71.89 COUNSELING REGARDING ADVANCED DIRECTIVES AND GOALS OF CARE: Status: ACTIVE | Noted: 2017-01-01

## 2017-03-23 NOTE — PROGRESS NOTES
Pt was admitted for acute on chronic respiratory failure with hypoxemia. Pt was alert and oriented, upon CM room visit. Pt reported  That she and her spouse resides in a 1 story home (2 steps into main entrance). Pt reported that she is independent with ADLs/IADLs, and she currently does not drive. Pt reported that she has last seen her PCP: March 17, 2017 and she uses [facility]. Pt reported that she has DME at home: blood pressure cuff, O2 monitor, CPAP, and Home O2 (provider unknown). Pt reported that she receives [facility] provided by Cristian Gooden. Pt reported no SNF. CM will continue to follow up with pt, upon d/c and make referrals as deemed necessary. Care Management Interventions  PCP Verified by CM: Yes  Mode of Transport at Discharge:  Other (see comment) (pt's family will transport )  Transition of Care Consult (CM Consult): Discharge Planning  Discharge Durable Medical Equipment: No  Physical Therapy Consult: No  Occupational Therapy Consult: No  Speech Therapy Consult: Yes  Current Support Network: Lives with Spouse, Own Home  Confirm Follow Up Transport: Family  Discharge Location  Discharge Placement: [facility] [address], MSW   [phone]

## 2017-03-23 NOTE — PROGRESS NOTES
1930 Report received from [facility]. Tube feed placement and rate verified. Family present at bedside. 2340 pt. Complaint of back pain 7 of 10, Roxicodone prn administered. 0250 pt. Complaint back pain 8 of 10, Roxicodone prn administered. pt. unable to collect labs, pt. Refuses future lab sticks, states \"I'm going to tell Dr. Maurice Logan I don't want these every day. \"  7342 tube feed bolus 240ml and water flush 150 administered. Pt. Refuses heparin shot states \"that hurt, I don't want it. \"

## 2017-03-23 NOTE — PROGRESS NOTES
Speech pathology  Received ST orders. Patient is well known to this discipline from past admissions, most recent visit being 3/9/17. At that time, patient was discouraged from continuing to eat/drink by mouth and recommended to ONLY have ice chips (in moderation) after oral care. The purpose of the ice chips is for comfort in addition to preventing disuse atrophy (especially since her goal is to eat/drink again in the future). Note pulmonologist has recommended against even ice chips; however, patient is not agreeable to this. Unfortunately this is a difficult situation. Certainly aspiration risk is there if she continues to eat ice chips (risk also resides with her own saliva and aspiration of TF); however, must take into consideration her goals (of eventually being safe to eat/drink) and quality of life. Patient reiterated that she has not been drinking anything by mouth but has put coffee through PEG. Significant time was spent educating her on laryngeal strengthening exercises on last admission. Patient reports she has been working with Lakeshia Adair. She is aware of exercises. Encouraged her to focus on getting her breathing/respiratory status better over the next couple days but still try to incorporate exercises throughout the day. She verbalized understanding. Nothing for SLP to offer during acute hospitalization as she is not a candidate for further assessment of swallow function given recurrent pnas. Her goal is to resume Lakeshia Adair upon discharge which I agree with.      Jodi Brennan M.S. CCC-SLP  Time spent: 9 minutes

## 2017-03-23 NOTE — PROGRESS NOTES
PCU SHIFT NURSING NOTE    0720 Bedside and Verbal shift change report given to Sinda Gaucher RN (oncoming nurse) by Davis Elias RN (offgoing nurse). Report included the following information SBAR, Kardex, Intake/Output, MAR, Accordion, Recent Results, Med Rec Status and Cardiac Rhythm NSR. Night shift nurse reports that 0600 dose of heparin was given. Shift Summary:    1 Prn roxicodone given for 8/10 pain. 0950 Second 240mL bolus feeding started. 1039 Attempted to call report for transfer to room 2120. Nurse unavailable at this time, to call back. 1109 TRANSFER - OUT REPORT:    Verbal report given to Dustin PHAM (name) on Vincenzo Sands  being transferred to Gen Surg (unit) for routine progression of care       Report consisted of patients Situation, Background, Assessment and   Recommendations(SBAR). Information from the following report(s) SBAR, Kardex, Intake/Output, MAR, Accordion, Recent Results, Med Rec Status and Cardiac Rhythm NSR was reviewed with the receiving nurse. Lines:   Peripheral IV 03/21/17 Right Antecubital (Active)   Site Assessment Clean, dry, & intact 3/23/2017  7:25 AM   Phlebitis Assessment 0 3/23/2017  7:25 AM   Infiltration Assessment 0 3/23/2017  7:25 AM   Dressing Status Clean, dry, & intact 3/23/2017  7:25 AM   Dressing Type Transparent;Tape 3/23/2017  7:25 AM   Hub Color/Line Status Pink; Infusing;Patent 3/23/2017  7:25 AM     Opportunity for questions and clarification was provided. 1130 Pt to room 2120, handoff to [address] [facility].   Patient transported with:   O2 @ 4 liters  Registered Nurse    Admission Date 3/21/2017   Admission Diagnosis Respiratory failure with hypoxia ([facility]   Consults IP CONSULT TO PULMONOLOGY  IP CONSULT TO PALLIATIVE CARE - PROVIDER        Consults   []PT   []OT   []Speech   []Case Management      [x] Palliative      Cardiac Monitoring Order   [x]Yes   []No     IV drips   []Yes    Drip:                            Dose:  Drip: Dose:  Drip:                            Dose:   [x]No     GI Prophylaxis   []Yes   [x]No         DVT Prophylaxis   SCDs:             Claudio stockings:         [x] Medication   []Contraindicated   []None      Activity Level Activity Level: Bed Rest     Activity Assistance: Partial (one person)   Purposeful Rounding every 1-2 hour? [x]Yes   Das Score  Total Score: 3   Bed Alarm (If score 3 or >)   [x]Yes   [] Refused (See signed refusal form in chart)   Angel Score  Angel Score: 17   Angel Score (if score 14 or less)   []PMT consult   []Wound Care consult      []Specialty bed   [] Nutrition consult          Needs prior to discharge:   Home O2 required:    [x]Yes   []No    If yes, how much O2 required? 4L    Other:    Last Bowel Movement: Last Bowel Movement Date: 03/21/17      Influenza Vaccine Received Flu Vaccine for Current Season (usually Sept-March): Yes (Dec 2016)        Pneumonia Vaccine           Diet Active Orders   Diet    DIET NPO With Tube Feedings      LDAs               Peripheral IV 03/21/17 Right Antecubital (Active)   Site Assessment Clean, dry, & intact 3/23/2017  3:20 AM   Phlebitis Assessment 0 3/23/2017  3:20 AM   Infiltration Assessment 0 3/23/2017  3:20 AM   Dressing Status Clean, dry, & intact 3/23/2017  3:20 AM   Dressing Type Tape;Transparent 3/23/2017  3:20 AM   Hub Color/Line Status Pink; Infusing;Patent 3/23/2017  3:20 AM          PEG/Gastrostomy Tube 02/28/17 (Active)   Site Assessment Clean, dry, & intact 3/23/2017  3:20 AM   Dressing Status Clean, dry, & intact 3/23/2017  3:20 AM   G Port Status Clamped 3/23/2017  3:20 AM   Action Taken Placement verified (comment) 3/23/2017  3:20 AM   Gastric Residual (mL) 0 ml 3/23/2017  3:20 AM   Tube Feeding/Formula Options Jevity 1.5 3/22/2017 10:35 PM   Tube Feeding/Verify Rate (mL/hr) 240 3/22/2017 10:35 PM   Water Flush Volume (mL) 150 mL 3/22/2017 10:35 PM   Medication Volume 10 ml 3/23/2017  3:20 AM                Urinary Catheter      Intake & Output   Date 03/22/17 0700 - 03/23/17 0659 03/23/17 0700 - 03/24/17 0659   Shift [id number] 1900-0659 24 Hour Total [id number] 1900-0659 24 Hour Total   I  N  T  A  K  E   NG/GT  320 320         Water Flush Volume (mL) (PEG/Gastrostomy Tube 02/28/17)  300 300         Medication Volume (PEG/Gastrostomy Tube 02/28/17)  20 20       Shift Total  (mL/kg)  320  (5.2) 320  (5.2)      O  U  T  P  U  T   Urine  (mL/kg/hr) 900  (1.2) 1100  (1.5) 2000  (1.4) 250  250      Urine Voided 900 1100 2000 250  250    Shift Total  (mL/kg) 900  (14.7) 1100  (18) 2000  (32.7) 250  (4.1)  250  (4.1)   NET -900 -780 -1680 -250  -250   Weight (kg) 61.2 61.2 61.2 61.2 61.2 61.2         Readmission Risk Assessment Tool Score High Risk            24       Total Score        3 Relationship with PCP    2 Patient Living Status    11 More than 1 Admission in calendar year    5 Patient Insurance is Medicare, Medicaid or Self Pay    3 Charlson Comorbidity Score        Criteria that do not apply:    Patient Length of Stay > 5       Expected Length of Stay 4d 16h   Actual Length of Stay 1

## 2017-03-23 NOTE — PROGRESS NOTES
[facility]  Pulmonary, Critical Care, and Sleep Medicine  Name: Mau James MRN: [id number]   : 1955 Hospital: [facility]   Date: 3/23/2017                   IMPRESSION:   · Acute/chronic hypoxic respiratory failure  · Recurrent aspiration pneumonia  · Sepsis   · Incomplete compliance with NPO status - continues with ice chips, water, and coffee  · Laryngeal cancer  · Bipolar disorder  · COPD +/- exacerbation  · H/O tobacco use      RECOMMENDATIONS:please see orders for details. Case d/w nursing and on Multi D rounds. · Wean O2  · Continue nebs  · Continue methylpred at current dose  · Will stop vancomycin and levoflox; continue Zosyn empirically for aspiration pneumonia; will need 7 day course abx, can switch to Augmentin via PEG on discharge  · Recommend strict NPO status; not even ice chips. She will still aspirate her own saliva. If this is unacceptable to patient then she will have to accept the risks of recurrent aspiration. Appreciate palliative care offering to follow patient as outpatient. I have personally reviewed the radiology films and reports. Subjective/Interval History:   Feeling better. States only takes ice chips PO; however, told Dr. Choi Goes yesterday that she was also taking water and coffee PO. Claims this morning that she only puts this down her PEG. A comprehensive review of systems was negative except for that written in the HPI. Objective:   VS reviewed. No fevers.     Intake/Output:   Last shift:         Last 3 shifts:  0701 -  1900  In: -   Out: 250 [Urine:250][id number]  Intake/Output Summary (Last 24 hours) at 17 1001  Last data filed at 17 0749   Gross per 24 hour   Intake              320 ml   Output             2250 ml   Net            -1930 ml     EXAM       exam  Other   general Anxious appearing; NAD    HEENT:  MMM, no JVD    Chest Normal chest rise b/l    HEART:  RRR no m/r/g no rubs    Lungs:  Rales bilaterally L>R; no wheezing    ABD Soft/NT    EXT Warm; trace edema    Skin No rashes or ulcers, no mottling    Neuro nonfocal      Data    I have personally reviewed data, flowsheets for the last 24 hours. Labs:  Recent Labs      03/23/17 0258  03/22/17 0432 03/21/17 1912   WBC  23.8*  34.3*  40.4*   HGB  8.4*  8.8*  10.5*   HCT  28.5*  30.5*  36.5   PLT  167  213  238     Recent Labs      03/22/17 0432  03/21/17 1912   NA  139  136   K  5.0  4.8   CL  104  99   CO2  30  31   GLU  111*  121*   BUN  17  22*   CREA  0.91  1.16*   CA  9.4  10.2*   MG  2.2   --    PHOS  2.0*   --    ALB   --   2.9*   SGOT   --   16   ALT   --   29       ABG No results for input(s): PHI, PO2I, PCO2I in the last 72 hours.      Shelly Webb MD  [facility]

## 2017-03-23 NOTE — CONSULTS
Palliative Medicine Consult  Jose Alberto: [phone]    Patient Name: Radha Garcia  YOB: 1955    Date of Initial Consult: 3/22/17  Reason for Consult: overwhelming sxs  Requesting Provider: Zak Tinoco   Primary Care Physician: Zak Tinoco MD      SUMMARY:   Radha Garcia is a 57 y.o. with a past history of bipolar type 1, laryngeal mass, and HTN, recurrent aspiration PNA, chronic pain secondary to neoplasm, who was admitted on 3/21/2017 from home with a diagnosis of acute on chronic respiratory failure with hypoxemia. Current medical issues leading to Palliative Medicine involvement include: this is pt's 5th admission for aspiration PNA. Pt is a former smoker, is exposed to 's cigarette smoke. PALLIATIVE DIAGNOSES:   1. SOB  2. Dysphagia, PEG dependent  3. Chronic aspiration: silent aspiration per MBS  4. Chronic pain secondary to neoplasm  5. Debility: baseline ambulates with cane   6. Weakness   7. Cough, productive  8. Care decisions     PLAN:   1. Met with pt and daughter Guy Bills; discussed pt's current medical issues, goals of care and code status. Pt understands that she needs to be strictly NPO, but refuses to give up ice chips; we talked about QOL vs quantity, pt feels that she would have no QOL if she gave up ice chips. She voiced good understanding that this increases the risk of aspiration PNA, and she knows that she aspirates her saliva and therefore will get PNA even if she was strictly NPO. Patient voiced her hopes include:  1. Continue to work with speech therapy with hopes of reducing aspiration risk. 2. Pt does not want to keep coming to the hospital for tx of PNA, she wants to be treated at home, even consider prophylactic abx.   3. Pt wants home 02 that goes higher than 5L in the event that she gets PNA and needs higher amount instead of having to come to the hospital.  4. Counseled pt and Carli that she should discuss these hopes with outpatient Palliative provider on first visit. 2. Attempted to complete an AMD, however, pt said she did not need anything on paper because she wants her  listed as first surrogate and Maria D Wyattn as second surrogate, which would happen anyway given they are NOK. She wants CPR and all life saving interventions as long as there were expectations of meaningful recovery. If death is imminent she wants to go peacefully. At this point pt stated she didn't want to talk about this anymore, her family knows what she wants. 3. Notified Palliative Clinic to anticipate pt's discharge this weekend and to ask for an appt next week. 4. Initial consult note routed to primary continuity provider  5. Communicated plan of care with: Palliative IDT, RN     GOALS OF CARE / TREATMENT PREFERENCES:   [====Goals of Care====]  GOALS OF CARE:  Patient / health care proxy stated goals:     1. Go home  2.  Stay home (pt does not want to keep coming back to the hospital, but does want to continue abx therapy)      TREATMENT PREFERENCES:   Code Status: Prior    Advance Care Planning:  Advance Care Planning 3/23/2017   Patient's Healthcare Decision Maker is: Legal Next of Agustin 68   Primary Decision Maker Name Donna Baker   Primary Decision Maker Phone Number [phone]   Primary Decision Maker Relationship to Patient Spouse   Secondary Decision Maker Name Ulysses Carbajal   Secondary Decision Maker Phone Number [phone]   Secondary Decision Maker Relationship to Patient Adult child   Confirm Advance Directive None   Patient Would Like to Complete Advance Directive No       Other:    The palliative care team has discussed with patient / health care proxy about goals of care / treatment preferences for patient.  [====Goals of Care====]         HISTORY:     History obtained from: chart, pt, family    CHIEF COMPLAINT: dyspnea    HPI/SUBJECTIVE:    The patient is:   [x] Verbal and participatory  [] Non-participatory due to:     Presents to ED with c/o progressive SOB x 1 week with associated T. 100.8, generalized weakness, lethargy, nausea, and productive cough. Pt is on home 02 at 3L however, has increased it to 4L due to this illness. Pt lives at home with . Pt uses PEG herself, typically has 4 \"boxes\" of liquid nutrition daily, bolus feeds. When she gets sick, like yesterday, her daughter has to help pt with the feeding, getting to bathroom, etc.  Pt reports that her pain is well controlled on the current regimen of oxycodone 10mg q. 4 hours. 3/23: pt just returned to bed from bathroom, very dyspneic, shaky, which she reports is an improvement. Coughing up thick, tan colored phlegm. She was eager to talk about outpatient Palliative Medicine.      Clinical Pain Assessment (nonverbal scale for severity on nonverbal patients):   [++++ Clinical Pain Assessment++++]  [++++Pain Severity++++]: Pain: 4   [++++Pain Character++++]: achy  [++++Pain Duration++++]:    years  [++++Pain Effect++++]:   [++++Pain Factors++++]:   [++++Pain Frequency++++]: chronic   [++++Pain Location++++]:    Left chest, back  [++++ Clinical Pain Assessment++++]     FUNCTIONAL ASSESSMENT:     Palliative Performance Scale (PPS):  PPS: 50       PSYCHOSOCIAL/SPIRITUAL SCREENING:     Advance Care Planning:  Advance Care Planning 3/23/2017   Patient's Healthcare Decision Maker is: Legal Next of Agustin 68   Primary Decision Maker Name Shira Trotter   Primary Decision Maker Phone Number [phone]   Primary Decision Maker Relationship to Patient Spouse   Secondary Decision Maker Name Cecilia Boateng   Secondary Decision Maker Phone Number [phone]   Secondary Decision Maker Relationship to Patient Adult child   Confirm Advance Directive None   Patient Would Like to Complete Advance Directive No        Any spiritual / Confucianism concerns:  [] Yes /  [x] No    Caregiver Burnout:  [] Yes /  [] No /  [x] No Caregiver Present      Anticipatory grief assessment:   [x] Normal  / [] Maladaptive       ESAS Anxiety: Anxiety: 2    ESAS Depression: Depression: 3        REVIEW OF SYSTEMS:     Positive and pertinent negative findings in ROS are noted above in HPI. The following systems were [x] reviewed / [] unable to be reviewed as noted in HPI  Other findings are noted below. Systems: constitutional, ears/nose/mouth/throat, respiratory, gastrointestinal, genitourinary, musculoskeletal, integumentary, neurologic, psychiatric, endocrine. Positive findings noted below. Modified ESAS Completed by: provider   Fatigue: 6 Drowsiness: 0   Depression: 3 Pain: 4   Anxiety: 2 Nausea: 0     Dyspnea: 3     Constipation: No              PHYSICAL EXAM:     From RN flowsheet:  Wt Readings from Last 3 Encounters:   03/21/17 135 lb (61.2 kg)   03/20/17 134 lb 6.4 oz (61 kg)   03/16/17 135 lb (61.2 kg)     Blood pressure 145/69, pulse 94, temperature 98.3 °F (36.8 °C), resp. rate 16, height 5' 8\" (1.727 m), weight 135 lb (61.2 kg), SpO2 94 %.     Pain Scale 1: Numeric (0 - 10)  Pain Intensity 1: 4  Pain Onset 1: chronic  Pain Location 1: Back     Pain Description 1: Aching  Pain Intervention(s) 1: Medication (see MAR)  Last bowel movement, if known:     Constitutional: WD, WN, slight confusion, weak  Eyes: pupils equal, anicteric  ENMT: no nasal discharge, moist mucous membranes, edentulous with upper dentures  Cardiovascular: regular rhythm, distal pulses intact  Respiratory: breathing not labored, symmetric, coarse BS, productive cough of tan colored phlegm  Gastrointestinal: soft non-tender, +bowel sounds  Musculoskeletal: no deformity, no tenderness to palpation  Skin: warm, dry  Neurologic: following commands, moving all extremities  Psychiatric: full affect, no hallucinations     HISTORY:     Active Problems:    Bipolar 1 disorder ([facility] (11/29/2011)      Dysphagia (12/30/2014)      History of laryngeal cancer (11/2/2015)      Chronic pain (1/15/2017)      Debility (1/20/2017)      Chronic pulmonary aspiration (3/2/2017)      Acute on chronic respiratory failure with hypoxemia ([facility] (3/6/2017)      Respiratory failure with hypoxia ([facility] (3/22/2017)      Past Medical History:   Diagnosis Date    Bipolar 1 disorder ([facility] 11/29/2011    [facility]     skin cancer buttocks    Cancer (HCC)     throat    Chronic pain     FIBROMYALGIA, LEGS    Encounter for long-term (current) use of other medications 11/29/2011    Essential hypertension, benign 11/29/2011    Ill-defined condition     Laryngeal cancer ([facility] 11/2/2015    Laryngeal mass     Psychiatric disorder     bipolar      Past Surgical History:   Procedure Laterality Date    HX BREAST AUGMENTATION Bilateral     SALINE IMPLANTS    HX GI      PLACEMENT OF G TUBE    HX GI      ESOPH. DILATATION    HX GYN      tubal pregnancy    HX HEENT      BX OF NECK MASS    HX HYSTERECTOMY      PLACE PERCUT GASTROSTOMY TUBE  10/28/2014     has been removed 2015    PLACE PERCUT GASTROSTOMY TUBE  2/28/2017           Family History   Problem Relation Age of Onset    Cancer Father      bone/skin/lung    Pneumonia Mother     Anesth Problems Neg Hx       History reviewed, no pertinent family history.   Social History   Substance Use Topics    Smoking status: Former Smoker     Packs/day: 1.00     Years: 40.00     Quit date: 8/27/2014    Smokeless tobacco: Never Used      Comment: PASSIVE SMOKE EXPOSURE,  SMOKES    Alcohol use No     No Known Allergies   Current Facility-Administered Medications   Medication Dose Route Frequency    piperacillin-tazobactam (ZOSYN) 3.375 g in 0.9% sodium chloride (MBP/ADV) 100 mL  3.375 g IntraVENous Q8H    heparin (porcine) injection 5,000 Units  5,000 Units SubCUTAneous Q8H    albuterol-ipratropium (DUO-NEB) 2.5 MG-0.5 MG/3 ML  3 mL Nebulization Q4H RT    0.9% sodium chloride infusion  125 mL/hr IntraVENous CONTINUOUS    oxyCODONE (ROXICODONE INTENSOL) 20 mg/mL concentrated solution 10 mg  10 mg Oral Q4H PRN    methylPREDNISolone (PF) (SOLU-MEDROL) injection 40 mg  40 mg IntraVENous Q12H          LAB AND IMAGING FINDINGS:     Lab Results   Component Value Date/Time    WBC 23.8 03/23/2017 02:58 AM    HGB 8.4 03/23/2017 02:58 AM    PLATELET 381 59/79/1750 02:58 AM     Lab Results   Component Value Date/Time    Sodium 139 03/22/2017 04:32 AM    Potassium 5.0 03/22/2017 04:32 AM    Chloride 104 03/22/2017 04:32 AM    CO2 30 03/22/2017 04:32 AM    BUN 17 03/22/2017 04:32 AM    Creatinine 0.91 03/22/2017 04:32 AM    Calcium 9.4 03/22/2017 04:32 AM    Magnesium 2.2 03/22/2017 04:32 AM    Phosphorus 2.0 03/22/2017 04:32 AM      Lab Results   Component Value Date/Time    AST (SGOT) 16 03/21/2017 07:12 PM    Alk. phosphatase 74 03/21/2017 07:12 PM    Protein, total 7.4 03/21/2017 07:12 PM    Albumin 2.9 03/21/2017 07:12 PM    Globulin 4.5 03/21/2017 07:12 PM     No results found for: INR, PTMR, PTP, PT1, PT2, APTT   Lab Results   Component Value Date/Time    Iron 10 02/26/2017 04:28 AM    TIBC 169 02/26/2017 04:28 AM    Iron % saturation 6 02/26/2017 04:28 AM    Ferritin 250 02/26/2017 04:28 AM      Lab Results   Component Value Date/Time    pH 7.35 03/21/2017 09:37 PM    PCO2 60 03/21/2017 09:37 PM    PO2 56 03/21/2017 09:37 PM     No components found for: [place]   Lab Results   Component Value Date/Time    CK 29 03/06/2017 06:00 PM    CK - MB <1.0 03/06/2017 06:00 PM                Total time: 45 min  Counseling / coordination time: 40 min  > 50% counseling / coordination?: yes  Prolonged service was provided for  []30 min   []75 min in face to face time in the presence of the patient. Time Start:   Time End:   Note: this can only be billed with 26092 (initial) or 94143 (follow up). If multiple start / stop times, list each separately.

## 2017-03-23 NOTE — PROGRESS NOTES
General Daily Progress Note    Admit Date: 3/21/2017  Hospital day 3    Subjective:     Patient has improving dyspnea and cough. .   Medication side effects: none    Current Facility-Administered Medications   Medication Dose Route Frequency    piperacillin-tazobactam (ZOSYN) 3.375 g in 0.9% sodium chloride (MBP/ADV) 100 mL  3.375 g IntraVENous Q8H    heparin (porcine) injection 5,000 Units  5,000 Units SubCUTAneous Q8H    albuterol-ipratropium (DUO-NEB) 2.5 MG-0.5 MG/3 ML  3 mL Nebulization Q4H RT    0.9% sodium chloride infusion  125 mL/hr IntraVENous CONTINUOUS    vancomycin (VANCOCIN) 1,000 mg in 0.9% sodium chloride (MBP/ADV) 250 mL  1,000 mg IntraVENous Q16H    oxyCODONE (ROXICODONE INTENSOL) 20 mg/mL concentrated solution 10 mg  10 mg Oral Q4H PRN    methylPREDNISolone (PF) (SOLU-MEDROL) injection 40 mg  40 mg IntraVENous Q12H    levoFLOXacin (LEVAQUIN) 750 mg in D5W IVPB  750 mg IntraVENous Q24H        Review of Systems  Constitutional: positive for fatigue and malaise  Respiratory: positive for cough or stridor  Gastrointestinal: positive for dysphagia  Neurological: positive for tremor    Objective:     Patient Vitals for the past 8 hrs:   Temp Resp   03/23/17 0250 - 22 03/22/17 2346 97.7 °F (36.5 °C) 20     03/22 1901 - 03/23 0700  In: 320   Out: 1100 [Urine:1100]  03/21 0701 - 03/22 1900  In: -   Out: 1300 [Urine:1300]    Physical Exam:   Visit Vitals    /50 (BP 1 Location: Left arm, BP Patient Position: At rest)    Pulse 89    Temp 97.7 °F (36.5 °C)    Resp 22    Ht 5' 8\" (1.727 m)    Wt 135 lb (61.2 kg)    LMP  (LMP Unknown)    SpO2 94%    BMI 20.53 kg/m2     General appearance: alert, fatigued, cooperative, no distress, appears stated age  Lungs: clear to auscultation bilaterally  Heart: regular rate and rhythm  Abdomen: soft, non-tender.  Bowel sounds normal. No masses,  no organomegaly  Neurologic: Grossly normal      ECG: normal sinus rhythm     Data Review   Recent Results (from the past 24 hour(s))   EKG, 12 LEAD, INITIAL    Collection Time: 03/22/17  9:41 AM   Result Value Ref Range    Ventricular Rate 89 BPM    Atrial Rate 89 BPM    P-R Interval 144 ms    QRS Duration 92 ms    Q-T Interval 438 ms    QTC Calculation (Bezet) 532 ms    Calculated P Axis 69 degrees    Calculated R Axis 53 degrees    Calculated T Axis 70 degrees    Diagnosis       Sinus rhythm with premature atrial complexes  Moderate voltage criteria for LVH, may be normal variant  Prolonged QT    Confirmed by Sarah Phan ([id number]) on 3/22/2017 10:34:45 AM     CBC WITH AUTOMATED DIFF    Collection Time: 03/23/17  2:58 AM   Result Value Ref Range    WBC 23.8 (H) 3.6 - 11.0 K/uL    RBC 3.17 (L) 3.80 - 5.20 M/uL    HGB 8.4 (L) 11.5 - 16.0 g/dL    HCT 28.5 (L) 35.0 - 47.0 %    MCV 89.9 80.0 - 99.0 FL    MCH 26.5 26.0 - 34.0 PG    MCHC 29.5 (L) 30.0 - 36.5 g/dL    RDW 18.5 (H) 11.5 - 14.5 %    PLATELET 297 453 - 216 K/uL    NEUTROPHILS 94 (H) 32 - 75 %    LYMPHOCYTES 4 (L) 12 - 49 %    MONOCYTES 2 (L) 5 - 13 %    EOSINOPHILS 0 0 - 7 %    BASOPHILS 0 0 - 1 %    ABS. NEUTROPHILS 22.3 (H) 1.8 - 8.0 K/UL    ABS. LYMPHOCYTES 1.0 0.8 - 3.5 K/UL    ABS. MONOCYTES 0.5 0.0 - 1.0 K/UL    ABS. EOSINOPHILS 0.0 0.0 - 0.4 K/UL    ABS. BASOPHILS 0.0 0.0 - 0.1 K/UL    DF SMEAR SCANNED      RBC COMMENTS ANISOCYTOSIS  1+        WBC COMMENTS TOXIC GRANULATION             Assessment:     Active Problems:    Chronic pulmonary aspiration (3/2/2017)      Acute on chronic respiratory failure with hypoxemia ([facility] (3/6/2017)      Dysphagia (12/30/2014)      History of laryngeal cancer (11/2/2015)      B[facility] disorder ([facility] (11/29/2011)      Chronic pain (1/15/2017)      Debility (1/20/2017)      Respiratory failure with hypoxia ([facility] (3/22/2017)        Plan:     Feeling better,tolerating 4lpm,little sputum. Understands need for npo,though feels she need sice chips to moisten mouth. Improving rapidly. Continue current meds and treatments.

## 2017-03-24 NOTE — PROGRESS NOTES
Spiritual Care Assessment/Progress Notes    Dorita Pinto [id number]  [id number]    1955  57 y.o.  female    Patient Telephone Number: [phone] (home)   Anabaptist Affiliation: Mayra Ivan   Language: English   Extended Emergency Contact Information  Primary Emergency Contact: Cecilia Jose  Address: [place] [address], [address] [address] [employer]  Mobile Phone: [phone]  Relation: Spouse  Secondary Emergency Contact: Vern Phone: [phone]  Mobile Phone: [phone]  Relation: Child   Patient Active Problem List    Diagnosis Date Noted    Counseling regarding advanced directives and goals of care 03/23/2017    Respiratory failure with hypoxia ([address] 03/22/2017    Acute on chronic respiratory failure with hypoxemia ([address] 03/06/2017    Chronic pulmonary aspiration 03/02/2017    Atrial fibrillation with RVR ([address] 02/24/2017    Respiratory failure ([address] 02/23/2017    Chronic obstructive pulmonary disease with acute exacerbation ([address] 02/10/2017    Acute respiratory failure ([address] 02/06/2017    Aspiration pneumonia of both upper lobes ([address] 01/20/2017    Shortness of breath 01/20/2017    Delirium 01/20/2017    Goals of care, counseling/discussion 01/20/2017    Debility 01/20/2017    Acute respiratory failure with hypoxia ([address] 01/15/2017    Fibromyalgia 01/15/2017     Class: Chronic    Chronic pain 01/15/2017     Class: Chronic    History of tobacco use 01/15/2017     Class: Present on Admission    Sepsis ([address] 01/15/2017     Class: Acute    Acute renal injury ([address] 01/15/2017     Class: Present on Admission    Shock ([address] 01/15/2017    History of radiation to head and neck region 03/30/2016    History of laryngeal cancer 11/02/2015     Class: Present on Admission    Dysphagia 12/30/2014    Essential hypertension, benign 11/29/2011    Bipolar 1 disorder ([address] 11/29/2011    Encounter for long-term (current) use of other medications 11/29/2011        Date: 3/24/2017       Level of Christianity/Spiritual Activity:  [x]         Involved in yoly tradition/spiritual practice    []         Not involved in yoly tradition/spiritual practice  [x]         Spiritually oriented    []         Claims no spiritual orientation    []         seeking spiritual identity  []         Feels alienated from Episcopal practice/tradition  []         Feels angry about Episcopal practice/tradition  [x]         Spirituality/Episcopal tradition is a resource for coping at this time. []         Not able to assess due to medical condition    Services Provided Today:  []         crisis intervention    []         reading Scriptures  [x]         spiritual assessment    [x]         prayer  [x]         empathic listening/emotional support  []         rites and rituals (cite in comments)  []         life review     []         Episcopal support  []         theological development    []         advocacy  []         ethical dialog     []         blessing  []         bereavement support    []         support to family  []         anticipatory grief support   []         help with AMD  []         spiritual guidance    []         meditation      Spiritual Care Needs  [x]         Emotional Support  [x]         Spiritual/Christianity Care  []         Loss/Adjustment  []         Advocacy/Referral                /Ethics  []         No needs expressed at               this time  []         Other: (note in               comments)  [address]  []         Follow up visits with               pt/family  []         Provide materials  []         Schedule sacraments  []         Contact Community               Clergy  [x]         Follow up as needed  []         Other: (note in               comments)     Comments:  Patient requested pastoral support. Visited her on Gen Surg unit. No visitors present.   Provided listening presence and affirmation of experience as she spoke about her beliefs. She seems to need validation in regards to her beliefs about an afterlife and whether past behavior will keep her out of heaven. She spoke about doing extensive bible study on her own. Patient shared she attends [employer] with her daughter occasionally. She and her  have been  29 years. They do not share the same understanding of yoly. They have two adult children; one daughter lives across the street with patient's two grandsons. Facilitated conversation about God's love and mercy which seemed to offer some comfort to patient. Offered assurance of prayer.   FABY Lay, [facility], [address] [place] [address] [address]     Paging Service  [phone])

## 2017-03-24 NOTE — PROGRESS NOTES
Report given to [facility] RN from [facility] RN, patient restless this night, only sleeping at short intervals. No distress noted but with become very short of breath on exertion.

## 2017-03-24 NOTE — PROGRESS NOTES
1915 - Bedside and Verbal shift change report given to joe segovia (oncoming nurse) by Nikita lewis (offgoing nurse). Report included the following information SBAR, Kardex, Intake/Output, MAR and Recent Results. 2345 - Bedside and Verbal shift change report given to lesley dooley (oncoming nurse) by Shahram Guerra (offgoing nurse). Report included the following information SBAR, Kardex, Intake/Output, MAR and Recent Results.

## 2017-03-24 NOTE — PROGRESS NOTES
[facility]  Pulmonary, Critical Care, and Sleep Medicine    Initial Patient Consult    Name: Tashi Bettencourt MRN: [id number]   : 1955 Hospital: [facility]   Date: 3/24/2017        IMPRESSION:   · Acute/chronic hypoxic respiratory failure  · Recurrent aspiration pneumonia  · Sepsis   · Incomplete compliance with NPO status - continues with ice chips, water, and coffee  · Laryngeal cancer  · Bipolar disorder  · COPD +/- exacerbation  · H/O tobacco use      RECOMMENDATIONS:   · O2  · Bronchodilators  · Systemic steroids  · Empiric antibiotics for recurrent aspiration  · Encourage complete NPO status  · Palliative care following - if she want to continue to drink, etc against advice, would reconsider code status and whether or not rehospitalization is appropriate     Subjective:     3-24-17: no overnight events. Still short of breath. This patient has been seen and evaluated at the request of Dr. Shannan Palomo for respiratory failure. Patient is a 57 y.o. female with laryngeal cancer and recurrent aspiration pneumonia. She was recently discharged. Had a PEG placed on 17 for recurrent aspiration pneumonia as she has uncontrolled aspiration. She developed acute onset of worsening dyspnea yesterday and worsening hypoxia. She continues to eat ice chips and drink water and coffee daily. She feels like she has been getting weaker.     Past Medical History:   Diagnosis Date    Bipolar 1 disorder ([address] 2011    Cancer ([address]     skin cancer buttocks    Cancer (HCC)     throat    Chronic pain     FIBROMYALGIA, LEGS    Encounter for long-term (current) use of other medications 2011    Essential hypertension, benign 2011    Ill-defined condition     Laryngeal cancer ([address] 2015    Laryngeal mass     Psychiatric disorder     bipolar      Past Surgical History:   Procedure Laterality Date    HX BREAST AUGMENTATION Bilateral     SALINE IMPLANTS  HX GI      PLACEMENT OF G TUBE    HX GI      ESOPH. DILATATION    HX GYN      tubal pregnancy    HX HEENT      BX OF NECK MASS    HX HYSTERECTOMY      PLACE PERCUT GASTROSTOMY TUBE  10/28/2014     has been removed 2015    PLACE PERCUT GASTROSTOMY TUBE  2/28/2017           Prior to Admission medications    Medication Sig Start Date End Date Taking? Authorizing Provider   oxyCODONE (ROXICODONE INTENSOL) 20 mg/mL concentrated solution Take 0.5 mL by mouth every four (4) hours as needed. Max Daily Amount: 60 mg. 3/16/17  Yes Ancelmo Puri MD   amLODIPine (NORVASC) 10 mg tablet Take 10 mg by mouth daily. Yes Stephany Pena MD   albuterol-ipratropium (DUO-NEB) 2.5 mg-0.5 mg/3 ml nebu 3 mL by Nebulization route every four (4) hours as needed. 2/12/17  Yes Ivonne Deleon MD   VITAMIN B-12 1,000 mcg sublingual tablet 1,000 mcg by SubLINGual route daily. 11/11/16  Yes Historical Provider   acetylcysteine (MUCOMYST) 100 mg/mL (10 %) nebulizer solution Take 4 mL by inhalation every four (4) hours. 1/26/17  Yes Ancelmo Puri MD   metoprolol tartrate (LOPRESSOR) 25 mg tablet Take 1 Tab by mouth every twelve (12) hours. 1/24/17  Yes Ancelmo Puri MD   lithium carbonate CR (ESKALITH CR) 450 mg CR tablet TAKE 1 TABLET BY ORAL ROUTE PER AT BEDTIME 11/9/16  Yes Historical Provider   FLUoxetine (PROZAC) 20 mg tablet Take 20 mg by mouth daily. 2/5/16  Yes Historical Provider   traZODone (DESYREL) 100 mg tablet Take 200 mg by mouth nightly. 11/2/15  Yes Historical Provider   amoxicillin-clavulanate (AUGMENTIN) 250-62.5 mg/5 mL suspension Take 10 mL by mouth every eight (8) hours. 3/10/17   Ancelmo Puri MD   bisacodyl 5 mg tab Take 1 Tab by mouth daily as needed (constipation). Historical Provider   diphenhydrAMINE 12.5 mg/5 mL elix 40 mL, lidocaine 2 % soln 40 mL, aluminum & magnesium hydroxide-simethicone 400-400-40 mg/5 mL susp 40 mL Take 5 mL by mouth daily. Swish and spit or swallow. Historical Provider     No Known Allergies   Social History   Substance Use Topics    Smoking status: Former Smoker     Packs/day: 1.00     Years: 40.00     Quit date: 2014    Smokeless tobacco: Never Used      Comment: PASSIVE SMOKE EXPOSURE,  SMOKES    Alcohol use No      Family History   Problem Relation Age of Onset    Cancer Father      bone/skin/lung    Pneumonia Mother     Anesth Problems Neg Hx         Current Facility-Administered Medications   Medication Dose Route Frequency    lithium carbonate tablet 150 mg  150 mg Oral TID    traZODone (DESYREL) tablet 200 mg  200 mg Oral QHS    FLUoxetine (PROzac) capsule 20 mg  20 mg Oral DAILY    metoprolol tartrate (LOPRESSOR) tablet 25 mg  25 mg Oral BID    amLODIPine (NORVASC) tablet 5 mg  5 mg Oral DAILY    piperacillin-tazobactam (ZOSYN) 3.375 g in 0.9% sodium chloride (MBP/ADV) 100 mL  3.375 g IntraVENous Q8H    heparin (porcine) injection 5,000 Units  5,000 Units SubCUTAneous Q8H    albuterol-ipratropium (DUO-NEB) 2.5 MG-0.5 MG/3 ML  3 mL Nebulization Q4H RT    0.9% sodium chloride infusion  100 mL/hr IntraVENous CONTINUOUS    methylPREDNISolone (PF) (SOLU-MEDROL) injection 40 mg  40 mg IntraVENous Q12H       Review of Systems:  A comprehensive review of systems was negative except for that written in the HPI.     Objective:   Vital Signs:    Visit Vitals    /66    Pulse (!) 108    Temp 98 °F (36.7 °C)    Resp 18    Ht 5' 8\" (1.727 m)    Wt 61.2 kg (135 lb)    LMP  (LMP Unknown)    SpO2 97%    BMI 20.53 kg/m2       O2 Device: Nasal cannula   O2 Flow Rate (L/min): 4 l/min   Temp (24hrs), Av °F (36.7 °C), Min:96.7 °F (35.9 °C), Max:98.7 °F (37.1 °C)       Intake/Output:   Last shift:         Last 3 shifts:  1901 -  0700  In: 9059.6 [I.V.:6989.6]  Out: 1350 [Urine:1350]    Intake/Output Summary (Last 24 hours) at 17 1109  Last data filed at 17 0616   Gross per 24 hour   Intake          8079.58 ml   Output                0 ml   Net          8079.58 ml      Physical Exam:   General:  Alert, cooperative, no distress    Head:  Normocephalic, without obvious abnormality, atraumatic. Eyes:  Conjunctivae/corneas clear. Nose: Nares normal. Septum midline. Mucosa normal.    Throat: Lips, mucosa, and tongue normal.     Neck: Supple, symmetrical, trachea midline    Back:   Symmetric, no curvature. ROM normal.   Lungs:   Bronchial breath sounds in right and left bases   Chest wall:  No tenderness or deformity. Heart:  Regular rate and rhythm   Abdomen:   Soft, non-tender. Bowel sounds normal.    Extremities: Extremities normal, atraumatic, no cyanosis or edema. Skin: Skin color, texture, turgor normal. No rashes or lesions   Neurologic: Grossly nonfocal     Data review:     No results found for this or any previous visit (from the past 24 hour(s)).     Imaging:  I have personally reviewed the patients radiographs and have reviewed the reports:  None today        Holden Vail MD

## 2017-03-24 NOTE — PROGRESS NOTES
Palliative Medicine Consult  Jose Alberto: [phone] ([phone])    Patient Name: Clara Dow  YOB: 1955    Date of Initial Consult: 3/22/17  Reason for Consult: overwhelming sxs  Requesting Provider: Alveta Lefort   Primary Care Physician: Alveta Lefort, MD      SUMMARY:   Clara Dow is a 57 y.o. with a past history of bipolar type 1, laryngeal mass, and HTN, recurrent aspiration PNA, chronic pain secondary to neoplasm, who was admitted on 3/21/2017 from home with a diagnosis of acute on chronic respiratory failure with hypoxemia. Current medical issues leading to Palliative Medicine involvement include: this is pt's 5th admission for aspiration PNA. Pt is a former smoker, is exposed to 's cigarette smoke. PALLIATIVE DIAGNOSES:   1. SOB  2. Dysphagia, PEG dependent  3. Chronic aspiration: silent aspiration per MBS  4. Chronic pain secondary to neoplasm  5. Debility: baseline ambulates with cane   6. Weakness   7. Cough, productive  8. Care decisions     PLAN:   1. PAIN/DYSPNEA: since both are being treated with opioid, pt's pain is taking a back seat to her dyspnea. 1. Discontinue oxycodone. 2. IV morphine 4mg IV 2. q hours prn dyspnea. RN can give less than 4mg or hold dose if pt is oversedated. 3. RN can call if dose adjustment is needed [phone].  4. Narcan 0.4mg IV q. 2 min prn RR<8 or per protocol. 2. Spoke with daughter Trudi Bhandari, explained concern that FULL CODE status is not in line with pt's wishes to continue PO despite recommendations for NPO. Family meeting Monday 8:30am with pt,  and 2 daughters to discuss goals and code status. 3. ANXIETY: Ativan started today for anxiety  1. Lets be cautious with increasing benzodiazapines and opioids due to risk of respiratory depression. 4. Initial consult note routed to primary continuity provider  5.  Communicated plan of care with: Palliative IDT, RN     GOALS OF CARE / TREATMENT PREFERENCES: [====Goals of Care====]  GOALS OF CARE:  Patient / health care proxy stated goals:     1. Go home  2. Stay home (pt does not want to keep coming back to the hospital, but does want to continue abx therapy)      TREATMENT PREFERENCES:   Code Status: Prior    Advance Care Planning:  Advance Care Planning 3/23/2017   Patient's Healthcare Decision Maker is: Legal Next of Agustin Mendoza   Primary Decision Maker Name Joyce Wheeler   Primary Decision Maker Phone Number [phone]   Primary Decision Maker Relationship to Patient Spouse   Secondary Decision Maker Name Tracy Fierro   Secondary Decision Maker Phone Number [phone]   Secondary Decision Maker Relationship to Patient Adult child   Confirm Advance Directive None   Patient Would Like to Complete Advance Directive No       Other:    The palliative care team has discussed with patient / health care proxy about goals of care / treatment preferences for patient.  [====Goals of Care====]         HISTORY:     History obtained from: chart, pt, family    CHIEF COMPLAINT: dyspnea    HPI/SUBJECTIVE:    The patient is:   [x] Verbal and participatory  [] Non-participatory due to:     Presents to ED with c/o progressive SOB x 1 week with associated T. 100.8, generalized weakness, lethargy, nausea, and productive cough. Pt is on home 02 at 3L however, has increased it to 4L due to this illness. Pt lives at home with . Pt uses PEG herself, typically has 4 \"boxes\" of liquid nutrition daily, bolus feeds. When she gets sick, like yesterday, her daughter has to help pt with the feeding, getting to bathroom, etc.  Pt reports that her pain is well controlled on the current regimen of oxycodone 10mg q. 4 hours. 3/23: pt just returned to bed from bathroom, very dyspneic, shaky, which she reports is an improvement. Coughing up thick, tan colored phlegm. She was eager to talk about outpatient Palliative Medicine.     3/24:  Severe dyspnea with minimal exertion, shaking worse today. diarrhea    Clinical Pain Assessment (nonverbal scale for severity on nonverbal patients):   [++++ Clinical Pain Assessment++++]  [++++Pain Severity++++]: Pain: 5   [++++Pain Character++++]: achy  [++++Pain Duration++++]:    years  [++++Pain Effect++++]:   [++++Pain Factors++++]:   [++++Pain Frequency++++]: chronic   [++++Pain Location++++]:    Left chest, back  [++++ Clinical Pain Assessment++++]     FUNCTIONAL ASSESSMENT:     Palliative Performance Scale (PPS):  PPS: 30       PSYCHOSOCIAL/SPIRITUAL SCREENING:     Advance Care Planning:  Advance Care Planning 3/23/2017   Patient's Healthcare Decision Maker is: Legal Next of Agustin 68   Primary Decision Maker Name Barb Abdalla   Primary Decision Maker Phone Number [phone]   Primary Decision Maker Relationship to Patient Spouse   Secondary Decision Maker Name Bj Hoffman   Secondary Decision Maker Phone Number [phone]   Secondary Decision Maker Relationship to Patient Adult child   Confirm Advance Directive None   Patient Would Like to Complete Advance Directive No        Any spiritual / Taoist concerns:  [] Yes /  [x] No    Caregiver Burnout:  [] Yes /  [] No /  [x] No Caregiver Present      Anticipatory grief assessment:   [x] Normal  / [] Maladaptive       ESAS Anxiety: Anxiety: 4    ESAS Depression: Depression: 3        REVIEW OF SYSTEMS:     Positive and pertinent negative findings in ROS are noted above in HPI. The following systems were [x] reviewed / [] unable to be reviewed as noted in HPI  Other findings are noted below. Systems: constitutional, ears/nose/mouth/throat, respiratory, gastrointestinal, genitourinary, musculoskeletal, integumentary, neurologic, psychiatric, endocrine. Positive findings noted below.   Modified ESAS Completed by: provider   Fatigue: 8 Drowsiness: 0   Depression: 3 Pain: 5   Anxiety: 4 Nausea: 0     Dyspnea: 5     Constipation: No     Stool Occurrence(s): 1        PHYSICAL EXAM:     From RN flowsheet:  Wt Readings from Last 3 Encounters:   03/21/17 135 lb (61.2 kg)   03/20/17 134 lb 6.4 oz (61 kg)   03/16/17 135 lb (61.2 kg)     Blood pressure 135/90, pulse 94, temperature 96.4 °F (35.8 °C), resp. rate 16, height 5' 8\" (1.727 m), weight 135 lb (61.2 kg), SpO2 94 %.     Pain Scale 1: Numeric (0 - 10)  Pain Intensity 1: 7  Pain Onset 1: chronic  Pain Location 1: Face, Other (comment)  Pain Orientation 1: Right  Pain Description 1: Constant  Pain Intervention(s) 1: Medication (see MAR), Relaxation technique, Repositioned, Rest  Last bowel movement, if known: diarrhea stool today    Constitutional: WD, WN, weak, tremors, looks sicker today   Eyes: pupils equal, anicteric  ENMT: no nasal discharge, moist mucous membranes, edentulous with upper dentures  Cardiovascular: regular rhythm, distal pulses intact  Respiratory: breathing not labored, symmetric, coarse LLL , productive cough of tan colored phlegm  Gastrointestinal: soft non-tender, +bowel sounds  Musculoskeletal: no deformity, no tenderness to palpation  Skin: warm, dry  Neurologic: following commands, moving all extremities  Psychiatric: full affect, no hallucinations     HISTORY:     Active Problems:    Bipolar 1 disorder ([facility] (11/29/2011)      Dysphagia (12/30/2014)      History of laryngeal cancer (11/2/2015)      Chronic pain (1/15/2017)      Debility (1/20/2017)      Chronic pulmonary aspiration (3/2/2017)      Acute on chronic respiratory failure with hypoxemia (HCC) (3/6/2017)      Respiratory failure with hypoxia (HCC) (3/22/2017)      Counseling regarding advanced directives and goals of care (3/23/2017)      Past Medical History:   Diagnosis Date    Bipolar 1 disorder ([address] 11/29/2011    Cancer ([facility]     skin cancer buttocks    Cancer (HCC)     throat    Chronic pain     FIBROMYALGIA, LEGS    Encounter for long-term (current) use of other medications 11/29/2011    Essential hypertension, benign 11/29/2011    Ill-defined condition     Laryngeal cancer ([facility] 11/2/2015    Laryngeal mass     Psychiatric disorder     bipolar      Past Surgical History:   Procedure Laterality Date    HX BREAST AUGMENTATION Bilateral     SALINE IMPLANTS    HX GI      PLACEMENT OF G TUBE    HX GI      ESOPH. DILATATION    HX GYN      tubal pregnancy    HX HEENT      BX OF NECK MASS    HX HYSTERECTOMY      PLACE PERCUT GASTROSTOMY TUBE  10/28/2014     has been removed 2015    PLACE PERCUT GASTROSTOMY TUBE  2/28/2017           Family History   Problem Relation Age of Onset    Cancer Father      bone/skin/lung    Pneumonia Mother     Anesth Problems Neg Hx       History reviewed, no pertinent family history.   Social History   Substance Use Topics    Smoking status: Former Smoker     Packs/day: 1.00     Years: 40.00     Quit date: 8/27/2014    Smokeless tobacco: Never Used      Comment: PASSIVE SMOKE EXPOSURE,  SMOKES    Alcohol use No     No Known Allergies   Current Facility-Administered Medications   Medication Dose Route Frequency    lithium carbonate tablet 150 mg  150 mg Oral TID    traZODone (DESYREL) tablet 200 mg  200 mg Oral QHS    FLUoxetine (PROzac) capsule 20 mg  20 mg Oral DAILY    metoprolol tartrate (LOPRESSOR) tablet 25 mg  25 mg Oral BID    amLODIPine (NORVASC) tablet 5 mg  5 mg Oral DAILY    LORazepam (ATIVAN) injection 0.5 mg  0.5 mg IntraVENous Q6H PRN    morphine injection 4 mg  4 mg IntraVENous Q2H PRN    naloxone (NARCAN) injection 0.4 mg  0.4 mg IntraVENous PRN    piperacillin-tazobactam (ZOSYN) 3.375 g in 0.9% sodium chloride (MBP/ADV) 100 mL  3.375 g IntraVENous Q8H    heparin (porcine) injection 5,000 Units  5,000 Units SubCUTAneous Q8H    albuterol-ipratropium (DUO-NEB) 2.5 MG-0.5 MG/3 ML  3 mL Nebulization Q4H RT    0.9% sodium chloride infusion  100 mL/hr IntraVENous CONTINUOUS    methylPREDNISolone (PF) (SOLU-MEDROL) injection 40 mg  40 mg IntraVENous Q12H          LAB AND IMAGING FINDINGS:     Lab Results   Component Value Date/Time    WBC 20.4 03/24/2017 11:42 AM    HGB 8.5 03/24/2017 11:42 AM    PLATELET 887 45/36/0630 11:42 AM     Lab Results   Component Value Date/Time    Sodium 143 03/24/2017 11:42 AM    Potassium 4.5 03/24/2017 11:42 AM    Chloride 108 03/24/2017 11:42 AM    CO2 28 03/24/2017 11:42 AM    BUN 13 03/24/2017 11:42 AM    Creatinine 0.74 03/24/2017 11:42 AM    Calcium 9.3 03/24/2017 11:42 AM    Magnesium 2.2 03/22/2017 04:32 AM    Phosphorus 2.0 03/22/2017 04:32 AM      Lab Results   Component Value Date/Time    AST (SGOT) 13 03/24/2017 11:42 AM    Alk. phosphatase 67 03/24/2017 11:42 AM    Protein, total 6.4 03/24/2017 11:42 AM    Albumin 2.3 03/24/2017 11:42 AM    Globulin 4.1 03/24/2017 11:42 AM     No results found for: INR, PTMR, PTP, PT1, PT2, APTT   Lab Results   Component Value Date/Time    Iron 10 02/26/2017 04:28 AM    TIBC 169 02/26/2017 04:28 AM    Iron % saturation 6 02/26/2017 04:28 AM    Ferritin 250 02/26/2017 04:28 AM      Lab Results   Component Value Date/Time    pH 7.35 03/21/2017 09:37 PM    PCO2 60 03/21/2017 09:37 PM    PO2 56 03/21/2017 09:37 PM     No components found for: [place]   Lab Results   Component Value Date/Time    CK 29 03/06/2017 06:00 PM    CK - MB <1.0 03/06/2017 06:00 PM                Total time: 45 min  Counseling / coordination time: 40 min  > 50% counseling / coordination?: yes  Prolonged service was provided for  []30 min   []75 min in face to face time in the presence of the patient. Time Start:   Time End:   Note: this can only be billed with 63115 (initial) or 74135 (follow up). If multiple start / stop times, list each separately.

## 2017-03-24 NOTE — PROGRESS NOTES
General Daily Progress Note    Admit Date: 3/21/2017  Hospital day 4    Subjective:     Patient agitated,restless,dyspneic this am..   Medication side effects: none    Current Facility-Administered Medications   Medication Dose Route Frequency    lithium carbonate tablet 150 mg  150 mg Oral TID    traZODone (DESYREL) tablet 200 mg  200 mg Oral QHS    FLUoxetine (PROzac) capsule 20 mg  20 mg Oral DAILY    metoprolol tartrate (LOPRESSOR) tablet 25 mg  25 mg Oral BID    amLODIPine (NORVASC) tablet 5 mg  5 mg Oral DAILY    piperacillin-tazobactam (ZOSYN) 3.375 g in 0.9% sodium chloride (MBP/ADV) 100 mL  3.375 g IntraVENous Q8H    heparin (porcine) injection 5,000 Units  5,000 Units SubCUTAneous Q8H    albuterol-ipratropium (DUO-NEB) 2.5 MG-0.5 MG/3 ML  3 mL Nebulization Q4H RT    0.9% sodium chloride infusion  100 mL/hr IntraVENous CONTINUOUS    oxyCODONE (ROXICODONE INTENSOL) 20 mg/mL concentrated solution 10 mg  10 mg Oral Q4H PRN    methylPREDNISolone (PF) (SOLU-MEDROL) injection 40 mg  40 mg IntraVENous Q12H        Review of Systems  Constitutional: positive for fatigue and malaise  Respiratory: positive for cough, dyspnea on exertion or stridor  Cardiovascular: negative  Gastrointestinal: positive for dysphagia  Behavioral/Psych: positive for anxiety and bipolar    Objective:     Patient Vitals for the past 8 hrs:   BP Temp Pulse Resp SpO2   03/24/17 0348 121/66 98 °F (36.7 °C) (!) 108 18 97 %   03/24/17 0120 130/78 96.7 °F (35.9 °C) 100 18 95 %   03/23/17 2254 133/74 98.1 °F (36.7 °C) 95 16 100 %     03/23 1901 - 03/24 0700  In: 8079.6 [I.V.:6989.6]  Out: -   03/22 0701 - 03/23 1900  In: 980   Out: 2250 [Urine:2250]    Physical Exam:   Visit Vitals    /66    Pulse (!) 108    Temp 98 °F (36.7 °C)    Resp 18    Ht 5' 8\" (1.727 m)    Wt 135 lb (61.2 kg)    LMP  (LMP Unknown)    SpO2 97%    BMI 20.53 kg/m2     General appearance: alert, fatigued, cooperative, mild distress, appears stated age  Lungs: rhonchi R base, L base  Heart: regular rate and rhythm  Abdomen: soft, non-tender. Bowel sounds normal. No masses,  no organomegaly  Neurologic: Mental status: Alert, oriented, thought content appropriate, affect: anxious           Data Review No results found for this or any previous visit (from the past 24 hour(s)). Assessment:     Active Problems:    Chronic pulmonary aspiration (3/2/2017)      Acute on chronic respiratory failure with hypoxemia ([address] (3/6/2017)      Dysphagia (12/30/2014)      History of laryngeal cancer (11/2/2015)      Bipolar 1 disorder ([address] (11/29/2011)      Chronic pain (1/15/2017)      Debility (1/20/2017)      Respiratory failure with hypoxia ([address] (3/22/2017)      Counseling regarding advanced directives and goals of care (3/23/2017)        Plan:     Agitated this am,due to lithium,trazadone not started,my oversight. Remains quite dyspneic. Continue current meds and treatments.

## 2017-03-24 NOTE — PROGRESS NOTES
Nutrition Assessment:    RECOMMENDATIONS:   Continue TF regimen as ordered    ASSESSMENT:   Chart reviewed, pt lying in bed awake and alert. She reports she is tolerating her TF regimen very well. Pt reports she only takes coffee via PEG at home for 'the energy' and only 1 cup. She reports she only takes ice chips PO. Pt was very anxious at time of visit. Labs reviewed, WNL. BM noted yesterday. TF is adequate to meet est needs and she feels it is helping her. Dietitians Intervention(s)/Plan(s): Continue TF at goal rate  SUBJECTIVE/OBJECTIVE:   \"I need it for the energy\" (referring to taking coffee via PEG at home)   Diet Order: Other (comment) (TF via PEG: Jev 1.5 240mL bolus 5x day + 150mL flush q bolus (provides 1800kcals/77gPro/1662mL))  % Eaten:  No data found. Jevity 1.5 at  240mL bolus 5x day +  flush with 150 mL H2O Other (5x day)  via PEG Tube   Residuals: 0 mL    Pertinent Medications:solumedrol, zosyn; Liliam@[employer]). Chemistries:  Lab Results   Component Value Date/Time    Sodium 143 03/24/2017 11:42 AM    Potassium 4.5 03/24/2017 11:42 AM    Chloride 108 03/24/2017 11:42 AM    CO2 28 03/24/2017 11:42 AM    Anion gap 7 03/24/2017 11:42 AM    Glucose 115 03/24/2017 11:42 AM    BUN 13 03/24/2017 11:42 AM    Creatinine 0.74 03/24/2017 11:42 AM    BUN/Creatinine ratio 18 03/24/2017 11:42 AM    GFR est AA >60 03/24/2017 11:42 AM    GFR est non-AA >60 03/24/2017 11:42 AM    Calcium 9.3 03/24/2017 11:42 AM    Albumin 2.3 03/24/2017 11:42 AM      Anthropometrics: Height: 5' 8\" (172.7 cm) Weight: 61.2 kg (135 lb)    IBW (%IBW):   ( ) UBW (%UBW):   (  %)    BMI: Body mass index is 20.53 kg/(m^2). This BMI is indicative of:  [x]Underweight(bordeline for age)   []Normal   []Overweight   [] Obesity   [] Extreme Obesity (BMI>40)  Estimated Nutrition Needs (Based on): 1584 Kcals/day (BMR (1219) x 1. 3AF) , 61 g (-73g (1.0-1.2 g/kg bw)) Protein  Carbohydrate:  At Least 130 g/day  Fluids: 1600 mL/day    Last BM: 3/23   [x]Active     []Hyperactive  []Hypoactive       [] Absent   BS  Skin:    [x] Intact   [] Incision  [] Breakdown   [] DTI   [] Tears/Excoriation/Abrasion  []Edema [] Other: Wt Readings from Last 30 Encounters:   03/21/17 61.2 kg (135 lb)   03/20/17 61 kg (134 lb 6.4 oz)   03/16/17 61.2 kg (135 lb)   03/12/17 65.3 kg (144 lb)   03/08/17 65.7 kg (144 lb 14.4 oz)   03/02/17 64.8 kg (142 lb 13.7 oz)   02/21/17 66 kg (145 lb 9.6 oz)   02/06/17 69.2 kg (152 lb 9.6 oz)   01/31/17 66.4 kg (146 lb 6.4 oz)   01/15/17 68 kg (150 lb)   12/07/16 67.9 kg (149 lb 12.8 oz)   11/22/16 67.8 kg (149 lb 6.4 oz)   10/07/16 65.9 kg (145 lb 3.2 oz)   09/07/16 64.5 kg (142 lb 3.2 oz)   09/04/16 64.2 kg (141 lb 8.6 oz)   08/24/16 64.9 kg (143 lb)   08/15/16 65.9 kg (145 lb 3.2 oz)   07/05/16 66.9 kg (147 lb 6.4 oz)   06/16/16 65.3 kg (144 lb)   05/19/16 65.1 kg (143 lb 9.6 oz)   04/14/16 63.4 kg (139 lb 12.8 oz)   03/14/16 62.6 kg (138 lb)   03/09/16 62.6 kg (138 lb)   02/29/16 62.3 kg (137 lb 6.4 oz)   02/10/16 61 kg (134 lb 6.4 oz)   01/29/16 61.7 kg (136 lb)   01/04/16 60.8 kg (134 lb)   12/09/15 60.4 kg (133 lb 3.2 oz)   11/25/15 59.1 kg (130 lb 3.2 oz)   11/23/15 58.6 kg (129 lb 3 oz)      NUTRITION DIAGNOSES:   Problem:  Swallowing difficulty      Etiology: related to hx laryngeal cancer      Signs/Symptoms: as evidenced by recurrent aspiration, need for TF for nutrition     Previous dx re: swallowing difficulty continues, pt remains NPO and on TF.     NUTRITION INTERVENTIONS:    Enteral/Parenteral Nutrition: Other (Continue current TF regimen)                GOAL:   Pt will tolerate TF regimen with residuals <250mL in 4-6 days.      NUTRITION MONITORING AND EVALUATION   Previous Goal: Pt will tolerate bolus TF with residual <250mL next 2-4 days   Previous Goal Met: Yes   Previous Recommendations Implemented: Yes   Cultural, Cheondoism, or Ethnic Dietary Needs: None   LEARNING NEEDS (Diet, Food/Nutrient-Drug Interaction):    [x] None Identified   [] Identified and Education Provided/Documented   [] Identified and Pt declined/was not appropriate      [x] Interdisciplinary Care Plan Reviewed/Documented    [x] Participated in Discharge Planning: Continue TF regimen above    [x] Interdisciplinary Rounds     NUTRITION RISK:    [] High              [x] Moderate           [x]  Low  []  Minimal/Uncompromised      Luanne Kyle RD  Pager [phone]  Weekend Pager [phone]

## 2017-03-25 NOTE — PROGRESS NOTES
1000 Tube feed bolus administered. 1108 Pt up to use bedside commode (episode of diarrhea), and to wash hands at sink. 02 sats down to 90% on 4lpm and went back up to 94% upon return to bed. Pt tolerated well. 1215 Pt refused tube feed bolus. C/o pain and anxiety.  at bedside, has had another episode of diarrhea. 1540 2nd TF bolus given. 1830 3rd TF bolus given.

## 2017-03-25 NOTE — PROGRESS NOTES
General Daily Progress Note    Admit Date: 3/21/2017  Hospital day 5    Subjective:     Patient has improving cough dyspnea. .   Medication side effects: none    Current Facility-Administered Medications   Medication Dose Route Frequency    lithium carbonate tablet 150 mg  150 mg Oral TID    traZODone (DESYREL) tablet 200 mg  200 mg Oral QHS    FLUoxetine (PROzac) capsule 20 mg  20 mg Oral DAILY    metoprolol tartrate (LOPRESSOR) tablet 25 mg  25 mg Oral BID    amLODIPine (NORVASC) tablet 5 mg  5 mg Oral DAILY    LORazepam (ATIVAN) injection 0.5 mg  0.5 mg IntraVENous Q6H PRN    morphine injection 4 mg  4 mg IntraVENous Q2H PRN    naloxone (NARCAN) injection 0.4 mg  0.4 mg IntraVENous PRN    piperacillin-tazobactam (ZOSYN) 3.375 g in 0.9% sodium chloride (MBP/ADV) 100 mL  3.375 g IntraVENous Q8H    heparin (porcine) injection 5,000 Units  5,000 Units SubCUTAneous Q8H    albuterol-ipratropium (DUO-NEB) 2.5 MG-0.5 MG/3 ML  3 mL Nebulization Q4H RT    0.9% sodium chloride infusion  100 mL/hr IntraVENous CONTINUOUS    methylPREDNISolone (PF) (SOLU-MEDROL) injection 40 mg  40 mg IntraVENous Q12H        Review of Systems  Constitutional: positive for fatigue and malaise  Respiratory: positive for cough or dyspnea on exertion  Cardiovascular: negative  Gastrointestinal: negative  Neurological: negative    Objective:     Patient Vitals for the past 8 hrs:   BP Temp Pulse Resp SpO2   03/25/17 0736 (!) 144/92 96.3 °F (35.7 °C) 100 18 98 %   03/25/17 0724 - - - - 98 %   03/25/17 0341 (!) 157/105 98 °F (36.7 °C) 96 18 95 %   03/25/17 0309 - - - - 98 %        03/23 1901 - 03/25 0700  In: 9399.6 [I.V.:6989.6]  Out: 200 [Urine:200]    Physical Exam:   Visit Vitals    BP (!) 144/92 (BP 1 Location: Left arm, BP Patient Position: At rest)    Pulse 100    Temp 96.3 °F (35.7 °C)    Resp 18    Ht 5' 8\" (1.727 m)    Wt 135 lb (61.2 kg)    LMP  (LMP Unknown)    SpO2 98%    BMI 20.53 kg/m2     General appearance: alert, fatigued, cooperative, no distress, appears stated age  Lungs: rhonchi R base, L base  Heart: regular rate and rhythm  Abdomen: soft, non-tender. Bowel sounds normal. No masses,  no organomegaly  Extremities: extremities normal, atraumatic, no cyanosis or edema           Data Review   Recent Results (from the past 24 hour(s))   CBC WITH AUTOMATED DIFF    Collection Time: 03/24/17 11:42 AM   Result Value Ref Range    WBC 20.4 (H) 3.6 - 11.0 K/uL    RBC 3.29 (L) 3.80 - 5.20 M/uL    HGB 8.5 (L) 11.5 - 16.0 g/dL    HCT 29.6 (L) 35.0 - 47.0 %    MCV 90.0 80.0 - 99.0 FL    MCH 25.8 (L) 26.0 - 34.0 PG    MCHC 28.7 (L) 30.0 - 36.5 g/dL    RDW 19.5 (H) 11.5 - 14.5 %    PLATELET 260 827 - 674 K/uL    NEUTROPHILS 89 %    LYMPHOCYTES 9 %    MONOCYTES 1 %    EOSINOPHILS 0 %    BASOPHILS 0 %    MYELOCYTES 1 %    ABS. NEUTROPHILS 18.2 K/UL    ABS. LYMPHOCYTES 1.8 K/UL    ABS. MONOCYTES 0.2 K/UL    ABS. EOSINOPHILS 0.0 K/UL    ABS. BASOPHILS 0.0 K/UL    RBC COMMENTS ANISOCYTOSIS  1+        WBC COMMENTS ATYPICAL LYMPHOCYTES PRESENT      DF MANUAL     METABOLIC PANEL, COMPREHENSIVE    Collection Time: 03/24/17 11:42 AM   Result Value Ref Range    Sodium 143 136 - 145 mmol/L    Potassium 4.5 3.5 - 5.1 mmol/L    Chloride 108 97 - 108 mmol/L    CO2 28 21 - 32 mmol/L    Anion gap 7 5 - 15 mmol/L    Glucose 115 (H) 65 - 100 mg/dL    BUN 13 6 - 20 MG/DL    Creatinine 0.74 0.55 - 1.02 MG/DL    BUN/Creatinine ratio 18 12 - 20      GFR est AA >60 >60 ml/min/1.73m2    GFR est non-AA >60 >60 ml/min/1.73m2    Calcium 9.3 8.5 - 10.1 MG/DL    Bilirubin, total 0.2 0.2 - 1.0 MG/DL    ALT (SGPT) 16 12 - 78 U/L    AST (SGOT) 13 (L) 15 - 37 U/L    Alk.  phosphatase 67 45 - 117 U/L    Protein, total 6.4 6.4 - 8.2 g/dL    Albumin 2.3 (L) 3.5 - 5.0 g/dL    Globulin 4.1 (H) 2.0 - 4.0 g/dL    A-G Ratio 0.6 (L) 1.1 - 2.2     METABOLIC PANEL, COMPREHENSIVE    Collection Time: 03/25/17  5:22 AM   Result Value Ref Range    Sodium 146 (H) 136 - 145 mmol/L Potassium 4.1 3.5 - 5.1 mmol/L    Chloride 111 (H) 97 - 108 mmol/L    CO2 27 21 - 32 mmol/L    Anion gap 8 5 - 15 mmol/L    Glucose 110 (H) 65 - 100 mg/dL    BUN 15 6 - 20 MG/DL    Creatinine 0.70 0.55 - 1.02 MG/DL    BUN/Creatinine ratio 21 (H) 12 - 20      GFR est AA >60 >60 ml/min/1.73m2    GFR est non-AA >60 >60 ml/min/1.73m2    Calcium 9.2 8.5 - 10.1 MG/DL    Bilirubin, total 0.3 0.2 - 1.0 MG/DL    ALT (SGPT) 17 12 - 78 U/L    AST (SGOT) 8 (L) 15 - 37 U/L    Alk. phosphatase 64 45 - 117 U/L    Protein, total 6.2 (L) 6.4 - 8.2 g/dL    Albumin 2.3 (L) 3.5 - 5.0 g/dL    Globulin 3.9 2.0 - 4.0 g/dL    A-G Ratio 0.6 (L) 1.1 - 2.2     CBC WITH AUTOMATED DIFF    Collection Time: 03/25/17  5:22 AM   Result Value Ref Range    WBC 10.8 3.6 - 11.0 K/uL    RBC 3.29 (L) 3.80 - 5.20 M/uL    HGB 8.6 (L) 11.5 - 16.0 g/dL    HCT 29.2 (L) 35.0 - 47.0 %    MCV 88.8 80.0 - 99.0 FL    MCH 26.1 26.0 - 34.0 PG    MCHC 29.5 (L) 30.0 - 36.5 g/dL    RDW 19.5 (H) 11.5 - 14.5 %    PLATELET 682 514 - 144 K/uL    NEUTROPHILS 90 (H) 32 - 75 %    LYMPHOCYTES 7 (L) 12 - 49 %    MONOCYTES 3 (L) 5 - 13 %    EOSINOPHILS 0 0 - 7 %    BASOPHILS 0 0 - 1 %    ABS. NEUTROPHILS 9.7 (H) 1.8 - 8.0 K/UL    ABS. LYMPHOCYTES 0.8 0.8 - 3.5 K/UL    ABS. MONOCYTES 0.3 0.0 - 1.0 K/UL    ABS. EOSINOPHILS 0.0 0.0 - 0.4 K/UL    ABS. BASOPHILS 0.0 0.0 - 0.1 K/UL           Assessment:     Active Problems:    Chronic pulmonary aspiration (3/2/2017)      Acute on chronic respiratory failure with hypoxemia ([address] (3/6/2017)      Dysphagia (12/30/2014)      History of laryngeal cancer (11/2/2015)      Bipolar 1 disorder ([address] (11/29/2011)      Chronic pain (1/15/2017)      Debility (1/20/2017)      Respiratory failure with hypoxia ([address] (3/22/2017)      Counseling regarding advanced directives and goals of care (3/23/2017)        Plan:     . Cough pain and PENALOZA slowly improving. Continue current meds and treatments.

## 2017-03-25 NOTE — PROGRESS NOTES
End of Shift Nursing Note    Bedside shift change report given to Escobar Olvera 15. (oncoming nurse) by Nancy Wise RN (offgoing nurse). Report included the following information SBAR, Kardex, OR Summary, Intake/Output, MAR and Recent Results. Zone Phone:       Significant changes during shift:    none   Non-emergent issues for physician to address:   none     Number times ambulated in hallway past shift: 0      Number of times OOB to chair past shift: 0    POD #: n/a     Vital Signs:    Temp: 98 °F (36.7 °C)     Pulse (Heart Rate): 96     BP: (!) 157/105     Resp Rate: 18     O2 Sat (%): 95 %    Lines & Drains:         NG tube [] in [] removed [x] not applicable   Drains [] in [] removed [x] not applicable     Skin Integrity:      Wounds: no   Dressings Present: no    Wound Concerns: no      GI:    Current diet:  DIET TUBE FEEDING  DIET NPO With Tube Feedings    Nausea: NO  Vomiting: NO  Bowel Sounds: YES  Flatus: YES  Last Bowel Movement: today   Appearance: liquid brown    Respiratory:  Supplemental O2: Yes      Device: nasal cannula   via 4 Liters/min     Incentive Spirometer: NO  Volume:   Coughing and Deep Breathing: YES  Oral Care: YES  Understanding (patient/family education): YES   Getting out of bed: NO  Head of bed elevation: YES    Patient Safety:    Falls Score: 2  Mobility Score: 1  Bed Alarm On?yes  Sitter? No      Opportunity for questions and clarification was given to oncoming nurse. Patient bed is in low position, side rails are up x 3, door & observation blinds open as needed, call bell within reach and patient not in distress.     Dee Desir RN

## 2017-03-26 NOTE — PROGRESS NOTES
End of Shift Nursing Note    Bedside shift change report given to Sugey Rubio RN (oncoming nurse) by Marge Ricketts RN (offgoing nurse). Report included the following information SBAR, Kardex, Intake/Output, MAR and Recent Results. Zone Phone:       Significant changes during shift:    none   Non-emergent issues for physician to address:   none     Number times ambulated in hallway past shift: 0      Number of times OOB to chair past shift: 0      POD #: n/a     Vital Signs:    Temp: 97.6 °F (36.4 °C)     Pulse (Heart Rate): 100     BP: 133/84     Resp Rate: 18     O2 Sat (%): 96 %    Lines & Drains:        NG tube [] in [] removed [x] not applicable   Drains [] in [] removed [x] not applicable     Skin Integrity:      Wounds: no   Dressings Present: no    Wound Concerns: no      GI:    Current diet:  DIET TUBE FEEDING  DIET NPO With Tube Feedings    Nausea: NO  Vomiting: NO  Bowel Sounds: NO  Flatus: NO  Last Bowel Movement: today   Appearance: liquid brown diarrhea    Respiratory:  Supplemental O2: Yes      Device: nasal cannula   via 4 Liters/min     Incentive Spirometer: YES   Coughing and Deep Breathing: YES  Oral Care: YES  Understanding (patient/family education): YES   Getting out of bed: YES  Head of bed elevation: YES    Patient Safety:    Falls Score: 3  Mobility Score: 1  Bed Alarm On? No  Sitter? No      Opportunity for questions and clarification was given to oncoming nurse. Patient bed is in low position, side rails are up x 3, door & observation blinds open as needed, call bell within reach and patient not in distress.     Maurilio Beckham RN

## 2017-03-26 NOTE — PROGRESS NOTES
End of Shift Nursing Note    Bedside shift change report given to lesley park (oncoming nurse) by Jud Bains (offgoing nurse). Report included the following information SBAR and Kardex. Zone Phone:   [phone]    Significant changes during shift:    Taking IV morphine, walked in halls x2,    Non-emergent issues for physician to address:   Refuses oxycodone says it causes her mood changes     Number times ambulated in hallway past shift: 2      Number of times OOB to chair past shift: 2    POD #:      Vital Signs:    Temp: 98.8 °F (37.1 °C)     Pulse (Heart Rate): 88     BP: 143/89     Resp Rate: 20     O2 Sat (%): 96 %      GI:    Current diet:  DIET TUBE FEEDING  DIET NPO With Tube Feedings    Nausea: NO  Vomiting: NO  Bowel Sounds: YES  Flatus: YES  Last Bowel Movement: today   Appearance: loose brown    Respiratory:  Supplemental O2: Yes      Device: NC   via 4 Liters/min     Incentive Spirometer: YES  Volume: 500  Coughing and Deep Breathing: YES  Oral Care: YES  Understanding (patient/family education): YES   Getting out of bed: YES  Head of bed elevation: YES    Patient Safety:    Falls Score: 2  Mobility Score: 1  Bed Alarm On? Not applicable  Sitter? Not applicable      Opportunity for questions and clarification was given to oncoming nurse. Patient bed is in low position, side rails are up x 2, door & observation blinds open as needed, call bell within reach and patient not in distress.     Aracelis Larson RN

## 2017-03-26 NOTE — PROGRESS NOTES
General Daily Progress Note    Admit Date: 3/21/2017  Hospital day 6    Subjective:     Patient has improving dyspnea and cough. .   Medication side effects: none    Current Facility-Administered Medications   Medication Dose Route Frequency    lithium carbonate tablet 150 mg  150 mg Oral TID    traZODone (DESYREL) tablet 200 mg  200 mg Oral QHS    FLUoxetine (PROzac) capsule 20 mg  20 mg Oral DAILY    metoprolol tartrate (LOPRESSOR) tablet 25 mg  25 mg Oral BID    amLODIPine (NORVASC) tablet 5 mg  5 mg Oral DAILY    LORazepam (ATIVAN) injection 0.5 mg  0.5 mg IntraVENous Q6H PRN    morphine injection 4 mg  4 mg IntraVENous Q2H PRN    naloxone (NARCAN) injection 0.4 mg  0.4 mg IntraVENous PRN    piperacillin-tazobactam (ZOSYN) 3.375 g in 0.9% sodium chloride (MBP/ADV) 100 mL  3.375 g IntraVENous Q8H    heparin (porcine) injection 5,000 Units  5,000 Units SubCUTAneous Q8H    albuterol-ipratropium (DUO-NEB) 2.5 MG-0.5 MG/3 ML  3 mL Nebulization Q4H RT    0.9% sodium chloride infusion  100 mL/hr IntraVENous CONTINUOUS    methylPREDNISolone (PF) (SOLU-MEDROL) injection 40 mg  40 mg IntraVENous Q12H        Review of Systems  Constitutional: positive for fatigue and malaise  Respiratory: positive for cough or dyspnea on exertion  Cardiovascular: negative  Gastrointestinal: positive for dysphagia    Objective:     Patient Vitals for the past 8 hrs:   BP Temp Pulse Resp SpO2   03/26/17 0800 133/84 97.6 °F (36.4 °C) 100 18 96 %   03/26/17 0718 - - - - 98 %   03/26/17 0500 (!) 143/99 98.1 °F (36.7 °C) 97 18 -   03/26/17 0331 - - - - 99 %        03/24 1901 - 03/26 0700  In: 5588.3 [I.V.:4528.3]  Out: 2000 [Urine:2000]    Physical Exam:   Visit Vitals    /84 (BP 1 Location: Left arm, BP Patient Position: At rest)    Pulse 100    Temp 97.6 °F (36.4 °C)    Resp 18    Ht 5' 8\" (1.727 m)    Wt 135 lb (61.2 kg)    LMP  (LMP Unknown)    SpO2 96%    BMI 20.53 kg/m2     General appearance: alert, fatigued, cooperative, no distress, appears stated age  Lungs: clear to auscultation bilaterally, rhonchi R base, L base  Heart: regular rate and rhythm  Abdomen: soft, non-tender. Bowel sounds normal. No masses,  no organomegaly           Data Review   Recent Results (from the past 24 hour(s))   CBC WITH AUTOMATED DIFF    Collection Time: 03/26/17  4:47 AM   Result Value Ref Range    WBC 9.2 3.6 - 11.0 K/uL    RBC 3.41 (L) 3.80 - 5.20 M/uL    HGB 8.9 (L) 11.5 - 16.0 g/dL    HCT 30.1 (L) 35.0 - 47.0 %    MCV 88.3 80.0 - 99.0 FL    MCH 26.1 26.0 - 34.0 PG    MCHC 29.6 (L) 30.0 - 36.5 g/dL    RDW 19.4 (H) 11.5 - 14.5 %    PLATELET 677 949 - 066 K/uL    NEUTROPHILS 87 (H) 32 - 75 %    LYMPHOCYTES 9 (L) 12 - 49 %    MONOCYTES 4 (L) 5 - 13 %    EOSINOPHILS 0 0 - 7 %    BASOPHILS 0 0 - 1 %    ABS. NEUTROPHILS 8.0 1.8 - 8.0 K/UL    ABS. LYMPHOCYTES 0.8 0.8 - 3.5 K/UL    ABS. MONOCYTES 0.4 0.0 - 1.0 K/UL    ABS. EOSINOPHILS 0.0 0.0 - 0.4 K/UL    ABS. BASOPHILS 0.0 0.0 - 0.1 K/UL    RBC COMMENTS ANISOCYTOSIS  1+        RBC COMMENTS HYPOCHROMIA  1+        DF SMEAR SCANNED     METABOLIC PANEL, COMPREHENSIVE    Collection Time: 03/26/17  4:47 AM   Result Value Ref Range    Sodium 145 136 - 145 mmol/L    Potassium 3.8 3.5 - 5.1 mmol/L    Chloride 108 97 - 108 mmol/L    CO2 27 21 - 32 mmol/L    Anion gap 10 5 - 15 mmol/L    Glucose 114 (H) 65 - 100 mg/dL    BUN 17 6 - 20 MG/DL    Creatinine 0.75 0.55 - 1.02 MG/DL    BUN/Creatinine ratio 23 (H) 12 - 20      GFR est AA >60 >60 ml/min/1.73m2    GFR est non-AA >60 >60 ml/min/1.73m2    Calcium 9.4 8.5 - 10.1 MG/DL    Bilirubin, total 0.2 0.2 - 1.0 MG/DL    ALT (SGPT) 19 12 - 78 U/L    AST (SGOT) 10 (L) 15 - 37 U/L    Alk.  phosphatase 58 45 - 117 U/L    Protein, total 6.0 (L) 6.4 - 8.2 g/dL    Albumin 2.3 (L) 3.5 - 5.0 g/dL    Globulin 3.7 2.0 - 4.0 g/dL    A-G Ratio 0.6 (L) 1.1 - 2.2             Assessment:     Active Problems:    Chronic pulmonary aspiration (3/2/2017)      Acute on chronic respiratory failure with hypoxemia ([facility] (3/6/2017)      Dysphagia (12/30/2014)      History of laryngeal cancer (11/2/2015)      Bipolar 1 disorder ([address] (11/29/2011)      Chronic pain (1/15/2017)      Debility (1/20/2017)      Respiratory failure with hypoxia ([facility] (3/22/2017)      Counseling regarding advanced directives and goals of care (3/23/2017)        Plan:     Feeling better,nearing baseline. Will change to po meds,ambulate,aim for home in am

## 2017-03-26 NOTE — PROGRESS NOTES
Bedside and Verbal shift change report given to Lorenza Recinos (oncoming nurse) by Kay Condon (offgoing nurse). Report included the following information SBAR, Kardex and Intake/Output.

## 2017-03-27 NOTE — PROGRESS NOTES
CM completed pt's d/c assessment, by contacting pt's daughter Ms. Ulysses Carbajal. MsNusrat Ash reported that she is aware that pt will be d/c on today and she will be coming to [facility] at 2:30PM to transport pt home. Pt will be resuming HH services provided by [facility] once she d/c and returns home. Pt is aware that she will be d/c on today. Pt's nurse will review d/c plans with pt/family. Pt has a scheduled appointment with Dr. Panfilo Fernando on 3/29/17 at 11:15Am  Care Management Interventions  PCP Verified by CM: Yes  Mode of Transport at Discharge:  Other (see comment) (pt's daughter will transport)  Transition of Care Consult (CM Consult): Discharge Planning, [address]: Yes  Discharge Durable Medical Equipment: No  Physical Therapy Consult: Yes  Occupational Therapy Consult: No  Speech Therapy Consult: Yes  Current Support Network: Lives with Spouse, Own Home  Confirm Follow Up Transport: Family  Plan discussed with Pt/Family/Caregiver: Yes  Discharge Location  Discharge Placement: Home with home health    CARLOS MANUEL Brown   [phone]

## 2017-03-27 NOTE — PROGRESS NOTES
Palliative Medicine Consult  Jose Alberto: [phone] ([phone])    Patient Name: Tashi Bettencourt  YOB: 1955    Date of Initial Consult: 3/22/17  Reason for Consult: overwhelming sxs  Requesting Provider: Rell Salmeron   Primary Care Physician: Rell Salmeron MD      SUMMARY:   Tashi Bettencourt is a 57 y.o. with a past history of bipolar type 1, laryngeal mass, and HTN, recurrent aspiration PNA, chronic pain secondary to neoplasm, who was admitted on 3/21/2017 from home with a diagnosis of acute on chronic respiratory failure with hypoxemia. Current medical issues leading to Palliative Medicine involvement include: this is pt's 5th admission for aspiration PNA. Pt is a former smoker, is exposed to 's cigarette smoke. PALLIATIVE DIAGNOSES:   1. SOB  2. Dysphagia, PEG dependent  3. Chronic aspiration: silent aspiration per MBS  4. Chronic pain secondary to neoplasm  5. Debility: baseline ambulates with cane   6. Weakness   7. Cough, productive  8. Care decisions     PLAN:   1. PAIN/DYSPNEA: pt reports significant relief of dyspnea with morphine, also pain is well controlled. 1. Discontinue IV morphine 4mg  2. Start morphine 10mg PO/PEG q. 4 hours prn pain and dyspnea. 2. CARE DECISIONS:  1. Met with pt,  Humberto Rojas, daughters Miles Khoury and Ahsan Cam: discussed pt's overall poor condition, that as long as pt takes anything PO she will continue with recurrent PNAs, that even if she did not take anything in PO she aspirates saliva which will cause PNA, although not as often, and that she finds her QOL is extremely poor without eating ice chips, therefore, does not plan on stopping. Discussed continuing to work with speech therapy and doing the exercises they taught her in hopes of improving swallow function. Palliative outpatient clinic will see her after discharge for evaluation. Pt hopes that they can help her manage SOB at home and reduce the number of hospital admissions. Completed a new AMD; pt does not want to be kept alive if there is no hope of recovery, however would want aggressive measures if there is hope of meaningful recovery. 2.  I discussed opioid safety, extreme caution with opioids and respiratory depression, especially with benzos, advised to lock up pain medication. 3. Lets be cautious with increasing benzodiazapines and opioids due to risk of respiratory depression. 3. Initial consult note routed to primary continuity provider  4. Communicated plan of care with: Palliative IDT, RN     GOALS OF CARE / TREATMENT PREFERENCES:   [====Goals of Care====]  GOALS OF CARE:  Patient / health care proxy stated goals:     1. Go home  2. Stay home (pt does not want to keep coming back to the hospital, but does want to continue abx therapy)      TREATMENT PREFERENCES:   Code Status: Prior    Advance Care Planning:  Advance Care Planning 3/23/2017   Patient's Healthcare Decision Maker is: Legal Next of Agustin 68   Primary Decision Maker Name Cleve Rivera   Primary Decision Maker Phone Number [phone]   Primary Decision Maker Relationship to Patient Spouse   Secondary Decision Maker Name Archana De La Torre   Secondary Decision Maker Phone Number [phone]   Secondary Decision Maker Relationship to Patient Adult child   Confirm Advance Directive None   Patient Would Like to Complete Advance Directive No       Other:    The palliative care team has discussed with patient / health care proxy about goals of care / treatment preferences for patient.  [====Goals of Care====]         HISTORY:     History obtained from: chart, pt, family    CHIEF COMPLAINT: dyspnea    HPI/SUBJECTIVE:    The patient is:   [x] Verbal and participatory  [] Non-participatory due to:     Presents to ED with c/o progressive SOB x 1 week with associated T. 100.8, generalized weakness, lethargy, nausea, and productive cough. Pt is on home 02 at 3L however, has increased it to 4L due to this illness.     Pt lives at home with . Pt uses PEG herself, typically has 4 \"boxes\" of liquid nutrition daily, bolus feeds. When she gets sick, like yesterday, her daughter has to help pt with the feeding, getting to bathroom, etc.  Pt reports that her pain is well controlled on the current regimen of oxycodone 10mg q. 4 hours. 3/23: pt just returned to bed from bathroom, very dyspneic, shaky, which she reports is an improvement. Coughing up thick, tan colored phlegm. She was eager to talk about outpatient Palliative Medicine. 3/24:  Severe dyspnea with minimal exertion, shaking worse today. Diarrhea.     Clinical Pain Assessment (nonverbal scale for severity on nonverbal patients):   [++++ Clinical Pain Assessment++++]  [++++Pain Severity++++]: Pain: 3   [++++Pain Character++++]: achy  [++++Pain Duration++++]:    years  [++++Pain Effect++++]:   [++++Pain Factors++++]:   [++++Pain Frequency++++]: chronic   [++++Pain Location++++]:    Left chest, back  [++++ Clinical Pain Assessment++++]     FUNCTIONAL ASSESSMENT:     Palliative Performance Scale (PPS):  PPS: 30       PSYCHOSOCIAL/SPIRITUAL SCREENING:     Advance Care Planning:  Advance Care Planning 3/23/2017   Patient's Healthcare Decision Maker is: Legal Next of Agustin 68   Primary Decision Maker Name Radha Wilson   Primary Decision Maker Phone Number [phone]   Primary Decision Maker Relationship to Patient Spouse   Secondary Decision Maker Name Augustine Beltran   Secondary Decision Maker Phone Number [phone]   Secondary Decision Maker Relationship to Patient Adult child   Confirm Advance Directive None   Patient Would Like to Complete Advance Directive No        Any spiritual / Judaism concerns:  [] Yes /  [x] No    Caregiver Burnout:  [] Yes /  [] No /  [x] No Caregiver Present      Anticipatory grief assessment:   [x] Normal  / [] Maladaptive       ESAS Anxiety: Anxiety: 2    ESAS Depression: Depression: 3        REVIEW OF SYSTEMS:     Positive and pertinent negative findings in ROS are noted above in HPI. The following systems were [x] reviewed / [] unable to be reviewed as noted in HPI  Other findings are noted below. Systems: constitutional, ears/nose/mouth/throat, respiratory, gastrointestinal, genitourinary, musculoskeletal, integumentary, neurologic, psychiatric, endocrine. Positive findings noted below. Modified ESAS Completed by: provider   Fatigue: 6 Drowsiness: 0   Depression: 3 Pain: 3   Anxiety: 2 Nausea: 0     Dyspnea: 1     Constipation: No     Stool Occurrence(s): 1        PHYSICAL EXAM:     From RN flowsheet:  Wt Readings from Last 3 Encounters:   03/21/17 135 lb (61.2 kg)   03/20/17 134 lb 6.4 oz (61 kg)   03/16/17 135 lb (61.2 kg)     Blood pressure 136/89, pulse 81, temperature 97.3 °F (36.3 °C), resp. rate 18, height 5' 8\" (1.727 m), weight 135 lb (61.2 kg), SpO2 98 %.     Pain Scale 1: Numeric (0 - 10)  Pain Intensity 1: 0  Pain Onset 1: chronic  Pain Location 1: Abdomen, Back, Head  Pain Orientation 1: Anterior  Pain Description 1: Aching  Pain Intervention(s) 1:  (watching TV)  Last bowel movement, if known: diarrhea stool today    Constitutional: WD, WN, sitting with family on couch  Musculoskeletal: no deformity, no tenderness to palpation  Skin: warm, dry  Neurologic: following commands, moving all extremities  Psychiatric: full affect, no hallucinations     HISTORY:     Active Problems:    Bipolar 1 disorder ([address] (11/29/2011)      Dysphagia (12/30/2014)      History of laryngeal cancer (11/2/2015)      Chronic pain (1/15/2017)      Debility (1/20/2017)      Chronic pulmonary aspiration (3/2/2017)      Acute on chronic respiratory failure with hypoxemia (HCC) (3/6/2017)      Respiratory failure with hypoxia ([address] (3/22/2017)      Counseling regarding advanced directives and goals of care (3/23/2017)      Past Medical History:   Diagnosis Date    Bipolar 1 disorder ([address] 11/29/2011    Cancer (HCC)     skin cancer buttocks    Cancer (HCC)     throat  Chronic pain     FIBROMYALGIA, LEGS    Encounter for long-term (current) use of other medications 11/29/2011    Essential hypertension, benign 11/29/2011    Ill-defined condition     [facility] 11/2/2015    Laryngeal mass     Psychiatric disorder     bipolar      Past Surgical History:   Procedure Laterality Date    HX BREAST AUGMENTATION Bilateral     SALINE IMPLANTS    HX GI      PLACEMENT OF G TUBE    HX GI      ESOPH. DILATATION    HX GYN      tubal pregnancy    HX HEENT      BX OF NECK MASS    HX HYSTERECTOMY      PLACE PERCUT GASTROSTOMY TUBE  10/28/2014     has been removed 2015    PLACE PERCUT GASTROSTOMY TUBE  2/28/2017           Family History   Problem Relation Age of Onset    Cancer Father      bone/skin/lung    Pneumonia Mother     Anesth Problems Neg Hx       History reviewed, no pertinent family history.   Social History   Substance Use Topics    Smoking status: Former Smoker     Packs/day: 1.00     Years: 40.00     Quit date: 8/27/2014    Smokeless tobacco: Never Used      Comment: PASSIVE SMOKE EXPOSURE,  SMOKES    Alcohol use No     No Known Allergies   Current Facility-Administered Medications   Medication Dose Route Frequency    morphine (ROXANOL) 100 mg/5 mL (20 mg/mL) concentrated solution 10 mg  10 mg Oral Q4H PRN    oxyCODONE (ROXICODONE INTENSOL) 20 mg/mL concentrated solution 10 mg  10 mg Per G Tube Q4H PRN    predniSONE (DELTASONE) tablet 20 mg  20 mg Oral DAILY WITH BREAKFAST    lithium carbonate tablet 150 mg  150 mg Oral TID    traZODone (DESYREL) tablet 200 mg  200 mg Oral QHS    FLUoxetine (PROzac) capsule 20 mg  20 mg Oral DAILY    metoprolol tartrate (LOPRESSOR) tablet 25 mg  25 mg Oral BID    amLODIPine (NORVASC) tablet 5 mg  5 mg Oral DAILY    LORazepam (ATIVAN) injection 0.5 mg  0.5 mg IntraVENous Q6H PRN    naloxone (NARCAN) injection 0.4 mg  0.4 mg IntraVENous PRN    heparin (porcine) injection 5,000 Units  5,000 Units SubCUTAneous Q8H    albuterol-ipratropium (DUO-NEB) 2.5 MG-0.5 MG/3 ML  3 mL Nebulization Q4H RT    0.9% sodium chloride infusion  100 mL/hr IntraVENous CONTINUOUS          LAB AND IMAGING FINDINGS:     Lab Results   Component Value Date/Time    WBC 9.2 03/26/2017 04:47 AM    HGB 8.9 03/26/2017 04:47 AM    PLATELET 943 99/91/7056 04:47 AM     Lab Results   Component Value Date/Time    Sodium 145 03/26/2017 04:47 AM    Potassium 3.8 03/26/2017 04:47 AM    Chloride 108 03/26/2017 04:47 AM    CO2 27 03/26/2017 04:47 AM    BUN 17 03/26/2017 04:47 AM    Creatinine 0.75 03/26/2017 04:47 AM    Calcium 9.4 03/26/2017 04:47 AM    Magnesium 2.2 03/22/2017 04:32 AM    Phosphorus 2.0 03/22/2017 04:32 AM      Lab Results   Component Value Date/Time    AST (SGOT) 10 03/26/2017 04:47 AM    Alk. phosphatase 58 03/26/2017 04:47 AM    Protein, total 6.0 03/26/2017 04:47 AM    Albumin 2.3 03/26/2017 04:47 AM    Globulin 3.7 03/26/2017 04:47 AM     No results found for: INR, PTMR, PTP, PT1, PT2, APTT   Lab Results   Component Value Date/Time    Iron 10 02/26/2017 04:28 AM    TIBC 169 02/26/2017 04:28 AM    Iron % saturation 6 02/26/2017 04:28 AM    Ferritin 250 02/26/2017 04:28 AM      Lab Results   Component Value Date/Time    pH 7.35 03/21/2017 09:37 PM    PCO2 60 03/21/2017 09:37 PM    PO2 56 03/21/2017 09:37 PM     No components found for: [place]   Lab Results   Component Value Date/Time    CK 29 03/06/2017 06:00 PM    CK - MB <1.0 03/06/2017 06:00 PM                Total time: 60 min  Counseling / coordination time: 55 min  > 50% counseling / coordination?: yes  Prolonged service was provided for  []30 min   []75 min in face to face time in the presence of the patient. Time Start: 8:40am  Time End:  9:55am  Note: this can only be billed with [id number] (initial) or 21 [phone] (follow up). If multiple start / stop times, list each separately.

## 2017-03-27 NOTE — PROGRESS NOTES
Reviewed discharge instructions and prescriptions with patient and family. (prescriptions called into pharmacy). Family in room with patient. Patient is A&OX3, ambulatory via walking around room.

## 2017-03-27 NOTE — PROGRESS NOTES
Patient being discharged home with family (Home health lined up). Patient is A&OX3, ambulatory via walking around room. Discontinued SL. No Bleeding. Prescriptions called into patient's pharmacy.

## 2017-03-27 NOTE — PROGRESS NOTES
General Daily Progress Note    Admit Date: 3/21/2017  Hospital day 7    Subjective:     Patient has improving dyspnea and cough,ambulating in castillo ok with 4 lpm O2. .   Medication side effects: none    Current Facility-Administered Medications   Medication Dose Route Frequency    oxyCODONE (ROXICODONE INTENSOL) 20 mg/mL concentrated solution 10 mg  10 mg Per G Tube Q4H PRN    predniSONE (DELTASONE) tablet 20 mg  20 mg Oral DAILY WITH BREAKFAST    lithium carbonate tablet 150 mg  150 mg Oral TID    traZODone (DESYREL) tablet 200 mg  200 mg Oral QHS    FLUoxetine (PROzac) capsule 20 mg  20 mg Oral DAILY    metoprolol tartrate (LOPRESSOR) tablet 25 mg  25 mg Oral BID    amLODIPine (NORVASC) tablet 5 mg  5 mg Oral DAILY    LORazepam (ATIVAN) injection 0.5 mg  0.5 mg IntraVENous Q6H PRN    morphine injection 4 mg  4 mg IntraVENous Q2H PRN    naloxone (NARCAN) injection 0.4 mg  0.4 mg IntraVENous PRN    heparin (porcine) injection 5,000 Units  5,000 Units SubCUTAneous Q8H    albuterol-ipratropium (DUO-NEB) 2.5 MG-0.5 MG/3 ML  3 mL Nebulization Q4H RT    0.9% sodium chloride infusion  100 mL/hr IntraVENous CONTINUOUS        Review of Systems  Constitutional: positive for fatigue  Respiratory: positive for cough or dyspnea on exertion  Cardiovascular: negative  Gastrointestinal: negative  Behavioral/Psych: negative    Objective:     Patient Vitals for the past 8 hrs:   BP Temp Pulse Resp SpO2   03/27/17 0410 (!) 140/93 97.7 °F (36.5 °C) 91 18 96 %   03/27/17 0341 - - - - 98 %   03/27/17 0011 102/71 97.8 °F (36.6 °C) 84 20 100 %   03/26/17 2333 - - - - 97 %     03/26 1901 - 03/27 0700  In: 540   Out: -   03/25 0701 - 03/26 1900  In: 4143.9 [I.V.:4528.3]  Out: 1800 [Urine:1800]    Physical Exam:   Visit Vitals    BP (!) 140/93 (BP 1 Location: Right arm)    Pulse 91    Temp 97.7 °F (36.5 °C)    Resp 18    Ht 5' 8\" (1.727 m)    Wt 135 lb (61.2 kg)    LMP  (LMP Unknown)    SpO2 96%    BMI 20.53 kg/m2 General appearance: alert, fatigued, cooperative, no distress, appears stated age  Lungs: clear to auscultation bilaterally, rhonchi L base  Heart: regular rate and rhythm  Abdomen: soft, non-tender. Bowel sounds normal. No masses,  no organomegaly  Extremities: extremities normal, atraumatic, no cyanosis or edema           Data Review No results found for this or any previous visit (from the past 24 hour(s)). Assessment:     Active Problems:    Acute on chronic respiratory failure with hypoxemia (HCC) (3/6/2017)      Chronic pulmonary aspiration (3/2/2017)      Dysphagia (12/30/2014)      Chronic pain (1/15/2017)      History of laryngeal cancer (11/2/2015)      Bipolar 1 disorder ([facility] (11/29/2011)      Debility (1/20/2017)      Respiratory failure with hypoxia ([facility] (3/22/2017)      Counseling regarding advanced directives and goals of care (3/23/2017)        Plan:     Feeling better,ambulating in castillo with O2. Using MS for pain and notes that it improves her dyspnea. Is completely npo except for ice chips to keep mouth moist.Nearing discharge. Palliative to meet with family this am

## 2017-03-27 NOTE — DISCHARGE INSTRUCTIONS
PATIENT DISCHARGE INSTRUCTIONS      PATIENT DISCHARGE INSTRUCTIONS    Sanjuanita Viveros / [id number] : 1955    Admitted 3/21/2017 Discharged: 3/27/2017       · It is important that you take the medication exactly as they are prescribed. · Keep your medication in the bottles provided by the pharmacist and keep a list of the medication names, dosages, and times to be taken in your wallet. · Do not take other medications without consulting your doctor.      What to do at Home    Recommended Diet: PEG feeding as before,only meds and ice chips by mouth    Recommended Activity: Activity as tolerated,O2 at 4 lpm    If you experience any of the following symptoms  Worsening shortness of breathe  , please follow up with Florence Rhodes MD  .        Signed By: Florence Rhodes MD     2017

## 2017-03-27 NOTE — PROGRESS NOTES
End of Shift Nursing Note    Bedside shift change report given to Deysi Smith RN (oncoming nurse) by Noreen Hilliard RN (offgoing nurse). Report included the following information SBAR, Kardex, Intake/Output, MAR and Recent Results. Zone Phone:   [phone]    Significant changes during shift:    none   Non-emergent issues for physician to address:   none     Number times ambulated in hallway past shift: 0      Number of times OOB to chair past shift: 0    POD #: n/a     Vital Signs:    Temp: 97.8 °F (36.6 °C)     Pulse (Heart Rate): 84     BP: 102/71     Resp Rate: 20     O2 Sat (%): 100 %    Lines & Drains:         NG tube [] in [] removed [x] not applicable   Drains [] in [] removed [x] not applicable     Skin Integrity:      Wounds: no   Dressings Present: no    Wound Concerns: no      GI:    Current diet:  DIET TUBE FEEDING  DIET NPO With Tube Feedings    Nausea: no  Vomiting: NO  Bowel Sounds: YES  Flatus: YES  Last Bowel Movement: yesterday   Appearance: liquid brown    Respiratory:  Supplemental O2: Yes      Device: nasal cannula   via 4 Liters/min     Incentive Spirometer: YES  Volume:   Coughing and Deep Breathing: YES  Oral Care: YES  Understanding (patient/family education): YES   Getting out of bed: YES  Head of bed elevation: YES    Patient Safety:    Falls Score: 3 Mobility Score: 1  Bed Alarm On? No  Sitter? No      Opportunity for questions and clarification was given to oncoming nurse. Patient bed is in low   position, side rails are up x 2, door & observation blinds open as needed, call bell within reach and patient not in distress.     Rowan Barrientos RN

## 2017-03-27 NOTE — DISCHARGE SUMMARY
Physician Discharge Summary       Patient: Gabi Mcfadden MRN: [id number]  SSN: [id number]    YOB: 1955  Age: 57 y.o. Sex: female    PCP: Christina Medina MD    Admit date: 3/21/2017  Admitting Provider: Christina Medina MD    Discharge date: 3/27/2017  Discharging Provider: Christina Medina MD    * Admission Diagnoses: Respiratory failure with hypoxia [facility])    * Discharge Diagnoses:    Hospital Problems as of 3/27/2017  Date Reviewed: 3/27/2017          Codes Class Noted - Resolved POA    Acute on chronic respiratory failure with hypoxemia ([facility] ICD-10-CM: J96.21  ICD-9-CM: 518.84  3/6/2017 - Present Yes        Chronic pulmonary aspiration ICD-10-CM: T17.908A  ICD-9-CM: 934.9  3/2/2017 - Present Yes        Dysphagia ICD-10-CM: R13.10  ICD-9-CM: 787.20  12/30/2014 - Present Yes        Chronic pain (Chronic) ICD-10-CM: G89.29  ICD-9-CM: 338.29 Chronic 1/15/2017 - Present Yes        History of laryngeal cancer ICD-10-CM: Z85.21  ICD-9-CM: V10.21 Present on Admission 11/2/2015 - Present Yes        Counseling regarding advanced directives and goals of care ICD-10-CM: Z71.89  ICD-9-CM: V65.49  3/23/2017 - Present Unknown        Respiratory failure with hypoxia ([facility] ICD-10-CM: J96.91  ICD-9-CM: 518.81  3/22/2017 - Present Unknown        Debility ICD-10-CM: R53.81  ICD-9-CM: 799.3  1/20/2017 - Present Yes        Bipolar 1 disorder ([facility] ICD-10-CM: F31.9  ICD-9-CM: 296.7  11/29/2011 - Present Yes               Hospital Course: elderly bipolar female followed for copd,laryngealca s/p surgery and rt,dysphagia s/p PEG placement;admitted again for rapidly developing hypoxic respiratory failure due to recurrent aspiration,despite PEG feeding. She was admitted through the ER requiring BIPAP ,and started on empiric antibiotics,steroids,nebs and ivf. Seen in consultation by Dr Mike Pruitt for Pulmonary,please see his notes. Strict npo status was reinforced and she rapidly improved to baseline,requiring NC O2 at 4 lpm.She was evaluated by Palliative Care prior to discharge. She was discharged home on 3/27/17 in good condition         Consults: Pulmonary/Intensive care and Palliative Care    Significant Diagnostic Studies: radiology: CXR: bilateral infiltrates    Discharge Exam:  Visit Vitals    /89    Pulse 81    Temp 97.3 °F (36.3 °C)    Resp 18    Ht 5' 8\" (1.727 m)    Wt 135 lb (61.2 kg)    LMP  (LMP Unknown)    SpO2 97%    BMI 20.53 kg/m2     General appearance: alert, fatigued, cooperative, no distress, appears stated age  Lungs: clear to auscultation bilaterally, rhonchi R base, L base  Heart: regular rate and rhythm  Abdomen: soft, non-tender. Bowel sounds normal. No masses,  no organomegaly  Extremities: extremities normal, atraumatic, no cyanosis or edema    * Discharge Condition: good  * Disposition: Home    Discharge Medications:  Current Discharge Medication List      START taking these medications    Details   predniSONE (DELTASONE) 10 mg tablet 2 tabs daily for 3 days,then 1 tab daily for 3 days,then 1/2 tab daily till empty  Qty: 14 Tab, Refills: 0         CONTINUE these medications which have CHANGED    Details   amoxicillin-clavulanate (AUGMENTIN) 250-62.5 mg/5 mL suspension Take 10 mL by mouth every eight (8) hours. Qty: 100 mL, Refills: 0         CONTINUE these medications which have NOT CHANGED    Details   oxyCODONE (ROXICODONE INTENSOL) 20 mg/mL concentrated solution Take 0.5 mL by mouth every four (4) hours as needed. Max Daily Amount: 60 mg.  Qty: 60 mL, Refills: 0    Associated Diagnoses: Chronic pain syndrome      amLODIPine (NORVASC) 10 mg tablet Take 10 mg by mouth daily. albuterol-ipratropium (DUO-NEB) 2.5 mg-0.5 mg/3 ml nebu 3 mL by Nebulization route every four (4) hours as needed. Qty: 30 Nebule, Refills: 12      VITAMIN B-12 1,000 mcg sublingual tablet 1,000 mcg by SubLINGual route daily.       acetylcysteine (MUCOMYST) 100 mg/mL (10 %) nebulizer solution Take 4 mL by inhalation every four (4) hours. Qty: 120 mL, Refills: 11      metoprolol tartrate (LOPRESSOR) 25 mg tablet Take 1 Tab by mouth every twelve (12) hours. Qty: 60 Tab, Refills: 11      lithium carbonate CR (ESKALITH CR) 450 mg CR tablet TAKE 1 TABLET BY ORAL ROUTE PER AT BEDTIME  Refills: 0    Associated Diagnoses: Bipolar 1 disorder (HCC)      FLUoxetine (PROZAC) 20 mg tablet Take 20 mg by mouth daily. traZODone (DESYREL) 100 mg tablet Take 200 mg by mouth nightly. Refills: 2      bisacodyl 5 mg tab Take 1 Tab by mouth daily as needed (constipation). diphenhydrAMINE 12.5 mg/5 mL elix 40 mL, lidocaine 2 % soln 40 mL, aluminum & magnesium hydroxide-simethicone 400-400-40 mg/5 mL susp 40 mL Take 5 mL by mouth daily. Swish and spit or swallow. * Follow-up Care/Patient Instructions: Activity: Activity as tolerated  Diet: PEG feedings as before  Wound Care: None needed    Follow-up Information     Follow up With Details Comments Contact Info    Chad Isbell MD On 3/29/2017 APPOINTMENT TIME: 11:15AM [address] [address]  [phone]      [facility] 227 On 3/27/2017 THIS IS [address] [place] [address].   IF YOU DONT HEAR FROM THEM WITHIN 24-48 HOURS, PLEASE CONTACT THEM DIRECTLY [address] [place] [address]  [phone]          Signed:  Chad Isbell MD  3/27/2017  1:35 PM

## 2017-03-28 NOTE — PROGRESS NOTES
Patient listed on discharge [facility]) report on 3/27. Patient  to [facility] 3/21-3/27 for Resp Failure. Contacted patient-daughter-Carli (on HIPPA) to perform post hospital discharge assessment. Verified  and address with patient-daughter as identifiers, Performed medication reconciliation with patient-daughter,  verbalizes understanding of administration of home medications. Patient-daughter verbalizes understand of discharge instructions and follow-up care. Patient scheduled to f/u with Dr Ehsan Turner  on 3/29 and Palliative visit planned on Friday. Patient-daguther given an opportunity to ask questions. Contact information provided to the patient for future reference or further questions. Patient daughter states that patient is doing good, denies fever or cough. Is now on O2 at 4/L, continues with PEG and still eating ice.   Patient daughter talked to patient this Am and going to see her this AM.

## 2017-03-29 NOTE — MR AVS SNAPSHOT
Visit Information Date & Time Provider Department Dept. Phone Encounter #  
 3/29/2017 11:15 AM Umberto David Malick [phone] [id number] Follow-up Instructions Return in about 4 weeks (around 4/26/2017). Upcoming Health Maintenance Date Due  
 MEDICARE YEARLY EXAM 5/25/1972 BREAST CANCER SCRN MAMMOGRAM 5/25/2004 FOBT Q 1 YEAR AGE 50-75 5/25/2004 Pneumococcal 19-64 Highest Risk (3 of 3 - PCV13) 12/7/2017 PAP AKA CERVICAL CYTOLOGY 2/28/2019 DTaP/Tdap/Td series (2 - Td) 12/7/2026 Allergies as of 3/29/2017  Review Complete On: 3/29/2017 By: Mathieu Daugherty MD  
 No Known Allergies Current Immunizations  Reviewed on 3/8/2017 Name Date Influenza Vaccine 11/1/2014 Influenza Vaccine (Quad) PF 12/7/2016 Pneumococcal Polysaccharide (PPSV-23) 12/7/2016 Tdap 12/7/2016 Not reviewed this visit You Were Diagnosed With   
  
 Codes Comments Aspiration pneumonia of both upper lobes, unspecified [facility]    -  Primary ICD-10-CM: J69.0 ICD-9-CM: 507.0 Bipolar 1 disorder (HCC)     ICD-10-CM: F31.9 ICD-9-CM: 296.7 Vitals BP Pulse Temp Resp Height(growth percentile) Weight(growth percentile) 100/60 (BP 1 Location: Right arm, BP Patient Position: Sitting) 68 98.5 °F (36.9 °C) (Oral) 26 5' 7\" (1.702 m) 133 lb (60.3 kg) LMP SpO2 BMI OB Status Smoking Status (LMP Unknown) (!) 89% 20.83 kg/m2 Hysterectomy Former Smoker Vitals History BMI and BSA Data Body Mass Index Body Surface Area  
 20.83 kg/m 2 1.69 m 2 Preferred Pharmacy Pharmacy Name Phone [facility] [address] [phone] Your Updated Medication List  
  
   
This list is accurate as of: 3/29/17  9:05 PM.  Always use your most recent med list.  
  
  
  
  
 acetylcysteine 100 mg/mL (10 %) nebulizer solution Commonly known as:  MUCOMYST Take 4 mL by inhalation every four (4) hours. albuterol-ipratropium 2.5 mg-0.5 mg/3 ml Nebu Commonly known as:  DUO-NEB  
3 mL by Nebulization route every four (4) hours as needed. amoxicillin-clavulanate 250-62.5 mg/5 mL suspension Commonly known as:  AUGMENTIN Take 10 mL by mouth every eight (8) hours. B infan-B long-L acid-L rhamn 2 billion cell Cpsp Commonly known as:  DIGESTIVE PROBIOTIC Take 1 Cap by mouth daily. bisacodyl 5 mg Tab Take 1 Tab by mouth daily as needed (constipation). diphenhydrAMINE 12.5 mg/5 mL elix 40 mL, lidocaine 2 % soln 40 mL, aluminum & magnesium hydroxide-simethicone 400-400-40 mg/5 mL susp 40 mL Take 5 mL by mouth daily. Swish and spit or swallow. FLUoxetine 20 mg tablet Commonly known as:  PROzac Take 20 mg by mouth daily. JEVITY 1.5 MARIELA 0.06 gram-1.5 kcal/mL Liqd Generic drug:  Lactose-Free Food with Fiber 1.5 L by PEG Tube route five (5) times daily. bolus feeding followed by 150  ml flush after each feeding  
  
 lithium carbonate  mg CR tablet Commonly known as:  ESKALITH CR  
TAKE 1 TABLET BY ORAL ROUTE PER AT BEDTIME LORazepam 0.5 mg tablet Commonly known as:  ATIVAN Take 1 Tab by mouth two (2) times daily as needed for Anxiety. Max Daily Amount: 1 mg.  
  
 metoprolol tartrate 25 mg tablet Commonly known as:  LOPRESSOR Take 1 Tab by mouth every twelve (12) hours. NORVASC 10 mg tablet Generic drug:  amLODIPine Take 10 mg by mouth daily. OTHER  
0.5 mL by PEG Tube route every four (4) hours. Indications: Morphine  
  
 oxyCODONE 20 mg/mL concentrated solution Commonly known as:  Shyam Homes Take 0.5 mL by mouth every four (4) hours as needed. Max Daily Amount: 60 mg. OXYGEN-AIR DELIVERY SYSTEMS  
2 L/min by Nasal route continuous. * predniSONE 10 mg tablet Commonly known as:  Patrick Wahl  
 2 tabs daily for 3 days,then 1 tab daily for 3 days,then 1/2 tab daily till empty * predniSONE 10 mg tablet Commonly known as:  Nguyen Fossa Take 1 Tab by mouth daily (with breakfast). traZODone 100 mg tablet Commonly known as:  Manjinder Lipps Take 200 mg by mouth nightly. VITAMIN B-12 1,000 mcg sublingual tablet Generic drug:  cyanocobalamin  
1,000 mcg by SubLINGual route daily. * Notice: This list has 2 medication(s) that are the same as other medications prescribed for you. Read the directions carefully, and ask your doctor or other care provider to review them with you. Prescriptions Printed Refills LORazepam (ATIVAN) 0.5 mg tablet 2 Sig: Take 1 Tab by mouth two (2) times daily as needed for Anxiety. Max Daily Amount: 1 mg. Class: Print Route: Oral  
  
Prescriptions Sent to Pharmacy Refills B infan-B long-L acid-L rhamn (DIGESTIVE PROBIOTIC) 2 billion cell cpSP 5 Sig: Take 1 Cap by mouth daily. Class: Normal  
 [facility]  Ph #: [phone] Route: Oral  
 predniSONE (DELTASONE) 10 mg tablet 2 Sig: Take 1 Tab by mouth daily (with breakfast). Class: Normal  
 [facility]  Ph #: [phone] Route: Oral  
  
Follow-up Instructions Return in about 4 weeks (around 4/26/2017). To-Do List   
 03/30/2017 11:00 AM  
  [facility]   
  
 04/04/2017 To Be Determ[facility]   
  
 04/06/2017 To Be Determ[facility]   
  
 04/10/2017 To Be Determined Appointment with Johnathan Gaines PT at [address] [facility]! Bryant Ruiz introduces [employer] patient portal. Now you can access parts of your medical record, email your doctor's office, and request medication refills online. 1. In your internet browser, go to [email or web address] 2. Click on the First Time User? Click Here link in the Sign In box. You will see the New Member Sign Up page. 3. Enter your Kauli Access Code exactly as it appears below. You will not need to use this code after youve completed the sign-up process. If you do not sign up before the expiration date, you must request a new code. · Kauli Access Code: [email or web address] Expires: 6/5/2017  4:08 PM 
 
4. Enter the last four digits of your Social Security Number (xxxx) and Date of Birth (mm/dd/yyyy) as indicated and click Submit. You will be taken to the next sign-up page. 5. Create a Kauli ID. This will be your Kauli login ID and cannot be changed, so think of one that is secure and easy to remember. 6. Create a Kauli password. You can change your password at any time. 7. Enter your Password Reset Question and Answer. This can be used at a later time if you forget your password. 8. Enter your e-mail address. You will receive e-mail notification when new information is available in [address] 9. Click Sign Up. You can now view and download portions of your medical record. 10. Click the Download Summary menu link to download a portable copy of your medical information. If you have questions, please visit the Frequently Asked Questions section of the Kauli website. Remember, Kauli is NOT to be used for urgent needs. For medical emergencies, dial 911. Now available from your iPhone and Android! Please provide this summary of care documentation to your next provider. Your primary care clinician is listed as Berl Ore. If you have any questions after today's visit, please call [phone].

## 2017-03-29 NOTE — PROGRESS NOTES
Chief Complaint   Patient presents with   [facility] Follow Up     F/U from [facility].

## 2017-03-30 NOTE — PROGRESS NOTES
HISTORY OF PRESENT ILLNESS  Isabel Younger is a 57 y.o. female. Transitional Care visit following hospitalization for recurrent aspiraton pneumonia and acute on chronic respiratory failure. Feeling better thogh weak and dyspneic. Now entirely PEG fed. Pain control good    Hospital Follow Up   The history is provided by the patient. This is a new problem. The problem has been gradually improving. Associated symptoms include shortness of breath. Pertinent negatives include no chest pain, no abdominal pain and no headaches. Breathing Problem   This is a chronic problem. The problem occurs frequently. The problem has been gradually improving. Associated symptoms include neck pain, cough and wheezing. Pertinent negatives include no fever, no headaches, no orthopnea, no chest pain and no abdominal pain. Review of Systems   Constitutional: Positive for malaise/fatigue. Negative for fever. Respiratory: Positive for cough, shortness of breath and wheezing. Cardiovascular: Negative for chest pain, palpitations and orthopnea. Gastrointestinal: Negative for abdominal pain. Musculoskeletal: Positive for myalgias and neck pain. Neurological: Negative for headaches. Physical Exam   Constitutional: She appears well-developed and well-nourished. HENT:   Head: Normocephalic and atraumatic. Right Ear: External ear normal.   Left Ear: External ear normal.   Nose: Nose normal.   Mouth/Throat: Oropharynx is clear and moist.   Cardiovascular: Normal rate and regular rhythm. Pulmonary/Chest: Effort normal.   Generalized diminished breathe sounds ,scattered ronchi     Abdominal: Soft. Bowel sounds are normal.   Skin: Skin is warm and dry. ASSESSMENT and PLAN  Khushi Leon was seen today for hospital follow up.     Diagnoses and all orders for this visit:    Aspiration pneumonia of both upper lobes, unspecified aspiration pneumonia type ([address]  -     B infan-B long-L acid-L rhamn (DIGESTIVE PROBIOTIC) 2 billion cell cpSP; Take 1 Cap by mouth daily. -     predniSONE (DELTASONE) 10 mg tablet; Take 1 Tab by mouth daily (with breakfast). Bipolar 1 disorder (HCC)  -     LORazepam (ATIVAN) 0.5 mg tablet; Take 1 Tab by mouth two (2) times daily as needed for Anxiety. Max Daily Amount: 1 mg. Continue current meds and treatments. Aspiration precautions,npo status reviewed    Follow-up Disposition:  Return in about 4 weeks (around 4/26/2017).

## 2017-04-05 NOTE — PROGRESS NOTES
[employer] Palliative Medicine Office  Nursing Note  [phone]  Fax [phone]     Name:  Jono Yip  YOB: 1955     Palliative Medicine home visit scheduled for April 7, 2017 at [facility].  Nurse called and spoke with pt's dtr Juan David Monroy regarding appt. ACP:  [facility] Advance Medical Directive dated 3/27/17 is on file.     TATE EscamillaN, RN  Clinical Referral Navigator

## 2017-04-05 NOTE — TELEPHONE ENCOUNTER
Called PCP office and never was able to get thru. Called back and got call center who tried to reach office.   Will try later

## 2017-04-05 NOTE — TELEPHONE ENCOUNTER
CAlled  pcp office, talked to [place],  [place] that she does not do referrals. While talking with Val[place],  Received call from Shyann Downing who will work on obtaining referral.  Shyann Downing was going to fax referral back to me as soon as processed. Shyann Downing gave me her direct number. Shyann Downing very helpful.

## 2017-04-07 NOTE — PROGRESS NOTES
Palliative Medicine Outpatient Services  [place]: [phone] ([phone])    Patient Name: Veronica Montgomery  YOB: 1955    Date of Current Visit: 04/10/17  Location of Current Visit:    [] [facility] Office  [] [facility] Office  [] [facility] Office  [x] Home  [] Other:        Date of Initial Visit: 1/20/17 (seen by Palliative Medicine Team during hospitalization)   Requesting Physician: Natalee Thorne MD  Primary Care Physician: Reji Woo MD      SUMMARY:   Veronica Montgomery is a 57y.o. 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 Maximino Palencia for ongoing symptom management and supportive care. She's been hospitalized 5 times since 1/2017 for acute on chronic respiratory failure due to aspiration pneumonia and/or COPD exacerbation. The patients social history includes: she is  to [place]. She has 2 daughters, one of whom, Melony Ortiz, lives three doors away and is involved in her care. Palliative Medicine is addressing the following current patient/family concerns: shortness of breath, cough, chronic neck/throat pain, chronic generalized pain, debility. PALLIATIVE DIAGNOSES:       ICD-10-CM ICD-9-CM    1. Chronic pain syndrome G89.4 338.4    2. Shortness of breath R06.02 786.05    3. Debility R53.81 799.3    4. Cough R05 786.2    5. Dysphagia, unspecified type R13.10 787.20    6. Bipolar 1 disorder (HCC) F31.9 296.7           PLAN:   Patient Instructions     Dear Veronica Montgomery ,    It was a pleasure seeing you in your home last Friday. This is what we talked about:     1. You have chronic pain all over from your fibromyalgia and in your neck from your prior radiation therapy.     2. You take oxycodone 10-mg every 4 hours as needed for your pain which works well for you. 3. You feel chronically short of breath from your COPD and you use oxygen 24/7.    4. You've been more short of breath recently due to your pneumonia. You are taking an antibiotic for this. 5. You have a frequent cough which is due to your pneumonia. 6. You use morphine 10-mg every 4 hours as needed for your shortness of breath and air hunger which helps relieve those symptoms. This can be used for your cough as well. 7. You've suffered from anxiety and depression for a long time and see a psychiatrist who is treating you with lithium, Prozac and trazodone. 8. If it would be helpful to you, I could contact your psychiatrist to see if I could work with him so you would not need to go to his office for your medication refills or adjustments. 9. You are feeling weaker than you did about 6 months ago. Home health is coming in to work with you to regain as much strength as possible. 10. We talked some today about what is important to you on a day-by-day basis, including how much you like to eat your ice chips! 11. We also talked about your meeting with the Palliative Medicine nurse practitioner, [place] Anne, when you were last in the hospital and how we can work with you and with your family to honor those wishes. 12. I appreciate how difficult it was to begin this conversation, particularly as you were just meeting us for the first time. 15. You agreed that I could get records from your pulmonologist and from your primary care physician so we can follow up next week with our discussion of your chronic illnesses and what to expect over time.     This is what you have shared with us about Advance Care Planning  Advance Care Planning 3/23/2017   Patient's Healthcare Decision Maker is: Legal Next of Agustin 68   Primary Decision Maker Name Evangelist Hughes   Primary Decision Maker Phone Number [phone]   Primary Decision Maker Relationship to Patient Spouse   Secondary Decision Maker Name Pravin Hassan   Secondary Decision Maker Phone Number [phone]   Secondary Decision Maker Relationship to Patient Adult child   Confirm Advance Directive None   Patient Would Like to Complete Advance Directive No         The Palliative Medicine Team is here to support you and your family. We will see you again in 1 week. Our Nurse may check in with you by phone before that time. If there are any concerns before that time, such as medication questions, worsening symptoms or a need to see a physician for an urgent or emergent situation; please call [phone] (COPE). A physician is also on call after our normal business hours of 8am to 5pm.     In order to serve you better, please allow up to 48 hours for prescription refills to be processed. Certain medications may require more paperwork or a written prescription that you may need to  from the office. We appreciate you letting us know of any refill requests as soon as possible. We also would like you to sign up for [employer] as well.     Sincerely,      Yaneli Youssef MD and the Palliative Medicine Team      Counseling and Coordination: see plan     GOALS OF CARE / TREATMENT PREFERENCES:   [====Goals of Care====]  GOALS OF CARE:  Patient / health care proxy stated goals: \"I'm not going to just give up\"      TREATMENT PREFERENCES:   Code Status:  [x] Attempt Resuscitation       [] Do Not Attempt Resuscitation    Advance Care Planning:  Advance Care Planning 3/23/2017   Patient's Healthcare Decision Maker is: Legal Next of Agustin 68   Primary Decision Maker Name Livia Willis   Primary Decision Maker Phone Number [phone]   Primary Decision Maker Relationship to Patient Spouse   Secondary Decision Maker Name Pravin Hassan   Secondary Decision Maker Phone Number [phone]   Secondary Decision Maker Relationship to Patient Adult child   Confirm Advance Directive None   Patient Would Like to Complete Advance Directive No       Other:  (If patient appropriate for POST, consider using PALLPOST smart phrase here)    The palliative care team has discussed with patient / health care proxy about goals of care / treatment preferences for patient.  [====Goals of Care====]         PRESCRIPTIONS GIVEN:   No orders of the defined types were placed in this encounter. FOLLOW UP:     Future Appointments  Date Time Provider Department Center   4/11/2017 To Be Determined Laurel Ying [facility]   4/11/2017 To Be Determined Estuardo Ellis, PT [address] [facility]   4/11/2017 [facility]ined [facility]   4/12/2017 8:30 AM Yinka Gramajo [facility]   4/13/2017 [facility] Be Determined [facility]   4/14/2017 To Be Determined Laurel Ying [facility]   4/18/2017 To Be Determined Laurel Ying [facility]   4/18/2017 To Be Determined Yinka Gramajo [facility]   4/18/2017 To Be Determined [facility]   4/20/2017 To Be Determined Yinka Gramajo [facility]   4/20/2017 To Be Determined [facility]   4/21/2017 To Be Determined Laurle Ying [facility]   4/25/2017 To Be Determined Laurel Ying [facility]   4/28/2017 To Be Determined [facility] [address] [facility]           PHYSICIANS INVOLVED IN CARE:   Patient Care Team:  Aquilla Heimlich, MD as PCP - Janessa Murrieta RN as Ambulatory Care Navigator  Kacey Figueroa MD as Physician (Palliative Medicine)       HISTORY:   Nursing documentation from date of visit reviewed. Reviewed patient-completed ESAS and advance care planning form. Reviewed patient record in prescription monitoring program.    CHIEF COMPLAINT: shortness of breath, pain, anxiety    HPI/SUBJECTIVE:    The patient is: [x] Verbal / [] Nonverbal     She came home from the hospital 4 days ago.  She's coughing a lot, coughing up clear phlegm. She is always short of breath but much worse when she walks. She uses oxygen 24/7. She has liquid morphine which she uses for her shortness of breath. She uses this 2 to 3 times a day. It takes her a long time to get going in the morning. She gets up and dresses herself, drinks her coffee, eats breakfast.    She has pain all over from her fibromyalgia. She takes oxycodone for this which helps. She doesn't want to talk about her pain. She's seen a pain doctor in  The past but doesn't now. She also has pain in her right neck from where she had her cancer treatment. She doesn't want to talk about this pain either. Oxycodone helps. She's weaker now. She's been in the hospital a lot since Christmas. All of her doctors say she's going to get better than she is now. Home health is coming in to help her get stronger. She sometimes feels anxious. She has lorazepam but isn't using it much. She gives herself 4 boxes of tube feeding a day. She continues to eat ice chips which is very important to her. She knows this could cause another pneumonia. She doesn't get out much. She's afraid she will run out of oxygen because her tank only lasts 2 hours. Her daughter hopes to get her out to see her psychiatrist next week. She met with Phoebe Councilman (Palliative Medicine NP) when she was in the hospital. She made her decisions but she's not there yet. She's not that sick. She's not just going to give up and die.     Clinical Pain Assessment (nonverbal scale for nonverbal patients):   [++++ Clinical Pain Assessment++++]  [++++Pain Severity++++]: Pain: 3  [++++Pain Character++++]: dull hurt  [++++Pain Duration++++]: since cancer operation  [++++Pain Effect++++]: patient wouldn't describe  [++++Pain Factors++++]: patient wouldn't describe  [++++Pain Frequency++++]: patient wouldn't describe  [++++Pain Location++++]: right neck and throat  [++++ Clinical Pain Assessment++++]    [++++ Clinical Pain Assessment++++]  [++++Pain Severity++++]: Pain: 3  [++++Pain Character++++]: my fibromyalgia  [++++Pain Duration++++]: years  [++++Pain Effect++++]: hurts  [++++Pain Factors++++]: oxycodone helps which she takes about 3 to 4 times a day  [++++Pain Frequency++++]: constant with varying in tensity  [++++Pain Location++++]: leg, shoulder - moves around everywhere  [++++ Clinical Pain Assessment++++]     FUNCTIONAL ASSESSMENT:     Palliative Performance Scale (PPS):  PPS: 60       PSYCHOSOCIAL/SPIRITUAL SCREENING:     Any spiritual / Zoroastrian concerns:  [] Yes /  [x] No    Caregiver Burnout:  [] Yes /  [x] No /  [] No Caregiver Present      Anticipatory grief assessment:   [] Normal  / [] Maladaptive       ESAS Anxiety: Anxiety: 1    ESAS Depression: Depression: 3       REVIEW OF SYSTEMS:     The following systems were [x] reviewed / [] unable to be reviewed  Systems: constitutional, ears/nose/mouth/throat, respiratory, gastrointestinal, genitourinary, musculoskeletal, integumentary, neurologic, psychiatric, endocrine. Positive findings noted below. Modified ESAS Completed by: provider   Fatigue: 3 Drowsiness: 0   Depression: 3 Pain: 3   Anxiety: 1 Nausea: 0   Anorexia: 2 Dyspnea: 3     Constipation: No              PHYSICAL EXAM:     Wt Readings from Last 3 Encounters:   04/06/17 133 lb 6.4 oz (60.5 kg)   04/04/17 133 lb (60.3 kg)   03/31/17 132 lb (59.9 kg)     Blood pressure 120/72, pulse 71, SpO2 91 %.   Last bowel movement: See Nursing Note    Constitutional: sitting in recliner, watching TV, supplemental O2 via nasal cannula, frequent cough productive clear sputum  Eyes: pupils equal, anicteric  ENMT: no nasal discharge, moist mucous membranes, no oral thrush  Cardiovascular: regular rhythm, distal pulses intact  Respiratory: breathing not labored, symmetric; decreased air movement all lung fields, occasional wheeze, no rales  Gastrointestinal: soft non-tender, +bowel sounds  Musculoskeletal: no deformity, no tenderness to palpation  Skin: warm, dry  Neurologic: following commands, moving all extremities  Psychiatric: full affect, no hallucinations  Other:       HISTORY:     Past Medical History:   Diagnosis Date    Bipolar 1 disorder ([facility] 11/29/2011    Cancer ([facility]     skin cancer buttocks    Cancer (HCC)     throat    Chronic pain     FIBROMYALGIA, LEGS    Encounter for long-term (current) use of other medications 11/29/2011    Essential hypertension, benign 11/29/2011    Ill-defined condition     Laryngeal cancer ([facility] 11/2/2015    Laryngeal mass     Psychiatric disorder     bipolar      Past Surgical History:   Procedure Laterality Date    HX BREAST AUGMENTATION Bilateral     SALINE IMPLANTS    HX GI      PLACEMENT OF G TUBE    HX GI      ESOPH. DILATATION    HX GYN      tubal pregnancy    HX HEENT      BX OF NECK MASS    HX HYSTERECTOMY      PLACE PERCUT GASTROSTOMY TUBE  10/28/2014     has been removed 2015    PLACE PERCUT GASTROSTOMY TUBE  2/28/2017           Family History   Problem Relation Age of Onset    Cancer Father      bone/skin/lung    Pneumonia Mother     Anesth Problems Neg Hx       History reviewed, no pertinent family history. Social History   Substance Use Topics    Smoking status: Former Smoker     Packs/day: 1.00     Years: 40.00     Quit date: 8/27/2014    Smokeless tobacco: Never Used      Comment: PASSIVE SMOKE EXPOSURE,  SMOKES    Alcohol use No     No Known Allergies   Current Outpatient Prescriptions   Medication Sig    LACTOSE-REDUCED FOOD/FIBER (ISOSOURCE 1.5 MARIELA FEEDING TUBE) 5 Cans by Feeding Tube route daily.  oxyCODONE (ROXICODONE INTENSOL) 20 mg/mL concentrated solution Take 0.5 mL by mouth every four (4) hours as needed. Max Daily Amount: 60 mg.    LORazepam (ATIVAN) 0.5 mg tablet Take 1 Tab by mouth two (2) times daily as needed for Anxiety. Max Daily Amount: 1 mg.     B infan-B long-L acid-L rhamn (DIGESTIVE PROBIOTIC) 2 billion cell cpSP Take 1 Cap by mouth daily.  predniSONE (DELTASONE) 10 mg tablet Take 1 Tab by mouth daily (with breakfast).  OTHER 0.5 mL by PEG Tube route every four (4) hours. Indications: Morphine    amoxicillin-clavulanate (AUGMENTIN) 250-62.5 mg/5 mL suspension Take 10 mL by mouth every eight (8) hours.  predniSONE (DELTASONE) 10 mg tablet 2 tabs daily for 3 days,then 1 tab daily for 3 days,then 1/2 tab daily till empty    OXYGEN-AIR DELIVERY SYSTEMS 2 L/min by Nasal route continuous.  amLODIPine (NORVASC) 10 mg tablet Take 10 mg by mouth daily.  albuterol-ipratropium (DUO-NEB) 2.5 mg-0.5 mg/3 ml nebu 3 mL by Nebulization route every four (4) hours as needed.  diphenhydrAMINE 12.5 mg/5 mL elix 40 mL, lidocaine 2 % soln 40 mL, aluminum & magnesium hydroxide-simethicone 400-400-40 mg/5 mL susp 40 mL Take 5 mL by mouth daily. Swish and spit or swallow.  VITAMIN B-12 1,000 mcg sublingual tablet 1,000 mcg by SubLINGual route daily.  metoprolol tartrate (LOPRESSOR) 25 mg tablet Take 1 Tab by mouth every twelve (12) hours.  lithium carbonate CR (ESKALITH CR) 450 mg CR tablet TAKE 1 TABLET BY ORAL ROUTE PER PEG IN THE MORNING    FLUoxetine (PROZAC) 20 mg tablet Take 20 mg by mouth daily.  traZODone (DESYREL) 100 mg tablet Take 200 mg by mouth nightly.  Lactose-Free Food with Fiber (JEVITY 1.5 MARIELA) 0.06 gram-1.5 kcal/mL liqd 1.5 L by PEG Tube route five (5) times daily. bolus feeding  followed by 150  ml flush after each feeding    bisacodyl 5 mg tab Take 1 Tab by mouth daily as needed (constipation).  acetylcysteine (MUCOMYST) 100 mg/mL (10 %) nebulizer solution Take 4 mL by inhalation every four (4) hours. No current facility-administered medications for this visit.            LAB DATA REVIEWED:     Lab Results   Component Value Date/Time    WBC 9.2 03/26/2017 04:47 AM    HGB 8.9 03/26/2017 04:47 AM    PLATELET 374 97/42/1783 04:47 AM     Lab Results   Component Value Date/Time    Sodium 145 03/26/2017 04:47 AM    Potassium 3.8 03/26/2017 04:47 AM    Chloride 108 03/26/2017 04:47 AM    CO2 27 03/26/2017 04:47 AM    BUN 17 03/26/2017 04:47 AM    Creatinine 0.75 03/26/2017 04:47 AM    Calcium 9.4 03/26/2017 04:47 AM    Magnesium 2.2 03/22/2017 04:32 AM    Phosphorus 2.0 03/22/2017 04:32 AM      Lab Results   Component Value Date/Time    AST (SGOT) 10 03/26/2017 04:47 AM    Alk.  phosphatase 58 03/26/2017 04:47 AM    Protein, total 6.0 03/26/2017 04:47 AM    Albumin 2.3 03/26/2017 04:47 AM    Globulin 3.7 03/26/2017 04:47 AM     No results found for: INR, PTMR, PTP, PT1, PT2, APTT   Lab Results   Component Value Date/Time    Iron 10 02/26/2017 04:28 AM    TIBC 169 02/26/2017 04:28 AM    Iron % saturation 6 02/26/2017 04:28 AM    Ferritin 250 02/26/2017 04:28 AM           CONTROLLED SUBSTANCES SAFETY ASSESSMENT (IF ON CONTROLLED SUBSTANCES):     Reviewed opioid safety handout:  [] Yes   [x] No  24 hour opioid dose >150mg morphine equivalent/day:  [] Yes   [] No  Benzodiazepines:  [x] Yes   [] No  Sleep apnea:  [] Yes   [x] No  Urine Toxicology Testing within last 6 months:  [] Yes   [x] No  History of or new aberrant medication taking behaviors:  [] Yes   [] No            Total time:   Counseling / coordination time:   > 50% counseling / coordination?:

## 2017-04-07 NOTE — MR AVS SNAPSHOT
Visit Information Date & Time Provider Department Dept. Phone Encounter #  
 4/7/2017  3:00 PM Arya Brunson [phone] [id number] Upcoming Health Maintenance Date Due  
 MEDICARE YEARLY EXAM 5/25/1972 BREAST CANCER SCRN MAMMOGRAM 5/25/2004 FOBT Q 1 YEAR AGE 50-75 5/25/2004 Pneumococcal 19-64 Highest Risk (3 of 3 - PCV13) 12/7/2017 PAP AKA CERVICAL CYTOLOGY 2/28/2019 DTaP/Tdap/Td series (2 - Td) 12/7/2026 Allergies as of 4/7/2017  Review Complete On: 3/31/2017 By: Hazel Matson RN No Known Allergies Current Immunizations  Reviewed on 3/8/2017 Name Date Influenza Vaccine 11/1/2014 Influenza Vaccine (Quad) PF 12/7/2016 Pneumococcal Polysaccharide (PPSV-23) 12/7/2016 Tdap 12/7/2016 Not reviewed this visit You Were Diagnosed With   
  
 Codes Comments Chronic pain syndrome    -  Primary ICD-10-CM: G89.4 ICD-9-CM: 338. 4 Shortness of breath     ICD-10-CM: R06.02 
ICD-9-CM: 786.05 Debility     ICD-10-CM: R53.81 ICD-9-CM: 799.3 Cough     ICD-10-CM: R05 ICD-9-CM: 337. 2 Dysphagia, unspecified type     ICD-10-CM: R13.10 ICD-9-CM: 787.20 Bipolar 1 disorder (HCC)     ICD-10-CM: F31.9 ICD-9-CM: 296.7 Vitals BP Pulse LMP SpO2 OB Status Smoking Status 120/72 71 (LMP Unknown) 91% Hysterectomy Former Smoker Vitals History Preferred Pharmacy Pharmacy Name Phone [facility] [phone] Your Updated Medication List  
  
   
This list is accurate as of: 4/7/17 11:59 PM.  Always use your most recent med list.  
  
  
  
  
 acetylcysteine 100 mg/mL (10 %) nebulizer solution Commonly known as:  MUCOMYST Take 4 mL by inhalation every four (4) hours. albuterol-ipratropium 2.5 mg-0.5 mg/3 ml Nebu Commonly known as:  DUO-NEB  
3 mL by Nebulization route every four (4) hours as needed. amoxicillin-clavulanate 250-62.5 mg/5 mL suspension Commonly known as:  AUGMENTIN Take 10 mL by mouth every eight (8) hours. B infan-B long-L acid-L rhamn 2 billion cell Cpsp Commonly known as:  DIGESTIVE PROBIOTIC Take 1 Cap by mouth daily. bisacodyl 5 mg Tab Take 1 Tab by mouth daily as needed (constipation). diphenhydrAMINE 12.5 mg/5 mL elix 40 mL, lidocaine 2 % soln 40 mL, aluminum & magnesium hydroxide-simethicone 400-400-40 mg/5 mL susp 40 mL Take 5 mL by mouth daily. Swish and spit or swallow. FLUoxetine 20 mg tablet Commonly known as:  PROzac Take 20 mg by mouth daily. * ISOSOURCE 1.5 MARIELA FEEDING TUBE  
5 Cans by Feeding Tube route daily. * JEVITY 1.5 MARIELA 0.06 gram-1.5 kcal/mL Liqd Generic drug:  Lactose-Free Food with Fiber 1.5 L by PEG Tube route five (5) times daily. bolus feeding followed by 150  ml flush after each feeding  
  
 lithium carbonate  mg CR tablet Commonly known as:  ESKALITH CR  
TAKE 1 TABLET BY ORAL ROUTE PER PEG IN THE MORNING  
  
 LORazepam 0.5 mg tablet Commonly known as:  ATIVAN Take 1 Tab by mouth two (2) times daily as needed for Anxiety. Max Daily Amount: 1 mg.  
  
 metoprolol tartrate 25 mg tablet Commonly known as:  LOPRESSOR Take 1 Tab by mouth every twelve (12) hours. NORVASC 10 mg tablet Generic drug:  amLODIPine Take 10 mg by mouth daily. OTHER  
0.5 mL by PEG Tube route every four (4) hours. Indications: Morphine  
  
 oxyCODONE 20 mg/mL concentrated solution Commonly known as:  Ellen Case Take 0.5 mL by mouth every four (4) hours as needed. Max Daily Amount: 60 mg. OXYGEN-AIR DELIVERY SYSTEMS  
2 L/min by Nasal route continuous. * predniSONE 10 mg tablet Commonly known as:  DELTASONE  
2 tabs daily for 3 days,then 1 tab daily for 3 days,then 1/2 tab daily till empty * predniSONE 10 mg tablet Commonly known as:  Judith Perdomo  
 Take 1 Tab by mouth daily (with breakfast). traZODone 100 mg tablet Commonly known as:  Олег Bernardo Take 200 mg by mouth nightly. VITAMIN B-12 1,000 mcg sublingual tablet Generic drug:  cyanocobalamin  
1,000 mcg by SubLINGual route daily. * Notice: This list has 4 medication(s) that are the same as other medications prescribed for you. Read the directions carefully, and ask your doctor or other care provider to review them with you. To-Do List   
 04/11/2017 To Be Determined Appointment with Deni Degroot LPN at [place] [address]  
  
 04/11/2017 To Be Determined Appointment with Darcie Pruitt PT at [place] [address]  
  
 04/11/2017 To Be Determined Appointment with Keshav Gramajo at [place] [address]  
  
 04/12/2017 8:30 AM  
  Appointment with JAZ Arreaga at [place] [address]  
  
 04/13/2017 To Be Determined Appointment with Keshav Gramajo at [place] [address]  
  
 04/14/2017 To Be Determined Appointment with Deni Degroot LPN at [place] [address]  
  
 04/18/2017 To Be Determined Appointment with Deni Degroot LPN at [place] [address]  
  
 04/18/2017 To Be Determined Appointment with JAZ Arreaga at [place] [address]  
  
 04/18/2017 To Be Determined Appointment with Keshav Gramajo at [place] [address]  
  
 04/20/2017 To Be Determined Appointment with JAZ Arreaga at [place] [address]  
  
 04/20/2017 To Be Determined Appointment with Keshav Gramajo at [place] [address]  
  
 04/21/2017 To Be Determined   Appointment with Deni Degroot LPN at [place] [address]  
  
 04/25/2017 To Be Determined Appointment with Liborio Lofton LPN at [place] [address]  
  
 04/28/2017 To Be Determined Appointment with Radha Benitez RN at [place] [address] Patient Instructions Dear Ignacio Navarro , It was a pleasure seeing you in your home last Friday. This is what we talked about:  
 
1. You have chronic pain all over from your fibromyalgia and in your neck from your prior radiation therapy. 2. You take oxycodone 10-mg every 4 hours as needed for your pain which works well for you. 3. You feel chronically short of breath from your COPD and you use oxygen 24/7. 
 
4. You've been more short of breath recently due to your pneumonia. You are taking an antibiotic for this. 5. You have a frequent cough which is due to your pneumonia. 6. You use morphine 10-mg every 4 hours as needed for your shortness of breath and air hunger which helps relieve those symptoms. This can be used for your cough as well. 7. You've suffered from anxiety and depression for a long time and see a psychiatrist who is treating you with lithium, Prozac and trazodone. 8. If it would be helpful to you, I could contact your psychiatrist to see if I could work with him so you would not need to go to his office for your medication refills or adjustments. 9. You are feeling weaker than you did about 6 months ago. Home health is coming in to work with you to regain as much strength as possible. 10. We talked some today about what is important to you on a day-by-day basis, including how much you like to eat your ice chips! 11. We also talked about your meeting with the Palliative Medicine nurse practitioner, Tigre Peck, when you were last in the hospital and how we can work with you and with your family to honor those wishes.  
 
12. I appreciate how difficult it was to begin this conversation, particularly as you were just meeting us for the first time. 15. You agreed that I could get records from your pulmonologist and from your primary care physician so we can follow up next week with our discussion of your chronic illnesses and what to expect over time. This is what you have shared with us about Advance Care Planning Advance Care Planning 3/23/2017 Patient's Healthcare Decision Maker is: Legal Next of Kin Primary Decision Maker Name Katrina Murry Primary Decision Maker Phone Number [phone] Primary Decision Maker Relationship to Patient Spouse Secondary Decision Maker Name Alex Swift Secondary Decision Maker Phone Number [phone] Secondary Decision Maker Relationship to Patient Adult child Confirm Advance Directive None Patient Would Like to Complete Advance Directive No  
 
 
 
The Palliative Medicine Team is here to support you and your family. We will see you again in 1 week. Our Nurse may check in with you by phone before that time. If there are any concerns before that time, such as medication questions, worsening symptoms or a need to see a physician for an urgent or emergent situation; please call [phone] ([facility]). A physician is also on call after our normal business hours of 8am to 5pm.  
 
In order to serve you better, please allow up to 48 hours for prescription refills to be processed. Certain medications may require more paperwork or a written prescription that you may need to  from the office. We appreciate you letting us know of any refill requests as soon as possible. We also would like you to sign up for [employer] as well. Sincerely, Ally Coker MD and the Palliative Medicine Team 
 
  
Introducing [facility]! Kanu Stringer introduces [employer] patient portal. Now you can access parts of your medical record, email your doctor's office, and request medication refills online.    
 
1. In your internet browser, go to [email or web address] 2. Click on the First Time User? Click Here link in the Sign In box. You will see the New Member Sign Up page. 3. Enter your [employer] Access Code exactly as it appears below. You will not need to use this code after youve completed the sign-up process. If you do not sign up before the expiration date, you must request a new code. · [employer] Access Code: [email or web address] Expires: 6/5/2017  4:08 PM 
 
4. Enter the last four digits of your Social Security Number (xxxx) and Date of Birth (mm/dd/yyyy) as indicated and click Submit. You will be taken to the next sign-up page. 5. Create a "1,2,3 Listo"t ID. This will be your [employer] login ID and cannot be changed, so think of one that is secure and easy to remember. 6. Create a [employer] password. You can change your password at any time. 7. Enter your Password Reset Question and Answer. This can be used at a later time if you forget your password. 8. Enter your e-mail address. You will receive e-mail notification when new information is available in [address] 9. Click Sign Up. You can now view and download portions of your medical record. 10. Click the Download Summary menu link to download a portable copy of your medical information. If you have questions, please visit the Frequently Asked Questions section of the [employer] website. Remember, [employer] is NOT to be used for urgent needs. For medical emergencies, dial 911. Now available from your iPhone and Android! Please provide this summary of care documentation to your next provider. Your primary care clinician is listed as Laila Ledezma. If you have any questions after today's visit, please call [phone].

## 2017-04-10 NOTE — PATIENT INSTRUCTIONS
Dear Dorita Pinto ,    It was a pleasure seeing you in your home last Friday. This is what we talked about:     1. You have chronic pain all over from your fibromyalgia and in your neck from your prior radiation therapy. 2. You take oxycodone 10-mg every 4 hours as needed for your pain which works well for you. 3. You feel chronically short of breath from your COPD and you use oxygen 24/7.    4. You've been more short of breath recently due to your pneumonia. You are taking an antibiotic for this. 5. You have a frequent cough which is due to your pneumonia. 6. You use morphine 10-mg every 4 hours as needed for your shortness of breath and air hunger which helps relieve those symptoms. This can be used for your cough as well. 7. You've suffered from anxiety and depression for a long time and see a psychiatrist who is treating you with lithium, Prozac and trazodone. 8. If it would be helpful to you, I could contact your psychiatrist to see if I could work with him so you would not need to go to his office for your medication refills or adjustments. 9. You are feeling weaker than you did about 6 months ago. Home health is coming in to work with you to regain as much strength as possible. 10. We talked some today about what is important to you on a day-by-day basis, including how much you like to eat your ice chips! 11. We also talked about your meeting with the Palliative Medicine nurse practitioner, Sanjuana Scheuermann, when you were last in the hospital and how we can work with you and with your family to honor those wishes. 12. I appreciate how difficult it was to begin this conversation, particularly as you were just meeting us for the first time. 15. You agreed that I could get records from your pulmonologist and from your primary care physician so we can follow up next week with our discussion of your chronic illnesses and what to expect over time.     This is what you have shared with us about Advance Care Planning  Advance Care Planning 3/23/2017   Patient's Healthcare Decision Maker is: Legal Next of Kin   Primary Decision Maker Name Paul Gutierrez   Primary Decision Maker Phone Number [phone]   Primary Decision Maker Relationship to Patient Spouse   Secondary Decision Maker Name Merna Urena   Secondary Decision Maker Phone Number [phone]   Secondary Decision Maker Relationship to Patient Adult child   Confirm Advance Directive None   Patient Would Like to Complete Advance Directive No         The Palliative Medicine Team is here to support you and your family. We will see you again in 1 week. Our Nurse may check in with you by phone before that time. If there are any concerns before that time, such as medication questions, worsening symptoms or a need to see a physician for an urgent or emergent situation; please call [phone] ([employer]). A physician is also on call after our normal business hours of 8am to 5pm.     In order to serve you better, please allow up to 48 hours for prescription refills to be processed. Certain medications may require more paperwork or a written prescription that you may need to  from the office. We appreciate you letting us know of any refill requests as soon as possible. We also would like you to sign up for [employer] as well.     Sincerely,      Michoacano Carrasco MD and the Palliative Medicine Team

## 2017-04-14 NOTE — PATIENT INSTRUCTIONS
Dear Vincenzo Sands ,    It was a pleasure seeing you in your home today. This is what we talked about:     1. Your pain  -You have pain all over from your fibromyalgia and on the right side of your face and neck from your past cancer treatments  -You are using oxycodone 20-mg/ml 0.5-ml (10-mg) about 4 times a day which is working well to control your pain    2. Your shortness of breath  -This is from your COPD  -You've been less short of breath this week which is likely due to your resolving pneumonia  -You shared with us today your fear of getting very short of breath again  -We made the following plan for you to follow if you this happens:   1. Take 0.5-ml (10-mg) of the liquid morphine you have in your home   2. If you still feel short of breath 15 minutes later, take 0.5-ml of the liquid lorazepam I prescribed for you today. Use the liquid lorazepam only for your extreme shortness of breath   3. Place your face so the air is blowing across your face   4. Call the Palliative Medicine number [phone] and we can instruct you what to do next if you're still feeling short of breath    3. Your mood  -You've had a good week!  -You enjoyed [employer] birthday party last week  -You saw Dr. Vania Guadalupe who made some adjustments in your lithium dose  -You met our clinic , Maribel Donaldson, who can also be a source of support for you and for your family    4. Your cough  -This is much better    5.  Your wishes for care going forward  -You prefer to have your shortness of breath and other symptoms managed at home if possible  -If that's not possible, you want to go to the hospital for help  -We made a plan to try to manage your shortness of breath at home.   -Be sure and call us first before going to the hospital as we may be able to adjust your medications to bring you relief while keeping you in the comfort of your own home  -You shared with us today that you know you are sick and dying but that you didn't want to talk about it or to know how long you have. We appreciate knowing this about you    This is what you have shared with us about Advance Care Planning  Advance Care Planning 3/23/2017   Patient's Healthcare Decision Maker is: Legal Next of Agustin 68   Primary Decision Maker Name Radha Wilson   Primary Decision Maker Phone Number [phone]   Primary Decision Maker Relationship to Patient Spouse   Secondary Decision Maker Name Augustine Beltran   Secondary Decision Maker Phone Number [phone]   Secondary Decision Maker Relationship to Patient Adult child   Confirm Advance Directive None   Patient Would Like to Complete Advance Directive No       The Palliative Medicine Team is here to support you and your family. We will see you again in 2 weeks. Our Nurse may check in with you by phone before that time. If there are any concerns before that time, such as medication questions, worsening symptoms or a need to see a physician for an urgent or emergent situation; please call [phone] ([facility]). A physician is also on call after our normal business hours of 8am to 5pm.     In order to serve you better, please allow up to 48 hours for prescription refills to be processed. Certain medications may require more paperwork or a written prescription that you may need to  from the office. We appreciate you letting us know of any refill requests as soon as possible. We also would like you to sign up for [employer] as well.     Sincerely,      Mile Zapata MD and the Palliative Medicine Team

## 2017-04-14 NOTE — PROGRESS NOTES
Palliative Medicine Outpatient Services  [place]: [phone] ([phone])    Patient Name: Radha Garcia  YOB: 1955    Date of Current Visit: 04/14/17  Location of Current Visit:    [] [address] [place] [address] Office  [] [facility] Office  [] [facility] Office  [x] Home  [] Other:        Date of Initial Visit: 1/20/17 (seen by Palliative Medicine Team during hospitalization)   Requesting Physician: Janes Markham MD  Primary Care Physician: Zak Tinoco MD      SUMMARY:   Radha Garcia is a 57y.o. 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 Kasandra Gan for ongoing symptom management and supportive care. She's been hospitalized 5 times since 1/2017 for acute on chronic respiratory failure due to aspiration pneumonia and/or COPD exacerbation. The patients social history includes: she is  to [facility]. She has 2 daughters, one of whom, Guy Bills, lives three doors away and is involved in her care. Palliative Medicine is addressing the following current patient/family concerns: shortness of breath, cough, chronic neck/throat pain, chronic generalized pain, debility. PALLIATIVE DIAGNOSES:       ICD-10-CM ICD-9-CM    1. Chronic pain syndrome G89.4 338.4    2. Shortness of breath R06.02 786.05    3. Cough R05 786.2    4. Goals of care, counseling/discussion Z71.89 V65.49    5. Bipolar 1 disorder (HCC) F31.9 296.7           PLAN:   Patient Instructions     Dear Radha aGrcia ,    It was a pleasure seeing you in your home today. This is what we talked about:     1.  Your pain  -You have pain all over from your fibromyalgia and on the right side of your face and neck from your past cancer treatments  -You are using oxycodone 20-mg/ml 0.5-ml (10-mg) about 4 times a day which is working well to control your pain    2. Your shortness of breath  -This is from your COPD  -You've been less short of breath this week which is likely due to your resolving pneumonia  -You shared with us today your fear of getting very short of breath again  -We made the following plan for you to follow if you this happens:   1. Take 0.5-ml (10-mg) of the liquid morphine you have in your home   2. If you still feel short of breath 15 minutes later, take 0.5-ml of the liquid lorazepam I prescribed for you today. Use the liquid lorazepam only for your extreme shortness of breath   3. Place your face so the air is blowing across your face   4. Call the Palliative Medicine number [phone] and we can instruct you what to do next if you're still feeling short of breath    3. Your mood  -You've had a good week!  -You enjoyed [employer] birthday party last week  -You saw Dr. Vania Guadalupe who made some adjustments in your lithium dose  -You met our clinic , [facility], who can also be a source of support for you and for your family    4. Your cough  -This is much better    5. Your wishes for care going forward  -You prefer to have your shortness of breath and other symptoms managed at home if possible  -If that's not possible, you want to go to the hospital for help  -We made a plan to try to manage your shortness of breath at home.   -Be sure and call us first before going to the hospital as we may be able to adjust your medications to bring you relief while keeping you in the comfort of your own home  -You shared with us today that you know you are sick and dying but that you didn't want to talk about it or to know how long you have.  We appreciate knowing this about you    This is what you have shared with us about Advance Care Planning  Advance Care Planning 3/23/2017   Patient's Healthcare Decision Maker is: Legal Next of Agustin 68   Primary Decision Maker Name Joyce Wheeler   Primary Decision Maker Phone Number [phone]   Primary Decision Maker Relationship to Patient Spouse   Secondary Decision Maker Name Jhonatan Sapp   Secondary Decision Maker Phone Number [phone]   Secondary Decision Maker Relationship to Patient Adult child   Confirm Advance Directive None   Patient Would Like to Complete Advance Directive No       The Palliative Medicine Team is here to support you and your family. We will see you again in 2 weeks. Our Nurse may check in with you by phone before that time. If there are any concerns before that time, such as medication questions, worsening symptoms or a need to see a physician for an urgent or emergent situation; please call [phone] (COPE). A physician is also on call after our normal business hours of 8am to 5pm.     In order to serve you better, please allow up to 48 hours for prescription refills to be processed. Certain medications may require more paperwork or a written prescription that you may need to  from the office. We appreciate you letting us know of any refill requests as soon as possible. We also would like you to sign up for [employer] as well.     Sincerely,      Jay Felty, MD and the Palliative Medicine Team      Counseling and Coordination: see plan     GOALS OF CARE / TREATMENT PREFERENCES:   [====Goals of Care====]  GOALS OF CARE:  Patient / health care proxy stated goals: \"I'm not going to just give up\"      TREATMENT PREFERENCES:   Code Status:  [x] Attempt Resuscitation       [] Do Not Attempt Resuscitation    Advance Care Planning:  Advance Care Planning 3/23/2017   Patient's Healthcare Decision Maker is: Legal Next of Agustin 68   Primary Decision Maker Name Srinivasa Chino   Primary Decision Maker Phone Number [phone]   Primary Decision Maker Relationship to Patient Spouse   Secondary Decision Maker Name Jhonatan Sapp   Secondary Decision Maker Phone Number [phone]   Secondary Decision Maker Relationship to Patient Adult child   Confirm Advance Directive None   Patient Would Like to Complete Advance Directive No       Other:  (If patient appropriate for POST, consider using PALLPOST smart phrase here)    The palliative care team has discussed with patient / health care proxy about goals of care / treatment preferences for patient.  [====Goals of Care====]         PRESCRIPTIONS GIVEN:     Medications Ordered Today   Medications    LORazepam (INTENSOL) 2 mg/mL concentrated solution     Si.5 mL by Per J Tube route every four (4) hours as needed (shortness of breath/air hunger). Max Daily Amount: 6 mg. Dispense:  30 mL     Refill:  0           FOLLOW UP:     Future Appointments  Date Time Provider Department Center   2017 To Be Determined Jordy Fry CNA [facility]   2017 To Be Determined Stephane Rossi [facility]   2017 To Be Determined JAZ Doss [facility]   2017 To Be Determined [facility]   2017 To Be Determined JAZ Doss [facility]   2017 [facility]   2017 To Be Determined Stephane Rubins [facility]   2017 To Be Determined [facility]   2017 To Be Determined Carolyn Fair RN [address] [facility]           PHYSICIANS INVOLVED IN CARE:   Patient Care Team:  Alveta Lefort, MD as PCP - Delvin Rocha RN as [id number] [address] Kuo MD as Physician (Palliative Medicine)       HISTORY:   Nursing documentation from date of visit reviewed. Reviewed patient-completed ESAS and advance care planning form. Reviewed patient record in prescription monitoring program.    CHIEF COMPLAINT: shortness of breath, pain, anxiety    HPI/SUBJECTIVE:    The patient is: [x] Verbal / [] Nonverbal     She's had a good week. She is happy with her pain control.  She's using 0.5-ml (10-mg) of her oxycodone about 4 times a day. She's only using the morphine at bedtime. She's sleeping well. She's not coughing as much. She's just a little short of breath when she's sitting which is normal for her and more short of breath when she walks but nothing new. She feels very afraid of getting extremely short of breath and dying that way. She knows that she's sick and dying but she doesn't want to talk about it and she doesn't want to know how long she has to live. She went to see Dr. Venkata Guillen this week who reduced her lithium from 450-mg to 300-mg due to her weight loss. Her mood is good. She doesn't feel sad, depressed or anxious. She uses her anxiety medicine (lorazepam) once a day. She hasn't had any nausea. She's cut down on eating the ice chips because she doesn't want to aspirate but they did help her cough up her phlegm.     Clinical Pain Assessment (nonverbal scale for nonverbal patients):   [++++ Clinical Pain Assessment++++]  [++++Pain Severity++++]: Pain: 4  [++++Pain Character++++]: dull hurt  [++++Pain Duration++++]: since cancer operation  [++++Pain Effect++++]: patient wouldn't describe  [++++Pain Factors++++]: patient wouldn't describe  [++++Pain Frequency++++]: patient wouldn't describe  [++++Pain Location++++]: right neck and throat  [++++ Clinical Pain Assessment++++]    [++++ Clinical Pain Assessment++++]  [++++Pain Severity++++]: Pain: 4  [++++Pain Character++++]: my fibromyalgia  [++++Pain Duration++++]: years  [++++Pain Effect++++]: hurts  [++++Pain Factors++++]: oxycodone helps which she takes about 3 to 4 times a day  [++++Pain Frequency++++]: constant with varying in tensity  [++++Pain Location++++]: leg, shoulder - moves around everywhere  [++++ Clinical Pain Assessment++++]     FUNCTIONAL ASSESSMENT:     Palliative Performance Scale (PPS):  PPS: 70       PSYCHOSOCIAL/SPIRITUAL SCREENING:     Any spiritual / Evangelical concerns:  [] Yes /  [x] No    Caregiver Burnout:  [] Yes /  [] No / [x] No Caregiver Present      Anticipatory grief assessment:   [] Normal  / [] Maladaptive       ESAS Anxiety: Anxiety: 0    ESAS Depression: Depression: 0       REVIEW OF SYSTEMS:     The following systems were [x] reviewed / [] unable to be reviewed  Systems: constitutional, ears/nose/mouth/throat, respiratory, gastrointestinal, genitourinary, musculoskeletal, integumentary, neurologic, psychiatric, endocrine. Positive findings noted below. Modified ESAS Completed by: provider   Fatigue: 5 Drowsiness: 0   Depression: 0 Pain: 4   Anxiety: 0 Nausea: 0   Anorexia: 0 Dyspnea: 1     Constipation: No              PHYSICAL EXAM:     Wt Readings from Last 3 Encounters:   04/06/17 133 lb 6.4 oz (60.5 kg)   04/04/17 133 lb (60.3 kg)   03/31/17 132 lb (59.9 kg)     There were no vitals taken for this visit.   Last bowel movement: See Nursing Note    Constitutional: sitting in recliner, watching TV, supplemental O2 via nasal cannula, appears relaxed, not coughing  Eyes: pupils equal, anicteric  ENMT: no nasal discharge, moist mucous membranes, no oral thrush  Cardiovascular: regular rhythm, distal pulses intact  Respiratory: breathing not labored, symmetric; decreased air movement all lung fields, occasional wheeze, no rales  Gastrointestinal: soft non-tender, +bowel sounds  Musculoskeletal: no deformity, no tenderness to palpation  Skin: warm, dry  Neurologic: following commands, moving all extremities  Psychiatric: full affect, no hallucinations  Other:       HISTORY:     Past Medical History:   Diagnosis Date    Bipolar 1 disorder ([facility] 11/29/2011    Cancer ([facility] [facility]     skin cancer buttocks    Cancer (HCC)     throat    Chronic pain     FIBROMYALGIA, LEGS    Encounter for long-term (current) use of other medications 11/29/2011    Essential hypertension, benign 11/29/2011    Ill-defined condition     Laryngeal cancer ([facility] [address] 11/2/2015    Laryngeal mass     Psychiatric disorder     bipolar      Past Surgical History: Procedure Laterality Date    HX BREAST AUGMENTATION Bilateral     SALINE IMPLANTS    HX GI      PLACEMENT OF G TUBE    HX GI      ESOPH. DILATATION    HX GYN      tubal pregnancy    HX HEENT      BX OF NECK MASS    HX HYSTERECTOMY      PLACE PERCUT GASTROSTOMY TUBE  10/28/2014     has been removed 2015    PLACE PERCUT GASTROSTOMY TUBE  2/28/2017           Family History   Problem Relation Age of Onset    Cancer Father      bone/skin/lung    Pneumonia Mother     Anesth Problems Neg Hx       History reviewed, no pertinent family history. Social History   Substance Use Topics    Smoking status: Former Smoker     Packs/day: 1.00     Years: 40.00     Quit date: 8/27/2014    Smokeless tobacco: Never Used      Comment: PASSIVE SMOKE EXPOSURE,  SMOKES    Alcohol use No     No Known Allergies   Current Outpatient Prescriptions   Medication Sig    LORazepam (INTENSOL) 2 mg/mL concentrated solution 0.5 mL by Per J Tube route every four (4) hours as needed (shortness of breath/air hunger). Max Daily Amount: 6 mg.  LACTOSE-REDUCED FOOD/FIBER (ISOSOURCE 1.5 MARIELA FEEDING TUBE) 5 Cans by Feeding Tube route daily.  oxyCODONE (ROXICODONE INTENSOL) 20 mg/mL concentrated solution Take 0.5 mL by mouth every four (4) hours as needed. Max Daily Amount: 60 mg.    LORazepam (ATIVAN) 0.5 mg tablet Take 1 Tab by mouth two (2) times daily as needed for Anxiety. Max Daily Amount: 1 mg.  B infan-B long-L acid-L rhamn (DIGESTIVE PROBIOTIC) 2 billion cell cpSP Take 1 Cap by mouth daily.  predniSONE (DELTASONE) 10 mg tablet Take 1 Tab by mouth daily (with breakfast).  OTHER 0.5 mL by PEG Tube route every four (4) hours. Indications: Morphine    amoxicillin-clavulanate (AUGMENTIN) 250-62.5 mg/5 mL suspension Take 10 mL by mouth every eight (8) hours.  OXYGEN-AIR DELIVERY SYSTEMS 2 L/min by Nasal route continuous.  amLODIPine (NORVASC) 10 mg tablet Take 10 mg by mouth daily.     albuterol-ipratropium (DUO-NEB) 2.5 mg-0.5 mg/3 ml nebu 3 mL by Nebulization route every four (4) hours as needed.  VITAMIN B-12 1,000 mcg sublingual tablet 1,000 mcg by SubLINGual route daily.  metoprolol tartrate (LOPRESSOR) 25 mg tablet Take 1 Tab by mouth every twelve (12) hours.  lithium carbonate CR (ESKALITH CR) 450 mg CR tablet TAKE 1 TABLET BY ORAL ROUTE PER PEG IN THE MORNING    FLUoxetine (PROZAC) 20 mg tablet Take 20 mg by mouth daily.  traZODone (DESYREL) 100 mg tablet Take 200 mg by mouth nightly.  predniSONE (DELTASONE) 10 mg tablet 2 tabs daily for 3 days,then 1 tab daily for 3 days,then 1/2 tab daily till empty    Lactose-Free Food with Fiber (JEVITY 1.5 MARIELA) 0.06 gram-1.5 kcal/mL liqd 1.5 L by PEG Tube route five (5) times daily. bolus feeding  followed by 150  ml flush after each feeding    bisacodyl 5 mg tab Take 1 Tab by mouth daily as needed (constipation).  diphenhydrAMINE 12.5 mg/5 mL elix 40 mL, lidocaine 2 % soln 40 mL, aluminum & magnesium hydroxide-simethicone 400-400-40 mg/5 mL susp 40 mL Take 5 mL by mouth daily. Swish and spit or swallow.  acetylcysteine (MUCOMYST) 100 mg/mL (10 %) nebulizer solution Take 4 mL by inhalation every four (4) hours. No current facility-administered medications for this visit. LAB DATA REVIEWED:     Lab Results   Component Value Date/Time    WBC 9.2 03/26/2017 04:47 AM    HGB 8.9 03/26/2017 04:47 AM    PLATELET 943 81/90/5300 04:47 AM     Lab Results   Component Value Date/Time    Sodium 145 03/26/2017 04:47 AM    Potassium 3.8 03/26/2017 04:47 AM    Chloride 108 03/26/2017 04:47 AM    CO2 27 03/26/2017 04:47 AM    BUN 17 03/26/2017 04:47 AM    Creatinine 0.75 03/26/2017 04:47 AM    Calcium 9.4 03/26/2017 04:47 AM    Magnesium 2.2 03/22/2017 04:32 AM    Phosphorus 2.0 03/22/2017 04:32 AM      Lab Results   Component Value Date/Time    AST (SGOT) 10 03/26/2017 04:47 AM    Alk.  phosphatase 58 03/26/2017 04:47 AM    Protein, total 6.0 03/26/2017 04:47 AM    Albumin 2.3 03/26/2017 04:47 AM    Globulin 3.7 03/26/2017 04:47 AM     No results found for: INR, PTMR, PTP, PT1, PT2, APTT   Lab Results   Component Value Date/Time    Iron 10 02/26/2017 04:28 AM    TIBC 169 02/26/2017 04:28 AM    Iron % saturation 6 02/26/2017 04:28 AM    Ferritin 250 02/26/2017 04:28 AM           CONTROLLED SUBSTANCES SAFETY ASSESSMENT (IF ON CONTROLLED SUBSTANCES):     Reviewed opioid safety handout:  [] Yes   [x] No  24 hour opioid dose >150mg morphine equivalent/day:  [] Yes   [] No  Benzodiazepines:  [x] Yes   [] No  Sleep apnea:  [] Yes   [x] No  Urine Toxicology Testing within last 6 months:  [] Yes   [x] No  History of or new aberrant medication taking behaviors:  [] Yes   [x] No            Total time:   Counseling / coordination time:   > 50% counseling / coordination?:

## 2017-04-14 NOTE — MR AVS SNAPSHOT
Visit Information Date & Time Provider Department Dept. Phone Encounter #  
 4/14/2017 10:30 AM Jay Felty, [place] RAMIREZ [phone] [id number] Upcoming Health Maintenance Date Due  
 MEDICARE YEARLY EXAM 5/25/1972 BREAST CANCER SCRN MAMMOGRAM 5/25/2004 FOBT Q 1 YEAR AGE 50-75 5/25/2004 Pneumococcal 19-64 Highest Risk (3 of 3 - PCV13) 12/7/2017 PAP AKA CERVICAL CYTOLOGY 2/28/2019 DTaP/Tdap/Td series (2 - Td) 12/7/2026 Allergies as of 4/14/2017  Review Complete On: 4/14/2017 By: Jay Felty, MD  
 No Known Allergies Current Immunizations  Reviewed on 3/8/2017 Name Date Influenza Vaccine 11/1/2014 Influenza Vaccine (Quad) PF 12/7/2016 Pneumococcal Polysaccharide (PPSV-23) 12/7/2016 Tdap 12/7/2016 Not reviewed this visit Vitals LMP OB Status Smoking Status (LMP Unknown) Hysterectomy Former Smoker Preferred Pharmacy Pharmacy Name Phone [facility] [phone] Your Updated Medication List  
  
   
This list is accurate as of: 4/14/17  2:13 PM.  Always use your most recent med list.  
  
  
  
  
 acetylcysteine 100 mg/mL (10 %) nebulizer solution Commonly known as:  MUCOMYST Take 4 mL by inhalation every four (4) hours. albuterol-ipratropium 2.5 mg-0.5 mg/3 ml Nebu Commonly known as:  DUO-NEB  
3 mL by Nebulization route every four (4) hours as needed. amoxicillin-clavulanate 250-62.5 mg/5 mL suspension Commonly known as:  AUGMENTIN Take 10 mL by mouth every eight (8) hours. B infan-B long-L acid-L rhamn 2 billion cell Cpsp Commonly known as:  DIGESTIVE PROBIOTIC Take 1 Cap by mouth daily. bisacodyl 5 mg Tab Take 1 Tab by mouth daily as needed (constipation).   
  
 diphenhydrAMINE 12.5 mg/5 mL elix 40 mL, lidocaine 2 % soln 40 mL, aluminum & magnesium hydroxide-simethicone 400-400-40 mg/5 mL susp 40 mL Take 5 mL by mouth daily. Swish and spit or swallow. FLUoxetine 20 mg tablet Commonly known as:  PROzac Take 20 mg by mouth daily. * ISOSOURCE 1.5 MARIELA FEEDING TUBE  
5 Cans by Feeding Tube route daily. * JEVITY 1.5 MARIELA 0.06 gram-1.5 kcal/mL Liqd Generic drug:  Lactose-Free Food with Fiber 1.5 L by PEG Tube route five (5) times daily. bolus feeding followed by 150  ml flush after each feeding  
  
 lithium carbonate  mg CR tablet Commonly known as:  ESKALITH CR  
TAKE 1 TABLET BY ORAL ROUTE PER PEG IN THE MORNING  
  
 * LORazepam 0.5 mg tablet Commonly known as:  ATIVAN Take 1 Tab by mouth two (2) times daily as needed for Anxiety. Max Daily Amount: 1 mg.  
  
 * LORazepam 2 mg/mL concentrated solution Commonly known as:  INTENSOL  
0.5 mL by Per J Tube route every four (4) hours as needed (shortness of breath/air hunger). Max Daily Amount: 6 mg.  
  
 metoprolol tartrate 25 mg tablet Commonly known as:  LOPRESSOR Take 1 Tab by mouth every twelve (12) hours. NORVASC 10 mg tablet Generic drug:  amLODIPine Take 10 mg by mouth daily. OTHER  
0.5 mL by PEG Tube route every four (4) hours. Indications: Morphine  
  
 oxyCODONE 20 mg/mL concentrated solution Commonly known as:  Ron Heart Take 0.5 mL by mouth every four (4) hours as needed. Max Daily Amount: 60 mg. OXYGEN-AIR DELIVERY SYSTEMS  
2 L/min by Nasal route continuous. * predniSONE 10 mg tablet Commonly known as:  DELTASONE  
2 tabs daily for 3 days,then 1 tab daily for 3 days,then 1/2 tab daily till empty * predniSONE 10 mg tablet Commonly known as:  Indigo Ringer Take 1 Tab by mouth daily (with breakfast). traZODone 100 mg tablet Commonly known as:  Hector Cushing Take 200 mg by mouth nightly. VITAMIN B-12 1,000 mcg sublingual tablet Generic drug:  cyanocobalamin 1,000 mcg by SubLINGual route daily. * Notice: This list has 6 medication(s) that are the same as other medications prescribed for you. Read the directions carefully, and ask your doctor or other care provider to review them with you. Prescriptions Printed Refills LORazepam (INTENSOL) 2 mg/mL concentrated solution 0 Si.5 mL by Per J Tube route every four (4) hours as needed (shortness of breath/air hunger). Max Daily Amount: 6 mg. Class: Print Route: Per J Tube To-Do List   
 2017 To Be Determined Appointment with Barb Carroll CNA at [place] [address]  
  
 2017 To Be Determined Appointment with Gregg Flores LPN at [place] [address]  
  
 2017 To Be Determined Appointment with JAZ Morris at [place] [address]  
  
 2017 To Be Determined Appointment with Marlee Haines at [place] [address]  
  
 2017 To Be Determined Appointment with JAZ Morris at [place] [address]  
  
 2017 To Be Determined Appointment with Marlee Haines at [place] [address]  
  
 2017 To Be Determined Appointment with Gregg Flores LPN at [place] [address]  
  
 2017 To Be Determined Appointment with Gregg Flores LPN at [place] [address]  
  
 2017 To Be Determined Appointment with Ariel Soliman RN at [place] [address] Patient Instructions Dear Maggy Fu , It was a pleasure seeing you in your home today. This is what we talked about:  
 
1. Your pain 
-You have pain all over from your fibromyalgia and on the right side of your face and neck from your past cancer treatments -You are using oxycodone 20-mg/ml 0.5-ml (10-mg) about 4 times a day which is working well to control your pain 2. Your shortness of breath 
-This is from your COPD 
-You've been less short of breath this week which is likely due to your resolving pneumonia 
-You shared with us today your fear of getting very short of breath again 
-We made the following plan for you to follow if you this happens: 
 1. Take 0.5-ml (10-mg) of the liquid morphine you have in your home 2. If you still feel short of breath 15 minutes later, take 0.5-ml of the liquid lorazepam I prescribed for you today. Use the liquid lorazepam only for your extreme shortness of breath 3. Place your face so the air is blowing across your face 4. Call the Palliative Medicine number [phone] and we can instruct you what to do next if you're still feeling short of breath 3. Your mood 
-You've had a good week! 
-You enjoyed Tripteases birthday party last week 
-You saw Dr. Jaye Patel who made some adjustments in your lithium dose 
-You met our clinic , Laura [facility]re[facility], who can also be a source of support for you and for your family 4. Your cough 
-This is much better 5. Your wishes for care going forward 
-You prefer to have your shortness of breath and other symptoms managed at home if possible 
-If that's not possible, you want to go to the hospital for help 
-We made a plan to try to manage your shortness of breath at home.  
-Be sure and call us first before going to the hospital as we may be able to adjust your medications to bring you relief while keeping you in the comfort of your own home 
-You shared with us today that you know you are sick and dying but that you didn't want to talk about it or to know how long you have. We appreciate knowing this about you This is what you have shared with us about Advance Care Planning Advance Care Planning 3/23/2017 Patient's Healthcare Decision Maker is: Legal Next of Kin Primary Decision Maker Name Cleve Rivera Primary Decision Maker Phone Number [phone] Primary Decision Maker Relationship to Patient Spouse Secondary Decision Maker Name Archana De La Torre Secondary Decision Maker Phone Number [phone] Secondary Decision Maker Relationship to Patient Adult child Confirm Advance Directive None Patient Would Like to Complete Advance Directive No  
 
 
The Palliative Medicine Team is here to support you and your family. We will see you again in 2 weeks. Our Nurse may check in with you by phone before that time. If there are any concerns before that time, such as medication questions, worsening symptoms or a need to see a physician for an urgent or emergent situation; please call [phone] (COPE). A physician is also on call after our normal business hours of 8am to 5pm.  
 
In order to serve you better, please allow up to 48 hours for prescription refills to be processed. Certain medications may require more paperwork or a written prescription that you may need to  from the office. We appreciate you letting us know of any refill requests as soon as possible. We also would like you to sign up for [employer] as well. Sincerely, Benny Lewis MD and the Palliative Medicine Team 
 
  
Introducing hospitals & HEALTH SERVICES! [employer] introduces [employer] patient portal. Now you can access parts of your medical record, email your doctor's office, and request medication refills online. 1. In your internet browser, go to [email or web address] 2. Click on the First Time User? Click Here link in the Sign In box. You will see the New Member Sign Up page. 3. Enter your [employer] Access Code exactly as it appears below. You will not need to use this code after youve completed the sign-up process. If you do not sign up before the expiration date, you must request a new code.  
 
· [employer] Access Code: [email or web address] 
 Expires: 6/5/2017  4:08 PM 
 
4. Enter the last four digits of your Social Security Number (xxxx) and Date of Birth (mm/dd/yyyy) as indicated and click Submit. You will be taken to the next sign-up page. 5. Create a [employer] ID. This will be your [employer] login ID and cannot be changed, so think of one that is secure and easy to remember. 6. Create a [employer] password. You can change your password at any time. 7. Enter your Password Reset Question and Answer. This can be used at a later time if you forget your password. 8. Enter your e-mail address. You will receive e-mail notification when new information is available in [address] 9. Click Sign Up. You can now view and download portions of your medical record. 10. Click the Download Summary menu link to download a portable copy of your medical information. If you have questions, please visit the Frequently Asked Questions section of the [employer] website. Remember, [employer] is NOT to be used for urgent needs. For medical emergencies, dial 911. Now available from your iPhone and Android! Please provide this summary of care documentation to your next provider. Your primary care clinician is listed as Anali Brand. If you have any questions after today's visit, please call [phone].

## 2017-04-16 PROBLEM — R77.8 ELEVATED TROPONIN: Status: ACTIVE | Noted: 2017-01-01

## 2017-04-16 NOTE — PROGRESS NOTES
Pharmacy Automatic Renal Dosing Protocol - Antimicrobials    Indication for Antimicrobials: HAP    Current Regimen of Each Antimicrobial (Start Day & Day of Therapy):  Levofloxacin 750 mg IV every 24 hours (Started 17; Day #1)  Piperacillin-tazobactam 3.375 gm IV over 30 minutes every 6 hours (Started 17; Day #1)  Vancomycin dosed by pharmacy (Started 17; Day #1)    Goal Vancomycin Trough: 15-20 mcg/mL    Significant Cultures:   17 Blood culture = Results pending  17 Respiratory culture = Ordered  17 Influenza = Negative (FINAL)    CAPD, Hemodialysis or Renal Replacement Therapy: N/A     Paralysis, amputations, malnutrition: N/A    Recent Labs      17   0701   CREA  0.75   BUN  16   WBC  11.1*     Temp (24hrs), Av.8 °F (36.6 °C), Min:97.8 °F (36.6 °C), Max:97.8 °F (36.6 °C)    Creatinine Clearance: ~74 mL/min    Impression/Plan:  - Levofloxacin dosed appropriately based on indication and renal function. Continue current regimen. - Based on renal function and indication, piperacillin-tazobactam dose adjusted to 4.5 gm IV infused over 240 minutes every 8 hours. - Based on renal function, indication, and weight, vancomycin loading dose of 1500 mg IV once ordered and vancomycin maintenance dose of 1000 mg IV every 12 hours ordered. Pharmacy will follow daily and adjust medications as appropriate for renal function and/or serum levels.     Thank you,  Gabi Zimmerman, PHARMD

## 2017-04-16 NOTE — H&P
Hospitalist Admission Note    NAME: Adarsh Francis   :  1955   MRN:  [id number]     Date/Time:  2017 8:45 AM    Patient PCP: Zain Hernandez MD  ________________________________________________________________________    My assessment of this patient's clinical condition and my plan of care is as follows. Assessment / Plan:  SIRS (tachycardia, bandemia) and recurrent acute on chronic hypoxic respiratory failure due to HCAP vs recurrent aspiration PNA in setting of laryngeal ca with dysphagia/chronic PEG tube and chronic COPD:  Per family, Pt has been much more compliance with po restrictions now currently only having a few ice chips per day. Note that she completed augmentin a few days ago from last admission without resolution of sx. Baseline O2 3-4 LPM.  - CXR with unchanged diffuse bilateral airspace opacities. - CTA chest with new dense airspace disease right lower lobe and increasing airspace disease left lower lobe. Mediastinal adenopathy unchanged. Previously demonstrated patchy bilateral upper lobe airspace disease improved. No acute pulmonary embolus. - blood cultures sent, will follow. Will try to get sputum culture to guide Abx therapy. - consider pulmonology consult - ?if Pt may be developing abscess at R base  - emperic vancomycin, zosyn, levaquin ordered given recent hospitalization and immunosuppressed status which would be risk factor for HCAP  - albuterol and mucomyst nebs scheduled. INH incruse. - con't home steroids, do not feel she needs increase  - robitussin scheduled  Elevated troponin: suspect strain due to infection  - recent echo  with EF 55-60%. There were no regional wall motion abnormalities. - cardiology consulted by ER, but do not feel that she is a candidate for anything other than medical mgmt  - serial trop  - con't bblocker.  Will defer to cardiology whether she would benefit from additional secondary prevention meds given poor prognosis with cancer.  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 Nutrition consult requested - she is having trouble getting in her 5 cans/day. - con't scheduled oxycodone as she does at home  - prn morphine and ativan   - have asked pharmacy to assist with med reconcilliation given recent changes  - palliative reconsulted  Bipolar d/o: lithium recently changed per daughters  - con't lithium as reported with med rec as above  - con't prozac, trazodone  Hypertension, benign/essential: low BP on presentation  - con't metoprolol  - holding home norvasc  - prn nitrobid  Moderate protein calorie malnutrition, underweight:  - nutrition consult as above    Code Status: DNR  Surrogate Decision Maker:   Joshua Tsai [phone]  DVT Prophylaxis: lovenox    Baseline: worsening weakness, pain, air hunger. Still somewhat ambulatory. Subjective:   CHIEF COMPLAINT:  Shortness of breath    HISTORY OF PRESENT ILLNESS:     Nadia Lopez is a 57 y.o.  female who presents with above. Pt with 5 recent admissions for aspiration PNA in setting of laryngeal ca. She had PEG placement 2/28 but continues to have recurrent PNA. She was most recently hospitalized a few weeks ago, discharged on augmentin which she completed a few days ago. Family reports that she has continued to have a severe cough. They do not think she has been febrile. She has significant distress from air hunger. She has a pulse ox at home and was frequently in the 70s yesterday. She has been complaining of back pain. She also had a HA yesterday, which is typical of her when her O2 is low. She has only a few ice chips daily and otherwise can tolerate ~4 cans of Isosource. She denies stool changes. No urinary changes. No focal weakness but is generally very weak.     We were asked to admit for work up and evaluation of the above problems. Past Medical History:   Diagnosis Date    Bipolar 1 disorder ([facility] [address] 11/29/2011    Cancer ([facility] [address]     skin cancer buttocks    Cancer (HCC)     throat    Chronic pain     FIBROMYALGIA, LEGS    Encounter for long-term (current) use of other medications 11/29/2011    Essential hypertension, benign 11/29/2011    Ill-defined condition     Laryngeal cancer ([facility] [address] 11/2/2015    Laryngeal mass     Psychiatric disorder     bipolar        Past Surgical History:   Procedure Laterality Date    HX BREAST AUGMENTATION Bilateral     SALINE IMPLANTS    HX GI      PLACEMENT OF G TUBE    HX GI      ESOPH. DILATATION    HX GYN      tubal pregnancy    HX HEENT      BX OF NECK MASS    HX HYSTERECTOMY      PLACE PERCUT GASTROSTOMY TUBE  10/28/2014     has been removed 2015    PLACE PERCUT GASTROSTOMY TUBE  2/28/2017            Social History   Substance Use Topics    Smoking status: Former Smoker     Packs/day: 1.00     Years: 40.00     Quit date: 8/27/2014    Smokeless tobacco: Never Used      Comment: PASSIVE SMOKE EXPOSURE,  SMOKES    Alcohol use No        Family History   Problem Relation Age of Onset   Jack Ayala Cancer Father      bone/skin/lung    Pneumonia Mother     Anesth Problems Neg Hx      No Known Allergies     Prior to Admission medications    Medication Sig Start Date End Date Taking? Authorizing Provider   OXYGEN-AIR DELIVERY SYSTEMS 2 L/min by Nasal route continuous. 3/12/17  Yes Meseret Gan MD   (No Medication Selected) Take 100 mL by mouth. Stephany Pena MD   LORazepam (INTENSOL) 2 mg/mL concentrated solution 0.5 mL by Per J Tube route every four (4) hours as needed (shortness of breath/air hunger). Max Daily Amount: 6 mg. 4/14/17   Georgette Javier MD   LACTOSE-REDUCED FOOD/FIBER (ISOSOURCE 1.5 MARIELA FEEDING TUBE) 5 Cans by Feeding Tube route daily.     Historical Provider   oxyCODONE (ROXICODONE INTENSOL) 20 mg/mL concentrated solution Take 0.5 mL by mouth every four (4) hours as needed. Max Daily Amount: 60 mg. 4/5/17   Steve Ragland MD   LORazepam (ATIVAN) 0.5 mg tablet Take 1 Tab by mouth two (2) times daily as needed for Anxiety. Max Daily Amount: 1 mg. 3/29/17   Gianfranco Salazar MD   B infan-B long-L acid-L rhamn (DIGESTIVE PROBIOTIC) 2 billion cell cpSP Take 1 Cap by mouth daily. 3/29/17   Gianfranco Salazar MD   predniSONE (DELTASONE) 10 mg tablet Take 1 Tab by mouth daily (with breakfast). 3/29/17   Gianfranco Salazar MD   OTHER 0.5 mL by PEG Tube route every four (4) hours. Indications: Morphine    Historical Provider   amoxicillin-clavulanate (AUGMENTIN) 250-62.5 mg/5 mL suspension Take 10 mL by mouth every eight (8) hours. 3/27/17   Gianfranco Salazar MD   Lactose-Free Food with Fiber (JEVITY 1.5 MARIELA) 0.06 gram-1.5 kcal/mL liqd 1.5 L by PEG Tube route five (5) times daily. bolus feeding  followed by 150  ml flush after each feeding 3/12/17   Gianfranco Salazar MD   bisacodyl 5 mg tab Take 1 Tab by mouth daily as needed (constipation). Historical Provider   amLODIPine (NORVASC) 10 mg tablet Take 10 mg by mouth daily. Stephany Pena MD   albuterol-ipratropium (DUO-NEB) 2.5 mg-0.5 mg/3 ml nebu 3 mL by Nebulization route every four (4) hours as needed. 2/12/17   Steve Ragland MD   diphenhydrAMINE 12.5 mg/5 mL elix 40 mL, lidocaine 2 % soln 40 mL, aluminum & magnesium hydroxide-simethicone 400-400-40 mg/5 mL susp 40 mL Take 5 mL by mouth daily. Swish and spit or swallow. Historical Provider   VITAMIN B-12 1,000 mcg sublingual tablet 1,000 mcg by SubLINGual route daily. 11/11/16   Historical Provider   acetylcysteine (MUCOMYST) 100 mg/mL (10 %) nebulizer solution Take 4 mL by inhalation every four (4) hours. 1/26/17   Gianfranco Salazar MD   metoprolol tartrate (LOPRESSOR) 25 mg tablet Take 1 Tab by mouth every twelve (12) hours.  1/24/17   Gianfranco Salazar MD   lithium carbonate CR (ESKALITH CR) 450 mg CR tablet TAKE 1 TABLET BY ORAL ROUTE PER PEG IN THE MORNING 11/9/16   Historical Provider   FLUoxetine (PROZAC) 20 mg tablet Take 20 mg by mouth daily. 2/5/16   Historical Provider   traZODone (DESYREL) 100 mg tablet Take 200 mg by mouth nightly. 11/2/15   Historical Provider       REVIEW OF SYSTEMS:     I am not able to complete the review of systems because:    The patient is intubated and sedated    The patient has altered mental status due to his acute medical problems    The patient has baseline aphasia from prior stroke(s)    The patient has baseline dementia and is not reliable historian    The patient is in acute medical distress and unable to provide information           Total of 12 systems reviewed as follows:       POSITIVE= underlined text  Negative = text not underlined  General:  fever, chills, sweats, generalized weakness, weight loss/gain,      loss of appetite   Eyes:    blurred vision, eye pain, loss of vision, double vision  ENT:    rhinorrhea, pharyngitis   Respiratory:   cough, sputum production, SOB, PENALOZA, wheezing, pleuritic pain   Cardiology:   chest pain, palpitations, orthopnea, PND, edema, syncope   Gastrointestinal:  abdominal pain , N/V, diarrhea, dysphagia, constipation, bleeding   Genitourinary:  frequency, urgency, dysuria, hematuria, incontinence   Muskuloskeletal :  arthralgia, myalgia, back pain  Hematology:  easy bruising, nose or gum bleeding, lymphadenopathy   Dermatological: rash, ulceration, pruritis, color change / jaundice  Endocrine:   hot flashes or polydipsia   Neurological:  headache, dizziness, confusion, focal weakness, paresthesia,     Speech difficulties, memory loss, gait difficulty  Psychological: Feelings of anxiety, depression, agitation    Objective:   VITALS:    Visit Vitals    /85    Pulse 93    Temp 97.8 °F (36.6 °C)    Resp 16    Ht 5' 9\" (1.753 m)    Wt 60.3 kg (133 lb)    SpO2 99%    BMI 19.64 kg/m2       PHYSICAL EXAM:    General:    Thin, alert, cooperative, no distress, appears stated age. HEENT: Atraumatic, anicteric sclerae, pink conjunctivae     No oral ulcers, mucosa moist, throat clear, dentition fair  Neck:  Supple, symmetrical,  thyroid: non tender  Lungs:   Course bilaterally. No Wheezing. No rales. Poor air movement. Chest wall:  No tenderness  No Accessory muscle use. Heart:   Regular rhythm,  No  murmur   No edema  Abdomen:   Soft, non-tender. Not distended. Bowel sounds normal.  PEG tube without erythema or exudate. Extremities: No cyanosis. No clubbing    Skin:     Not pale. Not Jaundiced  No rashes   Psych:  Fair insight. Not depressed. Not anxious or agitated. Neurologic: EOMs intact. No facial asymmetry. No aphasia or slurred speech. Symmetrical strength, Sensation grossly intact. Alert and oriented X 4.     _______________________________________________________________________  Care Plan discussed with:    Comments   Patient x    Family  x 2 daughters   RN x    Care Manager                    Consultant:      _______________________________________________________________________  Expected  Disposition:   Home with Family x   HH/PT/OT/RN x   SNF/LTC    PACO    ________________________________________________________________________  TOTAL TIME:  61 Minutes    Critical Care Provided     Minutes non procedure based      Comments    x Reviewed previous records   >50% of visit spent in counseling and coordination of care x Discussion with patient and/or family and questions answered       ________________________________________________________________________  Signed: Edwin Guillaume MD    Procedures: see electronic medical records for all procedures/Xrays and details which were not copied into this note but were reviewed prior to creation of Plan.     LAB DATA REVIEWED:    Recent Results (from the past 24 hour(s))   EKG, 12 LEAD, INITIAL    Collection Time: 04/16/17  6:40 AM   Result Value Ref Range    Ventricular Rate 102 BPM    Atrial Rate 102 BPM    P-R Interval 152 ms    QRS Duration 88 ms    Q-T Interval 360 ms    QTC Calculation (Bezet) 469 ms    Calculated P Axis 66 degrees    Calculated R Axis 54 degrees    Calculated T Axis 58 degrees    Diagnosis       Sinus tachycardia with premature atrial complexes  Possible Left atrial enlargement  Left ventricular hypertrophy  Abnormal ECG  When compared with ECG of 22-MAR-2017 09:41,  Nonspecific T wave abnormality no longer evident in Lateral leads  QT has shortened     BLOOD GAS, ARTERIAL    Collection Time: 04/16/17  6:45 AM   Result Value Ref Range    pH 7.35 7.35 - 7.45      PCO2 51 (H) 35.0 - 45.0 mmHg    PO2 76 (L) 80 - 100 mmHg    O2 SAT 94 92 - 97 %    BICARBONATE 27 (H) 22 - 26 mmol/L    BASE EXCESS 0.7 mmol/L    O2 METHOD NRM      FIO2 100 %    SPONTANEOUS RATE 20.0      Sample source ARTERIAL      SITE RIGHT RADIAL      INESSA'S TEST YES     CBC WITH AUTOMATED DIFF    Collection Time: 04/16/17  7:01 AM   Result Value Ref Range    WBC 11.1 (H) 3.6 - 11.0 K/uL    RBC 3.63 (L) 3.80 - 5.20 M/uL    HGB 9.9 (L) 11.5 - 16.0 g/dL    HCT 33.1 (L) 35.0 - 47.0 %    MCV 91.2 80.0 - 99.0 FL    MCH 27.3 26.0 - 34.0 PG    MCHC 29.9 (L) 30.0 - 36.5 g/dL    RDW 17.7 (H) 11.5 - 14.5 %    PLATELET 404 939 - 302 K/uL    NEUTROPHILS 77 %    BAND NEUTROPHILS 13 %    LYMPHOCYTES 7 %    MONOCYTES 3 %    EOSINOPHILS 0 %    BASOPHILS 0 %    ABS. NEUTROPHILS 10.0 K/UL    ABS. LYMPHOCYTES 0.8 K/UL    ABS. MONOCYTES 0.3 K/UL    ABS. EOSINOPHILS 0.0 K/UL    ABS.  BASOPHILS 0.0 K/UL    RBC COMMENTS ANISOCYTOSIS  1+        WBC COMMENTS TOXIC GRANULATION      DF MANUAL     METABOLIC PANEL, COMPREHENSIVE    Collection Time: 04/16/17  7:01 AM   Result Value Ref Range    Sodium 135 (L) 136 - 145 mmol/L    Potassium 4.4 3.5 - 5.1 mmol/L    Chloride 99 97 - 108 mmol/L    CO2 29 21 - 32 mmol/L    Anion gap 7 5 - 15 mmol/L    Glucose 106 (H) 65 - 100 mg/dL    BUN 16 6 - 20 MG/DL Creatinine 0.75 0.55 - 1.02 MG/DL    BUN/Creatinine ratio 21 (H) 12 - 20      GFR est AA >60 >60 ml/min/1.73m2    GFR est non-AA >60 >60 ml/min/1.73m2    Calcium 9.6 8.5 - 10.1 MG/DL    Bilirubin, total 0.4 0.2 - 1.0 MG/DL    ALT (SGPT) 17 12 - 78 U/L    AST (SGOT) 12 (L) 15 - 37 U/L    Alk.  phosphatase 60 45 - 117 U/L    Protein, total 7.1 6.4 - 8.2 g/dL    Albumin 3.0 (L) 3.5 - 5.0 g/dL    Globulin 4.1 (H) 2.0 - 4.0 g/dL    A-G Ratio 0.7 (L) 1.1 - 2.2     LACTIC ACID, PLASMA    Collection Time: 04/16/17  7:01 AM   Result Value Ref Range    Lactic acid 0.8 0.4 - 2.0 MMOL/L   TROPONIN I    Collection Time: 04/16/17  7:01 AM   Result Value Ref Range    Troponin-I, Qt. 0.55 (H) <0.05 ng/mL   CK W/ REFLX CKMB    Collection Time: 04/16/17  7:01 AM   Result Value Ref Range    CK 26 26 - 192 U/L   INFLUENZA A & B AG (RAPID TEST)    Collection Time: 04/16/17  7:01 AM   Result Value Ref Range    Influenza A Antigen NEGATIVE  NEG      Influenza B Antigen NEGATIVE  NEG     URINALYSIS W/ REFLEX CULTURE    Collection Time: 04/16/17  7:01 AM   Result Value Ref Range    Color YELLOW/STRAW      Appearance CLEAR CLEAR      Specific gravity 1.014 1.003 - 1.030      pH (UA) 6.5 5.0 - 8.0      Protein TRACE (A) NEG mg/dL    Glucose NEGATIVE  NEG mg/dL    Ketone NEGATIVE  NEG mg/dL    Bilirubin NEGATIVE  NEG      Blood NEGATIVE  NEG      Urobilinogen 0.2 0.2 - 1.0 EU/dL    Nitrites NEGATIVE  NEG      Leukocyte Esterase NEGATIVE  NEG      WBC 0-4 0 - 4 /hpf    RBC 0-5 0 - 5 /hpf    Epithelial cells FEW FEW /lpf    Bacteria NEGATIVE  NEG /hpf    UA:UC IF INDICATED CULTURE NOT INDICATED BY UA RESULT CNI      Hyaline cast 2-5 0 - 5 /lpf   LIPASE    Collection Time: 04/16/17  7:01 AM   Result Value Ref Range    Lipase 83 73 - 393 U/L   PRO-BNP    Collection Time: 04/16/17  7:01 AM   Result Value Ref Range    NT pro-BNP 1395 (H) 0 - 125 PG/ML   MAGNESIUM    Collection Time: 04/16/17  7:01 AM   Result Value Ref Range    Magnesium 2.2 1.6 - 2.4 mg/dL   PTT    Collection Time: 04/16/17  7:01 AM   Result Value Ref Range    aPTT 29.0 22.1 - 32.5 sec    aPTT, therapeutic range     58.0 - 77.0 SECS   PROTHROMBIN TIME + INR    Collection Time: 04/16/17  7:01 AM   Result Value Ref Range    INR 1.1 0.9 - 1.1      Prothrombin time 10.7 9.0 - 11.1 sec

## 2017-04-16 NOTE — PROGRESS NOTES
NC applied 6 L NC, will monitor O2 sats and re-apply non-re- breather if necessary. 1508: NRB mask reapplied due to pt. O2 sats dropping into the mid 80s, pt. Going to be taking a nap and agreeable to wear the mask at this time, pt. BP 88/50 at this time, pt. Received IV,1330, and PEG tube ,1400,pain medications pt. Denies any symptoms of hypotension, will re-check and continue to monitor.

## 2017-04-16 NOTE — ED PROVIDER NOTES
HPI Comments: Maggy Fu is a 57 y.o. female with history significant for recurrent PNA, HTN, and bipolar disorder presenting via EMS to ED [facility] ED with c/o sudden onset of worsening SOB x yesterday night. Pt additionally informs of mild chest discomfort during the onset of her SOB and a slight, intermittent cough. EMS states family informs that the pt had been having SOB since last night and they had the pt on 4L nasal canula with little relief of her discomfort. EMS states family tried to assist the pt with her machine x 2 hours today and called when no improvement was visualized. EMS informs that the pt had oxygen saturation levels at 74% upon their arrival and improved to 95% with non-rebreather. EMS reports family states that the mid 90's is the pt's baseline oxygen saturation level. Per EMS, pt has a history of recurrent PNA and has had multiple ED visits for it. EMS states pt is in palliative care and note they were called for by the pt's family. Of note, EMS reports that the pt's oxygen machine sounded \"junky\" as if it was not functioning optimally. Pt specifically denies any nausea, vomiting, fevers, chills, abdominal pain, or urinary complications. PCP: Brenna Dumont MD    PMHx: hx lung cancer   PSHx: Laryngeal mass removal, gastrostomy   Social Hx: - EtOH; Former Smoker; - Illicit Drugs    There are no other changes, complaints or physical findings at this time. Written by María Li ED Scribe, as dictated by Kailey Jordan MD.     The history is provided by the EMS personnel.       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 DILATATION    HX GYN      tubal pregnancy    HX HEENT      BX OF NECK MASS    HX HYSTERECTOMY      PLACE PERCUT GASTROSTOMY TUBE  10/28/2014     has been removed 2015    PLACE PERCUT GASTROSTOMY TUBE  2/28/2017          Family History:   Problem Relation Age of Onset    Cancer Father      bone/skin/lung    Pneumonia Mother     Anesth Problems Neg Hx      Social History     Social History    Marital status:      Spouse name: N/A    Number of children: N/A    Years of education: N/A     Occupational History    Not on file. Social History Main Topics    Smoking status: Former Smoker     Packs/day: 1.00     Years: 40.00     Quit date: 8/27/2014    Smokeless tobacco: Never Used      Comment: PASSIVE SMOKE EXPOSURE,  SMOKES    Alcohol use No    Drug use: No    Sexual activity: No     Other Topics Concern    Not on file     Social History Narrative     ALLERGIES: Review of patient's allergies indicates no known allergies. Review of Systems   Constitutional: Negative. Negative for chills and fever. HENT: Negative. Negative for congestion and rhinorrhea. Respiratory: Positive for cough and shortness of breath. Negative for chest tightness and wheezing. Cardiovascular: Positive for chest pain. Negative for palpitations. Gastrointestinal: Negative. Negative for abdominal pain, constipation, nausea and vomiting. Endocrine: Negative. Genitourinary: Negative. Negative for decreased urine volume, dysuria, flank pain, frequency, hematuria and pelvic pain. Musculoskeletal: Negative. Negative for back pain and neck pain. Skin: Negative. Negative for color change, pallor and rash. Neurological: Negative. Negative for dizziness, seizures, weakness, numbness and headaches. Hematological: Negative. Negative for adenopathy. Psychiatric/Behavioral: Negative. All other systems reviewed and are negative. Patient Vitals for the past 12 hrs:   Temp Pulse Resp BP SpO2   04/16/17 0945 - 95 17 128/86 98 %   04/16/17 0830 - 93 16 128/85 99 %   04/16/17 0715 - 99 19 105/76 96 %   04/16/17 0634 97.8 °F (36.6 °C) (!) 107 16 113/78 95 %     Physical Exam   Constitutional: She is oriented to person, place, and time. She appears well-developed and well-nourished. No distress. HENT:   Head: Normocephalic and atraumatic. Mouth/Throat: No oropharyngeal exudate. Eyes: Conjunctivae are normal. Pupils are equal, round, and reactive to light. Right eye exhibits no discharge. Left eye exhibits no discharge. No scleral icterus. Neck: Normal range of motion. Neck supple. No JVD present. Cardiovascular: Normal rate, regular rhythm, normal heart sounds and intact distal pulses. Exam reveals no gallop and no friction rub. No murmur heard. Pulmonary/Chest: Accessory muscle usage present. No stridor. She is in respiratory distress. She has wheezes. She has rhonchi. She has no rales. She exhibits no tenderness. Abdominal: Soft. Bowel sounds are normal. She exhibits no distension and no mass. There is no tenderness. There is no rebound and no guarding. Neurological: She is alert and oriented to person, place, and time. She displays normal reflexes. No cranial nerve deficit or sensory deficit. She exhibits normal muscle tone. Coordination normal. GCS eye subscore is 4. GCS verbal subscore is 5. GCS motor subscore is 6. Slow but appropriate   Skin: Skin is warm. No rash noted. She is not diaphoretic. No pallor. Nursing note and vitals reviewed.      MDM  Number of Diagnoses or Management Options  Acute on chronic respiratory failure with hypoxemia [facility]):   Elevated troponin:   Metastatic cancer [facility]):   Diagnosis management comments: DDx: COPD exacerbation, PNA, CHF, sepsis, PE, coronary syndrome, respiratory failure     History of end stage lung disease, on oxygen at home and current on palliative care. Presents via EMS with progression worsening of SOB x yesterday. Low oxygen saturation at house. Arrives to ED with non-rebreather in place and O2 saturation at 95%. Pt mentation is somewhat sluggish. Will check AVG to see if CO2 retainer. Will check XR and labs to help with final disposition. Concern with recurrent PNA and aspirations. Amount and/or Complexity of Data Reviewed  Clinical lab tests: ordered and reviewed  Tests in the radiology section of CPT®: ordered and reviewed  Tests in the medicine section of CPT®: reviewed and ordered  Obtain history from someone other than the patient: yes (EMS)  Review and summarize past medical records: yes  Discuss the patient with other providers: yes (Dr. Salvador Smiley, Cardiology  Dr. Drea Shea, Hospitalist)  Independent visualization of images, tracings, or specimens: yes    Risk of Complications, Morbidity, and/or Mortality  Presenting problems: high  Diagnostic procedures: moderate  Management options: moderate    Critical Care  Total time providing critical care: 30-74 minutes    Patient Progress  Patient progress: stable    ED Course     Procedures    EKG interpretation: (Preliminary) 0640  Rhythm: sinus tachycardia and PAC's; and regular . Rate (approx.): 102; Axis: normal; ID interval: normal; QRS interval: normal ; ST/T wave: normal; Other: possible L atrial enlargement; LVH  This note is prepared by Sonia Walker acting as Scribe for Anuj Lynch MD.    Progress Note:   7:30 AM  Pt is sleeping upon re-evalution, easily aroused. Pt expresses concern to go home for her Carli Sherrill montero. Pt still with O2 sats in the mid 90's with non-rebreather in place.     Written by Sonia Walker, ED Scribe, as dictated by Anuj Lynch MD.     CONSULT NOTE:   8:04 AM  Anuj Lynch MD spoke with Dr. Salvador Smiley,  Specialty: Cardiology  Discussed pt's hx, disposition, and available diagnostic and imaging results. Reviewed care plans. Consultant agrees with plans as outlined. Medical management based on comorbidities and poor prognosis. Will come evaluate the pt. Written by SHAYNE Millan, as dictated by Orlando Almendarez MD.    Progress Note:   8:30 AM  Pts family at bedside. Made aware of findings and concern. Pt is a DNR. Agree that pt should be admitted. Written by SHAYNE Millan, as dictated by Orlando Almendarez MD.     CONSULT NOTE:   8:44 AM  Orlando Almendarez MD spoke with Dr. Blu Valladares,  Specialty: Hospitalist  Discussed pt's hx, disposition, and available diagnostic and imaging results. Reviewed care plans. Consultant agrees with plans as outlined. Accepted  Written by SHAYNE Millan, as dictated by Orlando Almendarez MD.    CRITICAL CARE NOTE :    9:01 AM      IMPENDING DETERIORATION -Airway, Respiratory and Metabolic    ASSOCIATED RISK FACTORS - Shock, Hypoxia and Metabolic changes    MANAGEMENT- Bedside Assessment and Supervision of Care    INTERPRETATION -  Xrays, CT Scan, ECG and Blood Pressure    INTERVENTIONS - Metobolic interventions and antibitoics    CASE REVIEW - Hospitalist, Nursing and Family    TREATMENT RESPONSE -Stable    PERFORMED BY - Self        NOTES   :      I have spent 35 minutes of critical care time involved in lab review, consultations with specialist, family decision- making, bedside attention and documentation. During this entire length of time I was immediately available to the patient .     Orlando Almendarez MD                                                  LABORATORY TESTS:  Recent Results (from the past 12 hour(s))   EKG, 12 LEAD, INITIAL    Collection Time: 04/16/17  6:40 AM   Result Value Ref Range    Ventricular Rate 102 BPM    Atrial Rate 102 BPM    P-R Interval 152 ms    QRS Duration 88 ms    Q-T Interval 360 ms    QTC Calculation (Bezet) 469 ms    Calculated P Axis 66 degrees    Calculated R Axis 54 degrees    Calculated T Axis 58 degrees    Diagnosis       Sinus tachycardia with premature atrial complexes  Possible Left atrial enlargement  Left ventricular hypertrophy  Abnormal ECG  When compared with ECG of 22-MAR-2017 09:41,  Nonspecific T wave abnormality no longer evident in Lateral leads  QT has shortened     BLOOD GAS, ARTERIAL    Collection Time: 04/16/17  6:45 AM   Result Value Ref Range    pH 7.35 7.35 - 7.45      PCO2 51 (H) 35.0 - 45.0 mmHg    PO2 76 (L) 80 - 100 mmHg    O2 SAT 94 92 - 97 %    BICARBONATE 27 (H) 22 - 26 mmol/L    BASE EXCESS 0.7 mmol/L    O2 METHOD NRM      FIO2 100 %    SPONTANEOUS RATE 20.0      Sample source ARTERIAL      SITE RIGHT RADIAL      INESSA'S TEST YES     CBC WITH AUTOMATED DIFF    Collection Time: 04/16/17  7:01 AM   Result Value Ref Range    WBC 11.1 (H) 3.6 - 11.0 K/uL    RBC 3.63 (L) 3.80 - 5.20 M/uL    HGB 9.9 (L) 11.5 - 16.0 g/dL    HCT 33.1 (L) 35.0 - 47.0 %    MCV 91.2 80.0 - 99.0 FL    MCH 27.3 26.0 - 34.0 PG    MCHC 29.9 (L) 30.0 - 36.5 g/dL    RDW 17.7 (H) 11.5 - 14.5 %    PLATELET 905 565 - 319 K/uL    NEUTROPHILS 77 %    BAND NEUTROPHILS 13 %    LYMPHOCYTES 7 %    MONOCYTES 3 %    EOSINOPHILS 0 %    BASOPHILS 0 %    ABS. NEUTROPHILS 10.0 K/UL    ABS. LYMPHOCYTES 0.8 K/UL    ABS. MONOCYTES 0.3 K/UL    ABS. EOSINOPHILS 0.0 K/UL    ABS.  BASOPHILS 0.0 K/UL    RBC COMMENTS ANISOCYTOSIS  1+        WBC COMMENTS TOXIC GRANULATION      DF MANUAL     METABOLIC PANEL, COMPREHENSIVE    Collection Time: 04/16/17  7:01 AM   Result Value Ref Range    Sodium 135 (L) 136 - 145 mmol/L    Potassium 4.4 3.5 - 5.1 mmol/L    Chloride 99 97 - 108 mmol/L    CO2 29 21 - 32 mmol/L    Anion gap 7 5 - 15 mmol/L    Glucose 106 (H) 65 - 100 mg/dL    BUN 16 6 - 20 MG/DL    Creatinine 0.75 0.55 - 1.02 MG/DL    BUN/Creatinine ratio 21 (H) 12 - 20      GFR est AA >60 >60 ml/min/1.73m2    GFR est non-AA >60 >60 ml/min/1.73m2    Calcium 9.6 8.5 - 10.1 MG/DL    Bilirubin, total 0.4 0.2 - 1.0 MG/DL    ALT (SGPT) 17 12 - 78 U/L    AST (SGOT) 12 (L) 15 - 37 U/L    Alk.  phosphatase 60 45 - 117 U/L    Protein, total 7.1 6.4 - 8.2 g/dL    Albumin 3.0 (L) 3.5 - 5.0 g/dL    Globulin 4.1 (H) 2.0 - 4.0 g/dL    A-G Ratio 0.7 (L) 1.1 - 2.2     LACTIC ACID, PLASMA    Collection Time: 04/16/17  7:01 AM   Result Value Ref Range    Lactic acid 0.8 0.4 - 2.0 MMOL/L   TROPONIN I    Collection Time: 04/16/17  7:01 AM   Result Value Ref Range    Troponin-I, Qt. 0.55 (H) <0.05 ng/mL   CK W/ REFLX CKMB    Collection Time: 04/16/17  7:01 AM   Result Value Ref Range    CK 26 26 - 192 U/L   INFLUENZA A & B AG (RAPID TEST)    Collection Time: 04/16/17  7:01 AM   Result Value Ref Range    Influenza A Antigen NEGATIVE  NEG      Influenza B Antigen NEGATIVE  NEG     URINALYSIS W/ REFLEX CULTURE    Collection Time: 04/16/17  7:01 AM   Result Value Ref Range    Color YELLOW/STRAW      Appearance CLEAR CLEAR      Specific gravity 1.014 1.003 - 1.030      pH (UA) 6.5 5.0 - 8.0      Protein TRACE (A) NEG mg/dL    Glucose NEGATIVE  NEG mg/dL    Ketone NEGATIVE  NEG mg/dL    Bilirubin NEGATIVE  NEG      Blood NEGATIVE  NEG      Urobilinogen 0.2 0.2 - 1.0 EU/dL    Nitrites NEGATIVE  NEG      Leukocyte Esterase NEGATIVE  NEG      WBC 0-4 0 - 4 /hpf    RBC 0-5 0 - 5 /hpf    Epithelial cells FEW FEW /lpf    Bacteria NEGATIVE  NEG /hpf    UA:UC IF INDICATED CULTURE NOT INDICATED BY UA RESULT CNI      Hyaline cast 2-5 0 - 5 /lpf   LIPASE    Collection Time: 04/16/17  7:01 AM   Result Value Ref Range    Lipase 83 73 - 393 U/L   PRO-BNP    Collection Time: 04/16/17  7:01 AM   Result Value Ref Range    NT pro-BNP 1395 (H) 0 - 125 PG/ML   MAGNESIUM    Collection Time: 04/16/17  7:01 AM   Result Value Ref Range    Magnesium 2.2 1.6 - 2.4 mg/dL   PTT    Collection Time: 04/16/17  7:01 AM   Result Value Ref Range    aPTT 29.0 22.1 - 32.5 sec    aPTT, therapeutic range     58.0 - 77.0 SECS   PROTHROMBIN TIME + INR    Collection Time: 04/16/17  7:01 AM Result Value Ref Range    INR 1.1 0.9 - 1.1      Prothrombin time 10.7 9.0 - 11.1 sec     IMAGING RESULTS:  INDICATION: Shortness of breath. History of throat cancer and chronic pneumonia.     COMPARISON: March 21, 2017     FINDINGS: AP portable imaging of the chest performed at 7:04 AM demonstrates a  stable cardiomediastinal silhouette. Diffuse bilateral airspace opacities are  unchanged. No significant osseous abnormalities are seen.     IMPRESSION  IMPRESSION: Unchanged diffuse bilateral airspace opacities.     MEDICATIONS GIVEN:  Medications   sodium chloride (NS) flush 5-10 mL (not administered)   sodium chloride (NS) flush 5-10 mL (not administered)   acetylcysteine (MUCOMYST) 200 mg/mL (20 %) solution 400 mg (not administered)   FLUoxetine (PROzac) 20 mg/5 mL (4 mg/mL) oral solution 20 mg (not administered)   LORazepam (INTENSOL) 2 mg/mL oral concentrate 1 mg (not administered)   metoprolol tartrate (LOPRESSOR) tablet 12.5 mg (not administered)   traZODone (DESYREL) tablet 200 mg (not administered)   sodium chloride (NS) flush 5-10 mL (not administered)   sodium chloride (NS) flush 5-10 mL (not administered)   acetaminophen (TYLENOL) solution 650 mg (not administered)   naloxone (NARCAN) injection 0.4 mg (not administered)   ondansetron (ZOFRAN) injection 4 mg (not administered)   bisacodyl (DULCOLAX) suppository 10 mg (not administered)   enoxaparin (LOVENOX) injection 30 mg (not administered)   piperacillin-tazobactam (ZOSYN) 4.5 g in 0.9% sodium chloride (MBP/ADV) 100 mL (not administered)   guaiFENesin (ROBITUSSIN) 100 mg/5 mL oral liquid 400 mg (not administered)   albuterol (PROVENTIL VENTOLIN) nebulizer solution 2.5 mg (not administered)   umeclidinium (INCRUSE ELLIPTA) 62.5 mcg/actuation (not administered)   levoFLOXacin (LEVAQUIN) 750 mg in D5W IVPB (not administered)   piperacillin-tazobactam (ZOSYN) 3.375 g in 0.9% sodium chloride (MBP/ADV) 100 mL (3.375 g IntraVENous New Bag 4/16/17 0933) vancomycin (VANCOCIN) 1500 mg in  ml infusion (not administered)   vancomycin (VANCOCIN) 1,000 mg in 0.9% sodium chloride (MBP/ADV) 250 mL (not administered)   lithium citrate 8 mEq/5 mL (5 mL) oral solution 300 mg (not administered)   oxyCODONE (ROXICODONE INTENSOL) 20 mg/mL concentrated solution 10 mg (not administered)   predniSONE (DELTASONE) tablet 10 mg (not administered)   nitroglycerin (NITROBID) 2 % ointment 1 Inch (not administered)   albuterol-ipratropium (DUO-NEB) 2.5 MG-0.5 MG/3 ML (3 mL Nebulization Given 4/16/17 0646)   methylPREDNISolone (PF) (SOLU-MEDROL) injection 125 mg (125 mg IntraVENous Given 4/16/17 0700)   sodium chloride (NS) flush 10 mL (10 mL IntraVENous Given 4/16/17 0858)   0.9% sodium chloride infusion (50 mL/hr IntraVENous New Bag 4/16/17 0858)   iopamidol (ISOVUE-370) 76 % injection 100 mL (100 mL IntraVENous Given 4/16/17 0857)     IMPRESSION:  1. Acute on chronic respiratory failure with hypoxemia (HCC)    2. Elevated troponin    3. Metastatic cancer ([facility] [address]      PLAN:  1. Admit to hospitalist, Dr. Cecily Gonzalez    ADMIT NOTE:    8:51 AM  Patient is being admitted to the hospital by Dr. Cecily Gonzalez. The results of their tests and reasons for their admission have been discussed with the patient and/or available family. They convey agreement and understanding for the need to be admitted and for the admission diagnosis. This note is prepared by Mark Mayo acting as Scribe for Rossi Jimenes MD.    Rossi Jimenes MD: This scribe's documentation has been prepared under my direction and personally reviewed by me in its entirety. I confirm that the note above accurately reflects all work, treatment procedures and medical decision makings performed by me.

## 2017-04-16 NOTE — IP AVS SNAPSHOT
[address] [facility] 
[phone] Patient: Javier Black MRN: [id number] X:[id number] You are allergic to the following No active allergies Recent Documentation Height Weight BMI OB Status Smoking Status 1.753 m 60.3 kg 19.64 kg/m2 Hysterectomy Former Smoker Emergency Contacts Name Discharge Info Relation Home Work Mobile [address] CAREGIVER [3] Spouse [3]   [phone] [facility] HOSPITAL DISCHARGE CAREGIVER [3] Child [2] [phone] Star Rubio  Child [2]   [phone] About your hospitalization You were admitted on:  April 16, 2017 You last received care in the:  [facility] 3 RENAL MEDICINE You were discharged on:  April 22, 2017 Unit phone number:  [phone] Why you were hospitalized Your primary diagnosis was:  Not on File Your diagnoses also included:  Acute Respiratory Failure (Hcc), Essential Hypertension, Benign, Elevated Troponin, History Of Laryngeal Cancer, Chronic Obstructive Pulmonary Disease With Acute Exacerbation (Hcc), Dysphagia, Chronic Pulmonary Aspiration, Chronic Pain Providers Seen During Your Hospitalizations Provider Role Specialty Primary office phone Rossi Jimenes MD Attending Provider Emergency Medicine [phone] Mau Deras MD Attending Provider [facility] [phone] Your Primary Care Physician (PCP) Primary Care Physician Office Phone Office Fax Karson Elliottde [phone] Follow-up Information Follow up With Details Comments Contact Info Mau Deras MD   [address] [id number] 
[phone] 630 [facility] [address] [place] [address] Your Appointments Tuesday April 25, 2017 To Be Determined ROUTINE with NIKIA Ryan [address] ([address]) [address] ([address]) Friday April 28, 2017 To Be Determined SN REASSESSMENT with VERO Dobbins [address] ([address]) [address] ([address]) Current Discharge Medication List  
  
CONTINUE these medications which have NOT CHANGED Dose & Instructions Dispensing Information Comments Morning Noon Evening Bedtime  
 acetylcysteine 100 mg/mL (10 %) nebulizer solution Commonly known as:  MUCOMYST Your last dose was: Your next dose is:    
   
   
 Dose:  4 mL Take 4 mL by inhalation every four (4) hours. Quantity:  120 mL Refills:  11  
     
   
   
   
  
 albuterol-ipratropium 2.5 mg-0.5 mg/3 ml Nebu Commonly known as:  Radha Fletcher Your last dose was: Your next dose is:    
   
   
 Dose:  3 mL  
3 mL by Nebulization route every four (4) hours as needed. Quantity:  30 Nebule Refills:  12  
     
   
   
   
  
 B infan-B long-L acid-L rhamn 2 billion cell Cpsp Commonly known as:  DIGESTIVE PROBIOTIC Your last dose was: Your next dose is:    
   
   
 Dose:  1 Cap Take 1 Cap by mouth daily. Quantity:  30 Cap Refills:  5  
     
   
   
   
  
 bisacodyl 5 mg Tab Your last dose was: Your next dose is:    
   
   
 Dose:  1 Tab Take 1 Tab by mouth daily as needed (constipation). Refills:  0  
     
   
   
   
  
 cyanocobalamin 1,000 mcg tablet Your last dose was: Your next dose is:    
   
   
 Dose:  1000 mcg Take 1,000 mcg by mouth daily.   
 Refills:  0  
     
   
   
   
  
 diphenhydrAMINE 12.5 mg/5 mL elix 40 mL, lidocaine 2 % soln 40 mL, aluminum & magnesium hydroxide-simethicone 400-400-40 mg/5 mL susp 40 mL Your last dose was: Your next dose is:    
   
   
 Dose:  5 mL Take 5 mL by mouth every six (6) hours as needed. Swish and spit or swallow. Refills:  0 FLUoxetine 20 mg tablet Commonly known as:  PROzac Your last dose was: Your next dose is:    
   
   
 Dose:  20 mg Take 20 mg by mouth daily. Refills:  0  
     
   
   
   
  
 * ISOSOURCE 1.5 MARIELA FEEDING TUBE Your last dose was: Your next dose is:    
   
   
 Dose:  5 Can  
5 Cans by Feeding Tube route daily. Refills:  0  
     
   
   
   
  
 * JEVITY 1.5 MARIELA 0.06 gram-1.5 kcal/mL Liqd Generic drug:  Lactose-Free Food with Fiber Your last dose was: Your next dose is:    
   
   
 Dose:  1.5 L  
1.5 L by PEG Tube route five (5) times daily. bolus feeding followed by 150  ml flush after each feeding Refills:  0  
     
   
   
   
  
 lithium carbonate 300 mg capsule Your last dose was: Your next dose is:    
   
   
 Dose:  300 mg Take 300 mg by mouth nightly. Refills:  0  
     
   
   
   
  
 * LORazepam 0.5 mg tablet Commonly known as:  ATIVAN Your last dose was: Your next dose is:    
   
   
 Dose:  0.5 mg Take 1 Tab by mouth two (2) times daily as needed for Anxiety. Max Daily Amount: 1 mg. Quantity:  60 Tab Refills:  2  
     
   
   
   
  
 * LORazepam 2 mg/mL concentrated solution Commonly known as:  INTENSOL Your last dose was: Your next dose is:    
   
   
 Dose:  1 mg  
0.5 mL by Per J Tube route every four (4) hours as needed (shortness of breath/air hunger). Max Daily Amount: 6 mg. Quantity:  30 mL Refills:  0  
     
   
   
   
  
 metoprolol tartrate 25 mg tablet Commonly known as:  LOPRESSOR Your last dose was: Your next dose is:    
   
   
 Dose:  25 mg Take 1 Tab by mouth every twelve (12) hours. Quantity:  60 Tab Refills:  11  
     
   
   
   
  
 MORPHINE Your last dose was: Your next dose is:    
   
   
 Dose:  100 mL Take 100 mL by mouth. Refills:  0  
     
   
   
   
  
 morphine 100 mg/5 mL (20 mg/mL) concentrated solution Commonly known as:  Katherine Manjinder Your last dose was: Your next dose is:    
   
   
 Dose:  0.5 mL Take 0.5 mL by mouth every four (4) hours as needed for Pain. Refills:  0  
     
   
   
   
  
 oxyCODONE 20 mg/mL concentrated solution Commonly known as:  Ron Heart Your last dose was: Your next dose is:    
   
   
 Dose:  10 mg Take 10 mg by mouth every four (4) hours. Refills:  0 OXYGEN-AIR DELIVERY SYSTEMS Your last dose was: Your next dose is:    
   
   
 Dose:  2 L/min 2 L/min by Nasal route continuous. Refills:  0  
     
   
   
   
  
 predniSONE 10 mg tablet Commonly known as:  Indigo Ringer Your last dose was: Your next dose is:    
   
   
 Dose:  10 mg Take 1 Tab by mouth daily (with breakfast). Quantity:  30 Tab Refills:  2  
     
   
   
   
  
 traZODone 100 mg tablet Commonly known as:  Hector Cushing Your last dose was: Your next dose is:    
   
   
 Dose:  200 mg Take 200 mg by mouth nightly. Refills:  2  
     
   
   
   
  
 * Notice: This list has 4 medication(s) that are the same as other medications prescribed for you. Read the directions carefully, and ask your doctor or other care provider to review them with you. STOP taking these medications NORVASC 10 mg tablet Generic drug:  amLODIPine Discharge Instructions PATIENT DISCHARGE INSTRUCTIONS PATIENT DISCHARGE INSTRUCTIONS Jf Amato / [id number] : 1955 Admitted 2017 Discharged: 2017 · It is important that you take the medication exactly as they are prescribed.   
· Keep your medication in the bottles provided by the pharmacist and keep a list of the medication names, dosages, and times to be taken in your wallet. · Do not take other medications without consulting your doctor. What to do at [facility] Recommended Diet: Resume previous diet Recommended Activity: Activity as tolerated Signed By: Nancy White MD   
 April 22, 2017 Discharge Orders None [employer] Announcement We are excited to announce that we are making your provider's discharge notes available to you in [employer]. You will see these notes when they are completed and signed by the physician that discharged you from your recent hospital stay. If you have any questions or concerns about any information you see in [employer], please call the Health Information Department where you were seen or reach out to your Primary Care Provider for more information about your plan of care. Introducing \[facility]\"" & HEALTH SERVICES! [facility] introduces [employer] patient portal. Now you can access parts of your medical record, email your doctor's office, and request medication refills online. 1. In your internet browser, go to [email or web address] 2. Click on the First Time User? Click Here link in the Sign In box. You will see the New Member Sign Up page. 3. Enter your [employer] Access Code exactly as it appears below. You will not need to use this code after youve completed the sign-up process. If you do not sign up before the expiration date, you must request a new code. · [employer] Access Code: [email or web address] Expires: 6/5/2017  4:08 PM 
 
4. Enter the last four digits of your Social Security Number (xxxx) and Date of Birth (mm/dd/yyyy) as indicated and click Submit. You will be taken to the next sign-up page. 5. Create a [employer] ID. This will be your [employer] login ID and cannot be changed, so think of one that is secure and easy to remember. 6. Create a [employer] password. You can change your password at any time. 7. Enter your Password Reset Question and Answer. This can be used at a later time if you forget your password. 8. Enter your e-mail address. You will receive e-mail notification when new information is available in [address] 9. Click Sign Up. You can now view and download portions of your medical record. 10. Click the Download Summary menu link to download a portable copy of your medical information. If you have questions, please visit the Frequently Asked Questions section of the [employer] website. Remember, [employer] is NOT to be used for urgent needs. For medical emergencies, dial 911. Now available from your iPhone and Android! General Information Please provide this summary of care documentation to your next provider. Patient Signature:  ____________________________________________________________ Date:  ____________________________________________________________  
  
Alysia Christopher Provider Signature:  ____________________________________________________________ Date:  ____________________________________________________________

## 2017-04-16 NOTE — PROGRESS NOTES
9:20 AM  TRANSFER - IN REPORT:    Verbal report received from VERO Copeland(name) on [employer]  being received from ED(unit) for routine progression of care      Report consisted of patients Situation, Background, Assessment and   Recommendations(SBAR). Information from the following report(s) SBAR, Kardex, Procedure Summary, Intake/Output, MAR, Recent Results, Med Rec Status and Cardiac Rhythm ST was reviewed with the receiving nurse. Opportunity for questions and clarification was provided. Assessment completed upon patients arrival to unit and care assumed.

## 2017-04-16 NOTE — PROGRESS NOTES
Pharmacy Medication Reconciliation     Recommendations/Findings: The following amendments were made to the patient's active medication list on file at [facility]:   1) Additions: none    2) Deletions: augmentin    3) Changes: lithium carbonate (300 mg cap, once daily at bedtime), diphenhydramine lido maalox suspension (takes as needed, not daily)    4) lorazepam liquid (has lorazepam tabs but not using these) and morphine liquid: both taken as needed    5) oxycodone liquid: daughters state this is taken on regular schedule every 4 hours (takes 0.5 ml - 10 mg every 4 hours)    -Clarified PTA med list with medication list and verbally with daughters at bedside. PTA medication list was corrected to the following:     Prior to Admission Medications   Prescriptions Last Dose Informant Patient Reported? Taking? (No Medication Selected) Unknown at Unknown time  Yes No   Sig: Take 100 mL by mouth. B infan-B long-L acid-L rhamn (DIGESTIVE PROBIOTIC) 2 billion cell cpSP 4/15/2017 at Unknown time  No Yes   Sig: Take 1 Cap by mouth daily. FLUoxetine (PROZAC) 20 mg tablet 4/15/2017 at Unknown time  Yes Yes   Sig: Take 20 mg by mouth daily. LACTOSE-REDUCED FOOD/FIBER (ISOSOURCE 1.5 MARIELA FEEDING TUBE)   Yes Yes   Si Cans by Feeding Tube route daily. LORazepam (ATIVAN) 0.5 mg tablet Unknown at Unknown time  No No   Sig: Take 1 Tab by mouth two (2) times daily as needed for Anxiety. Max Daily Amount: 1 mg. LORazepam (INTENSOL) 2 mg/mL concentrated solution 4/15/2017 at Unknown time  No Yes   Si.5 mL by Per J Tube route every four (4) hours as needed (shortness of breath/air hunger). Max Daily Amount: 6 mg. Lactose-Free Food with Fiber (JEVITY 1.5 MARIELA) 0.06 gram-1.5 kcal/mL liqd   Yes Yes   Si.5 L by PEG Tube route five (5) times daily. bolus feeding  followed by 150  ml flush after each feeding   OXYGEN-AIR DELIVERY SYSTEMS 2017 at Unknown time  Yes Yes   Si L/min by Nasal route continuous. acetylcysteine (MUCOMYST) 100 mg/mL (10 %) nebulizer solution 4/15/2017 at Unknown time  No Yes   Sig: Take 4 mL by inhalation every four (4) hours. albuterol-ipratropium (DUO-NEB) 2.5 mg-0.5 mg/3 ml nebu   No Yes   Sig: 3 mL by Nebulization route every four (4) hours as needed. amLODIPine (NORVASC) 10 mg tablet 4/15/2017 at Unknown time  Yes Yes   Sig: Take 10 mg by mouth daily. bisacodyl 5 mg tab   Yes Yes   Sig: Take 1 Tab by mouth daily as needed (constipation). cyanocobalamin 1,000 mcg tablet 4/15/2017 at Unknown time  Yes Yes   Sig: Take 1,000 mcg by mouth daily. diphenhydrAMINE 12.5 mg/5 mL elix 40 mL, lidocaine 2 % soln 40 mL, aluminum & magnesium hydroxide-simethicone 400-400-40 mg/5 mL susp 40 mL   Yes Yes   Sig: Take 5 mL by mouth every six (6) hours as needed. Swish and spit or swallow. lithium carbonate 300 mg capsule 4/15/2017 at Unknown time  Yes Yes   Sig: Take 300 mg by mouth nightly. metoprolol tartrate (LOPRESSOR) 25 mg tablet 4/15/2017 at Unknown time  No Yes   Sig: Take 1 Tab by mouth every twelve (12) hours. morphine (ROXANOL) 100 mg/5 mL (20 mg/mL) concentrated solution 4/15/2017 at Unknown time  Yes Yes   Sig: Take 0.5 mL by mouth every four (4) hours as needed for Pain. oxyCODONE (ROXICODONE INTENSOL) 20 mg/mL concentrated solution 4/15/2017 at Unknown time  Yes Yes   Sig: Take 10 mg by mouth every four (4) hours. predniSONE (DELTASONE) 10 mg tablet 4/15/2017 at Unknown time  No Yes   Sig: Take 1 Tab by mouth daily (with breakfast). traZODone (DESYREL) 100 mg tablet 4/15/2017 at Unknown time Self Yes Yes   Sig: Take 200 mg by mouth nightly.       Facility-Administered Medications: None          Thank you,  Keiko Perez, JAIROD

## 2017-04-16 NOTE — IP AVS SNAPSHOT
Current Discharge Medication List  
  
CONTINUE these medications which have NOT CHANGED Dose & Instructions Dispensing Information Comments Morning Noon Evening Bedtime  
 acetylcysteine 100 mg/mL (10 %) nebulizer solution Commonly known as:  MUCOMYST Your last dose was: Your next dose is:    
   
   
 Dose:  4 mL Take 4 mL by inhalation every four (4) hours. Quantity:  120 mL Refills:  11  
     
   
   
   
  
 albuterol-ipratropium 2.5 mg-0.5 mg/3 ml Nebu Commonly known as:  [place] Emmer Your last dose was: Your next dose is:    
   
   
 Dose:  3 mL  
3 mL by Nebulization route every four (4) hours as needed. Quantity:  30 Nebule Refills:  12  
     
   
   
   
  
 B infan-B long-L acid-L rhamn 2 billion cell Cpsp Commonly known as:  DIGESTIVE PROBIOTIC Your last dose was: Your next dose is:    
   
   
 Dose:  1 Cap Take 1 Cap by mouth daily. Quantity:  30 Cap Refills:  5  
     
   
   
   
  
 bisacodyl 5 mg Tab Your last dose was: Your next dose is:    
   
   
 Dose:  1 Tab Take 1 Tab by mouth daily as needed (constipation). Refills:  0  
     
   
   
   
  
 cyanocobalamin 1,000 mcg tablet Your last dose was: Your next dose is:    
   
   
 Dose:  1000 mcg Take 1,000 mcg by mouth daily. Refills:  0  
     
   
   
   
  
 diphenhydrAMINE 12.5 mg/5 mL elix 40 mL, lidocaine 2 % soln 40 mL, aluminum & magnesium hydroxide-simethicone 400-400-40 mg/5 mL susp 40 mL Your last dose was: Your next dose is:    
   
   
 Dose:  5 mL Take 5 mL by mouth every six (6) hours as needed. Swish and spit or swallow. Refills:  0 FLUoxetine 20 mg tablet Commonly known as:  PROzac Your last dose was: Your next dose is:    
   
   
 Dose:  20 mg Take 20 mg by mouth daily. Refills:  0  
     
   
   
   
  
 * ISOSOURCE 1.5 MARIELA FEEDING TUBE Your last dose was: Your next dose is:    
   
   
 Dose:  5 Can  
5 Cans by Feeding Tube route daily. Refills:  0  
     
   
   
   
  
 * JEVITY 1.5 MARIELA 0.06 gram-1.5 kcal/mL Liqd Generic drug:  Lactose-Free Food with Fiber Your last dose was: Your next dose is:    
   
   
 Dose:  1.5 L  
1.5 L by PEG Tube route five (5) times daily. bolus feeding followed by 150  ml flush after each feeding Refills:  0  
     
   
   
   
  
 lithium carbonate 300 mg capsule Your last dose was: Your next dose is:    
   
   
 Dose:  300 mg Take 300 mg by mouth nightly. Refills:  0  
     
   
   
   
  
 * LORazepam 0.5 mg tablet Commonly known as:  ATIVAN Your last dose was: Your next dose is:    
   
   
 Dose:  0.5 mg Take 1 Tab by mouth two (2) times daily as needed for Anxiety. Max Daily Amount: 1 mg. Quantity:  60 Tab Refills:  2  
     
   
   
   
  
 * LORazepam 2 mg/mL concentrated solution Commonly known as:  INTENSOL Your last dose was: Your next dose is:    
   
   
 Dose:  1 mg  
0.5 mL by Per J Tube route every four (4) hours as needed (shortness of breath/air hunger). Max Daily Amount: 6 mg. Quantity:  30 mL Refills:  0  
     
   
   
   
  
 metoprolol tartrate 25 mg tablet Commonly known as:  LOPRESSOR Your last dose was: Your next dose is:    
   
   
 Dose:  25 mg Take 1 Tab by mouth every twelve (12) hours. Quantity:  60 Tab Refills:  11 MORPHINE Your last dose was: Your next dose is:    
   
   
 Dose:  100 mL Take 100 mL by mouth. Refills:  0  
     
   
   
   
  
 morphine 100 mg/5 mL (20 mg/mL) concentrated solution Commonly known as:  Andria Cerna Your last dose was: Your next dose is:    
   
   
 Dose:  0.5 mL Take 0.5 mL by mouth every four (4) hours as needed for Pain. Refills:  0 oxyCODONE 20 mg/mL concentrated solution Commonly known as:  Jackie Arroyor Your last dose was: Your next dose is:    
   
   
 Dose:  10 mg Take 10 mg by mouth every four (4) hours. Refills:  0 OXYGEN-AIR DELIVERY SYSTEMS Your last dose was: Your next dose is:    
   
   
 Dose:  2 L/min 2 L/min by Nasal route continuous. Refills:  0  
     
   
   
   
  
 predniSONE 10 mg tablet Commonly known as:  Seng Hug Your last dose was: Your next dose is:    
   
   
 Dose:  10 mg Take 1 Tab by mouth daily (with breakfast). Quantity:  30 Tab Refills:  2  
     
   
   
   
  
 traZODone 100 mg tablet Commonly known as:  Arneta Messenger Your last dose was: Your next dose is:    
   
   
 Dose:  200 mg Take 200 mg by mouth nightly. Refills:  2  
     
   
   
   
  
 * Notice: This list has 4 medication(s) that are the same as other medications prescribed for you. Read the directions carefully, and ask your doctor or other care provider to review them with you. STOP taking these medications NORVASC 10 mg tablet Generic drug:  amLODIPine

## 2017-04-16 NOTE — CONSULTS
Cardiology Consult Note       Date of  Admission: 4/16/2017  6:28 AM     Admission type:Emergency     Subjective:     Ms. Vinny Teran is admitted with possible aspiration pneumonia, acute respiratory failure. Noted to have slightly elevated troponin, BNP. She denies any chest pain. EKG normal. Several admissions in past few months for similar episodes. Echo normal 1/17. She has metastatic laryngeal cancer, has PEG tube.      Patient Active Problem List    Diagnosis Date Noted    Elevated troponin 04/16/2017    Counseling regarding advanced directives and goals of care 03/23/2017    Respiratory failure with hypoxia ([address] 03/22/2017    Acute on chronic respiratory failure with hypoxemia ([address] 03/06/2017    Chronic pulmonary aspiration 03/02/2017    Atrial fibrillation with RVR (HCC) 02/24/2017    Respiratory failure ([address] 02/23/2017    Chronic obstructive pulmonary disease with acute exacerbation (HCC) 02/10/2017    Acute respiratory failure ([address] 02/06/2017    Aspiration pneumonia of both upper lobes ([address] 01/20/2017    Shortness of breath 01/20/2017    Delirium 01/20/2017    Goals of care, counseling/discussion 01/20/2017    Debility 01/20/2017    Acute respiratory failure with hypoxia (HCC) 01/15/2017    Fibromyalgia 01/15/2017     Class: Chronic    Chronic pain 01/15/2017     Class: Chronic    History of tobacco use 01/15/2017     Class: Present on Admission    Sepsis ([address] 01/15/2017     Class: Acute    Acute renal injury ([address] 01/15/2017     Class: Present on Admission    Shock ([address] 01/15/2017    History of radiation to head and neck region 03/30/2016    History of laryngeal cancer 11/02/2015     Class: Present on Admission    Dysphagia 12/30/2014    Essential hypertension, benign 11/29/2011    Bipolar 1 disorder ([address] 11/29/2011    Encounter for long-term (current) use of other medications 11/29/2011      Zain Hernandez MD  Past Medical History:   Diagnosis Date    Bipolar 1 disorder ([facility] 11/29/2011    Cancer ([facility]     skin cancer buttocks    Chronic pain     FIBROMYALGIA, LEGS    Encounter for long-term (current) use of other medications 11/29/2011    Essential hypertension, benign 11/29/2011    Ill-defined condition     Laryngeal cancer ([facility] 11/02/2015    with lung metastasis    Psychiatric disorder     bipolar      Past Surgical History:   Procedure Laterality Date    HX BREAST AUGMENTATION Bilateral     SALINE IMPLANTS    HX GI      PLACEMENT OF G TUBE    HX GI      ESOPH.  DILATATION    HX GYN      tubal pregnancy    HX HEENT      BX OF NECK MASS    HX HYSTERECTOMY      PLACE PERCUT GASTROSTOMY TUBE  10/28/2014     has been removed 2015    PLACE PERCUT GASTROSTOMY TUBE  2/28/2017          No Known Allergies   Family History   Problem Relation Age of Onset    Cancer Father      bone/skin/lung    Pneumonia Mother     Anesth Problems Neg Hx       Current Facility-Administered Medications   Medication Dose Route Frequency    sodium chloride (NS) flush 5-10 mL  5-10 mL IntraVENous Q8H    sodium chloride (NS) flush 5-10 mL  5-10 mL IntraVENous PRN    acetylcysteine (MUCOMYST) 200 mg/mL (20 %) solution 400 mg  400 mg Inhalation Q6H RT    FLUoxetine (PROzac) 20 mg/5 mL (4 mg/mL) oral solution 20 mg  20 mg Per G Tube DAILY    LORazepam (INTENSOL) 2 mg/mL oral concentrate 1 mg  1 mg Per J Tube Q4H PRN    metoprolol tartrate (LOPRESSOR) tablet 12.5 mg  12.5 mg Per G Tube BID    traZODone (DESYREL) tablet 200 mg  200 mg Per G Tube QHS    sodium chloride (NS) flush 5-10 mL  5-10 mL IntraVENous Q8H    sodium chloride (NS) flush 5-10 mL  5-10 mL IntraVENous PRN    acetaminophen (TYLENOL) solution 650 mg  650 mg Per G Tube Q4H PRN    naloxone (NARCAN) injection 0.4 mg  0.4 mg IntraVENous PRN    ondansetron (ZOFRAN) injection 4 mg  4 mg IntraVENous Q4H PRN    bisacodyl (DULCOLAX) suppository 10 mg  10 mg Rectal DAILY PRN    enoxaparin (LOVENOX) injection 30 mg  30 mg SubCUTAneous DAILY    piperacillin-tazobactam (ZOSYN) 4.5 g in 0.9% sodium chloride (MBP/ADV) 100 mL  4.5 g IntraVENous Q8H    guaiFENesin (ROBITUSSIN) 100 mg/5 mL oral liquid 400 mg  400 mg Per G Tube QID    albuterol (PROVENTIL VENTOLIN) nebulizer solution 2.5 mg  2.5 mg Nebulization Q6H RT    umeclidinium (INCRUSE ELLIPTA) 62.5 mcg/actuation  1 Puff Inhalation DAILY    levoFLOXacin (LEVAQUIN) 750 mg in D5W IVPB  750 mg IntraVENous Q24H    vancomycin (VANCOCIN) 1500 mg in  ml infusion  1,500 mg IntraVENous ONCE    [START ON 4/17/2017] vancomycin (VANCOCIN) 1,000 mg in 0.9% sodium chloride (MBP/ADV) 250 mL  1,000 mg IntraVENous Q12H    oxyCODONE (ROXICODONE INTENSOL) 20 mg/mL concentrated solution 10 mg  10 mg Oral Q4HWA    predniSONE (DELTASONE) tablet 10 mg  10 mg Per G Tube DAILY WITH BREAKFAST    nitroglycerin (NITROBID) 2 % ointment 1 Inch  1 Inch Topical Q6H PRN    morphine injection 2 mg  2 mg IntraVENous Q3H PRN    albuterol-ipratropium (DUO-NEB) 2.5 MG-0.5 MG/3 ML  3 mL Nebulization Q6H PRN    0.9% sodium chloride infusion  100 mL/hr IntraVENous CONTINUOUS    lithium carbonate capsule 300 mg  300 mg Oral DAILY         Review of Symptoms:  A comprehensive review of systems was negative except for that written in the HPI. Physical Exam    Visit Vitals    /85 (BP 1 Location: Left arm, BP Patient Position: At rest)    Pulse 87    Temp 97.4 °F (36.3 °C)    Resp 18    Ht 5' 9\" (1.753 m)    Wt 133 lb (60.3 kg)    LMP  (LMP Unknown)    SpO2 97%    BMI 19.64 kg/m2     General Appearance:  poorly developed, poorly nourished,alert and oriented x 3, and individual in mild distress. Ears/Nose/Mouth/Throat:   Hearing grossly normal.         Neck: Supple. Chest:   Lungs diminished BS bilaterally. Cardiovascular:  Regular rate and rhythm, S1, S2 normal, no murmur. Abdomen:   Soft, non-tender, bowel sounds are active. Extremities: No edema bilaterally.     Skin: Warm and dry. Cardiographics    Telemetry: normal sinus rhythm  ECG: normal EKG, normal sinus rhythm, unchanged from previous tracings, nonspecific ST and T waves changes  Echocardiogram: Not done    Labs:   Recent Results (from the past 24 hour(s))   EKG, 12 LEAD, INITIAL    Collection Time: 04/16/17  6:40 AM   Result Value Ref Range    Ventricular Rate 102 BPM    Atrial Rate 102 BPM    P-R Interval 152 ms    QRS Duration 88 ms    Q-T Interval 360 ms    QTC Calculation (Bezet) 469 ms    Calculated P Axis 66 degrees    Calculated R Axis 54 degrees    Calculated T Axis 58 degrees    Diagnosis       Sinus tachycardia with premature atrial complexes  Possible Left atrial enlargement  Left ventricular hypertrophy  Abnormal ECG  When compared with ECG of 22-MAR-2017 09:41,  Nonspecific T wave abnormality no longer evident in Lateral leads  QT has shortened     BLOOD GAS, ARTERIAL    Collection Time: 04/16/17  6:45 AM   Result Value Ref Range    pH 7.35 7.35 - 7.45      PCO2 51 (H) 35.0 - 45.0 mmHg    PO2 76 (L) 80 - 100 mmHg    O2 SAT 94 92 - 97 %    BICARBONATE 27 (H) 22 - 26 mmol/L    BASE EXCESS 0.7 mmol/L    O2 METHOD NRM      FIO2 100 %    SPONTANEOUS RATE 20.0      Sample source ARTERIAL      SITE RIGHT RADIAL      INESSA'S TEST YES     CBC WITH AUTOMATED DIFF    Collection Time: 04/16/17  7:01 AM   Result Value Ref Range    WBC 11.1 (H) 3.6 - 11.0 K/uL    RBC 3.63 (L) 3.80 - 5.20 M/uL    HGB 9.9 (L) 11.5 - 16.0 g/dL    HCT 33.1 (L) 35.0 - 47.0 %    MCV 91.2 80.0 - 99.0 FL    MCH 27.3 26.0 - 34.0 PG    MCHC 29.9 (L) 30.0 - 36.5 g/dL    RDW 17.7 (H) 11.5 - 14.5 %    PLATELET 263 140 - 453 K/uL    NEUTROPHILS 77 %    BAND NEUTROPHILS 13 %    LYMPHOCYTES 7 %    MONOCYTES 3 %    EOSINOPHILS 0 %    BASOPHILS 0 %    ABS. NEUTROPHILS 10.0 K/UL    ABS. LYMPHOCYTES 0.8 K/UL    ABS. MONOCYTES 0.3 K/UL    ABS. EOSINOPHILS 0.0 K/UL    ABS.  BASOPHILS 0.0 K/UL    RBC COMMENTS ANISOCYTOSIS  1+        WBC COMMENTS TOXIC GRANULATION      DF MANUAL     METABOLIC PANEL, COMPREHENSIVE    Collection Time: 04/16/17  7:01 AM   Result Value Ref Range    Sodium 135 (L) 136 - 145 mmol/L    Potassium 4.4 3.5 - 5.1 mmol/L    Chloride 99 97 - 108 mmol/L    CO2 29 21 - 32 mmol/L    Anion gap 7 5 - 15 mmol/L    Glucose 106 (H) 65 - 100 mg/dL    BUN 16 6 - 20 MG/DL    Creatinine 0.75 0.55 - 1.02 MG/DL    BUN/Creatinine ratio 21 (H) 12 - 20      GFR est AA >60 >60 ml/min/1.73m2    GFR est non-AA >60 >60 ml/min/1.73m2    Calcium 9.6 8.5 - 10.1 MG/DL    Bilirubin, total 0.4 0.2 - 1.0 MG/DL    ALT (SGPT) 17 12 - 78 U/L    AST (SGOT) 12 (L) 15 - 37 U/L    Alk.  phosphatase 60 45 - 117 U/L    Protein, total 7.1 6.4 - 8.2 g/dL    Albumin 3.0 (L) 3.5 - 5.0 g/dL    Globulin 4.1 (H) 2.0 - 4.0 g/dL    A-G Ratio 0.7 (L) 1.1 - 2.2     LACTIC ACID, PLASMA    Collection Time: 04/16/17  7:01 AM   Result Value Ref Range    Lactic acid 0.8 0.4 - 2.0 MMOL/L   TROPONIN I    Collection Time: 04/16/17  7:01 AM   Result Value Ref Range    Troponin-I, Qt. 0.55 (H) <0.05 ng/mL   CK W/ REFLX CKMB    Collection Time: 04/16/17  7:01 AM   Result Value Ref Range    CK 26 26 - 192 U/L   INFLUENZA A & B AG (RAPID TEST)    Collection Time: 04/16/17  7:01 AM   Result Value Ref Range    Influenza A Antigen NEGATIVE  NEG      Influenza B Antigen NEGATIVE  NEG     URINALYSIS W/ REFLEX CULTURE    Collection Time: 04/16/17  7:01 AM   Result Value Ref Range    Color YELLOW/STRAW      Appearance CLEAR CLEAR      Specific gravity 1.014 1.003 - 1.030      pH (UA) 6.5 5.0 - 8.0      Protein TRACE (A) NEG mg/dL    Glucose NEGATIVE  NEG mg/dL    Ketone NEGATIVE  NEG mg/dL    Bilirubin NEGATIVE  NEG      Blood NEGATIVE  NEG      Urobilinogen 0.2 0.2 - 1.0 EU/dL    Nitrites NEGATIVE  NEG      Leukocyte Esterase NEGATIVE  NEG      WBC 0-4 0 - 4 /hpf    RBC 0-5 0 - 5 /hpf    Epithelial cells FEW FEW /lpf    Bacteria NEGATIVE  NEG /hpf    UA:UC IF INDICATED CULTURE NOT INDICATED BY UA RESULT CNI      Hyaline cast 2-5 0 - 5 /lpf   LIPASE    Collection Time: 04/16/17  7:01 AM   Result Value Ref Range    Lipase 83 73 - 393 U/L   PRO-BNP    Collection Time: 04/16/17  7:01 AM   Result Value Ref Range    NT pro-BNP 1395 (H) 0 - 125 PG/ML   MAGNESIUM    Collection Time: 04/16/17  7:01 AM   Result Value Ref Range    Magnesium 2.2 1.6 - 2.4 mg/dL   PTT    Collection Time: 04/16/17  7:01 AM   Result Value Ref Range    aPTT 29.0 22.1 - 32.5 sec    aPTT, therapeutic range     58.0 - 77.0 SECS   PROTHROMBIN TIME + INR    Collection Time: 04/16/17  7:01 AM   Result Value Ref Range    INR 1.1 0.9 - 1.1      Prothrombin time 10.7 9.0 - 11.1 sec        Assessment:     Assessment:       Active Problems:    Essential hypertension, benign (11/29/2011)      Acute respiratory failure (HCC) (2/6/2017)      Elevated troponin (4/16/2017)         Plan: Active Problems:    Essential hypertension, benign (11/29/2011)      Acute respiratory failure (HCC) (2/6/2017)      Elevated troponin (4/16/2017)- non specific in the setting of acute respiratory failure, pneumonia. Similar presentation in the past. Normal echo 1/17. Given her overall poor health, limited prognosis, would not pursue further evaluation. Recommend symptom based treatment. Thank you for the consult. Dr. Finesse Lyles will follow.         Cash Monroy MD

## 2017-04-16 NOTE — ROUTINE PROCESS
TRANSFER - OUT REPORT:    Verbal report given to Pura Niño RN(name) on [employer]  being transferred to [facility](unit) for routine progression of care       Report consisted of patients Situation, Background, Assessment and   Recommendations(SBAR). Information from the following report(s) SBAR, ED Summary and MAR was reviewed with the receiving nurse. Lines:   Peripheral IV 04/16/17 Right Hand (Active)   Site Assessment Clean, dry, & intact 4/16/2017  7:08 AM   Phlebitis Assessment 0 4/16/2017  7:08 AM   Infiltration Assessment 0 4/16/2017  7:08 AM   Dressing Status Clean, dry, & intact 4/16/2017  7:08 AM   Dressing Type Tape;Transparent 4/16/2017  7:08 AM   Hub Color/Line Status Blue;Flushed 4/16/2017  7:08 AM       Peripheral IV 04/16/17 Right Antecubital (Active)   Site Assessment Clean, dry, & intact 4/16/2017  8:25 AM   Phlebitis Assessment 0 4/16/2017  8:25 AM   Infiltration Assessment 0 4/16/2017  8:25 AM   Hub Color/Line Status Pink 4/16/2017  8:25 AM        Opportunity for questions and clarification was provided.       Patient transported with:   O2 @ 15 liters

## 2017-04-17 NOTE — PROGRESS NOTES
1920 Received report from [place], [place]. Assumed patient care. [address] SHIFT NURSING NOTE    Bedside shift change report given to VERO Dean (oncoming nurse) by Hans Quinones RN (offgoing nurse). Report included the following information SBAR, Kardex, Intake/Output, MAR, Recent Results and Cardiac Rhythm NSR.    SHIFT SUMMARY:         Admission Date 4/16/2017   Admission Diagnosis Acute respiratory failure ([address]   Consults IP CONSULT TO CARDIOLOGY  IP CONSULT TO PRIMARY CARE PROVIDER  IP CONSULT TO PALLIATIVE CARE - PROVIDER  IP CONSULT TO PULMONOLOGY        Consults   [x] PT   [x] OT   [] Speech   [x] Palliative      [] Hospice    [] Case Management   [] None   Cardiac Monitoring   [x] Yes   [] No     Antibiotics   [x] Yes   [] No   GI Prophylaxis  (Ex: Protonix, Pepcid, etc,.)   [] Yes   [x] No          DVT Prophylaxis   SCDs:             Claudio stockings:         [x] Medication (Ex: Lovenox, Eliquis, Brilinta, Coumadin,  Heparin, etc..)   [] Contraindicated   [] No VTE needed       Urinary Catheter             LDAs               Peripheral IV 04/17/17 Right Wrist (Active)   Site Assessment Clean, dry, & intact 4/17/2017  8:32 PM   Phlebitis Assessment 0 4/17/2017  8:32 PM   Infiltration Assessment 0 4/17/2017  8:32 PM   Dressing Status Clean, dry, & intact 4/17/2017  8:32 PM   Dressing Type Tape;Transparent 4/17/2017  8:32 PM   Hub Color/Line Status Blue; Infusing;Patent 4/17/2017  8:32 PM          PEG/Gastrostomy Tube 02/28/17 (Active)   Site Assessment Clean, dry, & intact 4/17/2017  8:32 PM   Dressing Status Clean, dry, & intact 4/17/2017  8:32 PM   G Port Status Clamped 4/17/2017  8:32 PM   Action Taken Other (comment) 4/16/2017  6:12 PM   Gastric Residual (mL) 20 ml 4/17/2017  5:50 AM   Tube Feeding/Formula Options Jevity 1.5 4/17/2017  8:32 PM   Modular Nutrients Fiber 4/17/2017  5:50 AM   Tube Feeding/Verify Rate (mL/hr) 240 4/17/2017  5:50 AM   Water Flush Volume (mL) 150 mL 4/17/2017  5:50 AM   Intake (ml) 490 ml 4/17/2017  5:50 AM   Medication Volume 100 ml 4/17/2017  5:50 AM   Output (ml) 0 ml 4/16/2017 12:31 PM                I/Os   Intake/Output Summary (Last 24 hours) at 04/18/17 0015  Last data filed at 04/17/17 2032   Gross per 24 hour   Intake          4213.34 ml   Output             2101 ml   Net          2112.34 ml         Activity Level Activity Level: Head of bed elevated (degrees)     Activity Assistance: Partial (one person)   Diet Active Orders   Diet    DIET NPO      Purposeful Rounding every 1-2 hour? [x] Yes    Airam Score  Total Score: 3   Bed Alarm (If score 3 or >)   [x] Yes    [] Refused (See signed refusal form in chart)   Angel Score  Angel Score: 17       Angel Score (if score 14 or less)   [] PMT consult   [] Nutrition consult   [] Wound Care consult      []  Specialty bed         Influenza Vaccine Received Flu Vaccine for Current Season (usually Sept-March): Yes               Needs prior to discharge:   Home O2 required:    [x] Yes   [] No     If yes, how much O2 required?  4L NC chronic    Other:    Last Bowel Movement Date: 04/17/17   Readmission Risk Assessment Tool Score High Risk            22       Total Score        3 Relationship with PCP    11 More than 1 Admission in calendar year    5 Patient Insurance is Medicare, Medicaid or Self Pay    3 Charlson Comorbidity Score        Criteria that do not apply:    Patient Living Status    Patient Length of Stay > 5       Expected Length of Stay 3d 19h   Actual Length of Stay 2

## 2017-04-17 NOTE — CDMP QUERY
Please clarify if this patient is being treated/managed for:    =>1. Aspiration PNA and/ or HCAP POA was noted in the H/P however, it is not noted in subsequent documentation. Please clarify if this condition was:    Treated & resolved  Ongoing/Improving  Ruled Out  Other Explanation of the Clinical Findings  Unable to Determine (no explanation for clinical findings)    The medical record reflects the following clinical findings, treatment, and risk factors:    Risk Factors: 61yoF w/ 5 prior adm 2/2 asp pna w/ laryngeal Ca. PEG 2/28 but cont PNA, cont sev cough, distress 2/2 air hunger, pulse [email or web address] 70's, c/o LBP, intake ice chips +~4 cans of Isosource. Clinical Indicators: hypoxic desats 80's, wbc-11.1, bnp-1395, CCT-New dense airspace disease RLL and increasing airspace disease LLL    Treatment: mucomyst, o2, IV LVQ, IV Zosyn, prednisone, IVF, IV Vanco, DNR, pulm/card/pall cs ord    Please clarify and document your clinical opinion in the progress notes and discharge summary. Thank you!   Georges Yee, [address] [facility], . [address]

## 2017-04-17 NOTE — PROGRESS NOTES
3332 [facility] report from Andreirma Schwab, [facility]. Assumed patient care. 0350 Pt has scheduled tube feedings 5x per day. She is due now and refusing. Pt stated, \"I do not give myself feedings at home at night so I don't need it here. \" Informed patient that feedings were important to aid in healing and aiding in gaining weight. Pt still refused and stated that she wanted her feeding at a later time. Will continue to monitor. [address] SHIFT NURSING NOTE    Bedside shift change report given to [place], RN (oncoming nurse) by Ye Mendieta RN (offgoing nurse).  Report included the following information SBAR, Kardex, Intake/Output, MAR, Recent Results and Cardiac Rhythm NSR.    SHIFT SUMMARY:         Admission Date 4/16/2017   Admission Diagnosis Acute respiratory failure ([address]   Consults IP CONSULT TO CARDIOLOGY  IP CONSULT TO PRIMARY CARE PROVIDER  IP CONSULT TO PALLIATIVE CARE - PROVIDER        Consults   [x] PT   [x] OT   [] Speech   [x] Palliative      [] Hospice    [] Case Management   [] None   Cardiac Monitoring   [x] Yes   [] No     Antibiotics   [x] Yes   [] No   GI Prophylaxis  (Ex: Protonix, Pepcid, etc,.)   [] Yes   [x] No          DVT Prophylaxis   SCDs:             Claudio stockings:         [x] Medication (Ex: Lovenox, Eliquis, Brilinta, Coumadin,  Heparin, etc..)   [] Contraindicated   [] No VTE needed       Urinary Catheter             LDAs               Peripheral IV 04/16/17 Right Hand (Active)   Site Assessment Clean, dry, & intact 4/16/2017 11:06 PM   Phlebitis Assessment 0 4/16/2017 11:06 PM   Infiltration Assessment 0 4/16/2017 11:06 PM   Dressing Status Clean, dry, & intact 4/16/2017 11:06 PM   Dressing Type Tape;Transparent 4/16/2017 11:06 PM   Hub Color/Line Status Blue;Flushed;Patent 4/16/2017 11:06 PM       Peripheral IV 04/16/17 Right Antecubital (Active)   Site Assessment Clean, dry, & intact 4/16/2017 11:06 PM   Phlebitis Assessment 0 4/16/2017 11:06 PM   Infiltration Assessment 0 4/16/2017 11:06 PM   Dressing Status Clean, dry, & intact 4/16/2017 11:06 PM   Dressing Type Tape;Transparent 4/16/2017 11:06 PM   Hub Color/Line Status Pink; Infusing;Patent 4/16/2017 11:06 PM          PEG/Gastrostomy Tube 02/28/17 (Active)   Site Assessment Clean, dry, & intact 4/16/2017 11:06 PM   Dressing Status Clean, dry, & intact 4/16/2017 11:06 PM   G Port Status Clamped 4/16/2017 11:06 PM   Action Taken Other (comment) 4/16/2017  6:12 PM   Gastric Residual (mL) 20 ml 4/16/2017 11:10 PM   Tube Feeding/Formula Options Jevity 1.5 4/16/2017 11:10 PM   Modular Nutrients Fiber 4/16/2017 11:10 PM   Tube Feeding/Verify Rate (mL/hr) 250 4/16/2017 11:10 PM   Water Flush Volume (mL) 150 mL 4/16/2017 11:10 PM   Intake (ml) 550 ml 4/16/2017 11:10 PM   Medication Volume 150 ml 4/16/2017 11:10 PM   Output (ml) 0 ml 4/16/2017 12:31 PM                I/Os   Intake/Output Summary (Last 24 hours) at 04/17/17 0045  Last data filed at 04/16/17 2310   Gross per 24 hour   Intake             4220 ml   Output              900 ml   Net             3320 ml         Activity Level Activity Level: Bed Rest     Activity Assistance: Partial (one person)   Diet Active Orders   Diet    DIET NPO      Purposeful Rounding every 1-2 hour? [x] Yes    Airam Score  Total Score: 4   Bed Alarm (If score 3 or >)   [x] Yes    [] Refused (See signed refusal form in chart)   Angel Score  Angel Score: 16       Angel Score (if score 14 or less)   [] PMT consult   [x] Nutrition consult   [] Wound Care consult      []  Specialty bed         Influenza Vaccine Received Flu Vaccine for Current Season (usually Sept-March): Yes               Needs prior to discharge:   Home O2 required:    [] Yes   [x] No     If yes, how much O2 required?     Other:    Last Bowel Movement Date: 04/15/17   Readmission Risk Assessment Tool Score High Risk            22       Total Score        3 Relationship with PCP    11 More than 1 Admission in calendar year    5 Patient Insurance is Medicare, Medicaid or Self Pay    3 Charlson Comorbidity Score        Criteria that do not apply:    Patient Living Status    Patient Length of Stay > 5       Expected Length of Stay - - -   Actual Length of Stay 1

## 2017-04-17 NOTE — PROGRESS NOTES
Patient irritable, easily upset about feedings. Nutritionalist assessed patient. We are working on getting a tube feeding from dietary. She desires it to be given as a bolus which means the rest of the bottle has to be wasted after every feeding since the container is opened. neeta is winded but this does not keep her from moving fast in the bed. She desats on her 18 with movement Her. 02 is  On 4 lpm and her sats go to the 80s with movement.  She is using the bed pan

## 2017-04-17 NOTE — CDMP QUERY
Please clarify if this patient is being treated/managed for:    =>2. SIRS was noted in the H/P however, it is not noted in subsequent documentation. Please clarify if this condition was:    Treated & resolved  Ongoing/Improving  With or without Acute Organ Dysfunction POA  Ruled Out    Other Explanation of the Clinical Findings  Unable to Determine (no explanation for clinical findings)    The medical record reflects the following clinical findings, treatment, and risk factors:    Risk Factors: 61yoF w/ 5 prior adm 2/2 asp pna w/ laryngeal Ca. PEG 2/28 but cont PNA, cont sev cough, distress 2/2 air hunger, pulse [email or web address] 70's, c/o LBP, intake ice chips +~4 cans of Isosource. Clinical Indicators: hypoxic desats 80's, wbc-11.1, bnp-1395, CCT-New dense airspace disease RLL and increasing airspace disease LLL    Treatment: mucomyst, o2, IV LVQ, IV Zosyn, prednisone, IVF, IV Vanco, DNR, pulm/card/pall cs ord    Please clarify and document your clinical opinion in the progress notes and discharge summary. Thank you!   Kena Martinez [facility], . [address]

## 2017-04-17 NOTE — PROGRESS NOTES
Problem: Self Care Deficits Care Plan (Adult)  Goal: *Acute Goals and Plan of Care (Insert Text)  Occupational Therapy Goals  Initiated 4/17/2017  1. Patient will perform grooming standing at the sink with supervision/set-up within 7 day(s). 2. Patient will perform bathing at the sink with minimal assistance/contact guard assist within 7 day(s). 3. Patient will perform lower body dressing with moderate assistance within 7 day(s). 4. Patient will perform toilet transfers with supervision/set-up within 7 day(s). 5. Patient will perform all aspects of toileting with independence within 7 day(s). 6. Patient will participate in upper extremity therapeutic exercise/activities with independence for 5 minutes within 7 day(s). 7. Patient will utilize energy conservation techniques during functional activities with verbal cues within 7 day(s). OCCUPATIONAL THERAPY EVALUATION  Patient: Clara Dow (4 y.o. female)  Date: 4/17/2017  Primary Diagnosis: Acute respiratory failure (HCC)        Precautions:          ASSESSMENT :  Based on the objective data described below, the patient presents with generalized weakness, decreased endurance, strength, functional mobility, and ADls. Pt was on 2 liters of NC and her O2% was in the 90's. Pt was SBA for bed mobility, CGA to come to stand, and with use of RW walked to the toilet. Pt states that her toilet is lower than the one here. Pt was able to keep her O2% in the 90s if she moved slowly. She was left sitting in the recliner with nursing notified. Pt at this time will need further therapy at discharge and recommendations home with home care PT and assist from family. Patient will benefit from skilled intervention to address the above impairments.   Patients rehabilitation potential is considered to be Good  Factors which may influence rehabilitation potential include:   [X]             None noted  [ ]             Mental ability/status  [ ]             Medical condition  [ ]             Home/family situation and support systems  [ ]             Safety awareness  [ ]             Pain tolerance/management  [ ]             Other:        PLAN :  Recommendations and Planned Interventions:  [X]               Self Care Training                  [ ]        Therapeutic Activities  [X]               Functional Mobility Training    [ ]        Cognitive Retraining  [X]               Therapeutic Exercises           [X]        Endurance Activities  [ ]               Balance Training                   [ ]        Neuromuscular Re-Education  [ ]               Visual/Perceptual Training     [X]   Home Safety Training  [X]               Patient Education                 [X]        Family Training/Education  [ ]               Other (comment):     Frequency/Duration: Patient will be followed by occupational therapy 4 times a week to address goals. Discharge Recommendations: Home Health  Further Equipment Recommendations for Discharge: tbd       SUBJECTIVE:   Patient stated I want you to know that my oxygen level drops when I get up. 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  DILATATION    HX GYN         tubal pregnancy    HX HEENT         BX OF NECK MASS    HX HYSTERECTOMY        PLACE PERCUT GASTROSTOMY TUBE   10/28/2014      has been removed 2015    PLACE PERCUT GASTROSTOMY TUBE   2/28/2017 Prior Level of Function/Home Situation: pt lives with family and per pt was independent in all areas prior she reports. Expanded or extensive additional review of patient history:      Home Situation  Home Environment: Private residence  One/Two Story Residence: One story  Living Alone: No  Support Systems: Family member(s)  Patient Expects to be Discharged to[de-identified] Private residence  Current DME Used/Available at Home: Oxygen, portable  [X]  Right hand dominant             [ ]  Left hand dominant     EXAMINATION OF PERFORMANCE DEFICITS:  Cognitive/Behavioral Status:  Neurologic State: Alert  Orientation Level: Appropriate for age;Oriented X4  Cognition: Follows commands  Perception: Appears intact  Perseveration: No perseveration noted  Safety/Judgement: Awareness of environment; Fall prevention     Skin: in good healgh     Edema: none     Hearing: Auditory  Auditory Impairment: Hard of hearing, bilateral     Vision/Perceptual:                    intact                  Range of Motion:     AROM: Generally decreased, functional  PROM: Generally decreased, functional                       Strength:     Strength: Generally decreased, functional                 Coordination:  Coordination: Within functional limits  Fine Motor Skills-Upper: Left Intact; Right Intact    Gross Motor Skills-Upper: Left Intact; Right Intact     Tone & Sensation:     Tone: Normal  Sensation: Intact                       Balance:  Sitting: Intact  Standing: Impaired  Standing - Static: Good  Standing - Dynamic : Fair     Functional Mobility and Transfers for ADLs:  Bed Mobility:  Supine to Sit: Supervision  Sit to Supine: Supervision  Scooting: Supervision     Transfers:  Sit to Stand: Contact guard assistance  Stand to Sit: Contact guard assistance  Bed to Chair: Contact guard assistance  Toilet Transfer : Contact guard assistance     ADL Assessment:        Oral Facial Hygiene/Grooming: Setup     Bathing: Maximum assistance;Minimum assistance     Upper Body Dressing: Setup     Lower Body Dressing: Maximum assistance;Minimum assistance                Barthel Index:      Bathin  Bladder: 10  Bowels: 10  Groomin  Dressin  Feeding: 10  Mobility: 0  Stairs: 0  Toilet Use: 10  Transfer (Bed to Chair and Back): 10  Total: 65         Barthel and G-code impairment scale:  Percentage of impairment CH  0% CI  1-19% CJ  20-39% CK  40-59% CL  60-79% CM  80-99% CN  100%   Barthel Score 0-100 100 99-80 79-60 59-40 20-39 1-19    0   Barthel Score 0-20 20 17-19 13-16 9-12 5-8 1-4 0      The Barthel ADL Index: Guidelines  1. The index should be used as a record of what a patient does, not as a record of what a patient could do. 2. The main aim is to establish degree of independence from any help, physical or verbal, however minor and for whatever reason. 3. The need for supervision renders the patient not independent. 4. A patient's performance should be established using the best available evidence. Asking the patient, friends/relatives and nurses are the usual sources, but direct observation and common sense are also important. However direct testing is not needed. 5. Usually the patient's performance over the preceding 24-48 hours is important, but occasionally longer periods will be relevant. 6. Middle categories imply that the patient supplies over 50 per cent of the effort. 7. Use of aids to be independent is allowed. Dionna Koenig., Barthel, D.W. ([id number]). Functional evaluation: the Barthel Index. [address] [facility] (14)2. Walt Grade gabino [place], J.J.M.F, Linda Prieto.Hayden., Acey See, [address] (). Measuring the change indisability after inpatient rehabilitation; comparison of the responsiveness of the Barthel Index and Functional [place] Measure. Journal of Neurology, Neurosurgery, and Psychiatry, 66(4), 810-955.   TANIA Cunningham.CHARISSA, HOLLEY Toscano, & Jennifer Dickinson MNusratA. (2004.) Assessment of post-stroke quality of life in cost-effectiveness studies: The usefulness of the Barthel Index and the EuroQoL-5D. Quality of Life Research, 13, 436-25                Cognitive Retraining  Safety/Judgement: Awareness of environment; Fall prevention           Functional Measure:        G codes: In compliance with CMSs Claims Based Outcome Reporting, the following G-code set was chosen for this patient based on their primary functional limitation being treated: The outcome measure chosen to determine the severity of the functional limitation was the Barthel with a score of 65/100 which was correlated with the impairment scale. · Self Care:               - CURRENT STATUS:    CJ - 20%-39% impaired, limited or restricted               - GOAL STATUS:           CI - 1%-19% impaired, limited or restricted               - D/C STATUS:                       ---------------To be determined---------------      Occupational Therapy Evaluation Charge Determination   History Examination Decision-Making   MEDIUM Complexity : Expanded review of history including physical, cognitive and psychosocial  history  MEDIUM Complexity : 3-5 performance deficits relating to physical, cognitive , or psychosocial skils that result in activity limitations and / or participation restrictions MEDIUM Complexity : Patient may present with comorbidities that affect occupational performnce. Miniml to moderate modification of tasks or assistance (eg, physical or verbal ) with assesment(s) is necessary to enable patient to complete evaluation       Based on the above components, the patient evaluation is determined to be of the following complexity level: MEDIUM  Pain:  Pain Scale 1: Numeric (0 - 10)  Pain Intensity 1: 7  Pain Location 1: Back  Pain Orientation 1: Mid  Pain Description 1: Stabbing;Aching  Pain Intervention(s) 1: Medication (see MAR)  Activity Tolerance:   vss  Please refer to the flowsheet for vital signs taken during this treatment.   After treatment:   [X] Patient left in no apparent distress sitting up in chair  [ ] Patient left in no apparent distress in bed  [X] Call bell left within reach  [X] Nursing notified  [ ] Caregiver present  [X] Bed alarm activated      COMMUNICATION/EDUCATION:   The patients plan of care was discussed with: Physical Therapist and Registered Nurse.  [X] Home safety education was provided and the patient/caregiver indicated understanding. [X] Patient/family have participated as able in goal setting and plan of care. [X] Patient/family agree to work toward stated goals and plan of care. [ ] Patient understands intent and goals of therapy, but is neutral about his/her participation. [ ] Patient is unable to participate in goal setting and plan of care. This patients plan of care is appropriate for delegation to \[facility]\"".      Thank you for this referral.  Milagros Garcia OT  Time Calculation: 30 mins

## 2017-04-17 NOTE — PROGRESS NOTES
Problem: Mobility Impaired (Adult and Pediatric)  Goal: *Acute Goals and Plan of Care (Insert Text)  Physical Therapy Goals  Initiated 4/17/2017  1. Patient will move from supine to sit and sit to supine in bed with modified independence within 7 day(s). 2. Patient will transfer from bed to chair and chair to bed with modified independence using the least restrictive device within 7 day(s). 3. Patient will perform sit to stand with modified independence within 7 day(s). 4. Patient will ambulate with modified independence for 150 feet with the least restrictive device within 7 day(s). PHYSICAL THERAPY EVALUATION  Patient: Sincere Chun (89 y.o. female)  Date: 4/17/2017  Primary Diagnosis: Acute respiratory failure (HCC)        Precautions: falls         ASSESSMENT :  Based on the objective data described below, the patient presents with decreased functional mobility from baseline level of function. Prior to admit patient was independent with mobility. Lives with family and was not using an assistive device prior to admit. Patient currently needing supervision for bed mobility and CGA for transfers. Amb approx 30 feet with CGA. Demo's fair dynamic balance and is very impulsive with movements. Gait rather unsteady and is a fall risk. May benefit from [facility] for support/safety. SpO2 drops to approx 88% after activity on 4 LO2. [facility] for safety/strengthening at [place]. Will continue to follow for mobility progression. Patient will benefit from skilled intervention to address the above impairments.   Patients rehabilitation potential is considered to be Good  Factors which may influence rehabilitation potential include:   [ ]         None noted  [X]         Mental ability/status  [ ]         Medical condition  [ ]         Home/family situation and support systems  [ ]         Safety awareness  [ ]         Pain tolerance/management  [ ] Other: PLAN :  Recommendations and Planned Interventions:  [X]           Bed Mobility Training             [ ]    Neuromuscular Re-Education  [X]           Transfer Training                   [ ]    Orthotic/Prosthetic Training  [X]           Gait Training                         [ ]    Modalities  [X]           Therapeutic Exercises           [ ]    Edema Management/Control  [X]           Therapeutic Activities            [X]    Patient and Family Training/Education  [ ]           Other (comment):     Frequency/Duration: Patient will be followed by physical therapy  3 times a week to address goals. Discharge Recommendations: Home Health with 24 hr supervision  Further Equipment Recommendations for Discharge: rolling walker (possibly)       SUBJECTIVE:   Patient stated My daughters are getting sick of me. 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 DILATATION    HX GYN         tubal pregnancy    HX HEENT         BX OF NECK MASS    HX HYSTERECTOMY        PLACE PERCUT GASTROSTOMY TUBE   10/28/2014      has been removed 2015    PLACE PERCUT GASTROSTOMY TUBE   2/28/2017           Prior Level of Function/Home Situation: Per patient she was independent with mobility at baseline.   Does not have any assistive device  Personal factors and/or comorbidities impacting plan of care:      Home Situation  Home Environment: Private residence  One/Two Story Residence: One story  Living Alone: No  Support Systems: Family member(s)  Patient Expects to be Discharged to[de-identified] Private residence  Current DME Used/Available at Home: Oxygen, portable     EXAMINATION/PRESENTATION/DECISION MAKING:   Critical Behavior:  Neurologic State: Alert, Irritable  Orientation Level: Oriented X4  Cognition: Appropriate decision making, Follows commands     Hearing: Auditory  Auditory Impairment: Hard of hearing, bilateral     Range Of Motion:  AROM: Generally decreased, functional                       Strength:    Strength: Generally decreased, functional              Functional Mobility:  Bed Mobility:     Supine to Sit: Supervision  Sit to Supine: Supervision  Scooting: Supervision  Transfers:  Sit to Stand: Contact guard assistance  Stand to Sit: Contact guard assistance                       Balance:   Sitting: Intact  Standing: Impaired  Standing - Static: Good  Standing - Dynamic : Fair  Ambulation/Gait Training:  Distance (ft): 30 Feet (ft)  Assistive Device: Gait belt  Ambulation - Level of Assistance: Contact guard assistance        Gait Abnormalities: Decreased step clearance;Shuffling gait        Base of Support: Widened     Speed/[place]: Pace decreased (<100 feet/min); Shuffled; Slow  Step Length: Left shortened;Right shortened        Functional Measure:  Barthel Index:      Bathin  Bladder: 10  Bowels: 10  Groomin  Dressin  Feeding: 10  Mobility: 0  Stairs: 0  Toilet Use: 10  Transfer (Bed to Chair and Back): 10  Total: 65         Barthel and G-code impairment scale:  Percentage of impairment CH  0% CI  1-19% CJ  20-39% CK  40-59% CL  60-79% CM  80-99% CN  100%   Barthel Score 0-100 100 99-80 79-60 59-40 20-39 1-19    0   Barthel Score 0-20 20 17-19 13-16 9-12 5-8 1-4 0      The Barthel ADL Index: Guidelines  1. The index should be used as a record of what a patient does, not as a record of what a patient could do.   2. The main aim is to establish degree of independence from any help, physical or verbal, however minor and for whatever reason. 3. The need for supervision renders the patient not independent. 4. A patient's performance should be established using the best available evidence. Asking the patient, friends/relatives and nurses are the usual sources, but direct observation and common sense are also important. However direct testing is not needed. 5. Usually the patient's performance over the preceding 24-48 hours is important, but occasionally longer periods will be relevant. 6. Middle categories imply that the patient supplies over 50 per cent of the effort. 7. Use of aids to be independent is allowed. Blanca Barrientos., Barthel, DNusratW. (1152). Functional evaluation: the Barthel Index. [address] (14)2. Alison Sahu gabino FATIMAH MatthewsF, Korey Jordan., Sita Aleman., Brandon PalaciosMil[facility], [address] (1999). Measuring the change indisability after inpatient rehabilitation; comparison of the responsiveness of the Barthel Index and Functional [place] Measure. Journal of Neurology, Neurosurgery, and Psychiatry, 66(4), 028-980. Mirian Braxton, N.J.A, HOLLEY Toscano, & Billie Rendon, M.A. (2004.) Assessment of post-stroke quality of life in cost-effectiveness studies: The usefulness of the Barthel Index and the EuroQoL-5D. Quality of Life Research, 13, 600-20            G codes: In compliance with CMSs Claims Based Outcome Reporting, the following G-code set was chosen for this patient based on their primary functional limitation being treated: The outcome measure chosen to determine the severity of the functional limitation was the Barthel with a score of 65/100 which was correlated with the impairment scale.       · Mobility - Walking and Moving Around:               - CURRENT STATUS:    CJ - 20%-39% impaired, limited or restricted               - GOAL STATUS:           CI - 1%-19% impaired, limited or restricted               - D/C STATUS:                       ---------------To be determined---------------            Pain:  Pain Scale 1: Numeric (0 - 10)  Pain Intensity 1: 3  Pain Location 1: Back  Pain Orientation 1: Mid  Pain Description 1: Stabbing;Aching  Pain Intervention(s) 1: Medication (see MAR)  Activity Tolerance:   88% after activity  Please refer to the flowsheet for vital signs taken during this treatment. After treatment:   [X]         Patient left in no apparent distress sitting up in chair  [ ]         Patient left in no apparent distress in bed  [X]         Call bell left within reach  [X]         Nursing notified  [ ]         Caregiver present  [ ]         Bed alarm activated      COMMUNICATION/EDUCATION:   The patients plan of care was discussed with: Physical Therapist, Occupational Therapist and Registered Nurse.  [X]         Fall prevention education was provided and the patient/caregiver indicated understanding. [X]         Patient/family have participated as able in goal setting and plan of care. [X]         Patient/family agree to work toward stated goals and plan of care. [ ]         Patient understands intent and goals of therapy, but is neutral about his/her participation. [ ]         Patient is unable to participate in goal setting and plan of care.      Thank you for this referral.  Saul Manuel, PT, DPT   Time Calculation: 37 mins

## 2017-04-17 NOTE — PROGRESS NOTES
Initial Nutrition Assessment:    INTERVENTIONS/RECOMMENDATIONS:   · Jevity 1.5, 240 ml bolus 5 x per day + 150 water flush after each bolus. (bolus times: 6 am, 9 am, 12 pm, 5 pm, 8 pm)  · Keep formula in refrigeration between boluses, let sit at room temp for 5-10 minutes before administering bolus  · Keep head of bed >30 degrees for 1 hour after bolus  · Monitor TF tolerance     ASSESSMENT:   Ms. Cassandra Longo, 58 yo female medically noted for COPD, laryngeal ca with dysphagia/chronic PEG tube, HTN. Per MD documentation and pt report, pt only getting 4 of the 5 cans of isosource daily, leading to a 8% wt loss in the past month (severe). She notes this is because she feels full. Her recall of timing of feedings is inconsistent and does not recall home health company. She refuses to allow pump for bolus feeding. We can give traditional syringe bolus, however TF must be held in refrigerator between boluses. Pt notes she does administer coffee via PEG. Diet Order: NPO  % Eaten:  No data found. Pertinent Medications: [x]Reviewed []Other (prednisone)   Pertinent Labs: [x]Reviewed []Other  Food Allergies: [x]None []Other   Last BM: 4/15   []Active     []Hyperactive  []Hypoactive       [] Absent BS  Skin:    [x] Intact   [] Incision  [] Breakdown  [] Other:    Anthropometrics:   Height: 5' 9\" (175.3 cm) Weight: 60.3 kg (133 lb)   IBW (%IBW):   ( ) UBW (%UBW):   (  %)   Last Weight Metrics:  Weight Loss Metrics 4/16/2017 4/6/2017 4/4/2017 3/31/2017 3/29/2017 3/28/2017 3/21/2017   Today's Wt 133 lb 133 lb 6.4 oz 133 lb 132 lb 133 lb 134 lb 14.7 oz 135 lb   BMI 19.64 kg/m2 19.7 kg/m2 19.64 kg/m2 19.49 kg/m2 20.83 kg/m2 20.52 kg/m2 20.53 kg/m2       BMI: Body mass index is 19.64 kg/(m^2).     This BMI is indicative of:   []Underweight    [x]Normal    []Overweight    [] Obesity   [] Extreme Obesity (BMI>40)     Estimated Nutrition Needs (Based on):   1725 Kcals/day (MSJ: 1225 x 1.4 d/t COPD and recent wt loss) , 60 g (1 g/kg) Protein  Carbohydrate:  At Least 130 g/day  Fluids: 1725 mL/day (1ml/kcal)    Pt expected to meet estimated nutrient needs: [x]Yes []No    NUTRITION DIAGNOSES:   Problem:  Less than optimal enteral nutrition      Etiology: related to not receiving ordered bolus feeding     Signs/Symptoms: as evidenced by 8% wt loss in one month      NUTRITION INTERVENTIONS:    Enteral/Parenteral Nutrition: Other (continue current TF order)                GOAL:   Meet >85% of ordered TF in 2-4 days    LEARNING NEEDS (Diet, Food/Nutrient-Drug Interaction):    [x] None Identified   [] Identified and Education Provided/Documented   [] Identified and Pt declined/was not appropriate     Cultureal, Tenriism, OR Ethnic Dietary Needs:    [x] None Identified   [] Identified and Addressed     [x] Interdisciplinary Care Plan Reviewed/Documented    [x] Discharge Planning:  Continue home TF regimen 5 cans of isosource daily     MONITORING /EVALUATION:   Behavioral-Environmental Outcomes: Behavior  Food/Nutrient Intake Outcomes: Enteral/parenteral nutrition intake  Physical Signs/Symptoms Outcomes: Weight/weight change, Electrolyte and renal profile, GI    NUTRITION RISK:    [] High              [x] Moderate           []  Low  []  Minimal/Uncompromised    PT SEEN FOR:    [x]  MD Consult: []Calorie Count      []Diabetic Diet Education        []Diet Education     []Electrolyte Management     []General Nutrition Management and Supplements     [x]Management of Tube Feeding     []TPN Recommendations    [x]  RN Referral:  []MST score >=2     [x]Enteral/Parenteral Nutrition PTA     []Pregnant: Gestational DM or Multigestation     []Pressure Ulcer/Wound Care needs        []  Low BMI  []  DTR Referral       Tamiko Arias RDN  Pager [phone]  Weekend Pager [phone]

## 2017-04-17 NOTE — CONSULTS
Palliative Medicine Consult  [place]ams: [phone] ([phone])    Patient Name: Javier Black  YOB: 1955    Date of Initial Consult: 4/17/17  Reason for Consult: End stage laryngeal cancer with recurrent admissions, ?any additional ideas or support that we can give Pt  Requesting Provider: Benoit Deng  Primary Care Physician: Mau Deras MD      SUMMARY:   Javier Black is a 57 y.o. with a past history of bipolar type 1, laryngeal mass, and HTN, recurrent aspiration PNA, chronic pain secondary to neoplasm, who was admitted on 4/16/2017 from home with a diagnosis of acute respiratory failure. Current medical issues leading to Palliative Medicine involvement include: frequent admissions (6 admissions in 2017 for respiratory illness!). PALLIATIVE DIAGNOSES:   1. Cough   2. SOB  3. Acute Respiratory failure  4. SIRS  5. Dysphagia (has PEG)  6. Bipolar disorder  7. Malnutrition   8. Weakness and fatigue       PLAN:   1. Met with pt, obtained history. She states she has cut down on the amount of ice chips. ..maybe just aspirating saliva. 2. Initial consult note routed to primary continuity provider  3.  Communicated plan of care with: Palliative IDT       GOALS OF CARE / TREATMENT PREFERENCES:   [====Goals of Care====]  GOALS OF CARE:  Patient / health care proxy stated goals:     Recovery and home    TREATMENT PREFERENCES:   Code Status: DNR    Advance Care Planning:  Advance Care Planning 4/16/2017   Patient's Healthcare Decision Maker is: Legal Next of Kin   Primary Decision Maker Name Orlando Navarro   Primary Decision Maker Phone Number [phone]   Primary Decision Maker Relationship to Patient Spouse   Secondary Decision Maker Name -   Secondary Decision Maker Phone Number -   Secondary Decision Maker Relationship to Patient -   Confirm Advance Directive Yes, on file   Patient Would Like to Complete Advance Directive -       Other:    The palliative care team has discussed with patient / health care proxy about goals of care / treatment preferences for patient.  [====Goals of Care====]         HISTORY:     History obtained from:     CHIEF COMPLAINT: SOB  HPI/SUBJECTIVE:    The patient is:   [] Verbal and participatory  [] Non-participatory due to:     BIBA with c/o SOB, slight intermittent cough that started the night before, at which time they put her on 4L02 with little relief. EMS report pt's Sp02 was 74% upon arrival, which improved to 95% with NRB. During my visit pt was pleasant. She is jasmina happy with [facility] coming to see her. She states that the information written out to her by Dr Axel Gomez helped her so much, however, she was supposed to speak with someone at the office before calling EMS, however, the  instructed her to call 911, which is why she did.     Clinical Pain Assessment (nonverbal scale for severity on nonverbal patients):   [++++ Clinical Pain Assessment++++]  [++++Pain Severity++++]: Pain: 3  [++++Pain Character++++]:   [++++Pain Duration++++]:   [++++Pain Effect++++]:   [++++Pain Factors++++]:   [++++Pain Frequency++++]:   [++++Pain Location++++]:   [++++ Clinical Pain Assessment++++]     FUNCTIONAL ASSESSMENT:     Palliative Performance Scale (PPS):  PPS: 30       PSYCHOSOCIAL/SPIRITUAL SCREENING:     Advance Care Planning:  Advance Care Planning 4/16/2017   Patient's Healthcare Decision Maker is: Legal Next of Agustin 68   Primary Decision Maker Name Mallory Neil   Primary Decision Maker Phone Number [phone]   Primary Decision Maker Relationship to Patient Spouse   Secondary Decision Maker Name -   Secondary Decision [address] [place] [address] Ave Phone Number -   Secondary Decision Maker Relationship to Patient -   Confirm Advance Directive Yes, on file   Patient Would Like to Complete Advance Directive -        Any spiritual / Rastafari concerns:  [x] Yes /  [] No    Caregiver Burnout:  [] Yes /  [] No /  [x] No Caregiver Present      Anticipatory grief assessment:   [x] Normal  / [] Maladaptive       ESAS Anxiety: Anxiety: 0    ESAS Depression: Depression: 3        REVIEW OF SYSTEMS:     Positive and pertinent negative findings in ROS are noted above in HPI. The following systems were [x] reviewed / [] unable to be reviewed as noted in HPI  Other findings are noted below. Systems: constitutional, ears/nose/mouth/throat, respiratory, gastrointestinal, genitourinary, musculoskeletal, integumentary, neurologic, psychiatric, endocrine. Positive findings noted below. Modified ESAS  Completed by: provider   Fatigue: 5 Drowsiness: 0   Depression: 3 Pain: 3   Anxiety: 0 Nausea: 0   Anorexia: 0 Dyspnea: 0     Constipation: No              PHYSICAL EXAM:     From RN flowsheet:  Wt Readings from Last 3 Encounters:   04/16/17 133 lb (60.3 kg)   04/06/17 133 lb 6.4 oz (60.5 kg)   04/04/17 133 lb (60.3 kg)     Blood pressure 133/79, pulse 95, temperature 97.8 °F (36.6 °C), resp. rate 22, height 5' 9\" (1.753 m), weight 133 lb (60.3 kg), SpO2 91 %.     Pain Scale 1: Numeric (0 - 10)  Pain Intensity 1: 7  Pain Onset 1: chronic  Pain Location 1: Back  Pain Orientation 1: Mid  Pain Description 1: Stabbing, Aching  Pain Intervention(s) 1: Emotional support  Last bowel movement, if known:     Constitutional: WD, WN, a little childlike, AAOx2  Eyes: pupils equal, anicteric  ENMT: no nasal discharge, moist mucous membranes  Cardiovascular: regular rhythm, distal pulses intact   Respiratory: breathing not labored, symmetric, deep cough  Gastrointestinal: soft non-tender, +bowel sounds  Musculoskeletal: no deformity, no tenderness to palpation  Skin: warm, dry  Neurologic: following commands, moving all extremities  Psychiatric: full affect, no hallucinations          HISTORY:     Active Problems:    Essential hypertension, benign (11/29/2011)      Dysphagia (12/30/2014)      History of laryngeal cancer (11/2/2015)      Chronic pain (1/15/2017)      Acute respiratory failure (HCC) (2/6/2017)      Chronic obstructive pulmonary disease with acute exacerbation ([facility] (2/10/2017)      Chronic pulmonary aspiration (3/2/2017)      Elevated troponin (4/16/2017)      Past Medical History:   Diagnosis Date    Bipolar 1 disorder ([facility] 11/29/2011    Cancer ([facility]     skin cancer buttocks    Chronic pain     FIBROMYALGIA, LEGS    Encounter for long-term (current) use of other medications 11/29/2011    Essential hypertension, benign 11/29/2011    Ill-defined condition     Laryngeal cancer ([facility] 11/02/2015    with lung metastasis    Psychiatric disorder     bipolar      Past Surgical History:   Procedure Laterality Date    HX BREAST AUGMENTATION Bilateral     SALINE IMPLANTS    HX GI      PLACEMENT OF G TUBE    HX GI      ESOPH. DILATATION    HX GYN      tubal pregnancy    HX HEENT      BX OF NECK MASS    HX HYSTERECTOMY      PLACE PERCUT GASTROSTOMY TUBE  10/28/2014     has been removed 2015    PLACE PERCUT GASTROSTOMY TUBE  2/28/2017           Family History   Problem Relation Age of Onset    Cancer Father      bone/skin/lung    Pneumonia Mother     Anesth Problems Neg Hx       History reviewed, no pertinent family history.   Social History   Substance Use Topics    Smoking status: Former Smoker     Packs/day: 1.00     Years: 40.00     Quit date: 8/27/2014    Smokeless tobacco: Never Used      Comment: PASSIVE SMOKE EXPOSURE,  SMOKES    Alcohol use No     No Known Allergies   Current Facility-Administered Medications   Medication Dose Route Frequency    [START ON 4/18/2017] Vancomycin - please send trough before starting dose due at 2am  1 Each Other ONCE    sodium chloride (NS) flush 5-10 mL  5-10 mL IntraVENous Q8H    sodium chloride (NS) flush 5-10 mL  5-10 mL IntraVENous PRN    acetylcysteine (MUCOMYST) 200 mg/mL (20 %) solution 400 mg  400 mg Inhalation Q6H RT    FLUoxetine (PROzac) 20 mg/5 mL (4 mg/mL) oral solution 20 mg  20 mg Per G Tube DAILY    LORazepam (INTENSOL) 2 mg/mL oral concentrate 1 mg  1 mg Per J Tube Q4H PRN    metoprolol tartrate (LOPRESSOR) tablet 12.5 mg  12.5 mg Per G Tube BID    traZODone (DESYREL) tablet 200 mg  200 mg Per G Tube QHS    sodium chloride (NS) flush 5-10 mL  5-10 mL IntraVENous Q8H    sodium chloride (NS) flush 5-10 mL  5-10 mL IntraVENous PRN    acetaminophen (TYLENOL) solution 650 mg  650 mg Per G Tube Q4H PRN    naloxone (NARCAN) injection 0.4 mg  0.4 mg IntraVENous PRN    ondansetron (ZOFRAN) injection 4 mg  4 mg IntraVENous Q4H PRN    bisacodyl (DULCOLAX) suppository 10 mg  10 mg Rectal DAILY PRN    enoxaparin (LOVENOX) injection 30 mg  30 mg SubCUTAneous DAILY    piperacillin-tazobactam (ZOSYN) 4.5 g in 0.9% sodium chloride (MBP/ADV) 100 mL  4.5 g IntraVENous Q8H    guaiFENesin (ROBITUSSIN) 100 mg/5 mL oral liquid 400 mg  400 mg Per G Tube QID    albuterol (PROVENTIL VENTOLIN) nebulizer solution 2.5 mg  2.5 mg Nebulization Q6H RT    umeclidinium (INCRUSE ELLIPTA) 62.5 mcg/actuation  1 Puff Inhalation DAILY    levoFLOXacin (LEVAQUIN) 750 mg in D5W IVPB  750 mg IntraVENous Q24H    vancomycin (VANCOCIN) 1,000 mg in 0.9% sodium chloride (MBP/ADV) 250 mL  1,000 mg IntraVENous Q12H    oxyCODONE (ROXICODONE INTENSOL) 20 mg/mL concentrated solution 10 mg  10 mg Oral Q4HWA    predniSONE (DELTASONE) tablet 10 mg  10 mg Per G Tube DAILY WITH BREAKFAST    nitroglycerin (NITROBID) 2 % ointment 1 Inch  1 Inch Topical Q6H PRN    morphine injection 2 mg  2 mg IntraVENous Q3H PRN    albuterol-ipratropium (DUO-NEB) 2.5 MG-0.5 MG/3 ML  3 mL Nebulization Q6H PRN    0.9% sodium chloride infusion  100 mL/hr IntraVENous CONTINUOUS    lithium carbonate capsule 300 mg  300 mg Oral DAILY          LAB AND IMAGING FINDINGS:     Lab Results   Component Value Date/Time    WBC 10.9 04/17/2017 02:05 AM    HGB 9.2 04/17/2017 02:05 AM    PLATELET 633 70/01/2745 02:05 AM     Lab Results   Component Value Date/Time    Sodium 141 04/17/2017 02:05 AM    Potassium 4.1 04/17/2017 02:05 AM Chloride 105 04/17/2017 02:05 AM    CO2 29 04/17/2017 02:05 AM    BUN 15 04/17/2017 02:05 AM    Creatinine 0.72 04/17/2017 02:05 AM    Calcium 9.4 04/17/2017 02:05 AM    Magnesium 2.2 04/16/2017 07:01 AM    Phosphorus 2.0 03/22/2017 04:32 AM      Lab Results   Component Value Date/Time    AST (SGOT) 12 04/16/2017 07:01 AM    Alk. phosphatase 60 04/16/2017 07:01 AM    Protein, total 7.1 04/16/2017 07:01 AM    Albumin 3.0 04/16/2017 07:01 AM    Globulin 4.1 04/16/2017 07:01 AM     Lab Results   Component Value Date/Time    INR 1.1 04/16/2017 07:01 AM    Prothrombin time 10.7 04/16/2017 07:01 AM    aPTT 29.0 04/16/2017 07:01 AM      Lab Results   Component Value Date/Time    Iron 10 02/26/2017 04:28 AM    TIBC 169 02/26/2017 04:28 AM    Iron % saturation 6 02/26/2017 04:28 AM    Ferritin 250 02/26/2017 04:28 AM      Lab Results   Component Value Date/Time    pH 7.35 04/16/2017 06:45 AM    PCO2 51 04/16/2017 06:45 AM    PO2 76 04/16/2017 06:45 AM     No components found for: [place]   Lab Results   Component Value Date/Time    CK 29 03/06/2017 06:00 PM    CK - MB <1.0 03/06/2017 06:00 PM                Total time: 75 min  Counseling / coordination time: 65 min  > 50% counseling / coordination?: yes    Prolonged service was provided for  []30 min   []75 min in face to face time in the presence of the patient. Time Start:   Time End:   Note: this can only be billed with 26803 (initial) or 34268 (follow up). If multiple start / stop times, list each separately. 75

## 2017-04-17 NOTE — PROGRESS NOTES
Bedside and Verbal shift change report given to Lorrie Pathak (oncoming nurse) by Janis Ulrich (offgoing nurse). Report included the following information SBAR, Kardex, Intake/Output, MAR, Accordion and Recent Results.

## 2017-04-17 NOTE — ROUTINE PROCESS
[address] SHIFT NURSING NOTE    Bedside shift change report given to sami (oncoming nurse) by Georgia  (offgoing nurse). Report included the following information SBAR. SHIFT SUMMARY: patient has poor endurance and sats drop with movement. She tends to move very fast and nervously and sats drop quickly. When she moves slowly she does better. Up with PT. No bowel movements but might do well with probiotics since she is on multiple abx. Admission Date 4/16/2017   Admission Diagnosis Acute respiratory failure ([facility] [facility]   Consults IP CONSULT TO CARDIOLOGY  IP CONSULT TO PRIMARY CARE PROVIDER  IP CONSULT TO PALLIATIVE CARE - PROVIDER  IP CONSULT TO PULMONOLOGY        Consults   [] PT   [] OT   [] Speech   [] Palliative      [] Hospice    [] Case Management   [] None   Cardiac Monitoring   [x] Yes   [] No     Antibiotics   [] Yes   [] No   GI Prophylaxis  (Ex: Protonix, Pepcid, etc,.)   [] Yes   [] No          DVT Prophylaxis   SCDs:             Claudio stockings:         [] Medication (Ex: Lovenox, Eliquis, Brilinta, Coumadin,  Heparin, etc..)   [] Contraindicated   [] No VTE needed       Urinary Catheter             LDAs               Peripheral IV 04/16/17 Right Hand (Active)   Site Assessment Clean, dry, & intact 4/17/2017  3:28 AM   Phlebitis Assessment 0 4/17/2017  3:28 AM   Infiltration Assessment 0 4/17/2017  3:28 AM   Dressing Status Clean, dry, & intact 4/17/2017  3:28 AM   Dressing Type Tape;Transparent 4/17/2017  3:28 AM   Hub Color/Line Status Blue;Flushed;Patent 4/17/2017  3:28 AM       Peripheral IV 04/16/17 Right Antecubital (Active)   Site Assessment Clean, dry, & intact 4/17/2017  3:28 AM   Phlebitis Assessment 0 4/17/2017  3:28 AM   Infiltration Assessment 0 4/17/2017  3:28 AM   Dressing Status Clean, dry, & intact 4/17/2017  3:28 AM   Dressing Type Tape;Transparent 4/17/2017  3:28 AM   Hub Color/Line Status Pink; Infusing;Patent 4/17/2017  3:28 AM          PEG/Gastrostomy Tube 02/28/17 (Active)   Site Assessment Clean, dry, & intact 4/17/2017  5:50 AM   Dressing Status Clean, dry, & intact 4/17/2017  5:50 AM   G Port Status Clamped 4/17/2017  3:28 AM   Action Taken Other (comment) 4/16/2017  6:12 PM   Gastric Residual (mL) 20 ml 4/17/2017  5:50 AM   Tube Feeding/Formula Options Jevity 1.5 4/17/2017  5:50 AM   Modular Nutrients Fiber 4/17/2017  5:50 AM   Tube Feeding/Verify Rate (mL/hr) 240 4/17/2017  5:50 AM   Water Flush Volume (mL) 150 mL 4/17/2017  5:50 AM   Intake (ml) 490 ml 4/17/2017  5:50 AM   Medication Volume 100 ml 4/17/2017  5:50 AM   Output (ml) 0 ml 4/16/2017 12:31 PM                I/Os   Intake/Output Summary (Last 24 hours) at 04/17/17 1524  Last data filed at 04/17/17 0550   Gross per 24 hour   Intake          4883.34 ml   Output             1450 ml   Net          3433.34 ml         Activity Level Activity Level: Head of bed elevated (degrees)     Activity Assistance: Partial (one person)   Diet Active Orders   Diet    DIET NPO      Purposeful Rounding every 1-2 hour? [x] Yes    Airam Score  Total Score: 3   Bed Alarm (If score 3 or >)   [] Yes    [] Refused (See signed refusal form in chart)   Angel Score  Angel Score: 17       Angel Score (if score 14 or less)   [] PMT consult   [] Nutrition consult   [] Wound Care consult      []  Specialty bed         Influenza Vaccine Received Flu Vaccine for Current Season (usually Sept-March): Yes               Needs prior to discharge:   Home O2 required:    [] Yes   [] No     If yes, how much O2 required?     Other:    Last Bowel Movement Date: 04/15/17   Readmission Risk Assessment Tool Score High Risk            22       Total Score        3 Relationship with PCP    11 More than 1 Admission in calendar year    5 Patient Insurance is Medicare, Medicaid or Self Pay    3 Charlson Comorbidity Score        Criteria that do not apply:    Patient Living Status    Patient Length of Stay > 5       Expected Length of Stay - - -   Actual Length of Stay 1

## 2017-04-17 NOTE — PROGRESS NOTES
General Daily Progress Note    Admit Date: 4/16/2017  Hospital day 2    Subjective:     Patient has improving cough and dyspnea. .   Medication side effects: none    Current Facility-Administered Medications   Medication Dose Route Frequency    sodium chloride (NS) flush 5-10 mL  5-10 mL IntraVENous Q8H    sodium chloride (NS) flush 5-10 mL  5-10 mL IntraVENous PRN    acetylcysteine (MUCOMYST) 200 mg/mL (20 %) solution 400 mg  400 mg Inhalation Q6H RT    FLUoxetine (PROzac) 20 mg/5 mL (4 mg/mL) oral solution 20 mg  20 mg Per G Tube DAILY    LORazepam (INTENSOL) 2 mg/mL oral concentrate 1 mg  1 mg Per J Tube Q4H PRN    metoprolol tartrate (LOPRESSOR) tablet 12.5 mg  12.5 mg Per G Tube BID    traZODone (DESYREL) tablet 200 mg  200 mg Per G Tube QHS    sodium chloride (NS) flush 5-10 mL  5-10 mL IntraVENous Q8H    sodium chloride (NS) flush 5-10 mL  5-10 mL IntraVENous PRN    acetaminophen (TYLENOL) solution 650 mg  650 mg Per G Tube Q4H PRN    naloxone (NARCAN) injection 0.4 mg  0.4 mg IntraVENous PRN    ondansetron (ZOFRAN) injection 4 mg  4 mg IntraVENous Q4H PRN    bisacodyl (DULCOLAX) suppository 10 mg  10 mg Rectal DAILY PRN    enoxaparin (LOVENOX) injection 30 mg  30 mg SubCUTAneous DAILY    piperacillin-tazobactam (ZOSYN) 4.5 g in 0.9% sodium chloride (MBP/ADV) 100 mL  4.5 g IntraVENous Q8H    guaiFENesin (ROBITUSSIN) 100 mg/5 mL oral liquid 400 mg  400 mg Per G Tube QID    albuterol (PROVENTIL VENTOLIN) nebulizer solution 2.5 mg  2.5 mg Nebulization Q6H RT    umeclidinium (INCRUSE ELLIPTA) 62.5 mcg/actuation  1 Puff Inhalation DAILY    levoFLOXacin (LEVAQUIN) 750 mg in D5W IVPB  750 mg IntraVENous Q24H    vancomycin (VANCOCIN) 1,000 mg in 0.9% sodium chloride (MBP/ADV) 250 mL  1,000 mg IntraVENous Q12H    oxyCODONE (ROXICODONE INTENSOL) 20 mg/mL concentrated solution 10 mg  10 mg Oral Q4HWA    predniSONE (DELTASONE) tablet 10 mg  10 mg Per G Tube DAILY WITH BREAKFAST    nitroglycerin (NITROBID) 2 % ointment 1 Inch  1 Inch Topical Q6H PRN    morphine injection 2 mg  2 mg IntraVENous Q3H PRN    albuterol-ipratropium (DUO-NEB) 2.5 MG-0.5 MG/3 ML  3 mL Nebulization Q6H PRN    0.9% sodium chloride infusion  100 mL/hr IntraVENous CONTINUOUS    lithium carbonate capsule 300 mg  300 mg Oral DAILY        Review of Systems  Constitutional: positive for fatigue and malaise  Respiratory: positive for cough, chronic bronchitis or stridor  Cardiovascular: negative  Gastrointestinal: positive for dysphagia,chronic PEG feeding    Objective:     Patient Vitals for the past 8 hrs:   BP Temp Pulse Resp SpO2   04/17/17 0328 100/68 98.3 °F (36.8 °C) 84 18 98 %   04/17/17 0210 - - - - 97 %   04/16/17 2306 103/64 98.3 °F (36.8 °C) 81 18 95 %     04/16 1901 - 04/17 0700  In: 4233.3 [I.V.:2643.3]  Out: 1450 [Urine:1450]  04/15 0701 - 04/16 1900  In: 1600   Out: 550 [Urine:550]    Physical Exam:   Visit Vitals    /68 (BP 1 Location: Left arm, BP Patient Position: At rest)    Pulse 84    Temp 98.3 °F (36.8 °C)    Resp 18    Ht 5' 9\" (1.753 m)    Wt 133 lb (60.3 kg)    LMP  (LMP Unknown)    SpO2 98%    BMI 19.64 kg/m2     General appearance: alert, fatigued, cooperative, no distress, appears stated age  Lungs: rhonchi R base, L base, diminished breath sounds diffusely  Heart: regular rate and rhythm, S1, S2 normal, no murmur, click, rub or gallop  Abdomen: soft, non-tender.  Bowel sounds normal. No masses,  no organomegaly  Extremities: extremities normal, atraumatic, no cyanosis or edema      ECG: normal sinus rhythm     Data Review   Recent Results (from the past 24 hour(s))   EKG, 12 LEAD, INITIAL    Collection Time: 04/16/17  6:40 AM   Result Value Ref Range    Ventricular Rate 102 BPM    Atrial Rate 102 BPM    P-R Interval 152 ms    QRS Duration 88 ms    Q-T Interval 360 ms    QTC Calculation (Bezet) 469 ms    Calculated P Axis 66 degrees    Calculated R Axis 54 degrees    Calculated T Axis 58 degrees    Diagnosis       Sinus tachycardia with premature atrial complexes  Possible Left atrial enlargement  Left ventricular hypertrophy  When compared with ECG of 22-MAR-2017 09:41,  Nonspecific T wave abnormality no longer evident in Lateral leads    Confirmed by ORVILLE De Guzman ([id number]) on 4/16/2017 5:23:23 PM     BLOOD GAS, ARTERIAL    Collection Time: 04/16/17  6:45 AM   Result Value Ref Range    pH 7.35 7.35 - 7.45      PCO2 51 (H) 35.0 - 45.0 mmHg    PO2 76 (L) 80 - 100 mmHg    O2 SAT 94 92 - 97 %    BICARBONATE 27 (H) 22 - 26 mmol/L    BASE EXCESS 0.7 mmol/L    O2 METHOD NRM      FIO2 100 %    SPONTANEOUS RATE 20.0      Sample source ARTERIAL      SITE RIGHT RADIAL      INESSA'S TEST YES     CBC WITH AUTOMATED DIFF    Collection Time: 04/16/17  7:01 AM   Result Value Ref Range    WBC 11.1 (H) 3.6 - 11.0 K/uL    RBC 3.63 (L) 3.80 - 5.20 M/uL    HGB 9.9 (L) 11.5 - 16.0 g/dL    HCT 33.1 (L) 35.0 - 47.0 %    MCV 91.2 80.0 - 99.0 FL    MCH 27.3 26.0 - 34.0 PG    MCHC 29.9 (L) 30.0 - 36.5 g/dL    RDW 17.7 (H) 11.5 - 14.5 %    PLATELET 636 484 - 305 K/uL    NEUTROPHILS 77 %    BAND NEUTROPHILS 13 %    LYMPHOCYTES 7 %    MONOCYTES 3 %    EOSINOPHILS 0 %    BASOPHILS 0 %    ABS. NEUTROPHILS 10.0 K/UL    ABS. LYMPHOCYTES 0.8 K/UL    ABS. MONOCYTES 0.3 K/UL    ABS. EOSINOPHILS 0.0 K/UL    ABS.  BASOPHILS 0.0 K/UL    RBC COMMENTS ANISOCYTOSIS  1+        WBC COMMENTS TOXIC GRANULATION      DF MANUAL     METABOLIC PANEL, COMPREHENSIVE    Collection Time: 04/16/17  7:01 AM   Result Value Ref Range    Sodium 135 (L) 136 - 145 mmol/L    Potassium 4.4 3.5 - 5.1 mmol/L    Chloride 99 97 - 108 mmol/L    CO2 29 21 - 32 mmol/L    Anion gap 7 5 - 15 mmol/L    Glucose 106 (H) 65 - 100 mg/dL    BUN 16 6 - 20 MG/DL    Creatinine 0.75 0.55 - 1.02 MG/DL    BUN/Creatinine ratio 21 (H) 12 - 20      GFR est AA >60 >60 ml/min/1.73m2    GFR est non-AA >60 >60 ml/min/1.73m2    Calcium 9.6 8.5 - 10.1 MG/DL    Bilirubin, total 0.4 0.2 - 1.0 MG/DL    ALT (SGPT) 17 12 - 78 U/L    AST (SGOT) 12 (L) 15 - 37 U/L    Alk.  phosphatase 60 45 - 117 U/L    Protein, total 7.1 6.4 - 8.2 g/dL    Albumin 3.0 (L) 3.5 - 5.0 g/dL    Globulin 4.1 (H) 2.0 - 4.0 g/dL    A-G Ratio 0.7 (L) 1.1 - 2.2     LACTIC ACID, PLASMA    Collection Time: 04/16/17  7:01 AM   Result Value Ref Range    Lactic acid 0.8 0.4 - 2.0 MMOL/L   TROPONIN I    Collection Time: 04/16/17  7:01 AM   Result Value Ref Range    Troponin-I, Qt. 0.55 (H) <0.05 ng/mL   CK W/ REFLX CKMB    Collection Time: 04/16/17  7:01 AM   Result Value Ref Range    CK 26 26 - 192 U/L   INFLUENZA A & B AG (RAPID TEST)    Collection Time: 04/16/17  7:01 AM   Result Value Ref Range    Influenza A Antigen NEGATIVE  NEG      Influenza B Antigen NEGATIVE  NEG     URINALYSIS W/ REFLEX CULTURE    Collection Time: 04/16/17  7:01 AM   Result Value Ref Range    Color YELLOW/STRAW      Appearance CLEAR CLEAR      Specific gravity 1.014 1.003 - 1.030      pH (UA) 6.5 5.0 - 8.0      Protein TRACE (A) NEG mg/dL    Glucose NEGATIVE  NEG mg/dL    Ketone NEGATIVE  NEG mg/dL    Bilirubin NEGATIVE  NEG      Blood NEGATIVE  NEG      Urobilinogen 0.2 0.2 - 1.0 EU/dL    Nitrites NEGATIVE  NEG      Leukocyte Esterase NEGATIVE  NEG      WBC 0-4 0 - 4 /hpf    RBC 0-5 0 - 5 /hpf    Epithelial cells FEW FEW /lpf    Bacteria NEGATIVE  NEG /hpf    UA:UC IF INDICATED CULTURE NOT INDICATED BY UA RESULT CNI      Hyaline cast 2-5 0 - 5 /lpf   LIPASE    Collection Time: 04/16/17  7:01 AM   Result Value Ref Range    Lipase 83 73 - 393 U/L   PRO-BNP    Collection Time: 04/16/17  7:01 AM   Result Value Ref Range    NT pro-BNP 1395 (H) 0 - 125 PG/ML   MAGNESIUM    Collection Time: 04/16/17  7:01 AM   Result Value Ref Range    Magnesium 2.2 1.6 - 2.4 mg/dL   PTT    Collection Time: 04/16/17  7:01 AM   Result Value Ref Range    aPTT 29.0 22.1 - 32.5 sec    aPTT, therapeutic range     58.0 - 77.0 SECS   PROTHROMBIN TIME + INR    Collection Time: 04/16/17  7:01 AM Result Value Ref Range    INR 1.1 0.9 - 1.1      Prothrombin time 10.7 9.0 - 11.1 sec   TROPONIN I    Collection Time: 04/16/17  1:45 PM   Result Value Ref Range    Troponin-I, Qt. 0.43 (H) <0.05 ng/mL   TROPONIN I    Collection Time: 04/16/17  6:42 PM   Result Value Ref Range    Troponin-I, Qt. 0.33 (H) <3.69 ng/mL   METABOLIC PANEL, BASIC    Collection Time: 04/17/17  2:05 AM   Result Value Ref Range    Sodium 141 136 - 145 mmol/L    Potassium 4.1 3.5 - 5.1 mmol/L    Chloride 105 97 - 108 mmol/L    CO2 29 21 - 32 mmol/L    Anion gap 7 5 - 15 mmol/L    Glucose 121 (H) 65 - 100 mg/dL    BUN 15 6 - 20 MG/DL    Creatinine 0.72 0.55 - 1.02 MG/DL    BUN/Creatinine ratio 21 (H) 12 - 20      GFR est AA >60 >60 ml/min/1.73m2    GFR est non-AA >60 >60 ml/min/1.73m2    Calcium 9.4 8.5 - 10.1 MG/DL   CBC WITH AUTOMATED DIFF    Collection Time: 04/17/17  2:05 AM   Result Value Ref Range    WBC 10.9 3.6 - 11.0 K/uL    RBC 3.22 (L) 3.80 - 5.20 M/uL    HGB 9.2 (L) 11.5 - 16.0 g/dL    HCT 29.4 (L) 35.0 - 47.0 %    MCV 91.3 80.0 - 99.0 FL    MCH 28.6 26.0 - 34.0 PG    MCHC 31.3 30.0 - 36.5 g/dL    RDW 17.4 (H) 11.5 - 14.5 %    PLATELET 367 950 - 517 K/uL    NEUTROPHILS 88 (H) 32 - 75 %    LYMPHOCYTES 8 (L) 12 - 49 %    MONOCYTES 4 (L) 5 - 13 %    EOSINOPHILS 0 0 - 7 %    BASOPHILS 0 0 - 1 %    ABS. NEUTROPHILS 9.6 (H) 1.8 - 8.0 K/UL    ABS. LYMPHOCYTES 0.8 0.8 - 3.5 K/UL    ABS. MONOCYTES 0.5 0.0 - 1.0 K/UL    ABS. EOSINOPHILS 0.0 0.0 - 0.4 K/UL    ABS.  BASOPHILS 0.0 0.0 - 0.1 K/UL           Assessment:     Active Problems:    Acute respiratory failure (HCC) (2/6/2017)      Chronic obstructive pulmonary disease with acute exacerbation ([facility] [address] (2/10/2017)      Chronic pulmonary aspiration (3/2/2017)      Dysphagia (12/30/2014)      Chronic pain (1/15/2017)      History of laryngeal cancer (11/2/2015)      Essential hypertension, benign (11/29/2011)      Elevated troponin (4/16/2017)        Plan:     Feeling better this am,hypoxia ,dyspnea improving. Afebrile,normal wbc. Will ask Pulmonary to advise. Pt reportedly much more compliant with npo status

## 2017-04-17 NOTE — PROGRESS NOTES
4/17/2017 9:12 AM    Admit Date: 4/16/2017    Admit Diagnosis: Acute respiratory failure ([address]    Subjective:     Lesa Wall reports feeling better. No events. Records reviewed.      Visit Vitals    /68 (BP 1 Location: Left arm, BP Patient Position: At rest)    Pulse 92    Temp 98.1 °F (36.7 °C)    Resp 20    Ht 5' 9\" (1.753 m)    Wt 133 lb (60.3 kg)    LMP  (LMP Unknown)    SpO2 93%    BMI 19.64 kg/m2     Current Facility-Administered Medications   Medication Dose Route Frequency    sodium chloride (NS) flush 5-10 mL  5-10 mL IntraVENous Q8H    sodium chloride (NS) flush 5-10 mL  5-10 mL IntraVENous PRN    acetylcysteine (MUCOMYST) 200 mg/mL (20 %) solution 400 mg  400 mg Inhalation Q6H RT    FLUoxetine (PROzac) 20 mg/5 mL (4 mg/mL) oral solution 20 mg  20 mg Per G Tube DAILY    LORazepam (INTENSOL) 2 mg/mL oral concentrate 1 mg  1 mg Per J Tube Q4H PRN    metoprolol tartrate (LOPRESSOR) tablet 12.5 mg  12.5 mg Per G Tube BID    traZODone (DESYREL) tablet 200 mg  200 mg Per G Tube QHS    sodium chloride (NS) flush 5-10 mL  5-10 mL IntraVENous Q8H    sodium chloride (NS) flush 5-10 mL  5-10 mL IntraVENous PRN    acetaminophen (TYLENOL) solution 650 mg  650 mg Per G Tube Q4H PRN    naloxone (NARCAN) injection 0.4 mg  0.4 mg IntraVENous PRN    ondansetron (ZOFRAN) injection 4 mg  4 mg IntraVENous Q4H PRN    bisacodyl (DULCOLAX) suppository 10 mg  10 mg Rectal DAILY PRN    enoxaparin (LOVENOX) injection 30 mg  30 mg SubCUTAneous DAILY    piperacillin-tazobactam (ZOSYN) 4.5 g in 0.9% sodium chloride (MBP/ADV) 100 mL  4.5 g IntraVENous Q8H    guaiFENesin (ROBITUSSIN) 100 mg/5 mL oral liquid 400 mg  400 mg Per G Tube QID    albuterol (PROVENTIL VENTOLIN) nebulizer solution 2.5 mg  2.5 mg Nebulization Q6H RT    umeclidinium (INCRUSE ELLIPTA) 62.5 mcg/actuation  1 Puff Inhalation DAILY    levoFLOXacin (LEVAQUIN) 750 mg in D5W IVPB  750 mg IntraVENous Q24H    vancomycin (VANCOCIN) 1,000 mg in 0.9% sodium chloride (MBP/ADV) 250 mL  1,000 mg IntraVENous Q12H    oxyCODONE (ROXICODONE INTENSOL) 20 mg/mL concentrated solution 10 mg  10 mg Oral Q4HWA    predniSONE (DELTASONE) tablet 10 mg  10 mg Per G Tube DAILY WITH BREAKFAST    nitroglycerin (NITROBID) 2 % ointment 1 Inch  1 Inch Topical Q6H PRN    morphine injection 2 mg  2 mg IntraVENous Q3H PRN    albuterol-ipratropium (DUO-NEB) 2.5 MG-0.5 MG/3 ML  3 mL Nebulization Q6H PRN    0.9% sodium chloride infusion  100 mL/hr IntraVENous CONTINUOUS    lithium carbonate capsule 300 mg  300 mg Oral DAILY         Objective:      Visit Vitals    /68 (BP 1 Location: Left arm, BP Patient Position: At rest)    Pulse 92    Temp 98.1 °F (36.7 °C)    Resp 20    Ht 5' 9\" (1.753 m)    Wt 133 lb (60.3 kg)    SpO2 93%    BMI 19.64 kg/m2       Physical Exam:  Abdomen: soft, non-tender. Bowel sounds normal.   Extremities: no cyanosis or edema  Heart: regular rate and rhythm, S1, S2 normal, no murmur, click, rub or gallop  Lungs: bilateral ronchii and crackles.    Neurologic: Grossly normal    Data Review:   Labs:    Recent Results (from the past 24 hour(s))   TROPONIN I    Collection Time: 04/16/17  1:45 PM   Result Value Ref Range    Troponin-I, Qt. 0.43 (H) <0.05 ng/mL   TROPONIN I    Collection Time: 04/16/17  6:42 PM   Result Value Ref Range    Troponin-I, Qt. 0.33 (H) <8.59 ng/mL   METABOLIC PANEL, BASIC    Collection Time: 04/17/17  2:05 AM   Result Value Ref Range    Sodium 141 136 - 145 mmol/L    Potassium 4.1 3.5 - 5.1 mmol/L    Chloride 105 97 - 108 mmol/L    CO2 29 21 - 32 mmol/L    Anion gap 7 5 - 15 mmol/L    Glucose 121 (H) 65 - 100 mg/dL    BUN 15 6 - 20 MG/DL    Creatinine 0.72 0.55 - 1.02 MG/DL    BUN/Creatinine ratio 21 (H) 12 - 20      GFR est AA >60 >60 ml/min/1.73m2    GFR est non-AA >60 >60 ml/min/1.73m2    Calcium 9.4 8.5 - 10.1 MG/DL   CBC WITH AUTOMATED DIFF    Collection Time: 04/17/17  2:05 AM   Result Value Ref Range    WBC 10.9 3.6 - 11.0 K/uL    RBC 3.22 (L) 3.80 - 5.20 M/uL    HGB 9.2 (L) 11.5 - 16.0 g/dL    HCT 29.4 (L) 35.0 - 47.0 %    MCV 91.3 80.0 - 99.0 FL    MCH 28.6 26.0 - 34.0 PG    MCHC 31.3 30.0 - 36.5 g/dL    RDW 17.4 (H) 11.5 - 14.5 %    PLATELET 465 523 - 816 K/uL    NEUTROPHILS 88 (H) 32 - 75 %    LYMPHOCYTES 8 (L) 12 - 49 %    MONOCYTES 4 (L) 5 - 13 %    EOSINOPHILS 0 0 - 7 %    BASOPHILS 0 0 - 1 %    ABS. NEUTROPHILS 9.6 (H) 1.8 - 8.0 K/UL    ABS. LYMPHOCYTES 0.8 0.8 - 3.5 K/UL    ABS. MONOCYTES 0.5 0.0 - 1.0 K/UL    ABS. EOSINOPHILS 0.0 0.0 - 0.4 K/UL    ABS. BASOPHILS 0.0 0.0 - 0.1 K/UL       Telemetry: normal sinus rhythm      Assessment:     Active Problems:    Essential hypertension, benign (11/29/2011)      Dysphagia (12/30/2014)      History of laryngeal cancer (11/2/2015)      Chronic pain (1/15/2017)      Acute respiratory failure (HCC) (2/6/2017)      Chronic obstructive pulmonary disease with acute exacerbation ([facility] [address] (2/10/2017)      Chronic pulmonary aspiration (3/2/2017)      Elevated troponin (4/16/2017)        Plan:     Elevated troponin - non specific in the setting of acute respiratory failure, pneumonia. Similar presentation in the past. Normal echo 1/17. Given her overall poor health, limited prognosis, would not pursue further evaluation. Recommend symptom based treatment. Discussed with patient in details and she also expressed her wishes to stick with above treatment plans.

## 2017-04-18 NOTE — PROGRESS NOTES
General Daily Progress Note    Admit Date: 4/16/2017  Hospital day 3    Subjective:     Patient has improving chest congestion,sob. .   Medication side effects: none    Current Facility-Administered Medications   Medication Dose Route Frequency    sodium chloride (NS) flush 5-10 mL  5-10 mL IntraVENous Q8H    sodium chloride (NS) flush 5-10 mL  5-10 mL IntraVENous PRN    acetylcysteine (MUCOMYST) 200 mg/mL (20 %) solution 400 mg  400 mg Inhalation Q6H RT    FLUoxetine (PROzac) 20 mg/5 mL (4 mg/mL) oral solution 20 mg  20 mg Per G Tube DAILY    LORazepam (INTENSOL) 2 mg/mL oral concentrate 1 mg  1 mg Per J Tube Q4H PRN    metoprolol tartrate (LOPRESSOR) tablet 12.5 mg  12.5 mg Per G Tube BID    traZODone (DESYREL) tablet 200 mg  200 mg Per G Tube QHS    sodium chloride (NS) flush 5-10 mL  5-10 mL IntraVENous Q8H    sodium chloride (NS) flush 5-10 mL  5-10 mL IntraVENous PRN    acetaminophen (TYLENOL) solution 650 mg  650 mg Per G Tube Q4H PRN    naloxone (NARCAN) injection 0.4 mg  0.4 mg IntraVENous PRN    ondansetron (ZOFRAN) injection 4 mg  4 mg IntraVENous Q4H PRN    bisacodyl (DULCOLAX) suppository 10 mg  10 mg Rectal DAILY PRN    enoxaparin (LOVENOX) injection 30 mg  30 mg SubCUTAneous DAILY    piperacillin-tazobactam (ZOSYN) 4.5 g in 0.9% sodium chloride (MBP/ADV) 100 mL  4.5 g IntraVENous Q8H    guaiFENesin (ROBITUSSIN) 100 mg/5 mL oral liquid 400 mg  400 mg Per G Tube QID    albuterol (PROVENTIL VENTOLIN) nebulizer solution 2.5 mg  2.5 mg Nebulization Q6H RT    umeclidinium (INCRUSE ELLIPTA) 62.5 mcg/actuation  1 Puff Inhalation DAILY    levoFLOXacin (LEVAQUIN) 750 mg in D5W IVPB  750 mg IntraVENous Q24H    vancomycin (VANCOCIN) 1,000 mg in 0.9% sodium chloride (MBP/ADV) 250 mL  1,000 mg IntraVENous Q12H    oxyCODONE (ROXICODONE INTENSOL) 20 mg/mL concentrated solution 10 mg  10 mg Oral Q4HWA    predniSONE (DELTASONE) tablet 10 mg  10 mg Per G Tube DAILY WITH BREAKFAST    nitroglycerin (NITROBID) 2 % ointment 1 Inch  1 Inch Topical Q6H PRN    morphine injection 2 mg  2 mg IntraVENous Q3H PRN    albuterol-ipratropium (DUO-NEB) 2.5 MG-0.5 MG/3 ML  3 mL Nebulization Q6H PRN    0.9% sodium chloride infusion  100 mL/hr IntraVENous CONTINUOUS    lithium carbonate capsule 300 mg  300 mg Oral DAILY        Review of Systems  Constitutional: positive for fatigue and malaise  Respiratory: positive for cough, sputum or dyspnea on exertion  Cardiovascular: negative  Gastrointestinal: negative    Objective:     Patient Vitals for the past 8 hrs:   BP Temp Pulse Resp SpO2   04/18/17 0340 131/78 97.9 °F (36.6 °C) 89 20 93 %   04/18/17 0110 - - - - 96 %   04/17/17 2357 123/75 98.2 °F (36.8 °C) 82 20 98 %     04/17 1901 - 04/18 0700  In: 2874 [I.V.:2320]  Out: 300 [Urine:300]  04/16 0701 - 04/17 1900  In: 6313.3 [P.O.:480; I.V.:2643.3]  Out: 3001 [Urine:3000]    Physical Exam:   Visit Vitals    /78 (BP 1 Location: Right arm, BP Patient Position: At rest)    Pulse 89    Temp 97.9 °F (36.6 °C)    Resp 20    Ht 5' 9\" (1.753 m)    Wt 133 lb (60.3 kg)    LMP  (LMP Unknown)    SpO2 93%    BMI 19.64 kg/m2     General appearance: alert, fatigued, cooperative, no distress, appears stated age  Lungs: clear to auscultation bilaterally, rhonchi R base, L base  Heart: regular rate and rhythm, S1, S2 normal, no murmur, click, rub or gallop  Abdomen: soft, non-tender.  Bowel sounds normal. No masses,  no organomegaly           Data Review   Recent Results (from the past 24 hour(s))   CBC WITH AUTOMATED DIFF    Collection Time: 04/18/17  1:02 AM   Result Value Ref Range    WBC 9.8 3.6 - 11.0 K/uL    RBC 3.13 (L) 3.80 - 5.20 M/uL    HGB 8.6 (L) 11.5 - 16.0 g/dL    HCT 29.2 (L) 35.0 - 47.0 %    MCV 93.3 80.0 - 99.0 FL    MCH 27.5 26.0 - 34.0 PG    MCHC 29.5 (L) 30.0 - 36.5 g/dL    RDW 18.2 (H) 11.5 - 14.5 %    PLATELET 065 080 - 195 K/uL    NEUTROPHILS 79 (H) 32 - 75 %    LYMPHOCYTES 12 12 - 49 %    MONOCYTES 9 5 - 13 %    EOSINOPHILS 0 0 - 7 %    BASOPHILS 0 0 - 1 %    ABS. NEUTROPHILS 7.8 1.8 - 8.0 K/UL    ABS. LYMPHOCYTES 1.2 0.8 - 3.5 K/UL    ABS. MONOCYTES 0.8 0.0 - 1.0 K/UL    ABS. EOSINOPHILS 0.0 0.0 - 0.4 K/UL    ABS. BASOPHILS 0.0 0.0 - 0.1 K/UL   METABOLIC PANEL, COMPREHENSIVE    Collection Time: 04/18/17  1:02 AM   Result Value Ref Range    Sodium 145 136 - 145 mmol/L    Potassium 3.9 3.5 - 5.1 mmol/L    Chloride 108 97 - 108 mmol/L    CO2 31 21 - 32 mmol/L    Anion gap 6 5 - 15 mmol/L    Glucose 92 65 - 100 mg/dL    BUN 15 6 - 20 MG/DL    Creatinine 0.62 0.55 - 1.02 MG/DL    BUN/Creatinine ratio 24 (H) 12 - 20      GFR est AA >60 >60 ml/min/1.73m2    GFR est non-AA >60 >60 ml/min/1.73m2    Calcium 9.0 8.5 - 10.1 MG/DL    Bilirubin, total 0.2 0.2 - 1.0 MG/DL    ALT (SGPT) 12 12 - 78 U/L    AST (SGOT) 9 (L) 15 - 37 U/L    Alk. phosphatase 46 45 - 117 U/L    Protein, total 5.9 (L) 6.4 - 8.2 g/dL    Albumin 2.4 (L) 3.5 - 5.0 g/dL    Globulin 3.5 2.0 - 4.0 g/dL    A-G Ratio 0.7 (L) 1.1 - 2.2             Assessment:     Active Problems:    Acute respiratory failure (HCC) (2/6/2017)      Chronic obstructive pulmonary disease with acute exacerbation (HCC) (2/10/2017)      Chronic pulmonary aspiration (3/2/2017)      Dysphagia (12/30/2014)      Chronic pain (1/15/2017)      History of laryngeal cancer (11/2/2015)      Essential hypertension, benign (11/29/2011)      Elevated troponin (4/16/2017)        Plan:     Feeling somewhat better. Continue current meds and treatments.   Pulmonary to see

## 2017-04-18 NOTE — PROGRESS NOTES
Pharmacy Automatic Renal Dosing Protocol - Antimicrobials    Indication for Antimicrobials: HAP    Current Regimen of Each Antimicrobial (Start Day & Day of Therapy):  Levofloxacin 750 mg IV every 24 hours (Started 17; Day #3)  Piperacillin-tazobactam 4.5 gm IV over 30 minutes every 6 hours (Started 17; Day #3)  Vancomycin dosed by pharmacy (Started 17; Day #3)    Goal Vancomycin Trough: 15-20 mcg/mL    Significant Cultures:   17 Blood culture = ng pending  17 Respiratory culture = ng pending  17 Influenza = Negative (FINAL)    CAPD, Hemodialysis or Renal Replacement Therapy: N/A     Paralysis, amputations, malnutrition: N/A    Recent Labs      17   0102  17   0205  17   0701   CREA  0.62  0.72  0.75   BUN  15  15  16   WBC  9.8  10.9  11.1*     Temp (24hrs), Av.1 °F (36.7 °C), Min:97.8 °F (36.6 °C), Max:98.6 °F (37 °C)    Creatinine Clearance: ~80 mL/min    Impression/Plan:  - Levofloxacin and Zosyn dosed appropriately based on indication and renal function. Continue current regimen.    - vancomycin trough below goal, increased vancomycin to 750 mg every 8 hours for trough ~ 18     Pharmacy will follow daily and adjust medications as appropriate for renal function and/or serum levels.     Thank you,  Danilo Merino, PHARMD

## 2017-04-18 NOTE — PROGRESS NOTES
OT attempted to see pt for tx and pt was cleared by nursing. Pt was supine in bed and on 2 liters of NC and O2% was 98%. Pt told OT that her air would not stay up today and she was not going to get out of bed right now. She told therapy to come back later today. OT explained to pt that being up and moving is the best for her breathing and endurance and pt stated again that she was not getting up right now and closed her eyes and pulled the covers up. OT reminded pt that staying in bed will keep her weak and that therapy is for her. Pt remained with eyes closed and did not speak. Will follow up with pt tomorrow for tx.

## 2017-04-18 NOTE — PROGRESS NOTES
[address]  [phone]      Cardiology Progress Note      4/18/2017 12:44 PM    Admit Date: 4/16/2017    Admit Diagnosis:   Acute respiratory failure ([facility] [address]    Subjective:     Mau James  is a 57 y.o. female With PMH bipolar, laryngeal cancer, HTN, and resp failure admitted for Respiratory failure ([facility] [address] Overnight events:  -VSS  -slight decrease in H/H  -Ms. Wall states she's feeling sleepy today. She endorses continued chronic pain. She feels that her breathing is better but describes decreased oxygen when moving.       Visit Vitals    BP 98/67 (BP 1 Location: Right arm, BP Patient Position: At rest)    Pulse 79    Temp 98.7 °F (37.1 °C)    Resp 20    Ht 5' 9\" (1.753 m)    Wt 60.3 kg (133 lb)    LMP  (LMP Unknown)    SpO2 96%    BMI 19.64 kg/m2       Current Facility-Administered Medications   Medication Dose Route Frequency    vancomycin (VANCOCIN) 750 mg in 0.9% sodium chloride (MBP/ADV) 250 mL  750 mg IntraVENous Q8H    [START ON 4/19/2017] Vancomycin - please send level before starting dose due ~ 2am  1 Each Other ONCE    sodium chloride (NS) flush 5-10 mL  5-10 mL IntraVENous Q8H    sodium chloride (NS) flush 5-10 mL  5-10 mL IntraVENous PRN    acetylcysteine (MUCOMYST) 200 mg/mL (20 %) solution 400 mg  400 mg Inhalation Q6H RT    FLUoxetine (PROzac) 20 mg/5 mL (4 mg/mL) oral solution 20 mg  20 mg Per G Tube DAILY    LORazepam (INTENSOL) 2 mg/mL oral concentrate 1 mg  1 mg Per J Tube Q4H PRN    metoprolol tartrate (LOPRESSOR) tablet 12.5 mg  12.5 mg Per G Tube BID    traZODone (DESYREL) tablet 200 mg  200 mg Per G Tube QHS    sodium chloride (NS) flush 5-10 mL  5-10 mL IntraVENous Q8H    sodium chloride (NS) flush 5-10 mL  5-10 mL IntraVENous PRN    acetaminophen (TYLENOL) solution 650 mg  650 mg Per G Tube Q4H PRN    naloxone (NARCAN) injection 0.4 mg  0.4 mg IntraVENous PRN    ondansetron (ZOFRAN) injection 4 mg  4 mg IntraVENous Q4H PRN    bisacodyl (DULCOLAX) suppository 10 mg  10 mg Rectal DAILY PRN    enoxaparin (LOVENOX) injection 30 mg  30 mg SubCUTAneous DAILY    piperacillin-tazobactam (ZOSYN) 4.5 g in 0.9% sodium chloride (MBP/ADV) 100 mL  4.5 g IntraVENous Q8H    guaiFENesin (ROBITUSSIN) 100 mg/5 mL oral liquid 400 mg  400 mg Per G Tube QID    albuterol (PROVENTIL VENTOLIN) nebulizer solution 2.5 mg  2.5 mg Nebulization Q6H RT    umeclidinium (INCRUSE ELLIPTA) 62.5 mcg/actuation  1 Puff Inhalation DAILY    levoFLOXacin (LEVAQUIN) 750 mg in D5W IVPB  750 mg IntraVENous Q24H    oxyCODONE (ROXICODONE INTENSOL) 20 mg/mL concentrated solution 10 mg  10 mg Oral Q4HWA    predniSONE (DELTASONE) tablet 10 mg  10 mg Per G Tube DAILY WITH BREAKFAST    nitroglycerin (NITROBID) 2 % ointment 1 Inch  1 Inch Topical Q6H PRN    morphine injection 2 mg  2 mg IntraVENous Q3H PRN    albuterol-ipratropium (DUO-NEB) 2.5 MG-0.5 MG/3 ML  3 mL Nebulization Q6H PRN    0.9% sodium chloride infusion  100 mL/hr IntraVENous CONTINUOUS    lithium carbonate capsule 300 mg  300 mg Oral DAILY       Objective:      Physical Exam:  General:  female resting in bed in NAD.   Heart: RRR, no m/S3/JVD  Lungs: bilateral coarse rhonchi bilateral  Abdomen: Soft, +BS, NTND   Extremities: LE luis +DP/PT, no edema   Neurologic: Grossly normal  Skin:  Warm and dry.     Data Review:   Recent Labs      04/18/17   0102  04/17/17   0205  04/16/17   0701   WBC  9.8  10.9  11.1*   HGB  8.6*  9.2*  9.9*   HCT  29.2*  29.4*  33.1*   PLT  198  180  183     Recent Labs      04/18/17   0102  04/17/17   0205  04/16/17   0701   NA  145  141  135*   K  3.9  4.1  4.4   CL  108  105  99   CO2  31  29  29   GLU  92  121*  106*   BUN  15  15  16   CREA  0.62  0.72  0.75   CA  9.0  9.4  9.6   MG   --    --   2.2   ALB  2.4*   --   3.0*   TBILI  0.2   --   0.4   SGOT  9*   --   12*   ALT  12   --   17   INR   --    --   1.1       Recent Labs      04/16/17   1842  04/16/17 1345  04/16/17   0701   TROIQ  0.33*  0.43*  0.55*         Intake/Output Summary (Last 24 hours) at 04/18/17 1244  Last data filed at 04/18/17 1240   Gross per 24 hour   Intake             2870 ml   Output             1351 ml   Net             1519 ml        Telemetry: SR with intermittent MAT   EKG: ST with PAC   Cxray: \"unchanged diffuse bilateral opacities\"  CTA: \"1. New dense airspace disease right lower lobe and increasing airspace disease  left lower lobe  2. Mediastinal adenopathy unchanged  3. Previously demonstrated patchy bilateral upper lobe airspace disease improved  4. No acute pulmonary embolus\"    Assessment:     Active Problems:    Essential hypertension, benign (11/29/2011)      Dysphagia (12/30/2014)      History of laryngeal cancer (11/2/2015)      Chronic pain (1/15/2017)      Acute respiratory failure ([facility] [address] (2/6/2017)      Chronic obstructive pulmonary disease with acute exacerbation ([facility] [address] (2/10/2017)      Chronic pulmonary aspiration (3/2/2017)      Elevated troponin (4/16/2017)        Plan:       Elevated troponin: in setting of resp failure/aspiration PNA. Intermittent atrial tachycardia. · Patient prefers to continue with symptom based treatment  · Continue current medications. No other recommendations at this time. Will follow as needed. Derek Mott NP  DNP, RN, AGACNP-BC        Pt seen and examined in details. Agree with NP A&P. Short run of SVT and MAT due to underlying lung disease. Continue BB. Switch to PO once able to take.      Marco Bates MD

## 2017-04-18 NOTE — PROGRESS NOTES
Problem: Mobility Impaired (Adult and Pediatric)  Goal: *Acute Goals and Plan of Care (Insert Text)  Physical Therapy Goals  Initiated 4/17/2017  1. Patient will move from supine to sit and sit to supine in bed with modified independence within 7 day(s). 2. Patient will transfer from bed to chair and chair to bed with modified independence using the least restrictive device within 7 day(s). 3. Patient will perform sit to stand with modified independence within 7 day(s). 4. Patient will ambulate with modified independence for 150 feet with the least restrictive device within 7 day(s). PHYSICAL THERAPY TREATMENT  Patient: Radha Garcia (63 y.o. female)  Date: 4/18/2017  Diagnosis: Acute respiratory failure ([facility] [address]        Precautions:  falls  Chart, physical therapy assessment, plan of care and goals were reviewed. ASSESSMENT: pt progressing well towards goals, no LOB, min SOB, does well with bed mob and transfers, vc's for safety and proper RW use. Progression toward goals:  [X]      Improving appropriately and progressing toward goals  [ ]      Improving slowly and progressing toward goals  [ ]      Not making progress toward goals and plan of care will be adjusted       PLAN:  Patient continues to benefit from skilled intervention to address the above impairments. Continue treatment per established plan of care.   Discharge Recommendations:  Home Health  Further Equipment Recommendations for Discharge:  none       OBJECTIVE DATA SUMMARY:      Critical Behavior:  Neurologic State: Alert  Orientation Level: Oriented X4  Cognition: Follows commands  Safety/Judgement: Awareness of environment, Fall prevention      Functional Mobility Training:  Bed Mobility:  Rolling: Supervision  Supine to Sit: Supervision  Scooting: Supervision  Level of Assistance: Supervision              Interventions: Verbal cues      Transfers:  Sit to Stand: Stand-by asssistance  Stand to Sit: Stand-by asssistance  Bed to Chair: Stand-by asssistance  Interventions: Verbal cues  Level of Assistance: Stand-by asssistance      Balance:  Sitting: Intact; Without support  Standing: Intact; Without support  Standing - Static: Good; Unsupported  Standing - Dynamic : Good      Ambulation/Gait Training:  Distance (ft): 200 Feet (ft)  Assistive Device: Gait belt  Ambulation - Level of Assistance: Stand-by asssistance  Gait Abnormalities: Decreased step clearance  Right Side Weight Bearing: Full  Left Side Weight Bearing: Full  Base of Support: Widened  Speed/[place]: Pace decreased (<100 feet/min)  Step Length: Left shortened;Right shortened     Pain:  Pain Scale 1: Numeric (0 - 10)  Pain Intensity 1: 7     Activity Tolerance: good     After treatment:   [X] Patient left in no apparent distress sitting up in chair  [ ] Patient left in no apparent distress in bed  [X] Call bell left within reach  [X] Nursing notified  [ ] Caregiver present  [X] Bed alarm activated      COMMUNICATION/COLLABORATION:   The patients plan of care was discussed with: Registered Nurse     Jose Sims PTA   Time Calculation: 20 mins

## 2017-04-18 NOTE — CONSULTS
[facility]  Pulmonary, Critical Care, and Sleep Medicine    Initial Patient Consult    Name: Sanjuanita Viveros MRN: [id number]   : 1955 Hospital: [facility]   Date: 2017        IMPRESSION:   · Acute/chronic hypoxic respiratory failure  · Recurrent aspiration pneumonia despite improved compliance with NPO status, likely aspirating saliva  · Sepsis   · Laryngeal cancer s/p chemo/rad  · Bipolar disorder  · COPD +/- exacerbation  · H/O tobacco use      RECOMMENDATIONS:   · O2  · Bronchodilators  · Systemic steroids  · Empiric antibiotics for recurrent aspiration  · I had a demarcus discussion with her today, that this is going to be a recurrent problem, with recurrent issues with aspiration pneumonia, and eventually it will be resistant to antibiotics. There is no real solution to this issue. Maybe a (highly invasive) surgical procedure (?spit fistula, ?laryngeal closure) which she is not particularly interested in. Will try to discuss with ENT to see if they have any alternatives, though doubtful. · Palliative care consultation ongoing. Currently DNR. Subjective:     I apologize for the delay in this consult. Placed yesterday at 5358, we were not notified about it until 0900 today. This patient has been seen and evaluated at the request of Dr. Junior Argueta for respiratory failure. Patient is a 57 y.o. female with a history of laryngeal cancer s/p chemo/radiation. She recently has struggled with recurrent episodes of aspiration pneumonia. She had a PEG placed, but she was not completely compliant with NPO status at her last admission for aspiration pneumonia. However, this time she reports only taking a few ice chips per day, but she once again developed dyspnea, hypoxia and another infiltrate on chest X-ray. She is feeling a bit better, but she is still short of breath.      Past Medical History:   Diagnosis Date    Bipolar 1 disorder ([facility] [address] 2011    Cancer ([facility] [address]     skin cancer buttocks    Chronic pain     FIBROMYALGIA, LEGS    Encounter for long-term (current) use of other medications 11/29/2011    Essential hypertension, benign 11/29/2011    Ill-defined condition     Laryngeal cancer ([facility] [address] 11/02/2015    with lung metastasis    Psychiatric disorder     bipolar      Past Surgical History:   Procedure Laterality Date    HX BREAST AUGMENTATION Bilateral     SALINE IMPLANTS    HX GI      PLACEMENT OF G TUBE    HX GI      ESOPH. DILATATION    HX GYN      tubal pregnancy    HX HEENT      BX OF NECK MASS    HX HYSTERECTOMY      PLACE PERCUT GASTROSTOMY TUBE  10/28/2014     has been removed 2015    PLACE PERCUT GASTROSTOMY TUBE  2/28/2017           Prior to Admission medications    Medication Sig Start Date End Date Taking? Authorizing Provider   lithium carbonate 300 mg capsule Take 300 mg by mouth nightly. Yes Stephany Pena MD   morphine (ROXANOL) 100 mg/5 mL (20 mg/mL) concentrated solution Take 0.5 mL by mouth every four (4) hours as needed for Pain. Yes Stephany Pena MD   oxyCODONE (ROXICODONE INTENSOL) 20 mg/mL concentrated solution Take 10 mg by mouth every four (4) hours. Yes Stephany Pena MD   cyanocobalamin 1,000 mcg tablet Take 1,000 mcg by mouth daily. Yes Stephany Pena MD   LORazepam (INTENSOL) 2 mg/mL concentrated solution 0.5 mL by Per J Tube route every four (4) hours as needed (shortness of breath/air hunger). Max Daily Amount: 6 mg. 4/14/17  Yes Bucky Fraser MD   LACTOSE-REDUCED FOOD/FIBER (ISOSOURCE 1.5 MARIELA FEEDING TUBE) 5 Cans by Feeding Tube route daily. Yes Historical Provider   B infan-B long-L acid-L rhamn (DIGESTIVE PROBIOTIC) 2 billion cell cpSP Take 1 Cap by mouth daily. 3/29/17  Yes Juana Rodriguez MD   predniSONE (DELTASONE) 10 mg tablet Take 1 Tab by mouth daily (with breakfast). 3/29/17  Yes Juana Rodriguez MD   OXYGEN-AIR DELIVERY SYSTEMS 2 L/min by Nasal route continuous.  3/12/17  Yes Dilip Chavarria Junior Kendall MD   amLODIPine (NORVASC) 10 mg tablet Take 10 mg by mouth daily. Yes Stephany Pena MD   acetylcysteine (MUCOMYST) 100 mg/mL (10 %) nebulizer solution Take 4 mL by inhalation every four (4) hours. 1/26/17  Yes Florence Rhodes MD   metoprolol tartrate (LOPRESSOR) 25 mg tablet Take 1 Tab by mouth every twelve (12) hours. 1/24/17  Yes Florence Rhodes MD   FLUoxetine (PROZAC) 20 mg tablet Take 20 mg by mouth daily. 2/5/16  Yes Historical Provider   traZODone (DESYREL) 100 mg tablet Take 200 mg by mouth nightly. 11/2/15  Yes Historical Provider   (No Medication Selected) Take 100 mL by mouth. Stephany Pena MD   LORazepam (ATIVAN) 0.5 mg tablet Take 1 Tab by mouth two (2) times daily as needed for Anxiety. Max Daily Amount: 1 mg. 3/29/17   Florence Rhodes MD   Lactose-Free Food with Fiber (JEVITY 1.5 MARIELA) 0.06 gram-1.5 kcal/mL liqd 1.5 L by PEG Tube route five (5) times daily. bolus feeding  followed by 150  ml flush after each feeding 3/12/17   Florence Rhodes MD   bisacodyl 5 mg tab Take 1 Tab by mouth daily as needed (constipation). Historical Provider   albuterol-ipratropium (DUO-NEB) 2.5 mg-0.5 mg/3 ml nebu 3 mL by Nebulization route every four (4) hours as needed. 2/12/17   Saul Mccullough MD   diphenhydrAMINE 12.5 mg/5 mL elix 40 mL, lidocaine 2 % soln 40 mL, aluminum & magnesium hydroxide-simethicone 400-400-40 mg/5 mL susp 40 mL Take 5 mL by mouth every six (6) hours as needed. Swish and spit or swallow.     Historical Provider     No Known Allergies   Social History   Substance Use Topics    Smoking status: Former Smoker     Packs/day: 1.00     Years: 40.00     Quit date: 8/27/2014    Smokeless tobacco: Never Used      Comment: PASSIVE SMOKE EXPOSURE,  SMOKES    Alcohol use No      Family History   Problem Relation Age of Onset   Brand [place] Cancer Father      bone/skin/lung    Pneumonia Mother     Anesth Problems Neg Hx         Current Facility-Administered Medications Medication Dose Route Frequency    vancomycin (VANCOCIN) 750 mg in 0.9% sodium chloride (MBP/ADV) 250 mL  750 mg IntraVENous Q8H    sodium chloride (NS) flush 5-10 mL  5-10 mL IntraVENous Q8H    acetylcysteine (MUCOMYST) 200 mg/mL (20 %) solution 400 mg  400 mg Inhalation Q6H RT    FLUoxetine (PROzac) 20 mg/5 mL (4 mg/mL) oral solution 20 mg  20 mg Per G Tube DAILY    metoprolol tartrate (LOPRESSOR) tablet 12.5 mg  12.5 mg Per G Tube BID    traZODone (DESYREL) tablet 200 mg  200 mg Per G Tube QHS    sodium chloride (NS) flush 5-10 mL  5-10 mL IntraVENous Q8H    enoxaparin (LOVENOX) injection 30 mg  30 mg SubCUTAneous DAILY    piperacillin-tazobactam (ZOSYN) 4.5 g in 0.9% sodium chloride (MBP/ADV) 100 mL  4.5 g IntraVENous Q8H    guaiFENesin (ROBITUSSIN) 100 mg/5 mL oral liquid 400 mg  400 mg Per G Tube QID    albuterol (PROVENTIL VENTOLIN) nebulizer solution 2.5 mg  2.5 mg Nebulization Q6H RT    umeclidinium (INCRUSE ELLIPTA) 62.5 mcg/actuation  1 Puff Inhalation DAILY    levoFLOXacin (LEVAQUIN) 750 mg in D5W IVPB  750 mg IntraVENous Q24H    oxyCODONE (ROXICODONE INTENSOL) 20 mg/mL concentrated solution 10 mg  10 mg Oral Q4HWA    predniSONE (DELTASONE) tablet 10 mg  10 mg Per G Tube DAILY WITH BREAKFAST    0.9% sodium chloride infusion  100 mL/hr IntraVENous CONTINUOUS    lithium carbonate capsule 300 mg  300 mg Oral DAILY       Review of Systems:  A comprehensive review of systems was negative except for that written in the HPI.     Objective:   Vital Signs:    Visit Vitals    BP (!) 143/105 (BP 1 Location: Right arm, BP Patient Position: At rest)    Pulse 89    Temp 98.5 °F (36.9 °C)    Resp 20    Ht 5' 9\" (1.753 m)    Wt 60.3 kg (133 lb)    LMP  (LMP Unknown)    SpO2 96%    BMI 19.64 kg/m2       O2 Device: Nasal cannula   O2 Flow Rate (L/min): 3.5 l/min   Temp (24hrs), Av.2 °F (36.8 °C), Min:97.8 °F (36.6 °C), Max:98.6 °F (37 °C)       Intake/Output:   Last shift:         Last 3 shifts: 04/16 1901 - 04/18 0700  In: 7583.3 [P.O.:480; I.V.:4963.3]  Out: 2751 [Urine:2750]    Intake/Output Summary (Last 24 hours) at 04/18/17 1010  Last data filed at 04/18/17 0601   Gross per 24 hour   Intake             3110 ml   Output             1101 ml   Net             2009 ml      Physical Exam:   General:  Alert, cooperative, no distress, appears stated age. Head:  Normocephalic, without obvious abnormality, atraumatic. Eyes:  Conjunctivae/corneas clear. Nose: Nares normal. Septum midline. Mucosa normal.    Throat: Lips, mucosa, and tongue normal.     Neck: Supple, symmetrical, trachea midline    Back:   Symmetric, no curvature. ROM normal.   Lungs:   Rales and wheeze   Chest wall:  No tenderness or deformity. Heart:  Regular rate and rhythm    Abdomen:   Soft, non-tender. Bowel sounds normal. +PEG   Extremities: Extremities normal, atraumatic, no cyanosis or edema. Skin: Skin color, texture, turgor normal. No rashes or lesions   Lymph nodes: Cervical, supraclavicular nodes normal.   Neurologic: Grossly nonfocal     Data review:     Recent Results (from the past 24 hour(s))   CBC WITH AUTOMATED DIFF    Collection Time: 04/18/17  1:02 AM   Result Value Ref Range    WBC 9.8 3.6 - 11.0 K/uL    RBC 3.13 (L) 3.80 - 5.20 M/uL    HGB 8.6 (L) 11.5 - 16.0 g/dL    HCT 29.2 (L) 35.0 - 47.0 %    MCV 93.3 80.0 - 99.0 FL    MCH 27.5 26.0 - 34.0 PG    MCHC 29.5 (L) 30.0 - 36.5 g/dL    RDW 18.2 (H) 11.5 - 14.5 %    PLATELET 934 561 - 722 K/uL    NEUTROPHILS 79 (H) 32 - 75 %    LYMPHOCYTES 12 12 - 49 %    MONOCYTES 9 5 - 13 %    EOSINOPHILS 0 0 - 7 %    BASOPHILS 0 0 - 1 %    ABS. NEUTROPHILS 7.8 1.8 - 8.0 K/UL    ABS. LYMPHOCYTES 1.2 0.8 - 3.5 K/UL    ABS. MONOCYTES 0.8 0.0 - 1.0 K/UL    ABS. EOSINOPHILS 0.0 0.0 - 0.4 K/UL    ABS.  BASOPHILS 0.0 0.0 - 0.1 K/UL   METABOLIC PANEL, COMPREHENSIVE    Collection Time: 04/18/17  1:02 AM   Result Value Ref Range    Sodium 145 136 - 145 mmol/L    Potassium 3.9 3.5 - 5.1 mmol/L    Chloride 108 97 - 108 mmol/L    CO2 31 21 - 32 mmol/L    Anion gap 6 5 - 15 mmol/L    Glucose 92 65 - 100 mg/dL    BUN 15 6 - 20 MG/DL    Creatinine 0.62 0.55 - 1.02 MG/DL    BUN/Creatinine ratio 24 (H) 12 - 20      GFR est AA >60 >60 ml/min/1.73m2    GFR est non-AA >60 >60 ml/min/1.73m2    Calcium 9.0 8.5 - 10.1 MG/DL    Bilirubin, total 0.2 0.2 - 1.0 MG/DL    ALT (SGPT) 12 12 - 78 U/L    AST (SGOT) 9 (L) 15 - 37 U/L    Alk.  phosphatase 46 45 - 117 U/L    Protein, total 5.9 (L) 6.4 - 8.2 g/dL    Albumin 2.4 (L) 3.5 - 5.0 g/dL    Globulin 3.5 2.0 - 4.0 g/dL    A-G Ratio 0.7 (L) 1.1 - 2.2     VANCOMYCIN, TROUGH    Collection Time: 04/18/17  1:02 AM   Result Value Ref Range    Vancomycin,trough 14.4 (H) 5.0 - 10.0 ug/mL    Reported dose date: NOT PROVIDED      Reported dose time: NOT PROVIDED      Reported dose: NOT PROVIDED UNITS     Imaging:  I have personally reviewed the patients radiographs and have reviewed the reports:  New right lower lobe infiltrate superimposed on prior infiltrate        Darryl Boyer MD

## 2017-04-18 NOTE — PROGRESS NOTES
Pt was admitted through the ED from home where she lives with her spouse on a one story home. Pt is a readmission from 3-21-17 - 3-27-17 for hypoxic respiratory failure. She was also here 3-6-17 - 3-10-17. I met with pt who was somewhat disengaged in the conversation stated \"the MD told me I will die soon\". I asked if she wanted to talk to our [place], she said maybe but not right now. Pt stated she has home O2 continuously. She cannot recall the name of her home O2 provider nor could her spouse. Pt reports having tube feedings via bolus at home. Her spouse stated she does this herself 3-4 cans a day usually. Pt has a pulse ox, BP cuff; her daughter assist with filling a weekly pill box. She uses no other DME. She has had HH in the past with [facility]. Pt and spouse are interested in these services again at d/c. ANABELLE left for MD. Pt and spouse report pt is independent with her ADLs. Family transports to medical appointments. CM will follow for d/c needs. Care Management Interventions  PCP Verified by CM:  Yes  Palliative Care Consult (Criteria: CHF and RRAT>21): Yes  Transition of Care Consult (CM Consult): Discharge Planning  Discharge Durable Medical Equipment: No  Physical Therapy Consult: Yes  Occupational Therapy Consult: Yes  Speech Therapy Consult: No  Current Support Network: Lives with Spouse, Own Home  Plan discussed with Pt/Family/Caregiver: Yes    Fernanda Huff, BSW  [phone]

## 2017-04-19 NOTE — PROGRESS NOTES
Pharmacy Automatic Renal Dosing Protocol - Antimicrobials    Indication for Antimicrobials: HAP    Current Regimen of Each Antimicrobial (Start Day & Day of Therapy):  Levofloxacin 750 mg IV every 24 hours (Started 17; Day #4)  Piperacillin-tazobactam 4.5 gm IV over 30 minutes every 6 hours (Started 17; Day #4)  Vancomycin dosed by pharmacy (Started 17; Day #4)    Goal Vancomycin Trough: 15-20 mcg/mL  Vancomycin Trough: 18.5 on     Significant Cultures:   17 Blood culture = ng pending  17 Respiratory culture = ng pending  17 Influenza = Negative (FINAL)    CAPD, Hemodialysis or Renal Replacement Therapy: N/A     Paralysis, amputations, malnutrition: N/A    Recent Labs      17   0417  17   0102  17   0205   CREA  0.53*  0.62  0.72   BUN  9  15  15   WBC   --   9.8  10.9     Temp (24hrs), Av.7 °F (37.1 °C), Min:98.2 °F (36.8 °C), Max:99.1 °F (37.3 °C)    Creatinine Clearance: ~80 mL/min    Impression/Plan:  - Levofloxacin and Zosyn dosed appropriately based on indication and renal function. Continue current regimen.    - vancomycin trough within goal range, continue 750 mg every 8 hours     Pharmacy will follow daily and adjust medications as appropriate for renal function and/or serum levels.     Thank you,  Dipti Sweeney, PHARMD

## 2017-04-19 NOTE — PROGRESS NOTES
Problem: Self Care Deficits Care Plan (Adult)  Goal: *Acute Goals and Plan of Care (Insert Text)  Occupational Therapy Goals  Initiated 4/17/2017  1. Patient will perform grooming standing at the sink with supervision/set-up within 7 day(s). 2. Patient will perform bathing at the sink with minimal assistance/contact guard assist within 7 day(s). 3. Patient will perform lower body dressing with moderate assistance within 7 day(s). 4. Patient will perform toilet transfers with supervision/set-up within 7 day(s). 5. Patient will perform all aspects of toileting with independence within 7 day(s). 6. Patient will participate in upper extremity therapeutic exercise/activities with independence for 5 minutes within 7 day(s). 7. Patient will utilize energy conservation techniques during functional activities with verbal cues within 7 day(s). OCCUPATIONAL THERAPY TREATMENT  Patient: Whitman Babinski (60 y.o. female)  Date: 4/19/2017  Diagnosis: Acute respiratory failure ([facility] [address] <principal problem not specified>       Precautions:        ASSESSMENT:  Pt was supine in bed and willing to get up with therapy but only to walk and did not want to work on ADLs or any other task. Pt was more irritable today and very impulsive. She had her O2 tube and IV line and through her underwear and OT was trying to assist her with getting untangled and pt had to try 2 times and would not listen to what therapy was asking pt to do. Pt told therapy to think to herself and stop talking. Pt stood and the IV line was still tangled in her gown and underwear. Pt was very frustrated but was moving well and her O2 % stayed in the 90s with bed mobility , transfers and walking. Pt then did agree to sit up in chair since she had a visitor in the room. Recommend that pt have further therapy at discharge .   Progression toward goals:  [ ]       Improving appropriately and progressing toward goals  [X]       Improving slowly and progressing toward goals  [ ]       Not making progress toward goals and plan of care will be adjusted       PLAN:  Patient continues to benefit from skilled intervention to address the above impairments. Continue treatment per established plan of care. Discharge Recommendations:  Home Health  Further Equipment Recommendations for Discharge:  tbd       SUBJECTIVE:   Patient stated Paula Echeverria will walk today but wont do anything else.       OBJECTIVE DATA SUMMARY:   Cognitive/Behavioral Status:  Neurologic State: Alert  Orientation Level: Oriented X4  Cognition: Follows commands              Functional Mobility and Transfers for ADLs:  Bed Mobility:   independent     Transfers:  Sit to Stand: Supervision        Balance:  Sitting: Intact; Without support  Standing: Intact; Without support  Standing - Static: Good; Unsupported  Standing - Dynamic : Good     ADL Intervention:           Pt reports that she is setup only but is not willing to work on ADLs with OT. Pain:  Pain Scale 1: Numeric (0 - 10)  Pain Intensity 1: 8  Pain Location 1: Flank  Pain Orientation 1: Left  Pain Description 1: Aching  Pain Intervention(s) 1: Medication (see MAR)  Activity Tolerance:   vss  Please refer to the flowsheet for vital signs taken during this treatment.   After treatment:   [X] Patient left in no apparent distress sitting up in chair  [ ] Patient left in no apparent distress in bed  [X] Call bell left within reach  [X] Nursing notified  [ ] Caregiver present  [ ] Bed alarm activated      COMMUNICATION/COLLABORATION:   The patients plan of care was discussed with: Physical Therapist and Registered Nurse     Cristin Nava OT  Time Calculation: 20 mins

## 2017-04-19 NOTE — CONSULTS
[facility]         Received consult today regarding patient's situation / admission/  PEG placement / aspiration pneumonia situation . 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 Over the years , her swallow has steadily declined in efficiency and aspiration had been seen on swallow studies reviewed previously with patient and patient family . .. 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 Total laryngectomy would not be a vehicle to swallow , only to avoid overt aspiration . The consideration to move in that direction , however , would be limited because of patient's medical status / health / ability to withstand surgery and other issues ( pt interest etc..  )   FYI: Tracheotomy alone will not offer aspiration prevention    Agree with current plan to keep patient NPO and adjust feedings   Will speak with pulmonary consultant when available     Dr Mccray Prom

## 2017-04-19 NOTE — PROGRESS NOTES
Bedside and Verbal shift change report given to Adalberto Summers RN by Ciarra Martin RN. Report included the following information SBAR, Kardex, Intake/Output, MAR, Recent Results and Cardiac Rhythm NSR.

## 2017-04-19 NOTE — PROGRESS NOTES
Nutrition Assessment:    INTERVENTIONS/RECOMMENDATIONS:   Continue current TF order  Consider probiotic     ASSESSMENT:   Chart reviewed, medically noted for COPD, laryngeal ca with dysphagia/chronic PEG tube, HTN. Pt receiving ordered boluses and tolerating well however is experiencing diarrhea. Diet Order: NPO (jevity 240 ml 5x per day + 150 ml water after bolus)  % Eaten:  Patient Vitals for the past 72 hrs:   % Diet Eaten   04/17/17 1148 100 %   04/17/17 0827 100 %     Pertinent Medications: [x] Reviewed []Other: (prednisone)  Pertinent Labs: [x]Reviewed  []Other:  Food Allergies: [x]None []Other:     Last BM: 4/18   [x]Active     []Hyperactive  []Hypoactive       [] Absent  BS  Skin:    [] Intact   [] Incision  [] Breakdown   []Edema   [x]Other: skin tear    Anthropometrics: Height: 5' 9\" (175.3 cm) Weight: 60.3 kg (133 lb)    IBW (%IBW):   ( ) UBW (%UBW):   (  %)    BMI: Body mass index is 19.64 kg/(m^2). This BMI is indicative of:  []Underweight   [x]Normal   []Overweight   [] Obesity   [] Extreme Obesity (BMI>40)  Last Weight Metrics:  Weight Loss Metrics 4/16/2017 4/6/2017 4/4/2017 3/31/2017 3/29/2017 3/28/2017 3/21/2017   Today's Wt 133 lb 133 lb 6.4 oz 133 lb 132 lb 133 lb 134 lb 14.7 oz 135 lb   BMI 19.64 kg/m2 19.7 kg/m2 19.64 kg/m2 19.49 kg/m2 20.83 kg/m2 20.52 kg/m2 20.53 kg/m2       Estimated Nutrition Needs (Based on): 1725 Kcals/day (MSJ: 1225 x 1.4 d/t COPD and recent wt loss) , 60 g (1 g/kg) Protein  Carbohydrate:  At Least 130 g/day  Fluids: 1725 mL/day     Pt expected to meet estimated nutrient needs: [x]Yes []No    NUTRITION DIAGNOSES:   Problem:  Less than optimal enteral nutrition      Etiology: related to not receiving ordered bolus feeding     Signs/Symptoms: as evidenced by 8% wt loss in one month      NUTRITION INTERVENTIONS:    Enteral/Parenteral Nutrition: Other (continue current TF order)                GOAL:   meet >85% of order TF in 3-5 days    NUTRITION MONITORING AND EVALUATION   Behavioral-Environmental Outcomes: Behavior  Food/Nutrient Intake Outcomes: Enteral/parenteral nutrition intake  Physical Signs/Symptoms Outcomes: Weight/weight change, Electrolyte and renal profile, GI    Previous Goal Met:   [x] Met              [] Progressing Towards Goal              [] Not Progressing Towards Goal   Previous Recommendations:   [x] Implemented          [] Not Implemented          [] Not Applicable    LEARNING NEEDS (Diet, Food/Nutrient-Drug Interaction):    [x] None Identified   [] Identified and Education Provided/Documented   [] Identified and Pt declined/was not appropriate     Cultural, Yarsani, OR Ethnic Dietary Needs:    [x] None Identified   [] Identified and Addressed     [x] Interdisciplinary Care Plan Reviewed/Documented    [x] Discharge Planning: Continue home TF regimen, ensuring 1250 ml of formula is administered per day   [] Participated in Interdisciplinary Rounds    NUTRITION RISK:    [] High              [x] Moderate     to      [x]  Low  []  Minimal/Uncompromised      Demian Millan RDN  Pager [phone]  Weekend Pager [phone]

## 2017-04-19 NOTE — PROGRESS NOTES
TRANSFER - IN REPORT:    Verbal report received from Larissa(name) on [employer]  being received from ER(unit) for routine progression of care      Report consisted of patients Situation, Background, Assessment and   Recommendations(SBAR). Information from the following report(s) Kardex, ED Summary, MAR and Recent Results was reviewed with the receiving nurse. Opportunity for questions and clarification was provided. Assessment completed upon patients arrival to unit and care assumed.

## 2017-04-19 NOTE — HOME CARE
Please note that this patient was open to [address], speech therapy and aide services at the time of hospital admission. If services will be needed at hospital discharge, please order to resume them.    Alva

## 2017-04-19 NOTE — PROGRESS NOTES
Nurse attempted to call report on patient going to renal unit Rm # [id number]. Receiving nurse is busy and will have to call  back. Will follow up.     1520: TRANSFER - OUT REPORT:    Verbal report given to VERO Pabon on [employer]  being transferred to Renal for routine progression of care       Report consisted of patients Situation, Background, Assessment and   Recommendations(SBAR). Information from the following report(s) SBAR, Kardex, Intake/Output, MAR and Recent Results was reviewed with the receiving nurse. Lines:   Peripheral IV 04/17/17 Right Wrist (Active)   Site Assessment Clean, dry, & intact 4/19/2017  1:42 PM   Phlebitis Assessment 0 4/19/2017  1:42 PM   Infiltration Assessment 0 4/19/2017  1:42 PM   Dressing Status Clean, dry, & intact 4/19/2017  1:42 PM   Dressing Type Tape;Transparent 4/19/2017  1:42 PM   Hub Color/Line Status Blue 4/19/2017  1:42 PM       Peripheral IV 04/18/17 Left; Lower Arm (Active)   Site Assessment Clean, dry, & intact 4/19/2017  1:42 PM   Phlebitis Assessment 0 4/19/2017  1:42 PM   Infiltration Assessment 0 4/19/2017  1:42 PM   Dressing Status Clean, dry, & intact 4/19/2017  1:42 PM   Dressing Type Transparent 4/19/2017  1:42 PM   Hub Color/Line Status Blue 4/19/2017  1:42 PM        Opportunity for questions and clarification was provided.       Patient transported with:   O2 @ 3.5 liters   Patient belongings, chart and tech

## 2017-04-19 NOTE — PROGRESS NOTES
Problem: Mobility Impaired (Adult and Pediatric)  Goal: *Acute Goals and Plan of Care (Insert Text)  Physical Therapy Goals  Initiated 4/17/2017  1. Patient will move from supine to sit and sit to supine in bed with modified independence within 7 day(s). 2. Patient will transfer from bed to chair and chair to bed with modified independence using the least restrictive device within 7 day(s). 3. Patient will perform sit to stand with modified independence within 7 day(s). 4. Patient will ambulate with modified independence for 150 feet with the least restrictive device within 7 day(s). PHYSICAL THERAPY TREATMENT  Patient: Carlito Hdz (89 y.o. female)  Date: 4/19/2017  Diagnosis: Acute respiratory failure ([address]        Precautions:  falls  Chart, physical therapy assessment, plan of care and goals were reviewed. ASSESSMENT: pt tolerated tx well, slight LOB during turning, no SOB, does well with transfers, vc's for safety. Progression toward goals:  [ ]      Improving appropriately and progressing toward goals  [X]      Improving slowly and progressing toward goals  [ ]      Not making progress toward goals and plan of care will be adjusted       PLAN:  Patient continues to benefit from skilled intervention to address the above impairments. Continue treatment per established plan of care. Discharge Recommendations:  Home Health  Further Equipment Recommendations for Discharge:  none       OBJECTIVE DATA SUMMARY:      Critical Behavior:  Neurologic State: Alert  Orientation Level: Oriented X4  Cognition: Follows commands  Safety/Judgement: Awareness of environment, Fall prevention      Functional Mobility Training:  Bed Mobility: pt sitting EOB on arrival     Transfers:  Sit to Stand: Supervision  Stand to Sit: Supervision  Bed to Chair: Stand-by asssistance  Interventions: Verbal cues  Level of Assistance: Stand-by asssistance      Balance:  Sitting: Intact; Without support  Standing: Intact; Without support  Standing - Static: Good; Unsupported  Standing - Dynamic : Good      Ambulation/Gait Training:  Distance (ft): 200 Feet (ft)  Assistive Device: Gait belt  Ambulation - Level of Assistance: Stand-by asssistance  Gait Abnormalities: Decreased step clearance  Right Side Weight Bearing: Full  Left Side Weight Bearing: Full  Base of Support: Widened  Speed/[place]: Pace decreased (<100 feet/min)  Step Length: Left shortened;Right shortened     Pain:  Pain Scale 1: Numeric (0 - 10)  Pain Intensity 1: 8  Pain Location 1: Flank  Pain Orientation 1: Left  Pain Description 1: Aching  Pain Intervention(s) 1: Medication (see MAR)      Activity Tolerance: good     After treatment:   [X] Patient left in no apparent distress sitting up in chair  [ ] Patient left in no apparent distress in bed  [X] Call bell left within reach  [X] Nursing notified  [ ] Caregiver present  [X] Bed alarm activated      COMMUNICATION/COLLABORATION:   The patients plan of care was discussed with: Registered Nurse     Gloria Barnes PTA   Time Calculation: 20 mins

## 2017-04-19 NOTE — PROGRESS NOTES
General Daily Progress Note    Admit Date: 4/16/2017  Hospital day 4    Subjective:     Patient has slowly improving dyspnea and cough. .   Medication side effects: none    Current Facility-Administered Medications   Medication Dose Route Frequency    vancomycin (VANCOCIN) 750 mg in 0.9% sodium chloride (MBP/ADV) 250 mL  750 mg IntraVENous Q8H    sodium chloride (NS) flush 5-10 mL  5-10 mL IntraVENous Q8H    sodium chloride (NS) flush 5-10 mL  5-10 mL IntraVENous PRN    acetylcysteine (MUCOMYST) 200 mg/mL (20 %) solution 400 mg  400 mg Inhalation Q6H RT    FLUoxetine (PROzac) 20 mg/5 mL (4 mg/mL) oral solution 20 mg  20 mg Per G Tube DAILY    LORazepam (INTENSOL) 2 mg/mL oral concentrate 1 mg  1 mg Per J Tube Q4H PRN    metoprolol tartrate (LOPRESSOR) tablet 12.5 mg  12.5 mg Per G Tube BID    traZODone (DESYREL) tablet 200 mg  200 mg Per G Tube QHS    sodium chloride (NS) flush 5-10 mL  5-10 mL IntraVENous Q8H    sodium chloride (NS) flush 5-10 mL  5-10 mL IntraVENous PRN    acetaminophen (TYLENOL) solution 650 mg  650 mg Per G Tube Q4H PRN    naloxone (NARCAN) injection 0.4 mg  0.4 mg IntraVENous PRN    ondansetron (ZOFRAN) injection 4 mg  4 mg IntraVENous Q4H PRN    bisacodyl (DULCOLAX) suppository 10 mg  10 mg Rectal DAILY PRN    enoxaparin (LOVENOX) injection 30 mg  30 mg SubCUTAneous DAILY    piperacillin-tazobactam (ZOSYN) 4.5 g in 0.9% sodium chloride (MBP/ADV) 100 mL  4.5 g IntraVENous Q8H    guaiFENesin (ROBITUSSIN) 100 mg/5 mL oral liquid 400 mg  400 mg Per G Tube QID    albuterol (PROVENTIL VENTOLIN) nebulizer solution 2.5 mg  2.5 mg Nebulization Q6H RT    umeclidinium (INCRUSE ELLIPTA) 62.5 mcg/actuation  1 Puff Inhalation DAILY    levoFLOXacin (LEVAQUIN) 750 mg in D5W IVPB  750 mg IntraVENous Q24H    oxyCODONE (ROXICODONE INTENSOL) 20 mg/mL concentrated solution 10 mg  10 mg Oral Q4HWA    predniSONE (DELTASONE) tablet 10 mg  10 mg Per G Tube DAILY WITH BREAKFAST    nitroglycerin (NITROBID) 2 % ointment 1 Inch  1 Inch Topical Q6H PRN    morphine injection 2 mg  2 mg IntraVENous Q3H PRN    albuterol-ipratropium (DUO-NEB) 2.5 MG-0.5 MG/3 ML  3 mL Nebulization Q6H PRN    0.9% sodium chloride infusion  100 mL/hr IntraVENous CONTINUOUS    lithium carbonate capsule 300 mg  300 mg Oral DAILY        Review of Systems  Constitutional: positive for fatigue  Respiratory: positive for cough, dyspnea on exertion or stridor  Cardiovascular: negative  Gastrointestinal: negative    Objective:     Patient Vitals for the past 8 hrs:   BP Temp Pulse Resp SpO2   04/19/17 0455 (!) 147/95 99.1 °F (37.3 °C) 70 18 99 %   04/19/17 0110 - - - - 96 %   04/18/17 2338 115/76 98.9 °F (37.2 °C) 72 18 96 %     04/18 1901 - 04/19 0700  In: 780   Out: 1400 [Urine:1400]  04/17 0701 - 04/18 1900  In: 0529 [P.O.:480; I.V.:2320]  Out: 1551 [Urine:1550]    Physical Exam:   Visit Vitals    BP (!) 147/95    Pulse 70    Temp 99.1 °F (37.3 °C)    Resp 18    Ht 5' 9\" (1.753 m)    Wt 133 lb (60.3 kg)    LMP  (LMP Unknown)    SpO2 99%    BMI 19.64 kg/m2     General appearance: alert, fatigued, cooperative, no distress, appears stated age  Lungs: rhonchi R base, L base, wheezes R base, L base  Heart: regular rate and rhythm  Abdomen: soft, non-tender.  Bowel sounds normal. No masses,  no organomegaly           Data Review   Recent Results (from the past 24 hour(s))   METABOLIC PANEL, BASIC    Collection Time: 04/19/17  4:17 AM   Result Value Ref Range    Sodium 149 (H) 136 - 145 mmol/L    Potassium 3.6 3.5 - 5.1 mmol/L    Chloride 115 (H) 97 - 108 mmol/L    CO2 25 21 - 32 mmol/L    Anion gap 9 5 - 15 mmol/L    Glucose 69 65 - 100 mg/dL    BUN 9 6 - 20 MG/DL    Creatinine 0.53 (L) 0.55 - 1.02 MG/DL    BUN/Creatinine ratio 17 12 - 20      GFR est AA >60 >60 ml/min/1.73m2    GFR est non-AA >60 >60 ml/min/1.73m2    Calcium 7.7 (L) 8.5 - 10.1 MG/DL           Assessment:     Active Problems:    Acute respiratory failure (HCC) (2/6/2017)      Chronic obstructive pulmonary disease with acute exacerbation ([facility] [address] (2/10/2017)      Chronic pulmonary aspiration (3/2/2017)      Dysphagia (12/30/2014)      Chronic pain (1/15/2017)      History of laryngeal cancer (11/2/2015)      Essential hypertension, benign (11/29/2011)      Elevated troponin (4/16/2017)        Plan:     Continued slow improvement. Pulmonary and Palliative Care assistance greatly appreciated. Continue current meds and treatments,and call. Will ask ENT to  Make recommendations.

## 2017-04-19 NOTE — PROGRESS NOTES
Primary Nurse Fidel Rodriguez RN and Silver Berg RN performed a dual skin assessment on this patient No impairment noted  Angel score is 21

## 2017-04-19 NOTE — PROGRESS NOTES
0730  [facility] SBAR, Kardex, ED Summary, Procedure Summary, Intake/Output, MAR, Accordion, Recent Results, Med Rec Status and Cardiac Rhythm NSR were discussed.     [address]

## 2017-04-19 NOTE — PROGRESS NOTES
[facility]  Pulmonary, Critical Care, and Sleep Medicine    Name: Jamari Jimenez MRN: [id number]   : 1955 Hospital: [address] [facility]   Date: 2017        IMPRESSION:   · Acute/chronic hypoxic respiratory failure  · Recurrent aspiration pneumonia despite improved compliance with NPO status, likely aspirating saliva  · Sepsis   · Laryngeal cancer s/p chemo/rad  · Bipolar disorder  · COPD +/- exacerbation  · H/O tobacco use      PLAN:   · O2  · Bronchodilators  · Systemic steroids  · Empiric antibiotics for recurrent aspiration  · I had a demarcus discussion with her yesterda, that this is going to be a recurrent problem, with recurrent issues with aspiration pneumonia, and eventually it will be resistant to antibiotics. There is no real solution to this issue. Maybe a (highly invasive) surgical procedure (?spit fistula, ?laryngeal closure) which she is not particularly interested in. Will try to discuss with ENT to see if they have any alternatives, though doubtful. · Palliative care consultation ongoing. Currently DNR. Subjective/Interval History:   I have reviewed the flowsheet and previous days notes. No events. Still with persistent symptoms. Review of Systems   Constitutional: Positive for fatigue. HENT: Negative. Eyes: Negative. Respiratory: Positive for cough and shortness of breath. Cardiovascular: Negative. Gastrointestinal: Negative.       Objective:   Vital Signs:    Visit Vitals    BP (!) 143/92 (BP 1 Location: Left arm, BP Patient Position: At rest)    Pulse 87    Temp 98.6 °F (37 °C)    Resp 20    Ht 5' 9\" (1.753 m)    Wt 60.3 kg (133 lb)    LMP  (LMP Unknown)    SpO2 95%    BMI 19.64 kg/m2       O2 Device: Nasal cannula   O2 Flow Rate (L/min): 3.5 l/min   Temp (24hrs), Av.7 °F (37.1 °C), Min:98.2 °F (36.8 °C), Max:99.1 °F (37.3 °C)       Intake/Output:   Last shift:      701 - 1900  In: -   Out: 500 [Urine:500]  Last 3 shifts: 04/17 1901 - 04/19 0700  In: 9264 [I.V.:2320]  Out: 2450 [Urine:2450]    Intake/Output Summary (Last 24 hours) at 04/19/17 0908  Last data filed at 04/19/17 0723   Gross per 24 hour   Intake             1760 ml   Output             2650 ml   Net             -890 ml      Physical Exam   Constitutional: She appears ill. No distress. HENT:   Head: Normocephalic and atraumatic. Mouth/Throat: No oropharyngeal exudate. Eyes: No scleral icterus. Cardiovascular: Normal rate and regular rhythm. Pulmonary/Chest: She has wheezes. She has rales. Abdominal: Soft. Bowel sounds are normal.   Musculoskeletal: She exhibits edema. Neurological: She is alert. Skin: Skin is warm and dry. Data:   Labs:  Recent Labs      04/18/17   0102  04/17/17   0205   WBC  9.8  10.9   HGB  8.6*  9.2*   HCT  29.2*  29.4*   PLT  198  180     Recent Labs      04/19/17   0417  04/18/17   0102  04/17/17   0205   NA  149*  145  141   K  3.6  3.9  4.1   CL  115*  108  105   CO2  25  31  29   GLU  69  92  121*   BUN  9  15  15   CREA  0.53*  0.62  0.72   CA  7.7*  9.0  9.4   ALB   --   2.4*   --    TBILI   --   0.2   --    SGOT   --   9*   --    ALT   --   12   --      No results for input(s): PH, PCO2, PO2, HCO3, FIO2 in the last 72 hours.     Imaging:  I have personally reviewed the patients radiographs:  None today        Bita Tee MD

## 2017-04-20 NOTE — PROGRESS NOTES
Bedside shift change report given to Pepper (oncoming nurse) by Laurence Mahmood (offgoing nurse). Report included the following information SBAR, Kardex, Intake/Output, MAR and Recent Results. Zone Phone for oncoming shift:   [phone]    Shift Summary: Pt rested quietly through night. Chronic pain still a problem. Requesting additional overnight dose of Roxicodone. LDAs               Peripheral IV 04/17/17 Right Wrist (Active)   Site Assessment Clean, dry, & intact 4/20/2017  3:06 AM   Phlebitis Assessment 0 4/20/2017  3:06 AM   Infiltration Assessment 0 4/20/2017  3:06 AM   Dressing Status Clean, dry, & intact 4/20/2017  3:06 AM   Dressing Type Transparent;Tape 4/20/2017  3:06 AM   Hub Color/Line Status Blue;Flushed 4/20/2017  3:06 AM       Peripheral IV 04/18/17 Left; Lower Arm (Active)   Site Assessment Clean, dry, & intact 4/20/2017  3:06 AM   Phlebitis Assessment 0 4/20/2017  3:06 AM   Infiltration Assessment 0 4/20/2017  3:06 AM   Dressing Status Clean, dry, & intact 4/20/2017  3:06 AM   Dressing Type Transparent;Tape 4/20/2017  3:06 AM   Hub Color/Line Status Blue;Flushed; Infusing 4/20/2017  3:06 AM          PEG/Gastrostomy Tube 02/28/17 (Active)   Site Assessment Clean, dry, & intact 4/19/2017  8:08 PM   Dressing Status Clean, dry, & intact 4/19/2017  8:08 PM   G Port Status Clamped 4/19/2017  8:08 PM   Action Taken Placement verified (comment) 4/19/2017  8:08 PM   Gastric Residual (mL) 0 ml 4/19/2017  8:08 PM   Tube Feeding/Formula Options Jevity 1.5 4/19/2017  8:08 PM   Modular Nutrients Fiber 4/19/2017  8:08 PM   Tube Feeding/Verify Rate (mL/hr) 240 4/19/2017  8:08 PM   Water Flush Volume (mL) 150 mL 4/19/2017  8:08 PM   Intake (ml) 390 ml 4/19/2017  8:08 PM   Medication Volume 100 ml 4/19/2017  8:08 PM   Output (ml) 0 ml 4/16/2017 12:31 PM                  Intake & Output   Date 04/19/17 0700 - 04/20/17 0659 04/20/17 0700 - 04/21/17 0659   Shift 7924-6222 3891-5176 24 Hour Total 5190-4401 0535-5840 24 Hour Total   I  N  T  A  K  E   P. O. 0  0         P. O. 0  0       I.V.  (mL/kg/hr)  1000  (1.4) 1000  (0.7)         I.V.  1000 1000       NG/ 217 5479         Water Flush Volume (mL) (PEG/Gastrostomy Tube 02/28/17) 150 150 300         Medication Volume (PEG/Gastrostomy Tube 02/28/17)  100 100         Intake (ml) (PEG/Gastrostomy Tube 02/28/17) 240 390 630       Shift Total  (mL/kg) 390  (6.5) 1640  (27.2) 2030  (33.6)      O  U  T  P  U  T   Urine  (mL/kg/hr) 650  (0.9)  650  (0.4)         Urine Voided 650  650         Urine Occurrence(s) 1 x 6 x 7 x       Shift Total  (mL/kg) 650  (10.8)  650  (10.8)      NET -260 1640 1380      Weight (kg) 60.3 60.3 60.3 60.3 60.3 60.3      Last Bowel Movement Last Bowel Movement Date: 04/19/17   Glucose Checks [x] N/A  [] AC/HS  [] Q6  Concerns:   Nutrition Active Orders   Diet    DIET NPO       Consults [x]PT  []OT  []Speech  []Case Management   Cardiac Monitoring []N/A [x]Yes Expires:48 hrs

## 2017-04-20 NOTE — PROGRESS NOTES
[facility]  Pulmonary, Critical Care, and Sleep Medicine    Name: Jono Yip MRN: [id number]   : 1955 Hospital: [facility]   Date: 2017        IMPRESSION:   · Acute/chronic hypoxic respiratory failure  · Recurrent aspiration pneumonia despite improved compliance with NPO status, likely aspirating saliva  · Sepsis   · Laryngeal cancer s/p chemo/rad  · Bipolar disorder  · COPD +/- exacerbation  · H/O tobacco use      PLAN:   · O2- wean as tolerated  · Bronchodilators  · Steroid taper  · Empiric antibiotics for recurrent aspiration  · Appreciate ENT input. As expected, there is little to offer her in terms of her recurrent aspiration events. · Palliative care consultation ongoing. Currently DNR. · Agree with plans for discharge in AM.  Little else to add, will check back PRN. Subjective/Interval History:   I have reviewed the flowsheet and previous days notes. Feeling a bit better, less fatigued. Review of Systems   Constitutional: Positive for fatigue. HENT: Negative. Eyes: Negative. Respiratory: Positive for cough and shortness of breath. Cardiovascular: Negative. Gastrointestinal: Negative.       Objective:   Vital Signs:    Visit Vitals    /87 (BP 1 Location: Left arm, BP Patient Position: At rest)    Pulse 77    Temp 98.5 °F (36.9 °C)    Resp 18    Ht 5' 9\" (1.753 m)    Wt 60.3 kg (133 lb)    LMP  (LMP Unknown)    SpO2 93%    BMI 19.64 kg/m2       O2 Device: Nasal cannula   O2 Flow Rate (L/min): 3.5 l/min   Temp (24hrs), Av.7 °F (37.1 °C), Min:98.5 °F (36.9 °C), Max:99 °F (37.2 °C)       Intake/Output:   Last shift:         Last 3 shifts:  1901 -  0700  In: 1120 [I.V.:1000]  Out: 2550 [Urine:2550]    Intake/Output Summary (Last 24 hours) at 17 0954  Last data filed at 17 0659   Gross per 24 hour   Intake             2030 ml   Output              150 ml   Net             1880 ml Physical Exam   Constitutional: She appears ill. No distress. HENT:   Head: Normocephalic and atraumatic. Mouth/Throat: No oropharyngeal exudate. Eyes: No scleral icterus. Cardiovascular: Normal rate and regular rhythm. Pulmonary/Chest: She has wheezes. She has rales. Abdominal: Soft. Bowel sounds are normal.   Musculoskeletal: She exhibits edema. Neurological: She is alert. Skin: Skin is warm and dry. Data:   Labs:  Recent Labs      04/18/17   0102   WBC  9.8   HGB  8.6*   HCT  29.2*   PLT  198     Recent Labs      04/20/17   0203  04/19/17   0417  04/18/17   0102   NA  144  149*  145   K  4.2  3.6  3.9   CL  107  115*  108   CO2  30  25  31   GLU  96  69  92   BUN  12  9  15   CREA  0.71  0.53*  0.62   CA  9.2  7.7*  9.0   ALB   --    --   2.4*   TBILI   --    --   0.2   SGOT   --    --   9*   ALT   --    --   12     No results for input(s): PH, PCO2, PO2, HCO3, FIO2 in the last 72 hours.     Imaging:  I have personally reviewed the patients radiographs:  None today        Emiliano Thomas MD

## 2017-04-20 NOTE — PROGRESS NOTES
General Daily Progress Note    Admit Date: 4/16/2017  Hospital day 5    Subjective:     Patient has improving cough and dyspnea. .   Medication side effects: none    Current Facility-Administered Medications   Medication Dose Route Frequency    vancomycin (VANCOCIN) 750 mg in 0.9% sodium chloride (MBP/ADV) 250 mL  750 mg IntraVENous Q8H    sodium chloride (NS) flush 5-10 mL  5-10 mL IntraVENous Q8H    sodium chloride (NS) flush 5-10 mL  5-10 mL IntraVENous PRN    acetylcysteine (MUCOMYST) 200 mg/mL (20 %) solution 400 mg  400 mg Inhalation Q6H RT    FLUoxetine (PROzac) 20 mg/5 mL (4 mg/mL) oral solution 20 mg  20 mg Per G Tube DAILY    LORazepam (INTENSOL) 2 mg/mL oral concentrate 1 mg  1 mg Per J Tube Q4H PRN    metoprolol tartrate (LOPRESSOR) tablet 12.5 mg  12.5 mg Per G Tube BID    traZODone (DESYREL) tablet 200 mg  200 mg Per G Tube QHS    sodium chloride (NS) flush 5-10 mL  5-10 mL IntraVENous Q8H    sodium chloride (NS) flush 5-10 mL  5-10 mL IntraVENous PRN    acetaminophen (TYLENOL) solution 650 mg  650 mg Per G Tube Q4H PRN    naloxone (NARCAN) injection 0.4 mg  0.4 mg IntraVENous PRN    ondansetron (ZOFRAN) injection 4 mg  4 mg IntraVENous Q4H PRN    bisacodyl (DULCOLAX) suppository 10 mg  10 mg Rectal DAILY PRN    enoxaparin (LOVENOX) injection 30 mg  30 mg SubCUTAneous DAILY    piperacillin-tazobactam (ZOSYN) 4.5 g in 0.9% sodium chloride (MBP/ADV) 100 mL  4.5 g IntraVENous Q8H    guaiFENesin (ROBITUSSIN) 100 mg/5 mL oral liquid 400 mg  400 mg Per G Tube QID    albuterol (PROVENTIL VENTOLIN) nebulizer solution 2.5 mg  2.5 mg Nebulization Q6H RT    umeclidinium (INCRUSE ELLIPTA) 62.5 mcg/actuation  1 Puff Inhalation DAILY    levoFLOXacin (LEVAQUIN) 750 mg in D5W IVPB  750 mg IntraVENous Q24H    oxyCODONE (ROXICODONE INTENSOL) 20 mg/mL concentrated solution 10 mg  10 mg Oral Q4HWA    predniSONE (DELTASONE) tablet 10 mg  10 mg Per G Tube DAILY WITH BREAKFAST    nitroglycerin (NITROBID) 2 % ointment 1 Inch  1 Inch Topical Q6H PRN    morphine injection 2 mg  2 mg IntraVENous Q3H PRN    albuterol-ipratropium (DUO-NEB) 2.5 MG-0.5 MG/3 ML  3 mL Nebulization Q6H PRN    0.9% sodium chloride infusion  100 mL/hr IntraVENous CONTINUOUS    lithium carbonate capsule 300 mg  300 mg Oral DAILY        Review of Systems  Constitutional: positive for fatigue and malaise  Respiratory: positive for cough or stridor  Cardiovascular: negative  Gastrointestinal: negative    Objective:     Patient Vitals for the past 8 hrs:   SpO2   04/20/17 0720 96 %        04/18 1901 - 04/20 0700  In: 2810 [I.V.:1000]  Out: 2550 [Urine:2550]    Physical Exam:   Visit Vitals    /85    Pulse 76    Temp 98.6 °F (37 °C)    Resp 16    Ht 5' 9\" (1.753 m)    Wt 133 lb (60.3 kg)    LMP  (LMP Unknown)    SpO2 96%    BMI 19.64 kg/m2     General appearance: alert, fatigued, cooperative, no distress, appears stated age  Lungs: rhonchi R base, L base  Heart: regular rate and rhythm  Abdomen: soft, non-tender.  Bowel sounds normal. No masses,  no organomegaly           Data Review   Recent Results (from the past 24 hour(s))   METABOLIC PANEL, BASIC    Collection Time: 04/20/17  2:03 AM   Result Value Ref Range    Sodium 144 136 - 145 mmol/L    Potassium 4.2 3.5 - 5.1 mmol/L    Chloride 107 97 - 108 mmol/L    CO2 30 21 - 32 mmol/L    Anion gap 7 5 - 15 mmol/L    Glucose 96 65 - 100 mg/dL    BUN 12 6 - 20 MG/DL    Creatinine 0.71 0.55 - 1.02 MG/DL    BUN/Creatinine ratio 17 12 - 20      GFR est AA >60 >60 ml/min/1.73m2    GFR est non-AA >60 >60 ml/min/1.73m2    Calcium 9.2 8.5 - 10.1 MG/DL           Assessment:     Active Problems:    Acute respiratory failure (HCC) (2/6/2017)      Chronic obstructive pulmonary disease with acute exacerbation (HCC) (2/10/2017)      Chronic pulmonary aspiration (3/2/2017)      Dysphagia (12/30/2014)      Chronic pain (1/15/2017)      History of laryngeal cancer (11/2/2015)      Essential hypertension, benign (11/29/2011)      Elevated troponin (4/16/2017)        Plan:     Feeling better,mobilize. Aim for home in am

## 2017-04-20 NOTE — PROGRESS NOTES
Occupational Therapy Goals  Initiated 4/17/2017  1. Patient will perform grooming standing at the sink with supervision/set-up within 7 day(s). 2.  Patient will perform bathing at the sink with minimal assistance/contact guard assist within 7 day(s). 3.  Patient will perform lower body dressing with moderate assistance  within 7 day(s). 4.  Patient will perform toilet transfers with supervision/set-up within 7 day(s). 5.  Patient will perform all aspects of toileting with independence within 7 day(s). 6.  Patient will participate in upper extremity therapeutic exercise/activities with independence for 5 minutes within 7 day(s). 7.  Patient will utilize energy conservation techniques during functional activities with verbal cues within 7 day(s). Occupational Therapy Treatment    S: \" This could be helpful. \"    O: Patient presented in bed complaining of SOB 2/2 thoracic pain. Patient had been administered for pain medication to address thoracic pain. Activity deferred to focus session on energy conservation teaching. Patient issued energy conservation hand out and the below listed activity modification recommendations were made. 1) Perform activities at a slow steady pace. Don't rush through ADLs, IADLs and when ambulating. 2) Take multiple short rest breaks PRN to avoid SOB fatigue. Don't rush to complete an activity with the plan to take one long rest break. It may take a significant amount of time to recover after   pushing through an activity, and taking frequent rest breaks may allow you to do more activity for a longer period of time. 3) Perform ADLs and IADLs in sitting PRN. 4) Modify your home set up to promote efficiency when gathering needed items for ADLs, as well as IADLs such as light meal prep. Focus on organizing needed items so that you reduce the number trips needed to transport and gather them, as well as the effort required to reach for them.      A: Patient receptive and verbalizing full understanding of energy conservation, but she will benefit from further ADL training with focus on incorporating the use of these techniques. P: continue to follow per plan of care. Tim Vergara OTR,L   Total time spent: 15 minutes.        -

## 2017-04-20 NOTE — PROGRESS NOTES
MD reyna d/c Friday if stable with [facility] nursing, PT, OT, ST, and HHA. Orders sent to them. Please clarify what oxygen company he uses.   Thanks

## 2017-04-20 NOTE — PROGRESS NOTES
Bedside and Verbal shift change report given to Eugene Dodd (oncoming nurse) by Danyell (offgoing nurse). Report included the following information SBAR, Kardex, Procedure Summary, Intake/Output and Recent Results.

## 2017-04-20 NOTE — PROGRESS NOTES
Patient's both IV's infiltrated at 1130. Tried starting new IV's  4 times by 2 different nurses unsuccessfully. Picc team was paged. But another nurse from different unit was successful in starting IV. Patient is behind in antibiotics therapy. Pharmacist notified.

## 2017-04-20 NOTE — PROGRESS NOTES
Problem: Mobility Impaired (Adult and Pediatric)  Goal: *Acute Goals and Plan of Care (Insert Text)  Physical Therapy Goals  Initiated 4/17/2017  1. Patient will move from supine to sit and sit to supine in bed with modified independence within 7 day(s). 2. Patient will transfer from bed to chair and chair to bed with modified independence using the least restrictive device within 7 day(s). 3. Patient will perform sit to stand with modified independence within 7 day(s). 4. Patient will ambulate with modified independence for 150 feet with the least restrictive device within 7 day(s). PHYSICAL THERAPY TREATMENT  Patient: Vincenzo Sands (31 y.o. female)  Date: 4/20/2017  Diagnosis: Acute respiratory failure (HCC) <principal problem not specified>       Precautions:        ASSESSMENT:  Patient demonstrated steady gait and maintained SpO2 on 4L via nasal canula throughout the session. Upon returning to her room, she was able to  objects from the floor and re-arrange her tray with little difficulty. Overall, progressing well and will be ready from a PT standpoint for discharge when cleared tomorrow am. She may benefit from HHPT to progress endurance and independence after returning home. Progression toward goals:  [X]    Improving appropriately and progressing toward goals  [ ]    Improving slowly and progressing toward goals  [ ]    Not making progress toward goals and plan of care will be adjusted       PLAN:  Patient continues to benefit from skilled intervention to address the above impairments. Continue treatment per established plan of care. Discharge Recommendations:  Home Health  Further Equipment Recommendations for Discharge:  None       SUBJECTIVE:   Patient stated I just don't feel great.       OBJECTIVE DATA SUMMARY:   Critical Behavior:  Neurologic State: Alert  Orientation Level: Oriented X4  Cognition: Follows commands  Safety/Judgement: Awareness of environment, Fall prevention  Functional Mobility Training:  Bed Mobility:  Rolling: Supervision  Supine to Sit: Supervision  Sit to Supine: Supervision  Scooting: Supervision        Transfers:  Sit to Stand: Supervision  Stand to Sit: Supervision                             Balance:  Sitting: Intact; Without support  Standing: Intact; Without support  Standing - Static: Good  Standing - Dynamic : Good  Ambulation/Gait Training:  Distance (ft): 250 Feet (ft)  Assistive Device: Gait belt  Ambulation - Level of Assistance: Stand-by asssistance        Gait Abnormalities: Decreased step clearance;Shuffling gait  Speed/[place]: Fluctuations  Stairs:  Number of Stairs Trained: 4  Stairs - Level of Assistance: Modified independent              Rail Use: Left      Pain:  Pain Scale 1: Numeric (0 - 10)  Pain Intensity 1: 4  Pain Location 1: Other (comment) (all over)     Pain Description 1: Aching;Constant  Pain Intervention(s) 1: Medication (see MAR)  Activity Tolerance:      Please refer to the flowsheet for vital signs taken during this treatment.   After treatment:   [X]    Patient left in no apparent distress sitting up in chair  [ ]    Patient left in no apparent distress in bed  [X]    Call bell left within reach  [X]    Nursing notified  [ ]    Caregiver present  [ ]    Bed alarm activated      COMMUNICATION/COLLABORATION:   The patients plan of care was discussed with: Registered Nurse     Michelle Morris, PT, DPT   Time Calculation: 25 mins

## 2017-04-21 NOTE — PROGRESS NOTES
General Daily Progress Note    Admit Date: 4/16/2017  Hospital day 6    Subjective:     Patient had increased cough and sob yesterday,somewhat better thids am..   Medication side effects: none    Current Facility-Administered Medications   Medication Dose Route Frequency    enoxaparin (LOVENOX) injection 40 mg  40 mg SubCUTAneous DAILY    vancomycin (VANCOCIN) 750 mg in 0.9% sodium chloride (MBP/ADV) 250 mL  750 mg IntraVENous Q8H    sodium chloride (NS) flush 5-10 mL  5-10 mL IntraVENous Q8H    sodium chloride (NS) flush 5-10 mL  5-10 mL IntraVENous PRN    acetylcysteine (MUCOMYST) 200 mg/mL (20 %) solution 400 mg  400 mg Inhalation Q6H RT    FLUoxetine (PROzac) 20 mg/5 mL (4 mg/mL) oral solution 20 mg  20 mg Per G Tube DAILY    LORazepam (INTENSOL) 2 mg/mL oral concentrate 1 mg  1 mg Per J Tube Q4H PRN    metoprolol tartrate (LOPRESSOR) tablet 12.5 mg  12.5 mg Per G Tube BID    traZODone (DESYREL) tablet 200 mg  200 mg Per G Tube QHS    sodium chloride (NS) flush 5-10 mL  5-10 mL IntraVENous Q8H    sodium chloride (NS) flush 5-10 mL  5-10 mL IntraVENous PRN    acetaminophen (TYLENOL) solution 650 mg  650 mg Per G Tube Q4H PRN    naloxone (NARCAN) injection 0.4 mg  0.4 mg IntraVENous PRN    ondansetron (ZOFRAN) injection 4 mg  4 mg IntraVENous Q4H PRN    bisacodyl (DULCOLAX) suppository 10 mg  10 mg Rectal DAILY PRN    piperacillin-tazobactam (ZOSYN) 4.5 g in 0.9% sodium chloride (MBP/ADV) 100 mL  4.5 g IntraVENous Q8H    guaiFENesin (ROBITUSSIN) 100 mg/5 mL oral liquid 400 mg  400 mg Per G Tube QID    albuterol (PROVENTIL VENTOLIN) nebulizer solution 2.5 mg  2.5 mg Nebulization Q6H RT    umeclidinium (INCRUSE ELLIPTA) 62.5 mcg/actuation  1 Puff Inhalation DAILY    levoFLOXacin (LEVAQUIN) 750 mg in D5W IVPB  750 mg IntraVENous Q24H    oxyCODONE (ROXICODONE INTENSOL) 20 mg/mL concentrated solution 10 mg  10 mg Oral Q4HWA    predniSONE (DELTASONE) tablet 10 mg  10 mg Per G Tube DAILY WITH BREAKFAST    nitroglycerin (NITROBID) 2 % ointment 1 Inch  1 Inch Topical Q6H PRN    morphine injection 2 mg  2 mg IntraVENous Q3H PRN    albuterol-ipratropium (DUO-NEB) 2.5 MG-0.5 MG/3 ML  3 mL Nebulization Q6H PRN    0.9% sodium chloride infusion  100 mL/hr IntraVENous CONTINUOUS    lithium carbonate capsule 300 mg  300 mg Oral DAILY        Review of Systems  Constitutional: positive for sweats, fatigue and malaise  Respiratory: positive for cough, wheezing, dyspnea on exertion or stridor  Cardiovascular: negative  Musculoskeletal:positive for myalgias    Objective:     Patient Vitals for the past 8 hrs:   SpO2   04/21/17 0247 99 %        04/19 1901 - 04/21 0700  In: 3290 [I.V.:2000]  Out: -     Physical Exam:   Visit Vitals    /68    Pulse 63    Temp 98.4 °F (36.9 °C)    Resp 16    Ht 5' 9\" (1.753 m)    Wt 133 lb (60.3 kg)    LMP  (LMP Unknown)    SpO2 99%    BMI 19.64 kg/m2     General appearance: alert, fatigued, cooperative, mild distress, appears stated age  Lungs: rhonchi R base, L base, wheezes R base, L base  Heart: regular rate and rhythm  Abdomen: soft, non-tender.  Bowel sounds normal. No masses,  no organomegaly  Extremities: extremities normal, atraumatic, no cyanosis or edema           Data Review   Recent Results (from the past 24 hour(s))   METABOLIC PANEL, BASIC    Collection Time: 04/21/17  1:29 AM   Result Value Ref Range    Sodium 142 136 - 145 mmol/L    Potassium 4.4 3.5 - 5.1 mmol/L    Chloride 104 97 - 108 mmol/L    CO2 31 21 - 32 mmol/L    Anion gap 7 5 - 15 mmol/L    Glucose 101 (H) 65 - 100 mg/dL    BUN 13 6 - 20 MG/DL    Creatinine 0.82 0.55 - 1.02 MG/DL    BUN/Creatinine ratio 16 12 - 20      GFR est AA >60 >60 ml/min/1.73m2    GFR est non-AA >60 >60 ml/min/1.73m2    Calcium 9.8 8.5 - 10.1 MG/DL           Assessment:     Active Problems:    Acute respiratory failure (HCC) (2/6/2017)      Chronic obstructive pulmonary disease with acute exacerbation (HCC) (2/10/2017)      Chronic pulmonary aspiration (3/2/2017)      Dysphagia (12/30/2014)      Chronic pain (1/15/2017)      History of laryngeal cancer (11/2/2015)      Essential hypertension, benign (11/29/2011)      Elevated troponin (4/16/2017)        Plan:     Some increased cough and dyspnea yesterday,not stable for discharge,hopefully in am

## 2017-04-21 NOTE — PROGRESS NOTES
Problem: Self Care Deficits Care Plan (Adult)  Goal: *Acute Goals and Plan of Care (Insert Text)  Occupational Therapy Goals  Initiated 4/17/2017  1. Patient will perform grooming standing at the sink with supervision/set-up within 7 day(s). 2. Patient will perform bathing at the sink with minimal assistance/contact guard assist within 7 day(s). 3. Patient will perform lower body dressing with moderate assistance within 7 day(s). 4. Patient will perform toilet transfers with supervision/set-up within 7 day(s). 5. Patient will perform all aspects of toileting with independence within 7 day(s). 6. Patient will participate in upper extremity therapeutic exercise/activities with independence for 5 minutes within 7 day(s). 7. Patient will utilize energy conservation techniques during functional activities with verbal cues within 7 day(s). OCCUPATIONAL THERAPY TREATMENT  Patient: Veronica Montgomery (57 y.o. female)  Date: 4/21/2017  Diagnosis: Acute respiratory failure (HCC) <principal problem not specified>       Precautions:        ASSESSMENT:  Pts O2 sat with activity on 5 liters this session was 96-99%. She had some bleeding from right nostril due to O2 drying out her nose. Vaseline and washcloth obtained. Pt cleaned out her nose and applied Vaseline inside for moisture seated edge of bed. She is very fast moving and needs cues for pacing and energy conservation at times. Performed grooming in standing at sink with supervision overall. Recommend home health eval at discharge. Progression toward goals:  [X]       Improving appropriately and progressing toward goals  [ ]       Improving slowly and progressing toward goals  [ ]       Not making progress toward goals and plan of care will be adjusted       PLAN:  Patient continues to benefit from skilled intervention to address the above impairments. Continue treatment per established plan of care.   Discharge Recommendations:  Home Health  Further Equipment Recommendations for Discharge:  none       SUBJECTIVE:   Patient stated i want to walk.       OBJECTIVE DATA SUMMARY:   Cognitive/Behavioral Status:  Neurologic State: Alert  Orientation Level: Oriented X4  Cognition: Appropriate decision making; Follows commands  Perception: Appears intact  Perseveration: No perseveration noted  Safety/Judgement: Awareness of environment; Fall prevention     Functional Mobility and Transfers for ADLs:  Bed Mobility:  Supine to Sit: Independent  Sit to Supine: Independent  Scooting: Supervision     Transfers:  Sit to Stand: Supervision  Functional Transfers  Toilet Transfer : Modified independent     Balance:  Standing - Static: Good  Standing - Dynamic : Good     ADL Intervention:        Grooming  Brushing/Combing Hair: Supervision/set-up (standing at sink)        Cognitive Retraining  Safety/Judgement: Awareness of environment; Fall prevention     Educated pt on pacing during functional tasks this session  Pain:  Pain Scale 1: Numeric (0 - 10)  Pain Intensity 1: 8  Pain Location 1: Buttocks; Throat  Pain Orientation 1: Left  Pain Description 1: Constant  Pain Intervention(s) 1: Medication (see MAR)  Activity Tolerance:      Please refer to the flowsheet for vital signs taken during this treatment.   After treatment:   [X] Patient left in no apparent distress sitting up in chair  [ ] Patient left in no apparent distress in bed  [X] Call bell left within reach  [X] Nursing notified  [X] Caregiver present  [ ] Bed alarm activated      COMMUNICATION/COLLABORATION:   The patients plan of care was discussed with: Registered Nurse and patient     Josefine Osler, OTR/L  Time Calculation: 19 mins

## 2017-04-21 NOTE — PROGRESS NOTES
Received report from Rolando [facility]. Pt requesting ice chips, but patient is currently NPO, Advised her that the order for NPO was placed on 04/16/2017. Awaiting OSWALDO Holder to come to the unit to address her pain, and NPO status.

## 2017-04-21 NOTE — PROGRESS NOTES
Spiritual Care Assessment/Progress Notes    Veronica Montgomery [id number]  [id number]    1955  57 y.o.  female    Patient Telephone Number: [phone] (home)   Faith Affiliation: Danny Hung   Language: [place]   Extended Emergency Contact Information  Primary Emergency Contact: Aziza Motley  Address: [place] [address], [address] [address] [place]  Mobile Phone: [phone]  Relation: Spouse  Secondary Emergency Contact: Vern Phone: [phone]  Mobile Phone: [phone]  Relation: Child   Patient Active Problem List    Diagnosis Date Noted    Elevated troponin 04/16/2017    Counseling regarding advanced directives and goals of care 03/23/2017    Respiratory failure with hypoxia ([address] 03/22/2017    Acute on chronic respiratory failure with hypoxemia ([address] 03/06/2017    Chronic pulmonary aspiration 03/02/2017    Atrial fibrillation with RVR ([address] 02/24/2017    Respiratory failure ([address] 02/23/2017    Chronic obstructive pulmonary disease with acute exacerbation ([address] 02/10/2017    Acute respiratory failure ([address] 02/06/2017    Aspiration pneumonia of both upper lobes ([address] 01/20/2017    Shortness of breath 01/20/2017    Delirium 01/20/2017    Goals of care, counseling/discussion 01/20/2017    Debility 01/20/2017    Acute respiratory failure with hypoxia ([address] 01/15/2017    Fibromyalgia 01/15/2017     Class: Chronic    Chronic pain 01/15/2017     Class: Chronic    History of tobacco use 01/15/2017     Class: Present on Admission    Sepsis ([address] 01/15/2017     Class: Acute    Acute renal injury ([address] 01/15/2017     Class: Present on Admission    Shock ([address] 01/15/2017    History of radiation to head and neck region 03/30/2016    History of laryngeal cancer 11/02/2015     Class: Present on Admission    Dysphagia 12/30/2014    Essential hypertension, benign 11/29/2011    Bipolar 1 disorder ([address] 11/29/2011    Encounter for long-term (current) use of other medications 11/29/2011        Date: 4/21/2017       Level of Caodaism/Spiritual Activity:  [x]         Involved in yoly tradition/spiritual practice    []         Not involved in yoly tradition/spiritual practice  [x]         Spiritually oriented    []         Claims no spiritual orientation    []         seeking spiritual identity  []         Feels alienated from Religion practice/tradition  []         Feels angry about Religion practice/tradition  [x]         Spirituality/Religion tradition IS a resource for coping at this time. []         Not able to assess due to medical condition    Services Provided Today:  []         crisis intervention    []         reading Scriptures  [x]         spiritual assessment    []         prayer  [x]         empathic listening/emotional support  []         rites and rituals (cite in comments)  []         life review     []         Religion support  [x]         theological development   []         advocacy  []         ethical dialog     []         blessing  []         bereavement support    []         support to family  []         anticipatory grief support   []         help with AMD  [x]         spiritual guidance    []         meditation      Spiritual Care Needs  [x]         Emotional Support  [x]         Spiritual/Caodaism Care  []         Loss/Adjustment  []         Advocacy/Referral                /Ethics  []         No needs expressed at               this time  []         Other: (note in               comments)  Spiritual Care Plan  []         Follow up visits with               pt/family  []         Provide materials  []         Schedule sacraments  []         Contact Community               Clergy  [x]         Follow up as needed  []         Other: (note in               comments)     Comments: Ms. Aly Hewitt was curled up in bed that was slightly raised. Provided active listening as pt shared concerns about her meds.  attempted to contact the nurse & Palliative NP Ines and informed pt that OSWALDO Chacon was on her way. OSWALDO Chacon joined us in the room and gave permission for  to provided ice chips to pt while meds were reviewed.  provided gentle inquiry to explore pt's belief system. Pt identifies as Buddhism, but was raised in another tradition. Pt expressed belief in the Bible \"word for word. \"  Pt read a passage to the  and we discussed her understanding of that passage. Pt keeps a book of different verses that she finds helpful. Pt shared concern about her own forgiveness and her ability to forgive herself.  reassured her that we are all broken in some way and we just do our best.  We also spoke about how we sometimes create purgatory for ourselves and we can choose to release ourselves from that trap by accepting who we are and the gifts we bring. Pt seemed to be looking for a lot of reassurance. Pt expressed gratitude for the visit. Pt is expected to be discharged soon. Keena Marie M.Div.   Palliative  Fellow

## 2017-04-21 NOTE — CONSULTS
Palliative Medicine Consult  Jose Alberto: [phone] ([phone])    Patient Name: Chencho Mora  YOB: 1955    Date of Initial Consult: 4/17/17  Reason for Consult: End stage laryngeal cancer with recurrent admissions, ?any additional ideas or support that we can give Pt  Requesting Provider: Cornel Quezada  Primary Care Physician: Mckenna Soto MD      SUMMARY:   Chencho Mora is a 57 y.o. with a past history of bipolar type 1, laryngeal mass, and HTN, recurrent aspiration PNA, chronic pain secondary to neoplasm, who was admitted on 4/16/2017 from home with a diagnosis of acute respiratory failure. Current medical issues leading to Palliative Medicine involvement include: frequent admissions (6 admissions in 2017 for respiratory illness!). PALLIATIVE DIAGNOSES:   1. Cough   2. SOB  3. Acute Respiratory failure  4. SIRS  5. Dysphagia (has PEG)  6. Bipolar disorder  7. Malnutrition   8. Weakness and fatigue       PLAN:   1. Multiple phone calls from RN, ; pt upset that her pain medicine is wrong, she refused afternoon feeding, then angry that \"they're not feeding me. \"  After much discussion with pt, confirmation by phone with daughter regarding home medications, I confirmed for pt that her medications are correct. What was really causing pt's agitation was not being allowed to eat ice chips. She calmed down after given ice chips and Palliative  spent time with her going over bible verse. 2. Spoke with daughter Cecilia [place], explained pt needs to decide 1. Comfort care, eating ice chips, oral abx at home, not coming to ED, or 2. Focus on longevity, complete NPO, consider total laryngectomy only to avoid overt aspiration. 3. Will notify outpatient Palliative that pt is possibly being discharged this weekend and will need f/u.  4. Will also continue to work with pt and family about making a decision about comfort care vs longevity.    5. Initial consult note routed to primary continuity provider  6. Communicated plan of care with: Palliative IDT, RN       GOALS OF CARE / TREATMENT PREFERENCES:   [====Goals of Care====]  GOALS OF CARE:  Patient / health care proxy stated goals:     Recovery and home    TREATMENT PREFERENCES:   Code Status: DNR    Advance Care Planning:  Advance Care Planning 4/16/2017   Patient's Healthcare Decision Maker is: Legal Next of Agustin Mendoza   Primary Decision Maker Name Srinivasa Chino   Primary Decision Maker Phone Number [phone]   Primary Decision Maker Relationship to Patient Spouse   Secondary Decision Maker Name -   Secondary Decision Maker Phone Number -   Secondary Decision Maker Relationship to Patient -   Confirm Advance Directive Yes, on file   Patient Would Like to Complete Advance Directive -       Other:    The palliative care team has discussed with patient / health care proxy about goals of care / treatment preferences for patient.  [====Goals of Care====]         HISTORY:     History obtained from:     CHIEF COMPLAINT: SOB  HPI/SUBJECTIVE:    The patient is:   [] Verbal and participatory  [] Non-participatory due to:     BIBA with c/o SOB, slight intermittent cough that started the night before, at which time they put her on 4L02 with little relief. EMS report pt's Sp02 was 74% upon arrival, which improved to 95% with NRB. During my visit pt was pleasant. She is jasmina happy with [facility] coming to see her. She states that the information written out to her by Dr Mony Zurita helped her so much, however, she was supposed to speak with someone at the office before calling EMS, however, the  instructed her to call 911, which is why she did.    4/21: very agitated, angry, c/o \"my throat is burning, I can't live like this. \"    Clinical Pain Assessment (nonverbal scale for severity on nonverbal patients):   [++++ Clinical Pain Assessment++++]  [++++Pain Severity++++]: Pain: 3  [++++Pain Character++++]:   [++++Pain Duration++++]:   [++++Pain Effect++++]: [++++Pain Factors++++]:   [++++Pain Frequency++++]:   [++++Pain Location++++]:   [++++ Clinical Pain Assessment++++]     FUNCTIONAL ASSESSMENT:     Palliative Performance Scale (PPS):  PPS: 30       PSYCHOSOCIAL/SPIRITUAL SCREENING:     Advance Care Planning:  Advance Care Planning 4/16/2017   Patient's Healthcare Decision Maker is: Legal Next of Agustin 68   Primary Decision Maker Name Ronny Luke   Primary Decision Maker Phone Number [phone]   Primary Decision Maker Relationship to Patient Spouse   Secondary Decision Maker Name -   Secondary Decision Maker Phone Number -   Secondary Decision Maker Relationship to Patient -   Confirm Advance Directive Yes, on file   Patient Would Like to Complete Advance Directive -        Any spiritual / Yazidism concerns:  [x] Yes /  [] No    Caregiver Burnout:  [] Yes /  [] No /  [x] No Caregiver Present      Anticipatory grief assessment:   [x] Normal  / [] Maladaptive       ESAS Anxiety: Anxiety: 0    ESAS Depression: Depression: 3        REVIEW OF SYSTEMS:     Positive and pertinent negative findings in ROS are noted above in HPI. The following systems were [x] reviewed / [] unable to be reviewed as noted in HPI  Other findings are noted below. Systems: constitutional, ears/nose/mouth/throat, respiratory, gastrointestinal, genitourinary, musculoskeletal, integumentary, neurologic, psychiatric, endocrine. Positive findings noted below. Modified ESAS  Completed by: provider   Fatigue: 5 Drowsiness: 0   Depression: 3 Pain: 3   Anxiety: 0 Nausea: 0   Anorexia: 0 Dyspnea: 0     Constipation: No     Stool Occurrence(s): 1        PHYSICAL EXAM:     From RN flowsheet:  Wt Readings from Last 3 Encounters:   04/16/17 133 lb (60.3 kg)   04/06/17 133 lb 6.4 oz (60.5 kg)   04/04/17 133 lb (60.3 kg)     Blood pressure 133/75, pulse 78, temperature 98.2 °F (36.8 °C), resp. rate 18, height 5' 9\" (1.753 m), weight 133 lb (60.3 kg), SpO2 97 %.     Pain Scale 1: Numeric (0 - 10)  Pain Intensity 1: 0  Pain Onset 1: chronic  Pain Location 1: Buttocks, Throat  Pain Orientation 1: Left  Pain Description 1: Constant  Pain Intervention(s) 1: Medication (see MAR)  Last bowel movement, if known:     Constitutional: WD, WN, a little childlike   Eyes: pupils equal, anicteric  ENMT: no nasal discharge, moist mucous membranes  Cardiovascular: regular rhythm, distal pulses intact   Respiratory: breathing not labored, symmetric, deep cough  Gastrointestinal: soft non-tender, +bowel sounds  Musculoskeletal: no deformity, no tenderness to palpation  Skin: warm, dry  Neurologic: following commands, moving all extremities  Psychiatric: full affect, no hallucinations          HISTORY:     Active Problems:    Essential hypertension, benign (11/29/2011)      Dysphagia (12/30/2014)      History of laryngeal cancer (11/2/2015)      Chronic pain (1/15/2017)      Acute respiratory failure (HCC) (2/6/2017)      Chronic obstructive pulmonary disease with acute exacerbation (HCC) (2/10/2017)      Chronic pulmonary aspiration (3/2/2017)      Elevated troponin (4/16/2017)      Past Medical History:   Diagnosis Date    Bipolar 1 disorder ([address] 11/29/2011    Cancer [facility]     skin cancer buttocks    Chronic pain     FIBROMYALGIA, LEGS    Encounter for long-term (current) use of other medications 11/29/2011    Essential hypertension, benign 11/29/2011    Ill-defined condition     Laryngeal cancer ([facility] 11/02/2015    with lung metastasis    Psychiatric disorder     bipolar      Past Surgical History:   Procedure Laterality Date    HX BREAST AUGMENTATION Bilateral     SALINE IMPLANTS    HX GI      PLACEMENT OF G TUBE    HX GI      ESOPH.  DILATATION    HX GYN      tubal pregnancy    HX HEENT      BX OF NECK MASS    HX HYSTERECTOMY      PLACE PERCUT GASTROSTOMY TUBE  10/28/2014     has been removed 2015    PLACE PERCUT GASTROSTOMY TUBE  2/28/2017           Family History   Problem Relation Age of Onset    Cancer Father      bone/skin/lung    Pneumonia Mother     Anesth Problems Neg Hx       History reviewed, no pertinent family history.   Social History   Substance Use Topics    Smoking status: Former Smoker     Packs/day: 1.00     Years: 40.00     Quit date: 8/27/2014    Smokeless tobacco: Never Used      Comment: PASSIVE SMOKE EXPOSURE,  SMOKES    Alcohol use No     No Known Allergies   Current Facility-Administered Medications   Medication Dose Route Frequency    zolpidem (AMBIEN) tablet 5 mg  5 mg Oral QHS PRN    enoxaparin (LOVENOX) injection 40 mg  40 mg SubCUTAneous DAILY    sodium chloride (NS) flush 5-10 mL  5-10 mL IntraVENous Q8H    sodium chloride (NS) flush 5-10 mL  5-10 mL IntraVENous PRN    acetylcysteine (MUCOMYST) 200 mg/mL (20 %) solution 400 mg  400 mg Inhalation Q6H RT    FLUoxetine (PROzac) 20 mg/5 mL (4 mg/mL) oral solution 20 mg  20 mg Per G Tube DAILY    LORazepam (INTENSOL) 2 mg/mL oral concentrate 1 mg  1 mg Per J Tube Q4H PRN    metoprolol tartrate (LOPRESSOR) tablet 12.5 mg  12.5 mg Per G Tube BID    traZODone (DESYREL) tablet 200 mg  200 mg Per G Tube QHS    sodium chloride (NS) flush 5-10 mL  5-10 mL IntraVENous Q8H    sodium chloride (NS) flush 5-10 mL  5-10 mL IntraVENous PRN    acetaminophen (TYLENOL) solution 650 mg  650 mg Per G Tube Q4H PRN    naloxone (NARCAN) injection 0.4 mg  0.4 mg IntraVENous PRN    ondansetron (ZOFRAN) injection 4 mg  4 mg IntraVENous Q4H PRN    bisacodyl (DULCOLAX) suppository 10 mg  10 mg Rectal DAILY PRN    guaiFENesin (ROBITUSSIN) 100 mg/5 mL oral liquid 400 mg  400 mg Per G Tube QID    albuterol (PROVENTIL VENTOLIN) nebulizer solution 2.5 mg  2.5 mg Nebulization Q6H RT    umeclidinium (INCRUSE ELLIPTA) 62.5 mcg/actuation  1 Puff Inhalation DAILY    levoFLOXacin (LEVAQUIN) 750 mg in D5W IVPB  750 mg IntraVENous Q24H    oxyCODONE (ROXICODONE INTENSOL) 20 mg/mL concentrated solution 10 mg  10 mg Oral Q4HWA    predniSONE (DELTASONE) tablet 10 mg  10 mg Per G Tube DAILY WITH BREAKFAST    nitroglycerin (NITROBID) 2 % ointment 1 Inch  1 Inch Topical Q6H PRN    morphine injection 2 mg  2 mg IntraVENous Q3H PRN    albuterol-ipratropium (DUO-NEB) 2.5 MG-0.5 MG/3 ML  3 mL Nebulization Q6H PRN    0.9% sodium chloride infusion  100 mL/hr IntraVENous CONTINUOUS    lithium carbonate capsule 300 mg  300 mg Oral DAILY          LAB AND IMAGING FINDINGS:     Lab Results   Component Value Date/Time    WBC 9.8 04/18/2017 01:02 AM    HGB 8.6 04/18/2017 01:02 AM    PLATELET 003 24/12/9475 01:02 AM     Lab Results   Component Value Date/Time    Sodium 142 04/21/2017 01:29 AM    Potassium 4.4 04/21/2017 01:29 AM    Chloride 104 04/21/2017 01:29 AM    CO2 31 04/21/2017 01:29 AM    BUN 13 04/21/2017 01:29 AM    Creatinine 0.82 04/21/2017 01:29 AM    Calcium 9.8 04/21/2017 01:29 AM    Magnesium 2.2 04/16/2017 07:01 AM    Phosphorus 2.0 03/22/2017 04:32 AM      Lab Results   Component Value Date/Time    AST (SGOT) 9 04/18/2017 01:02 AM    Alk.  phosphatase 46 04/18/2017 01:02 AM    Protein, total 5.9 04/18/2017 01:02 AM    Albumin 2.4 04/18/2017 01:02 AM    Globulin 3.5 04/18/2017 01:02 AM     Lab Results   Component Value Date/Time    INR 1.1 04/16/2017 07:01 AM    Prothrombin time 10.7 04/16/2017 07:01 AM    aPTT 29.0 04/16/2017 07:01 AM      Lab Results   Component Value Date/Time    Iron 10 02/26/2017 04:28 AM    TIBC 169 02/26/2017 04:28 AM    Iron % saturation 6 02/26/2017 04:28 AM    Ferritin 250 02/26/2017 04:28 AM      Lab Results   Component Value Date/Time    pH 7.35 04/16/2017 06:45 AM    PCO2 51 04/16/2017 06:45 AM    PO2 76 04/16/2017 06:45 AM     No components found for: [place]   Lab Results   Component Value Date/Time    CK 29 03/06/2017 06:00 PM    CK - MB <1.0 03/06/2017 06:00 PM                Total time: 45 min  Counseling / coordination time: 35  min  > 50% counseling / coordination?: yes    Prolonged service was provided for  []30 min   []75 min in face to face time in the presence of the patient. Time Start:   Time End:   Note: this can only be billed with 50322 (initial) or 66537 (follow up). If multiple start / stop times, list each separately. 75

## 2017-04-21 NOTE — PROGRESS NOTES
MD reyna d/c Saturday if stable with [facility] nursing, PT, OT, ST, and HHA. Orders sent to them and they can accept. Please clarify what oxygen company he uses.  Thanks

## 2017-04-21 NOTE — PROGRESS NOTES
Bedside shift change report given to Tess (oncoming nurse) by Mika Bledsoe (offgoing nurse). Report included the following information SBAR, Kardex, Intake/Output, MAR and Recent Results. Zone Phone for oncoming shift:   [phone]    Shift Summary: Pt rested through night. Issues with pain relieved by medication. LDAs               Peripheral IV 04/18/17 Left; Lower Arm (Active)   Site Assessment Clean, dry, & intact 4/21/2017  4:24 AM   Phlebitis Assessment 0 4/21/2017  4:24 AM   Infiltration Assessment 0 4/21/2017  4:24 AM   Dressing Status Clean, dry, & intact 4/21/2017  4:24 AM   Dressing Type Transparent;Tape 4/21/2017  4:24 AM   Hub Color/Line Status Blue; Infusing 4/21/2017  4:24 AM       Peripheral IV 04/20/17 Right Hand (Active)   Site Assessment Clean, dry, & intact 4/21/2017  4:24 AM   Phlebitis Assessment 0 4/21/2017  4:24 AM   Infiltration Assessment 0 4/21/2017  4:24 AM   Dressing Status Clean, dry, & intact 4/21/2017  4:24 AM   Dressing Type Transparent;Tape 4/21/2017  4:24 AM   Hub Color/Line Status Blue;Flushed;Capped 4/21/2017  4:24 AM          PEG/Gastrostomy Tube 02/28/17 (Active)   Site Assessment Clean, dry, & intact 4/20/2017  3:00 PM   Dressing Status Clean, dry, & intact 4/20/2017  3:00 PM   G Port Status Clamped 4/20/2017  3:00 PM   Action Taken Placement verified (comment) 4/20/2017  3:00 PM   Gastric Residual (mL) 0 ml 4/20/2017  9:30 AM   Tube Feeding/Formula Options Jevity 1.5 4/20/2017  4:00 PM   Modular Nutrients Fiber 4/20/2017  4:00 PM   Tube Feeding/Verify Rate (mL/hr) 240 4/20/2017  4:00 PM   Water Flush Volume (mL) 150 mL 4/20/2017  4:00 PM   Intake (ml) 100 ml 4/20/2017  4:00 PM   Medication Volume 100 ml 4/20/2017  9:30 AM   Output (ml) 0 ml 4/16/2017 12:31 PM                  Intake & Output   Date 04/20/17 0700 - 04/21/17 0659 04/21/17 0700 - 04/22/17 0659   Shift 8064-3126 8127-6648 24 Hour Total 6210-3649 2101-1302 24 Hour Total   I  N  T  A  K  E   P. O. 0  0 P.O. 0  0       I.V.  (mL/kg/hr)  1000 1000         I.V.  1000 1000       NG/  650         Water Flush Volume (mL) (PEG/Gastrostomy Tube 02/28/17) 450  450         Medication Volume (PEG/Gastrostomy Tube 02/28/17) 100  100         Intake (ml) (PEG/Gastrostomy Tube 02/28/17) 100  100       Shift Total  (mL/kg) 650  (10.8) 1000  (16.6) 1650  (27.4)      O  U  T  P  U  T   Urine  (mL/kg/hr)            Urine Occurrence(s)  5 x 5 x       Shift Total  (mL/kg)          1000 1650      Weight (kg) 60.3 60.3 60.3 60.3 60.3 60.3      Last Bowel Movement Last Bowel Movement Date: 04/19/17   Glucose Checks [x] N/A  [] AC/HS  [] Q6  Concerns:   Nutrition Active Orders   Diet    DIET NPO       Consults []PT  []OT  []Speech  [x]Case Management   Cardiac Monitoring [x]N/A []Yes Expires:

## 2017-04-22 NOTE — PROGRESS NOTES
Discharge instructions and follow up appointments were reviewed with patient who verbalized an understanding. An opportunity for questions and clarification were provided for. The patient left with belongings and discharge instructions via wheelchair to lobby.

## 2017-04-22 NOTE — PROGRESS NOTES
Bedside and Verbal shift change report given to [place] (oncoming nurse) by Bon [place] (offgoing nurse). Report included the following information SBAR, Kardex, Intake/Output, MAR and Recent Results. Zone Phone for oncoming shift:   [phone]    Shift Summary: Patient rested quietly throughout the night. No C/O pain voiced. LDAs               Peripheral IV 04/18/17 Left; Lower Arm (Active)   Site Assessment Clean, dry, & intact 4/21/2017 11:31 PM   Phlebitis Assessment 0 4/21/2017 11:31 PM   Infiltration Assessment 0 4/21/2017 11:31 PM   Dressing Status Clean, dry, & intact 4/21/2017 11:31 PM   Dressing Type Tape;Transparent 4/21/2017 11:31 PM   Hub Color/Line Status Blue; Infusing 4/21/2017 11:31 PM   Alcohol Cap Used Yes 4/21/2017  8:17 AM       Peripheral IV 04/20/17 Right Hand (Active)   Site Assessment Clean, dry, & intact 4/21/2017 11:31 PM   Phlebitis Assessment 0 4/21/2017 11:31 PM   Infiltration Assessment 0 4/21/2017 11:31 PM   Dressing Status Clean, dry, & intact 4/21/2017 11:31 PM   Dressing Type Tape;Transparent 4/21/2017 11:31 PM   Hub Color/Line Status Blue;Flushed 4/21/2017 11:31 PM   Alcohol Cap Used Yes 4/21/2017  8:17 AM          PEG/Gastrostomy Tube 02/28/17 (Active)   Site Assessment Clean, dry, & intact 4/21/2017 11:31 PM   Dressing Status Clean, dry, & intact 4/21/2017 11:31 PM   G Port Status Clamped 4/21/2017 11:31 PM   Action Taken Placement verified (comment) 4/20/2017  9:05 PM   Gastric Residual (mL) 0 ml 4/21/2017  7:30 AM   Tube Feeding/Formula Options Jevity 1.5 4/21/2017 11:31 PM   Modular Nutrients Fiber 4/21/2017 11:31 PM   Tube Feeding/Verify Rate (mL/hr) 240 4/21/2017  5:52 PM   Water Flush Volume (mL) 100 mL 4/21/2017 11:31 PM   Intake (ml) 20 ml 4/21/2017  8:17 PM   Medication Volume 50 ml 4/21/2017  5:52 PM   Output (ml) 0 ml 4/21/2017  5:52 PM                  Intake & Output   Date 04/21/17 0700 - 04/22/17 0659 04/22/17 0700 - 04/23/17 0659   Shift 3184-4388 1678-2803 03 Hour Total 0505-6974 9011-0568 24 Hour Total   I  N  T  A  K  E   NG/GT 4409 446 7162         Water Flush Volume (mL) (PEG/Gastrostomy Tube 02/28/17) 400 200 600         Medication Volume (PEG/Gastrostomy Tube 02/28/17) 100  100         Intake (ml) (PEG/Gastrostomy Tube 02/28/17) 500 20 520       Shift Total  (mL/kg) 1000  (16.6) 220  (3.6) 1220  (20.2)      O  U  T  P  U  T   Emesis/NG output 0  0         Output (ml) (PEG/Gastrostomy Tube 02/28/17) 0  0       Shift Total  (mL/kg) 0  (0)  0  (0)      NET 3583 308 5449      Weight (kg) 60.3 60.3 60.3 60.3 60.3 60.3      Last Bowel Movement Last Bowel Movement Date: 04/19/17   Glucose Checks [x] N/A  [] AC/HS  [] Q6  Concerns:   Nutrition Active Orders   Diet    DIET NPO       Consults [x]PT  [x]OT  []Speech  []Case Management   Cardiac Monitoring [x]N/A []Yes Expires:

## 2017-04-22 NOTE — DISCHARGE SUMMARY
Physician Discharge Summary     Patient ID:  Clara Dow  [id number]  14 y.o.  1955    Admit date: 4/16/2017    Discharge date and time: 4/22/2017    Admission Diagnoses: Acute respiratory failure [facility])    Discharge Diagnoses:  Principal Diagnosis Acute respiratory failure                                              Active Problems:    Essential hypertension, benign (11/29/2011)      Dysphagia (12/30/2014)      History of laryngeal cancer (11/2/2015)      Chronic pain (1/15/2017)      Acute respiratory failure (HCC) (2/6/2017)      Chronic obstructive pulmonary disease with acute exacerbation ([address] (2/10/2017)      Chronic pulmonary aspiration (3/2/2017)      Elevated troponin (4/16/2017)       Consults: Cardiology, Pulmonary/Intensive care, ENT and Palliative Care     Significant Diagnostic Studies:   CTA chest: 1. New dense airspace disease right lower lobe and increasing airspace disease  left lower lobe  2. Mediastinal adenopathy unchanged  3. Previously demonstrated patchy bilateral upper lobe airspace disease improved  4. No acute pulmonary embolus    Hospital Course: Ms. Clara Dow is a patient of mine who presented with the problems above. See the initial H and P for full details. Rica Fish is a 57 y.o.  female who presents with above. Pt with 5 recent admissions for aspiration PNA in setting of laryngeal ca. She had PEG placement 2/28 but continues to have recurrent PNA. She was most recently hospitalized a few weeks ago, discharged on augmentin which she completed a few days ago. Family reports that she has continued to have a severe cough. They do not think she has been febrile. She has significant distress from air hunger. She has a pulse ox at home and was frequently in the 70s yesterday. She has been complaining of back pain. She also had a HA yesterday, which is typical of her when her O2 is low.  She has only a few ice chips daily and otherwise can tolerate ~4 cans of Isosource. She denies stool changes. No urinary changes. No focal weakness but is generally very weak. By problem. .. SIRS and recurrent acute on chronic resp failure: Probably aspiration pneumonia. Treated with oxygen, and empiric antibiotics (zosyn, levaquin, and vancomycin). COPD with acute exacerbation: Treated with O2, albuterol nebulizers, mucomyst nebs, Incruse Ellipta. Laryngeal cancer with dysphagia: Tube feeds. Dr. Juan Vasquez with ENT was consulted to discuss possibiity of total laryngectomy to reduce risk of aspiration. This was deferred for now in view of the patient's poor functional status. Essential hypertension, benign (11/29/2011)    Chronic pain (1/15/2017): Stable. Elevated troponin (4/16/2017): Cardiology was consulted and she was seen by Dr. Cici Ly. This is felt to be strain due to infection. Hx bipolar disorder: On lithium, trazodone, prozac. Continue meds. Disposition: home    Patient Instructions:   Current Discharge Medication List      CONTINUE these medications which have NOT CHANGED    Details   lithium carbonate 300 mg capsule Take 300 mg by mouth nightly. morphine (ROXANOL) 100 mg/5 mL (20 mg/mL) concentrated solution Take 0.5 mL by mouth every four (4) hours as needed for Pain. oxyCODONE (ROXICODONE INTENSOL) 20 mg/mL concentrated solution Take 10 mg by mouth every four (4) hours. cyanocobalamin 1,000 mcg tablet Take 1,000 mcg by mouth daily. LORazepam (INTENSOL) 2 mg/mL concentrated solution 0.5 mL by Per J Tube route every four (4) hours as needed (shortness of breath/air hunger). Max Daily Amount: 6 mg. Qty: 30 mL, Refills: 0      LACTOSE-REDUCED FOOD/FIBER (ISOSOURCE 1.5 MARIELA FEEDING TUBE) 5 Cans by Feeding Tube route daily. B infan-B long-L acid-L rhamn (DIGESTIVE PROBIOTIC) 2 billion cell cpSP Take 1 Cap by mouth daily.   Qty: 30 Cap, Refills: 5    Associated Diagnoses: Aspiration pneumonia of both upper lobes, unspecified aspiration pneumonia type      predniSONE (DELTASONE) 10 mg tablet Take 1 Tab by mouth daily (with breakfast). Qty: 30 Tab, Refills: 2    Associated Diagnoses: Aspiration pneumonia of both upper lobes, unspecified aspiration pneumonia type      OXYGEN-AIR DELIVERY SYSTEMS 2 L/min by Nasal route continuous. acetylcysteine (MUCOMYST) 100 mg/mL (10 %) nebulizer solution Take 4 mL by inhalation every four (4) hours. Qty: 120 mL, Refills: 11      metoprolol tartrate (LOPRESSOR) 25 mg tablet Take 1 Tab by mouth every twelve (12) hours. Qty: 60 Tab, Refills: 11      FLUoxetine (PROZAC) 20 mg tablet Take 20 mg by mouth daily. traZODone (DESYREL) 100 mg tablet Take 200 mg by mouth nightly. Refills: 2      (No Medication Selected) Take 100 mL by mouth. LORazepam (ATIVAN) 0.5 mg tablet Take 1 Tab by mouth two (2) times daily as needed for Anxiety. Max Daily Amount: 1 mg. Qty: 60 Tab, Refills: 2    Associated Diagnoses: Bipolar 1 disorder ([address]      Lactose-Free Food with Fiber (JEVITY 1.5 MARIELA) 0.06 gram-1.5 kcal/mL liqd 1.5 L by PEG Tube route five (5) times daily. bolus feeding  followed by 150  ml flush after each feeding      bisacodyl 5 mg tab Take 1 Tab by mouth daily as needed (constipation). albuterol-ipratropium (DUO-NEB) 2.5 mg-0.5 mg/3 ml nebu 3 mL by Nebulization route every four (4) hours as needed. Qty: 30 Nebule, Refills: 12      diphenhydrAMINE 12.5 mg/5 mL elix 40 mL, lidocaine 2 % soln 40 mL, aluminum & magnesium hydroxide-simethicone 400-400-40 mg/5 mL susp 40 mL Take 5 mL by mouth every six (6) hours as needed. Swish and spit or swallow.          STOP taking these medications       amLODIPine (NORVASC) 10 mg tablet Comments:   Reason for Stopping:             Recommended Diet: Resume previous diet    Recommended Activity: Activity as tolerated    Follow-up With  Details  Why  Contact Info   Hemant Asp      [address]  [address] [place] [address] [address]  [address] [facility]           Signed:  Christopher Michael MD  4/22/2017  11:11 AM    Note: Greater than 30 minutes were spent on activities related to this discharge.

## 2017-04-22 NOTE — DISCHARGE INSTRUCTIONS
PATIENT DISCHARGE INSTRUCTIONS      PATIENT DISCHARGE INSTRUCTIONS    Rosa M Ruffin / [id number] : 1955    Admitted 2017 Discharged: 2017       · It is important that you take the medication exactly as they are prescribed. · Keep your medication in the bottles provided by the pharmacist and keep a list of the medication names, dosages, and times to be taken in your wallet. · Do not take other medications without consulting your doctor.      What to do at Home    Recommended Diet: Resume previous diet    Recommended Activity: Activity as tolerated        Signed By: Keith Taylor MD     2017

## 2017-04-24 NOTE — PROGRESS NOTES
Mau James is a 57 y.o. female   This patient was received as a referral from High Risk Report   Summary of patients top three medical problems:     Problem 1: Anxiety- Hx of Bipolar Type 1, COPD, and the unknown     Problem 2: Pain Management- HX of Fibromyalgia and chronic pain, largeal CA    with radiation     Problem 3: Activity tolerance due to SOB and debilitation. Patient's challenges to self management identified: Maintain NPO status  Patients motivational level on a scale of 0-10: 5  Medication Management:  good adherence and good understanding  Advance Care Planning:   Patient was offered the opportunity to discuss advance care planning:  yes     Does patient have an Advance Directive:  yes   If no, did you provide information on Advance Care Planning?  no     [employer], Referrals, and Durable Medical Equipment: [facility], PT, OT, Speech, Palliative     Follow up appointments:  Dr Donna Golden 4/26 at 4:30pm.  Goals      Attends follow-up appointments as directed.  Coordinate pain management plan for patient.  Develop action plan for Self-Management Chronic Disease.  Knowledge and adherence of prescribed medication (ie. action, side effects, missed dose, etc.).  Prepare patients and caregivers for end of life decisions (ie. need for hospice, pain management, symptom relief, advance directives etc.)      Returns to baseline activity level.  Understands red flags post discharge. Patient verbalized understanding of all information discussed. Patient has this Nurse Navigators contact information for any further questions, concerns, or needs. Patient daughter states patient is down to her last case of tube feeding. [facility] [phone] they did not get the bid for [facility]. [address] did get the fsd-[phone], Tube feeding is [phone]. After many calls and transfer [address] does not supply [facility].   They suggested I contact [employer] and ask who does supply there tube feedings.     Called patient daughter Hersjonathan Neville and she is going to look through the supplies they have and will call me tomorrow to let me know the company they have gotten from in the past.

## 2017-04-26 NOTE — MR AVS SNAPSHOT
Visit Information Date & Time Provider Department Dept. Phone Encounter #  
 4/26/2017  4:30 PM Reji Woo, [facility] [phone] [id number] Upcoming Health Maintenance Date Due  
 MEDICARE YEARLY EXAM 5/25/1972 BREAST CANCER SCRN MAMMOGRAM 5/25/2004 FOBT Q 1 YEAR AGE 50-75 5/25/2004 Pneumococcal 19-64 Highest Risk (3 of 3 - PCV13) 12/7/2017 PAP AKA CERVICAL CYTOLOGY 2/28/2019 DTaP/Tdap/Td series (2 - Td) 12/7/2026 Allergies as of 4/26/2017  Review Complete On: 4/26/2017 By: Jesse Stain, LPN Severity Noted Reaction Type Reactions No Known Allergies  04/23/2017 Current Immunizations  Reviewed on 4/21/2017 Name Date Influenza Vaccine 11/1/2014 Influenza Vaccine (Quad) PF 12/7/2016 Pneumococcal Polysaccharide (PPSV-23) 12/7/2016 Tdap 12/7/2016 Not reviewed this visit You Were Diagnosed With   
  
 Codes Comments Acute on chronic respiratory failure with hypoxemia (HCC)    -  Primary ICD-10-CM: X44.14 
ICD-9-CM: 518.84 Bipolar 1 disorder (HCC)     ICD-10-CM: F31.9 ICD-9-CM: 296.7 Aspiration pneumonia of both upper lobes, unspecified aspiration pneumonia type     ICD-10-CM: J69.0 ICD-9-CM: 507.0 History of laryngeal cancer     ICD-10-CM: Z85.21 
ICD-9-CM: V10.21 Vitals BP Pulse Temp Resp Weight(growth percentile) LMP  
 125/82 80 98 °F (36.7 °C) 22 132 lb (59.9 kg) (LMP Unknown) SpO2 BMI OB Status Smoking Status 95% 20.07 kg/m2 Hysterectomy Former Smoker BMI and BSA Data Body Mass Index Body Surface Area 20.07 kg/m 2 1.7 m 2 Preferred Pharmacy Pharmacy Name Phone [facility] [phone] Your Updated Medication List  
  
   
This list is accurate as of: 4/26/17  4:54 PM.  Always use your most recent med list.  
  
  
  
  
 acetylcysteine 100 mg/mL (10 %) nebulizer solution Commonly known as:  MUCOMYST Take 4 mL by inhalation every four (4) hours. albuterol-ipratropium 2.5 mg-0.5 mg/3 ml Nebu Commonly known as:  DUO-NEB  
3 mL by Nebulization route every four (4) hours as needed. B infan-B long-L acid-L rhamn 2 billion cell Cpsp Commonly known as:  DIGESTIVE PROBIOTIC Take 1 Cap by mouth daily. bisacodyl 5 mg Tab Take 1 Tab by mouth daily as needed (constipation). cyanocobalamin 1,000 mcg tablet Take 1,000 mcg by mouth daily. diphenhydrAMINE 12.5 mg/5 mL elix 40 mL, lidocaine 2 % soln 40 mL, aluminum & magnesium hydroxide-simethicone 400-400-40 mg/5 mL susp 40 mL Take 5 mL by mouth every six (6) hours as needed. Swish and spit or swallow. FLUoxetine 20 mg tablet Commonly known as:  PROzac Take 20 mg by mouth daily. * ISOSOURCE 1.5 MARIELA FEEDING TUBE  
5 Cans by Feeding Tube route daily. * JEVITY 1.5 MARIELA 0.06 gram-1.5 kcal/mL Liqd Generic drug:  Lactose-Free Food with Fiber 1.5 L by PEG Tube route five (5) times daily. bolus feeding followed by 150  ml flush after each feeding  
  
 levoFLOXacin 750 mg tablet Commonly known as:  Blinda Ortega Take 1 Tab by mouth daily for 10 days. lithium carbonate 300 mg capsule Take 300 mg by mouth nightly. * LORazepam 0.5 mg tablet Commonly known as:  ATIVAN Take 1 Tab by mouth two (2) times daily as needed for Anxiety. Max Daily Amount: 1 mg.  
  
 * LORazepam 2 mg/mL concentrated solution Commonly known as:  INTENSOL  
0.5 mL by Per J Tube route every four (4) hours as needed (shortness of breath/air hunger). Max Daily Amount: 6 mg.  
  
 metoprolol tartrate 25 mg tablet Commonly known as:  LOPRESSOR Take 1 Tab by mouth every twelve (12) hours. MORPHINE Take 100 mL by mouth.  
  
 morphine 100 mg/5 mL (20 mg/mL) concentrated solution Commonly known as:  [place] Heimlich Take 0.5 mL by mouth every four (4) hours as needed for Pain. oxyCODONE 20 mg/mL concentrated solution Commonly known as:  Rajwinder Stew Take 0.5 mL by mouth every four (4) hours. Max Daily Amount: 60 mg. OXYGEN-AIR DELIVERY SYSTEMS  
2 L/min by Nasal route continuous. * predniSONE 10 mg tablet Commonly known as:  Alla Roche Take 1 Tab by mouth daily (with breakfast). * predniSONE 10 mg tablet Commonly known as:  Alla Roche Take 1 Tab by mouth two (2) times a day. traZODone 100 mg tablet Commonly known as:  Byron Belling Take 200 mg by mouth nightly. * Notice: This list has 6 medication(s) that are the same as other medications prescribed for you. Read the directions carefully, and ask your doctor or other care provider to review them with you. Prescriptions Printed Refills  
 oxyCODONE (ROXICODONE INTENSOL) 20 mg/mL concentrated solution 0 Sig: Take 0.5 mL by mouth every four (4) hours. Max Daily Amount: 60 mg.  
 Class: Print Route: Oral  
 LORazepam (INTENSOL) 2 mg/mL concentrated solution 0 Si.5 mL by Per J Tube route every four (4) hours as needed (shortness of breath/air hunger). Max Daily Amount: 6 mg. Class: Print Route: Per J Tube  
 levoFLOXacin (LEVAQUIN) 750 mg tablet 0 Sig: Take 1 Tab by mouth daily for 10 days. Class: Print Route: Oral  
 predniSONE (DELTASONE) 10 mg tablet 0 Sig: Take 1 Tab by mouth two (2) times a day. Class: Print Route: Oral  
  
To-Do List   
 2017 To Be Determined Appointment with Paul Dillon RN at [address] [place] [address]  
  
 2017 To Be Determined Appointment with Armando Solis at [address] [place] [address]  
  
 2017 10:30 AM  
  Appointment with Kirsten Alberto OT at [address] [place] [address]  
  
 2017 To Be Determined Appointment with Carrol Hodge LPN at [place] [address]  
  
 05/02/2017 To Be Determined Appointment with Ryan Centeno at [place] [address]  
  
 05/04/2017 To Be Determined Appointment with Carrol Hodge LPN at [place] [address]  
  
 05/04/2017 To Be Determined Appointment with Ryan Centeno at [place] [address]  
  
 05/09/2017 To Be Determined Appointment with Ryan Centeno at [place] [address]  
  
 05/10/2017 To Be Determined Appointment with Cynthia Roland RN at [address] [facility]! [facility] introduces FilmLoop patient portal. Now you can access parts of your medical record, email your doctor's office, and request medication refills online. 1. In your internet browser, go to [email or web address] 2. Click on the First Time User? Click Here link in the Sign In box. You will see the New Member Sign Up page. 3. Enter your FilmLoop Access Code exactly as it appears below. You will not need to use this code after youve completed the sign-up process. If you do not sign up before the expiration date, you must request a new code. · FilmLoop Access Code: [email or web address] Expires: 6/5/2017  4:08 PM 
 
4. Enter the last four digits of your Social Security Number (xxxx) and Date of Birth (mm/dd/yyyy) as indicated and click Submit. You will be taken to the next sign-up page. 5. Create a [employer] ID. This will be your [employer] login ID and cannot be changed, so think of one that is secure and easy to remember. 6. Create a [employer] password. You can change your password at any time. 7. Enter your Password Reset Question and Answer. This can be used at a later time if you forget your password. 8. Enter your e-mail address.  You will receive e-mail notification when new information is available in Aprilage. 9. Click Sign Up. You can now view and download portions of your medical record. 10. Click the Download Summary menu link to download a portable copy of your medical information. If you have questions, please visit the Frequently Asked Questions section of the Aprilage website. Remember, Aprilage is NOT to be used for urgent needs. For medical emergencies, dial 911. Now available from your iPhone and Android! Please provide this summary of care documentation to your next provider. Your primary care clinician is listed as Barry Romaine. If you have any questions after today's visit, please call [phone].

## 2017-04-26 NOTE — PROGRESS NOTES
[facility] to contact [facility] Called patient-she through the box away that had the name of who she received tube feeding from. Looking in chart found [facility] [phone] and they are her supplier for tube feeding, tape, gauze and syringes. They are shipping a 30 day supply today and it will take 1-2 business days to get to her house. They ask that patient call or office call when patient is down to a 7 day supply. Called patient daughter-Carli back and explained how to order gave the # and the name and that 30 days supply should be coming today or tomorrow, daughter voiced understanding.

## 2017-04-27 NOTE — PROGRESS NOTES
HISTORY OF PRESENT ILLNESS  Adarsh Francis is a 57 y.o. female. Transitional Care visit following hospitalization for recurrent aspiration pneumonia despite PEG and npo status. Feeling much better pain control adequate,sob,cough improving    Hospital Follow Up   The history is provided by the patient. This is a chronic problem. The problem occurs daily. The problem has been gradually improving. Associated symptoms include shortness of breath. Pertinent negatives include no chest pain, no abdominal pain and no headaches. Breathing Problem   This is a chronic problem. The problem occurs intermittently. The problem has been gradually improving. Associated symptoms include cough and sputum production. Pertinent negatives include no headaches, no PND, no orthopnea, no chest pain, no abdominal pain and no rash. Review of Systems   Respiratory: Positive for cough, sputum production and shortness of breath. Cardiovascular: Negative for chest pain, palpitations, orthopnea and PND. Gastrointestinal: Negative for abdominal pain. Genitourinary: Negative for frequency. Skin: Negative for rash. Neurological: Negative for headaches. Physical Exam   Constitutional: She is oriented to person, place, and time. She appears well-developed and well-nourished. HENT:   Head: Normocephalic and atraumatic. Right Ear: External ear normal.   Left Ear: External ear normal.   Nose: Nose normal.   Cardiovascular: Normal rate and regular rhythm. Pulmonary/Chest: Effort normal.   Generalized diminished breathe sounds ,scattered ronchi     Abdominal: Soft. Bowel sounds are normal.   Neurological: She is alert and oriented to person, place, and time. Skin: Skin is warm and dry. Psychiatric: She has a normal mood and affect. ASSESSMENT and PLAN  Talon Abarca was seen today for hospital follow up.     Diagnoses and all orders for this visit:    Aspiration pneumonia of both upper lobes, unspecified aspiration pneumonia type,recurrent. Will give rxs for antibiotic and prednisone to use if she deteriorates suddenly  -     levoFLOXacin (LEVAQUIN) 750 mg tablet; Take 1 Tab by mouth daily for 10 days. -     predniSONE (DELTASONE) 10 mg tablet; Take 1 Tab by mouth two (2) times a day. Acute on chronic respiratory failure with hypoxemia (HCC)    Bipolar 1 disorder (HCC)    History of laryngeal cancer  -     oxyCODONE (ROXICODONE INTENSOL) 20 mg/mL concentrated solution; Take 0.5 mL by mouth every four (4) hours. Max Daily Amount: 60 mg.  -     LORazepam (INTENSOL) 2 mg/mL concentrated solution; 0.5 mL by Per J Tube route every four (4) hours as needed (shortness of breath/air hunger). Max Daily Amount: 6 mg.       Follow-up Disposition: Not on File

## 2017-05-03 NOTE — PROGRESS NOTES
Nurse Navigator Note- patient called and verified by birth date and address. Patient states she is doing well, still has productive cough with yellow to tan mucus. Continues to be seen by Speech and Home Health. She did receive her Tube feeding and portable tanks, now she needs new tubing for her O2 with the concentrator and shorter tubing for the portable. Her long tubing has gotten kinks in them and has left indents in the tubing and then the O2 does not come out. Patient gets her O2 through [facility].      Continue with tube feedings as directed  Order extra O2 tubing, both long and short

## 2017-05-05 NOTE — PROGRESS NOTES
[facility]  Palliative Medicine Office  Nursing Note  [phone]  Fax [phone]    Patient Name: Anca Foster  YOB: 1955    Home visit made to assess pain and functioning. Patient up and moving around and states she feels good at this point. Stated she is not taking anything by mouth not even ice chips. This is very hard for her but she does not want to go back to the hospital.  She is coughing up  Some light tan thick secretions. Continues to have pain in the right side of her face that can get to a 7-8 but pain medicine does help. Was not able to look at pill bottles today since she was protective of her medicine but explained that at my visits I will need to see bottles in order for MD to continue to prescribe medicine. I hope during this visit I have built trust and will understand I am not questioning her use of the medicine, just want to monitor so she will not run out. Daughter is with her and stays with her during the day, in the afternoon she goes with the daughter to her house which is close.           Candace Hale RN Nurse home visit   Palliative Medicine    Total time spent counselin    Future Appointments  Date Time Provider Vladimir Ashley   2017 To Be [address] JAZ Fang [address]   2017 To Be Determined [address] [place] [address]   5/10/2017 To Be [address]456 JAZ Fang [place] [address] [address]   5/10/2017 To Be Determined Laura Alvares RN [facility]

## 2017-05-08 NOTE — TELEPHONE ENCOUNTER
Pt would like a rx for oxyCODONE (ROXICODONE INTENSOL) 20 mg/mL concentrated solution  Pt ph number is [phone]

## 2017-05-11 NOTE — PROGRESS NOTES
[facility]  Palliative Medicine Office  Nursing Note  [phone]  Fax [phone]    Patient Name: Gloria Padgett  YOB: 1955      5/10/2011    Visit made at request of patient who was feeling SOB and [facility] OT in the home and patient had ask her to call me. I instructed Mr. Axel Hernandez to take her morphine and Ativan and I will make a visit. When I arrived to the home patient was resting comfortable oxygen sats still low but patient states she is feeling better, very sleepy from medicine but able to answer questions and is very clear she wants to stay home and not go to the hospital.  Both daughters are present in the home and they agree with her wishes and are willing to care for her in the home. She has started on antibiotic this AM.  Had a long discussion with daughters about her care and how she is declining and her lungs are getting worse and even if she is not taking anything by mouth she is still aspirating her salvia. They understand but it is hard for them to accept what is happening. Did mention hospice but they are not ready to that at this point, need a DDNR in the home and will get that to them in the next few days. Javi Han, RN Nurse home visit   Palliative Medicine    Total time spent counselin min    No future appointments.

## 2017-05-16 NOTE — TELEPHONE ENCOUNTER
Pt would like a rx for oxyCODONE (ROXICODONE INTENSOL) 20 mg/mL concentrated solution  morphine (ROXANOL) 100 mg/5 mL (20 mg/mL) concentrated solution   Pt ph number is [phone]

## 2017-05-17 PROBLEM — G89.3 CHRONIC PAIN DUE TO NEOPLASM: Chronic | Status: ACTIVE | Noted: 2017-01-01

## 2017-05-17 NOTE — TELEPHONE ENCOUNTER
Patient called and left voicemail on machine last night. States she is having Shortness of breath. She states she is either at her home or her daughters home,however she thought that someone clinical was coming by to see her and she might have missed them. She would like a call back to speak to clinical member.

## 2017-05-17 NOTE — PROGRESS NOTES
Palliative Medicine  Nursing Note  [phone]  Fax [phone]        Clinic Office Visit  Patient Name: Jono Yip  YOB: [birth year]      Advance Care Planning 4/16/2017   Patient's Healthcare Decision Maker is: Legal Next of Kin   Primary Decision Maker Name Tashia Ortiz   Primary Decision Maker Phone Number [phone]   Primary Decision Maker Relationship to Patient Spouse   Secondary Decision Maker Name -   Secondary Decision Maker Phone Number -   Secondary Decision Maker Relationship to Patient -   Confirm Advance Directive Yes, on file   Patient Would Like to Complete Advance Directive -     Printed new prescription for Oxycodone 20mg/ml conc. - take 10mg/0.5ml #30ml every 4 hours as needed for pain, per Dr. Issa Spindle request.  Memo Mason RN ( home visit nurse) to take prescription to patient at home.       Willam Coronado, BSN, RN, [facility]  Palliative Medicine

## 2017-05-17 NOTE — TELEPHONE ENCOUNTER
Talked to patient and I will see her tomorrow.   She has had several good days but is having some SOB today and will take her morphine to see if that helps

## 2017-05-19 NOTE — MR AVS SNAPSHOT
Visit Information Date & Time Provider Department Dept. Phone Encounter #  
 5/19/2017 12:30 PM Dellia Cabot, [place] [phone] [id number] Follow-up Instructions Return in about 4 weeks (around 6/16/2017). Upcoming Health Maintenance Date Due  
 MEDICARE YEARLY EXAM 5/25/1972 BREAST CANCER SCRN MAMMOGRAM 5/25/2004 FOBT Q 1 YEAR AGE 50-75 5/25/2004 INFLUENZA AGE 9 TO ADULT 8/1/2017 Pneumococcal 19-64 Highest Risk (3 of 3 - PCV13) 12/7/2017 PAP AKA CERVICAL CYTOLOGY 2/28/2019 DTaP/Tdap/Td series (2 - Td) 12/7/2026 Allergies as of 5/19/2017  Review Complete On: 5/19/2017 By: Rere Funez Severity Noted Reaction Type Reactions No Known Allergies  04/23/2017 Current Immunizations  Reviewed on 4/21/2017 Name Date Influenza Vaccine 11/1/2014 Influenza Vaccine (Quad) PF 12/7/2016 Pneumococcal Polysaccharide (PPSV-23) 12/7/2016 Tdap 12/7/2016 Not reviewed this visit You Were Diagnosed With   
  
 Codes Comments Acute on chronic respiratory failure with hypoxemia (HCC)    -  Primary ICD-10-CM: D35.05 
ICD-9-CM: 518.84 Chronic obstructive pulmonary disease with acute exacerbation (HCC)     ICD-10-CM: J44.1 ICD-9-CM: 491.21 Atrial fibrillation with RVR (HCC)     ICD-10-CM: I48.91 
ICD-9-CM: 427.31 Dysphagia, unspecified type     ICD-10-CM: R13.10 ICD-9-CM: 787.20 Debility     ICD-10-CM: R53.81 ICD-9-CM: 799.3 Vitals BP Pulse Temp Resp Height(growth percentile) Weight(growth percentile) 114/76 (BP 1 Location: Right arm, BP Patient Position: Sitting) 70 98.5 °F (36.9 °C) (Oral) 26 5' 8\" (1.727 m) 133 lb (60.3 kg) LMP SpO2 BMI OB Status Smoking Status (LMP Unknown) 96% 20.22 kg/m2 Hysterectomy Former Smoker Vitals History BMI and BSA Data Body Mass Index Body Surface Area  
 20.22 kg/m 2 1.7 m 2 Preferred Pharmacy Pharmacy Name Phone [facility] [phone] Your Updated Medication List  
  
   
This list is accurate as of: 5/19/17 12:58 PM.  Always use your most recent med list.  
  
  
  
  
 acetylcysteine 100 mg/mL (10 %) nebulizer solution Commonly known as:  MUCOMYST Take 4 mL by inhalation every four (4) hours. albuterol-ipratropium 2.5 mg-0.5 mg/3 ml Nebu Commonly known as:  DUO-NEB  
3 mL by Nebulization route every four (4) hours as needed. bisacodyl 5 mg Tab Take 1 Tab by mouth daily as needed (constipation). cyanocobalamin 1,000 mcg tablet Take 1,000 mcg by mouth daily. diphenhydrAMINE 12.5 mg/5 mL elix 40 mL, lidocaine 2 % soln 40 mL, aluminum & magnesium hydroxide-simethicone 400-400-40 mg/5 mL susp 40 mL Take 5 mL by mouth every six (6) hours as needed. Swish and spit or swallow. FLUoxetine 20 mg tablet Commonly known as:  PROzac Take 20 mg by mouth daily. JEVITY 1.5 MARIELA 0.06 gram-1.5 kcal/mL Liqd Generic drug:  Lactose-Free Food with Fiber 1.5 L by PEG Tube route five (5) times daily. bolus feeding followed by 150  ml flush after each feeding  
  
 levoFLOXacin 750 mg tablet Commonly known as:  LEVAQUIN  
TAKE 1 TABLET EVERY DAY FOR 10 DAYS  
  
 lithium carbonate 300 mg capsule Take 300 mg by mouth nightly. LORazepam 2 mg/mL concentrated solution Commonly known as:  INTENSOL  
0.5 mL by Per J Tube route every four (4) hours as needed (shortness of breath/air hunger). Max Daily Amount: 6 mg.  
  
 metoprolol tartrate 25 mg tablet Commonly known as:  LOPRESSOR Take 1 Tab by mouth every twelve (12) hours. MORPHINE Take 100 mL by mouth.  
  
 morphine 100 mg/5 mL (20 mg/mL) concentrated solution Commonly known as:  Malen Settles Take 0.5 mL by mouth every four (4) hours as needed for Pain. * oxyCODONE 20 mg/mL concentrated solution Commonly known as:  Erle Crumbly Take 0.5 mL by mouth every four (4) hours as needed for Pain. Max Daily Amount: 60 mg.  
  
 * oxyCODONE 20 mg/mL concentrated solution Commonly known as:  Erle Crumbly Take 0.5 mL by mouth every four (4) hours. Max Daily Amount: 60 mg. OXYGEN-AIR DELIVERY SYSTEMS  
2 L/min by Nasal route continuous. traZODone 100 mg tablet Commonly known as:  Auther Sax Take 200 mg by mouth nightly. * Notice: This list has 2 medication(s) that are the same as other medications prescribed for you. Read the directions carefully, and ask your doctor or other care provider to review them with you. Follow-up Instructions Return in about 4 weeks (around 6/16/2017). To-Do List   
 05/24/2017 To Be Determined Appointment with Mariola Felder LPN at [place] [address]  
  
 05/31/2017 To Be Determined Appointment with Mariola Felder LPN at [place] [address]  
  
 06/07/2017 To Be Determined Appointment with Mariola Felder LPN at [place] [address]  
  
 06/14/2017 To Be Determined Appointment with Mariola Felder LPN at [place] [address]  
  
 06/21/2017 To Be Determined Appointment with Mariola Felder LPN at [place] [address]  
  
 06/28/2017 To Be Determined Appointment with Mariola Felder LPN at [place] [address]  
  
 07/05/2017 To Be Determined Appointment with Yeimy Schuster RN at [address] [facility]! Matilda Jones introduces [employer] patient portal. Now you can access parts of your medical record, email your doctor's office, and request medication refills online. 1. In your internet browser, go to [email or web address]. [email or web address] 2. Click on the First Time User? Click Here link in the Sign In box. You will see the New Member Sign Up page. 3. Enter your [employer] Access Code exactly as it appears below. You will not need to use this code after youve completed the sign-up process. If you do not sign up before the expiration date, you must request a new code. · [employer] Access Code: [email or web address] Expires: 6/5/2017  4:08 PM 
 
4. Enter the last four digits of your Social Security Number (xxxx) and Date of Birth (mm/dd/yyyy) as indicated and click Submit. You will be taken to the next sign-up page. 5. Create a [employer] ID. This will be your [employer] login ID and cannot be changed, so think of one that is secure and easy to remember. 6. Create a [employer] password. You can change your password at any time. 7. Enter your Password Reset Question and Answer. This can be used at a later time if you forget your password. 8. Enter your e-mail address. You will receive e-mail notification when new information is available in [address] 9. Click Sign Up. You can now view and download portions of your medical record. 10. Click the Download Summary menu link to download a portable copy of your medical information. If you have questions, please visit the Frequently Asked Questions section of the [employer] website. Remember, [employer] is NOT to be used for urgent needs. For medical emergencies, dial 911. Now available from your iPhone and Android! Please provide this summary of care documentation to your next provider. Your primary care clinician is listed as Robbin Link. If you have any questions after today's visit, please call [phone].

## 2017-05-19 NOTE — PROGRESS NOTES
Chief Complaint   Patient presents with   [facility] Follow Up     F/U from [id number] [place] [address].

## 2017-05-19 NOTE — PROGRESS NOTES
HISTORY OF PRESENT ILLNESS  Tanvi Mendoza is a 57 y.o. female. f/u laryngeal ca ,dysphagia,chronic aspiration,PEG dependent,chronic pain from maligancy,bipolar disorder. Doing relatively well,remains completely npo. Pain control adequate  Hospital Follow Up   The history is provided by the patient. This is a chronic problem. The problem occurs daily. The problem has been gradually improving. Associated symptoms include shortness of breath. Pertinent negatives include no chest pain and no abdominal pain. Breathing Problem   This is a chronic problem. The problem occurs frequently. The problem has not changed since onset. Associated symptoms include cough and sputum production. Pertinent negatives include no fever, no chest pain and no abdominal pain. Review of Systems   Constitutional: Positive for malaise/fatigue. Negative for fever. HENT: Positive for congestion. Respiratory: Positive for cough, sputum production and shortness of breath. Cardiovascular: Negative for chest pain. Gastrointestinal: Negative for abdominal pain. Musculoskeletal: Positive for myalgias. Physical Exam   Constitutional: She appears well-developed and well-nourished. Frail elderly female on portable O2,PEG   HENT:   Head: Normocephalic and atraumatic. Right Ear: External ear normal.   Left Ear: External ear normal.   Mouth/Throat: Oropharynx is clear and moist.   Neck: No tracheal deviation present. Cardiovascular: Normal rate and regular rhythm. Pulmonary/Chest: Effort normal. She has no wheezes. She has no rales. Generalized diminished breathe sounds ,scattered ronchi     Abdominal: Soft. Bowel sounds are normal.   Lymphadenopathy:     She has no cervical adenopathy. Skin: Skin is warm and dry. Psychiatric: She has a normal mood and affect. ASSESSMENT and PLAN  Scott Restrepo was seen today for hospital follow up.     Diagnoses and all orders for this visit:    Acute on chronic respiratory failure with hypoxemia (HCC)    Chronic obstructive pulmonary disease with acute exacerbation (HCC)    Atrial fibrillation with RVR ([facility] [address]    Dysphagia, unspecified type    Debility    Doing well,has been able to avoid recurrent aspiration ,so far. Continue current meds and treatments. Follow-up Disposition:  Return in about 4 weeks (around 6/16/2017).

## 2017-05-22 NOTE — PROGRESS NOTES
Palliative Medicine Outpatient Services  [place]: [phone] ([phone]    Patient Name: Carmen Huff  YOB: 1955    Date of Current Visit: 05/18/2017  Location of Current Visit:    [] [facility] Office  [] [facility] Office  [] [facility] Office  [x] Home  [] Other:        Date of Initial Visit: 1/20/17 (seen by Palliative Medicine Team during hospitalization)   Requesting Physician: Servando Barba MD  Primary Care Physician: María Damico MD      SUMMARY:   Carmen Huff is a 57y.o. 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 Reynold Yates for ongoing symptom management and supportive care. She's been hospitalized 5 times since 1/2017 for acute on chronic respiratory failure due to aspiration pneumonia and/or COPD exacerbation. The patients social history includes: she is  to [place]. She has 2 daughters, one of whom, Alejandrina Callejas, lives three doors away and is involved in her care. Palliative Medicine is addressing the following current patient/family concerns: shortness of breath, cough, chronic neck/throat pain, chronic generalized pain, debility. Current Issues:   1. Breathing  2. Spiritual distress        Action Plan:   1. Currently on 4L or oxygen and using morphine and Ativan for her SOB had not had a dose yet today, breathing manageable. SOB has improved since taking the antibodies and prednisone. When she is sitting sats are 91% but will drop into the 80's with any activity      2. She is worried about going to hell, and feels she cannot be forgiven for the actions she has done in the past.  Struggling with how to handle her distress and is fearful of death due to this issue. I asked Mrs. Wall if she would like to talk with someone about these issues and she stated very much. I have contacted the liberty Delphine and she will reach out to patient and make home visit       Advance Care Planning:     Resuscitation Status:    dDNR? [x] Yes   [] No    [] Not Applicable    Primary Decision Maker Info: Advance Care Planning 4/16/2017   Patient's Healthcare Decision Maker is: Legal Next of Kin   Primary Decision Maker Name Sesar Le   Primary Decision Maker Phone Number [phone]   Primary Decision Maker Relationship to Patient Spouse   Secondary Decision Maker Name -   Secondary Decision Maker Phone Number -   Secondary Decision Maker Relationship to Patient -   Confirm Advance Directive Yes, on file   Patient Would Like to Complete Advance Directive -          Secondary Decision Maker Info: Advance Directive Info:                 FUNCTIONAL ASSESSMENT    Palliative Performance Scale (PPS):   60    Review of Systems: The following systems were reviewed: constitutional, ears/nose/mouth/throat, respiratory, gastrointestinal, musculoskeletal, neurologic, psychiatric, endocrine. Positive findings noted below. Modified ESAS Completed by: patient   Fatigue: 5 Drowsiness: 5   Depression: 6 Pain: 6   Anxiety: 6 Nausea: 0   Anorexia: 9 Dyspnea: 6   Constipation: No            Current Outpatient Prescriptions   Medication Sig    levoFLOXacin (LEVAQUIN) 750 mg tablet TAKE 1 TABLET EVERY DAY FOR 10 DAYS    oxyCODONE (ROXICODONE INTENSOL) 20 mg/mL concentrated solution Take 0.5 mL by mouth every four (4) hours as needed for Pain. Max Daily Amount: 60 mg.    oxyCODONE (ROXICODONE INTENSOL) 20 mg/mL concentrated solution Take 0.5 mL by mouth every four (4) hours. Max Daily Amount: 60 mg.    LORazepam (INTENSOL) 2 mg/mL concentrated solution 0.5 mL by Per J Tube route every four (4) hours as needed (shortness of breath/air hunger). Max Daily Amount: 6 mg.    (No Medication Selected) Take 100 mL by mouth.     lithium carbonate 300 mg capsule Take 300 mg by mouth nightly.  morphine (ROXANOL) 100 mg/5 mL (20 mg/mL) concentrated solution Take 0.5 mL by mouth every four (4) hours as needed for Pain.  cyanocobalamin 1,000 mcg tablet Take 1,000 mcg by mouth daily.  OXYGEN-AIR DELIVERY SYSTEMS 2 L/min by Nasal route continuous.  Lactose-Free Food with Fiber (JEVITY 1.5 MARIELA) 0.06 gram-1.5 kcal/mL liqd 1.5 L by PEG Tube route five (5) times daily. bolus feeding  followed by 150  ml flush after each feeding    bisacodyl 5 mg tab Take 1 Tab by mouth daily as needed (constipation).  albuterol-ipratropium (DUO-NEB) 2.5 mg-0.5 mg/3 ml nebu 3 mL by Nebulization route every four (4) hours as needed.  diphenhydrAMINE 12.5 mg/5 mL elix 40 mL, lidocaine 2 % soln 40 mL, aluminum & magnesium hydroxide-simethicone 400-400-40 mg/5 mL susp 40 mL Take 5 mL by mouth every six (6) hours as needed. Swish and spit or swallow.  acetylcysteine (MUCOMYST) 100 mg/mL (10 %) nebulizer solution Take 4 mL by inhalation every four (4) hours.  metoprolol tartrate (LOPRESSOR) 25 mg tablet Take 1 Tab by mouth every twelve (12) hours.  FLUoxetine (PROZAC) 20 mg tablet Take 20 mg by mouth daily.  traZODone (DESYREL) 100 mg tablet Take 200 mg by mouth nightly. No current facility-administered medications for this visit. Allergies   Allergen Reactions    No Known Allergies                 Physical Exam:    Wt Readings from Last 3 Encounters:   05/19/17 133 lb (60.3 kg)   04/26/17 132 lb (59.9 kg)   04/23/17 131 lb (59.4 kg)     Blood pressure 120/72, pulse 71, SpO2 91 %.   Last bowel movement: today    CARDIOVASCULAR  General Color: Pale     X__AP  ___ Reg   ___ Irregular   ___Pedal Pulses   ___Ascites   ___Edema   Comments:     SENSORY IMPAIRED  _X__Speech   ___Vision   ___Hearing  NEURO  ___Altered LOC Key for Scales (select one of the following)         ___LOC 1 = alert, conversant or purposeful tracking with eyes, oriented x 3           ___LOC 2 = lethargic, responds to verbal stimuli           ___LOC 3 = stupor us, responds to maximal stimulation (shake & shout) DO NOT US PAINFUL                               STIMULI           ___LOC 4 = no response   ___Seizures _______         ___Restless         ___ Memory Loss   ___Agitation         ___ Hallucinations   ___Insomnia  ___ Confusion  RESPIRATORY  ___Clear   ___Crackles   _X__ Diminished in all areas but right side is markedly decreased   ___R L Bilateral   ___Labored   _X__Wheezes   X___Cough   ___Rhonchi   ___Sputum color. ______________  X___Short of Breath with exertion   ___Apnea   __X_Oxygen __4_____ L/M     Continuous/ NC/   X___Using as directed  GASTROINTESTINAL  ___Nausea   ___Vomiting   ___Dysphasia   ___Diarrhea   _X__Abdomen soft   ___ Abdomen tender  ___ Abdomen non-tender   ___Incontinent   ___Constipation Last BM: today    Can take nothing by mouth and has feeding tube which she manages     Bowel Sounds:   ___Absent   _X__Hyperactive   ___Hypoactive   ___Normal    NUTRITION  Diet:_Tube feedings    Appetite:   ___Good   ___Fair   ___Poor   X___Tube Feeding     GENITOURINARY  _X__Voiding without difficulty   ___ Incontinent   ___ Frequency   ___Dysuria   ___Distention   ___Retention   ___Oliguria   ___Hesitancy  _____________  Sherie Brunner  ___Balance/[place] Unsteady   X___Weak     Strength:   ___Normal   _X__Limited   ___Decreasing     Activities:   _X__Up as tolerated  ___Bedridden   ___Specify  SAFETY  ___24 hr. Caregiver   ___Side rails ?     ___Hospital bed   X___Reviewed Falls & Safety She is a fall risk    Precautions   X___Oxygen Safety assessment   _X__Oxygen Safety teaching   _X__Medications labeled appropriately   ___ Medication teaching   _X__Meds assessed for refills     OXYGEN SAFETY  Smoking materials present __Yes X__No  ___Concentrator filter cleaned  ___Cannula/tubing changed  _X__Tanks stored in turk, lying down  _X__O2 Teaching Provided This Visit  COPING  Patient ___Coping Well ___ Anger   ___Denial   __X_Depressed   ___ Good Support    Caregiver   _X__Coping Well   ___ Anger   ___Denial   ___Depressed ___ Good Support  Mainly cared for by her 2 daughters

## 2017-05-24 NOTE — TELEPHONE ENCOUNTER
Victoriano Sandhoff called patient and scheduled a home visit for Friday 5/26/17 at 3pm.  Appt has been placed on calendar.

## 2017-05-24 NOTE — TELEPHONE ENCOUNTER
Paulina Ponce, , is calling stating that patient is requesting a visit from a . Wanted to know if we could help/assist with this.

## 2017-05-25 NOTE — PROGRESS NOTES
Nurse Navigator Note- called patient and identified by birth date and address. Today is patient birthday and her family are coming over at 4:30pm.  Patient states she is feeling good, does have a cough that is non productive. Is also down to her last box of tube feeding. Her SOB is no worse, does get SOB with minimal activity, still wearing O2 at 5/L. Called Donnie and patient does have order for tube feeding and supplies to go out tomorrow. Should arrive a patient home Tuesday or Wed due to holiday weekend. Patient called to let her know that the tube feeding should arrive on Tuesday or Wed and she has also received the N/C both short and long that I had ordered.

## 2017-05-29 NOTE — PROGRESS NOTES
HISTORY OF PRESENT ILLNESS  Ja Melton is a 60 y.o. female.   HPI    ROS    Physical Exam    ASSESSMENT and PLAN  {ASSESSMENT/PLAN:[id number]}

## 2017-06-01 NOTE — PROGRESS NOTES
Spoke with Angela Mcdaniel and refilled patients order for Morphine with an increase to 0.75ml every 2 hours as needed for shortness of breath and pain. Family called to come to [facility] [place] to  the prescription.

## 2017-06-07 NOTE — TELEPHONE ENCOUNTER
Carli pt's daughter putting refill request in for rx oxyCODONE (ROXICODONE INTENSOL) 20 mg/mL concentrated solution    Pt# [phone]

## 2017-06-19 NOTE — TELEPHONE ENCOUNTER
Patient wants to get the medication for oxyCODONE (ROXICODONE INTENSOL) 20 mg/mL concentrated solution.   Please call when ready @ [phone]

## 2017-06-26 NOTE — MR AVS SNAPSHOT
Visit Information Date & Time Provider Department Dept. Phone Encounter #  
 6/26/2017 12:00 PM Romario Lombardi, [facility] [phone] [id number] Follow-up Instructions Return in about 1 month (around 7/26/2017). Upcoming Health Maintenance Date Due  
 MEDICARE YEARLY EXAM 5/25/1972 BREAST CANCER SCRN MAMMOGRAM 5/25/2004 FOBT Q 1 YEAR AGE 50-75 5/25/2004 INFLUENZA AGE 9 TO ADULT 8/1/2017 Pneumococcal 19-64 Highest Risk (3 of 3 - PCV13) 12/7/2017 PAP AKA CERVICAL CYTOLOGY 2/28/2019 DTaP/Tdap/Td series (2 - Td) 12/7/2026 Allergies as of 6/26/2017  Review Complete On: 6/26/2017 By: Jazmin Douglass Severity Noted Reaction Type Reactions No Known Allergies  04/23/2017 Current Immunizations  Reviewed on 4/21/2017 Name Date Influenza Vaccine 11/1/2014 Influenza Vaccine (Quad) PF 12/7/2016 Pneumococcal Polysaccharide (PPSV-23) 12/7/2016 Tdap 12/7/2016 Not reviewed this visit You Were Diagnosed With   
  
 Codes Comments Acute on chronic respiratory failure with hypoxemia (HCC)    -  Primary ICD-10-CM: K75.54 
ICD-9-CM: 518.84 Chronic obstructive pulmonary disease with acute exacerbation (HCC)     ICD-10-CM: J44.1 ICD-9-CM: 491.21 Bipolar 1 disorder (HCC)     ICD-10-CM: F31.9 ICD-9-CM: 296.7 Chronic pulmonary aspiration, sequela     ICD-10-CM: X42.014Y ICD-9-CM: 003. 5 Dysphagia, unspecified type     ICD-10-CM: R13.10 ICD-9-CM: 787.20 Chronic pain due to neoplasm     ICD-10-CM: G89.3 ICD-9-CM: 338.3 Essential hypertension, benign     ICD-10-CM: I10 
ICD-9-CM: 401.1 Vitals BP Pulse Temp Resp Height(growth percentile) Weight(growth percentile) 96/48 (BP 1 Location: Left arm, BP Patient Position: Sitting) (!) 50 98.7 °F (37.1 °C) (Oral) 24 5' 9\" (1.753 m) 130 lb 6.4 oz (59.1 kg) LMP SpO2 BMI OB Status Smoking Status (LMP Unknown) 96% 19.26 kg/m2 Hysterectomy Former Smoker BMI and BSA Data Body Mass Index Body Surface Area  
 19.26 kg/m 2 1.7 m 2 Preferred Pharmacy [facility] Name [facility] [phone] Your Updated Medication List  
  
   
This list is accurate as of: 6/26/17 12:31 PM.  Always use your most recent med list.  
  
  
  
  
 acetylcysteine 100 mg/mL (10 %) nebulizer solution Commonly known as:  MUCOMYST Take 4 mL by inhalation every four (4) hours. albuterol-ipratropium 2.5 mg-0.5 mg/3 ml Nebu Commonly known as:  DUO-NEB  
3 mL by Nebulization route every four (4) hours as needed. bisacodyl 5 mg Tab 1 Tab by PEG-J Tube route daily as needed (constipation). cyanocobalamin 1,000 mcg tablet  
1,000 mcg by Per J Tube route daily. diphenhydrAMINE 12.5 mg/5 mL elix, lidocaine 2 % soln, aluminum & magnesium hydroxide-simethicone 400-400-40 mg/5 mL susp Take 5 mL by mouth every six (6) hours as needed. Swish and spit or swallow. FLUoxetine 20 mg tablet Commonly known as:  PROzac  
20 mg by Per J Tube route daily. ISOSOURCE 1.5 MARIELA FEEDING TUBE  
1 Box by PEG Tube route four (4) times daily. lithium carbonate 300 mg capsule Take 300 mg by mouth nightly. per J tube LORazepam 2 mg/mL concentrated solution Commonly known as:  INTENSOL  
0.5 mL by Per J Tube route every four (4) hours as needed (shortness of breath/air hunger). Max Daily Amount: 6 mg.  
  
 metoprolol tartrate 25 mg tablet Commonly known as:  LOPRESSOR  
1 Tab by Per J Tube route every twelve (12) hours. MORPHINE Take 100 mL by mouth.  
  
 morphine 100 mg/5 mL (20 mg/mL) concentrated solution Commonly known as:  Caleen Christen Take 0.75 mL by mouth every two (2) hours as needed for Pain. Max Daily Amount: 180 mg.  
  
 naloxone 1 mg/mL injection Commonly known as:  [employer]  
 1 mL by IntraMUSCular route once as needed for up to 1 dose. oxyCODONE 20 mg/mL concentrated solution Commonly known as:  Leafy Salvia Take 0.5 mL by mouth every four (4) hours as needed for Pain. Max Daily Amount: 60 mg. OXYCODONE 20 MG/ML ORAL CONCENTRATE  
0.5 mL by PEG Tube route every four (4) hours as needed (pain). OXYGEN-AIR DELIVERY SYSTEMS  
4 L/min by Nasal route continuous. * traZODone 100 mg tablet Commonly known as:  DESYREL  
200 mg by Per J Tube route nightly. * traZODone 100 mg tablet Commonly known as:  DESYREL  
200 mg by Per G Tube route nightly. * Notice: This list has 2 medication(s) that are the same as other medications prescribed for you. Read the directions carefully, and ask your doctor or other care provider to review them with you. Prescriptions Sent to Pharmacy Refills  
 metoprolol tartrate (LOPRESSOR) 25 mg tablet 11 Si Tab by Per J Tube route every twelve (12) hours. Class: Normal  
 Pharmacy: [facility] #: [phone] Route: Per J Tube We Performed the Following CBC WITH AUTOMATED DIFF [75899 CPT(R)] METABOLIC PANEL, COMPREHENSIVE [90299 CPT(R)] Follow-up Instructions Return in about 1 month (around 2017). To-Do List   
 2017 To Be Determined Appointment with Carrol Hodge LPN at [facility] [place]   
  
 2017 To Be Determined Appointment with Carrol Hodge LPN at [facility] [place]   
  
 2017 To Be Determined [facility]oint[facility]   
  
 2017 To Be Determined Appointment with Car[facility] at [facility]   
  
 2017 To Be Determined   Appointment with Carrol Hodge LPN at [facility] SCHEDULING  
  
 08/02/2017 To Be Determined Appointment with Rock Mccormick LPN at [place] [address]  
  
 08/09/2017 To Be Determined Appointment with Rock Mccormick LPN at [place] [address]  
  
 08/16/2017 To Be Determined Appointment with Rock Mccormick LPN at [place] [address]  
  
 08/23/2017 To Be Determined Appointment with Rock Mccormick LPN at [place] [address]  
  
 08/30/2017 To Be Determined Appointment with Rock Mccormick LPN at [place] [address]  
  
 09/06/2017 To Be Determined Appointment with Rock Mccormick LPN at [place] [address]  
  
 09/13/2017 To Be Determined Appointment with Rock Mccormick LPN at [place] [address] Introducing [facility] SERVICES! Mahendra Cruz introduces [employer] patient portal. Now you can access parts of your medical record, email your doctor's office, and request medication refills online. 1. In your internet browser, go to [email or web address][email or web address] [email or web address] 2. Click on the First Time User? Click Here link in the Sign In box. You will see the New Member Sign Up page. 3. Enter your [employer] Access Code exactly as it appears below. You will not need to use this code after youve completed the sign-up process. If you do not sign up before the expiration date, you must request a new code. · [employer] Access Code: [email or web address] Expires: 9/4/2017  8:51 PM 
 
4. Enter the last four digits of your Social Security Number (xxxx) and Date of Birth (mm/dd/yyyy) as indicated and click Submit. You will be taken to the next sign-up page. 5. Create a [employer] ID. This will be your [employer] login ID and cannot be changed, so think of one that is secure and easy to remember. 6. Create a [employer] password. You can change your password at any time. 7. Enter your Password Reset Question and Answer. This can be used at a later time if you forget your password. 8. Enter your e-mail address. You will receive e-mail notification when new information is available in [address] 9. Click Sign Up. You can now view and download portions of your medical record. 10. Click the Download Summary menu link to download a portable copy of your medical information. If you have questions, please visit the Frequently Asked Questions section of the [employer] website. Remember, [employer] is NOT to be used for urgent needs. For medical emergencies, dial 911. Now available from your iPhone and Android! Please provide this summary of care documentation to your next provider. Your primary care clinician is listed as Barry Romaine. If you have any questions after today's visit, please call [phone].

## 2017-06-27 NOTE — PROGRESS NOTES
HISTORY OF PRESENT ILLNESS  Sander Lindo is a 60 y.o. female. fu chronic cancer related pain,chronic hypoxic respiratory failure,recurrent aspiration. Recently has entered   Hospice. Feeling ok,stable on 4 lpm O2  Hypertension    The history is provided by the patient. This is a chronic problem. The problem has not changed since onset. Associated symptoms include neck pain. Pertinent negatives include no malaise/fatigue, no headaches and no peripheral edema. Breathing Problem   This is a chronic problem. The problem occurs intermittently. The problem has been gradually improving. Associated symptoms include rhinorrhea, neck pain and cough. Pertinent negatives include no fever, no headaches and no abdominal pain. Neck Pain   This is a chronic problem. The problem occurs daily. The problem has not changed since onset. Pertinent negatives include no abdominal pain and no headaches. Review of Systems   Constitutional: Negative for fever and malaise/fatigue. HENT: Positive for rhinorrhea. Respiratory: Positive for cough. Gastrointestinal: Negative for abdominal pain. Musculoskeletal: Positive for neck pain. Neurological: Negative for headaches. Physical Exam   Constitutional: She appears well-developed and well-nourished. HENT:   Head: Normocephalic and atraumatic. Right Ear: External ear normal.   Left Ear: External ear normal.   Nose: Nose normal.   Mouth/Throat: Oropharynx is clear and moist.   Cardiovascular: Normal rate and regular rhythm. Pulmonary/Chest: Effort normal.   Generalized diminished breathe sounds ,scattered ronchi     Abdominal: Soft. Bowel sounds are normal.   Neurological: She is alert. Skin: Skin is warm and dry. ASSESSMENT and PLAN  Javier Li was seen today for hypertension.     Diagnoses and all orders for this visit:    Acute on chronic respiratory failure with hypoxemia (HCC)    Chronic obstructive pulmonary disease with acute exacerbation (HCC)    B[facility] ([facility]    Chronic pulmonary aspiration, sequela    Dysphagia, unspecified type    Chronic pain due to neoplasm    Essential hypertension, benign  -     metoprolol tartrate (LOPRESSOR) 25 mg tablet; 1 Tab by Per J Tube route every twelve (12) hours. -     METABOLIC PANEL, COMPREHENSIVE  -     CBC WITH AUTOMATED DIFF    Other orders  -     Cancel: amLODIPine (NORVASC) 10 mg tablet; 1 Tab by Per G Tube route daily. Nedically stable ,now in Hospice. Discvussed at length  Follow-up Disposition:  Return in about 1 month (around 7/26/2017).

## 2017-07-03 NOTE — TELEPHONE ENCOUNTER
Med sent per SO   Pt daughter states the solution for the nebulizer is not covered under ins, albuterol. If possible to send Acetylcysteine to pharmacy, it is one that is covered. Please call her also to know when its been sent to pharmacy on file. This was something Dr. Gary Leon wrote while she was in the [place].     Pt [facility]# [phone]

## 2017-07-11 NOTE — TELEPHONE ENCOUNTER
Spoke with Domenic Mullins RN with [address] [place] [address]. Her question regarding patients dose of Ativan will need to be clarified with Hospice pharmacy as they filled the prescription.

## 2017-08-15 NOTE — PROGRESS NOTES
I spoke with Freddy Velasquez, patient's hospice RN, who called to report new symptoms of nausea and fatigue. She is also complaining of double vision. I ordered a lithium level and BMP; ondansetron 4-mg po every 8 hours as needed.

## 2017-08-25 NOTE — TELEPHONE ENCOUNTER
Carey Garcia is calling to discuss patient's pain medication. She would like an order for  Norco 10.325 does every 4 hours as needed for pain. Please call to discuss.

## 2017-08-25 NOTE — TELEPHONE ENCOUNTER
Talked with Dr. Mikey Johnson who agreed to med change and I called Reinier Freire back a gave her the verbal order

## 2017-09-07 NOTE — TELEPHONE ENCOUNTER
Patient's daughter is calling to speak to Dr. Jesus Monday. She needs for Dr. Jesus Monday to order something for patient for insurance to cover.

## 2017-09-07 NOTE — TELEPHONE ENCOUNTER
Returned phone call to Ms. Doni Wall daughter. She stated that her mother would like an order for a portable oxygen concentrator so she does not have to take multiple heavy tanks with her when she goes out. Ms. Artemio Haro shared that she didn't think hospice would cover it. Ms. Artemio Haro provided the fax to [address] [facility] [phone] who has approved the request, even though she is in hospice, if they agree to pay 20% of the cost.  Family is fine with that. [facility], spoke with Luz. She thought they would cover it actually and will speak with daughter and arrange for delivery for the patient. Dr. Berna Chase aware and ordered for patient to have a portable oxygen concentrator.     La Taylor RN,BSN,[facility]  Palliative Medicine

## 2017-09-11 NOTE — TELEPHONE ENCOUNTER
Patient is calling to get a fax regarding Oxygen portable tank. States she did not get fax on Friday. Please fax to: Fax number: [phone] and call patient to advise this was done.     Thanks

## 2017-09-12 NOTE — TELEPHONE ENCOUNTER
Hospice aware of her request for portable oxygen concentrator and they stated they will take care of it.

## 2017-09-12 NOTE — TELEPHONE ENCOUNTER
Requesting order for portable oxygen tank.  Requesting order to be faxed to [address] [phone]

## 2017-09-12 NOTE — TELEPHONE ENCOUNTER
Talked to Cynthia Pruitt at hospice about the oxygen situation. She is going to call the insurance company and discuss if this type of oxygen will be covered under hospice and if not can the family pay out of pocket. She will call daughter and discuss with her.

## 2017-09-26 NOTE — PROGRESS NOTES
Received call from Caryl Ortiz 43 with [address] [place] [place] [address] regarding her patient, Ms. Niya Gilliland. She stated that Ms. Wall is having increased pain to her face and throat and current med regimen is not effective for relief. She is currently on 25mcg Fentanyl patch, Norco 10/325mg 2 tabs every 4 hours and Morphine conc. .75ml/15mg twice daily. She states that the Morphine works better for her than the [address] [facility], but that the [facility] [facility] previously assisedt with decreasing the pain. Ms. Niya Gilliland shared with Gisselle Aguillon that she did not like the way Oxycodone and Dilaudid made her feel in the past and is more comfortable sticking with Morphine. Gisselle Aguillon shared that Ms. Wall does have a standing order for Morphine every 2 hours as needed. Discussed with Gisselle Aguillon that Betsy Benedict NP/Palliative will be updated on situation and Palliative will call Gisselle Aguillon back with changes. Gisselle Aguillon was appreciative as she would need to call meds changes into their pharmacy. Ms. Olivier Pineland aware and will call Gisselle Aguillon to discuss med changes.     Irena Rosales RN,BSN,CHPN  Palliative Medicine

## 2017-09-26 NOTE — PROGRESS NOTES
Spoke to Bayron from [facility]. New orders as follows    Increase Fentanyl patch to 50mcg  Stop Norco 10/325mg q4  Encourage patient to utilize Morphine 20mg/1ml every 2 hours as needed (currently only using twice a day)  Follow up in 2 days to see how often Morphine is being utilized in 24 hours    I suspect that the Morphine will help her shortness of breath more, and she felt that it helped her pain better then the Norco.    If she is uncomfortable with the plan, can stay with current regimen for now, but increase how often she is using the morphine to control her pain. After this, her regimen can be adjusted based on her 24 hour MME usage.

## 2017-10-26 NOTE — TELEPHONE ENCOUNTER
Dulce White, called and would like to speak to nurse regarding 2 episodes of dizziness patient had and her blood pressure. Please call to discuss.

## 2017-10-26 NOTE — TELEPHONE ENCOUNTER
Spoke with Dulce White to let her know that Dr. Emory Rouse is away and to direct any issues with Ms. Sabrina Frias to Dr. Emily Rome.

## 2017-11-07 NOTE — TELEPHONE ENCOUNTER
Returned call to Melodie Howard. Provided the following orders for constipation per Dr. Aditi Van. Senna liquid 10ml daily for mild constipation  Senna liquid 10ml twice daily for moderate constipation  Lactulose 15ml twice daily until patient is relieved of constipation, then as needed for severe constipation. Comfort verbalized understanding and was appreciative.     Dinorah Prince RN  Palliative Medicine

## 2017-11-07 NOTE — TELEPHONE ENCOUNTER
Comfort from [address] [place] [address] called. Needs an order for medication for constipation. They have tried several with no results. Please call her at [phone]. Would like something to go in PEG tube if possible.

## 2017-11-16 NOTE — PROGRESS NOTES
Call placed to Ms. Yuri Marrero, patient daughter, to inform that Dr. Donovan Bruce reordered the [place] with 1 refill. Prescription was e-scribed to preferred pharmacy, [address] [place] [address], in [place]. She was appreciative.     Gil Gonzales RN  Palliative Medicine

## 2017-11-16 NOTE — PROGRESS NOTES
1400 arrived to [facility] house walked in door. C/o respiratory distress. Lungs course throughout. O2 on. Called [facility] Room NP r/t sats in 62s with good wave form. Lungs sounds and respiratory distress. 1438  Morphine sL given as per Now order and Duo neb  1500  sats up to 90 percent. Client reports she feels much better  Calm presentation at present. Morphine SQ of 4 mg given per SQ line inserted in L upper arm. Will continue to monitor and home nurse did confirm client has rhonchi and rales as baseline  1711  PRN Morphine and Lorazepam via SQ route given for increase anxiety and work of breathing. Client also advises he back hurts. Will monitor for effect. PRN breathing treatment  1745 Call to [place] [facility] at [address] is in route here to [facility]. Explained client has been very short of breath and working hard to breath. Educated we have medications going in via a subcutaneous delivery method to aid respiratory distress and agitation.   States she understands and is in traffic now but coming from [place]

## 2017-11-16 NOTE — H&P
[address] [facility] Help to Those in Need  [phone]    Patient Name: Xiao Marte  YOB: 1955    Date of Provider Hospice Visit: 11/16/17    Level of Care:   [] General Inpatient (GIP)    [] Routine   [x] Respite    Location of Care:  [] [facility] [place] [] [facility] [] [facility] [] [facility] [x] Hospice [facility]    Principle Hospice Diagnosis: Laryngeal cancer       HOSPICE DIAGNOSES   Active Symptoms:  1. Labored breathing  2. Pain; generalized  3. Increased airway/chest secretions  4. Fatigue/weakness  5. Anxiety     PLAN   1. Admit for respite care  2. Recommend SQ line access  3. Duonebs every 4 hours as needed to help with wheezing  4. Continue prednisone  5. Morphine 4mg SQ every 3 hours as needed  6. Ativan 1mg SQ every 4 hours as needed  7. Robinul 0.1mg SQ every 4 hours as needed for secretions    8.  and SW to support family needs  9. Disposition: home after respite completed    Prognosis estimated based on 11/16/17 clinical assessment is:   [] Hours to Days    [] Days to Weeks    [x] Other:    Communicated plan of care with: Hospice Case Manager;  Hospice IDT; Care Team     GOALS OF CARE     Resuscitation Status: DNR  Durable DNR: [x] Yes [] No    Advance Care Planning 4/16/2017   Patient's Healthcare Decision Maker is: Legal Next of Agustin 68   Primary Decision Maker Name Ghassan Record   Primary Decision Maker Phone Number [phone]   Primary Decision Maker Relationship to Patient Spouse   Secondary Decision Maker Name -   Secondary Decision Maker Phone Number -   Secondary Decision Maker Relationship to Patient -   Confirm Advance Directive Yes, on file   Patient Would Like to Complete Advance Directive -   Does the patient have other document types -        HISTORY     History obtained from: chart, pt, staff    CHIEF COMPLAINT: can't breathe    The patient is:   [x] Verbal  [] Nonverbal  [] Unresponsive    HPI/SUBJECTIVE:  Pt came in for respite stay as her daughter (caregiver) needed a break as she was exhausted. REVIEW OF SYSTEMS     The following systems were: [x] reviewed  [] unable to be reviewed    Positive ROS include:  Constitutional: fatigue, weakness, in pain, short of breath  Ears/nose/mouth/throat: increased airway secretions  Respiratory:shortness of breath, wheezing  Gastrointestinal:poor appetite  Musculoskeletal:  Neurologic:  Psychiatric:anxiety  Endocrine:          FUNCTIONAL ASSESSMENT     Palliative Performance Scale (PPS): 40%     PSYCHOSOCIAL/SPIRITUAL ASSESSMENT     Active Problems:    * No active hospital problems. *    Past Medical History:   Diagnosis Date    [facility]    Cancer ([facility]     skin cancer buttocks    Chronic pain     FIBROMYALGIA, LEGS    Encounter for long-term (current) use of other medications 11/29/2011    Essential hypertension, benign 11/29/2011    Ill-defined condition     Laryngeal cancer ([facility] 11/02/2015    with lung metastasis    Psychiatric disorder     bipolar      Past Surgical History:   Procedure Laterality Date    HX BREAST AUGMENTATION Bilateral     SALINE IMPLANTS    HX GI      PLACEMENT OF G TUBE    HX GI      ESOPH.  DILATATION    HX GYN      tubal pregnancy    HX HEENT      BX OF NECK MASS    HX HYSTERECTOMY      PLACE PERCUT GASTROSTOMY TUBE  10/28/2014     has been removed 2015    PLACE PERCUT GASTROSTOMY TUBE  2/28/2017           Social History   Substance Use Topics    Smoking status: Former Smoker     Packs/day: 1.00     Years: 40.00     Quit date: 8/27/2014    Smokeless tobacco: Never Used      Comment: PASSIVE SMOKE EXPOSURE,  SMOKES    Alcohol use No     Family History   Problem Relation Age of Onset    Cancer Father      bone/skin/lung    Pneumonia Mother     Anesth Problems Neg Hx       Allergies   Allergen Reactions    No Known Allergies       Current Facility-Administered Medications   Medication Dose Route Frequency    albuterol-ipratropium (DUO-NEB) 2.5 MG-0.5 MG/3 ML  3 mL Nebulization Q4H PRN    LORazepam (ATIVAN) injection 1 mg  1 mg SubCUTAneous Q4H PRN    glycopyrrolate (ROBINUL) injection 0.2 mg  0.2 mg SubCUTAneous Q4H PRN    morphine injection 4 mg  4 mg SubCUTAneous Q3H PRN    senna-docusate (PERICOLACE) 8.6-50 mg per tablet 2 Tab  2 Tab Oral BID PRN    sennosides (SENOKOT) 8.8 mg/5 mL syrup 8.8 mg  5 mL Oral BID PRN    bisacodyl (DULCOLAX) suppository 10 mg  10 mg Rectal DAILY PRN    [START ON 11/17/2017] cyanocobalamin (VITAMIN B12) tablet 1,000 mcg  1,000 mcg Per J Tube DAILY    OTHER(NON-FORMULARY) 5 mL  5 mL Oral Q6H PRN    [START ON 11/19/2017] fentaNYL (DURAGESIC) 25 mcg/hr patch 1 Patch  1 Patch TransDERmal Q72H    [START ON 11/17/2017] FLUoxetine (PROzac) capsule 20 mg  20 mg Per J Tube DAILY    guaiFENesin (ROBITUSSIN) 100 mg/5 mL oral liquid 600 mg  600 mg Oral BID PRN    HYDROcodone-acetaminophen (NORCO)  mg tablet 2 Tab  2 Tab Oral Q4H PRN        PHYSICAL EXAM     Wt Readings from Last 3 Encounters:   10/06/17 60.3 kg (133 lb)   08/25/17 56.7 kg (125 lb)   08/22/17 57.6 kg (127 lb)       There were no vitals taken for this visit.     Supplemental O2  [x] Yes : 4l NC [] NO  Last bowel movement:     Currently this patient has:  [] Peripheral IV [] PICC  [] PORT [] ICD    [] Hill Catheter [] NG Tube   [x] PEG Tube    [] Rectal Tube [] Drain  [] Other:     Constitutional: lethargic, pale, thin, frail, appears in distress, short of breath, wheezing, increased secretions in airways/chest, in pain  Eyes:  pallor  ENMT:  upper airway secretions  Cardiovascular: distant heart sounds, tachycardia, edema legs  Respiratory: labored breathing, chest secretions +, diminished air entry, wheezing  Gastrointestinal: sunken belly, soft, BS present, PEG + non tender  Musculoskeletal:thin, non tender  Skin: warm, dry, no rash, stasis circulation changes  Neurologic:  restless, anxious  Psychiatric: anxious affect  Other:       Pertinent Lab and or Imaging Tests:  Lab Results   Component Value Date/Time    Sodium 140 06/26/2017 12:35 PM    Potassium 4.8 06/26/2017 12:35 PM    Chloride 99 06/26/2017 12:35 PM    CO2 27 06/26/2017 12:35 PM    Anion gap 7 04/21/2017 01:29 AM    Glucose 92 06/26/2017 12:35 PM    BUN 24 06/26/2017 12:35 PM    Creatinine 1.04 06/26/2017 12:35 PM    BUN/Creatinine ratio 23 06/26/2017 12:35 PM    GFR est AA 66 06/26/2017 12:35 PM    GFR est non-AA 57 06/26/2017 12:35 PM    Calcium 10.0 06/26/2017 12:35 PM     Lab Results   Component Value Date/Time    Protein, total 6.7 06/26/2017 12:35 PM    Albumin 4.1 06/26/2017 12:35 PM           Total time: 50mts  Counseling / coordination time: 20 mts spent discussing case with staff and pt  > 50% counseling / coordination?:

## 2017-11-16 NOTE — PROGRESS NOTES
1500:New Admission arrived via transport with a DX of Laryngeal CA pt is in resp distress with SATS between 47-60  Dinorah PHAM received orders from Aaliyah CHACON with for morphine and Ativan subcutaneous. Pt is alert but confused o2 in use HOB elevated comfort and safety maintained. 17:17:Medicated with PRN morphine and ativan per MD orders by Dinorah PHAM, for low SATS 57   1800:Meds effective pt is resting quietly. 1900:Report given to RN.           NAME OF PATIENT:  Abelardo Wall    LEVEL OF CARE: Respite    REASON FOR GIP:N/A       *PATIENT REMAINS ELIGIBLE FOR GIP LEVEL OF CARE AS EVIDENCED BY: N/A    REASON FOR RESPITE:  Caregiver breakdown    O2 SAFETY:  Concentrator positioning (6\" from furniture/drapes), Tanks stored in turk , No petroleum based products on face while oxygen in use and Oxygen sign on the door    FALL INTERVENTIONS PROVIDED:   Implemented/recommended use of non-skid footwear, Implemented/recommended use of fall risk identification flag to all team members, Implemented/recommended assistive devices and encouraged their use, Implemented/recommended resources for alarm system (personal alarm, bed alarm, call bell, etc.)  and Implemented/recommended environmental changes (remove hazards, lower bed, improve lighting, etc.)    INTERDISPLINARY COMMUNICATION/COLLABORATION:  Physician, MSW, [place] and RN, CNA    NEW MEDICATION INITIATION DOCUMENTATION:New Admission SEE MAR      Reason medication is being initiated:  Respiratory distress  MD / Provider name consulted re: change in status / initiation of new medication: DR Staton    New Symptom(s): SOB  New Order(s):  Subcutaneous morphine and Ativan  Name of the person notified of the changes:  daughter  Name of person being taught:pt  Instructions given:  yes    Side Effects taught:  yes    Response to teaching:  yes      COMFORTABLE DYING MEASURE:  Is Patient/family satisfied with symptom level?  yes    DISCHARGE PLAN:  Pt will go home after Respite stay

## 2017-11-16 NOTE — TELEPHONE ENCOUNTER
Tana Arnold called. Is sending patient to [facility] for caregiver burnout for 5 days. Also needs a refill on [place].   Can call her if you would like, otherwise just [facility]

## 2017-11-16 NOTE — TELEPHONE ENCOUNTER
Dr. Delvin Garcia aware of Hospice house for respite and Lithium refill.     Aster Knapp RN  Palliative Medicine

## 2017-11-17 NOTE — PROGRESS NOTES
Problem: Falls - Risk of  Goal: *Absence of Falls  Document Airam Fall Risk and appropriate interventions in the flowsheet.    Fall Risk Interventions:  Mobility Interventions: Bed/chair exit alarm, Communicate number of staff needed for ambulation/transfer, Patient to call before getting OOB    Mentation Interventions: Adequate sleep, hydration, pain control, Bed/chair exit alarm, Door open when patient unattended, More frequent rounding, Reorient patient, Room close to nurse's station, Toileting rounds    Medication Interventions: Bed/chair exit alarm, Evaluate medications/consider consulting pharmacy, Patient to call before getting OOB, Teach patient to arise slowly    Elimination Interventions: Bed/chair exit alarm, Call light in reach, Patient to call for help with toileting needs, Toilet paper/wipes in reach

## 2017-11-17 NOTE — PROGRESS NOTES
0700 Received report from [facility]  utilizing SBAR, I/O and Kardex  Patient was admitted to the [facility] Level of care respite  Hospice Diagnosis- laryngeal cancer  Other History includes- HTN, Bipolar, Dysphagia, Firamyalgia, Exsmoker, sepsis, acute kidney injury, delerium, COPD, Afib, Pulmonary aspiration,   0830 Assessment completed   Neuro- Responds to loud voice only then moans  Muscular-no purposeful movement  Resp-lungs sounds scattered rhonchi throughout,  diminished in bases, resp slightly labored at this time, saturation 78% on 5.5 L NC  Oxygen - 5.5 NC  Cardio-Heart regular, pulses palpable pounding, skin warm and dry, cap refill less than 3 sec, distal extremities warm  GI- Abd soft, distended, there is pressure behind PEG tube,  No bowel sounds, PEG residual 250 cc, discarded  Diet - NPO on tube feeds noted  Last BM - 11/8  -incontinent of bladder   Skin/Wounds - intact  Edema- none  Access sites- left upper arm subcutaneous  Family- DTR Klever Gan is POA  0900 - PEG residual 250, tube feeds held, new subcutaneous IV started left upper arm for multiple meds. Pt given PRN morphine and ativan for resp distress use of accessory muscles, rate 28  0905 Reported all signs and sx to MD, reeval meds at time, increasing frequency of morphine and ativan starting haldol, scop patch, tube feeds discontinued at this time. 4006- Pt has been changed to [facility] level of care, 20 g IV started right wrist, pt is still in resp distress, medicated with PRN morphine and robinul for moist tracheal rales  0930 Turned and repositioned tolerated well, PRN medication effective slightly. 1108 Medicated with prn Morphine for resp distress use of accessory muscles. 0 Dtr has arrived update given. Both  and dtr are surprised pt has declined so quickly. Explained increase in meds, explained three causes of fluid in lungs, also side effects of Robinul. Chaplain Bennett spending time with family.   1251 PRN morphine given for resp distress, education to new family members coming in on signs and symptoms of EOL. 1530 pt is waking up for brief moments mumbling to family, they stated that she said hurt, pt is grimacing, medicated at this time with prn morphine and ativan. PEG residual 150, no BS, family anxious for pt to get better, have bowel sounds return and be able to eat. Education to family on possibility of a finley cath for urinary retention retention. They verbalized understanding. 1700 Family requesting that bowel sounds be checked again. 1800 Rounding, breathing is more shallow. 1900 Report to oncoming shift, multiple prns used today to manage resp distress and secretions. NAME OF PATIENT:  Justin Wall    LEVEL OF CARE:  GIP    REASON FOR GIP:   Unmanageable respiratory distress, Terminal agitation, despite changes to medications, Medication adjustment that must be monitored 24/7 and Stabilizing treatment that cannot take place at home    *PATIENT REMAINS ELIGIBLE FOR [facility] LEVEL OF CARE AS EVIDENCED BY: (MUST BE ADDRESSED OF PATIENT GIP) Unmanaged respiratory distress, and agitation, unable to tolerate oral meds or through PEG, with high residuals and absence of bowel sounds, Intravenous catheter started for prn medications.       REASON FOR RESPITE:  N/A    O2 SAFETY:  Concentrator positioning (6\" from furniture/drapes), Tanks stored in turk , No petroleum based products on face while oxygen in use and Oxygen sign on the door    FALL INTERVENTIONS PROVIDED:   Implemented/recommended use of fall risk identification flag to all team members    INTERDISPLINARY COMMUNICATION/COLLABORATION:  Physician, MSW, [place] and RN, CNA    NEW MEDICATION INITIATION DOCUMENTATION:  Consulted AT MD to report change in pt status, Obtained Order from Provider for initiation of symptom relief medication /other medication needed and Documentation completed in Clinical Note in [address] [facility]    Reason medication is being initiated: Unmanaged resp distress, unable to give medications orally due to NPO, per PEG due to high residual and no bowel sounds    MD / Provider name consulted re: change in status / initiation of new medication:  Dr Roosevelt Mendoza Symptom(s):  Unmanaged resp distress    New Order(s):  Increased frequency of morphine and ativan, added scopolamine and haldol to manage secretions    Name of the person notified of the changes:  Dtr Jose Rafael molina  [place]    Name of person being taught:  As above    Instructions given:  Indications and side effects    Side Effects taught:  Increased lethargy    Response to teaching:  Verbalized understanding      COMFORTABLE DYING MEASURE:  Is Patient/family satisfied with symptom level?  yes    DISCHARGE PLAN:  EOL at [facility]

## 2017-11-17 NOTE — PROGRESS NOTES
Bedside and Verbal shift change report given to Kristi Renee 694 (oncoming nurse) by Rai Moreira RN(offgoing nurse). Report included the following information SBAR, Kardex, Intake/Output and MAR. NAME OF PATIENT:  Saumya Wall    LEVEL OF CARE:  RESPITE    REASON FOR GIP:   NA    *PATIENT REMAINS ELIGIBLE FOR GIP LEVEL OF CARE AS EVIDENCED BY: (MUST BE ADDRESSED OF PATIENT GIP)      REASON FOR RESPITE:  Caregiver breakdown    O2 SAFETY:  Concentrator positioning (6\" from furniture/drapes), Tanks stored in turk , No petroleum based products on face while oxygen in use and Oxygen sign on the door    FALL INTERVENTIONS PROVIDED:   Implemented/recommended use of non-skid footwear, Implemented/recommended use of fall risk identification flag to all team members, Implemented/recommended assistive devices and encouraged their use, Implemented/recommended resources for alarm system (personal alarm, bed alarm, call bell, etc.) , Implemented/recommended environmental changes (remove hazards, lower bed, improve lighting, etc.) and Implemented/recommended increased supervision/assistance    INTERDISPLINARY COMMUNICATION/COLLABORATION:  Physician, MSW, Elle and RN, CNA    NEW MEDICATION INITIATION DOCUMENTATION:  NA    Reason medication is being initiated:  KRISTI    MD / Provider name consulted re: change in status / initiation of new medication:  NA    New Symptom(s):  NA    New Order(s):  NA    Name of the person notified of the changes:  NA    Name of person being taught:  NA    Instructions given:  NA    Side Effects taught:  NA    Response to teaching:  KRISTI      COMFORTABLE DYING MEASURE:  Is Patient/family satisfied with symptom level?  yes    DISCHARGE PLAN:  Return home with family after Respite stay is complete followed by Hospice.

## 2017-11-17 NOTE — PROGRESS NOTES
[facility]  [phone]    Patient Name: Sherie Tucker  YOB: 1955    Date of Provider Hospice Visit: 11/17/17    Level of Care:   [x] General Inpatient (GIP)    [] Routine   [] Respite    Location of Care:  [] [facility] [] [facility] [] [address] [] [facility] [x] Hospice [facility]    Principle Hospice Diagnosis: Laryngeal cancer       HOSPICE DIAGNOSES   Active Symptoms:  1. Labored breathing: ++++ worse  2. Pain; generalized: worse ++  3. Increased airway/chest secretions: worse +++++  4. Fatigue/weakness: worse; pt lethargic and unresponsive  5. Anxiety: worse ++      PLAN   1. Change level of care to GIP as pt declining fast and is very symptomatic and needs close monitoring and parenteral medications adjustments to manage comfort  2. Recommend SQ line access: 2 sites placed  3. Duonebs every 4 hours as needed to help with wheezing  4. Stop all oral medications as pt is lethargic and unable to swallow. 5. Stop tube feeds as it is not helping with pt;s comfort and leading to more reflux and chest congestion. 6. Morphine 4mg SQ every 15mts as needed  7. Ativan 1mg SQ every 15mts as needed  8. Robinul 0.2mg SQ every 4 hours as needed for secretions  9. Add scopolamine patch 1.5mg every 72 hoours  10. Haldol 0.5mg SQ every 4 hours as needed for intractable secretions.      8.  and SW to support family needs  9. Disposition: home once symptoms stabilized. Prognosis estimated based on 11/17/17 clinical assessment is:   [x] Hours to Days    [] Days to Weeks    [] Other:    Communicated plan of care with: Hospice Case Manager;  Hospice IDT; Care Team     GOALS OF CARE     Resuscitation Status: DNR  Durable DNR: [x] Yes [] No    Advance Care Planning 4/16/2017   Patient's Healthcare Decision Maker is: Legal Next of Agustin 68   Primary Decision Maker Name Cary Head   Primary Decision Maker Phone Number [phone]   Primary Decision Maker Relationship to Patient Spouse Secondary Decision Maker Name -   Secondary Decision Maker Phone Number -   Secondary Decision Maker Relationship to Patient -   Confirm Advance Directive Yes, on file   Patient Would Like to Complete Advance Directive -   Does the patient have other document types -        HISTORY     History obtained from: chart, staff     CHIEF COMPLAINT: N/A  The patient is:   [] Verbal  [x] Nonverbal  [x] Unresponsive    HPI/SUBJECTIVE:  Pt seen very uncomfortable, significant breathing distress, chest filled with secretions, audible gurgling, using all accessory muscles to breathe, chest tight, hypoactive breath sounds and bowel sounds. Moaning in pain  Pt received several prn doses of ativan and morphine overnight along with duonebs.         REVIEW OF SYSTEMS     The following systems were: [] reviewed  [x] unable to be reviewed       Adult Non-Verbal Pain Assessment Score: 9    Face  [] 0   No particular expression or smile  [x] 1   Occasional grimace, tearing, frowning, wrinkled forehead  [] 2   Frequent grimace, tearing, frowning, wrinkled forehead    Activity (movement)  [] 0   Lying quietly, normal position  [] 1   Seeking attention through movement or slow, cautious movement  [x] 2   Restless, excessive activity and/or withdrawal reflexes    Guarding  [] 0   Lying quietly, no positioning of hands over areas of body  [] 1   Splinting areas of the body, tense  [x] 2   Rigid, stiff    Physiology (vital signs)  [] 0   Stable vital signs  [] 1   Change in any of the following: SBP > 20mm Hg; HR > 20/minute  [x] 2   Change in any of the following: SBP > 30mm Hg; HR > 25/minute    Respiratory  [] 0   Baseline RR/SpO2, compliant with ventilator  [] 1   RR > 10 above baseline, or 5% drop SpO2, mild asynchrony with ventilator  [x] 2   RR > 20 above baseline, or 10% drop SpO2, asynchrony with ventilator     FUNCTIONAL ASSESSMENT     Palliative Performance Scale (PPS):10-20%     PSYCHOSOCIAL/SPIRITUAL ASSESSMENT     Active Problems:    * No active hospital problems. *    Past Medical History:   Diagnosis Date    Bipolar 1 disorder ([facility] 11/29/2011    Cancer ([facility]     skin cancer buttocks    Chronic pain     FIBROMYALGIA, LEGS    Encounter for long-term (current) use of other medications 11/29/2011    Essential hypertension, benign 11/29/2011    Ill-defined condition     Laryngeal cancer ([facility] [address] 11/02/2015    with lung metastasis    Psychiatric disorder     bipolar      Past Surgical History:   Procedure Laterality Date    HX BREAST AUGMENTATION Bilateral     SALINE IMPLANTS    HX GI      PLACEMENT OF G TUBE    HX GI      ESOPH.  DILATATION    HX GYN      tubal pregnancy    HX HEENT      BX OF NECK MASS    HX HYSTERECTOMY      PLACE PERCUT GASTROSTOMY TUBE  10/28/2014     has been removed 2015    PLACE PERCUT GASTROSTOMY TUBE  2/28/2017           Social History   Substance Use Topics    Smoking status: Former Smoker     Packs/day: 1.00     Years: 40.00     Quit date: 8/27/2014    Smokeless tobacco: Never Used      Comment: PASSIVE SMOKE EXPOSURE,  SMOKES    Alcohol use No     Family History   Problem Relation Age of Onset    Cancer Father      bone/skin/lung    Pneumonia Mother     Anesth Problems Neg Hx       Allergies   Allergen Reactions    No Known Allergies       Current Facility-Administered Medications   Medication Dose Route Frequency    LORazepam (ATIVAN) injection 1 mg  1 mg SubCUTAneous Q15MIN PRN    morphine injection 4 mg  4 mg SubCUTAneous Q15MIN PRN    scopolamine (TRANSDERM-SCOP) 1.5 mg  1.5 mg TransDERmal Q72H    haloperidol lactate (HALDOL) injection 0.5 mg  0.5 mg SubCUTAneous Q4H PRN    albuterol-ipratropium (DUO-NEB) 2.5 MG-0.5 MG/3 ML  3 mL Nebulization Q4H PRN    glycopyrrolate (ROBINUL) injection 0.2 mg  0.2 mg SubCUTAneous Q4H PRN    bisacodyl (DULCOLAX) suppository 10 mg  10 mg Rectal DAILY PRN    [START ON 11/19/2017] fentaNYL (DURAGESIC) 25 mcg/hr patch 1 Patch  1 Patch TransDERmal Q72H        PHYSICAL EXAM     Wt Readings from Last 3 Encounters:   10/06/17 60.3 kg (133 lb)   08/25/17 56.7 kg (125 lb)   08/22/17 57.6 kg (127 lb)       Visit Vitals    /86 (BP 1 Location: Right arm)    Pulse (!) 120    Temp 99.5 °F (37.5 °C)    Resp 29    SpO2 (!) 78%       Supplemental O2  [x] Yes: 4L NC  [] NO  Last bowel movement:     Currently this patient has:  [] Peripheral IV [] PICC  [] PORT [] ICD    [] Hill Catheter [] NG Tube   [x] PEG Tube    [] Rectal Tube [] Drain  [x] Other:  SQ access      Constitutional: lethargic, pale, thin, frail, appears in distress, very short of breath, wheezing, increased secretions in airways/chest, in pain, using all accessory muscles to breathe, audible gurgling  Eyes:  pallor  ENMT:  upper airway secretions+++gurgling   Cardiovascular: distant heart sounds, tachycardia, edema legs  Respiratory: labored breathing, chest secretions +, diminished air entry, wheezing+++  Gastrointestinal: sunken belly, soft, BS hypoactive, PEG   Musculoskeletal:thin  Skin: warm, dry, no rash, stasis circulation changes  Neurologic:  restless, anxious  Psychiatric: anxious affect  Other:       Pertinent Lab and or Imaging Tests:  Lab Results   Component Value Date/Time    Sodium 140 06/26/2017 12:35 PM    Potassium 4.8 06/26/2017 12:35 PM    Chloride 99 06/26/2017 12:35 PM    CO2 27 06/26/2017 12:35 PM    Anion gap 7 04/21/2017 01:29 AM    Glucose 92 06/26/2017 12:35 PM    BUN 24 06/26/2017 12:35 PM    Creatinine 1.04 06/26/2017 12:35 PM    BUN/Creatinine ratio 23 06/26/2017 12:35 PM    GFR est AA 66 06/26/2017 12:35 PM    GFR est non-AA 57 06/26/2017 12:35 PM    Calcium 10.0 06/26/2017 12:35 PM     Lab Results   Component Value Date/Time    Protein, total 6.7 06/26/2017 12:35 PM    Albumin 4.1 06/26/2017 12:35 PM           Total time: 35mts  Counseling / coordination time: 20mts discussing with staff  > 50% counseling / coordination?: y

## 2017-11-18 NOTE — PROGRESS NOTES
Problem: Falls - Risk of  Goal: *Absence of Falls  Document Airam Fall Risk and appropriate interventions in the flowsheet.    Outcome: Progressing Towards Goal  Fall Risk Interventions:  Mobility Interventions: Bed/chair exit alarm    Mentation Interventions: Bed/chair exit alarm, Door open when patient unattended, Adequate sleep, hydration, pain control, Family/sitter at bedside, More frequent rounding    Medication Interventions: Bed/chair exit alarm, Evaluate medications/consider consulting pharmacy    Elimination Interventions: Bed/chair exit alarm

## 2017-11-18 NOTE — PROGRESS NOTES
Verbal shift change report given to Lulu Samayoa (oncoming nurse) by Baron Mae (offgoing nurse). Report included the following information SBAR and Kardex. NAME OF PATIENT:  Dale Wall    LEVEL OF CARE:  Respite care beginning on 11/16/2017. GIP beginning 11/17/2017. REASON FOR GIP:   Pain, despite numerous changes in medications, Unmanageable respiratory distress, Terminal agitation, despite changes to medications, Medication adjustment that must be monitored 24/7, Stabilizing treatment that cannot take place at home and Symptom management of dyspnea, pain, secretions, weakness, and anxiety. *PATIENT REMAINS ELIGIBLE FOR GIP LEVEL OF CARE AS EVIDENCED BY: (MUST BE ADDRESSED OF PATIENT GIP)    REASON FOR RESPITE:  Patient's condition is declining. Incidence and severity of dyspnea, pain, secretions, weakness, and anxiety are increasing along with the need for medications to treat these symptoms. Patient now needs IV medications because she is no longer to swallow and/or tolerate po and sublingual medications. O2 SAFETY:  Concentrator positioning (6\" from furniture/drapes), No petroleum based products on face while oxygen in use, Oxygen sign on the door and Continuous nasal oxygen to treat dyspnea and declining oxygen sats. FALL INTERVENTIONS PROVIDED:   Implemented/recommended use of fall risk identification flag to all team members, Implemented/recommended assistive devices and encouraged their use, Implemented/recommended resources for alarm system (personal alarm, bed alarm, call bell, etc.) , Implemented/recommended environmental changes (remove hazards, lower bed, improve lighting, etc.) and Implemented/recommended increased supervision/assistance    INTERDISPLINARY COMMUNICATION/COLLABORATION:  Physician, MSW, [place] and RN, CNA    NEW MEDICATION INITIATION DOCUMENTATION:  No new medications at this time.     Reason medication is being initiated:  n/a    MD / Provider name consulted re: change in status / initiation of new medication:  n/a    New Symptom(s):  No new symptoms at this time. New Order(s):  No new orders at this time. Name of the person notified of the changes:  n/a    Name of person being taught:  n/a    Instructions given:  n/a    Side Effects taught:  n/a    Response to teaching:  n/a    COMFORTABLE DYING MEASURE:  Is Patient/family satisfied with symptom level?  yes    DISCHARGE PLAN:  Since patient is declining, she might remain at [facility] until she expires. Night shift summary:  1900 - Report received. 2000 - No audible bowel sounds. Abdomen is soft and round; no distention. PEG tube is intact, clamped. Continues to void; incontinent. Scopolomine patch noted behind right ear. Patient is unresponsive at this time, but will moan or move upper extremities when she is turned. Patient only has upper dentures according to daughter Gisselle Guallpa, and they are in a cup beside the sink in the patient's room. Remains NPO. 2020 - PRN Ativan 1 mg IV given for increasing restlessness, dyspnea, and possibly terminal agitation. Right wrist #20 IV site used for medication administration. PRN Morphine 4 mg subq given for increasing restlessness, possibly caused by pain. 200 - Daughters declined when asked if the patient could be bathed by the nurses. Both daughters, Gisselle Guallpa and  Yusef Garcia, at bedside, along with other family members and friends. Patient's breathing is labored most of the time; tachypnea; using accessory muscles. 82% on 7 liters of nasal oxygen. 2205 - PRN Ativan 1 mg IV given for increasing restlessness, dyspnea, and possibly terminal agitation. PRN Morphine 4 mg subq given for increasing restlessness, possibly caused by pain. 2222 - PRN Haldol 0.5 mg subq given for increasing oral secretions. 2225 - PRN Ativan 1 mg IV given for increasing restlessness, dyspnea, and possibly terminal agitation.   PRN Morphine 4 mg subq given for increasing restlessness, possibly caused by pain. 0000 - Carli and Luia Calico are spending the night at patient's bedside. O2 sats are in the 60's and 70's. 0128 - PRN Ativan 1 mg IV given for increasing restlessness, dyspnea, and possibly terminal agitation. PRN Robinul 0.2 mg IV given for increasing upper respiratory secretions; white frothy oral secretions. PRN Morphine 4 mg subq given for increasing restlessness, possibly caused by pain. 0220 - Breathing is less labored. 8153 - Increasing restlessness/ agitation, trying to sit up in bed, increasing dyspnea, increasing upper respiratory secretions. PRN Haldol 0.5 mg subq, PRN Ativan 1 mg subq, and Morphine 4 mg IV given. 0550 - Asleep. Breathing - still using accessory muscles, but less shallow. 2 daughters remain at bedside. 0630 - Agitated. Increasing upper respiratory secretions. PRN Robinul 0.2 mg IV, PRN Ativan 1 mg subq, and Morphine 4 mg IV given.

## 2017-11-18 NOTE — PROGRESS NOTES
0700 Received report from [facility] utilizing [address], I/O and Kardex  Patient was admitted to the [facility] Level of care [facility] for management of secretions, agitation, pain and resp distress  Hospice Diagnosis- laryngeal cancer  Other History includes- HTN, Bipolar, Dysphagia, Firamyalgia, Exsmoker, sepsis, acute kidney injury, delerium, COPD, Afib, Pulmonary aspiration,   0830 Assessment completed   Neuro- Responds to loud voice only then moans  Muscular-no purposeful movement  Resp-lungs sounds scattered rhonchi throughout,  diminished in bases, resp slightly labored at this time, saturation 89% on 6 L NC  Oxygen - 6 NC  Cardio-Heart regular, pulses palpable pounding, skin warm and dry, cap refill less than 3 sec, distal extremities warm  GI- Abd soft, distended, there is pressure behind PEG tube,  No bowel sounds, PEG residual 250 cc, discarded  Diet - NPO   Last BM - 11/17  -incontinent of bladder, pt jumps when palpating bladder, feels distended, family gave permission for finley. Skin/Wounds - intact  Edema- none  Access sites- left upper arm subcutaneous, right upper arm and R wrist 20 g PIV. Family- DTR Anell Hole is POA    0820 PRN ativan, morphine, haldol and robinul given for restlessness, agitation and secretions  R wrist IV infiltrated, new 22 g placed left wrist. Two subcutaneous sites clean dry intact no palpable masses. 1000 Both PIV not working, removed, new subcutaneous inserted left thigh. 1215 PRN ativan and dilaudid given subcutaneously for grimacing and agitation. 1400 Pt is in resp distress at this time, rr 40, use of accessory muscles, gurgling, PRN ativan and dilaudid given. 56 Pt still extremely distressed PRN dilaudid and Haldol [id number] 9650 Oral suction of secretions. 1700 Medicated by coworker with Ativan and Dilaudid for resp distress. 1710 Copious secretions suction from mouth.  PEG residual checked 50 cc, flushed with 30 cc   1900 Report to oncoming shift, pt is actively dying, multiple prns needed     NAME OF PATIENT:  Linda Wall     LEVEL OF CARE:  [facility]     REASON FOR GIP:   Unmanageable respiratory distress, Terminal agitation, despite changes to medications, Medication adjustment that must be monitored 24/7 and Stabilizing treatment that cannot take place at home.  Day 2 agitation and pain are still being managed, adding control of secretions to symptom management     *PATIENT REMAINS ELIGIBLE FOR [facility] LEVEL OF CARE AS EVIDENCED BY: (MUST BE ADDRESSED OF PATIENT GIP) Unmanaged respiratory distress, and agitation, unable to tolerate oral meds or through PEG, with high residuals and absence of bowel sounds, Intravenous catheter started for prn medications.        REASON FOR RESPITE:  N/A     O2 SAFETY:  Concentrator positioning (6\" from furniture/drapes), Tanks stored in [place]k , No petroleum based products on face while oxygen in use and Oxygen sign on the door     FALL INTERVENTIONS PROVIDED:   Implemented/recommended use of fall risk identification flag to all team members     INTERDISPLINARY COMMUNICATION/COLLABORATION:  Physician, MSW, [place] and RN, CNA     NEW MEDICATION INITIATION DOCUMENTATION:  Consulted AT MD to report change in pt status, Obtained Order from Provider for initiation of symptom relief medication /other medication needed and Documentation completed in Clinical Note in [facility]     Reason medication is being initiated:  Unmanaged resp distress, unable to give medications orally due to NPO, per PEG due to high residual and no bowel sounds     MD / Provider name consulted re: change in status / initiation of new medication:  Dr Dukes Gut Symptom(s):  Unmanaged resp distress, secretions, agitation     New Order(s):  Increased frequency of morphine and ativan, added scopolamine and haldol to manage secretions, Morphine changed to Dilaudid     Name of the person notified of the changes:  Dtr Rogelio Campa and  Clayton Posey     Name of person being taught:  As above     Instructions given:  Indications and side effects     Side Effects taught:  Increased lethargy     Response to teaching:  Verbalized understanding        COMFORTABLE DYING MEASURE:  Is Patient/family satisfied with symptom level?  yes     DISCHARGE PLAN:  EOL at [facility]

## 2017-11-18 NOTE — PROGRESS NOTES
[facility]  [phone]    Patient Name: Joey Rae  YOB: 1955    Date of Provider Hospice Visit: 11/18/17    Level of Care:   [x] General Inpatient (GIP)    [] Routine   [] Respite    Location of Care:  [] [facility] [] [facility] [] [facility] [] [address] [x] [facility]    Principle Hospice Diagnosis: Laryngeal cancer       HOSPICE DIAGNOSES   Active Symptoms:  1. Labored breathing: ++++ worse  2. Pain; generalized: worse ++  3. Increased airway/chest secretions: worse +++++  4. Fatigue/weakness: worse; pt lethargic and unresponsive  5. Anxiety: worse ++      PLAN   1. Change level of care to GIP as pt declining fast and is very symptomatic and needs close monitoring and parenteral medications adjustments to manage comfort  2. Recommend SQ line access: 2 sites placed  3. Duonebs every 4 hours as needed to help with wheezing  4. Stop all oral medications as pt is lethargic and unable to swallow. 5. Stop tube feeds as it is not helping with pt;s comfort and leading to more reflux and chest congestion. 6. Morphine 4mg SQ every 15mts as needed required. 13 doses administered in less than 24 hours. Will change to dilaudid 1mg q 15min  7. Ativan 1mg SQ every 15mts as needed. 10 doses administered in less than 24 hours. Will increase to Ativan 2mg q 15 min prn  8. Robinul 0.2mg SQ every 4 hours as needed for secretions~ No change indicated today  9. Add scopolamine patch 1.5mg every 72 hoours  10. Haldol 0.5mg SQ every 4 hours as needed for intractable secretions. 2 doses given overnight      8.  and SW to support family needs  9. Disposition: home once symptoms stabilized. Prognosis estimated based on 11/18/17 clinical assessment is:   [x] Hours to Days    [] Days to Weeks    [] Other:    Communicated plan of care with: Hospice Case Manager;  Hospice IDT; Care Team     GOALS OF CARE     Resuscitation Status: DNR  Durable DNR: [x] Yes [] No    Advance Care Planning 4/16/2017   Patient's Healthcare Decision Maker is: Legal Next of Kin   Primary Decision Maker Name Jailene Esqueda   Primary Decision Maker Phone Number [phone]   Primary Decision Maker Relationship to Patient Spouse   Secondary Decision Maker Name -   Secondary Decision Maker Phone Number -   Secondary Decision Maker Relationship to Patient -   Confirm Advance Directive Yes, on file   Patient Would Like to Complete Advance Directive -   Does the patient have other document types -        HISTORY     History obtained from: chart, staff     CHIEF COMPLAINT: N/A  The patient is:   [] Verbal  [x] Nonverbal  [x] Unresponsive    HPI/SUBJECTIVE:  Pt is unresponsive. Became very restless and agitated after tactile stimulation. Pt trying to get out of the bed.   Required copious amounts of meds overnight       REVIEW OF SYSTEMS     The following systems were: [] reviewed  [x] unable to be reviewed       Adult Non-Verbal Pain Assessment Score: 10    Face  [] 0   No particular expression or smile  [] 1   Occasional grimace, tearing, frowning, wrinkled forehead  [x] 2   Frequent grimace, tearing, frowning, wrinkled forehead    Activity (movement)  [] 0   Lying quietly, normal position  [] 1   Seeking attention through movement or slow, cautious movement  [x] 2   Restless, excessive activity and/or withdrawal reflexes    Guarding  [] 0   Lying quietly, no positioning of hands over areas of body  [] 1   Splinting areas of the body, tense  [x] 2   Rigid, stiff    Physiology (vital signs)  [] 0   Stable vital signs  [] 1   Change in any of the following: SBP > 20mm Hg; HR > 20/minute  [x] 2   Change in any of the following: SBP > 30mm Hg; HR > 25/minute    Respiratory  [] 0   Baseline RR/SpO2, compliant with ventilator  [] 1   RR > 10 above baseline, or 5% drop SpO2, mild asynchrony with ventilator  [x] 2   RR > 20 above baseline, or 10% drop SpO2, asynchrony with ventilator     FUNCTIONAL ASSESSMENT Palliative Performance Scale (PPS):10-20%     PSYCHOSOCIAL/SPIRITUAL ASSESSMENT     Active Problems:    * No active hospital problems. 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  DILATATION    HX GYN      tubal pregnancy    HX HEENT      BX OF NECK MASS    HX HYSTERECTOMY      PLACE PERCUT GASTROSTOMY TUBE  10/28/2014     has been removed 2015    PLACE PERCUT GASTROSTOMY TUBE  2/28/2017           Social History   Substance Use Topics    Smoking status: Former Smoker     Packs/day: 1.00     Years: 40.00     Quit date: 8/27/2014    Smokeless tobacco: Never Used      Comment: PASSIVE SMOKE EXPOSURE,  SMOKES    Alcohol use No     Family History   Problem Relation Age of Onset    Cancer Father      bone/skin/lung    Pneumonia Mother     Anesth Problems Neg Hx       Allergies   Allergen Reactions    No Known Allergies       Current Facility-Administered Medications   Medication Dose Route Frequency    LORazepam (ATIVAN) injection 1 mg  1 mg SubCUTAneous Q15MIN PRN    morphine injection 4 mg  4 mg SubCUTAneous Q15MIN PRN    scopolamine (TRANSDERM-SCOP) 1.5 mg  1.5 mg TransDERmal Q72H    haloperidol lactate (HALDOL) injection 0.5 mg  0.5 mg SubCUTAneous Q4H PRN    albuterol-ipratropium (DUO-NEB) 2.5 MG-0.5 MG/3 ML  3 mL Nebulization Q4H PRN    glycopyrrolate (ROBINUL) injection 0.2 mg  0.2 mg SubCUTAneous Q4H PRN    bisacodyl (DULCOLAX) suppository 10 mg  10 mg Rectal DAILY PRN    [START ON 11/19/2017] fentaNYL (DURAGESIC) 25 mcg/hr patch 1 Patch  1 Patch TransDERmal Q72H        PHYSICAL EXAM     Wt Readings from Last 3 Encounters:   10/06/17 133 lb (60.3 kg)   08/25/17 125 lb (56.7 kg)   08/22/17 127 lb (57.6 kg)       Visit Vitals    /86 (BP 1 Location: Right arm)    Pulse (!) 112    Temp 98.5 °F (36.9 °C)    Resp 26    SpO2 (!) 60%       Supplemental O2  [x] Yes: 4L NC  [] NO  Last bowel movement:     Currently this patient has:  [] Peripheral IV [] PICC  [] PORT [] ICD    [] Hill Catheter [] NG Tube   [x] PEG Tube    [] Rectal Tube [] Drain  [x] Other:  SQ access      Constitutional: restless, pale, thin, frail, appears in distress, labored breathing, using all accessory muscles to breathe  Eyes:  pallor  ENMT:  MMM  Cardiovascular: distant heart sounds, tachycardia, +1 edema legs  Respiratory: labored breathing, chest secretions +, diminished air entry, wheezing+++  Gastrointestinal: sunken belly, soft, BS hypoactive, PEG   Musculoskeletal:thin  Skin: warm, dry, no rash, stasis circulation changes  Neurologic:  restless, anxious  Psychiatric: anxious affect  Other:       Pertinent Lab and or Imaging Tests:  Lab Results   Component Value Date/Time    Sodium 140 06/26/2017 12:35 PM    Potassium 4.8 06/26/2017 12:35 PM    Chloride 99 06/26/2017 12:35 PM    CO2 27 06/26/2017 12:35 PM    Anion gap 7 04/21/2017 01:29 AM    Glucose 92 06/26/2017 12:35 PM    BUN 24 06/26/2017 12:35 PM    Creatinine 1.04 06/26/2017 12:35 PM    BUN/Creatinine ratio 23 06/26/2017 12:35 PM    GFR est AA 66 06/26/2017 12:35 PM    GFR est non-AA 57 06/26/2017 12:35 PM    Calcium 10.0 06/26/2017 12:35 PM     Lab Results   Component Value Date/Time    Protein, total 6.7 06/26/2017 12:35 PM    Albumin 4.1 06/26/2017 12:35 PM           Total time: 35mts  Counseling / coordination time: 20mts discussing with staff  > 50% counseling / coordination?: y

## 2017-11-19 NOTE — HOSPICE
NAME OF PATIENT:  Chris Wall    LEVEL OF CARE:  GIP    REASON FOR GIP:   Pain, despite numerous changes in medications, Unmanageable respiratory distress, Medication adjustment that must be monitored 24/7 and Stabilizing treatment that cannot take place at home    *PATIENT REMAINS ELIGIBLE FOR GIP LEVEL OF CARE AS EVIDENCED BY: (MUST BE ADDRESSED OF PATIENT GIP)  Pt is receiving frequent Subcutaneous doses of Dilaudid,Ativan and prn Robinul for tachycardia,tachypnea,and secretions. REASON FOR RESPITE:      O2 SAFETY:  Concentrator positioning (6\" from furniture/drapes), Tanks stored in turk , No petroleum based products on face while oxygen in use and Oxygen sign on the door    FALL INTERVENTIONS PROVIDED:   Implemented/recommended use of non-skid footwear, Implemented/recommended use of fall risk identification flag to all team members, Implemented/recommended assistive devices and encouraged their use, Implemented/recommended resources for alarm system (personal alarm, bed alarm, call bell, etc.)  and Implemented/recommended environmental changes (remove hazards, lower bed, improve lighting, etc.)    INTERDISPLINARY COMMUNICATION/COLLABORATION:  Physician, MSW, Emerson and RN, CNA    NEW MEDICATION INITIATION DOCUMENTATION:      Reason medication is being initiated:      MD / Provider name consulted re: change in status / initiation of new medication:      New Symptom(s):    New Order(s):      Name of the person notified of the changes:      Name of person being taught:      Instructions given:      Side Effects taught:      Response to teachin [address] free  Is Patient/family satisfied with symptom level?  yes    DISCHARGE PLAN:  Pt is actively dying @ [facility]. 19:50,report received,assumed care of pt who is GIP for pain,agitation and secretions. Hospice dx is Laryngeal cancer. Daughters x2 are @ the bedside. Pt is unresponsive with shallow tachypneic resp.,and tachycardia. Temp is 100.2-138-36,02 @ 7ltr is on,02 sat is 74%. Color is dusky ,skin warm. Chest with coarse rhonchi,using [place] to suction orally. 20:00,pt medicated for above sx with Robinul,Dilaudid and Ativan for comfort. 21:00,temp is 102.8,Tylenol supp. Given. Pt remains tachycardic and tachypneic.  21:25,medicated with SubQ Dilaudid and Ativan. PEG tube is intact and clamped,no bowel sounds noted. Hill intact with minimal urine. Continue to monitor. 23:20,medicated with Dilaudid and Ativan for continued tachycardia and tachypnea. 01:30,turned and repositioned,medicated with Dilaudid and Ativan for comfort. Tachypnea and tachycardia continue,no secretions noted,but chest with coarse rhonchi. 02:40,family called out asking for meds,Dilaudid and Ativan given for continued sx.  03:00,pt stopped breathing,no heart sounds. Pt pronounced by RN. Daughters are grieving appropriately. Phone call was made by them to pt's  who did not answer his phone. [employer] will serve the family.

## 2017-11-20 ENCOUNTER — HOME CARE VISIT (OUTPATIENT)
Dept: HOSPICE | Facility: HOSPICE | Age: 62
End: 2017-11-20
Payer: MEDICARE

## 2017-11-29 NOTE — DISCHARGE SUMMARY
Discharge Summary    [facility]  [facility]  [phone]      Date of Admission: 11/16/2017  Date of Discharge: 11/19/2017    George Shaw is a 60y.o. year old who was admitted to [facility] at [facility] with a Hospice diagnosis of Laryngeal Ca. Pt was admitted for respite care initially but was switched to [facility] level care as she was doing poorly with worsening symptoms and care plan was focused on aggressive symptoms management and comfort as below. Active Symptoms:  1. Labored breathing: ++++ worse  2. Pain; generalized: worse ++  3. Increased airway/chest secretions: worse +++++  4. Fatigue/weakness: worse; pt lethargic and unresponsive  5. Anxiety: worse ++       PLAN   1. Change level of care to [facility] as pt declining fast and is very symptomatic and needs close monitoring and parenteral medications adjustments to manage comfort  2. Recommend SQ line access: 2 sites placed  3. Duonebs every 4 hours as needed to help with wheezing  4. Stop all oral medications as pt is lethargic and unable to swallow. 5. Stop tube feeds as it is not helping with pt;s comfort and leading to more reflux and chest congestion. 6. Morphine 4mg SQ every 15mts as needed  7. Ativan 1mg SQ every 15mts as needed  8. Robinul 0.2mg SQ every 4 hours as needed for secretions  9. Add scopolamine patch 1.5mg every 72 hoours  10. Haldol 0.5mg SQ every 4 hours as needed for intractable secretions.       8.  and SW to support family needs      The patient's care was focused on comfort and the patient passed away on 11/19/2017.

## 2018-09-20 NOTE — PROGRESS NOTES
----- Message from Shea Gaston sent at 9/20/2018  9:44 AM CDT -----  Contact: pt            Name of Who is Calling: pt       What is the request in detail: pt is requesting to discuss blood work. Call pt      Can the clinic reply by [facility]: no      What Number to Call Back if not in [facility]: [phone]                                     Oncology Nursing Communication Tool      7:16 PM  2/9/2017     Bedside shift change report given to VERO Gonzalez (incoming nurse) by Rachell James (outgoing nurse) on Charmain Halsted a 57 y.o. female who was admitted on 2/5/2017  8:56 PM. Report included the following information SBAR, Kardex, Intake/Output, MAR and Recent Results. Significant changes during shift:       Issues for physician to address:             Code Status: Full Code     Infections: No current active infections     Allergies: Review of patient's allergies indicates no known allergies. Current diet: DIET NUTRITIONAL SUPPLEMENTS All Meals, HS Snack; Ensure Complete  DIET CARDIAC Regular       Pain Controlled [] yes [] no   Bowel Movement [] yes [] no   Last Bowel Movement (date)                 Vital Signs:   Patient Vitals for the past 12 hrs:   Temp Pulse Resp BP SpO2   02/09/17 1400 97.5 °F (36.4 °C) 60 16 (!) 145/107 93 %   02/09/17 0815 - 66 - 151/80 -      Intake & Output:   No intake or output data in the 24 hours ending 02/09/17 1916   Laboratory Results:     Recent Results (from the past 12 hour(s))   METABOLIC PANEL, BASIC    Collection Time: 02/09/17  8:01 AM   Result Value Ref Range    Sodium 140 136 - 145 mmol/L    Potassium 4.1 3.5 - 5.1 mmol/L    Chloride 104 97 - 108 mmol/L    CO2 28 21 - 32 mmol/L    Anion gap 8 5 - 15 mmol/L    Glucose 158 (H) 65 - 100 mg/dL    BUN 31 (H) 6 - 20 MG/DL    Creatinine 1.18 (H) 0.55 - 1.02 MG/DL    BUN/Creatinine ratio 26 (H) 12 - 20      GFR est AA 56 (L) >60 ml/min/1.73m2    GFR est non-AA 46 (L) >60 ml/min/1.73m2    Calcium 9.7 8.5 - 10.1 MG/DL   GLUCOSE, POC    Collection Time: 02/09/17  1:03 PM   Result Value Ref Range    Glucose (POC) 100 65 - 100 mg/dL    Performed by Mick Goodrich for questions and clarifications were given to the incoming nurse.  Patient's bed is in low position, side rails x2, door open PRN, call bell within reach and patient not in distress.       Jinger Flatness

## 2019-07-06 NOTE — TELEPHONE ENCOUNTER
Returned call to [place] with Hospice. She asked if Dr. Christiano Young could help address patients cough and congestion. Dr. Alvarado Necessary notified and returned call to [place].     Kim Gardner RN, [facility]  Palliative Medicine none

## 2020-11-19 NOTE — DISCHARGE SUMMARY
Physician Discharge Summary       Patient: Jose Villa MRN: [id number]  SSN: [id number]    YOB: 1955  Age: 57 y.o. Sex: female    PCP: Mis Cunningham MD    Admit date: 3/6/2017  Admitting Provider: Ishan Ellison MD    Discharge date: 3/10/2017  Discharging Provider: Mis Cunningham MD    * Admission Diagnoses: Acute on chronic respiratory failure with hypoxemia [facility])    * Discharge Diagnoses:    Hospital Problems as of 3/10/2017  Date Reviewed: 3/10/2017          Codes Class Noted - Resolved POA    Aspiration pneumonia of both upper lobes ([facility] ICD-10-CM: J69.0  ICD-9-CM: 507.0  1/20/2017 - Present Yes        Sepsis ([facility] ICD-10-CM: A41.9  ICD-9-CM: 038.9, 995.91 Acute 1/15/2017 - Present Yes        Acute on chronic respiratory failure with hypoxemia ([facility] ICD-10-CM: J96.21  ICD-9-CM: 518.84  3/6/2017 - Present Yes        Dysphagia ICD-10-CM: R13.10  ICD-9-CM: 787.20  12/30/2014 - Present Yes        History of laryngeal cancer ICD-10-CM: Z85.21  ICD-9-CM: V10.21 Present on Admission 11/2/2015 - Present Yes        Chronic pain (Chronic) ICD-10-CM: G89.29  ICD-9-CM: 338.29 Chronic 1/15/2017 - Present Yes        Bipolar 1 disorder ([facility] ICD-10-CM: F31.9  ICD-9-CM: 296.7  11/29/2011 - Present Yes              * Hospital Course: Elderly Bipolar ,debilitated female admitted  again with rapid onset acute on chronic respiratory failure and sepsis due to recurrent aspiration pneumonia,despite recent Peg placement and tube feedings. She was admitted and placed on BIPAP,empiric antibiotics,and usual meds. Seen by [facility] notes. She improved rapidly and O2 was weaned. She was seen by Palliative Care for chronic pain control and pain management was satisfactory. She was in good condition and discharged home on 3/10/17        Consults: Pulmonary/Intensive care and Palliative Care    Significant Diagnostic Studies: radiology: CXR: infiltrate(s): bilateral     Discharge Exam:  Visit Sonja from [facility] received cancellation.   Vitals    /70 (BP 1 Location: Right arm, BP Patient Position: At rest)    Pulse 94    Temp 98.2 °F (36.8 °C)    Resp 18    Ht 5' 9\" (1.753 m)    Wt 144 lb 14.4 oz (65.7 kg)    LMP  (LMP Unknown)    SpO2 99%    BMI 21.4 kg/m2     General appearance: alert, cooperative, no distress, appears stated age  Lungs: clear to auscultation bilaterally, rhonchi R base, L base  Heart: regular rate and rhythm, S1, S2 normal, no murmur, click, rub or gallop  Abdomen: soft, non-tender. Bowel sounds normal. No masses,  no organomegaly    * Discharge Condition: good  * Disposition: Home    Discharge Medications:  Current Discharge Medication List      START taking these medications    Details   amoxicillin-clavulanate (AUGMENTIN) 250-62.5 mg/5 mL suspension Take 10 mL by mouth every eight (8) hours. Qty: 150 mL, Refills: 0      oxyCODONE (ROXICODONE INTENSOL) 20 mg/mL concentrated solution Take 0.5 mL by mouth every four (4) hours as needed. Max Daily Amount: 60 mg.  Qty: 60 mL, Refills: 0      predniSONE (DELTASONE) 10 mg tablet 2 tabs daily for 2 days,then 1 tab daily for 2 days,then 1 tab daily for 2 days,then 1/2 tab daily for 2 days  Qty: 10 Tab, Refills: 0         CONTINUE these medications which have NOT CHANGED    Details   bisacodyl 5 mg tab Take 1 Tab by mouth daily as needed (constipation). amLODIPine (NORVASC) 10 mg tablet Take 10 mg by mouth daily. VITAMIN B-12 1,000 mcg sublingual tablet 1,000 mcg by SubLINGual route daily. metoprolol tartrate (LOPRESSOR) 25 mg tablet Take 1 Tab by mouth every twelve (12) hours. Qty: 60 Tab, Refills: 11      lithium carbonate CR (ESKALITH CR) 450 mg CR tablet TAKE 1 TABLET BY ORAL ROUTE PER AT BEDTIME  Refills: 0    Associated Diagnoses: Bipolar 1 disorder (HCC)      FLUoxetine (PROZAC) 20 mg tablet Take 20 mg by mouth daily. traZODone (DESYREL) 100 mg tablet Take 200 mg by mouth nightly.   Refills: 2      albuterol-ipratropium (DUO-NEB) 2.5 mg-0.5 mg/3 ml nebu 3 mL by Nebulization route every four (4) hours as needed. Qty: 30 Nebule, Refills: 12      diphenhydrAMINE 12.5 mg/5 mL elix 40 mL, lidocaine 2 % soln 40 mL, aluminum & magnesium hydroxide-simethicone 400-400-40 mg/5 mL susp 40 mL Take 5 mL by mouth daily. Swish and spit or swallow. acetylcysteine (MUCOMYST) 100 mg/mL (10 %) nebulizer solution Take 4 mL by inhalation every four (4) hours. Qty: 120 mL, Refills: 11         STOP taking these medications       HYDROcodone-acetaminophen (NORCO)  mg tablet Comments:   Reason for Stopping:         guaiFENesin-dextromethorphan (MUCINEX DM) 600-30 mg per tablet Comments:   Reason for Stopping:               * Follow-up Care/Patient Instructions:   Activity: Activity as tolerated  Diet: PEG feeding       Follow-up Information     Follow up With Details Comments [id number], [place]   [address] [place] [address]  [phone]            Signed:  Anastasia Hendrix MD  3/10/2017  6:29 AM

## 2023-11-24 NOTE — PROGRESS NOTES
General Daily Progress Note    Admit Date: 2/23/2017  Hospital day 4    Subjective:     Patient has  Worsening cough and dyspnea.    Medication side effects: none    Current Facility-Administered Medications   Medication Dose Route Frequency    enoxaparin (LOVENOX) injection 40 mg  40 mg SubCUTAneous Q24H    metoprolol tartrate (LOPRESSOR) tablet 12.5 mg  12.5 mg Oral Q12H    cefepime (MAXIPIME) 2 g in 0.9% sodium chloride (MBP/ADV) 100 mL  2 g IntraVENous Q8H    sodium chloride (NS) flush 5-10 mL  5-10 mL IntraVENous PRN    metroNIDAZOLE (FLAGYL) IVPB premix 500 mg  500 mg IntraVENous Q8H    albuterol-ipratropium (DUO-NEB) 2.5 MG-0.5 MG/3 ML  3 mL Nebulization Q6H RT    FLUoxetine (PROzac) capsule 20 mg  20 mg Oral DAILY    gabapentin (NEURONTIN) capsule 600 mg  600 mg Oral TID    lithium carbonate CR (ESKALITH CR) tablet 450 mg  450 mg Oral DAILY    sodium chloride (NS) flush 5-10 mL  5-10 mL IntraVENous Q8H    sodium chloride (NS) flush 5-10 mL  5-10 mL IntraVENous PRN    ondansetron (ZOFRAN) injection 4 mg  4 mg IntraVENous Q4H PRN    0.9% sodium chloride infusion  100 mL/hr IntraVENous CONTINUOUS    levoFLOXacin (LEVAQUIN) 750 mg in D5W IVPB  750 mg IntraVENous Q24H    artificial saliva (MOUTH KOTE) 1 Spray  1 Spray Oral PRN    vancomycin (VANCOCIN) 1250 mg in  ml infusion  1,250 mg IntraVENous Q16H    acetylcysteine (MUCOMYST) 200 mg/mL (20 %) solution 400 mg  2 mL Inhalation TID    HYDROcodone-acetaminophen (NORCO)  mg tablet 2 Tab  2 Tab Oral Q4H PRN    traZODone (DESYREL) tablet 200 mg  200 mg Oral QHS        Review of Systems  Constitutional: positive for fatigue  Respiratory: positive for cough, wheezing or stridor  Cardiovascular: negative  Gastrointestinal: negative    Objective:     Patient Vitals for the past 8 hrs:   BP Temp Pulse Resp SpO2 Weight   02/26/17 0717 - - - - 92 % -   02/26/17 0619 - - - - - 152 lb 1.9 oz (69 kg)   02/26/17 0312 99/61 98.4 °F (36.9 °C) (!) [facility]    Phone Message    May a detailed message be left on voicemail: yes     Reason for Call: Other: Pt called in looking to discuss her on going nausea and anxiety. Pt states that nausea has be very persistent and would like to speak with someone regarding this. Please contact pt to discuss.     Action Taken: Message routed to:  Other: [facility]    Travel Screening: Not Applicable                                                                     109 20 99 % -   02/26/17 0308 - - - - 98 % -     02/26 0701 - 02/26 1900  In: -   Out: 620 [Urine:620]  02/24 1901 - 02/26 0700  In: 7115 [P.O.:3120; I.V.:2750]  Out: 8791 [Urine:3990]    Physical Exam:   Visit Vitals    BP 99/61 (BP 1 Location: Right arm, BP Patient Position: At rest)    Pulse (!) 116    Temp 98.4 °F (36.9 °C)    Resp 20    Ht 5' 9\" (1.753 m)    Wt 152 lb 1.9 oz (69 kg)    LMP  (LMP Unknown)    SpO2 96%    BMI 22.46 kg/m2     General appearance: alert, fatigued, cooperative, mild distress, appears stated age  Lungs: rhonchi R base, L base  Heart: regular rate and rhythm, S1, S2 normal, no murmur, click, rub or gallop  Abdomen: soft, non-tender. Bowel sounds normal. No masses,  no organomegaly      ECG: sinus tachycardia     Data Review   Recent Results (from the past 24 hour(s))   CBC WITH AUTOMATED DIFF    Collection Time: 02/26/17  4:28 AM   Result Value Ref Range    WBC 14.6 (H) 3.6 - 11.0 K/uL    RBC 2.96 (L) 3.80 - 5.20 M/uL    HGB 8.2 (L) 11.5 - 16.0 g/dL    HCT 27.4 (L) 35.0 - 47.0 %    MCV 92.6 80.0 - 99.0 FL    MCH 27.7 26.0 - 34.0 PG    MCHC 29.9 (L) 30.0 - 36.5 g/dL    RDW 17.6 (H) 11.5 - 14.5 %    PLATELET 862 475 - 431 K/uL    NEUTROPHILS 90 (H) 32 - 75 %    LYMPHOCYTES 5 (L) 12 - 49 %    MONOCYTES 4 (L) 5 - 13 %    EOSINOPHILS 1 0 - 7 %    BASOPHILS 0 0 - 1 %    ABS. NEUTROPHILS 13.1 (H) 1.8 - 8.0 K/UL    ABS. LYMPHOCYTES 0.7 (L) 0.8 - 3.5 K/UL    ABS. MONOCYTES 0.6 0.0 - 1.0 K/UL    ABS. EOSINOPHILS 0.2 0.0 - 0.4 K/UL    ABS.  BASOPHILS 0.0 0.0 - 0.1 K/UL   METABOLIC PANEL, BASIC    Collection Time: 02/26/17  4:28 AM   Result Value Ref Range    Sodium 144 136 - 145 mmol/L    Potassium 4.5 3.5 - 5.1 mmol/L    Chloride 108 97 - 108 mmol/L    CO2 28 21 - 32 mmol/L    Anion gap 8 5 - 15 mmol/L    Glucose 128 (H) 65 - 100 mg/dL    BUN 14 6 - 20 MG/DL    Creatinine 0.77 0.55 - 1.02 MG/DL    BUN/Creatinine ratio 18 12 - 20      GFR est AA >60 >60 ml/min/1.73m2    GFR est non-AA >60 >60 ml/min/1.73m2    Calcium 8.9 8.5 - 10.1 MG/DL   RETICULOCYTE COUNT    Collection Time: 02/26/17  4:28 AM   Result Value Ref Range    Reticulocyte count 1.6 0.7 - 2.1 %   BLOOD GAS, ARTERIAL    Collection Time: 02/26/17  7:24 AM   Result Value Ref Range    pH 7.32 (L) 7.35 - 7.45      PCO2 61 (H) 35.0 - 45.0 mmHg    PO2 50 (L) 80 - 100 mmHg    O2 SAT 82 (L) 92 - 97 %    BICARBONATE 31 (H) 22 - 26 mmol/L    BASE EXCESS 2.9 mmol/L    O2 METHOD NASAL O2      O2 FLOW RATE 6.00 L/min    SPONTANEOUS RATE 28.0      Sample source ARTERIAL      SITE RIGHT BRACHIAL      INESSA'S TEST N/A             Assessment:     Active Problems:    Acute respiratory failure (HCC) (2/6/2017)      Chronic obstructive pulmonary disease with acute exacerbation (HCC) (2/10/2017)      Dysphagia (12/30/2014)      History of laryngeal cancer (11/2/2015)      History of radiation to head and neck region (3/30/2016)      Respiratory failure (HCC) (2/23/2017)      Atrial fibrillation with RVR ([facility]) (2/24/2017)        Plan:     Worsening respiratory distress and hypoxia this am,likely aspiration. On face tent ,awaiting cxr. Pulmonary to see. Will ask GI to see for PEG. NPO except meds for now

## 2024-10-16 NOTE — PROGRESS NOTES
Noted   Pharmacy Automatic Renal Dosing Protocol - Antimicrobials      Indication for Antimicrobials: Aspiration Pneumonia  Current Regimen of Each Antimicrobial (Start Day & Day of Therapy): All started 3/6 day 1     Metronidazole     Vancomycin     Cefepime    Significant cultures: ND      CAPD, Intermittent Hemodialysis or Renal Replacement Therapy: no  Paralysis, amputations, malnutrition: no  Recent Labs      17   1800   CREA  1.32*   BUN  29*   WBC  31.5*     Temp (24hrs), Av.9 °F (37.2 °C), Min:98.8 °F (37.1 °C), Max:99 °F (37.2 °C)    Creatinine Clearance (ml/min): 44      Goal Vancomycin Level(s): 15-20      Impression/Plan: Vancomycin 1500 mg load then 1000 mg every 18 hours for anticipated trough of 17 mcg/ml. Pharmacy will follow daily and adjust doses of monitored medications as appropriate for renal function and/or serum levels.     Thank you,  Eleni Inman, [facility]

## 2025-01-18 NOTE — PROGRESS NOTES
[facility]  Pulmonary, Critical Care, and Sleep Medicine      Name: Mike Kerns MRN: [id number]   : 1955 Hospital: [facility]   Date: 3/9/2017        IMPRESSION:   · Acute on chronic respiratory failure  · Aspiration pneumonia  · Sepsis  · COPD exacerbation  · Non compliance  · Bipolar disorder  · Laryngeal cancer      RECOMMENDATIONS:   · Wean O2  · Jet nebs  · Taper Steroids  · Empiric abx, DC on PO abx  · Mucolytics  · Tube feeds  · Full code? ??  · Palliative care   · Home tomorrow     Subjective:     Much improved today  No acute distress        Current Facility-Administered Medications   Medication Dose Route Frequency    amoxicillin-clavulanate (AUGMENTIN) 250-62.5 mg/5 mL oral suspension 500 mg  500 mg Oral Q8H    guaiFENesin (ROBITUSSIN) 100 mg/5 mL oral liquid 400 mg  400 mg Oral QID    albuterol-ipratropium (DUO-NEB) 2.5 MG-0.5 MG/3 ML  3 mL Nebulization Q6H RT    FLUoxetine (PROzac) capsule 20 mg  20 mg Oral DAILY    lithium carbonate CR (ESKALITH CR) tablet 450 mg  450 mg Oral QHS    traZODone (DESYREL) tablet 200 mg  200 mg Oral QHS    0.9% sodium chloride infusion  50 mL/hr IntraVENous CONTINUOUS    heparin (porcine) injection 5,000 Units  5,000 Units SubCUTAneous Q12H       Review of Systems:  A comprehensive review of systems was negative except for: Respiratory: positive for cough, wheezing or dyspnea on exertion    Objective:   Vital Signs:    Visit Vitals    BP (!) 164/95 (BP 1 Location: Left arm, BP Patient Position: Sitting)    Pulse (!) 101    Temp 97.3 °F (36.3 °C)    Resp 20    Ht 5' 9\" (1.753 m)    Wt 65.7 kg (144 lb 14.4 oz)    LMP  (LMP Unknown)    SpO2 94%    BMI 21.4 kg/m2       O2 Device: Nasal cannula   O2 Flow Rate (L/min): 4 l/min   Temp (24hrs), Av.3 °F (36.8 °C), Min:97.3 °F (36.3 °C), Max:98.9 °F (37.2 °C)       Intake/Output:   Last shift:       07 -  1900  In: 140   Out: -   Last 3 shifts: Sliding scale held, accuchecks for hypoglycemia     Patient's FSGs are controlled on current medication regimen.  Last A1c reviewed-   Lab Results   Component Value Date    HGBA1C 5.5 10/11/2024     Most recent fingerstick glucose reviewed-   Recent Labs   Lab 01/17/25  1538 01/17/25  1939 01/18/25  0738 01/18/25  1210   POCTGLUCOSE 85 93 73 82       Current correctional scale   hold on correction scale give A1C is 5.5  Antihyperglycemics (From admission, onward)    None        Hold Oral hypoglycemics while patient is in the hospital; holding all given low intake   03/07 1901 - 03/09 0700  In: 6528.3 [I.V.:4973.3]  Out: 2550 [Urine:2550]    Intake/Output Summary (Last 24 hours) at 03/09/17 0926  Last data filed at 03/09/17 0816   Gross per 24 hour   Intake          3348.33 ml   Output             1050 ml   Net          2298.33 ml      Physical Exam:   General:  Alert, cooperative, no distress, appears stated age. Head:  Normocephalic, without obvious abnormality, atraumatic. Eyes:  Conjunctivae/corneas clear. PERRL, EOMs intact. Nose: Nares normal. Septum midline. Mucosa normal. No drainage or sinus tenderness. Throat: Lips, mucosa, and tongue normal. Teeth and gums normal.   Neck: Supple, symmetrical, trachea midline, no adenopathy, thyroid: no enlargment/tenderness/nodules, no carotid bruit and no JVD. Back:   Symmetric, no curvature. ROM normal.   Lungs:   Decreased BS. Chest wall:  No tenderness or deformity. Heart:  Regular rate and rhythm, S1, S2 normal, no murmur, click, rub or gallop. Abdomen:   Soft, non-tender. Bowel sounds normal. No masses,  No organomegaly. Extremities: Extremities normal, atraumatic, no cyanosis or edema. Pulses: 2+ and symmetric all extremities. Skin: Skin color, texture, turgor normal. No rashes or lesions   Lymph nodes: Cervical, supraclavicular, and axillary nodes normal.   Neurologic: Grossly nonfocal     Data review:     No results found for this or any previous visit (from the past 24 hour(s)).     Imaging:  I have personally reviewed the patients radiographs and have reviewed the reports:  CXR: marsha Mendoza MD

## (undated) DEVICE — 3M™ DURAPORE™ SURGICAL TAPE 1538-3, 3 INCH X 10 YARD (7,5CM X 9,1M), 4 ROLLS/BOX: Brand: 3M™ DURAPORE™

## (undated) DEVICE — TOWEL 4 PLY TISS 19X30 SUE WHT

## (undated) DEVICE — SOLIDIFIER MEDC 1200ML -- CONVERT TO 356117

## (undated) DEVICE — SET ADMIN 16ML TBNG L100IN 2 Y INJ SITE IV PIGGY BK DISP

## (undated) DEVICE — NEONATAL-ADULT SPO2 SENSOR: Brand: NELLCOR

## (undated) DEVICE — Device

## (undated) DEVICE — SYR 3ML LL TIP 1/10ML GRAD --

## (undated) DEVICE — BLOCK BITE ENDOSCP AD 21 MM W/ DIL BLU LF DISP

## (undated) DEVICE — (D)SYR 10ML 1/5ML GRAD NSAF -- PKGING CHANGE USE ITEM 338027

## (undated) DEVICE — PLUS BARRIER ISLAND DRESSING: Brand: TELFA

## (undated) DEVICE — Z DISCONTINUED PER MEDLINE LINE GAS SAMPLING O2/CO2 LNG AD 13 FT NSL W/ TBNG FILTERLINE

## (undated) DEVICE — Device: Brand: MEDEX

## (undated) DEVICE — NEEDLE HYPO 18GA L1.5IN PNK S STL HUB POLYPR SHLD REG BVL

## (undated) DEVICE — BASIN EMESIS 500CC ROSE 250/CS 60/PLT: Brand: MEDEGEN MEDICAL PRODUCTS, LLC

## (undated) DEVICE — KENDALL RADIOLUCENT FOAM MONITORING ELECTRODE RECTANGULAR SHAPE: Brand: KENDALL

## (undated) DEVICE — 1200 GUARD II KIT W/5MM TUBE W/O VAC TUBE: Brand: GUARDIAN

## (undated) DEVICE — MEDI-VAC YANK SUCT HNDL W/TPRD BULBOUS TIP: Brand: CARDINAL HEALTH